# Patient Record
Sex: FEMALE | URBAN - METROPOLITAN AREA
[De-identification: names, ages, dates, MRNs, and addresses within clinical notes are randomized per-mention and may not be internally consistent; named-entity substitution may affect disease eponyms.]

---

## 2019-04-15 ENCOUNTER — OFFICE (OUTPATIENT)
Dept: URBAN - METROPOLITAN AREA CLINIC 34 | Facility: CLINIC | Age: 74
Setting detail: OPHTHALMOLOGY
End: 2019-04-15
Payer: COMMERCIAL

## 2019-04-15 ENCOUNTER — RX ONLY (RX ONLY)
Age: 74
End: 2019-04-15

## 2019-04-15 DIAGNOSIS — H00.024: ICD-10-CM

## 2019-04-15 DIAGNOSIS — E11.9: ICD-10-CM

## 2019-04-15 DIAGNOSIS — H16.221: ICD-10-CM

## 2019-04-15 DIAGNOSIS — H25.13: ICD-10-CM

## 2019-04-15 DIAGNOSIS — H00.021: ICD-10-CM

## 2019-04-15 DIAGNOSIS — H01.004: ICD-10-CM

## 2019-04-15 DIAGNOSIS — H01.001: ICD-10-CM

## 2019-04-15 PROCEDURE — 92004 COMPRE OPH EXAM NEW PT 1/>: CPT | Performed by: OPHTHALMOLOGY

## 2019-04-15 ASSESSMENT — AXIALLENGTH_DERIVED
OS_AL: 22.6591
OD_AL: 22.6642
OD_AL: 22.6642
OS_AL: 22.5696

## 2019-04-15 ASSESSMENT — SUPERFICIAL PUNCTATE KERATITIS (SPK): OD_SPK: 1+

## 2019-04-15 ASSESSMENT — REFRACTION_MANIFEST
OS_VA2: 20/
OS_VA3: 20/
OD_VA2: 20/
OU_VA: 20/
OD_VA1: 20/40
OD_VA3: 20/
OS_VA3: 20/
OS_AXIS: 040
OD_AXIS: 175
OS_CYLINDER: +1.75
OS_VA1: 20/30
OU_VA: 20/
OS_SPHERE: +1.75
OD_VA1: 20/
OD_CYLINDER: +2.25
OD_VA2: 20/
OD_SPHERE: +1.00
OS_VA1: 20/
OD_VA3: 20/
OS_VA2: 20/

## 2019-04-15 ASSESSMENT — SPHEQUIV_DERIVED
OD_SPHEQUIV: 2.125
OD_SPHEQUIV: 2.125
OS_SPHEQUIV: 2.375
OS_SPHEQUIV: 2.625

## 2019-04-15 ASSESSMENT — REFRACTION_CURRENTRX
OD_OVR_VA: 20/
OS_OVR_VA: 20/

## 2019-04-15 ASSESSMENT — REFRACTION_AUTOREFRACTION
OD_SPHERE: +1.00
OD_AXIS: 164
OD_CYLINDER: +2.25
OS_CYLINDER: +1.25
OS_AXIS: 014
OS_SPHERE: +1.75

## 2019-04-15 ASSESSMENT — LID EXAM ASSESSMENTS
OD_MEIBOMITIS: RUL 1+
OD_BLEPHARITIS: RUL 1+
OS_COMMENTS: MISSING GLANDS, TELANGIECTASIA
OS_BLEPHARITIS: LUL T
OS_MEIBOMITIS: LUL 1+ 2+
OD_COMMENTS: MISSING GLANDS

## 2019-04-15 ASSESSMENT — KERATOMETRY
OD_K2POWER_DIOPTERS: 44.25
OS_K1POWER_DIOPTERS: 43.25
METHOD_AUTO_MANUAL: AUTO
OD_K1POWER_DIOPTERS: 43.50
OS_AXISANGLE_DEGREES: 016
OS_K2POWER_DIOPTERS: 44.00
OD_AXISANGLE_DEGREES: 168

## 2019-04-15 ASSESSMENT — CONFRONTATIONAL VISUAL FIELD TEST (CVF)
OD_FINDINGS: FULL
OS_FINDINGS: FULL

## 2019-04-15 ASSESSMENT — VISUAL ACUITY
OD_BCVA: 20/70
OS_BCVA: 20/60+2

## 2019-04-15 ASSESSMENT — DRY EYES - PHYSICIAN NOTES: OD_GENERALCOMMENTS: INFERIOR LIMBUS

## 2020-12-01 ENCOUNTER — OFFICE (OUTPATIENT)
Dept: URBAN - METROPOLITAN AREA CLINIC 63 | Facility: CLINIC | Age: 75
Setting detail: OPHTHALMOLOGY
End: 2020-12-01
Payer: MEDICAID

## 2020-12-01 DIAGNOSIS — E11.9: ICD-10-CM

## 2020-12-01 DIAGNOSIS — H40.013: ICD-10-CM

## 2020-12-01 DIAGNOSIS — H04.123: ICD-10-CM

## 2020-12-01 DIAGNOSIS — H25.13: ICD-10-CM

## 2020-12-01 DIAGNOSIS — H35.033: ICD-10-CM

## 2020-12-01 PROCEDURE — 92020 GONIOSCOPY: CPT | Performed by: OPHTHALMOLOGY

## 2020-12-01 PROCEDURE — 83861 MICROFLUID ANALY TEARS: CPT | Performed by: OPHTHALMOLOGY

## 2020-12-01 PROCEDURE — 92014 COMPRE OPH EXAM EST PT 1/>: CPT | Performed by: OPHTHALMOLOGY

## 2020-12-01 PROCEDURE — 92250 FUNDUS PHOTOGRAPHY W/I&R: CPT | Performed by: OPHTHALMOLOGY

## 2020-12-01 ASSESSMENT — AXIALLENGTH_DERIVED
OS_AL: 22.8807
OS_AL: 22.6539
OD_AL: 22.6168
OD_AL: 22.7067

## 2020-12-01 ASSESSMENT — TONOMETRY
OD_IOP_MMHG: 14
OD_IOP_MMHG: 20
OS_IOP_MMHG: 16
OS_IOP_MMHG: 12

## 2020-12-01 ASSESSMENT — REFRACTION_MANIFEST
OS_CYLINDER: +1.75
OD_VA1: 20/40
OS_VA1: 20/30
OD_AXIS: 175
OS_SPHERE: +1.75
OS_AXIS: 040
OD_SPHERE: +1.00
OD_CYLINDER: +2.25

## 2020-12-01 ASSESSMENT — REFRACTION_AUTOREFRACTION
OD_CYLINDER: -1.75
OD_SPHERE: +3.25
OS_SPHERE: +2.75
OD_AXIS: 053
OS_CYLINDER: -1.50
OS_AXIS: 112

## 2020-12-01 ASSESSMENT — REFRACTION_CURRENTRX
OD_OVR_VA: 20/
OD_CYLINDER: -0.75
OS_ADD: +2.75
OS_VPRISM_DIRECTION: PROGS
OD_ADD: +2.75
OD_SPHERE: +2.25
OD_AXIS: 020
OS_AXIS: 112
OS_CYLINDER: -0.75
OS_SPHERE: +2.25
OS_OVR_VA: 20/
OD_VPRISM_DIRECTION: PROGS

## 2020-12-01 ASSESSMENT — CONFRONTATIONAL VISUAL FIELD TEST (CVF)
OS_FINDINGS: FULL
OD_FINDINGS: FULL

## 2020-12-01 ASSESSMENT — KERATOMETRY
OS_K2POWER_DIOPTERS: 44.00
OD_K1POWER_DIOPTERS: 43.50
OS_AXISANGLE_DEGREES: 012
OD_AXISANGLE_DEGREES: 159
OS_K1POWER_DIOPTERS: 42.75
OD_K2POWER_DIOPTERS: 44.00
METHOD_AUTO_MANUAL: AUTO

## 2020-12-01 ASSESSMENT — SUPERFICIAL PUNCTATE KERATITIS (SPK)
OS_SPK: T
OD_SPK: T

## 2020-12-01 ASSESSMENT — SPHEQUIV_DERIVED
OS_SPHEQUIV: 2
OS_SPHEQUIV: 2.625
OD_SPHEQUIV: 2.375
OD_SPHEQUIV: 2.125

## 2020-12-01 ASSESSMENT — VISUAL ACUITY
OS_BCVA: 20/50
OD_BCVA: 20/40

## 2022-08-09 ENCOUNTER — OFFICE (OUTPATIENT)
Dept: URBAN - METROPOLITAN AREA CLINIC 63 | Facility: CLINIC | Age: 77
Setting detail: OPHTHALMOLOGY
End: 2022-08-09
Payer: MEDICAID

## 2022-08-09 DIAGNOSIS — H04.123: ICD-10-CM

## 2022-08-09 DIAGNOSIS — H25.13: ICD-10-CM

## 2022-08-09 DIAGNOSIS — H35.033: ICD-10-CM

## 2022-08-09 DIAGNOSIS — H02.834: ICD-10-CM

## 2022-08-09 DIAGNOSIS — H40.013: ICD-10-CM

## 2022-08-09 DIAGNOSIS — E11.9: ICD-10-CM

## 2022-08-09 DIAGNOSIS — H43.813: ICD-10-CM

## 2022-08-09 DIAGNOSIS — H02.831: ICD-10-CM

## 2022-08-09 PROCEDURE — 92014 COMPRE OPH EXAM EST PT 1/>: CPT | Performed by: STUDENT IN AN ORGANIZED HEALTH CARE EDUCATION/TRAINING PROGRAM

## 2022-08-09 PROCEDURE — 92133 CPTRZD OPH DX IMG PST SGM ON: CPT | Performed by: STUDENT IN AN ORGANIZED HEALTH CARE EDUCATION/TRAINING PROGRAM

## 2022-08-09 ASSESSMENT — SUPERFICIAL PUNCTATE KERATITIS (SPK)
OS_SPK: 2+
OD_SPK: 2+

## 2022-08-09 ASSESSMENT — TONOMETRY
OD_IOP_MMHG: 15
OS_IOP_MMHG: 15

## 2022-08-09 ASSESSMENT — CONFRONTATIONAL VISUAL FIELD TEST (CVF)
OD_FINDINGS: FULL
OS_FINDINGS: FULL

## 2022-08-09 ASSESSMENT — LID EXAM ASSESSMENTS
OS_MEIBOMITIS: LLL 1+
OD_MEIBOMITIS: RLL 1+

## 2022-08-09 ASSESSMENT — LID POSITION - DERMATOCHALASIS
OS_DERMATOCHALASIS: LUL 1+
OD_DERMATOCHALASIS: RUL 1+

## 2022-08-10 ASSESSMENT — SPHEQUIV_DERIVED
OS_SPHEQUIV: 2.5
OS_SPHEQUIV: 2.75
OD_SPHEQUIV: 1.75
OD_SPHEQUIV: 2.125
OD_SPHEQUIV: 2
OS_SPHEQUIV: 2.625

## 2022-08-10 ASSESSMENT — AXIALLENGTH_DERIVED
OD_AL: 22.9727
OS_AL: 22.7389
OD_AL: 22.835
OS_AL: 22.7843
OS_AL: 22.6938
OD_AL: 22.8807

## 2022-08-10 ASSESSMENT — KERATOMETRY
OS_AXISANGLE_DEGREES: 013
OD_K2POWER_DIOPTERS: 43.75
OD_K1POWER_DIOPTERS: 43.00
OS_K2POWER_DIOPTERS: 43.75
METHOD_AUTO_MANUAL: AUTO
OS_K1POWER_DIOPTERS: 42.50
OD_AXISANGLE_DEGREES: 163

## 2022-08-10 ASSESSMENT — REFRACTION_AUTOREFRACTION
OS_SPHERE: +3.25
OD_CYLINDER: -1.00
OS_AXIS: 113
OD_AXIS: 055
OS_CYLINDER: -1.00
OD_SPHERE: +2.25

## 2022-08-10 ASSESSMENT — REFRACTION_MANIFEST
OS_SPHERE: +3.00
OD_CYLINDER: -1.00
OD_AXIS: 055
OS_SPHERE: +1.75
OD_CYLINDER: +2.25
OS_CYLINDER: +1.75
OS_ADD: +2.75
OD_SPHERE: +1.00
OS_VA1: 20/40
OD_ADD: +2.75
OS_VA1: 20/30
OS_AXIS: 110
OD_SPHERE: +2.50
OS_AXIS: 040
OS_VA2: 20/25
OD_VA2: 20/25
OD_VA1: 20/40
OD_AXIS: 175
OD_VA1: 20/40
OU_VA: 20/30
OS_CYLINDER: -1.00

## 2022-08-10 ASSESSMENT — VISUAL ACUITY
OS_BCVA: 20/50
OD_BCVA: 20/40-1

## 2022-08-10 ASSESSMENT — REFRACTION_CURRENTRX
OD_VPRISM_DIRECTION: PROGS
OS_OVR_VA: 20/
OD_AXIS: 020
OD_CYLINDER: -0.75
OS_SPHERE: +2.25
OS_CYLINDER: -0.75
OD_SPHERE: +2.25
OS_VPRISM_DIRECTION: PROGS
OD_ADD: +2.75
OD_OVR_VA: 20/
OS_AXIS: 112
OS_ADD: +2.75

## 2023-01-06 ENCOUNTER — INPATIENT (INPATIENT)
Facility: HOSPITAL | Age: 78
LOS: 5 days | Discharge: HOME | DRG: 65 | End: 2023-01-12
Attending: PSYCHIATRY & NEUROLOGY | Admitting: PSYCHIATRY & NEUROLOGY
Payer: MEDICARE

## 2023-01-06 VITALS
RESPIRATION RATE: 20 BRPM | WEIGHT: 119.93 LBS | DIASTOLIC BLOOD PRESSURE: 73 MMHG | OXYGEN SATURATION: 98 % | TEMPERATURE: 98 F | HEART RATE: 72 BPM | HEIGHT: 62 IN | SYSTOLIC BLOOD PRESSURE: 205 MMHG

## 2023-01-06 LAB
ALBUMIN SERPL ELPH-MCNC: 4.2 G/DL — SIGNIFICANT CHANGE UP (ref 3.3–5)
ALP SERPL-CCNC: 117 U/L — SIGNIFICANT CHANGE UP (ref 40–120)
ALT FLD-CCNC: 11 U/L — SIGNIFICANT CHANGE UP (ref 10–45)
ANION GAP SERPL CALC-SCNC: 13 MMOL/L — SIGNIFICANT CHANGE UP (ref 5–17)
APTT BLD: 30.6 SEC — SIGNIFICANT CHANGE UP (ref 27.5–35.5)
AST SERPL-CCNC: 17 U/L — SIGNIFICANT CHANGE UP (ref 10–40)
BASE EXCESS BLDV CALC-SCNC: 1.3 MMOL/L — SIGNIFICANT CHANGE UP (ref -2–3)
BASOPHILS # BLD AUTO: 0.04 K/UL — SIGNIFICANT CHANGE UP (ref 0–0.2)
BASOPHILS NFR BLD AUTO: 0.5 % — SIGNIFICANT CHANGE UP (ref 0–2)
BILIRUB SERPL-MCNC: 0.2 MG/DL — SIGNIFICANT CHANGE UP (ref 0.2–1.2)
BLOOD GAS VENOUS - CREATININE: SIGNIFICANT CHANGE UP MG/DL (ref 0.5–1.3)
BUN SERPL-MCNC: 26 MG/DL — HIGH (ref 7–23)
CA-I SERPL-SCNC: 1.23 MMOL/L — SIGNIFICANT CHANGE UP (ref 1.15–1.33)
CALCIUM SERPL-MCNC: 9.7 MG/DL — SIGNIFICANT CHANGE UP (ref 8.4–10.5)
CHLORIDE BLDV-SCNC: 104 MMOL/L — SIGNIFICANT CHANGE UP (ref 96–108)
CHLORIDE SERPL-SCNC: 105 MMOL/L — SIGNIFICANT CHANGE UP (ref 96–108)
CO2 BLDV-SCNC: 29 MMOL/L — HIGH (ref 22–26)
CO2 SERPL-SCNC: 23 MMOL/L — SIGNIFICANT CHANGE UP (ref 22–31)
CREAT SERPL-MCNC: 0.87 MG/DL — SIGNIFICANT CHANGE UP (ref 0.5–1.3)
EGFR: 68 ML/MIN/1.73M2 — SIGNIFICANT CHANGE UP
EOSINOPHIL # BLD AUTO: 0.51 K/UL — HIGH (ref 0–0.5)
EOSINOPHIL NFR BLD AUTO: 6.6 % — HIGH (ref 0–6)
GAS PNL BLDV: 140 MMOL/L — SIGNIFICANT CHANGE UP (ref 136–145)
GAS PNL BLDV: SIGNIFICANT CHANGE UP
GLUCOSE BLDV-MCNC: 231 MG/DL — HIGH (ref 70–99)
GLUCOSE SERPL-MCNC: 226 MG/DL — HIGH (ref 70–99)
HCO3 BLDV-SCNC: 27 MMOL/L — SIGNIFICANT CHANGE UP (ref 22–29)
HCT VFR BLD CALC: 40.3 % — SIGNIFICANT CHANGE UP (ref 34.5–45)
HCT VFR BLDA CALC: 39 % — SIGNIFICANT CHANGE UP (ref 34.5–46.5)
HGB BLD CALC-MCNC: 13.1 G/DL — SIGNIFICANT CHANGE UP (ref 11.7–16.1)
HGB BLD-MCNC: 12.8 G/DL — SIGNIFICANT CHANGE UP (ref 11.5–15.5)
IMM GRANULOCYTES NFR BLD AUTO: 0.5 % — SIGNIFICANT CHANGE UP (ref 0–0.9)
INR BLD: 1.11 RATIO — SIGNIFICANT CHANGE UP (ref 0.88–1.16)
LACTATE BLDV-MCNC: 1.3 MMOL/L — SIGNIFICANT CHANGE UP (ref 0.5–2)
LYMPHOCYTES # BLD AUTO: 2.85 K/UL — SIGNIFICANT CHANGE UP (ref 1–3.3)
LYMPHOCYTES # BLD AUTO: 36.7 % — SIGNIFICANT CHANGE UP (ref 13–44)
MCHC RBC-ENTMCNC: 27.8 PG — SIGNIFICANT CHANGE UP (ref 27–34)
MCHC RBC-ENTMCNC: 31.8 GM/DL — LOW (ref 32–36)
MCV RBC AUTO: 87.4 FL — SIGNIFICANT CHANGE UP (ref 80–100)
MONOCYTES # BLD AUTO: 0.56 K/UL — SIGNIFICANT CHANGE UP (ref 0–0.9)
MONOCYTES NFR BLD AUTO: 7.2 % — SIGNIFICANT CHANGE UP (ref 2–14)
NEUTROPHILS # BLD AUTO: 3.77 K/UL — SIGNIFICANT CHANGE UP (ref 1.8–7.4)
NEUTROPHILS NFR BLD AUTO: 48.5 % — SIGNIFICANT CHANGE UP (ref 43–77)
NRBC # BLD: 0 /100 WBCS — SIGNIFICANT CHANGE UP (ref 0–0)
PCO2 BLDV: 47 MMHG — HIGH (ref 39–42)
PH BLDV: 7.37 — SIGNIFICANT CHANGE UP (ref 7.32–7.43)
PLATELET # BLD AUTO: 277 K/UL — SIGNIFICANT CHANGE UP (ref 150–400)
PO2 BLDV: 37 MMHG — SIGNIFICANT CHANGE UP (ref 25–45)
POTASSIUM BLDV-SCNC: 3.8 MMOL/L — SIGNIFICANT CHANGE UP (ref 3.5–5.1)
POTASSIUM SERPL-MCNC: 4 MMOL/L — SIGNIFICANT CHANGE UP (ref 3.5–5.3)
POTASSIUM SERPL-SCNC: 4 MMOL/L — SIGNIFICANT CHANGE UP (ref 3.5–5.3)
PROT SERPL-MCNC: 8 G/DL — SIGNIFICANT CHANGE UP (ref 6–8.3)
PROTHROM AB SERPL-ACNC: 12.8 SEC — SIGNIFICANT CHANGE UP (ref 10.5–13.4)
RBC # BLD: 4.61 M/UL — SIGNIFICANT CHANGE UP (ref 3.8–5.2)
RBC # FLD: 13.9 % — SIGNIFICANT CHANGE UP (ref 10.3–14.5)
SAO2 % BLDV: 61.8 % — LOW (ref 67–88)
SODIUM SERPL-SCNC: 141 MMOL/L — SIGNIFICANT CHANGE UP (ref 135–145)
TROPONIN T, HIGH SENSITIVITY RESULT: 19 NG/L — SIGNIFICANT CHANGE UP (ref 0–51)
WBC # BLD: 7.77 K/UL — SIGNIFICANT CHANGE UP (ref 3.8–10.5)
WBC # FLD AUTO: 7.77 K/UL — SIGNIFICANT CHANGE UP (ref 3.8–10.5)

## 2023-01-06 PROCEDURE — 99291 CRITICAL CARE FIRST HOUR: CPT

## 2023-01-06 PROCEDURE — 70450 CT HEAD/BRAIN W/O DYE: CPT | Mod: 26,MA,59

## 2023-01-06 PROCEDURE — 71045 X-RAY EXAM CHEST 1 VIEW: CPT | Mod: 26

## 2023-01-06 PROCEDURE — 70496 CT ANGIOGRAPHY HEAD: CPT | Mod: 26,MA

## 2023-01-06 PROCEDURE — 70498 CT ANGIOGRAPHY NECK: CPT | Mod: 26,MA

## 2023-01-06 PROCEDURE — 0042T: CPT | Mod: MA

## 2023-01-06 NOTE — CONSULT NOTE ADULT - SUBJECTIVE AND OBJECTIVE BOX
Neurology - Consult Note - 01-06-23  -  Philippe Cantu MD  PGY-3 Neurology  Spectra: [phone] ([facility]), [phone] ([facility])  -    Name: SARAH DISLA; 78y (1945)    Reason for Admission:     Chief Complaint:     HPI:       Review of Systems:  INCOMPLETE   CONSTITUTIONAL: No fevers or chills  EYES/ENT: No visual changes or no throat pain   NECK: No pain or stiffness  RESPIRATORY: No hemoptysis or shortness of breath  CARDIOVASCULAR: No chest pain or palpitations  GASTROINTESTINAL: No melena or hematochezia  GENITOURINARY: No dysuria or hematuria  NEUROLOGICAL: +As stated in HPI above  SKIN: No itching, burning, rashes, or lesions   All other review of systems is negative unless indicated above.    Allergies:      PMHx:     PFHx:     PSuHx:       Medications:  MEDICATIONS  (STANDING):    MEDICATIONS  (PRN):      Vitals:  T(C): 36.7 (01-06-23 @ 22:50), Max: 36.7 (01-06-23 @ 22:50)  HR: 72 (01-06-23 @ 22:50) (72 - 72)  BP: 205/73 (01-06-23 @ 22:50) (205/73 - 205/73)  RR: 20 (01-06-23 @ 22:50) (20 - 20)  SpO2: 98% (01-06-23 @ 22:50) (98% - 98%)    Physical Examination: INCOMPLETE  ***    Labs:          CAPILLARY BLOOD GLUCOSE      POCT Blood Glucose.: 215 mg/dL (06 Jan 2023 22:54)         Radiology:     Neurology - Consult Note - 01-06-23  -  Philippe Cantu MD  PGY-3 Neurology  Spectra: [phone] ([facility]), [phone] ([facility])  -    Name: SARAH DISLA; 78y (1945)    Reason for Admission:     Chief Complaint:     HPI:       Review of Systems:  INCOMPLETE   CONSTITUTIONAL: No fevers or chills  EYES/ENT: No visual changes or no throat pain   NECK: No pain or stiffness  RESPIRATORY: No hemoptysis or shortness of breath  CARDIOVASCULAR: No chest pain or palpitations  GASTROINTESTINAL: No melena or hematochezia  GENITOURINARY: No dysuria or hematuria  NEUROLOGICAL: +As stated in HPI above  SKIN: No itching, burning, rashes, or lesions   All other review of systems is negative unless indicated above.    Allergies:      PMHx:     PFHx:     PSuHx:       Medications:  MEDICATIONS  (STANDING):    MEDICATIONS  (PRN):      Vitals:  T(C): 36.7 (01-06-23 @ 22:50), Max: 36.7 (01-06-23 @ 22:50)  HR: 72 (01-06-23 @ 22:50) (72 - 72)  BP: 205/73 (01-06-23 @ 22:50) (205/73 - 205/73)  RR: 20 (01-06-23 @ 22:50) (20 - 20)  SpO2: 98% (01-06-23 @ 22:50) (98% - 98%)    Physical Examination: INCOMPLETE  ***    Labs:          CAPILLARY BLOOD GLUCOSE      POCT Blood Glucose.: 215 mg/dL (06 Jan 2023 22:54)         Radiology:     Neurology - Consult Note - 01-06-23  -  Philippe Cantu MD  PGY-3 Neurology  Spectra: [phone] ([facility]), [phone] ([facility])  -    Name: SARAH DISLA; 78y (1945)    Reason for Admission:     Chief Complaint:     HPI:       Review of Systems:  INCOMPLETE   CONSTITUTIONAL: No fevers or chills  EYES/ENT: No visual changes or no throat pain   NECK: No pain or stiffness  RESPIRATORY: No hemoptysis or shortness of breath  CARDIOVASCULAR: No chest pain or palpitations  GASTROINTESTINAL: No melena or hematochezia  GENITOURINARY: No dysuria or hematuria  NEUROLOGICAL: +As stated in HPI above  SKIN: No itching, burning, rashes, or lesions   All other review of systems is negative unless indicated above.    Allergies:      PMHx:     PFHx:     PSuHx:       Medications:  MEDICATIONS  (STANDING):    MEDICATIONS  (PRN):      Vitals:  T(C): 36.7 (01-06-23 @ 22:50), Max: 36.7 (01-06-23 @ 22:50)  HR: 72 (01-06-23 @ 22:50) (72 - 72)  BP: 205/73 (01-06-23 @ 22:50) (205/73 - 205/73)  RR: 20 (01-06-23 @ 22:50) (20 - 20)  SpO2: 98% (01-06-23 @ 22:50) (98% - 98%)    Physical Examination: INCOMPLETE  ***    Labs:          CAPILLARY BLOOD GLUCOSE      POCT Blood Glucose.: 215 mg/dL (06 Jan 2023 22:54)         Radiology:     Neurology - Consult Note - 01-06-23  -  Philippe Cantu MD  PGY-3 Neurology  Spectra: [phone] ([facility]), [phone] ([facility])  -    Name: SARAH DISLA; 78y (1945)    Reason for Admission:     Chief Complaint:     HPI:       Review of Systems: unable to obtain ROS due to lack of speech.  All other review of systems is negative unless indicated above.    Allergies:  NKDA      PMHx:     PFHx:     PSuHx:       Medications:  MEDICATIONS  (STANDING):    MEDICATIONS  (PRN):      Vitals:  T(C): 36.7 (01-06-23 @ 22:50), Max: 36.7 (01-06-23 @ 22:50)  HR: 72 (01-06-23 @ 22:50) (72 - 72)  BP: 205/73 (01-06-23 @ 22:50) (205/73 - 205/73)  RR: 20 (01-06-23 @ 22:50) (20 - 20)  SpO2: 98% (01-06-23 @ 22:50) (98% - 98%)    Physical Examination:      Constitutional: well-developed, well-nourished, well-groomed  Eyes: ophthalmoscopic exam deferred secondary to COVID-19 pandemic  Cardiovascular: no swelling, warm-to-touch    Neurological Examination:    - Mental Status: Eyes open, awake, appears alert, but does not attend to examiner. Unable to assess [SCOTT] orientation to person, age, month, year, location, and situation. No discernable speech. Does not name, repeat, or follow simple commands.     - Cranial Nerves:  II: Diminished blink to threat in the right VF, intact in the left VF. PERRL.  III, IV, VI: Extraocular movements are grossly intact at least horizontally, unclear if patient buries sclera on either side as patient not following commands. No nystagmus.  V: Unable to assess facial sensation.  VII: Moderate-to-severe right lower facial weakness (somewhat limited examination as patient not following command). Forehead appears intact.  VIII: Unable to appropriately assess hearing.    - Motor/Strength Testing:  - Limited motor/strength testing as patient not following commands and does not mimic examiner.  - All extremities drift to the bed before 5 seconds.   - RUE falls faster than LUE.   - LLE falls faster than RLE (not a typo).     - Normal muscle bulk and tone throughout.    - Reflexes:   Bicep (C5/C6):                  R 2+ --- L 2+   Brachioradialis (C5/C6) :   R 2+ --- L 2+   Patella (L3/L4) :                 R 2+ --- L 0+   Ankle (S1) :                       R 0+ --- L 0+     - Plant responses up-going on the right, down-going on the left.    - Sensory: Less grimace and response to noxious stimuli applied to the LUE and LLE. Intact on the right body.  - Coordination: Unable to assess coordination due to not following commands.  - Gait: Unable to assess gait due to not following commands.      Labs:  01-06    141  |  105  |  26<H>  ----------------------------<  226<H>  4.0   |  23  |  0.87    Ca    9.7      06 Jan 2023 23:06    TPro  8.0  /  Alb  4.2  /  TBili  0.2  /  DBili  x   /  AST  17  /  ALT  11  /  AlkPhos  117  01-06                            12.8   7.77  )-----------( 277      ( 06 Jan 2023 23:06 )             40.3       PT/INR - ( 06 Jan 2023 23:06 )   PT: 12.8 sec;   INR: 1.11 ratio     PTT - ( 06 Jan 2023 23:06 )  PTT:30.6 sec  LIVER FUNCTIONS - ( 06 Jan 2023 23:06 )  Alb: 4.2 g/dL / Pro: 8.0 g/dL / ALK PHOS: 117 U/L / ALT: 11 U/L / AST: 17 U/L / GGT: x               Radiology:    CT Brain Stroke Protocol (01.06.23 @ 23:14)    ACC: [id number] EXAM:  CT BRAIN STROKE PROTOCOL                          PROCEDURE DATE:  01/06/2023      INTERPRETATION:  CT HEAD WITHOUT CONTRAST    INDICATION: 78 year old. Female. Canceled code stroke. Right-sided focal   neurological deficits.    COMPARISON: None available.    TECHNIQUE: Noncontrast axial CT head was obtained from the skull base to   vertex. RAPID artificial intelligence was used for preliminary evaluation   of intracranial hemorrhage.    FINDINGS:  Hypoattenuation of the left temporal lobe.  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    No acute intracranial hemorrhage, mass effect or midline shift.    The ventricles and cortical sulci are prominent likely secondary to   cerebral volume loss.  There are hemispheric white matter areas of low attenuation which are   nonspecific but likely related to sequelae of microvascular disease.    The visualized paranasal sinuses and mastoid air cells are well aerated.  No displaced calvarial fracture.    IMPRESSION:  No acute intracranial hemorrhage.    Hypoattenuation of the left temporal lobe, may represent acute left MCA   territory infarct.    Findings discussed with Dr. Cantu by Dr. Muse at 11:14 PM on   1/6/2023.    --- End of Report ---     EV MUSE MD; Resident Radiologist  This document has been electronically signed.  JAKUB SMITH MD; Attending Radiologist  This document has been electronically signed. Jan 6 2023 11:40PM       CT Angio Head & Neck w/ IV Cont; CT Perfusion (01.06.23 @ 23:26)    ACC: [id number] EXAM:  CT ANGIO NECK (W)AW IC                        ACC: [id number] EXAM:  CT BRAIN PERFUSION MAPS STROKE                        ACC: [id number] EXAM:  CT ANGIO BRAIN (W)AW IC                          PROCEDURE DATE:  01/06/2023          INTERPRETATION:  CT ANGIOGRAPHY OF THE HEAD AND NECK    HISTORY: 78 years old. Female. Stroke Code.    COMPARISON: None available.    TECHNIQUE: CT angiographic evaluation of the head and neck was performed   consisting of contiguous axial sections scanned from the vertex to the   level of the aortic arch following the intravenous administration of   iodinated contrast. The acquired data was post-processed to generate   3D/MIP coronal, and sagittal reformats of the head and neck arterial   vasculature. Vascular stenosis is measured by NASCET criteria comparing   the narrowest segment with the distal luminal diameter as related to the   reported measure of arterial narrowing.  90 cc Omnipaque 350 was administered.    FINDINGS:  Conventional arch anatomy. Great vessel origins are patent. Innominate   and visualized subclavian arteries are patent. The common carotid   arteries are unremarkable.    The carotid bifurcations are patent without significant stenosis. The   internal carotid arteries are patent without significant stenosis.    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 The   anterior and right middle cerebral arteries are patent.    Bilateral vertebral arteries are unremarkable from their origins to their   intracranial segments.    The basilar artery is unremarkable. The posterior cerebral arteries are   patent.    Other:  No enlarged cervical adenopathy by size criteria.    Left upper lobe bronchiectasis.    Multilevel degenerative changes in the cervical spine.    CT PERFUSION:  The following measurements were obtained on perfusion maps:    Tmax >10s: 5 mL  Tmax >8s: 16 mL  Tmax >6s: 59 mL  Tmax >4s: 118 mL    CBF <  20%: 0 mL  CBF <  30%: 5 mL  CBF <  34%: 6 mL  CBF <  38%: 7 mL    CBV <  34%: 0 mL  CBV <  38%: 0 mL  CBV <  42%: 0 mL    Core infarct (CBF <  30%:): 5 mL  Penumbra (Tmax >6s): 59 mL  Mismatched volume: 54 mL  Mismatched ratio: 11.8    IMPRESSION:  CTA Head & Neck: Severe stenosis/near complete occlusion with the left M2   branch.  CT Perfusion: There is a penumbra involving a large area of the left MCA   territory.    Findings discussed with  by Dr. Muse at 11:35 PM on   1/6/2023.    Neurology - Consult Note - 01-06-23  -  Philippe Cantu MD  PGY-3 Neurology  Spectra: [phone] ([facility]), [phone] ([facility])  -    Name: SARAH DISLA; 78y (1945)    Reason for Admission:     Chief Complaint:     HPI:       Review of Systems: unable to obtain ROS due to lack of speech.  All other review of systems is negative unless indicated above.    Allergies:  NKDA      PMHx:     PFHx:     PSuHx:       Medications:  MEDICATIONS  (STANDING):    MEDICATIONS  (PRN):      Vitals:  T(C): 36.7 (01-06-23 @ 22:50), Max: 36.7 (01-06-23 @ 22:50)  HR: 72 (01-06-23 @ 22:50) (72 - 72)  BP: 205/73 (01-06-23 @ 22:50) (205/73 - 205/73)  RR: 20 (01-06-23 @ 22:50) (20 - 20)  SpO2: 98% (01-06-23 @ 22:50) (98% - 98%)    Physical Examination:      Constitutional: well-developed, well-nourished, well-groomed  Eyes: ophthalmoscopic exam deferred secondary to COVID-19 pandemic  Cardiovascular: no swelling, warm-to-touch    Neurological Examination:    - Mental Status: Eyes open, awake, appears alert, but does not attend to examiner. Unable to assess [SCOTT] orientation to person, age, month, year, location, and situation. No discernable speech. Does not name, repeat, or follow simple commands.     - Cranial Nerves:  II: Diminished blink to threat in the right VF, intact in the left VF. PERRL.  III, IV, VI: Extraocular movements are grossly intact at least horizontally, unclear if patient buries sclera on either side as patient not following commands. No nystagmus.  V: Unable to assess facial sensation.  VII: Moderate-to-severe right lower facial weakness (somewhat limited examination as patient not following command). Forehead appears intact.  VIII: Unable to appropriately assess hearing.    - Motor/Strength Testing:  - Limited motor/strength testing as patient not following commands and does not mimic examiner.  - All extremities drift to the bed before 5 seconds.   - RUE falls faster than LUE.   - LLE falls faster than RLE (not a typo).     - Normal muscle bulk and tone throughout.    - Reflexes:   Bicep (C5/C6):                  R 2+ --- L 2+   Brachioradialis (C5/C6) :   R 2+ --- L 2+   Patella (L3/L4) :                 R 2+ --- L 0+   Ankle (S1) :                       R 0+ --- L 0+     - Plant responses up-going on the right, down-going on the left.    - Sensory: Less grimace and response to noxious stimuli applied to the LUE and LLE. Intact on the right body.  - Coordination: Unable to assess coordination due to not following commands.  - Gait: Unable to assess gait due to not following commands.      Labs:  01-06    141  |  105  |  26<H>  ----------------------------<  226<H>  4.0   |  23  |  0.87    Ca    9.7      06 Jan 2023 23:06    TPro  8.0  /  Alb  4.2  /  TBili  0.2  /  DBili  x   /  AST  17  /  ALT  11  /  AlkPhos  117  01-06                            12.8   7.77  )-----------( 277      ( 06 Jan 2023 23:06 )             40.3       PT/INR - ( 06 Jan 2023 23:06 )   PT: 12.8 sec;   INR: 1.11 ratio     PTT - ( 06 Jan 2023 23:06 )  PTT:30.6 sec  LIVER FUNCTIONS - ( 06 Jan 2023 23:06 )  Alb: 4.2 g/dL / Pro: 8.0 g/dL / ALK PHOS: 117 U/L / ALT: 11 U/L / AST: 17 U/L / GGT: x               Radiology:    CT Brain Stroke Protocol (01.06.23 @ 23:14)    ACC: [id number] EXAM:  CT BRAIN STROKE PROTOCOL                          PROCEDURE DATE:  01/06/2023      INTERPRETATION:  CT HEAD WITHOUT CONTRAST    INDICATION: 78 year old. Female. Canceled code stroke. Right-sided focal   neurological deficits.    COMPARISON: None available.    TECHNIQUE: Noncontrast axial CT head was obtained from the skull base to   vertex. RAPID artificial intelligence was used for preliminary evaluation   of intracranial hemorrhage.    FINDINGS:  Hypoattenuation of the left temporal lobe.  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    No acute intracranial hemorrhage, mass effect or midline shift.    The ventricles and cortical sulci are prominent likely secondary to   cerebral volume loss.  There are hemispheric white matter areas of low attenuation which are   nonspecific but likely related to sequelae of microvascular disease.    The visualized paranasal sinuses and mastoid air cells are well aerated.  No displaced calvarial fracture.    IMPRESSION:  No acute intracranial hemorrhage.    Hypoattenuation of the left temporal lobe, may represent acute left MCA   territory infarct.    Findings discussed with Dr. Cantu by Dr. Muse at 11:14 PM on   1/6/2023.    --- End of Report ---     EV MUSE MD; Resident Radiologist  This document has been electronically signed.  JAKUB SMITH MD; Attending Radiologist  This document has been electronically signed. Jan 6 2023 11:40PM       CT Angio Head & Neck w/ IV Cont; CT Perfusion (01.06.23 @ 23:26)    ACC: [id number] EXAM:  CT ANGIO NECK (W)AW IC                        ACC: [id number] EXAM:  CT BRAIN PERFUSION MAPS STROKE                        ACC: [id number] EXAM:  CT ANGIO BRAIN (W)AW IC                          PROCEDURE DATE:  01/06/2023          INTERPRETATION:  CT ANGIOGRAPHY OF THE HEAD AND NECK    HISTORY: 78 years old. Female. Stroke Code.    COMPARISON: None available.    TECHNIQUE: CT angiographic evaluation of the head and neck was performed   consisting of contiguous axial sections scanned from the vertex to the   level of the aortic arch following the intravenous administration of   iodinated contrast. The acquired data was post-processed to generate   3D/MIP coronal, and sagittal reformats of the head and neck arterial   vasculature. Vascular stenosis is measured by NASCET criteria comparing   the narrowest segment with the distal luminal diameter as related to the   reported measure of arterial narrowing.  90 cc Omnipaque 350 was administered.    FINDINGS:  Conventional arch anatomy. Great vessel origins are patent. Innominate   and visualized subclavian arteries are patent. The common carotid   arteries are unremarkable.    The carotid bifurcations are patent without significant stenosis. The   internal carotid arteries are patent without significant stenosis.    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 The   anterior and right middle cerebral arteries are patent.    Bilateral vertebral arteries are unremarkable from their origins to their   intracranial segments.    The basilar artery is unremarkable. The posterior cerebral arteries are   patent.    Other:  No enlarged cervical adenopathy by size criteria.    Left upper lobe bronchiectasis.    Multilevel degenerative changes in the cervical spine.    CT PERFUSION:  The following measurements were obtained on perfusion maps:    Tmax >10s: 5 mL  Tmax >8s: 16 mL  Tmax >6s: 59 mL  Tmax >4s: 118 mL    CBF <  20%: 0 mL  CBF <  30%: 5 mL  CBF <  34%: 6 mL  CBF <  38%: 7 mL    CBV <  34%: 0 mL  CBV <  38%: 0 mL  CBV <  42%: 0 mL    Core infarct (CBF <  30%:): 5 mL  Penumbra (Tmax >6s): 59 mL  Mismatched volume: 54 mL  Mismatched ratio: 11.8    IMPRESSION:  CTA Head & Neck: Severe stenosis/near complete occlusion with the left M2   branch.  CT Perfusion: There is a penumbra involving a large area of the left MCA   territory.    Findings discussed with  by Dr. Muse at 11:35 PM on   1/6/2023.    Neurology - Consult Note - 01-06-23  -  Philippe Cantu MD  PGY-3 Neurology  Spectra: [phone] ([facility]), [phone] ([facility])  -    Name: SARAH DISLA; 78y (1945)    Reason for Admission:     Chief Complaint:     HPI:       Review of Systems: unable to obtain ROS due to lack of speech.  All other review of systems is negative unless indicated above.    Allergies:  NKDA      PMHx:     PFHx:     PSuHx:       Medications:  MEDICATIONS  (STANDING):    MEDICATIONS  (PRN):      Vitals:  T(C): 36.7 (01-06-23 @ 22:50), Max: 36.7 (01-06-23 @ 22:50)  HR: 72 (01-06-23 @ 22:50) (72 - 72)  BP: 205/73 (01-06-23 @ 22:50) (205/73 - 205/73)  RR: 20 (01-06-23 @ 22:50) (20 - 20)  SpO2: 98% (01-06-23 @ 22:50) (98% - 98%)    Physical Examination:      Constitutional: well-developed, well-nourished, well-groomed  Eyes: ophthalmoscopic exam deferred secondary to COVID-19 pandemic  Cardiovascular: no swelling, warm-to-touch    Neurological Examination:    - Mental Status: Eyes open, awake, appears alert, but does not attend to examiner. Unable to assess [SCOTT] orientation to person, age, month, year, location, and situation. No discernable speech. Does not name, repeat, or follow simple commands.     - Cranial Nerves:  II: Diminished blink to threat in the right VF, intact in the left VF. PERRL.  III, IV, VI: Extraocular movements are grossly intact at least horizontally, unclear if patient buries sclera on either side as patient not following commands. No nystagmus.  V: Unable to assess facial sensation.  VII: Moderate-to-severe right lower facial weakness (somewhat limited examination as patient not following command). Forehead appears intact.  VIII: Unable to appropriately assess hearing.    - Motor/Strength Testing:  - Limited motor/strength testing as patient not following commands and does not mimic examiner.  - All extremities drift to the bed before 5 seconds.   - RUE falls faster than LUE.   - LLE falls faster than RLE (not a typo).     - Normal muscle bulk and tone throughout.    - Reflexes:   Bicep (C5/C6):                  R 2+ --- L 2+   Brachioradialis (C5/C6) :   R 2+ --- L 2+   Patella (L3/L4) :                 R 2+ --- L 0+   Ankle (S1) :                       R 0+ --- L 0+     - Plant responses up-going on the right, down-going on the left.    - Sensory: Less grimace and response to noxious stimuli applied to the LUE and LLE. Intact on the right body.  - Coordination: Unable to assess coordination due to not following commands.  - Gait: Unable to assess gait due to not following commands.      Labs:  01-06    141  |  105  |  26<H>  ----------------------------<  226<H>  4.0   |  23  |  0.87    Ca    9.7      06 Jan 2023 23:06    TPro  8.0  /  Alb  4.2  /  TBili  0.2  /  DBili  x   /  AST  17  /  ALT  11  /  AlkPhos  117  01-06                            12.8   7.77  )-----------( 277      ( 06 Jan 2023 23:06 )             40.3       PT/INR - ( 06 Jan 2023 23:06 )   PT: 12.8 sec;   INR: 1.11 ratio     PTT - ( 06 Jan 2023 23:06 )  PTT:30.6 sec  LIVER FUNCTIONS - ( 06 Jan 2023 23:06 )  Alb: 4.2 g/dL / Pro: 8.0 g/dL / ALK PHOS: 117 U/L / ALT: 11 U/L / AST: 17 U/L / GGT: x               Radiology:    CT Brain Stroke Protocol (01.06.23 @ 23:14)    ACC: [id number] EXAM:  CT BRAIN STROKE PROTOCOL                          PROCEDURE DATE:  01/06/2023      INTERPRETATION:  CT HEAD WITHOUT CONTRAST    INDICATION: 78 year old. Female. Canceled code stroke. Right-sided focal   neurological deficits.    COMPARISON: None available.    TECHNIQUE: Noncontrast axial CT head was obtained from the skull base to   vertex. RAPID artificial intelligence was used for preliminary evaluation   of intracranial hemorrhage.    FINDINGS:  Hypoattenuation of the left temporal lobe.  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    No acute intracranial hemorrhage, mass effect or midline shift.    The ventricles and cortical sulci are prominent likely secondary to   cerebral volume loss.  There are hemispheric white matter areas of low attenuation which are   nonspecific but likely related to sequelae of microvascular disease.    The visualized paranasal sinuses and mastoid air cells are well aerated.  No displaced calvarial fracture.    IMPRESSION:  No acute intracranial hemorrhage.    Hypoattenuation of the left temporal lobe, may represent acute left MCA   territory infarct.    Findings discussed with Dr. Cantu by Dr. Muse at 11:14 PM on   1/6/2023.    --- End of Report ---     EV MUSE MD; Resident Radiologist  This document has been electronically signed.  JAKUB SMITH MD; Attending Radiologist  This document has been electronically signed. Jan 6 2023 11:40PM       CT Angio Head & Neck w/ IV Cont; CT Perfusion (01.06.23 @ 23:26)    ACC: [id number] EXAM:  CT ANGIO NECK (W)AW IC                        ACC: [id number] EXAM:  CT BRAIN PERFUSION MAPS STROKE                        ACC: [id number] EXAM:  CT ANGIO BRAIN (W)AW IC                          PROCEDURE DATE:  01/06/2023          INTERPRETATION:  CT ANGIOGRAPHY OF THE HEAD AND NECK    HISTORY: 78 years old. Female. Stroke Code.    COMPARISON: None available.    TECHNIQUE: CT angiographic evaluation of the head and neck was performed   consisting of contiguous axial sections scanned from the vertex to the   level of the aortic arch following the intravenous administration of   iodinated contrast. The acquired data was post-processed to generate   3D/MIP coronal, and sagittal reformats of the head and neck arterial   vasculature. Vascular stenosis is measured by NASCET criteria comparing   the narrowest segment with the distal luminal diameter as related to the   reported measure of arterial narrowing.  90 cc Omnipaque 350 was administered.    FINDINGS:  Conventional arch anatomy. Great vessel origins are patent. Innominate   and visualized subclavian arteries are patent. The common carotid   arteries are unremarkable.    The carotid bifurcations are patent without significant stenosis. The   internal carotid arteries are patent without significant stenosis.    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 The   anterior and right middle cerebral arteries are patent.    Bilateral vertebral arteries are unremarkable from their origins to their   intracranial segments.    The basilar artery is unremarkable. The posterior cerebral arteries are   patent.    Other:  No enlarged cervical adenopathy by size criteria.    Left upper lobe bronchiectasis.    Multilevel degenerative changes in the cervical spine.    CT PERFUSION:  The following measurements were obtained on perfusion maps:    Tmax >10s: 5 mL  Tmax >8s: 16 mL  Tmax >6s: 59 mL  Tmax >4s: 118 mL    CBF <  20%: 0 mL  CBF <  30%: 5 mL  CBF <  34%: 6 mL  CBF <  38%: 7 mL    CBV <  34%: 0 mL  CBV <  38%: 0 mL  CBV <  42%: 0 mL    Core infarct (CBF <  30%:): 5 mL  Penumbra (Tmax >6s): 59 mL  Mismatched volume: 54 mL  Mismatched ratio: 11.8    IMPRESSION:  CTA Head & Neck: Severe stenosis/near complete occlusion with the left M2   branch.  CT Perfusion: There is a penumbra involving a large area of the left MCA   territory.    Findings discussed with  by Dr. Muse at 11:35 PM on   1/6/2023.

## 2023-01-06 NOTE — STROKE CODE NOTE - CT PERFORMED
Referring provider: Ulysses Alford OD  Primary eye provider: Ulysses Alford OD  Primary care provider:Carlos Heredia MD    Does Mr. Campbell have  today: yes  Mr. Campbell gave us verbal permission to discuss their medical condition in the presence of the following individual(s) in the room: NA      Last dilated exam each eye:  2/4/21  Last comprehensive exam:  3/1/2022  Last fluorescein angiogram:  6/8/21  Heart attack or stroke updated: 5/10/2022      CHIEF COMPLAINT/HPI (technician): 4 week return  for central retinal vein occlusion with macular edema of the right eye.  Patient states that his vision is the same.         HISTORY OF PRESENT ILLNESS (DOCTOR):  Mr. Campbell is a 84 year old patient here for 5 week return  for central retinal vein occlusion with macular edema of the right eye.  I reviewed and agree with the history as noted above.    The patient was diagnosed with diabetes at age 69?. They check their blood sugars PRN. Their blood sugar control is adequate.     Hemoglobin A1C (%)   Date Value   11/30/2019 5.8 (H)     Last blood sugar was unknown. States he doesn't check it at home (5/10/2022)      PAST OCULAR HISTORY:  Pseudophakia Both Eyes   Glaucoma suspect both eyes  Central Retinal Vein Occlusion Right Eye-present since at least 1/6/21      PAST OCULAR PROCEDURES:  No heart attack, had a mini stroke about 3 years ago: updated 7/22/22    RIGHT EYE:   Cataract Extraction: Right Eye  Avastin: 2/4/21, 3/9/21, 4/6/21, 5/6/21, 7/6/21,8-6-21(martin),9-14-21, 10/18/21  Eylea: 12/17/21, 1/17/22, 3/1/22, 4/5/22, 5/10/22, 7/22/22  LEFT EYE:   Cataract Extraction: Left Eye      CURRENT EYE MEDICATION:  Current eye drops - Latanoprost Both Eyes at bedtime    Base Eye Exam     Visual Acuity (Snellen - Linear)       Right Left    Dist cc 20/30 -1 20/20 -2    Correction: Glasses          Tonometry (Applanation, 9:11 AM)       Right Left    Pressure 16 20          Pupils       Pupils    Right PERRL    Left  PERRL          Neuro/Psych     Oriented x3: Yes    Mood/Affect: Normal          Dilation     Right eye: 2.5% Phenylephrine / 1% Tropicamide @ 9:11 AM            Additional Notes    Angles open right eye     Slit Lamp and Fundus Exam     External Exam       Right Left    External Normal           Slit Lamp Exam       Right Left    Lids/Lashes 2+ Dermatochalasis - upper lid     Conjunctiva/Sclera Clear     Cornea Clear     Anterior Chamber Deep and quiet     Iris Round and regular, no neovascularization of the iris or angles     Lens Posterior chamber intraocular lens, Open posterior capsule     Vitreous Posterior vitreous detachment           Fundus Exam       Right Left    Disc Minimal residual hemorrhage.  Otherwise pink, flat, clear.  No neovascularization, no shunt vessels at this stage     C/D Ratio 0.6     Macula No hemorrhage/exudate/fluid visible     Vessels Tortuous     Periphery Flat, healthy, without tears or detachments                 TEST: Ocular Coherence Tomography - Macula      Indication for test:  Central retinal vein occlusion of the right eye with macular edema.    Impression:       Right eye:  Central sub field thickness 428.  There is moderate fluid, worse than previous.          PROCEDURE PERFORMED: Intravitreal Eylea Right Eye    INDICATIONS: macular edema right eye    ANESTHESIA:  Proparacaine 0.5%, Tetracaine 0.5% and Subconjunctival lidocaine    SPECIMEN: None    COMPLICATIONS: None    ESTIMATED BLOOD LOSS: None    DESCRIPTION OF PROCEDURE: Treatment options were reviewed with the patient and after our discussion they elected to proceed.    The above-mentioned topical anesthetic was placed into the eye for approximately 5 minutes. An eyelid speculum was placed and the eye was then swabbed with povidone iodine 5%. 0.1 CC of Lidocaine was then injected subconjunctivally in the inferotemporal bulbar conjunctiva. This was allowed to sit for 5 minutes. The eye was again swabbed with Povidone  Iodine 5%. Eylea was injected 3.5-4.0 mm from the limbus in the inferotemporal quadrant.     Their vision was checked and confirmed to be at least count fingers before the completion of the visit.    DISPOSITION: The patient tolerated the procedure well Without complication.           ASSESSMENT/PLAN:  1. Central retinal vein occlusion with macular edema, right eye.  Confirmed by angiography.  Much better today.  We reviewed treatment options elected to continue with the Eylea injection.  Please see my operative note above.      2. Nonexudative macular degeneration, each eye.  Continue to monitor both eyes with the Amsler grid or equivalent.      3.  Ocular hypertension, left eye.  Intra-ocular pressure is good again.  Continue present management.        DROPS:    1. Latanoprost-1 drop to both eyes at bedtime.    Follow-up in 5 weeks for central retinal vein occlusion with macular edema of the right eye.  Ocular coherence tomography of the macula in the right eye.  Likely Eylea in the right eye.   06-Jan-2023 23:12

## 2023-01-06 NOTE — ED PROVIDER NOTE - PHYSICAL EXAMINATION
Gen: WDWN, NAD, comfortable appearing, hemodynamically stable   HEENT: Significant right sided facial droop with forehead sparing, atraumatic head, PERRLA, EOMI, no nasal discharge, mucous membranes moist   CV: RRR, +S1/S2, no M/R/G, equal b/l radial pulses 2+  Resp: CTAB, no W/R/R, no increased WOB   GI: Abdomen soft non-distended, NTTP, no masses/organomegaly   MSK/Skin: No CVA tenderness, no open wounds, no bruising, no LE edema  Neuro: Right sided facial droop with forehead sparing, AAOX0-1, MS +3/5 in in RUE/RLE, unsteady/slow gait   Psych: appropriate mood

## 2023-01-06 NOTE — ED PROVIDER NOTE - CLINICAL SUMMARY MEDICAL DECISION MAKING FREE TEXT BOX
78-year-old female with no past medical history presenting with facial droop and unsteady gait, facial droop started 4 days ago with gait instability starting around 3:30pm today, obvious right sided facial droop with RUE/RLE weakness c/f CVA; code stroke called in triage but patient not within TPA window; pending CTs, neuro recs, and will continue to reassess. Will also obtain labs for low suspicion of infectious etiology vs. anemia vs. electrolyte derangement 78-year-old female with no past medical history presenting with facial droop and unsteady gait, facial droop started 4 days ago with gait instability starting around 3:30pm today, obvious right sided facial droop with RUE/RLE weakness c/f CVA; code stroke called in triage but patient not within TPA window; pending CTs, neuro recs, and will continue to reassess. Will also obtain labs for low suspicion of infectious etiology vs. anemia vs. electrolyte derangement    Attending MD Belcher.  Pt is a 79 yo with no sig pmhx aside from dementia who presents ~3-4 days after onset of R facial droop with onset of difficulty ambulating today with onset ~1500.  Pt arrives with evidence of stroke sxs outside TPA window.  Stroke protocol pursued, neurology in house.  Pt expedited to CT.  Planned admission and management per neuro recs. 78-year-old female with no past medical history presenting with facial droop and unsteady gait, facial droop started 4 days ago with gait instability starting around 3:30pm today, obvious right sided facial droop with RUE/RLE weakness c/f CVA; code stroke called in triage but patient not within TPA window; pending CTs, neuro recs, and will continue to reassess. Will also obtain labs for low suspicion of infectious etiology vs. anemia vs. electrolyte derangement    Attending MD Belcher.  Pt is a 77 yo with no sig pmhx aside from dementia who presents ~3-4 days after onset of R facial droop with onset of difficulty ambulating today with onset ~1500.  Pt arrives with evidence of stroke sxs outside TPA window.  Stroke protocol pursued, neurology in house.  Pt expedited to CT.  Planned admission and management per neuro recs.

## 2023-01-06 NOTE — ED PROVIDER NOTE - OBJECTIVE STATEMENT
Attending MD Belcher.  Pt is a 79 yo with no sig pmhx aside from dementia Attending MD Belcher.  Pt is a 77 yo with no sig pmhx aside from dementia Attending MD Belcher.  Pt is a 79 yo with no sig pmhx aside from dementia    Goodrich, PGY-3  78-year-old female with no past medical history presenting with facial droop and unsteady gait.  Family at bedside noticed that patient started to have right-sided facial droop starting 4 days ago.  Today around 3:30 PM patient started to have unsteady gait and aphasia with last known well right before incident.  Patient not participating in history: Family at bedside reports more confused than usual but possible baseline dementia.  Family denies any recent fevers, vomiting/diarrhea, cough, rashes, complaints about changes in urination.  No recent fall/trauma.  Not on any anticoagulations, not taking any medications.  Normally ambulates independently. Attending MD Belcher.  Pt is a 77 yo with no sig pmhx aside from dementia    Goodrich, PGY-3  78-year-old female with no past medical history presenting with facial droop and unsteady gait.  Family at bedside noticed that patient started to have right-sided facial droop starting 4 days ago.  Today around 3:30 PM patient started to have unsteady gait and aphasia with last known well right before incident.  Patient not participating in history: Family at bedside reports more confused than usual but possible baseline dementia.  Family denies any recent fevers, vomiting/diarrhea, cough, rashes, complaints about changes in urination.  No recent fall/trauma.  Not on any anticoagulations, not taking any medications.  Normally ambulates independently. Kp, PGY-3  78-year-old female with no past medical history presenting with facial droop and unsteady gait.  Family at bedside noticed that patient started to have right-sided facial droop starting 4 days ago.  Today around 3:30 PM patient started to have unsteady gait and aphasia with last known well right before incident.  Patient not participating in history: Family at bedside reports more confused than usual but possible baseline dementia.  Family denies any recent fevers, vomiting/diarrhea, cough, rashes, complaints about changes in urination.  No recent fall/trauma.  Not on any anticoagulations, not taking any medications.  Normally ambulates independently.

## 2023-01-06 NOTE — ED PROVIDER NOTE - ATTENDING CONTRIBUTION TO CARE
Attending MD Belcher:  I performed a history and physical exam of the patient and discussed their management with the resident. I reviewed the resident's note and agree with the documented findings and plan of care. My medical decision making and observations are found above.    Critical care billing:  Upon my evaluation, this patient had a high probability of imminent or life-threatening deterioration due to acute stroke, which required my direct attention, intervention, and personal management.  The patient has a  medical condition that impairs one or more vital organ systems.  Frequent personal assessment and adjustment of medical interventions was performed.      I have personally provided 45 minutes of critical care time exclusive of time spent on separately billable procedures. Time includes review of laboratory data, radiology results, discussion with consultants, patient and family; monitoring for potential decompensation, as well as time spent retrieving data and reviewing the chart and documenting the visit. Interventions were performed as documented above.

## 2023-01-06 NOTE — CONSULT NOTE ADULT - ASSESSMENT
INCOMPLETE    Assessment: ***. Initial VS in ED: ***. Exam: ***.      mRS: ***  LKN: ***  NIHSS: ***    Impression: ***    Recommendations:    Diagnostics:  [] MRI brain without contrast   [] TTE   [] Implantable loop recorder - as per StrokeAF trial  [] Lipid panel, HbA1c  [] CBC, CMP, coagulation panel, troponin    Medications:  [] ASA 81 mg PO (325 per rectum if fails dysphagia)   [] Atorvastatin 80mg (titrate to LDL < 70)   [] Lovenox SQ for DVT prophylaxis     Miscellaneous:  [] NPO unless passes dysphagia screen; swallow eval if fails  [] Keep BP permissive up to 220/110 for 48 hours followed by gradual normotension over 2-3 days   [] Telemonitoring  [] Neurological checks and vital signs per unit protocol  [] BGM goals 140-180  [] PT/OT; S/S evaluation    * Case and plan *** with Stroke Fellow Dr. Bill Lemus *  * Case and plan to be discussed with Neurology Attending *    * Case and plan not final until Attending attestation *

## 2023-01-06 NOTE — ED PROVIDER NOTE - PROGRESS NOTE DETAILS
Kp, PGY-3  Severe/near complete occlusion for left M2 with penumbra in left MCA territory. Neuro aware and reports to give only aspirin at this time. Patient's /70; no treatment at this time; will continue to monitor/permissive HTN Attending MD Belcher.  Stroke service accepting pt to their service in stable condition for subacute/acute stroke.

## 2023-01-07 DIAGNOSIS — I63.9 CEREBRAL INFARCTION, UNSPECIFIED: ICD-10-CM

## 2023-01-07 LAB
A1C WITH ESTIMATED AVERAGE GLUCOSE RESULT: 8.6 % — HIGH (ref 4–5.6)
ANION GAP SERPL CALC-SCNC: 13 MMOL/L — SIGNIFICANT CHANGE UP (ref 5–17)
APPEARANCE UR: CLEAR — SIGNIFICANT CHANGE UP
BACTERIA # UR AUTO: NEGATIVE — SIGNIFICANT CHANGE UP
BILIRUB UR-MCNC: NEGATIVE — SIGNIFICANT CHANGE UP
BUN SERPL-MCNC: 22 MG/DL — SIGNIFICANT CHANGE UP (ref 7–23)
CALCIUM SERPL-MCNC: 9.6 MG/DL — SIGNIFICANT CHANGE UP (ref 8.4–10.5)
CHLORIDE SERPL-SCNC: 104 MMOL/L — SIGNIFICANT CHANGE UP (ref 96–108)
CO2 SERPL-SCNC: 22 MMOL/L — SIGNIFICANT CHANGE UP (ref 22–31)
COLOR SPEC: SIGNIFICANT CHANGE UP
CREAT SERPL-MCNC: 0.59 MG/DL — SIGNIFICANT CHANGE UP (ref 0.5–1.3)
DIFF PNL FLD: ABNORMAL
EGFR: 92 ML/MIN/1.73M2 — SIGNIFICANT CHANGE UP
EPI CELLS # UR: 1 /HPF — SIGNIFICANT CHANGE UP
ESTIMATED AVERAGE GLUCOSE: 200 MG/DL — HIGH (ref 68–114)
FLUAV AG NPH QL: SIGNIFICANT CHANGE UP
FLUBV AG NPH QL: SIGNIFICANT CHANGE UP
GLUCOSE SERPL-MCNC: 186 MG/DL — HIGH (ref 70–99)
GLUCOSE UR QL: ABNORMAL
HCT VFR BLD CALC: 40.2 % — SIGNIFICANT CHANGE UP (ref 34.5–45)
HGB BLD-MCNC: 12.8 G/DL — SIGNIFICANT CHANGE UP (ref 11.5–15.5)
HYALINE CASTS # UR AUTO: 1 /LPF — SIGNIFICANT CHANGE UP (ref 0–2)
KETONES UR-MCNC: NEGATIVE — SIGNIFICANT CHANGE UP
LEUKOCYTE ESTERASE UR-ACNC: NEGATIVE — SIGNIFICANT CHANGE UP
MCHC RBC-ENTMCNC: 27.5 PG — SIGNIFICANT CHANGE UP (ref 27–34)
MCHC RBC-ENTMCNC: 31.8 GM/DL — LOW (ref 32–36)
MCV RBC AUTO: 86.3 FL — SIGNIFICANT CHANGE UP (ref 80–100)
NITRITE UR-MCNC: NEGATIVE — SIGNIFICANT CHANGE UP
NRBC # BLD: 0 /100 WBCS — SIGNIFICANT CHANGE UP (ref 0–0)
PH UR: 6 — SIGNIFICANT CHANGE UP (ref 5–8)
PLATELET # BLD AUTO: 277 K/UL — SIGNIFICANT CHANGE UP (ref 150–400)
POTASSIUM SERPL-MCNC: 3.7 MMOL/L — SIGNIFICANT CHANGE UP (ref 3.5–5.3)
POTASSIUM SERPL-SCNC: 3.7 MMOL/L — SIGNIFICANT CHANGE UP (ref 3.5–5.3)
PROT UR-MCNC: ABNORMAL
RBC # BLD: 4.66 M/UL — SIGNIFICANT CHANGE UP (ref 3.8–5.2)
RBC # FLD: 13.6 % — SIGNIFICANT CHANGE UP (ref 10.3–14.5)
RBC CASTS # UR COMP ASSIST: 2 /HPF — SIGNIFICANT CHANGE UP (ref 0–4)
RSV RNA NPH QL NAA+NON-PROBE: SIGNIFICANT CHANGE UP
SARS-COV-2 RNA SPEC QL NAA+PROBE: SIGNIFICANT CHANGE UP
SODIUM SERPL-SCNC: 139 MMOL/L — SIGNIFICANT CHANGE UP (ref 135–145)
SP GR SPEC: >1.05 (ref 1.01–1.02)
TROPONIN T, HIGH SENSITIVITY RESULT: 11 NG/L — SIGNIFICANT CHANGE UP (ref 0–51)
UROBILINOGEN FLD QL: NEGATIVE — SIGNIFICANT CHANGE UP
WBC # BLD: 10.11 K/UL — SIGNIFICANT CHANGE UP (ref 3.8–10.5)
WBC # FLD AUTO: 10.11 K/UL — SIGNIFICANT CHANGE UP (ref 3.8–10.5)
WBC UR QL: 1 /HPF — SIGNIFICANT CHANGE UP (ref 0–5)

## 2023-01-07 PROCEDURE — 99291 CRITICAL CARE FIRST HOUR: CPT

## 2023-01-07 PROCEDURE — 93010 ELECTROCARDIOGRAM REPORT: CPT

## 2023-01-07 PROCEDURE — 70551 MRI BRAIN STEM W/O DYE: CPT | Mod: 26

## 2023-01-07 RX ORDER — ASPIRIN/CALCIUM CARB/MAGNESIUM 324 MG
81 TABLET ORAL DAILY
Refills: 0 | Status: DISCONTINUED | OUTPATIENT
Start: 2023-01-07 | End: 2023-01-12

## 2023-01-07 RX ORDER — HALOPERIDOL DECANOATE 100 MG/ML
2 INJECTION INTRAMUSCULAR ONCE
Refills: 0 | Status: COMPLETED | OUTPATIENT
Start: 2023-01-07 | End: 2023-01-07

## 2023-01-07 RX ORDER — CLOPIDOGREL BISULFATE 75 MG/1
300 TABLET, FILM COATED ORAL ONCE
Refills: 0 | Status: COMPLETED | OUTPATIENT
Start: 2023-01-07 | End: 2023-01-07

## 2023-01-07 RX ORDER — ATORVASTATIN CALCIUM 80 MG/1
80 TABLET, FILM COATED ORAL AT BEDTIME
Refills: 0 | Status: DISCONTINUED | OUTPATIENT
Start: 2023-01-07 | End: 2023-01-12

## 2023-01-07 RX ORDER — CLOPIDOGREL BISULFATE 75 MG/1
75 TABLET, FILM COATED ORAL DAILY
Refills: 0 | Status: DISCONTINUED | OUTPATIENT
Start: 2023-01-08 | End: 2023-01-12

## 2023-01-07 RX ORDER — ENOXAPARIN SODIUM 100 MG/ML
40 INJECTION SUBCUTANEOUS EVERY 24 HOURS
Refills: 0 | Status: DISCONTINUED | OUTPATIENT
Start: 2023-01-07 | End: 2023-01-12

## 2023-01-07 RX ORDER — SODIUM CHLORIDE 9 MG/ML
1000 INJECTION INTRAMUSCULAR; INTRAVENOUS; SUBCUTANEOUS
Refills: 0 | Status: DISCONTINUED | OUTPATIENT
Start: 2023-01-07 | End: 2023-01-10

## 2023-01-07 RX ORDER — ASPIRIN/CALCIUM CARB/MAGNESIUM 324 MG
81 TABLET ORAL ONCE
Refills: 0 | Status: COMPLETED | OUTPATIENT
Start: 2023-01-07 | End: 2023-01-07

## 2023-01-07 RX ORDER — QUETIAPINE FUMARATE 200 MG/1
12.5 TABLET, FILM COATED ORAL AT BEDTIME
Refills: 0 | Status: DISCONTINUED | OUTPATIENT
Start: 2023-01-07 | End: 2023-01-07

## 2023-01-07 RX ADMIN — Medication 1 MILLIGRAM(S): at 12:46

## 2023-01-07 RX ADMIN — HALOPERIDOL DECANOATE 2 MILLIGRAM(S): 100 INJECTION INTRAMUSCULAR at 06:16

## 2023-01-07 RX ADMIN — ENOXAPARIN SODIUM 40 MILLIGRAM(S): 100 INJECTION SUBCUTANEOUS at 05:10

## 2023-01-07 RX ADMIN — Medication 81 MILLIGRAM(S): at 11:47

## 2023-01-07 RX ADMIN — Medication 1 MILLIGRAM(S): at 14:27

## 2023-01-07 RX ADMIN — Medication 81 MILLIGRAM(S): at 00:20

## 2023-01-07 RX ADMIN — HALOPERIDOL DECANOATE 2 MILLIGRAM(S): 100 INJECTION INTRAMUSCULAR at 05:10

## 2023-01-07 RX ADMIN — CLOPIDOGREL BISULFATE 300 MILLIGRAM(S): 75 TABLET, FILM COATED ORAL at 12:16

## 2023-01-07 NOTE — H&P ADULT - NSHPPHYSICALEXAM_GEN_ALL_CORE
- Mental Status: Eyes open, awake, appears alert, but does not attend to examiner. Unable to assess [SCOTT] orientation to person, age, month, year, location, and situation. No discernable speech. Does not name, repeat, or follow simple commands.     - Cranial Nerves:  II: Diminished blink to threat in the right VF, intact in the left VF. PERRL.  III, IV, VI: Extraocular movements are grossly intact at least horizontally, unclear if patient buries sclera on either side as patient not following commands. No nystagmus.  V: Unable to assess facial sensation.  VII: Moderate-to-severe right lower facial weakness (somewhat limited examination as patient not following command). Forehead appears intact.  VIII: Unable to appropriately assess hearing.    - Motor/Strength Testing:  - Limited motor/strength testing as patient not following commands and does not mimic examiner.  - All extremities drift to the bed before 5 seconds.   - RUE falls faster than LUE.   - LLE falls faster than RLE (not a typo).     - Normal muscle bulk and tone throughout.    - Reflexes:   Bicep (C5/C6):                  R 2+ --- L 2+   Brachioradialis (C5/C6) :   R 2+ --- L 2+   Patella (L3/L4) :                 R 2+ --- L 0+   Ankle (S1) :                       R 0+ --- L 0+     - Plant responses up-going on the right, down-going on the left.    - Sensory: Less grimace and response to noxious stimuli applied to the LUE and LLE. Intact on the right body.  - Coordination: Unable to assess coordination due to not following commands.  - Gait: Unable to assess gait due to not following commands.

## 2023-01-07 NOTE — OCCUPATIONAL THERAPY INITIAL EVALUATION ADULT - PERTINENT HX OF CURRENT PROBLEM, REHAB EVAL
78 year-old right-handed female presenting to [facility] ED w/ right facial droop that started on 1/2/23. On day of arrival, 1/6/23, around 15:30PM, patient noted to have unsteady gait and language abnormalities (not speaking, difficulty understanding), whereas these symptoms were apparently not present just before 15:30PM. CTA Head & Neck 1/6: Severe stenosis/near complete occlusion with the left M2 branch. CT Perfusion 1/6: There is a penumbra involving a large area of the left MCA territory. CT Brain 1/6-No acute intracranial hemorrhage. Hypoattenuation of the left temporal lobe, may represent acute left MCA territory infarct. XRAY Chest 1/6-Left upper lung bronchiectasis but otherwise no focal consolidation. 78 year-old right-handed female presenting to [facility] ED w/ right facial droop that started on 1/2/23. On day of arrival, 1/6/23, around 15:30PM, patient noted to have unsteady gait and language abnormalities (not speaking, difficulty understanding), whereas these symptoms were apparently not present just before 15:30PM. CTA Head & Neck 1/6: Severe stenosis/near complete occlusion with the left M2 branch. CT Perfusion 1/6: There is a penumbra involving a large area of the left MCA territory. CT Brain 1/6-No acute intracranial hemorrhage. Hypoattenuation of the left temporal lobe, may represent acute left MCA territory infarct. XRAY Chest 1/6-Left upper lung bronchiectasis but otherwise no focal consolidation. 78 year-old right-handed female presenting to [facility] ED w/ right facial droop that started on 1/2/23. On day of arrival, 1/6/23, around 15:30PM, patient noted to have unsteady gait and language abnormalities (not speaking, difficulty understanding), whereas these symptoms were apparently not present just before 15:30PM. CTA Head & Neck 1/6: Severe stenosis/near complete occlusion with the left M2 branch. CT Perfusion 1/6: There is a penumbra involving a large area of the left MCA territory. CT Brain 1/6-No acute intracranial hemorrhage. Hypoattenuation of the left temporal lobe, may represent acute left MCA territory infarct. XRAY Chest 1/6-Left upper lung bronchiectasis but otherwise no focal consolidation.

## 2023-01-07 NOTE — PHYSICAL THERAPY INITIAL EVALUATION ADULT - DIAGNOSIS, PT EVAL
Impaired cognition, decreased strength, impaired postural control, impaired motor control, impaired functional mobility

## 2023-01-07 NOTE — OCCUPATIONAL THERAPY INITIAL EVALUATION ADULT - BALANCE DISTURBANCE, SYSTEM IMPAIRMENT CONTRIBUTE, REHAB EVAL
March 28, 2017                   [facility]  Pediatrics  [address]  [place] [address]  Phone: [phone]  Fax: [phone]   March 28, 2017     Patient: Leon Schaeffer   YOB: 2004   Date of Visit: 3/28/2017       To Whom it May Concern:    Leon Schaeffer was seen in my clinic on 3/28/2017. She may return to school on 3/29/17. She was absent 3/27/17-3/28/17.    If you have any questions or concerns, please don't hesitate to call.    Sincerely,         Grace Zayas MD     
cognitive/musculoskeletal

## 2023-01-07 NOTE — ED ADULT NURSE NOTE - OBJECTIVE STATEMENT
77 y/o female presents to the ED endorsing L-sided facial droop and weakness x3 days. A/Ox0, unaware of person, place and time. PMH: Dementia. Ambulatory at baseline. Code stroke initiated. Patient on CM, NSR. Safety measures maintained, call-bell within reach and side-rails intact. 79 y/o female presents to the ED endorsing L-sided facial droop and weakness x3 days. A/Ox0, unaware of person, place and time. PMH: Dementia. Ambulatory at baseline. Code stroke initiated. Patient on CM, NSR. Safety measures maintained, call-bell within reach and side-rails intact.

## 2023-01-07 NOTE — PHYSICAL THERAPY INITIAL EVALUATION ADULT - RISK REDUCTION/PREVENTION, PT EVAL
Phone: [address]      Fax: [phone]                            Outpatient Physical Therapy                                                                            Daily Note    Date: 2020  Patient Name: Carlos Eduardo Houser        MRN: [id number]   ACCT#:  [de-identified]  : 1948  (67 y.o.)    Referring Practitioner: Truman Miles    Referral Date : 10/28/20    Diagnosis: R shoulder A-scope with RCR  Treatment Diagnosis: R shoulder pain s/p RTC repair    Onset Date: 10/14/20  PT Insurance Information: [facility]  Total # of Visits Approved: 27 Per Physician Order  Total # of Visits to Date: 19  No Show: 0  Canceled Appointment: 0  Plan of Care/Certification Expiration Date: 12/15/20    Pre-Treatment Pain:  5/10  Subjective: Goes by \"OQMS\"  magda 20  Assessment  Assessment: Pt reports that he has noted occational numbness of his R 3rd and 4th digits. Pt reports it is infrequent but no pattern. PROM: ABD=115deg  Pt continues to note some discomfort anterior shoulder at endranges. Chart Reviewed: Yes    Plan  Plan: Continue with current plan    Exercises/Modalities/Manual:  See DocFlow Sheet    Education: Rommel Aguilar walks or table slides for ABD, goal 120deg`     Barriers to Learning: None    Goals  (Total # of Visits to Date: 23)   Short Term Goals -       Long Term Goals - Time Frame for Long term goals : 9 visits(27 total) POC exp 21  Long term goal 1: Pt to score >32/80 UEFS to improve ADLs  Long term goal 2: Pt to have 4/5 horiz ABD to improve pt posture  Long term goal 3: Pt to have 110deg of AROM ABD to improve upper body dressing.           Post Treatment Pain:  5/10      Time In: 0805  Time Out: 0850  Timed Code Treatment Minutes: 45 Minutes  Total Treatment Time: Kd Ramirez PT     Date: 2020 risk factors/recurrence of condition/secondary impairments

## 2023-01-07 NOTE — SPEECH LANGUAGE PATHOLOGY EVALUATION - COMMENTS
1/6:  CXR: Left upper lung bronchiectasis but otherwise no focal consolidation.  CTA Head & Neck: Severe stenosis/near complete occlusion with the left M2 branch.  CT Perfusion: There is a penumbra involving a large area of the left MCA territory.  CT Brain: IMPRESSION: No acute intracranial hemorrhage. Hypoattenuation of the left temporal lobe, may represent acute left MCA territory infarct.    SWALLOW HISTORY: No reports in SCM or in PACS prior to this admission. nonvocal/nonverbal Unable to assess given severity of language deficits. Granddaughter endorsed pt w/ short term recall deficits prior to admission, however LTM intact Simple object naming - 0/5 Goals:  1. Pt will improve expressive language skills for functional communication.  2. Pt will improve receptive language skills for functional communication.  3. Family/caregiver will demonstrate understanding/carryover of strategies to improve patient’s communication of wants/needs

## 2023-01-07 NOTE — OCCUPATIONAL THERAPY INITIAL EVALUATION ADULT - DIAGNOSIS, OT EVAL
pt presents with decreased strength, endurance, postural control, balance, and cognition, limiting their ability to engage in ADLs and functional tasks.

## 2023-01-07 NOTE — H&P ADULT - NSHPSOURCEINFOTX_GEN_ALL_CORE
Grandson-in-law Dr. Alejandro Vaughn [phone] Grandson-in-law Dr. Alejandro Vaughn [phone] Grandson-in-law Dr. Alejandro Vaughn [phone]

## 2023-01-07 NOTE — PHYSICAL THERAPY INITIAL EVALUATION ADULT - GAIT TRAINING, PT EVAL
GOAL: pt will be able to independently ambulate 300ft with least restrictive assistive device within 4 weeks

## 2023-01-07 NOTE — H&P ADULT - HISTORY OF PRESENT ILLNESS
78 year-old right-handed female w/ PMHx dementia (AOx1-2, performs all ADLs), otherwise reportedly no other PMHx, presenting to [facility] ED w/ right facial droop that started on 1/2/23. On day of arrival, 1/6/23, around 15:30PM, patient noted to have unsteady gait and language abnormalities (not speaking, difficulty understanding), whereas these symptoms were apparently not present just before 15:30PM. 78 year-old right-handed female w/ PMHx dementia (AOx1-2, performs all ADLs), otherwise reportedly no other PMHx, presenting to [facility] ED w/ right facial droop that started on 1/2/23. On day of arrival, 1/6/23, around 15:30PM, patient noted to have unsteady gait and language abnormalities (not speaking, difficulty understanding), whereas these symptoms were apparently not present just before 15:30PM. 78 year-old right-handed female w/ PMHx dementia (AOx1-2, performs all ADLs), otherwise reportedly no other PMHx, presenting to [facility] ED w/ right facial droop that started on 1/2/23. On day of arrival, 1/6/23, around 15:30PM, patient noted to have unsteady gait and language abnormalities (not speaking, difficulty understanding), whereas these symptoms were apparently not present just before 15:30PM. 78 year-old right-handed female w/ PMHx ?dementia (AOx1-2, performs all ADLs), otherwise reportedly no other PMHx, presenting to [facility] ED w/ right facial droop that started on 1/2/23. On day of arrival, 1/6/23, around 15:30PM, patient noted to have unsteady gait and language abnormalities (not speaking, difficulty understanding), whereas these symptoms were apparently not present just before 15:30PM. 78 year-old right-handed female w/ PMHx ?dementia (AOx1-2, performs all ADLs), otherwise reportedly no other PMHx, presenting to [facility] ED w/ right facial droop that started on 1/2/23. On day of arrival, 1/6/23, around 15:30PM, patient noted to have unsteady gait and language abnormalities (not speaking, difficulty understanding), whereas these symptoms were apparently not present just before 15:30PM. 78 year-old right-handed female w/ PMHx ?dementia (AOx1-2, performs all ADLs), otherwise reportedly no other PMHx, presenting to [facility] ED w/ right facial droop that started on 1/2/23. On day of arrival, 1/6/23, around 15:30PM, patient noted to have unsteady gait and language abnormalities (not speaking, difficulty understanding), whereas these symptoms were apparently not present just before 15:30PM.

## 2023-01-07 NOTE — SPEECH LANGUAGE PATHOLOGY EVALUATION - SLP ANTICIPATED DISCHARGE DISPOSITION
Acute rehab; if home, outpatient [facility] Acute rehab; if home, outpatient [facility] Acute rehab; if home, outpatient [facility]

## 2023-01-07 NOTE — PHYSICAL THERAPY INITIAL EVALUATION ADULT - BALANCE TRAINING, PT EVAL
GOAL: pt will be able to independently sit at edge of bed & perform ADL's without loss of balance within 4 weeks. pt will be able to independently ambulate 300ft with least restrictive assistive device without loss of balance within 4 weeks

## 2023-01-07 NOTE — H&P ADULT - ASSESSMENT
INCOMPLETE    Assessment: ***. VS in ED: /70, HR 84, afebrile. Exam: ***.      mRS: 0  LKN: 1/2/23  NIHSS: 22    Impression: ***    Recommendations:    Diagnostics:  [] MRI brain without contrast   [] MRA brain without contrast; MRA neck with contrast   [] MRA brain & neck w/ NOVA  [] TTE  [] Implantable loop recorder  [] Lipid panel, HbA1c  [] CBC, CMP, coagulation panel, troponin    Medications:  [] ASA 81 mg PO (325 per rectum if fails dysphagia)   [] Atorvastatin 80mg (titrate to LDL < 70)   [] Lovenox SQ for DVT prophylaxis     Miscellaneous:  [] NPO unless passes dysphagia screen; swallow eval if fails  [] Keep BP permissive, would keep SBP above 140-160 on night of admission (1/6-1/7).    [] Telemonitoring  [] Neurological checks and vital signs per unit protocol  [] BGM goals 140-180  [] PT/OT; S/S evaluation    * Case and plan not final until Attending attestation * Assessment: 78 year-old right-handed female w/ PMHx ?dementia (AOx1-2, performs all ADLs), otherwise reportedly no other PMHx, presenting to [facility] ED w/ right facial droop that started on 1/2/23. On day of arrival, 1/6/23, around 15:30PM, patient noted to have unsteady gait and language abnormalities (not speaking, difficulty understanding), whereas these symptoms were apparently not present just before 15:30PM. VS in ED: /70, HR 84, afebrile.     mRS: 0  LKN: 1/2/23  NIHSS: 22    Impression: Global aphasia a/w right hemiparesis localizable to left MCA territory acute ischemic stroke secondary to left MCA M1 severe stenosis likely due to large-artery atherosclerosis.    Recommendations:    Diagnostics:  [] MRI brain without contrast   [] MRA brain without contrast; MRA neck with contrast   [] MRA brain & neck w/ NOVA  [] TTE  [] Implantable loop recorder  [] Lipid panel, HbA1c  [] CBC, CMP, coagulation panel, troponin    Medications:  [] ASA 81 mg PO (325 per rectum if fails dysphagia)   [] Atorvastatin 80mg (titrate to LDL < 70)   [] Lovenox SQ for DVT prophylaxis     Miscellaneous:  [] NPO unless passes dysphagia screen; swallow eval if fails  [] Keep BP permissive, would keep SBP above 140-160 on night of admission (1/6-1/7).    [] Telemonitoring  [] Neurological checks and vital signs per unit protocol  [] BGM goals 140-180  [] PT/OT; S/S evaluation    * Case and plan not final until Attending attestation * Assessment: 78 year-old right-handed female w/ PMHx ?dementia (AOx1-2, performs all ADLs), otherwise reportedly no other PMHx, presenting to [facility] ED w/ right facial droop that started on 1/2/23. On day of arrival, 1/6/23, around 15:30PM, patient noted to have unsteady gait and language abnormalities (not speaking, difficulty understanding), whereas these symptoms were apparently not present just before 15:30PM. VS in ED: /70, HR 84, afebrile.     mRS: 0  LKN: 1/2/23  NIHSS: 22    Impression: Global aphasia a/w right hemiparesis localizable to left MCA territory acute ischemic stroke secondary to left MCA M1 severe stenosis likely due to large-artery atherosclerosis.    Recommendations:    Diagnostics:  [] MRI brain without contrast   [] MRA brain without contrast; MRA neck with contrast   [] MRA brain & neck w/ NOVA  [] TTE  [] Implantable loop recorder  [] Lipid panel, HbA1c  [] CBC, CMP, coagulation panel, troponin    Medications:  [] ASA 81 mg PO (325 per rectum if fails dysphagia)   [] Atorvastatin 80mg (titrate to LDL < 70)   [] Lovenox SQ for DVT prophylaxis     Miscellaneous:  [] NPO unless passes dysphagia screen; swallow eval if fails  [] Keep BP permissive, would keep SBP above 140-160 on night of admission (1/6-1/7).    [] Telemonitoring  [] Neurological checks and vital signs per unit protocol  [] BGM goals 140-180  [] PT/OT; S/S evaluation    * Case and plan not final until Attending attestation * Assessment: 78 year-old right-handed female w/ PMHx ?dementia (AOx1-2, performs all ADLs), otherwise reportedly no other PMHx, presenting to [facility] ED w/ right facial droop that started on 1/2/23. On day of arrival, 1/6/23, around 15:30PM, patient noted to have unsteady gait and language abnormalities (not speaking, difficulty understanding), whereas these symptoms were apparently not present just before 15:30PM. VS in ED: /70, HR 84, afebrile.     mRS: 0  LKN: 1/2/23  NIHSS: 22    Impression: Global aphasia a/w right hemiparesis localizable to left MCA territory acute ischemic stroke secondary to left MCA M1 severe stenosis likely due to large-artery atherosclerosis.    Recommendations:    Diagnostics:  [] MRI brain without contrast   [] MRA brain without contrast; MRA neck with contrast   [] MRA brain & neck w/ NOVA  [] TTE  [] Implantable loop recorder  [] Lipid panel, HbA1c  [] CBC, CMP, coagulation panel, troponin    Medications:  [] ASA 81 mg PO (325 per rectum if fails dysphagia)   [] Atorvastatin 80mg (titrate to LDL < 70)   [] Lovenox SQ for DVT prophylaxis     Miscellaneous:  [] NPO unless passes dysphagia screen; swallow eval if fails  [] Keep BP permissive, would keep SBP above 140-160 on night of admission (1/6-1/7).    [] Telemonitoring  [] Neurological checks and vital signs per unit protocol  [] BGM goals 140-180  [] PT/OT; S/S evaluation    * Case and plan not final until Attending attestation * Assessment: 78 year-old right-handed female w/ PMHx ?dementia (AOx1-2, performs all ADLs), otherwise reportedly no other PMHx, presenting to [facility] ED w/ right facial droop that started on 1/2/23. On day of arrival, 1/6/23, around 15:30PM, patient noted to have unsteady gait and language abnormalities (not speaking, difficulty understanding), whereas these symptoms were apparently not present just before 15:30PM. VS in ED: /70, HR 84, afebrile.     mRS: 0  LKN: 1/2/23  NIHSS: 22    Not a candidate for tPA or mechanical thrombectomy as last known normal > 24 hours prior to arrival.    Impression: Global aphasia a/w right hemiparesis localizable to left MCA territory acute ischemic stroke secondary to left MCA M1 severe stenosis likely due to large-artery atherosclerosis.    Recommendations:    Diagnostics:  [] MRI brain without contrast   [] MRA brain without contrast; MRA neck with contrast   [] MRA brain & neck w/ NOVA  [] TTE  [] Implantable loop recorder  [] Lipid panel, HbA1c  [] CBC, CMP, coagulation panel, troponin    Medications:  [] ASA 81 mg PO (325 per rectum if fails dysphagia)   [] Atorvastatin 80mg (titrate to LDL < 70)   [] Lovenox SQ for DVT prophylaxis     Miscellaneous:  [] NPO unless passes dysphagia screen; swallow eval if fails  [] Keep BP permissive, would keep SBP above 140-160 on night of admission (1/6-1/7).    [] Telemonitoring  [] Neurological checks and vital signs per unit protocol  [] BGM goals 140-180  [] PT/OT; S/S evaluation    * Case and plan not final until Attending attestation * Assessment: 78 year-old right-handed female w/ PMHx ?dementia (AOx1-2, performs all ADLs), otherwise reportedly no other PMHx, presenting to [facility] ED w/ right facial droop that started on 1/2/23. On day of arrival, 1/6/23, around 15:30PM, patient noted to have unsteady gait and language abnormalities (not speaking, difficulty understanding), whereas these symptoms were apparently not present just before 15:30PM. VS in ED: /70, HR 84, afebrile.     mRS: 0  LKN: 1/2/23  NIHSS: 22    Not a candidate for tPA or mechanical thrombectomy as last known normal > 24 hours prior to arrival.    Impression: Global aphasia a/w right hemiparesis localizable to left MCA territory acute ischemic stroke secondary to left MCA M1 severe stenosis likely due to large-artery atherosclerosis.    Recommendations:    Diagnostics:  [] MRI brain without contrast   [] MRA brain without contrast; MRA neck with contrast   [] MRA brain & neck w/ NOVA  [] TTE  [] Implantable loop recorder  [] Lipid panel, HbA1c  [] CBC, CMP, coagulation panel, troponin    Medications:  [] ASA 81 mg PO (325 per rectum if fails dysphagia)   [] Atorvastatin 80mg (titrate to LDL < 70)   [] Lovenox SQ for DVT prophylaxis     Miscellaneous:  [] NPO unless passes dysphagia screen; swallow eval if fails  [] Keep BP permissive, would keep SBP above 140-160 on night of admission (1/6-1/7).    [] Telemonitoring  [] Neurological checks and vital signs per unit protocol  [] BGM goals 140-180  [] PT/OT; S/S evaluation    * Case and plan not final until Attending attestation * Assessment: 78 year-old right-handed female w/ PMHx ?dementia (AOx1-2, performs all ADLs), otherwise reportedly no other PMHx, presenting to [facility] ED w/ right facial droop that started on 1/2/23. On day of arrival, 1/6/23, around 15:30PM, patient noted to have unsteady gait and language abnormalities (not speaking, difficulty understanding), whereas these symptoms were apparently not present just before 15:30PM. VS in ED: /70, HR 84, afebrile.     mRS: 0  LKN: 1/2/23  NIHSS: 22    Not a candidate for tPA or mechanical thrombectomy as last known normal > 24 hours prior to arrival.    Impression: Global aphasia a/w right hemiparesis localizable to left MCA territory acute ischemic stroke secondary to left MCA M1 severe stenosis likely due to large-artery atherosclerosis.    Recommendations:    Diagnostics:  [] MRI brain without contrast   [] MRA brain without contrast; MRA neck with contrast   [] MRA brain & neck w/ NOVA  [] TTE  [] Implantable loop recorder  [] Lipid panel, HbA1c  [] CBC, CMP, coagulation panel, troponin    Medications:  [] ASA 81 mg PO (325 per rectum if fails dysphagia)   [] Atorvastatin 80mg (titrate to LDL < 70)   [] Lovenox SQ for DVT prophylaxis     Miscellaneous:  [] NPO unless passes dysphagia screen; swallow eval if fails  [] Keep BP permissive, would keep SBP above 140-160 on night of admission (1/6-1/7).    [] Telemonitoring  [] Neurological checks and vital signs per unit protocol  [] BGM goals 140-180  [] PT/OT; S/S evaluation    * Case and plan not final until Attending attestation *

## 2023-01-07 NOTE — OCCUPATIONAL THERAPY INITIAL EVALUATION ADULT - LIVES WITH, PROFILE
Social history/prior level of function obtained from family at bedside as pt is not currently speaking. Pt lives with family (can provide 24/7 assist) in a private home, 4-5 entry steps (+ rail), pt stays on main level. Prior to admission pt was independent with all functional mobility including community ambulation without a device & ADL's. Pt does not own any DMEs. Walk in shower stall, + standard toilet seat height, built in bench, + grab bars, & fixed shower head. Pt with informal dx of dementia but AOx1-2 at baseline per family report. Pt Nicole speaking & no issues with communication prior to admission. Goal of therapy: return home & return to prior level of function.

## 2023-01-07 NOTE — SPEECH LANGUAGE PATHOLOGY EVALUATION - SLP GENERAL OBSERVATIONS
Pt encountered semi-supine in bed, on room air, + icu monitoring (VSS). Granddaughter and her  at the bedside. Offered to translate Czech for patient. Pt encountered semi-supine in bed, on room air, + icu monitoring (VSS). Granddaughter and her  at the bedside. Offered to translate Kiswahili for patient. Pt encountered semi-supine in bed, on room air, + icu monitoring (VSS). Granddaughter and her  at the bedside. Offered to translate Polish for patient. Pt encountered semi-supine in bed, on room air, + icu monitoring (VSS), 1:1 at bedside, unrestrained b/l wrist restraints. Granddaughter and her  at the bedside. Offered to translate Khmer for patient. Pt encountered semi-supine in bed, on room air, + icu monitoring (VSS), 1:1 at bedside, unrestrained b/l wrist restraints. Granddaughter and her  at the bedside. Offered to translate Mohawk for patient. Pt encountered semi-supine in bed, on room air, + icu monitoring (VSS), 1:1 at bedside, unrestrained b/l wrist restraints. Granddaughter and her  at the bedside. Offered to translate Korean for patient.

## 2023-01-07 NOTE — ED ADULT NURSE REASSESSMENT NOTE - NS ED NURSE REASSESS COMMENT FT1
Called neurology at [phone] to notify them that patient's BP was 199/96. Patient mildly agitated at the time. As per neurology they will recheck when patient is up stairs. [place] neurology at [phone] to notify them that patient's BP was 199/96. Patient mildly agitated at the time. As per neurology they will recheck when patient is up stairs. Called neurology at [phone] to notify them that patient's BP was 199/96. Patient mildly agitated at the time. As per neurology they will recheck when patient is up stairs.

## 2023-01-07 NOTE — ED CLERICAL - DIVISION
[facility]... [facility]... [facility]...

## 2023-01-07 NOTE — PHYSICAL THERAPY INITIAL EVALUATION ADULT - TRANSFER TRAINING, PT EVAL
GOAL: pt will be able to perform transfers independently with least restrictive assistive device within 4 weeks

## 2023-01-07 NOTE — PHYSICAL THERAPY INITIAL EVALUATION ADULT - PERTINENT HX OF CURRENT PROBLEM, REHAB EVAL
Pt is a 77yo Nicole speaking F admitted 2/2 R facial droop that started on 1/2/23. On day of arrival (1/6/23) around 1530, pt noted to have unsteady gait and language abnormalities (not speaking, difficulty understanding). Code stroke called: NIHSS =22. CT brain stroke: no acute ICH, + hypoattenuation of the left temporal lobe, may represent acute left MCA territory infarct. TTE pending. MRI head pending. MRA head/neck pending. Pt currently in stroke unit for work up. Agitated/combative overnight, placed on constant observation. Pt is a 79yo Nicole speaking F admitted 2/2 R facial droop that started on 1/2/23. On day of arrival (1/6/23) around 1530, pt noted to have unsteady gait and language abnormalities (not speaking, difficulty understanding). Code stroke called: NIHSS =22. CT brain stroke: no acute ICH, + hypoattenuation of the left temporal lobe, may represent acute left MCA territory infarct. TTE pending. MRI head pending. MRA head/neck pending. Pt currently in stroke unit for work up. Agitated/combative overnight, placed on constant observation.

## 2023-01-07 NOTE — H&P ADULT - NSHPLABSRESULTS_GEN_ALL_CORE
Labs:  01-06    141  |  105  |  26<H>  ----------------------------<  226<H>  4.0   |  23  |  0.87    Ca    9.7      06 Jan 2023 23:06    TPro  8.0  /  Alb  4.2  /  TBili  0.2  /  DBili  x   /  AST  17  /  ALT  11  /  AlkPhos  117  01-06                        12.8   7.77  )-----------( 277      ( 06 Jan 2023 23:06 )             40.3     PT/INR - ( 06 Jan 2023 23:06 )   PT: 12.8 sec;   INR: 1.11 ratio     PTT - ( 06 Jan 2023 23:06 )  PTT:30.6 sec  LIVER FUNCTIONS - ( 06 Jan 2023 23:06 )  Alb: 4.2 g/dL / Pro: 8.0 g/dL / ALK PHOS: 117 U/L / ALT: 11 U/L / AST: 17 U/L / GGT: x           -------------  CT Brain Stroke Protocol (01.06.23 @ 23:14)  IMPRESSION:  No acute intracranial hemorrhage.    Hypoattenuation of the left temporal lobe, may represent acute left MCA territory infarct.    -------------  CT Angio Head & Neck w/ IV Cont; CT Perfusion (01.06.23 @ 23:26)  IMPRESSION:    CTA Head & Neck: Severe stenosis/near complete occlusion with the left M2 branch.    CT Perfusion: There is a penumbra involving a large area of the left MCA territory. Labs:  01-06    141  |  105  |  26<H>  ----------------------------<  226<H>  4.0   |  23  |  0.87    Ca    9.7      06 Jan 2023 23:06    TPro  8.0  /  Alb  4.2  /  TBili  0.2  /  DBili  x   /  AST  17  /  ALT  11  /  AlkPhos  117  01-06                        12.8   7.77  )-----------( 277      ( 06 Jan 2023 23:06 )             40.3     PT/INR - ( 06 Jan 2023 23:06 )   PT: 12.8 sec;   INR: 1.11 ratio     PTT - ( 06 Jan 2023 23:06 )  PTT:30.6 sec  LIVER FUNCTIONS - ( 06 Jan 2023 23:06 )  Alb: 4.2 g/dL / Pro: 8.0 g/dL / ALK PHOS: 117 U/L / ALT: 11 U/L / AST: 17 U/L / GGT: x           -------------  CT Brain Stroke Protocol (01.06.23 @ 23:14)   IMPRESSION:  No acute intracranial hemorrhage.  Hypoattenuation of the left temporal lobe, may represent acute left MCA territory infarct.    -------------  CT Angio Head & Neck w/ IV Cont; CT Perfusion (01.06.23 @ 23:26)  IMPRESSION:  CTA Head & Neck: Severe stenosis/near complete occlusion with the left M2 branch.  CT Perfusion: There is a penumbra involving a large area of the left MCA territory.

## 2023-01-07 NOTE — SPEECH LANGUAGE PATHOLOGY EVALUATION - SLP DIAGNOSIS
Pt is a 77 y/o female with PMHx of ?dementia found to have a left MCA territory acute ischemic stroke. Pt p/w global aphasia evidenced by absent verbal output, absent naming/repetition, inability to follow simple 1-step directives/answer simple y/n questions, and inability to identify common objects in field of 2. Pt unable to gesture wants/needs. Unable to assess cognitive communication skills given severity of language deficits. Pt is a 79 y/o female with PMHx of ?dementia found to have a left MCA territory acute ischemic stroke. Pt p/w global aphasia evidenced by absent verbal output, absent naming/repetition, inability to follow simple 1-step directives/answer simple y/n questions, and inability to identify common objects in field of 2. Pt unable to gesture wants/needs. Unable to assess cognitive communication skills given severity of language deficits. Pt is a 77 y/o female with PMHx of ?dementia found to have a left MCA territory acute ischemic stroke. Pt p/w global aphasia evidenced by absent verbal output, absent naming/repetition, and inability to follow simple 1-step directives/answer simple y/n questions. Pt unable to gesture wants/needs. Unable to assess cognitive communication skills given severity of language deficits. Pt is a 79 y/o female with PMHx of ?dementia found to have a left MCA territory acute ischemic stroke. Pt p/w global aphasia evidenced by absent verbal output, absent naming/repetition, and inability to follow simple 1-step directives/answer simple y/n questions. Pt unable to gesture wants/needs. Unable to assess cognitive communication skills given severity of language deficits.

## 2023-01-07 NOTE — H&P ADULT - ATTENDING COMMENTS
Ms. Ulloa is a 78-year-old woman with past medical history significant for mild cognitive impairment baseline modified Roselyn scale 0, performs her daily activities and oriented to person and place.  She presented with sudden onset of right facial droop, right hemiparesis and difficulty speaking.  On neurological exam today, she is alert, awake.  She is due/in the speaking and I was able to give instructions for neurological exam in her native language.  She appears to have global aphasia, right facial droop and right hemiparesis, strength 4/5 in right arm and leg.  No obvious visual field deficit to visual threat.    Neuroimaging: Noncontrast CT shows left inferior temporal hypodensity suggestive of subacute infarct  MRI brain significant for left hemispheric–MCA territory inferior division temporal infarction and left hemispheric subcortical infarcts  Vascular imaging: To my eye, left M1 segment severe left MCA stenosis    Impression: Left MCA territory ischemia unlikely secondary to symptomatic intracranial left MCA stenosis  Differential diagnosis includes embolic etiology, based on age and infarct pattern on MRI.  Recommend complete stroke work-up including echocardiogram, telemetry monitoring  Speech and swallow evaluation PT OT PMR consultation  I spoke to her grandson also a physician at [facility] and updated him with the plan of care.  Permissive hypertension  Aspirin, clopidroand high-dose statins  loading dose with clopidogrel Ms. Ulloa is a 78-year-old woman with past medical history significant for mild cognitive impairment baseline modified Roselyn scale 0, performs her daily activities and oriented to person and place.  She presented with sudden onset of right facial droop, right hemiparesis and difficulty speaking.  On neurological exam today, she is alert, awake.  She is due/in the speaking and I was able to give instructions for neurological exam in her native language.  She appears to have global aphasia, right facial droop and right hemiparesis, strength 4/5 in right arm and leg.  No obvious visual field deficit to visual threat.    Neuroimaging: Noncontrast CT shows left inferior temporal hypodensity suggestive of subacute infarct  MRI brain significant for left hemispheric–MCA territory inferior division temporal infarction and left hemispheric subcortical infarcts  Vascular imaging: To my eye, left M1 segment severe left MCA stenosis    Impression: Left MCA territory ischemia unlikely secondary to symptomatic intracranial left MCA stenosis  Differential diagnosis includes embolic etiology, based on age and infarct pattern on MRI.  Recommend complete stroke work-up including echocardiogram, telemetry monitoring  Speech and swallow evaluation PT OT PMR consultation  I spoke to her grandson also a physician at [facility] and updated him with the plan of care.  Permissive hypertension  Aspirin, clopidroand high-dose statins  loading dose with clopidogrel Ms. Ulloa is a 78-year-old woman with past medical history significant for mild cognitive impairment baseline modified Roselyn scale 0, performs her daily activities and oriented to person and place.  She presented with sudden onset of right facial droop, right hemiparesis and difficulty speaking.  On neurological exam today, she is alert, awake.  She is due/in the speaking and I was able to give instructions for neurological exam in her native language.  She appears to have global aphasia, right facial droop and right hemiparesis, strength 4/5 in right arm and leg.  No obvious visual field deficit to visual threat.    Neuroimaging: Noncontrast CT shows left inferior temporal hypodensity suggestive of subacute infarct  MRI brain significant for left hemispheric–MCA territory inferior division temporal infarction and left hemispheric subcortical infarcts  Vascular imaging: To my eye, left M1 segment severe left MCA stenosis    Impression: Left MCA territory ischemia unlikely secondary to symptomatic intracranial left MCA stenosis  Differential diagnosis includes embolic etiology, based on age and infarct pattern on MRI.  Recommend complete stroke work-up including echocardiogram, telemetry monitoring  Speech and swallow evaluation PT OT PMR consultation  I spoke to her grandson also a physician at [facility] and updated him with the plan of care.  Permissive hypertension  Aspirin, clopidroand high-dose statins  loading dose with clopidogrel Ms. Ulloa is a 78-year-old woman with past medical history significant for mild cognitive impairment baseline modified Roselyn scale 0, performs her daily activities and oriented to person and place.  She presented with sudden onset of right facial droop, right hemiparesis and difficulty speaking.  On neurological exam today, she is alert, awake.  She is due/in the speaking and I was able to give instructions for neurological exam in her native language.  She appears to have global aphasia, right facial droop and right hemiparesis, strength 4/5 in right arm and leg.  No obvious visual field deficit to visual threat.  She was not  a candidate for IV thrombolysis as her stroke symptoms started 1/2/2023; infarction/ left temporal hypodensity present on CT at the time of presentation and left M2 distal occlusion with patent M1 segment.    Neuroimaging: Noncontrast CT shows left inferior temporal hypodensity suggestive of subacute infarct  MRI brain significant for left hemispheric–MCA territory inferior division temporal infarction and left hemispheric subcortical infarcts  Vascular imaging: To my eye, left M1 segment severe left MCA stenosis    Impression: Left MCA territory ischemia unlikely secondary to symptomatic intracranial left MCA stenosis  Differential diagnosis includes embolic etiology, based on age and infarct pattern on MRI.  Recommend complete stroke work-up including echocardiogram, telemetry monitoring  Speech and swallow evaluation PT OT PMR consultation  I spoke to her grandson also a physician at [facility] and updated him with the plan of care.  Permissive hypertension  Aspirin, clopidroand high-dose statins  loading dose with clopidogrel Ms. Ulloa is a 78-year-old woman with past medical history significant for mild cognitive impairment baseline modified Roselyn scale 0, performs her daily activities and oriented to person and place.  She presented with sudden onset of right facial droop, right hemiparesis and difficulty speaking.  On neurological exam today, she is alert, awake.  She is due/in the speaking and I was able to give instructions for neurological exam in her native language.  She appears to have global aphasia, right facial droop and right hemiparesis, strength 4/5 in right arm and leg.  No obvious visual field deficit to visual threat.  She was not  a candidate for IV thrombolysis as her stroke symptoms started 1/2/2023; infarction/ left temporal hypodensity present on CT at the time of presentation and left M2 distal occlusion with patent M1 segment.    Neuroimaging: Noncontrast CT shows left inferior temporal hypodensity suggestive of subacute infarct  MRI brain significant for left hemispheric–MCA territory inferior division temporal infarction and left hemispheric subcortical infarcts  Vascular imaging: To my eye, left M1 segment severe left MCA stenosis    Impression: Left MCA territory ischemia unlikely secondary to symptomatic intracranial left MCA stenosis  Differential diagnosis includes embolic etiology, based on age and infarct pattern on MRI.  Recommend complete stroke work-up including echocardiogram, telemetry monitoring  Speech and swallow evaluation PT OT PMR consultation  I spoke to her grandson also a physician at [facility] and updated him with the plan of care.  Permissive hypertension  Aspirin, clopidroand high-dose statins  loading dose with clopidogrel Ms. Ulloa is a 78-year-old woman with past medical history significant for mild cognitive impairment baseline modified Roselyn scale 0, performs her daily activities and oriented to person and place.  She presented with sudden onset of right facial droop, right hemiparesis and difficulty speaking.  On neurological exam today, she is alert, awake.  She is due/in the speaking and I was able to give instructions for neurological exam in her native language.  She appears to have global aphasia, right facial droop and right hemiparesis, strength 4/5 in right arm and leg.  No obvious visual field deficit to visual threat.  She was not  a candidate for IV thrombolysis as her stroke symptoms started 1/2/2023; infarction/ left temporal hypodensity present on CT at the time of presentation and left M2 distal occlusion with patent M1 segment.    Neuroimaging: Noncontrast CT shows left inferior temporal hypodensity suggestive of subacute infarct  MRI brain significant for left hemispheric–MCA territory inferior division temporal infarction and left hemispheric subcortical infarcts  Vascular imaging: To my eye, left M1 segment severe left MCA stenosis    Impression: Left MCA territory ischemia unlikely secondary to symptomatic intracranial left MCA stenosis  Differential diagnosis includes embolic etiology, based on age and infarct pattern on MRI.  Recommend complete stroke work-up including echocardiogram, telemetry monitoring  Speech and swallow evaluation PT OT PMR consultation  I spoke to her grandson also a physician at [facility] and updated him with the plan of care.  Permissive hypertension  Aspirin, clopidroand high-dose statins  loading dose with clopidogrel

## 2023-01-07 NOTE — SPEECH LANGUAGE PATHOLOGY EVALUATION - SLP PERTINENT HISTORY OF CURRENT PROBLEM
78 year-old right-handed female w/ PMHx ?dementia (AOx1-2, performs all ADLs), otherwise reportedly no other PMHx, presenting to [facility] ED w/ right facial droop that started on 1/2/23. On day of arrival, 1/6/23, around 15:30PM, patient noted to have unsteady gait and language abnormalities (not speaking, difficulty understanding), whereas these symptoms were apparently not present just before 15:30PM. VS in ED: /70, HR 84, afebrile. mRS: 0. LKN: 1/2/23. NIHSS: 22. Impression: Global aphasia a/w right hemiparesis localizable to left MCA territory acute ischemic stroke secondary to left MCA M1 severe stenosis likely due to large-artery atherosclerosis. 78 year-old right-handed female w/ PMHx ?dementia (AOx1-2, performs all ADLs), otherwise reportedly no other PMHx, presenting to [facility] ED w/ right facial droop that started on 1/2/23. On day of arrival, 1/6/23, around 15:30PM, patient noted to have unsteady gait and language abnormalities (not speaking, difficulty understanding), whereas these symptoms were apparently not present just before 15:30PM. VS in ED: /70, HR 84, afebrile. mRS: 0. LKN: 1/2/23. NIHSS: 22. Impression: Global aphasia a/w right hemiparesis localizable to left MCA territory acute ischemic stroke secondary to left MCA M1 severe stenosis likely due to large-artery atherosclerosis. 78 year-old right-handed female w/ PMHx ?dementia (AOx1-2, performs all ADLs), otherwise reportedly no other PMHx, presenting to [facility] ED w/ right facial droop that started on 1/2/23. On day of arrival, 1/6/23, around 15:30PM, patient noted to have unsteady gait and language abnormalities (not speaking, difficulty understanding), whereas these symptoms were apparently not present just before 15:30PM. VS in ED: /70, HR 84, afebrile. mRS: 0. LKN: 1/2/23. NIHSS: 22. Impression: Global aphasia a/w right hemiparesis localizable to left MCA territory acute ischemic stroke secondary to left MCA M1 severe stenosis likely due to large-artery atherosclerosis.

## 2023-01-07 NOTE — OCCUPATIONAL THERAPY INITIAL EVALUATION ADULT - STRENGTHENING, PT EVAL
General Sunscreen Counseling: I recommended a broad spectrum sunscreen with a SPF of 30 or higher.  I explained that SPF 30 sunscreens block approximately 97 percent of the sun's harmful rays.  Sunscreens should be applied at least 15 minutes prior to expected sun exposure and then every 2 hours after that as long as sun exposure continues. If swimming or exercising sunscreen should be reapplied every 45 minutes to an hour after getting wet or sweating.  One ounce, or the equivalent of a shot glass full of sunscreen, is adequate to protect the skin not covered by a bathing suit. I also recommended a lip balm with a sunscreen as well. Sun protective clothing can be used in lieu of sunscreen but must be worn the entire time you are exposed to the sun's rays. Products Recommended: Begin using physical sunscreen daily Detail Level: Generalized GOAL: Pt will increase strength by one muscle grade in 4 weeks to improve ability to complete ADLs and functional tasks.

## 2023-01-07 NOTE — PATIENT PROFILE ADULT - FALL HARM RISK - HARM RISK INTERVENTIONS
Assistance with ambulation/Assistance OOB with selected safe patient handling equipment/Communicate Risk of Fall with Harm to all staff/Discuss with provider need for PT consult/Monitor gait and stability/Provide patient with walking aids - walker, cane, crutches/Reinforce activity limits and safety measures with patient and family/Tailored Fall Risk Interventions/Visual Cue: Yellow wristband and red socks/Bed in lowest position, wheels locked, appropriate side rails in place/Call bell, personal items and telephone in reach/Instruct patient to call for assistance before getting out of bed or chair/Non-slip footwear when patient is out of bed/[place] to call system/Physically safe environment - no spills, clutter or unnecessary equipment/Purposeful Proactive Rounding/Room/bathroom lighting operational, light cord in reach Assistance with ambulation/Assistance OOB with selected safe patient handling equipment/Communicate Risk of Fall with Harm to all staff/Discuss with provider need for PT consult/Monitor gait and stability/Provide patient with walking aids - walker, cane, crutches/Reinforce activity limits and safety measures with patient and family/Tailored Fall Risk Interventions/Visual Cue: Yellow wristband and red socks/Bed in lowest position, wheels locked, appropriate side rails in place/Call bell, personal items and telephone in reach/Instruct patient to call for assistance before getting out of bed or chair/Non-slip footwear when patient is out of bed/[place] to call system/Physically safe environment - no spills, clutter or unnecessary equipment/Purposeful Proactive Rounding/Room/bathroom lighting operational, light cord in reach Assistance with ambulation/Assistance OOB with selected safe patient handling equipment/Communicate Risk of Fall with Harm to all staff/Discuss with provider need for PT consult/Monitor gait and stability/Provide patient with walking aids - walker, cane, crutches/Reinforce activity limits and safety measures with patient and family/Tailored Fall Risk Interventions/Visual Cue: Yellow wristband and red socks/Bed in lowest position, wheels locked, appropriate side rails in place/Call bell, personal items and telephone in reach/Instruct patient to call for assistance before getting out of bed or chair/Non-slip footwear when patient is out of bed/[place] to call system/Physically safe environment - no spills, clutter or unnecessary equipment/Purposeful Proactive Rounding/Room/bathroom lighting operational, light cord in reach

## 2023-01-07 NOTE — PHYSICAL THERAPY INITIAL EVALUATION ADULT - GENERAL OBSERVATIONS, REHAB EVAL
Pt encountered supine in bed + IV lock, BP cuff LUE, continuous pulse ox, cardiac monitor, primafit, diaper donned, family at bedside, 1:1 at bedside

## 2023-01-07 NOTE — PHYSICAL THERAPY INITIAL EVALUATION ADULT - REHAB POTENTIAL, PT EVAL
TSH (mcUnits/mL)   Date Value   12/21/2021 1.983     Doing well  Cont levothyroxine 25 mcg  No labs today   good, to achieve stated therapy goals

## 2023-01-08 LAB
ANION GAP SERPL CALC-SCNC: 11 MMOL/L — SIGNIFICANT CHANGE UP (ref 5–17)
BUN SERPL-MCNC: 13 MG/DL — SIGNIFICANT CHANGE UP (ref 7–23)
CALCIUM SERPL-MCNC: 9.5 MG/DL — SIGNIFICANT CHANGE UP (ref 8.4–10.5)
CHLORIDE SERPL-SCNC: 106 MMOL/L — SIGNIFICANT CHANGE UP (ref 96–108)
CHOLEST SERPL-MCNC: 238 MG/DL — HIGH
CO2 SERPL-SCNC: 24 MMOL/L — SIGNIFICANT CHANGE UP (ref 22–31)
CREAT SERPL-MCNC: 0.56 MG/DL — SIGNIFICANT CHANGE UP (ref 0.5–1.3)
EGFR: 93 ML/MIN/1.73M2 — SIGNIFICANT CHANGE UP
GLUCOSE SERPL-MCNC: 115 MG/DL — HIGH (ref 70–99)
HCT VFR BLD CALC: 43.1 % — SIGNIFICANT CHANGE UP (ref 34.5–45)
HDLC SERPL-MCNC: 46 MG/DL — LOW
HGB BLD-MCNC: 12.8 G/DL — SIGNIFICANT CHANGE UP (ref 11.5–15.5)
LIPID PNL WITH DIRECT LDL SERPL: 176 MG/DL — HIGH
MCHC RBC-ENTMCNC: 27.1 PG — SIGNIFICANT CHANGE UP (ref 27–34)
MCHC RBC-ENTMCNC: 29.7 GM/DL — LOW (ref 32–36)
MCV RBC AUTO: 91.3 FL — SIGNIFICANT CHANGE UP (ref 80–100)
NON HDL CHOLESTEROL: 192 MG/DL — HIGH
NRBC # BLD: 0 /100 WBCS — SIGNIFICANT CHANGE UP (ref 0–0)
PLATELET # BLD AUTO: 256 K/UL — SIGNIFICANT CHANGE UP (ref 150–400)
POTASSIUM SERPL-MCNC: 3.6 MMOL/L — SIGNIFICANT CHANGE UP (ref 3.5–5.3)
POTASSIUM SERPL-SCNC: 3.6 MMOL/L — SIGNIFICANT CHANGE UP (ref 3.5–5.3)
RBC # BLD: 4.72 M/UL — SIGNIFICANT CHANGE UP (ref 3.8–5.2)
RBC # FLD: 13.7 % — SIGNIFICANT CHANGE UP (ref 10.3–14.5)
SODIUM SERPL-SCNC: 141 MMOL/L — SIGNIFICANT CHANGE UP (ref 135–145)
TRIGL SERPL-MCNC: 80 MG/DL — SIGNIFICANT CHANGE UP
WBC # BLD: 7.33 K/UL — SIGNIFICANT CHANGE UP (ref 3.8–10.5)
WBC # FLD AUTO: 7.33 K/UL — SIGNIFICANT CHANGE UP (ref 3.8–10.5)

## 2023-01-08 PROCEDURE — 93306 TTE W/DOPPLER COMPLETE: CPT | Mod: 26

## 2023-01-08 PROCEDURE — 99233 SBSQ HOSP IP/OBS HIGH 50: CPT

## 2023-01-08 RX ORDER — SODIUM CHLORIDE 9 MG/ML
1000 INJECTION, SOLUTION INTRAVENOUS
Refills: 0 | Status: DISCONTINUED | OUTPATIENT
Start: 2023-01-08 | End: 2023-01-12

## 2023-01-08 RX ORDER — DEXTROSE 50 % IN WATER 50 %
15 SYRINGE (ML) INTRAVENOUS ONCE
Refills: 0 | Status: DISCONTINUED | OUTPATIENT
Start: 2023-01-08 | End: 2023-01-12

## 2023-01-08 RX ORDER — GLUCAGON INJECTION, SOLUTION 0.5 MG/.1ML
1 INJECTION, SOLUTION SUBCUTANEOUS ONCE
Refills: 0 | Status: DISCONTINUED | OUTPATIENT
Start: 2023-01-08 | End: 2023-01-12

## 2023-01-08 RX ORDER — DEXTROSE 50 % IN WATER 50 %
25 SYRINGE (ML) INTRAVENOUS ONCE
Refills: 0 | Status: DISCONTINUED | OUTPATIENT
Start: 2023-01-08 | End: 2023-01-12

## 2023-01-08 RX ORDER — DEXTROSE 50 % IN WATER 50 %
12.5 SYRINGE (ML) INTRAVENOUS ONCE
Refills: 0 | Status: DISCONTINUED | OUTPATIENT
Start: 2023-01-08 | End: 2023-01-12

## 2023-01-08 RX ORDER — QUETIAPINE FUMARATE 200 MG/1
12.5 TABLET, FILM COATED ORAL AT BEDTIME
Refills: 0 | Status: DISCONTINUED | OUTPATIENT
Start: 2023-01-08 | End: 2023-01-09

## 2023-01-08 RX ORDER — INSULIN LISPRO 100/ML
VIAL (ML) SUBCUTANEOUS AT BEDTIME
Refills: 0 | Status: DISCONTINUED | OUTPATIENT
Start: 2023-01-08 | End: 2023-01-09

## 2023-01-08 RX ORDER — INSULIN LISPRO 100/ML
VIAL (ML) SUBCUTANEOUS
Refills: 0 | Status: DISCONTINUED | OUTPATIENT
Start: 2023-01-08 | End: 2023-01-12

## 2023-01-08 RX ADMIN — CLOPIDOGREL BISULFATE 75 MILLIGRAM(S): 75 TABLET, FILM COATED ORAL at 12:20

## 2023-01-08 RX ADMIN — Medication 1: at 12:19

## 2023-01-08 RX ADMIN — QUETIAPINE FUMARATE 12.5 MILLIGRAM(S): 200 TABLET, FILM COATED ORAL at 21:26

## 2023-01-08 RX ADMIN — Medication 1: at 08:40

## 2023-01-08 RX ADMIN — ATORVASTATIN CALCIUM 80 MILLIGRAM(S): 80 TABLET, FILM COATED ORAL at 21:25

## 2023-01-08 RX ADMIN — ENOXAPARIN SODIUM 40 MILLIGRAM(S): 100 INJECTION SUBCUTANEOUS at 05:04

## 2023-01-08 RX ADMIN — Medication 81 MILLIGRAM(S): at 12:20

## 2023-01-08 NOTE — SWALLOW BEDSIDE ASSESSMENT ADULT - COMMENTS
Continued history:   1/6:  CXR: Left upper lung bronchiectasis but otherwise no focal consolidation.  CTA Head & Neck: Severe stenosis/near complete occlusion with the left M2 branch.  CT Perfusion: There is a penumbra involving a large area of the left MCA territory.  CT Brain: IMPRESSION: No acute intracranial hemorrhage. Hypoattenuation of the left temporal lobe, may represent acute left MCA territory infarct.    SWALLOW HISTORY: No reports in SCM or in PACS prior to this admission. Pt known this admission for speech evaluation 1/7- see report.  -1/7/23 0010- Passed RN swallow screen

## 2023-01-08 NOTE — PROGRESS NOTE ADULT - SUBJECTIVE AND OBJECTIVE BOX
THE PATIENT WAS SEEN AND EXAMINED BY ME WITH THE HOUSESTAFF AND STROKE TEAM DURING MORNING ROUNDS.   HPI:  78 year-old right-handed female w/ PMHx ?dementia (AOx1-2, performs all ADLs), DM, presenting to [facility] ED w/ right facial droop that started on 1/2/23. On day of arrival, 1/6/23, around 15:30PM, patient noted to have unsteady gait and language abnormalities (not speaking, difficulty understanding),       SUBJECTIVE: No events overnight.  No new neurologic complaints, ROS reported negative unless otherwise noted.      aspirin  chewable 81 milliGRAM(s) Oral daily  atorvastatin 80 milliGRAM(s) Oral at bedtime  clopidogrel Tablet 75 milliGRAM(s) Oral daily  dextrose 5%. 1000 milliLiter(s) IV Continuous <Continuous>  dextrose 5%. 1000 milliLiter(s) IV Continuous <Continuous>  dextrose 50% Injectable 25 Gram(s) IV Push once  dextrose 50% Injectable 12.5 Gram(s) IV Push once  dextrose 50% Injectable 25 Gram(s) IV Push once  dextrose Oral Gel 15 Gram(s) Oral once PRN  enoxaparin Injectable 40 milliGRAM(s) SubCutaneous every 24 hours  glucagon  Injectable 1 milliGRAM(s) IntraMuscular once  insulin lispro (ADMELOG) corrective regimen sliding scale   SubCutaneous three times a day before meals  insulin lispro (ADMELOG) corrective regimen sliding scale   SubCutaneous at bedtime  sodium chloride 0.9%. 1000 milliLiter(s) IV Continuous <Continuous>      PHYSICAL EXAM:   Vital Signs Last 24 Hrs  T(C): 36.4 (08 Jan 2023 04:00), Max: 36.6 (07 Jan 2023 12:00)  T(F): 97.6 (08 Jan 2023 04:00), Max: 97.8 (07 Jan 2023 12:00)  HR: 80 (08 Jan 2023 06:00) (63 - 96)  BP: 200/76 (08 Jan 2023 06:00) (143/56 - 200/76)  BP(mean): 102 (08 Jan 2023 06:00) (75 - 120)  RR: 17 (08 Jan 2023 06:00) (15 - 22)  SpO2: 98% (08 Jan 2023 04:00) (96% - 100%)    Parameters below as of 08 Jan 2023 06:00  Patient On (Oxygen Delivery Method): room air        General: No acute distress  HEENT: EOM intact, blinks to threat B/L  Abdomen: Soft, nontender, nondistended   Extremities: No edema    NEUROLOGICAL EXAM:  Mental status: Eyes closed, awake, alert, oriented x3, mute, unable to follow commands  Cranial Nerves: Right facial droop, no nystagmus, blinks to threat B/L  Motor exam: Normal tone, no drift, Right hemiparesis RUE drift, 4/5, RLE drift, 4/5,, LUE 5/5, LLE 5/5  Sensation: Intact to light touch   Coordination/ Gait: Unable to participate with exam     LABS:                        12.8   7.33  )-----------( 256      ( 08 Jan 2023 02:47 )             43.1    01-08    141  |  106  |  13  ----------------------------<  115<H>  3.6   |  24  |  0.56    Ca    9.5      08 Jan 2023 02:47    TPro  8.0  /  Alb  4.2  /  TBili  0.2  /  DBili  x   /  AST  17  /  ALT  11  /  AlkPhos  117  01-06  PT/INR - ( 06 Jan 2023 23:06 )   PT: 12.8 sec;   INR: 1.11 ratio         PTT - ( 06 Jan 2023 23:06 )  PTT:30.6 sec      IMAGING: Reviewed by me.     Brain MRI (01/07/23): Left MCA territory acute infarcts. No acute intracranial hemorrhage    CT Head (01/06/23): No acute intracranial hemorrhage. Hypoattenuation of the left temporal lobe, may represent acute left MCA territory infarct.    CT/CTA head/neck (01/06/23): CTA Head & Neck: Severe stenosis/near complete occlusion with the left M2 branch.    CT Perfusion: There is a penumbra involving a large area of the left MCA territory.   THE PATIENT WAS SEEN AND EXAMINED BY ME WITH THE HOUSESTAFF AND STROKE TEAM DURING MORNING ROUNDS.   HPI:  78 year-old right-handed female w/ PMHx ?dementia (AOx1-2, performs all ADLs), DM, presenting to [facility] ED w/ right facial droop that started on 1/2/23. On day of arrival, 1/6/23, around 15:30PM, patient noted to have unsteady gait and language abnormalities (not speaking, difficulty understanding),       SUBJECTIVE: No events overnight.  No new neurologic complaints, ROS reported negative unless otherwise noted.      aspirin  chewable 81 milliGRAM(s) Oral daily  atorvastatin 80 milliGRAM(s) Oral at bedtime  clopidogrel Tablet 75 milliGRAM(s) Oral daily  dextrose 5%. 1000 milliLiter(s) IV Continuous <Continuous>  dextrose 5%. 1000 milliLiter(s) IV Continuous <Continuous>  dextrose 50% Injectable 25 Gram(s) IV Push once  dextrose 50% Injectable 12.5 Gram(s) IV Push once  dextrose 50% Injectable 25 Gram(s) IV Push once  dextrose Oral Gel 15 Gram(s) Oral once PRN  enoxaparin Injectable 40 milliGRAM(s) SubCutaneous every 24 hours  glucagon  Injectable 1 milliGRAM(s) IntraMuscular once  insulin lispro (ADMELOG) corrective regimen sliding scale   SubCutaneous three times a day before meals  insulin lispro (ADMELOG) corrective regimen sliding scale   SubCutaneous at bedtime  sodium chloride 0.9%. 1000 milliLiter(s) IV Continuous <Continuous>      PHYSICAL EXAM:   Vital Signs Last 24 Hrs  T(C): 36.4 (08 Jan 2023 04:00), Max: 36.6 (07 Jan 2023 12:00)  T(F): 97.6 (08 Jan 2023 04:00), Max: 97.8 (07 Jan 2023 12:00)  HR: 80 (08 Jan 2023 06:00) (63 - 96)  BP: 200/76 (08 Jan 2023 06:00) (143/56 - 200/76)  BP(mean): 102 (08 Jan 2023 06:00) (75 - 120)  RR: 17 (08 Jan 2023 06:00) (15 - 22)  SpO2: 98% (08 Jan 2023 04:00) (96% - 100%)    Parameters below as of 08 Jan 2023 06:00  Patient On (Oxygen Delivery Method): room air        General: No acute distress  HEENT: EOM intact, blinks to threat B/L  Abdomen: Soft, nontender, nondistended   Extremities: No edema    NEUROLOGICAL EXAM:  Mental status: Eyes closed, awake, alert, oriented x3, mute, unable to follow commands  Cranial Nerves: Right facial droop, no nystagmus, blinks to threat B/L  Motor exam: Normal tone, no drift, Right hemiparesis RUE drift, 4/5, RLE drift, 4/5,, LUE 5/5, LLE 5/5  Sensation: Intact to light touch   Coordination/ Gait: Unable to participate with exam     LABS:                        12.8   7.33  )-----------( 256      ( 08 Jan 2023 02:47 )             43.1    01-08    141  |  106  |  13  ----------------------------<  115<H>  3.6   |  24  |  0.56    Ca    9.5      08 Jan 2023 02:47    TPro  8.0  /  Alb  4.2  /  TBili  0.2  /  DBili  x   /  AST  17  /  ALT  11  /  AlkPhos  117  01-06  PT/INR - ( 06 Jan 2023 23:06 )   PT: 12.8 sec;   INR: 1.11 ratio         PTT - ( 06 Jan 2023 23:06 )  PTT:30.6 sec      IMAGING: Reviewed by me.     Brain MRI (01/07/23): Left MCA territory acute infarcts. No acute intracranial hemorrhage    CT Head (01/06/23): No acute intracranial hemorrhage. Hypoattenuation of the left temporal lobe, may represent acute left MCA territory infarct.    CT/CTA head/neck (01/06/23): CTA Head & Neck: Severe stenosis/near complete occlusion with the left M2 branch.    CT Perfusion: There is a penumbra involving a large area of the left MCA territory.   THE PATIENT WAS SEEN AND EXAMINED BY ME WITH THE HOUSESTAFF AND STROKE TEAM DURING MORNING ROUNDS.   HPI:  78 year-old right-handed female w/ PMHx ?dementia (AOx1-2, performs all ADLs), DM, presenting to [facility] ED w/ right facial droop that started on 1/2/23. On day of arrival, 1/6/23, around 15:30PM, patient noted to have unsteady gait and language abnormalities (not speaking, difficulty understanding),       SUBJECTIVE: No events overnight.  No new neurologic complaints, ROS reported negative unless otherwise noted.      aspirin  chewable 81 milliGRAM(s) Oral daily  atorvastatin 80 milliGRAM(s) Oral at bedtime  clopidogrel Tablet 75 milliGRAM(s) Oral daily  dextrose 5%. 1000 milliLiter(s) IV Continuous <Continuous>  dextrose 5%. 1000 milliLiter(s) IV Continuous <Continuous>  dextrose 50% Injectable 25 Gram(s) IV Push once  dextrose 50% Injectable 12.5 Gram(s) IV Push once  dextrose 50% Injectable 25 Gram(s) IV Push once  dextrose Oral Gel 15 Gram(s) Oral once PRN  enoxaparin Injectable 40 milliGRAM(s) SubCutaneous every 24 hours  glucagon  Injectable 1 milliGRAM(s) IntraMuscular once  insulin lispro (ADMELOG) corrective regimen sliding scale   SubCutaneous three times a day before meals  insulin lispro (ADMELOG) corrective regimen sliding scale   SubCutaneous at bedtime  sodium chloride 0.9%. 1000 milliLiter(s) IV Continuous <Continuous>      PHYSICAL EXAM:   Vital Signs Last 24 Hrs  T(C): 36.4 (08 Jan 2023 04:00), Max: 36.6 (07 Jan 2023 12:00)  T(F): 97.6 (08 Jan 2023 04:00), Max: 97.8 (07 Jan 2023 12:00)  HR: 80 (08 Jan 2023 06:00) (63 - 96)  BP: 200/76 (08 Jan 2023 06:00) (143/56 - 200/76)  BP(mean): 102 (08 Jan 2023 06:00) (75 - 120)  RR: 17 (08 Jan 2023 06:00) (15 - 22)  SpO2: 98% (08 Jan 2023 04:00) (96% - 100%)    Parameters below as of 08 Jan 2023 06:00  Patient On (Oxygen Delivery Method): room air        General: No acute distress  HEENT: EOM intact, blinks to threat B/L  Abdomen: Soft, nontender, nondistended   Extremities: No edema    NEUROLOGICAL EXAM:  Mental status: Eyes closed, awake, alert, oriented x3, mute, unable to follow commands  Cranial Nerves: Right facial droop, no nystagmus, blinks to threat B/L  Motor exam: Normal tone, no drift, Right hemiparesis RUE drift, 4/5, RLE drift, 4/5,, LUE 5/5, LLE 5/5  Sensation: Intact to light touch   Coordination/ Gait: Unable to participate with exam     LABS:                        12.8   7.33  )-----------( 256      ( 08 Jan 2023 02:47 )             43.1    01-08    141  |  106  |  13  ----------------------------<  115<H>  3.6   |  24  |  0.56    Ca    9.5      08 Jan 2023 02:47    TPro  8.0  /  Alb  4.2  /  TBili  0.2  /  DBili  x   /  AST  17  /  ALT  11  /  AlkPhos  117  01-06  PT/INR - ( 06 Jan 2023 23:06 )   PT: 12.8 sec;   INR: 1.11 ratio         PTT - ( 06 Jan 2023 23:06 )  PTT:30.6 sec      IMAGING: Reviewed by me.     Brain MRI (01/07/23): Left MCA territory acute infarcts. No acute intracranial hemorrhage    CT Head (01/06/23): No acute intracranial hemorrhage. Hypoattenuation of the left temporal lobe, may represent acute left MCA territory infarct.    CT/CTA head/neck (01/06/23): CTA Head & Neck: Severe stenosis/near complete occlusion with the left M2 branch.    CT Perfusion: There is a penumbra involving a large area of the left MCA territory.   THE PATIENT WAS SEEN AND EXAMINED BY ME WITH THE HOUSESTAFF AND STROKE TEAM DURING MORNING ROUNDS.   HPI:  78 year-old right-handed female w/ PMHx ?dementia (AOx1-2, performs all ADLs), DM, presenting to [facility] ED w/ right facial droop that started on 1/2/23. On day of arrival, 1/6/23, around 15:30PM, patient noted to have unsteady gait and language abnormalities (not speaking, difficulty understanding),       SUBJECTIVE: No events overnight.  No new neurologic complaints, ROS reported negative unless otherwise noted.      aspirin  chewable 81 milliGRAM(s) Oral daily  atorvastatin 80 milliGRAM(s) Oral at bedtime  clopidogrel Tablet 75 milliGRAM(s) Oral daily  dextrose 5%. 1000 milliLiter(s) IV Continuous <Continuous>  dextrose 5%. 1000 milliLiter(s) IV Continuous <Continuous>  dextrose 50% Injectable 25 Gram(s) IV Push once  dextrose 50% Injectable 12.5 Gram(s) IV Push once  dextrose 50% Injectable 25 Gram(s) IV Push once  dextrose Oral Gel 15 Gram(s) Oral once PRN  enoxaparin Injectable 40 milliGRAM(s) SubCutaneous every 24 hours  glucagon  Injectable 1 milliGRAM(s) IntraMuscular once  insulin lispro (ADMELOG) corrective regimen sliding scale   SubCutaneous three times a day before meals  insulin lispro (ADMELOG) corrective regimen sliding scale   SubCutaneous at bedtime  sodium chloride 0.9%. 1000 milliLiter(s) IV Continuous <Continuous>      PHYSICAL EXAM:   Vital Signs Last 24 Hrs  T(C): 36.4 (08 Jan 2023 04:00), Max: 36.6 (07 Jan 2023 12:00)  T(F): 97.6 (08 Jan 2023 04:00), Max: 97.8 (07 Jan 2023 12:00)  HR: 80 (08 Jan 2023 06:00) (63 - 96)  BP: 200/76 (08 Jan 2023 06:00) (143/56 - 200/76)  BP(mean): 102 (08 Jan 2023 06:00) (75 - 120)  RR: 17 (08 Jan 2023 06:00) (15 - 22)  SpO2: 98% (08 Jan 2023 04:00) (96% - 100%)    Parameters below as of 08 Jan 2023 06:00  Patient On (Oxygen Delivery Method): room air        General: No acute distress  HEENT: EOM intact, blinks to threat B/L  Abdomen: Soft, nontender, nondistended   Extremities: No edema    NEUROLOGICAL EXAM: Translation provided by Neuro attending Dr Wilson and by pt's son in law at bedside  Mental status: Eyes closed, awake, alert, oriented x3, mute, unable to follow commands  Cranial Nerves: Right facial droop, no nystagmus, blinks to threat B/L  Motor exam: Normal tone, no drift, Right hemiparesis RUE drift, 4/5, RLE drift, 4/5,, LUE 5/5, LLE 5/5  Sensation: Intact to light touch   Coordination/ Gait: Unable to participate with exam     LABS:                        12.8   7.33  )-----------( 256      ( 08 Jan 2023 02:47 )             43.1    01-08    141  |  106  |  13  ----------------------------<  115<H>  3.6   |  24  |  0.56    Ca    9.5      08 Jan 2023 02:47    TPro  8.0  /  Alb  4.2  /  TBili  0.2  /  DBili  x   /  AST  17  /  ALT  11  /  AlkPhos  117  01-06  PT/INR - ( 06 Jan 2023 23:06 )   PT: 12.8 sec;   INR: 1.11 ratio         PTT - ( 06 Jan 2023 23:06 )  PTT:30.6 sec      IMAGING: Reviewed by me.     Brain MRI (01/07/23): Left MCA territory acute infarcts. No acute intracranial hemorrhage    CT Head (01/06/23): No acute intracranial hemorrhage. Hypoattenuation of the left temporal lobe, may represent acute left MCA territory infarct.    CT/CTA head/neck (01/06/23): CTA Head & Neck: Severe stenosis/near complete occlusion with the left M2 branch.    CT Perfusion: There is a penumbra involving a large area of the left MCA territory.   THE PATIENT WAS SEEN AND EXAMINED BY ME WITH THE HOUSESTAFF AND STROKE TEAM DURING MORNING ROUNDS.   HPI:  78 year-old right-handed female w/ PMHx ?dementia (AOx1-2, performs all ADLs), DM, presenting to [facility] ED w/ right facial droop that started on 1/2/23. On day of arrival, 1/6/23, around 15:30PM, patient noted to have unsteady gait and language abnormalities (not speaking, difficulty understanding),       SUBJECTIVE: No events overnight.  No new neurologic complaints, ROS reported negative unless otherwise noted.      aspirin  chewable 81 milliGRAM(s) Oral daily  atorvastatin 80 milliGRAM(s) Oral at bedtime  clopidogrel Tablet 75 milliGRAM(s) Oral daily  dextrose 5%. 1000 milliLiter(s) IV Continuous <Continuous>  dextrose 5%. 1000 milliLiter(s) IV Continuous <Continuous>  dextrose 50% Injectable 25 Gram(s) IV Push once  dextrose 50% Injectable 12.5 Gram(s) IV Push once  dextrose 50% Injectable 25 Gram(s) IV Push once  dextrose Oral Gel 15 Gram(s) Oral once PRN  enoxaparin Injectable 40 milliGRAM(s) SubCutaneous every 24 hours  glucagon  Injectable 1 milliGRAM(s) IntraMuscular once  insulin lispro (ADMELOG) corrective regimen sliding scale   SubCutaneous three times a day before meals  insulin lispro (ADMELOG) corrective regimen sliding scale   SubCutaneous at bedtime  sodium chloride 0.9%. 1000 milliLiter(s) IV Continuous <Continuous>      PHYSICAL EXAM:   Vital Signs Last 24 Hrs  T(C): 36.4 (08 Jan 2023 04:00), Max: 36.6 (07 Jan 2023 12:00)  T(F): 97.6 (08 Jan 2023 04:00), Max: 97.8 (07 Jan 2023 12:00)  HR: 80 (08 Jan 2023 06:00) (63 - 96)  BP: 200/76 (08 Jan 2023 06:00) (143/56 - 200/76)  BP(mean): 102 (08 Jan 2023 06:00) (75 - 120)  RR: 17 (08 Jan 2023 06:00) (15 - 22)  SpO2: 98% (08 Jan 2023 04:00) (96% - 100%)    Parameters below as of 08 Jan 2023 06:00  Patient On (Oxygen Delivery Method): room air        General: No acute distress  HEENT: EOM intact, blinks to threat B/L  Abdomen: Soft, nontender, nondistended   Extremities: No edema    NEUROLOGICAL EXAM: Translation provided by Neuro attending Dr Wilson and by pt's son in law at bedside  Mental status: Eyes closed, awake, alert, oriented x3, mute, unable to follow commands  Cranial Nerves: Right facial droop, no nystagmus, blinks to threat B/L  Motor exam: Normal tone, no drift, Right hemiparesis RUE drift, 4/5, RLE drift, 4/5,, LUE 5/5, LLE 5/5  Sensation: Intact to light touch   Coordination/ Gait: Unable to participate with exam     LABS:                        12.8   7.33  )-----------( 256      ( 08 Jan 2023 02:47 )             43.1    01-08    141  |  106  |  13  ----------------------------<  115<H>  3.6   |  24  |  0.56    Ca    9.5      08 Jan 2023 02:47    TPro  8.0  /  Alb  4.2  /  TBili  0.2  /  DBili  x   /  AST  17  /  ALT  11  /  AlkPhos  117  01-06  PT/INR - ( 06 Jan 2023 23:06 )   PT: 12.8 sec;   INR: 1.11 ratio         PTT - ( 06 Jan 2023 23:06 )  PTT:30.6 sec      IMAGING: Reviewed by me.     Brain MRI (01/07/23): Left MCA territory acute infarcts. No acute intracranial hemorrhage    CT Head (01/06/23): No acute intracranial hemorrhage. Hypoattenuation of the left temporal lobe, may represent acute left MCA territory infarct.    CT/CTA head/neck (01/06/23): CTA Head & Neck: Severe stenosis/near complete occlusion with the left M2 branch.    CT Perfusion: There is a penumbra involving a large area of the left MCA territory.   THE PATIENT WAS SEEN AND EXAMINED BY ME WITH THE HOUSESTAFF AND STROKE TEAM DURING MORNING ROUNDS.   HPI:  78 year-old right-handed female w/ PMHx ?dementia (AOx1-2, performs all ADLs), DM, presenting to [facility] ED w/ right facial droop that started on 1/2/23. On day of arrival, 1/6/23, around 15:30PM, patient noted to have unsteady gait and language abnormalities (not speaking, difficulty understanding),       SUBJECTIVE: No events overnight.  No new neurologic complaints, ROS reported negative unless otherwise noted.      aspirin  chewable 81 milliGRAM(s) Oral daily  atorvastatin 80 milliGRAM(s) Oral at bedtime  clopidogrel Tablet 75 milliGRAM(s) Oral daily  dextrose 5%. 1000 milliLiter(s) IV Continuous <Continuous>  dextrose 5%. 1000 milliLiter(s) IV Continuous <Continuous>  dextrose 50% Injectable 25 Gram(s) IV Push once  dextrose 50% Injectable 12.5 Gram(s) IV Push once  dextrose 50% Injectable 25 Gram(s) IV Push once  dextrose Oral Gel 15 Gram(s) Oral once PRN  enoxaparin Injectable 40 milliGRAM(s) SubCutaneous every 24 hours  glucagon  Injectable 1 milliGRAM(s) IntraMuscular once  insulin lispro (ADMELOG) corrective regimen sliding scale   SubCutaneous three times a day before meals  insulin lispro (ADMELOG) corrective regimen sliding scale   SubCutaneous at bedtime  sodium chloride 0.9%. 1000 milliLiter(s) IV Continuous <Continuous>      PHYSICAL EXAM:   Vital Signs Last 24 Hrs  T(C): 36.4 (08 Jan 2023 04:00), Max: 36.6 (07 Jan 2023 12:00)  T(F): 97.6 (08 Jan 2023 04:00), Max: 97.8 (07 Jan 2023 12:00)  HR: 80 (08 Jan 2023 06:00) (63 - 96)  BP: 200/76 (08 Jan 2023 06:00) (143/56 - 200/76)  BP(mean): 102 (08 Jan 2023 06:00) (75 - 120)  RR: 17 (08 Jan 2023 06:00) (15 - 22)  SpO2: 98% (08 Jan 2023 04:00) (96% - 100%)    Parameters below as of 08 Jan 2023 06:00  Patient On (Oxygen Delivery Method): room air        General: No acute distress  HEENT: EOM intact, blinks to threat B/L  Abdomen: Soft, nontender, nondistended   Extremities: No edema    NEUROLOGICAL EXAM: Translation provided by Neuro attending Dr Wilson and by pt's son in law at bedside  Mental status: Eyes closed, awake, alert, oriented x3, mute, unable to follow commands  Cranial Nerves: Right facial droop, no nystagmus, blinks to threat B/L  Motor exam: Normal tone, no drift, Right hemiparesis RUE drift, 4/5, RLE drift, 4/5,, LUE 5/5, LLE 5/5  Sensation: Intact to light touch   Coordination/ Gait: Unable to participate with exam     LABS:                        12.8   7.33  )-----------( 256      ( 08 Jan 2023 02:47 )             43.1    01-08    141  |  106  |  13  ----------------------------<  115<H>  3.6   |  24  |  0.56    Ca    9.5      08 Jan 2023 02:47    TPro  8.0  /  Alb  4.2  /  TBili  0.2  /  DBili  x   /  AST  17  /  ALT  11  /  AlkPhos  117  01-06  PT/INR - ( 06 Jan 2023 23:06 )   PT: 12.8 sec;   INR: 1.11 ratio         PTT - ( 06 Jan 2023 23:06 )  PTT:30.6 sec      IMAGING: Reviewed by me.     Brain MRI (01/07/23): Left MCA territory acute infarcts. No acute intracranial hemorrhage    CT Head (01/06/23): No acute intracranial hemorrhage. Hypoattenuation of the left temporal lobe, may represent acute left MCA territory infarct.    CT/CTA head/neck (01/06/23): CTA Head & Neck: Severe stenosis/near complete occlusion with the left M2 branch.    CT Perfusion: There is a penumbra involving a large area of the left MCA territory.   THE PATIENT WAS SEEN AND EXAMINED BY ME WITH THE HOUSESTAFF AND STROKE TEAM DURING MORNING ROUNDS.   HPI:  78 year-old right-handed female w/ PMHx ?dementia (AOx1-2, performs all ADLs), DM, presenting to [facility] ED w/ right facial droop that started on 1/2/23. On day of arrival, 1/6/23, around 15:30PM, patient noted to have unsteady gait and language abnormalities (not speaking, difficulty understanding),       SUBJECTIVE: No events overnight.  No new neurologic complaints, ROS reported negative unless otherwise noted.      aspirin  chewable 81 milliGRAM(s) Oral daily  atorvastatin 80 milliGRAM(s) Oral at bedtime  clopidogrel Tablet 75 milliGRAM(s) Oral daily  dextrose 5%. 1000 milliLiter(s) IV Continuous <Continuous>  dextrose 5%. 1000 milliLiter(s) IV Continuous <Continuous>  dextrose 50% Injectable 25 Gram(s) IV Push once  dextrose 50% Injectable 12.5 Gram(s) IV Push once  dextrose 50% Injectable 25 Gram(s) IV Push once  dextrose Oral Gel 15 Gram(s) Oral once PRN  enoxaparin Injectable 40 milliGRAM(s) SubCutaneous every 24 hours  glucagon  Injectable 1 milliGRAM(s) IntraMuscular once  insulin lispro (ADMELOG) corrective regimen sliding scale   SubCutaneous three times a day before meals  insulin lispro (ADMELOG) corrective regimen sliding scale   SubCutaneous at bedtime  sodium chloride 0.9%. 1000 milliLiter(s) IV Continuous <Continuous>      PHYSICAL EXAM:   Vital Signs Last 24 Hrs  T(C): 36.4 (08 Jan 2023 04:00), Max: 36.6 (07 Jan 2023 12:00)  T(F): 97.6 (08 Jan 2023 04:00), Max: 97.8 (07 Jan 2023 12:00)  HR: 80 (08 Jan 2023 06:00) (63 - 96)  BP: 200/76 (08 Jan 2023 06:00) (143/56 - 200/76)  BP(mean): 102 (08 Jan 2023 06:00) (75 - 120)  RR: 17 (08 Jan 2023 06:00) (15 - 22)  SpO2: 98% (08 Jan 2023 04:00) (96% - 100%)    Parameters below as of 08 Jan 2023 06:00  Patient On (Oxygen Delivery Method): room air        General: No acute distress  HEENT: EOM intact, blinks to threat B/L  Abdomen: Soft, nontender, nondistended   Extremities: No edema    NEUROLOGICAL EXAM: Translation provided by Neuro attending Dr Wilson and by pt's son in law at bedside  Mental status: Eyes closed, awake, alert, oriented x3, attempts to produce speech, , able to follow some simple commands, able to mimic, unable to ID objects  Cranial Nerves: Right facial droop, no nystagmus, blinks to threat B/L  Motor exam: Normal tone, no drift, Right hemiparesis RUE drift, 4/5, RLE drift, 4/5,, LUE 5/5, LLE 5/5  Sensation: Intact to light touch   Coordination/ Gait: Unable to participate with exam     LABS:                        12.8   7.33  )-----------( 256      ( 08 Jan 2023 02:47 )             43.1    01-08    141  |  106  |  13  ----------------------------<  115<H>  3.6   |  24  |  0.56    Ca    9.5      08 Jan 2023 02:47    TPro  8.0  /  Alb  4.2  /  TBili  0.2  /  DBili  x   /  AST  17  /  ALT  11  /  AlkPhos  117  01-06  PT/INR - ( 06 Jan 2023 23:06 )   PT: 12.8 sec;   INR: 1.11 ratio         PTT - ( 06 Jan 2023 23:06 )  PTT:30.6 sec      IMAGING: Reviewed by me.     Brain MRI (01/07/23): Left MCA territory acute infarcts. No acute intracranial hemorrhage    CT Head (01/06/23): No acute intracranial hemorrhage. Hypoattenuation of the left temporal lobe, may represent acute left MCA territory infarct.    CT/CTA head/neck (01/06/23): CTA Head & Neck: Severe stenosis/near complete occlusion with the left M2 branch.    CT Perfusion: There is a penumbra involving a large area of the left MCA territory.   THE PATIENT WAS SEEN AND EXAMINED BY ME WITH THE HOUSESTAFF AND STROKE TEAM DURING MORNING ROUNDS.   HPI:  78 year-old right-handed female w/ PMHx ?dementia (AOx1-2, performs all ADLs), DM, presenting to [facility] ED w/ right facial droop that started on 1/2/23. On day of arrival, 1/6/23, around 15:30PM, patient noted to have unsteady gait and language abnormalities (not speaking, difficulty understanding),       SUBJECTIVE: No events overnight.  No new neurologic complaints, ROS reported negative unless otherwise noted.      aspirin  chewable 81 milliGRAM(s) Oral daily  atorvastatin 80 milliGRAM(s) Oral at bedtime  clopidogrel Tablet 75 milliGRAM(s) Oral daily  dextrose 5%. 1000 milliLiter(s) IV Continuous <Continuous>  dextrose 5%. 1000 milliLiter(s) IV Continuous <Continuous>  dextrose 50% Injectable 25 Gram(s) IV Push once  dextrose 50% Injectable 12.5 Gram(s) IV Push once  dextrose 50% Injectable 25 Gram(s) IV Push once  dextrose Oral Gel 15 Gram(s) Oral once PRN  enoxaparin Injectable 40 milliGRAM(s) SubCutaneous every 24 hours  glucagon  Injectable 1 milliGRAM(s) IntraMuscular once  insulin lispro (ADMELOG) corrective regimen sliding scale   SubCutaneous three times a day before meals  insulin lispro (ADMELOG) corrective regimen sliding scale   SubCutaneous at bedtime  sodium chloride 0.9%. 1000 milliLiter(s) IV Continuous <Continuous>      PHYSICAL EXAM:   Vital Signs Last 24 Hrs  T(C): 36.4 (08 Jan 2023 04:00), Max: 36.6 (07 Jan 2023 12:00)  T(F): 97.6 (08 Jan 2023 04:00), Max: 97.8 (07 Jan 2023 12:00)  HR: 80 (08 Jan 2023 06:00) (63 - 96)  BP: 200/76 (08 Jan 2023 06:00) (143/56 - 200/76)  BP(mean): 102 (08 Jan 2023 06:00) (75 - 120)  RR: 17 (08 Jan 2023 06:00) (15 - 22)  SpO2: 98% (08 Jan 2023 04:00) (96% - 100%)    Parameters below as of 08 Jan 2023 06:00  Patient On (Oxygen Delivery Method): room air        General: No acute distress  HEENT: EOM intact, blinks to threat B/L  Abdomen: Soft, nontender, nondistended   Extremities: No edema    NEUROLOGICAL EXAM: Translation provided by Neuro attending Dr Wilson and by pt's son in law at bedside  Mental status: Eyes closed, awake, alert, oriented x3, attempts to produce speech, , able to follow some simple commands, able to mimic, unable to ID objects  Cranial Nerves: Right facial droop, no nystagmus, blinks to threat B/L  Motor exam: Normal tone, no drift, Right hemiparesis RUE drift, 4/5, RLE drift, 4/5,, LUE 5/5, LLE 5/5  Sensation: Intact to light touch   Coordination/ Gait: Unable to participate with exam     LABS:                        12.8   7.33  )-----------( 256      ( 08 Jan 2023 02:47 )             43.1    01-08    141  |  106  |  13  ----------------------------<  115<H>  3.6   |  24  |  0.56    Ca    9.5      08 Jan 2023 02:47    TPro  8.0  /  Alb  4.2  /  TBili  0.2  /  DBili  x   /  AST  17  /  ALT  11  /  AlkPhos  117  01-06  PT/INR - ( 06 Jan 2023 23:06 )   PT: 12.8 sec;   INR: 1.11 ratio         PTT - ( 06 Jan 2023 23:06 )  PTT:30.6 sec      IMAGING: Reviewed by me.     Brain MRI (01/07/23): Left MCA territory acute infarcts. No acute intracranial hemorrhage    CT Head (01/06/23): No acute intracranial hemorrhage. Hypoattenuation of the left temporal lobe, may represent acute left MCA territory infarct.    CT/CTA head/neck (01/06/23): CTA Head & Neck: Severe stenosis/near complete occlusion with the left M2 branch.    CT Perfusion: There is a penumbra involving a large area of the left MCA territory.   THE PATIENT WAS SEEN AND EXAMINED BY ME WITH THE HOUSESTAFF AND STROKE TEAM DURING MORNING ROUNDS.   HPI:  78 year-old right-handed female w/ PMHx ?dementia (AOx1-2, performs all ADLs), DM, presenting to [facility] ED w/ right facial droop that started on 1/2/23. On day of arrival, 1/6/23, around 15:30PM, patient noted to have unsteady gait and language abnormalities (not speaking, difficulty understanding),       SUBJECTIVE: No events overnight.  No new neurologic complaints, ROS reported negative unless otherwise noted.      aspirin  chewable 81 milliGRAM(s) Oral daily  atorvastatin 80 milliGRAM(s) Oral at bedtime  clopidogrel Tablet 75 milliGRAM(s) Oral daily  dextrose 5%. 1000 milliLiter(s) IV Continuous <Continuous>  dextrose 5%. 1000 milliLiter(s) IV Continuous <Continuous>  dextrose 50% Injectable 25 Gram(s) IV Push once  dextrose 50% Injectable 12.5 Gram(s) IV Push once  dextrose 50% Injectable 25 Gram(s) IV Push once  dextrose Oral Gel 15 Gram(s) Oral once PRN  enoxaparin Injectable 40 milliGRAM(s) SubCutaneous every 24 hours  glucagon  Injectable 1 milliGRAM(s) IntraMuscular once  insulin lispro (ADMELOG) corrective regimen sliding scale   SubCutaneous three times a day before meals  insulin lispro (ADMELOG) corrective regimen sliding scale   SubCutaneous at bedtime  sodium chloride 0.9%. 1000 milliLiter(s) IV Continuous <Continuous>      PHYSICAL EXAM:   Vital Signs Last 24 Hrs  T(C): 36.4 (08 Jan 2023 04:00), Max: 36.6 (07 Jan 2023 12:00)  T(F): 97.6 (08 Jan 2023 04:00), Max: 97.8 (07 Jan 2023 12:00)  HR: 80 (08 Jan 2023 06:00) (63 - 96)  BP: 200/76 (08 Jan 2023 06:00) (143/56 - 200/76)  BP(mean): 102 (08 Jan 2023 06:00) (75 - 120)  RR: 17 (08 Jan 2023 06:00) (15 - 22)  SpO2: 98% (08 Jan 2023 04:00) (96% - 100%)    Parameters below as of 08 Jan 2023 06:00  Patient On (Oxygen Delivery Method): room air        General: No acute distress  HEENT: EOM intact, blinks to threat B/L  Abdomen: Soft, nontender, nondistended   Extremities: No edema    NEUROLOGICAL EXAM: Translation provided by Neuro attending Dr Wilson and by pt's son in law at bedside  Mental status: Eyes closed, awake, alert, oriented x3, attempts to produce speech, , able to follow some simple commands, able to mimic, unable to ID objects  Cranial Nerves: Right facial droop, no nystagmus, blinks to threat B/L  Motor exam: Normal tone, no drift, Right hemiparesis RUE drift, 4/5, RLE drift, 4/5,, LUE 5/5, LLE 5/5  Sensation: Intact to light touch   Coordination/ Gait: Unable to participate with exam     LABS:                        12.8   7.33  )-----------( 256      ( 08 Jan 2023 02:47 )             43.1    01-08    141  |  106  |  13  ----------------------------<  115<H>  3.6   |  24  |  0.56    Ca    9.5      08 Jan 2023 02:47    TPro  8.0  /  Alb  4.2  /  TBili  0.2  /  DBili  x   /  AST  17  /  ALT  11  /  AlkPhos  117  01-06  PT/INR - ( 06 Jan 2023 23:06 )   PT: 12.8 sec;   INR: 1.11 ratio         PTT - ( 06 Jan 2023 23:06 )  PTT:30.6 sec      IMAGING: Reviewed by me.     Brain MRI (01/07/23): Left MCA territory acute infarcts. No acute intracranial hemorrhage    CT Head (01/06/23): No acute intracranial hemorrhage. Hypoattenuation of the left temporal lobe, may represent acute left MCA territory infarct.    CT/CTA head/neck (01/06/23): CTA Head & Neck: Severe stenosis/near complete occlusion with the left M2 branch.    CT Perfusion: There is a penumbra involving a large area of the left MCA territory.   THE PATIENT WAS SEEN AND EXAMINED BY ME WITH THE HOUSESTAFF AND STROKE TEAM DURING MORNING ROUNDS.   HPI:  78 year-old right-handed female Nicole speaking, w/ PMHx ?dementia (AOx1-2, performs all ADLs), DM, presenting to [facility] ED w/ right facial droop that started on 1/2/23. On day of arrival, 1/6/23, around 15:30PM, patient noted to have unsteady gait and language abnormalities (not speaking, difficulty understanding),       SUBJECTIVE: No events overnight.  No new neurologic complaints, ROS reported negative unless otherwise noted.      aspirin  chewable 81 milliGRAM(s) Oral daily  atorvastatin 80 milliGRAM(s) Oral at bedtime  clopidogrel Tablet 75 milliGRAM(s) Oral daily  dextrose 5%. 1000 milliLiter(s) IV Continuous <Continuous>  dextrose 5%. 1000 milliLiter(s) IV Continuous <Continuous>  dextrose 50% Injectable 25 Gram(s) IV Push once  dextrose 50% Injectable 12.5 Gram(s) IV Push once  dextrose 50% Injectable 25 Gram(s) IV Push once  dextrose Oral Gel 15 Gram(s) Oral once PRN  enoxaparin Injectable 40 milliGRAM(s) SubCutaneous every 24 hours  glucagon  Injectable 1 milliGRAM(s) IntraMuscular once  insulin lispro (ADMELOG) corrective regimen sliding scale   SubCutaneous three times a day before meals  insulin lispro (ADMELOG) corrective regimen sliding scale   SubCutaneous at bedtime  sodium chloride 0.9%. 1000 milliLiter(s) IV Continuous <Continuous>      PHYSICAL EXAM:   Vital Signs Last 24 Hrs  T(C): 36.4 (08 Jan 2023 04:00), Max: 36.6 (07 Jan 2023 12:00)  T(F): 97.6 (08 Jan 2023 04:00), Max: 97.8 (07 Jan 2023 12:00)  HR: 80 (08 Jan 2023 06:00) (63 - 96)  BP: 200/76 (08 Jan 2023 06:00) (143/56 - 200/76)  BP(mean): 102 (08 Jan 2023 06:00) (75 - 120)  RR: 17 (08 Jan 2023 06:00) (15 - 22)  SpO2: 98% (08 Jan 2023 04:00) (96% - 100%)    Parameters below as of 08 Jan 2023 06:00  Patient On (Oxygen Delivery Method): room air        General: No acute distress  HEENT: EOM intact, blinks to threat B/L  Abdomen: Soft, nontender, nondistended   Extremities: No edema    NEUROLOGICAL EXAM: Translation provided by Neuro attending Dr Wilson and by pt's son in law at bedside ([facility] language)  Mental status: Eyes closed, awake, alert, oriented x3, attempts to produce speech, , able to follow some simple commands, able to mimic, unable to ID objects  Cranial Nerves: Right facial droop, no nystagmus, blinks to threat B/L  Motor exam: Normal tone, no drift, Right hemiparesis RUE drift, 4/5, RLE drift, 4/5,, LUE 5/5, LLE 5/5  Sensation: Intact to light touch   Coordination/ Gait: Unable to participate with exam     LABS:                        12.8   7.33  )-----------( 256      ( 08 Jan 2023 02:47 )             43.1    01-08    141  |  106  |  13  ----------------------------<  115<H>  3.6   |  24  |  0.56    Ca    9.5      08 Jan 2023 02:47    TPro  8.0  /  Alb  4.2  /  TBili  0.2  /  DBili  x   /  AST  17  /  ALT  11  /  AlkPhos  117  01-06  PT/INR - ( 06 Jan 2023 23:06 )   PT: 12.8 sec;   INR: 1.11 ratio         PTT - ( 06 Jan 2023 23:06 )  PTT:30.6 sec      IMAGING: Reviewed by me.     Brain MRI (01/07/23): Left MCA territory acute infarcts. No acute intracranial hemorrhage    CT Head (01/06/23): No acute intracranial hemorrhage. Hypoattenuation of the left temporal lobe, may represent acute left MCA territory infarct.    CT/CTA head/neck (01/06/23): CTA Head & Neck: Severe stenosis/near complete occlusion with the left M2 branch.    CT Perfusion: There is a penumbra involving a large area of the left MCA territory.   THE PATIENT WAS SEEN AND EXAMINED BY ME WITH THE HOUSESTAFF AND STROKE TEAM DURING MORNING ROUNDS.   HPI:  78 year-old right-handed female Nicole speaking, w/ PMHx ?dementia (AOx1-2, performs all ADLs), DM, presenting to [facility] ED w/ right facial droop that started on 1/2/23. On day of arrival, 1/6/23, around 15:30PM, patient noted to have unsteady gait and language abnormalities (not speaking, difficulty understanding),       SUBJECTIVE: No events overnight.  No new neurologic complaints, ROS reported negative unless otherwise noted.      aspirin  chewable 81 milliGRAM(s) Oral daily  atorvastatin 80 milliGRAM(s) Oral at bedtime  clopidogrel Tablet 75 milliGRAM(s) Oral daily  dextrose 5%. 1000 milliLiter(s) IV Continuous <Continuous>  dextrose 5%. 1000 milliLiter(s) IV Continuous <Continuous>  dextrose 50% Injectable 25 Gram(s) IV Push once  dextrose 50% Injectable 12.5 Gram(s) IV Push once  dextrose 50% Injectable 25 Gram(s) IV Push once  dextrose Oral Gel 15 Gram(s) Oral once PRN  enoxaparin Injectable 40 milliGRAM(s) SubCutaneous every 24 hours  glucagon  Injectable 1 milliGRAM(s) IntraMuscular once  insulin lispro (ADMELOG) corrective regimen sliding scale   SubCutaneous three times a day before meals  insulin lispro (ADMELOG) corrective regimen sliding scale   SubCutaneous at bedtime  sodium chloride 0.9%. 1000 milliLiter(s) IV Continuous <Continuous>      PHYSICAL EXAM:   Vital Signs Last 24 Hrs  T(C): 36.4 (08 Jan 2023 04:00), Max: 36.6 (07 Jan 2023 12:00)  T(F): 97.6 (08 Jan 2023 04:00), Max: 97.8 (07 Jan 2023 12:00)  HR: 80 (08 Jan 2023 06:00) (63 - 96)  BP: 200/76 (08 Jan 2023 06:00) (143/56 - 200/76)  BP(mean): 102 (08 Jan 2023 06:00) (75 - 120)  RR: 17 (08 Jan 2023 06:00) (15 - 22)  SpO2: 98% (08 Jan 2023 04:00) (96% - 100%)    Parameters below as of 08 Jan 2023 06:00  Patient On (Oxygen Delivery Method): room air        General: No acute distress  HEENT: EOM intact, blinks to threat B/L  Abdomen: Soft, nontender, nondistended   Extremities: No edema    NEUROLOGICAL EXAM: Translation provided by Neuro attending Dr Wilson and by pt's son in law at bedside ([facility] language)  Mental status: Eyes closed, awake, alert, oriented x3, attempts to produce speech, , able to follow some simple commands, able to mimic, unable to ID objects  Cranial Nerves: Right facial droop, no nystagmus, blinks to threat B/L  Motor exam: Normal tone, no drift, Right hemiparesis RUE drift, 4/5, RLE drift, 4/5,, LUE 5/5, LLE 5/5  Sensation: Intact to light touch   Coordination/ Gait: Unable to participate with exam     LABS:                        12.8   7.33  )-----------( 256      ( 08 Jan 2023 02:47 )             43.1    01-08    141  |  106  |  13  ----------------------------<  115<H>  3.6   |  24  |  0.56    Ca    9.5      08 Jan 2023 02:47    TPro  8.0  /  Alb  4.2  /  TBili  0.2  /  DBili  x   /  AST  17  /  ALT  11  /  AlkPhos  117  01-06  PT/INR - ( 06 Jan 2023 23:06 )   PT: 12.8 sec;   INR: 1.11 ratio         PTT - ( 06 Jan 2023 23:06 )  PTT:30.6 sec      IMAGING: Reviewed by me.     Brain MRI (01/07/23): Left MCA territory acute infarcts. No acute intracranial hemorrhage    CT Head (01/06/23): No acute intracranial hemorrhage. Hypoattenuation of the left temporal lobe, may represent acute left MCA territory infarct.    CT/CTA head/neck (01/06/23): CTA Head & Neck: Severe stenosis/near complete occlusion with the left M2 branch.    CT Perfusion: There is a penumbra involving a large area of the left MCA territory.   THE PATIENT WAS SEEN AND EXAMINED BY ME WITH THE HOUSESTAFF AND STROKE TEAM DURING MORNING ROUNDS.   HPI:  78 year-old right-handed female Nicole speaking, w/ PMHx ?dementia (AOx1-2, performs all ADLs), DM, presenting to [facility] ED w/ right facial droop that started on 1/2/23. On day of arrival, 1/6/23, around 15:30PM, patient noted to have unsteady gait and language abnormalities (not speaking, difficulty understanding),       SUBJECTIVE: No events overnight.  No new neurologic complaints, ROS reported negative unless otherwise noted.      aspirin  chewable 81 milliGRAM(s) Oral daily  atorvastatin 80 milliGRAM(s) Oral at bedtime  clopidogrel Tablet 75 milliGRAM(s) Oral daily  dextrose 5%. 1000 milliLiter(s) IV Continuous <Continuous>  dextrose 5%. 1000 milliLiter(s) IV Continuous <Continuous>  dextrose 50% Injectable 25 Gram(s) IV Push once  dextrose 50% Injectable 12.5 Gram(s) IV Push once  dextrose 50% Injectable 25 Gram(s) IV Push once  dextrose Oral Gel 15 Gram(s) Oral once PRN  enoxaparin Injectable 40 milliGRAM(s) SubCutaneous every 24 hours  glucagon  Injectable 1 milliGRAM(s) IntraMuscular once  insulin lispro (ADMELOG) corrective regimen sliding scale   SubCutaneous three times a day before meals  insulin lispro (ADMELOG) corrective regimen sliding scale   SubCutaneous at bedtime  sodium chloride 0.9%. 1000 milliLiter(s) IV Continuous <Continuous>      PHYSICAL EXAM:   Vital Signs Last 24 Hrs  T(C): 36.4 (08 Jan 2023 04:00), Max: 36.6 (07 Jan 2023 12:00)  T(F): 97.6 (08 Jan 2023 04:00), Max: 97.8 (07 Jan 2023 12:00)  HR: 80 (08 Jan 2023 06:00) (63 - 96)  BP: 200/76 (08 Jan 2023 06:00) (143/56 - 200/76)  BP(mean): 102 (08 Jan 2023 06:00) (75 - 120)  RR: 17 (08 Jan 2023 06:00) (15 - 22)  SpO2: 98% (08 Jan 2023 04:00) (96% - 100%)    Parameters below as of 08 Jan 2023 06:00  Patient On (Oxygen Delivery Method): room air        General: No acute distress  HEENT: EOM intact, blinks to threat B/L  Abdomen: Soft, nontender, nondistended   Extremities: No edema    NEUROLOGICAL EXAM: Translation provided by Neuro attending Dr Wilson and by pt's son in law at bedside ([facility] language)  Mental status: Eyes closed, awake, alert, oriented x3, attempts to produce speech, , able to follow some simple commands, able to mimic, unable to ID objects  Cranial Nerves: Right facial droop, no nystagmus, blinks to threat B/L  Motor exam: Normal tone, no drift, Right hemiparesis RUE drift, 4/5, RLE drift, 4/5,, LUE 5/5, LLE 5/5  Sensation: Intact to light touch   Coordination/ Gait: Unable to participate with exam     LABS:                        12.8   7.33  )-----------( 256      ( 08 Jan 2023 02:47 )             43.1    01-08    141  |  106  |  13  ----------------------------<  115<H>  3.6   |  24  |  0.56    Ca    9.5      08 Jan 2023 02:47    TPro  8.0  /  Alb  4.2  /  TBili  0.2  /  DBili  x   /  AST  17  /  ALT  11  /  AlkPhos  117  01-06  PT/INR - ( 06 Jan 2023 23:06 )   PT: 12.8 sec;   INR: 1.11 ratio         PTT - ( 06 Jan 2023 23:06 )  PTT:30.6 sec      IMAGING: Reviewed by me.     Brain MRI (01/07/23): Left MCA territory acute infarcts. No acute intracranial hemorrhage    CT Head (01/06/23): No acute intracranial hemorrhage. Hypoattenuation of the left temporal lobe, may represent acute left MCA territory infarct.    CT/CTA head/neck (01/06/23): CTA Head & Neck: Severe stenosis/near complete occlusion with the left M2 branch.    CT Perfusion: There is a penumbra involving a large area of the left MCA territory.

## 2023-01-08 NOTE — SWALLOW BEDSIDE ASSESSMENT ADULT - SLP PERTINENT HISTORY OF CURRENT PROBLEM
SARAH DISLA is a 78 year-old right-handed female w/ PMHx ?dementia (AOx1-2, performs all ADLs), otherwise reportedly no other PMHx, presenting to [facility] ED w/ right facial droop that started on 1/2/23. On day of arrival, 1/6/23, around 15:30PM, patient noted to have unsteady gait and language abnormalities (not speaking, difficulty understanding), whereas these symptoms were apparently not present just before 15:30PM. VS in ED: /70, HR 84, afebrile. mRS: 0. LKN: 1/2/23. NIHSS: 22. Impression: Global aphasia a/w right hemiparesis localizable to left MCA territory acute ischemic stroke secondary to left MCA M1 severe stenosis likely due to large-artery atherosclerosis. SARAH DISLA is a 78 year-old right-handed female w/ PMHx ?dementia (AOx1-2, performs all ADLs), otherwise reportedly no other PMHx, presenting to [facility] ED w/ right facial droop that started on 1/2/23. On day of arrival, 1/6/23, around 15:30PM, patient noted to have unsteady gait and language abnormalities (not speaking, difficulty understanding), whereas these symptoms were apparently not present just before 15:30PM. VS in ED: /70, HR 84, afebrile. mRS: 0. LKN: 1/2/23. NIHSS: 22. Impression: Global aphasia a/w right hemiparesis localizable to left MCA territory acute ischemic stroke secondary to left MCA M1 severe stenosis likely due to large-artery atherosclerosis.

## 2023-01-08 NOTE — SWALLOW BEDSIDE ASSESSMENT ADULT - SLP GENERAL OBSERVATIONS
Pt encountered semi-supine in bed, on room air, + icu monitoring (VSS), 1:1 at bedside, unrestrained b/l wrist restraints. Granddaughter and her  at the bedside. Offered to translate Tajik for patient. Pt encountered semi-supine in bed, on room air, + icu monitoring (VSS), 1:1 at bedside, unrestrained b/l wrist restraints. Granddaughter and her  at the bedside. Offered to translate Nepali for patient. Pt encountered semi-supine in bed, on room air, + icu monitoring (VSS), 1:1 at bedside, unrestrained b/l wrist restraints. Granddaughter and her  at the bedside. Offered to translate Swedish for patient.

## 2023-01-08 NOTE — SWALLOW BEDSIDE ASSESSMENT ADULT - SWALLOW EVAL: DIAGNOSIS
Order received/appreciated for swallow evaluation; D/w RN in stroke unit and PADMINI Amador regarding order. Pt passed screen 1/7 and is tolerating diet per verbal report. PA to d/c order. Please reconsult for intolerance or concern for aspiration. Ekta Lewis MS CCC-SLP Prefer teams   extension [phone]# Order received/appreciated for swallow evaluation; D/w RN in stroke unit and PADMINI Amador regarding order. Pt passed screen 1/7 and is tolerating diet per verbal report. PA to d/c order. Please reconsult for intolerance or concern for aspiration. Ekta Lewis MS CCC-SLP Prefer teams   extension [phone]# Order received/appreciated for swallow evaluation; D/w RN in stroke unit and PADMINI Amador regarding order. Pt passed screen 1/7 and is tolerating diet per verbal report. PA to d/c order. Please reconsult for intolerance or concern for aspiration. Ekta Lewis MS CCC-SLP Prefer teams   extension [phone]#

## 2023-01-08 NOTE — PROGRESS NOTE ADULT - ASSESSMENT
ASSESSMENT: 78-year-old woman with PMH, DM, mild cognitive impairment (performs her daily activities and oriented to person and place.)  She presented with sudden onset of right facial droop, right hemiparesis and global aphasia.Head CT shows left inferior temporal hypodensity suggestive of subacute infarct. MRI brain significant for left hemispheric–MCA territory inferior division temporal infarction and left hemispheric subcortical infarcts  CTA shows to my eye, left M1 segment severe left MCA stenosis. She was not  a candidate for IV thrombolysis as her stroke symptoms started 1/2/2023; infarction/ left temporal hypodensity present on CT at the time of presentation and left M2 distal occlusion with patent M1 segment.    Impression: Left MCA territory ischemia likely secondary to symptomatic intracranial left MCA stenosis. Differential diagnosis includes embolic etiology, based on age and infarct pattern on MRI.    NEURO: Neuro exam unchanged, continue close monitoring for neurologic deterioration, permissive HTN with slow titration to normotensive, titrate statin to LDL goal less than 70, MRI Brain, CTA Head and Neck results as above Physical therapy/OT eval with recommendations for AR.      ANTITHROMBOTIC THERAPY: ASA/Plavix for 3 months followed by ASA only as per [facility] protocol for secondary stroke prevention     PULMONARY: CXR (01/07): Left upper lung bronchiectasis but otherwise no focal consolidation, protecting airway, saturating well on 1L NC    CARDIOVASCULAR: check TTE, cardiac monitoring no events                               SBP goal: 110-180mmHg    GASTROINTESTINAL: Dysphagia screen passed, tolerating diet     Diet: Pureed    RENAL: BUN/Cr stable, good urine output, straight cath X1 on 01/07/23      Na Goal: Greater than 135     Rojas: No    HEMATOLOGY: H/H stable, Platelets normal      DVT ppx: LMWH     ID: afebrile, no leukocytosis     OTHER: Plan/goals of care discussed with pt's family at bedside and over the phone. Soft vest restraints in place as pt attempts to get out of bed, pull lines    DISPOSITION: AR as per PT/OT eval once stable and workup is complete    CORE MEASURES:        Admission NIHSS: 22     TPA:  NO      LDL/HDL: pending     Depression Screen: N/A pt is aphasic     Statin Therapy: Y     Dysphagia Screen: PASS     Smoking NO      Afib NO     Stroke Education YES    Obtain screening ultrasound in patients who meet 1 or more of the following criteria as patient is high risk for DVT/PE upon admission:   [] History of DVT/PE  []Hypercoagulable states (Factor V Leiden, Cancer, OCP, etc. )  []Prolonged immobility (hemiplegia/hemiparesis/post operative or any other extended immobilization)   [] Transferred from outside facility (Rehab or Long term care)  [] Age </= to 50 ASSESSMENT: 78-year-old woman with PMH, DM, mild cognitive impairment (performs her daily activities and oriented to person and place.)  She presented with sudden onset of right facial droop, right hemiparesis and global aphasia.Head CT shows left inferior temporal hypodensity suggestive of subacute infarct. MRI brain significant for left hemispheric–MCA territory inferior division temporal infarction and left hemispheric subcortical infarcts  CTA shows to my eye, left M1 segment severe left MCA stenosis. She was not  a candidate for IV thrombolysis as her stroke symptoms started 1/2/2023; infarction/ left temporal hypodensity present on CT at the time of presentation and left M2 distal occlusion with patent M1 segment.    Impression: Left MCA territory ischemia likely secondary to symptomatic intracranial left MCA stenosis. Differential diagnosis includes embolic etiology, based on age and infarct pattern on MRI.    NEURO: Neuro exam unchanged, continue close monitoring for neurologic deterioration, permissive HTN with slow titration to normotensive, titrate statin to LDL goal less than 70, MRI Brain, CTA Head and Neck results as above Physical therapy/OT eval with recommendations for AR.      ANTITHROMBOTIC THERAPY: ASA/Plavix for 3 months followed by ASA only as per [facility] protocol for secondary stroke prevention     PULMONARY: CXR (01/07): Left upper lung bronchiectasis but otherwise no focal consolidation, protecting airway, saturating well on 1L NC    CARDIOVASCULAR: check TTE, cardiac monitoring no events                               SBP goal: 110-180mmHg    GASTROINTESTINAL: Dysphagia screen passed, tolerating diet     Diet: Pureed    RENAL: BUN/Cr stable, good urine output, straight cath X1 on 01/07/23      Na Goal: Greater than 135     Rojas: No    HEMATOLOGY: H/H stable, Platelets normal      DVT ppx: LMWH     ID: afebrile, no leukocytosis     OTHER: Plan/goals of care discussed with pt's family at bedside and over the phone. Soft vest restraints in place as pt attempts to get out of bed, pull lines    DISPOSITION: AR as per PT/OT eval once stable and workup is complete    CORE MEASURES:        Admission NIHSS: 22     TPA:  NO      LDL/HDL: pending     Depression Screen: N/A pt is aphasic     Statin Therapy: Y     Dysphagia Screen: PASS     Smoking NO      Afib NO     Stroke Education YES    Obtain screening ultrasound in patients who meet 1 or more of the following criteria as patient is high risk for DVT/PE upon admission:   [] History of DVT/PE  []Hypercoagulable states (Factor V Leiden, Cancer, OCP, etc. )  []Prolonged immobility (hemiplegia/hemiparesis/post operative or any other extended immobilization)   [] Transferred from outside facility (Rehab or Long term care)  [] Age </= to 50 ASSESSMENT: 78-year-old woman with PMH, DM, mild cognitive impairment (performs her daily activities and oriented to person and place.)  She presented with sudden onset of right facial droop, right hemiparesis and global aphasia.Head CT shows left inferior temporal hypodensity suggestive of subacute infarct. MRI brain significant for left hemispheric–MCA territory inferior division temporal infarction and left hemispheric subcortical infarcts  CTA shows to my eye, left M1 segment severe left MCA stenosis. She was not  a candidate for IV thrombolysis as her stroke symptoms started 1/2/2023; infarction/ left temporal hypodensity present on CT at the time of presentation and left M2 distal occlusion with patent M1 segment.    Impression: Left MCA territory ischemia likely secondary to symptomatic intracranial left MCA stenosis. Differential diagnosis includes embolic etiology, based on age and infarct pattern on MRI.    NEURO: Neuro exam unchanged, continue close monitoring for neurologic deterioration, permissive HTN with slow titration to normotensive, titrate statin to LDL goal less than 70, MRI Brain, CTA Head and Neck results as above Physical therapy/OT eval with recommendations for AR.      ANTITHROMBOTIC THERAPY: ASA/Plavix for 3 months followed by ASA only as per [facility] protocol for secondary stroke prevention     PULMONARY: CXR (01/07): Left upper lung bronchiectasis but otherwise no focal consolidation, protecting airway, saturating well on 1L NC    CARDIOVASCULAR: check TTE, cardiac monitoring no events                               SBP goal: 110-180mmHg    GASTROINTESTINAL: Dysphagia screen passed, tolerating diet     Diet: Pureed    RENAL: BUN/Cr stable, good urine output, straight cath X1 on 01/07/23      Na Goal: Greater than 135     Rojas: No    HEMATOLOGY: H/H stable, Platelets normal      DVT ppx: LMWH     ID: afebrile, no leukocytosis     OTHER: Plan/goals of care discussed with pt's family at bedside and over the phone. Soft vest restraints in place as pt attempts to get out of bed, pull lines    DISPOSITION: AR as per PT/OT eval once stable and workup is complete    CORE MEASURES:        Admission NIHSS: 22     TPA:  NO      LDL/HDL: pending     Depression Screen: N/A pt is aphasic     Statin Therapy: Y     Dysphagia Screen: PASS     Smoking NO      Afib NO     Stroke Education YES    Obtain screening ultrasound in patients who meet 1 or more of the following criteria as patient is high risk for DVT/PE upon admission:   [] History of DVT/PE  []Hypercoagulable states (Factor V Leiden, Cancer, OCP, etc. )  []Prolonged immobility (hemiplegia/hemiparesis/post operative or any other extended immobilization)   [] Transferred from outside facility (Rehab or Long term care)  [] Age </= to 50 ASSESSMENT: 78-year-old woman with PMH, DM, mild cognitive impairment (performs her daily activities and oriented to person and place.)  She presented with sudden onset of right facial droop, right hemiparesis and global aphasia.Head CT shows left inferior temporal hypodensity suggestive of subacute infarct. MRI brain significant for left hemispheric–MCA territory inferior division temporal infarction and left hemispheric subcortical infarcts  CTA shows to my eye, left M1 segment severe left MCA stenosis. She was not  a candidate for IV thrombolysis as her stroke symptoms started 1/2/2023; infarction/ left temporal hypodensity present on CT at the time of presentation and left M2 distal occlusion with patent M1 segment.    Impression: Left MCA territory ischemia likely secondary to symptomatic intracranial left MCA stenosis. Differential diagnosis includes embolic etiology, based on age and infarct pattern on MRI.    NEURO: Neuro exam unchanged, continue close monitoring for neurologic deterioration, permissive HTN with slow titration to normotensive, titrate statin to LDL goal less than 70, MRI Brain, CTA Head and Neck results as above Physical therapy/OT eval with recommendations for AR.      ANTITHROMBOTIC THERAPY: ASA/Plavix for 3 months followed by ASA only as per [facility] protocol for secondary stroke prevention     PULMONARY: CXR (01/07): Left upper lung bronchiectasis but otherwise no focal consolidation, protecting airway, saturating well on 1L NC    CARDIOVASCULAR: check TTE, cardiac monitoring no events, plan to obtain LOOP to monitor for occult arrhythmias like Afib as per AF stroke trial                               SBP goal: 110-180mmHg    GASTROINTESTINAL: Dysphagia screen passed, tolerating diet     Diet: Pureed    RENAL: BUN/Cr stable, good urine output, straight cath X1 on 01/07/23      Na Goal: Greater than 135     Rojas: No    HEMATOLOGY: H/H stable, Platelets normal      DVT ppx: LMWH     ID: afebrile, no leukocytosis     OTHER: Plan/goals of care discussed with pt's family at bedside and over the phone. Soft vest restraints in place as pt attempts to get out of bed, pull lines    DISPOSITION: AR as per PT/OT eval once stable and workup is complete    CORE MEASURES:        Admission NIHSS: 22     TPA:  NO      LDL/HDL: pending     Depression Screen: N/A pt is aphasic     Statin Therapy: Y     Dysphagia Screen: PASS     Smoking NO      Afib NO     Stroke Education YES    Obtain screening ultrasound in patients who meet 1 or more of the following criteria as patient is high risk for DVT/PE upon admission:   [] History of DVT/PE  []Hypercoagulable states (Factor V Leiden, Cancer, OCP, etc. )  []Prolonged immobility (hemiplegia/hemiparesis/post operative or any other extended immobilization)   [] Transferred from outside facility (Rehab or Long term care)  [] Age </= to 50 ASSESSMENT: 78-year-old woman with PMH, DM, mild cognitive impairment (performs her daily activities and oriented to person and place.)  She presented with sudden onset of right facial droop, right hemiparesis and global aphasia.Head CT shows left inferior temporal hypodensity suggestive of subacute infarct. MRI brain significant for left hemispheric–MCA territory inferior division temporal infarction and left hemispheric subcortical infarcts  CTA shows to my eye, left M1 segment severe left MCA stenosis. She was not  a candidate for IV thrombolysis as her stroke symptoms started 1/2/2023; infarction/ left temporal hypodensity present on CT at the time of presentation and left M2 distal occlusion with patent M1 segment.    Impression: Left MCA territory ischemia likely secondary to symptomatic intracranial left MCA stenosis. Differential diagnosis includes embolic etiology, based on age and infarct pattern on MRI.    NEURO: Neuro exam unchanged, continue close monitoring for neurologic deterioration, permissive HTN with slow titration to normotensive, titrate statin to LDL goal less than 70, MRI Brain, CTA Head and Neck results as above Physical therapy/OT eval with recommendations for AR.      ANTITHROMBOTIC THERAPY: ASA/Plavix for 3 months followed by ASA only as per [facility] protocol for secondary stroke prevention     PULMONARY: CXR (01/07): Left upper lung bronchiectasis but otherwise no focal consolidation, protecting airway, saturating well on 1L NC    CARDIOVASCULAR: check TTE, cardiac monitoring no events, plan to obtain LOOP to monitor for occult arrhythmias like Afib as per AF stroke trial                               SBP goal: 110-180mmHg    GASTROINTESTINAL: Dysphagia screen passed, tolerating diet     Diet: Pureed    RENAL: BUN/Cr stable, good urine output, straight cath X1 on 01/07/23      Na Goal: Greater than 135     Rojas: No    HEMATOLOGY: H/H stable, Platelets normal      DVT ppx: LMWH     ID: afebrile, no leukocytosis     OTHER: Plan/goals of care discussed with pt's family at bedside and over the phone. Soft vest restraints in place as pt attempts to get out of bed, pull lines    DISPOSITION: AR as per PT/OT eval once stable and workup is complete    CORE MEASURES:        Admission NIHSS: 22     TPA:  NO      LDL/HDL: pending     Depression Screen: N/A pt is aphasic     Statin Therapy: Y     Dysphagia Screen: PASS     Smoking NO      Afib NO     Stroke Education YES    Obtain screening ultrasound in patients who meet 1 or more of the following criteria as patient is high risk for DVT/PE upon admission:   [] History of DVT/PE  []Hypercoagulable states (Factor V Leiden, Cancer, OCP, etc. )  []Prolonged immobility (hemiplegia/hemiparesis/post operative or any other extended immobilization)   [] Transferred from outside facility (Rehab or Long term care)  [] Age </= to 50 ASSESSMENT: 78-year-old woman with PMH, DM, mild cognitive impairment (performs her daily activities and oriented to person and place.)  She presented with sudden onset of right facial droop, right hemiparesis and global aphasia.Head CT shows left inferior temporal hypodensity suggestive of subacute infarct. MRI brain significant for left hemispheric–MCA territory inferior division temporal infarction and left hemispheric subcortical infarcts  CTA shows to my eye, left M1 segment severe left MCA stenosis. She was not  a candidate for IV thrombolysis as her stroke symptoms started 1/2/2023; infarction/ left temporal hypodensity present on CT at the time of presentation and left M2 distal occlusion with patent M1 segment.    Impression: Left MCA territory ischemia likely secondary to symptomatic intracranial left MCA stenosis. Differential diagnosis includes embolic etiology, based on age and infarct pattern on MRI.    NEURO: Neuro exam unchanged, continue close monitoring for neurologic deterioration, permissive HTN with slow titration to normotensive, titrate statin to LDL goal less than 70, MRI Brain, CTA Head and Neck results as above Physical therapy/OT eval with recommendations for AR.      ANTITHROMBOTIC THERAPY: ASA/Plavix for 3 months followed by ASA only as per [facility] protocol for secondary stroke prevention     PULMONARY: CXR (01/07): Left upper lung bronchiectasis but otherwise no focal consolidation, protecting airway, saturating well on 1L NC    CARDIOVASCULAR: check TTE, cardiac monitoring no events, plan to obtain LOOP to monitor for occult arrhythmias like Afib as per AF stroke trial                               SBP goal: 110-180mmHg    GASTROINTESTINAL: Dysphagia screen passed, tolerating diet     Diet: Pureed    RENAL: BUN/Cr stable, good urine output, straight cath X1 on 01/07/23      Na Goal: Greater than 135     Rojas: No    HEMATOLOGY: H/H stable, Platelets normal      DVT ppx: LMWH     ID: afebrile, no leukocytosis     OTHER: Plan/goals of care discussed with pt's family at bedside and over the phone. Soft vest restraints in place as pt attempts to get out of bed, pull lines    DISPOSITION: AR as per PT/OT eval once stable and workup is complete    CORE MEASURES:        Admission NIHSS: 22     TPA:  NO      LDL/HDL: pending     Depression Screen: N/A pt is aphasic     Statin Therapy: Y     Dysphagia Screen: PASS     Smoking NO      Afib NO     Stroke Education YES    Obtain screening ultrasound in patients who meet 1 or more of the following criteria as patient is high risk for DVT/PE upon admission:   [] History of DVT/PE  []Hypercoagulable states (Factor V Leiden, Cancer, OCP, etc. )  []Prolonged immobility (hemiplegia/hemiparesis/post operative or any other extended immobilization)   [] Transferred from outside facility (Rehab or Long term care)  [] Age </= to 50

## 2023-01-09 DIAGNOSIS — E78.5 HYPERLIPIDEMIA, UNSPECIFIED: ICD-10-CM

## 2023-01-09 DIAGNOSIS — I10 ESSENTIAL (PRIMARY) HYPERTENSION: ICD-10-CM

## 2023-01-09 DIAGNOSIS — E11.65 TYPE 2 DIABETES MELLITUS WITH HYPERGLYCEMIA: ICD-10-CM

## 2023-01-09 LAB
ANION GAP SERPL CALC-SCNC: 14 MMOL/L — SIGNIFICANT CHANGE UP (ref 5–17)
BUN SERPL-MCNC: 12 MG/DL — SIGNIFICANT CHANGE UP (ref 7–23)
CALCIUM SERPL-MCNC: 9.4 MG/DL — SIGNIFICANT CHANGE UP (ref 8.4–10.5)
CHLORIDE SERPL-SCNC: 105 MMOL/L — SIGNIFICANT CHANGE UP (ref 96–108)
CO2 SERPL-SCNC: 19 MMOL/L — LOW (ref 22–31)
CREAT SERPL-MCNC: 0.56 MG/DL — SIGNIFICANT CHANGE UP (ref 0.5–1.3)
EGFR: 93 ML/MIN/1.73M2 — SIGNIFICANT CHANGE UP
GLUCOSE SERPL-MCNC: 181 MG/DL — HIGH (ref 70–99)
HCT VFR BLD CALC: 37.5 % — SIGNIFICANT CHANGE UP (ref 34.5–45)
HGB BLD-MCNC: 12.1 G/DL — SIGNIFICANT CHANGE UP (ref 11.5–15.5)
MCHC RBC-ENTMCNC: 27.5 PG — SIGNIFICANT CHANGE UP (ref 27–34)
MCHC RBC-ENTMCNC: 32.3 GM/DL — SIGNIFICANT CHANGE UP (ref 32–36)
MCV RBC AUTO: 85.2 FL — SIGNIFICANT CHANGE UP (ref 80–100)
NRBC # BLD: 0 /100 WBCS — SIGNIFICANT CHANGE UP (ref 0–0)
PLATELET # BLD AUTO: 291 K/UL — SIGNIFICANT CHANGE UP (ref 150–400)
POTASSIUM SERPL-MCNC: 3.3 MMOL/L — LOW (ref 3.5–5.3)
POTASSIUM SERPL-SCNC: 3.3 MMOL/L — LOW (ref 3.5–5.3)
RBC # BLD: 4.4 M/UL — SIGNIFICANT CHANGE UP (ref 3.8–5.2)
RBC # FLD: 13.6 % — SIGNIFICANT CHANGE UP (ref 10.3–14.5)
SODIUM SERPL-SCNC: 138 MMOL/L — SIGNIFICANT CHANGE UP (ref 135–145)
WBC # BLD: 10.36 K/UL — SIGNIFICANT CHANGE UP (ref 3.8–10.5)
WBC # FLD AUTO: 10.36 K/UL — SIGNIFICANT CHANGE UP (ref 3.8–10.5)

## 2023-01-09 PROCEDURE — 99222 1ST HOSP IP/OBS MODERATE 55: CPT

## 2023-01-09 PROCEDURE — 99223 1ST HOSP IP/OBS HIGH 75: CPT

## 2023-01-09 PROCEDURE — 99233 SBSQ HOSP IP/OBS HIGH 50: CPT

## 2023-01-09 RX ORDER — INSULIN LISPRO 100/ML
VIAL (ML) SUBCUTANEOUS AT BEDTIME
Refills: 0 | Status: DISCONTINUED | OUTPATIENT
Start: 2023-01-09 | End: 2023-01-12

## 2023-01-09 RX ORDER — POTASSIUM CHLORIDE 20 MEQ
10 PACKET (EA) ORAL
Refills: 0 | Status: COMPLETED | OUTPATIENT
Start: 2023-01-09 | End: 2023-01-09

## 2023-01-09 RX ORDER — CLOPIDOGREL BISULFATE 75 MG/1
1 TABLET, FILM COATED ORAL
Qty: 90 | Refills: 0
Start: 2023-01-09 | End: 2023-04-08

## 2023-01-09 RX ORDER — QUETIAPINE FUMARATE 200 MG/1
25 TABLET, FILM COATED ORAL AT BEDTIME
Refills: 0 | Status: DISCONTINUED | OUTPATIENT
Start: 2023-01-09 | End: 2023-01-09

## 2023-01-09 RX ORDER — ASPIRIN/CALCIUM CARB/MAGNESIUM 324 MG
1 TABLET ORAL
Qty: 30 | Refills: 0
Start: 2023-01-09 | End: 2023-02-07

## 2023-01-09 RX ORDER — POLYETHYLENE GLYCOL 3350 17 G/17G
17 POWDER, FOR SOLUTION ORAL DAILY
Refills: 0 | Status: DISCONTINUED | OUTPATIENT
Start: 2023-01-09 | End: 2023-01-12

## 2023-01-09 RX ORDER — QUETIAPINE FUMARATE 200 MG/1
12.5 TABLET, FILM COATED ORAL AT BEDTIME
Refills: 0 | Status: DISCONTINUED | OUTPATIENT
Start: 2023-01-09 | End: 2023-01-10

## 2023-01-09 RX ORDER — ATORVASTATIN CALCIUM 80 MG/1
1 TABLET, FILM COATED ORAL
Qty: 30 | Refills: 0
Start: 2023-01-09 | End: 2023-02-07

## 2023-01-09 RX ADMIN — SODIUM CHLORIDE 50 MILLILITER(S): 9 INJECTION INTRAMUSCULAR; INTRAVENOUS; SUBCUTANEOUS at 21:55

## 2023-01-09 RX ADMIN — ENOXAPARIN SODIUM 40 MILLIGRAM(S): 100 INJECTION SUBCUTANEOUS at 05:10

## 2023-01-09 RX ADMIN — Medication 1: at 11:58

## 2023-01-09 RX ADMIN — Medication 10 MILLIGRAM(S): at 01:16

## 2023-01-09 RX ADMIN — CLOPIDOGREL BISULFATE 75 MILLIGRAM(S): 75 TABLET, FILM COATED ORAL at 11:21

## 2023-01-09 RX ADMIN — Medication 100 MILLIEQUIVALENT(S): at 08:28

## 2023-01-09 RX ADMIN — QUETIAPINE FUMARATE 12.5 MILLIGRAM(S): 200 TABLET, FILM COATED ORAL at 21:42

## 2023-01-09 RX ADMIN — Medication 100 MILLIEQUIVALENT(S): at 11:21

## 2023-01-09 RX ADMIN — Medication 100 MILLIEQUIVALENT(S): at 09:16

## 2023-01-09 RX ADMIN — SODIUM CHLORIDE 50 MILLILITER(S): 9 INJECTION INTRAMUSCULAR; INTRAVENOUS; SUBCUTANEOUS at 16:09

## 2023-01-09 RX ADMIN — Medication 2: at 08:16

## 2023-01-09 RX ADMIN — Medication 81 MILLIGRAM(S): at 11:21

## 2023-01-09 RX ADMIN — Medication 1: at 17:09

## 2023-01-09 RX ADMIN — ATORVASTATIN CALCIUM 80 MILLIGRAM(S): 80 TABLET, FILM COATED ORAL at 21:42

## 2023-01-09 NOTE — DISCHARGE NOTE PROVIDER - HOSPITAL COURSE
78 y.o. RH Nicole speaking F with PMH of dementia (A&O x1-2 but performs all ADLs) presented to [facility] ED on 1/6/23 for R facial droop. LKN sometime on 1/2/23. Also presented with gait imbalance and speech difficulty that started around 15:30 hr on 1/6/23. Called for code stroke with NIHSS 22 (answering questions incorrectly, not performing tasks, complete hemianopia, partial paralysis of R face, some effort against gravity throughout extremities, and mild sensory loss). mRS 0. Underwent CTH which showed hypoattenuation of L temporal lobe, which may represent acute L MCA infarct & CTA which showed severe stenosis/near complete occlusion of L M2 branch, along with CTP showing large area of L MCA territory penumbra. Admitted to stroke for further management and evaluation.    During this hospitalization, underwent MR which confirmed L MCA territory acute infarcts. Placed on DAPT per sammpris & atorvastatin 80 mg qhs. A1c 8.6 and .    Imagings as noted below:  CT Brain Stroke Protocol (01.06.23 @ 23:14): No acute intracranial hemorrhage. Hypoattenuation of the left temporal lobe, may represent acute left MCA territory infarct.    CT Angio Neck w/ IV Cont (01.06.23 @ 23:21):   CTA Head & Neck: Severe stenosis/near complete occlusion with the left M2 branch.    CT Perfusion (01.06.23 @ 23:21): There is a penumbra involving a large area of the left MCA territory.    MR Head No Cont (01.07.23 @ 13:40): Left MCA territory acute infarcts.    TTE 1/8/23:   EF 63%    Conclusions:  1. Mitral annular calcification and calcified mitral  leaflets withnormal diastolic opening. Mild mitral  regurgitation.  2. Calcified trileaflet aortic valve with normal opening.  3. Normal left ventricular systolic function. No segmental  wall motion abnormalities.  4. Normal right ventricular size and function.    Pt is now medically stable and ready for discharge to rehab     78 y.o. RH Nicole speaking F with PMH of dementia (A&O x1-2 but performs all ADLs) presented to [email or web address] ED on 1/6/23 for R facial droop. LKN sometime on 1/2/23. Also presented with gait imbalance and speech difficulty that started around 15:30 hr on 1/6/23. Called for code stroke with NIHSS 22 (answering questions incorrectly, not performing tasks, complete hemianopia, partial paralysis of R face, some effort against gravity throughout extremities, and mild sensory loss). mRS 0. Underwent CTH which showed hypoattenuation of L temporal lobe, which may represent acute L MCA infarct & CTA which showed severe stenosis/near complete occlusion of L M2 branch, along with CTP showing large area of L MCA territory penumbra. Admitted to stroke for further management and evaluation.    During this hospitalization, underwent MR which confirmed L MCA territory acute infarcts. Placed on DAPT per sammpris & atorvastatin 80 mg qhs. A1c 8.6 and .    Imagings as noted below:  CT Brain Stroke Protocol (01.06.23 @ 23:14): No acute intracranial hemorrhage. Hypoattenuation of the left temporal lobe, may represent acute left MCA territory infarct.    CT Angio Neck w/ IV Cont (01.06.23 @ 23:21):   CTA Head & Neck: Severe stenosis/near complete occlusion with the left M2 branch.    CT Perfusion (01.06.23 @ 23:21): There is a penumbra involving a large area of the left MCA territory.    MR Head No Cont (01.07.23 @ 13:40): Left MCA territory acute infarcts.    TTE 1/8/23:   EF 63%    Conclusions:  1. Mitral annular calcification and calcified mitral  leaflets withnormal diastolic opening. Mild mitral  regurgitation.  2. Calcified trileaflet aortic valve with normal opening.  3. Normal left ventricular systolic function. No segmental  wall motion abnormalities.  4. Normal right ventricular size and function.    Pt is now medically stable and ready for discharge to rehab     78 y.o. RH Nicole speaking F with PMH of dementia (A&O x1-2 but performs all ADLs) presented to [facility] ED on 1/6/23 for R facial droop. LKN sometime on 1/2/23. Also presented with gait imbalance and speech difficulty that started around 15:30 hr on 1/6/23. Called for code stroke with NIHSS 22 (answering questions incorrectly, not performing tasks, complete hemianopia, partial paralysis of R face, some effort against gravity throughout extremities, and mild sensory loss). mRS 0. Underwent CTH which showed hypoattenuation of L temporal lobe, which may represent acute L MCA infarct & CTA which showed severe stenosis/near complete occlusion of L M2 branch, along with CTP showing large area of L MCA territory penumbra. Admitted to stroke for further management and evaluation.    During this hospitalization, underwent MR which confirmed L MCA territory acute infarcts. Placed on DAPT per sammpris & atorvastatin 80 mg qhs. A1c 8.6 and . Loop recorder may be done as outpatient.   Endocrine consulted due to new DM finding. Placed on sliding scale during stay. DM meds upon dc: Metformin 500 mg ER BID (with breakfast and dinner to reduce stomach upset).  Rec o/p F/u for close monitoring     Imagings as noted below:  CT Brain Stroke Protocol (01.06.23 @ 23:14): No acute intracranial hemorrhage. Hypoattenuation of the left temporal lobe, may represent acute left MCA territory infarct.    CT Angio Neck w/ IV Cont (01.06.23 @ 23:21):   CTA Head & Neck: Severe stenosis/near complete occlusion with the left M2 branch.    CT Perfusion (01.06.23 @ 23:21): There is a penumbra involving a large area of the left MCA territory.    MR Head No Cont (01.07.23 @ 13:40): Left MCA territory acute infarcts.    TTE 1/8/23:   EF 63%    Conclusions:  1. Mitral annular calcification and calcified mitral  leaflets withnormal diastolic opening. Mild mitral  regurgitation.  2. Calcified trileaflet aortic valve with normal opening.  3. Normal left ventricular systolic function. No segmental  wall motion abnormalities.  4. Normal right ventricular size and function.    CXR (01/07): Left upper lung bronchiectasis but otherwise no focal consolidation, protecting airway, saturating well on 1L NC    Pt is now medically stable and ready for discharge to rehab.     78 y.o. RH Nicole speaking F with PMH of dementia (A&O x1-2 but performs all ADLs) presented to [facility] ED on 1/6/23 for R facial droop. LKN sometime on 1/2/23. Also presented with gait imbalance and speech difficulty that started around 15:30 hr on 1/6/23. Called for code stroke with NIHSS 22 (answering questions incorrectly, not performing tasks, complete hemianopia, partial paralysis of R face, some effort against gravity throughout extremities, and mild sensory loss). mRS 0. Underwent CTH which showed hypoattenuation of L temporal lobe, which may represent acute L MCA infarct & CTA which showed severe stenosis/near complete occlusion of L M2 branch, along with CTP showing large area of L MCA territory penumbra. Admitted to stroke for further management and evaluation.    During this hospitalization, underwent MR which confirmed L MCA territory acute infarcts. Placed on DAPT per sammpris & atorvastatin 80 mg qhs. A1c 8.6 and . Loop recorder may be done as outpatient.   Endocrine consulted due to new DM finding. Placed on sliding scale during stay. DM meds upon dc: Metformin 500 mg ER BID (with breakfast and dinner to reduce stomach upset).  Rec o/p F/u for close monitoring     Imagings as noted below:  CT Brain Stroke Protocol (01.06.23 @ 23:14): No acute intracranial hemorrhage. Hypoattenuation of the left temporal lobe, may represent acute left MCA territory infarct.    CT Angio Neck w/ IV Cont (01.06.23 @ 23:21):   CTA Head & Neck: Severe stenosis/near complete occlusion with the left M2 branch.    CT Perfusion (01.06.23 @ 23:21): There is a penumbra involving a large area of the left MCA territory.    MR Head No Cont (01.07.23 @ 13:40): Left MCA territory acute infarcts.    TTE 1/8/23:   EF 63%    Conclusions:  1. Mitral annular calcification and calcified mitral  leaflets withnormal diastolic opening. Mild mitral  regurgitation.  2. Calcified trileaflet aortic valve with normal opening.  3. Normal left ventricular systolic function. No segmental  wall motion abnormalities.  4. Normal right ventricular size and function.    CXR (01/07): Left upper lung bronchiectasis but otherwise no focal consolidation, protecting airway, saturating well on 1L NC    Pt is now medically stable and ready for discharge to rehab.     78 y.o. RH Nicole speaking F with PMH of dementia (A&O x1-2 but performs all ADLs) presented to [facility] ED on 1/6/23 for R facial droop. LKN sometime on 1/2/23. Also presented with gait imbalance and speech difficulty that started around 15:30 hr on 1/6/23. Called for code stroke with NIHSS 22 (answering questions incorrectly, not performing tasks, complete hemianopia, partial paralysis of R face, some effort against gravity throughout extremities, and mild sensory loss). mRS 0. Underwent CTH which showed hypoattenuation of L temporal lobe, which may represent acute L MCA infarct & CTA which showed severe stenosis/near complete occlusion of L M2 branch, along with CTP showing large area of L MCA territory penumbra. Admitted to stroke for further management and evaluation.    During this hospitalization, underwent MR which confirmed L MCA territory acute infarcts. Placed on DAPT per sammpris & atorvastatin 80 mg qhs. A1c 8.6 and . Loop recorder may be done as outpatient.   Endocrine consulted due to new DM finding. Placed on sliding scale during stay. DM meds upon dc: Metformin 500 mg ER BID (with breakfast and dinner to reduce stomach upset).  Rec o/p F/u for close monitoring     Imagings as noted below:  CT Brain Stroke Protocol (01.06.23 @ 23:14): No acute intracranial hemorrhage. Hypoattenuation of the left temporal lobe, may represent acute left MCA territory infarct.    CT Angio Neck w/ IV Cont (01.06.23 @ 23:21):   CTA Head & Neck: Severe stenosis/near complete occlusion with the left M2 branch.    CT Perfusion (01.06.23 @ 23:21): There is a penumbra involving a large area of the left MCA territory.    MR Head No Cont (01.07.23 @ 13:40): Left MCA territory acute infarcts.    TTE 1/8/23:   EF 63%    Conclusions:  1. Mitral annular calcification and calcified mitral  leaflets withnormal diastolic opening. Mild mitral  regurgitation.  2. Calcified trileaflet aortic valve with normal opening.  3. Normal left ventricular systolic function. No segmental  wall motion abnormalities.  4. Normal right ventricular size and function.    CXR (01/07): Left upper lung bronchiectasis but otherwise no focal consolidation, protecting airway, saturating well on 1L NC    Pt is now medically stable and ready for discharge to rehab.     78 y.o. RH Nicole speaking F with PMH of dementia (A&O x1-2 but performs all ADLs) presented to [facility] ED on 1/6/23 for R facial droop. LKN sometime on 1/2/23. Also presented with gait imbalance and speech difficulty that started around 15:30 hr on 1/6/23. Called for code stroke with NIHSS 22 (answering questions incorrectly, not performing tasks, complete hemianopia, partial paralysis of R face, some effort against gravity throughout extremities, and mild sensory loss). mRS 0. Underwent CTH which showed hypoattenuation of L temporal lobe, which may represent acute L MCA infarct & CTA which showed severe stenosis/near complete occlusion of L M2 branch, along with CTP showing large area of L MCA territory penumbra. Admitted to stroke for further management and evaluation.    During this hospitalization, underwent MR which confirmed L MCA territory acute infarcts. Placed on DAPT per sammpris & atorvastatin 80 mg qhs. A1c 8.6 and . Loop recorder may be done as outpatient.   Endocrine consulted due to new DM finding. Placed on sliding scale during stay. DM meds upon dc: Metformin 500 mg ER BID (with breakfast and dinner to reduce stomach upset).  Rec o/p F/u for close monitoring     Imagings as noted below:  CT Brain Stroke Protocol (01.06.23 @ 23:14): No acute intracranial hemorrhage. Hypoattenuation of the left temporal lobe, may represent acute left MCA territory infarct.    CT Angio Neck w/ IV Cont (01.06.23 @ 23:21):   CTA Head & Neck: Severe stenosis/near complete occlusion with the left M2 branch.    CT Perfusion (01.06.23 @ 23:21): There is a penumbra involving a large area of the left MCA territory.    MR Head No Cont (01.07.23 @ 13:40): Left MCA territory acute infarcts.    TTE 1/8/23:   EF 63%    Conclusions:  1. Mitral annular calcification and calcified mitral  leaflets withnormal diastolic opening. Mild mitral  regurgitation.  2. Calcified trileaflet aortic valve with normal opening.  3. Normal left ventricular systolic function. No segmental  wall motion abnormalities.  4. Normal right ventricular size and function.    CXR (01/07): Left upper lung bronchiectasis but otherwise no focal consolidation, protecting airway, saturating well on 1L NC  CTA Chest: neg for PE. Hematuria 2/2 straight cath due to patient self retraining as she feels uncomfortable.   Pt is now medically stable and ready for discharge to rehab.     78 y.o. RH Nicole speaking F with PMH of dementia (A&O x1-2 but performs all ADLs) presented to [facility] ED on 1/6/23 for R facial droop. LKN sometime on 1/2/23. Also presented with gait imbalance and speech difficulty that started around 15:30 hr on 1/6/23. Called for code stroke with NIHSS 22 (answering questions incorrectly, not performing tasks, complete hemianopia, partial paralysis of R face, some effort against gravity throughout extremities, and mild sensory loss). mRS 0. Underwent CTH which showed hypoattenuation of L temporal lobe, which may represent acute L MCA infarct & CTA which showed severe stenosis/near complete occlusion of L M2 branch, along with CTP showing large area of L MCA territory penumbra. Admitted to stroke for further management and evaluation.    During this hospitalization, underwent MR which confirmed L MCA territory acute infarcts. Placed on DAPT per sammpris & atorvastatin 80 mg qhs. A1c 8.6 and . Loop recorder may be done as outpatient.   Endocrine consulted due to new DM finding. Placed on sliding scale during stay. DM meds upon dc: Metformin 500 mg ER BID (with breakfast and dinner to reduce stomach upset).  Rec o/p F/u for close monitoring     Imagings as noted below:  CT Brain Stroke Protocol (01.06.23 @ 23:14): No acute intracranial hemorrhage. Hypoattenuation of the left temporal lobe, may represent acute left MCA territory infarct.    CT Angio Neck w/ IV Cont (01.06.23 @ 23:21):   CTA Head & Neck: Severe stenosis/near complete occlusion with the left M2 branch.    CT Perfusion (01.06.23 @ 23:21): There is a penumbra involving a large area of the left MCA territory.    MR Head No Cont (01.07.23 @ 13:40): Left MCA territory acute infarcts.    TTE 1/8/23:   EF 63%    Conclusions:  1. Mitral annular calcification and calcified mitral  leaflets withnormal diastolic opening. Mild mitral  regurgitation.  2. Calcified trileaflet aortic valve with normal opening.  3. Normal left ventricular systolic function. No segmental  wall motion abnormalities.  4. Normal right ventricular size and function.    CXR (01/07): Left upper lung bronchiectasis but otherwise no focal consolidation, protecting airway, saturating well on 1L NC  CTA Chest: neg for PE. Hematuria 2/2 straight cath due to patient self retraining as she feels uncomfortable.   Pt is now medically stable and ready for discharge to rehab.     78 y.o. RH Nicole speaking F with PMH of dementia (A&O x1-2 but performs all ADLs) presented to [facility] ED on 1/6/23 for R facial droop. LKN sometime on 1/2/23. Also presented with gait imbalance and speech difficulty that started around 15:30 hr on 1/6/23. Called for code stroke with NIHSS 22 (answering questions incorrectly, not performing tasks, complete hemianopia, partial paralysis of R face, some effort against gravity throughout extremities, and mild sensory loss). mRS 0. Underwent CTH which showed hypoattenuation of L temporal lobe, which may represent acute L MCA infarct & CTA which showed severe stenosis/near complete occlusion of L M2 branch, along with CTP showing large area of L MCA territory penumbra. Admitted to stroke for further management and evaluation.    During this hospitalization, underwent MR which confirmed L MCA territory acute infarcts. Placed on DAPT per sammpris & atorvastatin 80 mg qhs. A1c 8.6 and . Loop recorder may be done as outpatient.   Endocrine consulted due to new DM finding. Placed on sliding scale during stay. DM meds upon dc: Metformin 500 mg ER BID (with breakfast and dinner to reduce stomach upset).  Rec o/p F/u for close monitoring     Imagings as noted below:  CT Brain Stroke Protocol (01.06.23 @ 23:14): No acute intracranial hemorrhage. Hypoattenuation of the left temporal lobe, may represent acute left MCA territory infarct.    CT Angio Neck w/ IV Cont (01.06.23 @ 23:21):   CTA Head & Neck: Severe stenosis/near complete occlusion with the left M2 branch.    CT Perfusion (01.06.23 @ 23:21): There is a penumbra involving a large area of the left MCA territory.    MR Head No Cont (01.07.23 @ 13:40): Left MCA territory acute infarcts.    TTE 1/8/23:   EF 63%    Conclusions:  1. Mitral annular calcification and calcified mitral  leaflets withnormal diastolic opening. Mild mitral  regurgitation.  2. Calcified trileaflet aortic valve with normal opening.  3. Normal left ventricular systolic function. No segmental  wall motion abnormalities.  4. Normal right ventricular size and function.    CXR (01/07): Left upper lung bronchiectasis but otherwise no focal consolidation, protecting airway, saturating well on 1L NC  CTA Chest: neg for PE. Hematuria 2/2 straight cath due to patient self retraining as she feels uncomfortable.   Pt is now medically stable and ready for discharge to rehab.

## 2023-01-09 NOTE — PROGRESS NOTE ADULT - ASSESSMENT
ASSESSMENT: 78-year-old woman with PMH, DM, mild cognitive impairment (performs her daily activities and oriented to person and place.)  She presented with sudden onset of right facial droop, right hemiparesis and global aphasia.Head CT shows left inferior temporal hypodensity suggestive of subacute infarct. MRI brain significant for left hemispheric–MCA territory inferior division temporal infarction and left hemispheric subcortical infarcts  CTA shows to my eye, left M1 segment severe left MCA stenosis. She was not  a candidate for IV thrombolysis as her stroke symptoms started 1/2/2023; infarction/ left temporal hypodensity present on CT at the time of presentation and left M2 distal occlusion with patent M1 segment.    Impression: Left MCA territory ischemia likely secondary to symptomatic intracranial left MCA stenosis. Differential diagnosis includes embolic etiology, based on age and infarct pattern on MRI.    NEURO: Neuro exam unchanged, continue close monitoring for neurologic deterioration, permissive HTN with slow titration to normotensive, titrate statin to LDL goal less than 70, MRI Brain, CTA Head and Neck results as above Physical therapy/OT eval with recommendations for AR.      ANTITHROMBOTIC THERAPY: ASA/Plavix for 3 months followed by ASA only as per [facility] protocol for secondary stroke prevention     PULMONARY: CXR (01/07): Left upper lung bronchiectasis but otherwise no focal consolidation, protecting airway, saturating well on 1L NC    CARDIOVASCULAR: check TTE, cardiac monitoring no events, plan to obtain LOOP to monitor for occult arrhythmias like Afib as per AF stroke trial                               SBP goal: 110-180mmHg    GASTROINTESTINAL: Dysphagia screen passed, tolerating diet     Diet: Pureed    RENAL: BUN/Cr stable, good urine output, straight cath X1 on 01/07/23      Na Goal: Greater than 135     Rojas: No    HEMATOLOGY: H/H stable, Platelets normal      DVT ppx: LMWH     ID: afebrile, no leukocytosis     OTHER: Plan/goals of care discussed with pt's family at bedside and over the phone. Soft vest restraints in place as pt attempts to get out of bed, pull lines    DISPOSITION: AR as per PT/OT eval once stable and workup is complete    CORE MEASURES:        Admission NIHSS: 22     TPA:  NO      LDL/HDL: pending     Depression Screen: N/A pt is aphasic     Statin Therapy: Y     Dysphagia Screen: PASS     Smoking NO      Afib NO     Stroke Education YES    Obtain screening ultrasound in patients who meet 1 or more of the following criteria as patient is high risk for DVT/PE upon admission:   [] History of DVT/PE  []Hypercoagulable states (Factor V Leiden, Cancer, OCP, etc. )  []Prolonged immobility (hemiplegia/hemiparesis/post operative or any other extended immobilization)   [] Transferred from outside facility (Rehab or Long term care)  [] Age </= to 50 ASSESSMENT: 78-year-old woman with PMH, DM, mild cognitive impairment (performs her daily activities and oriented to person and place.)  She presented with sudden onset of right facial droop, right hemiparesis and global aphasia.Head CT shows left inferior temporal hypodensity suggestive of subacute infarct. MRI brain significant for left hemispheric–MCA territory inferior division temporal infarction and left hemispheric subcortical infarcts  CTA shows to my eye, left M1 segment severe left MCA stenosis. She was not  a candidate for IV thrombolysis as her stroke symptoms started 1/2/2023; infarction/ left temporal hypodensity present on CT at the time of presentation and left M2 distal occlusion with patent M1 segment.    Impression: Left MCA territory ischemia likely secondary to symptomatic intracranial left MCA stenosis. Differential diagnosis includes embolic etiology, based on age and infarct pattern on MRI.    NEURO: Neuro exam unchanged, continue close monitoring for neurologic deterioration, permissive HTN with slow titration to normotensive, titrate statin to LDL goal less than 70, MRI Brain, CTA Head and Neck results as above Physical therapy/OT eval with recommendations for AR.      ANTITHROMBOTIC THERAPY: ASA/Plavix for 3 months followed by ASA only as per [facility] protocol for secondary stroke prevention     PULMONARY: CXR (01/07): Left upper lung bronchiectasis but otherwise no focal consolidation, protecting airway, saturating well on 1L NC    CARDIOVASCULAR: check TTE, cardiac monitoring no events, plan to obtain LOOP to monitor for occult arrhythmias like Afib as per AF stroke trial                               SBP goal: 110-180mmHg    GASTROINTESTINAL: Dysphagia screen passed, tolerating diet     Diet: Pureed    RENAL: BUN/Cr stable, good urine output, straight cath X1 on 01/07/23      Na Goal: Greater than 135     Rojas: No    HEMATOLOGY: H/H stable, Platelets normal      DVT ppx: LMWH     ID: afebrile, no leukocytosis     OTHER: Plan/goals of care discussed with pt's family at bedside and over the phone. Soft vest restraints in place as pt attempts to get out of bed, pull lines    DISPOSITION: AR as per PT/OT eval once stable and workup is complete    CORE MEASURES:        Admission NIHSS: 22     TPA:  NO      LDL/HDL: pending     Depression Screen: N/A pt is aphasic     Statin Therapy: Y     Dysphagia Screen: PASS     Smoking NO      Afib NO     Stroke Education YES    Obtain screening ultrasound in patients who meet 1 or more of the following criteria as patient is high risk for DVT/PE upon admission:   [] History of DVT/PE  []Hypercoagulable states (Factor V Leiden, Cancer, OCP, etc. )  []Prolonged immobility (hemiplegia/hemiparesis/post operative or any other extended immobilization)   [] Transferred from outside facility (Rehab or Long term care)  [] Age </= to 50 ASSESSMENT: 78-year-old woman with PMH, DM, mild cognitive impairment (performs her daily activities and oriented to person and place.)  She presented with sudden onset of right facial droop, right hemiparesis and global aphasia.Head CT shows left inferior temporal hypodensity suggestive of subacute infarct. MRI brain significant for left hemispheric–MCA territory inferior division temporal infarction and left hemispheric subcortical infarcts  CTA shows to my eye, left M1 segment severe left MCA stenosis. She was not  a candidate for IV thrombolysis as her stroke symptoms started 1/2/2023; infarction/ left temporal hypodensity present on CT at the time of presentation and left M2 distal occlusion with patent M1 segment.    Impression: Left MCA territory ischemia likely secondary to symptomatic intracranial left MCA stenosis. Differential diagnosis includes embolic etiology, based on age and infarct pattern on MRI.    NEURO: Neuro exam unchanged, continue close monitoring for neurologic deterioration, permissive HTN with slow titration to normotensive, titrate statin to LDL goal less than 70, MRI Brain, CTA Head and Neck results as above Physical therapy/OT eval with recommendations for AR.      ANTITHROMBOTIC THERAPY: ASA/Plavix for 3 months followed by ASA only as per [facility] protocol for secondary stroke prevention     PULMONARY: CXR (01/07): Left upper lung bronchiectasis but otherwise no focal consolidation, protecting airway, saturating well on 1L NC    CARDIOVASCULAR: check TTE, cardiac monitoring no events, plan to obtain LOOP to monitor for occult arrhythmias like Afib as per AF stroke trial                               SBP goal: 110-180mmHg    GASTROINTESTINAL: Dysphagia screen passed, tolerating diet     Diet: Pureed    RENAL: BUN/Cr stable, good urine output, straight cath X1 on 01/07/23      Na Goal: Greater than 135     Rojas: No    HEMATOLOGY: H/H stable, Platelets normal      DVT ppx: LMWH     ID: afebrile, no leukocytosis     OTHER: Plan/goals of care discussed with pt's family at bedside and over the phone. Soft vest restraints in place as pt attempts to get out of bed, pull lines    DISPOSITION: AR as per PT/OT eval once stable and workup is complete    CORE MEASURES:        Admission NIHSS: 22     TPA:  NO      LDL/HDL: pending     Depression Screen: N/A pt is aphasic     Statin Therapy: Y     Dysphagia Screen: PASS     Smoking NO      Afib NO     Stroke Education YES    Obtain screening ultrasound in patients who meet 1 or more of the following criteria as patient is high risk for DVT/PE upon admission:   [] History of DVT/PE  []Hypercoagulable states (Factor V Leiden, Cancer, OCP, etc. )  []Prolonged immobility (hemiplegia/hemiparesis/post operative or any other extended immobilization)   [] Transferred from outside facility (Rehab or Long term care)  [] Age </= to 50 ASSESSMENT: 78-year-old woman with PMH, DM, mild cognitive impairment (performs her daily activities and oriented to person and place.)  She presented with sudden onset of right facial droop, right hemiparesis and global aphasia.Head CT shows left inferior temporal hypodensity suggestive of subacute infarct. MRI brain significant for left hemispheric–MCA territory inferior division temporal infarction and left hemispheric subcortical infarcts  CTA shows to my eye, left M1 segment severe left MCA stenosis. She was not  a candidate for IV thrombolysis as her stroke symptoms started 1/2/2023; infarction/ left temporal hypodensity present on CT at the time of presentation and left M2 distal occlusion with patent M1 segment.    Impression: Left MCA territory ischemia likely secondary to symptomatic intracranial left MCA stenosis. Differential diagnosis includes embolic etiology, based on age and infarct pattern on MRI.    NEURO: Neuro exam unchanged, continue close monitoring for neurologic deterioration, permissive HTN with slow titration to normotensive, titrate statin to LDL goal less than 70, MRI Brain, CTA Head and Neck results as above Physical therapy/OT eval with recommendations for AR.      ANTITHROMBOTIC THERAPY: ASA/Plavix for 3 months followed by ASA only as per [facility] protocol for secondary stroke prevention     PULMONARY: CXR (01/07): Left upper lung bronchiectasis but otherwise no focal consolidation, protecting airway, saturating well on 1L NC    CARDIOVASCULAR: check TTE, cardiac monitoring no events, plan to obtain LOOP to monitor for occult arrhythmias like Afib as per AF stroke trial                               SBP goal: 110-180mmHg    GASTROINTESTINAL: Dysphagia screen passed, tolerating diet     Diet: Pureed    RENAL: BUN/Cr stable, good urine output, straight cath X1 on 01/07/23      Na Goal: Greater than 135     Rojas: No    HEMATOLOGY: H/H stable, Platelets normal      DVT ppx: LMWH     ID: afebrile, no leukocytosis     OTHER: Plan/goals of care discussed with pt's family at bedside and over the phone. Soft vest restraints in place as pt attempts to get out of bed, pull lines. Seroquel increased from 12.5mg to 25mg PO qHs for agitation and possible dc restraints once patient stops pulling lines    DISPOSITION: AR as per PT/OT eval once stable and workup is complete.     CORE MEASURES:        Admission NIHSS: 22     TPA:  NO      LDL/HDL: pending     Depression Screen: N/A pt is aphasic     Statin Therapy: Y     Dysphagia Screen: PASS     Smoking NO      Afib NO     Stroke Education YES    Obtain screening ultrasound in patients who meet 1 or more of the following criteria as patient is high risk for DVT/PE upon admission:   [] History of DVT/PE  []Hypercoagulable states (Factor V Leiden, Cancer, OCP, etc. )  []Prolonged immobility (hemiplegia/hemiparesis/post operative or any other extended immobilization)   [] Transferred from outside facility (Rehab or Long term care)  [] Age </= to 50 ASSESSMENT: 78-year-old woman with PMH, DM, mild cognitive impairment (performs her daily activities and oriented to person and place.)  She presented with sudden onset of right facial droop, right hemiparesis and global aphasia.Head CT shows left inferior temporal hypodensity suggestive of subacute infarct. MRI brain significant for left hemispheric–MCA territory inferior division temporal infarction and left hemispheric subcortical infarcts  CTA shows to my eye, left M1 segment severe left MCA stenosis. She was not  a candidate for IV thrombolysis as her stroke symptoms started 1/2/2023; infarction/ left temporal hypodensity present on CT at the time of presentation and left M2 distal occlusion with patent M1 segment.    Impression: Left MCA territory ischemia likely secondary to symptomatic intracranial left MCA stenosis. Differential diagnosis includes embolic etiology, based on age and infarct pattern on MRI.    NEURO: Neuro exam unchanged, continue close monitoring for neurologic deterioration, permissive HTN with slow titration to normotensive, titrate statin to LDL goal less than 70, MRI Brain, CTA Head and Neck results as above Physical therapy/OT eval with recommendations for AR.      ANTITHROMBOTIC THERAPY: ASA/Plavix for 3 months followed by ASA only as per [facility] protocol for secondary stroke prevention     PULMONARY: CXR (01/07): Left upper lung bronchiectasis but otherwise no focal consolidation, protecting airway, saturating well on 1L NC    CARDIOVASCULAR: check TTE, cardiac monitoring no events, plan to obtain LOOP to monitor for occult arrhythmias like Afib as per AF stroke trial                               SBP goal: 110-180mmHg    GASTROINTESTINAL: Dysphagia screen passed, tolerating diet     Diet: Pureed    RENAL: BUN/Cr stable, good urine output, straight cath X1 on 01/07/23      Na Goal: Greater than 135     Rojas: No    HEMATOLOGY: H/H stable, Platelets normal      DVT ppx: LMWH     ID: afebrile, no leukocytosis     OTHER: Plan/goals of care discussed with pt's family at bedside and over the phone. Soft vest restraints in place as pt attempts to get out of bed, pull lines. Seroquel increased from 12.5mg to 25mg PO qHs for agitation and possible dc restraints once patient stops pulling lines    DISPOSITION: AR as per PT/OT eval once stable and workup is complete.     CORE MEASURES:        Admission NIHSS: 22     TPA:  NO      LDL/HDL: pending     Depression Screen: N/A pt is aphasic     Statin Therapy: Y     Dysphagia Screen: PASS     Smoking NO      Afib NO     Stroke Education YES    Obtain screening ultrasound in patients who meet 1 or more of the following criteria as patient is high risk for DVT/PE upon admission:   [] History of DVT/PE  []Hypercoagulable states (Factor V Leiden, Cancer, OCP, etc. )  []Prolonged immobility (hemiplegia/hemiparesis/post operative or any other extended immobilization)   [] Transferred from outside facility (Rehab or Long term care)  [] Age </= to 50 ASSESSMENT: 78-year-old woman with PMH, DM, mild cognitive impairment (performs her daily activities and oriented to person and place.)  She presented with sudden onset of right facial droop, right hemiparesis and global aphasia.Head CT shows left inferior temporal hypodensity suggestive of subacute infarct. MRI brain significant for left hemispheric–MCA territory inferior division temporal infarction and left hemispheric subcortical infarcts  CTA shows to my eye, left M1 segment severe left MCA stenosis. She was not  a candidate for IV thrombolysis as her stroke symptoms started 1/2/2023; infarction/ left temporal hypodensity present on CT at the time of presentation and left M2 distal occlusion with patent M1 segment.    Impression: Left MCA territory ischemia likely secondary to symptomatic intracranial left MCA stenosis. Differential diagnosis includes embolic etiology, based on age and infarct pattern on MRI.    NEURO: Neuro exam unchanged, continue close monitoring for neurologic deterioration, permissive HTN with slow titration to normotensive, titrate statin to LDL goal less than 70, MRI Brain, CTA Head and Neck results as above Physical therapy/OT eval with recommendations for AR.      ANTITHROMBOTIC THERAPY: ASA/Plavix for 3 months followed by ASA only as per [facility] protocol for secondary stroke prevention     PULMONARY: CXR (01/07): Left upper lung bronchiectasis but otherwise no focal consolidation, protecting airway, saturating well on 1L NC    CARDIOVASCULAR: check TTE, cardiac monitoring no events, plan to obtain LOOP to monitor for occult arrhythmias like Afib as per AF stroke trial                               SBP goal: 110-180mmHg    GASTROINTESTINAL: Dysphagia screen passed, tolerating diet     Diet: Pureed    RENAL: BUN/Cr stable, good urine output, straight cath X1 on 01/07/23      Na Goal: Greater than 135     Rojas: No    HEMATOLOGY: H/H stable, Platelets normal      DVT ppx: LMWH     ID: afebrile, no leukocytosis     OTHER: Plan/goals of care discussed with pt's family at bedside and over the phone. Soft vest restraints in place as pt attempts to get out of bed, pull lines. Seroquel increased from 12.5mg to 25mg PO qHs for agitation and possible dc restraints once patient stops pulling lines    DISPOSITION: AR as per PT/OT eval once stable and workup is complete.     CORE MEASURES:        Admission NIHSS: 22     TPA:  NO      LDL/HDL: pending     Depression Screen: N/A pt is aphasic     Statin Therapy: Y     Dysphagia Screen: PASS     Smoking NO      Afib NO     Stroke Education YES    Obtain screening ultrasound in patients who meet 1 or more of the following criteria as patient is high risk for DVT/PE upon admission:   [] History of DVT/PE  []Hypercoagulable states (Factor V Leiden, Cancer, OCP, etc. )  []Prolonged immobility (hemiplegia/hemiparesis/post operative or any other extended immobilization)   [] Transferred from outside facility (Rehab or Long term care)  [] Age </= to 50 ASSESSMENT: 78-year-old woman with PMH, DM, mild cognitive impairment (performs her daily activities and oriented to person and place.)  She presented with sudden onset of right facial droop, right hemiparesis and global aphasia.Head CT shows left inferior temporal hypodensity suggestive of subacute infarct. MRI brain significant for left hemispheric–MCA territory inferior division temporal infarction and left hemispheric subcortical infarcts  CTA shows to my eye, left M1 segment severe left MCA stenosis. She was not  a candidate for IV thrombolysis as her stroke symptoms started 1/2/2023; infarction/ left temporal hypodensity present on CT at the time of presentation and left M2 distal occlusion with patent M1 segment.    Impression: Left MCA territory ischemia likely secondary to symptomatic intracranial left MCA stenosis. Differential diagnosis includes embolic etiology, based on age and infarct pattern on MRI.    NEURO: Neuro exam unchanged, continue close monitoring for neurologic deterioration, permissive HTN with slow titration to normotensive, titrate statin to LDL goal less than 70, MRI Brain, CTA Head and Neck results as above Physical therapy/OT eval with recommendations for AR.      ANTITHROMBOTIC THERAPY: ASA/Plavix for 3 months followed by ASA only as per [facility] protocol for secondary stroke prevention     PULMONARY: CXR (01/07): Left upper lung bronchiectasis but otherwise no focal consolidation, protecting airway, saturating well on 1L NC    CARDIOVASCULAR: check TTE, cardiac monitoring no events, plan to obtain LOOP to monitor for occult arrhythmias like Afib as per AF stroke trial                               SBP goal: 110-180mmHg    GASTROINTESTINAL: Dysphagia screen passed, tolerating diet     Diet: Pureed    RENAL: BUN/Cr stable, good urine output, straight cath X1 on 01/07/23      Na Goal: Greater than 135     Rojas: No    HEMATOLOGY: H/H stable, Platelets normal      DVT ppx: LMWH     ID: afebrile, no leukocytosis     OTHER: Plan/goals of care discussed with pt's family at bedside and over the phone. Soft vest restraints in place as pt attempts to get out of bed, pull lines. Seroquel increased from 12.5mg to 25mg PO qHs for agitation and possible dc restraints once patient stops pulling lines. Endocrine consult for new onset DM    DISPOSITION: AR as per PT/OT eval once stable and workup is complete.     CORE MEASURES:        Admission NIHSS: 22     TPA:  NO      LDL/HDL: pending     Depression Screen: N/A pt is aphasic     Statin Therapy: Y     Dysphagia Screen: PASS     Smoking NO      Afib NO     Stroke Education YES    Obtain screening ultrasound in patients who meet 1 or more of the following criteria as patient is high risk for DVT/PE upon admission:   [] History of DVT/PE  []Hypercoagulable states (Factor V Leiden, Cancer, OCP, etc. )  []Prolonged immobility (hemiplegia/hemiparesis/post operative or any other extended immobilization)   [] Transferred from outside facility (Rehab or Long term care)  [] Age </= to 50 ASSESSMENT: 78-year-old woman with PMH, DM, mild cognitive impairment (performs her daily activities and oriented to person and place.)  She presented with sudden onset of right facial droop, right hemiparesis and global aphasia.Head CT shows left inferior temporal hypodensity suggestive of subacute infarct. MRI brain significant for left hemispheric–MCA territory inferior division temporal infarction and left hemispheric subcortical infarcts  CTA shows to my eye, left M1 segment severe left MCA stenosis. She was not  a candidate for IV thrombolysis as her stroke symptoms started 1/2/2023; infarction/ left temporal hypodensity present on CT at the time of presentation and left M2 distal occlusion with patent M1 segment.    Impression: Left MCA territory ischemia likely secondary to symptomatic intracranial left MCA stenosis. Differential diagnosis includes embolic etiology, based on age and infarct pattern on MRI.    NEURO: Neuro exam unchanged, continue close monitoring for neurologic deterioration, permissive HTN with slow titration to normotensive, titrate statin to LDL goal less than 70, MRI Brain, CTA Head and Neck results as above Physical therapy/OT eval with recommendations for AR.      ANTITHROMBOTIC THERAPY: ASA/Plavix for 3 months followed by ASA only as per [facility] protocol for secondary stroke prevention     PULMONARY: CXR (01/07): Left upper lung bronchiectasis but otherwise no focal consolidation, protecting airway, saturating well on 1L NC    CARDIOVASCULAR: check TTE, cardiac monitoring no events, plan to obtain LOOP to monitor for occult arrhythmias like Afib as per AF stroke trial                               SBP goal: 110-180mmHg    GASTROINTESTINAL: Dysphagia screen passed, tolerating diet     Diet: Pureed    RENAL: BUN/Cr stable, good urine output, straight cath X1 on 01/07/23      Na Goal: Greater than 135     Rojas: No    HEMATOLOGY: H/H stable, Platelets normal      DVT ppx: LMWH     ID: afebrile, no leukocytosis     OTHER: Plan/goals of care discussed with pt's family at bedside and over the phone. Soft vest restraints in place as pt attempts to get out of bed, pull lines. Seroquel increased from 12.5mg to 25mg PO qHs for agitation and possible dc restraints once patient stops pulling lines. Endocrine consult for new onset DM    DISPOSITION: AR as per PT/OT eval once stable and workup is complete.     CORE MEASURES:        Admission NIHSS: 22     TPA:  NO      LDL/HDL: pending     Depression Screen: N/A pt is aphasic     Statin Therapy: Y     Dysphagia Screen: PASS     Smoking NO      Afib NO     Stroke Education YES    Obtain screening ultrasound in patients who meet 1 or more of the following criteria as patient is high risk for DVT/PE upon admission:   [] History of DVT/PE  []Hypercoagulable states (Factor V Leiden, Cancer, OCP, etc. )  []Prolonged immobility (hemiplegia/hemiparesis/post operative or any other extended immobilization)   [] Transferred from outside facility (Rehab or Long term care)  [] Age </= to 50 ASSESSMENT: 78-year-old woman with PMH, DM, mild cognitive impairment (performs her daily activities and oriented to person and place.)  She presented with sudden onset of right facial droop, right hemiparesis and global aphasia.Head CT shows left inferior temporal hypodensity suggestive of subacute infarct. MRI brain significant for left hemispheric–MCA territory inferior division temporal infarction and left hemispheric subcortical infarcts  CTA shows to my eye, left M1 segment severe left MCA stenosis. She was not  a candidate for IV thrombolysis as her stroke symptoms started 1/2/2023; infarction/ left temporal hypodensity present on CT at the time of presentation and left M2 distal occlusion with patent M1 segment.    Impression: Left MCA territory ischemia likely secondary to symptomatic intracranial left MCA stenosis. Differential diagnosis includes embolic etiology, based on age and infarct pattern on MRI.    NEURO: Neuro exam unchanged, continue close monitoring for neurologic deterioration, permissive HTN with slow titration to normotensive, titrate statin to LDL goal less than 70, MRI Brain, CTA Head and Neck results as above Physical therapy/OT eval with recommendations for AR.      ANTITHROMBOTIC THERAPY: ASA/Plavix for 3 months followed by ASA only as per [facility] protocol for secondary stroke prevention     PULMONARY: CXR (01/07): Left upper lung bronchiectasis but otherwise no focal consolidation, protecting airway, saturating well on 1L NC    CARDIOVASCULAR: check TTE, cardiac monitoring no events, plan to obtain LOOP to monitor for occult arrhythmias like Afib as per AF stroke trial                               SBP goal: 110-180mmHg    GASTROINTESTINAL: Dysphagia screen passed, tolerating diet     Diet: Pureed    RENAL: BUN/Cr stable, good urine output, straight cath X1 on 01/07/23      Na Goal: Greater than 135     Rojas: No    HEMATOLOGY: H/H stable, Platelets normal      DVT ppx: LMWH     ID: afebrile, no leukocytosis     OTHER: Plan/goals of care discussed with pt's family at bedside and over the phone. Soft vest restraints in place as pt attempts to get out of bed, pull lines. Seroquel increased from 12.5mg to 25mg PO qHs for agitation and possible dc restraints once patient stops pulling lines. Endocrine consult for new onset DM    DISPOSITION: AR as per PT/OT eval once stable and workup is complete.     CORE MEASURES:        Admission NIHSS: 22     TPA:  NO      LDL/HDL: pending     Depression Screen: N/A pt is aphasic     Statin Therapy: Y     Dysphagia Screen: PASS     Smoking NO      Afib NO     Stroke Education YES    Obtain screening ultrasound in patients who meet 1 or more of the following criteria as patient is high risk for DVT/PE upon admission:   [] History of DVT/PE  []Hypercoagulable states (Factor V Leiden, Cancer, OCP, etc. )  []Prolonged immobility (hemiplegia/hemiparesis/post operative or any other extended immobilization)   [] Transferred from outside facility (Rehab or Long term care)  [] Age </= to 50 ASSESSMENT: 78-year-old woman with PMH, DM, mild cognitive impairment (performs her daily activities and oriented to person and place.)  She presented with sudden onset of right facial droop, right hemiparesis and global aphasia.Head CT shows left inferior temporal hypodensity suggestive of subacute infarct. MRI brain significant for left hemispheric–MCA territory inferior division temporal infarction and left hemispheric subcortical infarcts  CTA shows to my eye, left M1 segment severe left MCA stenosis. She was not  a candidate for IV thrombolysis as her stroke symptoms started 1/2/2023; infarction/ left temporal hypodensity present on CT at the time of presentation and left M2 distal occlusion with patent M1 segment.    Impression: Left MCA territory ischemia likely secondary to symptomatic intracranial left MCA stenosis.     NEURO: Neuro exam unchanged, continue close monitoring for neurologic deterioration, permissive HTN with slow titration to normotensive, titrate statin to LDL goal less than 70, MRI Brain, CTA Head and Neck results as above Physical therapy/OT eval with recommendations for AR.      ANTITHROMBOTIC THERAPY: ASA/Plavix for 3 months followed by ASA only as per [facility] protocol for secondary stroke prevention     PULMONARY: CXR (01/07): Left upper lung bronchiectasis but otherwise no focal consolidation, protecting airway, saturating well on 1L NC    CARDIOVASCULAR: check TTE, cardiac monitoring no events, plan to evaluate for prolonged cardiac monitoring to completely exclude A-fib which can be done as outpatient                          SBP goal: 110-180mmHg    GASTROINTESTINAL: Dysphagia screen passed, tolerating diet     Diet: Pureed    RENAL: BUN/Cr stable, good urine output, straight cath X1 on 01/07/23      Na Goal: Greater than 135     Rojas: No    HEMATOLOGY: H/H stable, Platelets normal      DVT ppx: LMWH     ID: afebrile, no leukocytosis     OTHER: Plan/goals of care discussed with pt's family at bedside and over the phone. Soft vest restraints in place as pt attempts to get out of bed, pull lines. Seroquel increased from 12.5mg to 25mg PO qHs for agitation and possible dc restraints once patient stops pulling lines. Endocrine consult for new onset DM    DISPOSITION: AR as per PT/OT eval once stable and workup is complete.     CORE MEASURES:        Admission NIHSS: 22     TPA:  NO      LDL/HDL: pending     Depression Screen: N/A pt is aphasic     Statin Therapy: Y     Dysphagia Screen: PASS     Smoking NO      Afib NO     Stroke Education YES    Obtain screening ultrasound in patients who meet 1 or more of the following criteria as patient is high risk for DVT/PE upon admission:   [] History of DVT/PE  []Hypercoagulable states (Factor V Leiden, Cancer, OCP, etc. )  []Prolonged immobility (hemiplegia/hemiparesis/post operative or any other extended immobilization)   [] Transferred from outside facility (Rehab or Long term care)  [] Age </= to 50 ASSESSMENT: 78-year-old woman with PMH, DM, mild cognitive impairment (performs her daily activities and oriented to person and place.)  She presented with sudden onset of right facial droop, right hemiparesis and global aphasia.Head CT shows left inferior temporal hypodensity suggestive of subacute infarct. MRI brain significant for left hemispheric–MCA territory inferior division temporal infarction and left hemispheric subcortical infarcts  CTA shows to my eye, left M1 segment severe left MCA stenosis. She was not  a candidate for IV thrombolysis as her stroke symptoms started 1/2/2023; infarction/ left temporal hypodensity present on CT at the time of presentation and left M2 distal occlusion with patent M1 segment.    Impression: Left MCA territory ischemia likely secondary to symptomatic intracranial left MCA stenosis.     NEURO: Neuro exam unchanged, continue close monitoring for neurologic deterioration, permissive HTN with slow titration to normotensive, titrate statin to LDL goal less than 70, MRI Brain, CTA Head and Neck results as above Physical therapy/OT eval with recommendations for AR.      ANTITHROMBOTIC THERAPY: ASA/Plavix for 3 months followed by ASA only as per [facility] protocol for secondary stroke prevention     PULMONARY: CXR (01/07): Left upper lung bronchiectasis but otherwise no focal consolidation, protecting airway, saturating well on 1L NC    CARDIOVASCULAR: check TTE, cardiac monitoring no events, plan to evaluate for prolonged cardiac monitoring to completely exclude A-fib which can be done as outpatient                          SBP goal: 110-180mmHg    GASTROINTESTINAL: Dysphagia screen passed, tolerating diet     Diet: Pureed    RENAL: BUN/Cr stable, good urine output, straight cath X1 on 01/07/23      Na Goal: Greater than 135     Rojas: No    HEMATOLOGY: H/H stable, Platelets normal      DVT ppx: LMWH     ID: afebrile, no leukocytosis     OTHER: Plan/goals of care discussed with pt's family at bedside and over the phone. Soft vest restraints in place as pt attempts to get out of bed, pull lines. Seroquel increased from 12.5mg to 25mg PO qHs for agitation and possible dc restraints once patient stops pulling lines. Endocrine consult for new onset DM    DISPOSITION: AR as per PT/OT eval once stable and workup is complete.     CORE MEASURES:        Admission NIHSS: 22     TPA:  NO      LDL/HDL: pending     Depression Screen: N/A pt is aphasic     Statin Therapy: Y     Dysphagia Screen: PASS     Smoking NO      Afib NO     Stroke Education YES    Obtain screening ultrasound in patients who meet 1 or more of the following criteria as patient is high risk for DVT/PE upon admission:   [] History of DVT/PE  []Hypercoagulable states (Factor V Leiden, Cancer, OCP, etc. )  []Prolonged immobility (hemiplegia/hemiparesis/post operative or any other extended immobilization)   [] Transferred from outside facility (Rehab or Long term care)  [] Age </= to 50 ASSESSMENT: 78-year-old woman with PMH, DM, mild cognitive impairment (performs her daily activities and oriented to person and place.)  She presented with sudden onset of right facial droop, right hemiparesis and global aphasia.Head CT shows left inferior temporal hypodensity suggestive of subacute infarct. MRI brain significant for left hemispheric–MCA territory inferior division temporal infarction and left hemispheric subcortical infarcts  CTA shows to my eye, left M1 segment severe left MCA stenosis. She was not  a candidate for IV thrombolysis as her stroke symptoms started 1/2/2023; infarction/ left temporal hypodensity present on CT at the time of presentation and left M2 distal occlusion with patent M1 segment.    Impression: Left MCA territory ischemia likely secondary to symptomatic intracranial left MCA stenosis.     NEURO: Neuro exam unchanged, continue close monitoring for neurologic deterioration, permissive HTN with slow titration to normotensive, titrate statin to LDL goal less than 70, MRI Brain, CTA Head and Neck results as above Physical therapy/OT eval with recommendations for AR.      ANTITHROMBOTIC THERAPY: ASA/Plavix for 3 months followed by ASA only as per [facility] protocol for secondary stroke prevention     PULMONARY: CXR (01/07): Left upper lung bronchiectasis but otherwise no focal consolidation, protecting airway, saturating well on 1L NC    CARDIOVASCULAR: check TTE, cardiac monitoring no events, plan to evaluate for prolonged cardiac monitoring to completely exclude A-fib which can be done as outpatient                          SBP goal: 110-180mmHg    GASTROINTESTINAL: Dysphagia screen passed, tolerating diet     Diet: Pureed    RENAL: BUN/Cr stable, good urine output, straight cath X1 on 01/07/23      Na Goal: Greater than 135     Rojas: No    HEMATOLOGY: H/H stable, Platelets normal      DVT ppx: LMWH     ID: afebrile, no leukocytosis     OTHER: Plan/goals of care discussed with pt's family at bedside and over the phone. Soft vest restraints in place as pt attempts to get out of bed, pull lines. Seroquel increased from 12.5mg to 25mg PO qHs for agitation and possible dc restraints once patient stops pulling lines. Endocrine consult for new onset DM    DISPOSITION: AR as per PT/OT eval once stable and workup is complete.     CORE MEASURES:        Admission NIHSS: 22     TPA:  NO      LDL/HDL: pending     Depression Screen: N/A pt is aphasic     Statin Therapy: Y     Dysphagia Screen: PASS     Smoking NO      Afib NO     Stroke Education YES    Obtain screening ultrasound in patients who meet 1 or more of the following criteria as patient is high risk for DVT/PE upon admission:   [] History of DVT/PE  []Hypercoagulable states (Factor V Leiden, Cancer, OCP, etc. )  []Prolonged immobility (hemiplegia/hemiparesis/post operative or any other extended immobilization)   [] Transferred from outside facility (Rehab or Long term care)  [] Age </= to 50

## 2023-01-09 NOTE — DISCHARGE NOTE PROVIDER - NSDCMRMEDTOKEN_GEN_ALL_CORE_FT
aspirin 81 mg oral tablet, chewable: 1 tab(s) orally once a day  atorvastatin 80 mg oral tablet: 1 tab(s) orally once a day (at bedtime)  clopidogrel 75 mg oral tablet: 1 tab(s) orally once a day. Please continue to take plavix until 4/7/2023, then continue only aspirin indefinitely   alcohol swabs : Apply topically to affected area 4 times a day   amLODIPine 5 mg oral tablet: 1 tab(s) orally once a day  aspirin 81 mg oral tablet, chewable: 1 tab(s) orally once a day  atorvastatin 80 mg oral tablet: 1 tab(s) orally once a day (at bedtime)  clopidogrel 75 mg oral tablet: 1 tab(s) orally once a day. Please continue to take plavix until 4/7/2023, then continue only aspirin indefinitely  glucometer (per patient&#x27;s insurance): Test blood sugars four times a day. Dispense #1 glucometer.  Glumetza 500 mg oral tablet, extended release: 1 tab(s) orally 2 times a day   lancets: 1 application subcutaneously 4 times a day   test strips (per patient&#x27;s insurance): 1 application subcutaneously 4 times a day. ** Compatible with patient&#x27;s glucometer **   alcohol swabs : Apply topically to affected area 4 times a day   amLODIPine 5 mg oral tablet: 1 tab(s) orally once a day  aspirin 81 mg oral tablet, chewable: 1 tab(s) orally once a day  atorvastatin 80 mg oral tablet: 1 tab(s) orally once a day (at bedtime)  clopidogrel 75 mg oral tablet: 1 tab(s) orally once a day. Please continue to take plavix until 4/7/2023, then continue only aspirin indefinitely  glucometer (per patient&#x27;s insurance): Test blood sugars four times a day. Dispense #1 glucometer.  Glumetza 500 mg oral tablet, extended release: 1 tab(s) orally 2 times a day   lancets: 1 application subcutaneously 4 times a day   tamsulosin 0.4 mg oral capsule: 1 cap(s) orally once a day (at bedtime)  test strips (per patient&#x27;s insurance): 1 application subcutaneously 4 times a day. ** Compatible with patient&#x27;s glucometer **

## 2023-01-09 NOTE — DISCHARGE NOTE PROVIDER - NPI NUMBER (FOR SYSADMIN USE ONLY) :
[[id number]] [[id number]] [[id number]] [[id number]],[[id number]] [[id number]],[[id number]] [[id number]],[[id number]]

## 2023-01-09 NOTE — DISCHARGE NOTE PROVIDER - NSDCCPCAREPLAN_GEN_ALL_CORE_FT
PRINCIPAL DISCHARGE DIAGNOSIS  Diagnosis: Acute ischemic stroke  Assessment and Plan of Treatment: You were noted to have right facial droop, speech difficulty, and gait imbalance & found to have acute stroke upon imaging including CT and MRI. You were placed on antiplatelets called aspirin and plavix. Please continue to take medications as prescribed. Please follow up with outpatient neurologist. Please return to the nearest emergency room for any new gait imbalance, visual deficit, one sided numbness/tingling/weakness, and/or speech difficulty.

## 2023-01-09 NOTE — DISCHARGE NOTE PROVIDER - CARE PROVIDER_API CALL
Maylin West)  Neurology; Vascular Neurology  [address], [facility] [address]  [place] [address]  Phone: [phone]  Fax: [phone]  Follow Up Time: 1 week   Maylin West)  Neurology; Vascular Neurology  [address] [facility], [facility] [address]  [place] [address]  Phone: [phone]  Fax: [phone]  Follow Up Time: 1 week   Maylin West)  Neurology; Vascular Neurology  [address] [facility], [facility]  [place] [address]  Phone: [phone]  Fax: [phone]  Follow Up Time: 1 week   Maylin West (MD)  Neurology; Vascular Neurology  [address] [facility], [facility] [address]  [place] [address]  Phone: [phone]  Fax: [phone]  Follow Up Time: 1 week    Patric Castillo (DO)  Medicine  [address] [facility], [address]  [place] [address]  Phone: [phone]  Fax: [phone]  Follow Up Time: 2 weeks   Maylin West (MD)  Neurology; Vascular Neurology  [address] [facility], [facility] [address]  [place] [address]  Phone: [phone]  Fax: [phone]  Follow Up Time: 1 week    Patric Castillo (DO)  Medicine  [address] [facility], [address]  [place] [address]  Phone: [phone]  Fax: [phone]  Follow Up Time: 2 weeks   Maylin West (MD)  Neurology; Vascular Neurology  [address] [facility], [facility] [address]  [place] [address]  Phone: [phone]  Fax: [phone]  Follow Up Time: 1 week    Patric Castillo (DO)  Medicine  [address] [facility], [address]  [place] [address]  Phone: [phone]  Fax: [phone]  Follow Up Time: 2 weeks

## 2023-01-09 NOTE — DISCHARGE NOTE PROVIDER - PROVIDER TOKENS
PROVIDER:[TOKEN:[[id number]],FOLLOWUP:[1 week]] PROVIDER:[TOKEN:[[id number]],FOLLOWUP:[1 week]] PROVIDER:[TOKEN:[[id number]],FOLLOWUP:[1 week]] PROVIDER:[TOKEN:[[id number]],FOLLOWUP:[1 week]],PROVIDER:[TOKEN:[[id number]],FOLLOWUP:[2 weeks]] PROVIDER:[TOKEN:[[id number]],FOLLOWUP:[1 week]],PROVIDER:[TOKEN:[[id number]],FOLLOWUP:[2 weeks]] PROVIDER:[TOKEN:[[id number]],FOLLOWUP:[1 week]],PROVIDER:[TOKEN:[[id number]],FOLLOWUP:[2 weeks]]

## 2023-01-09 NOTE — PROGRESS NOTE ADULT - SUBJECTIVE AND OBJECTIVE BOX
THE PATIENT WAS SEEN AND EXAMINED BY ME WITH THE HOUSESTAFF AND STROKE TEAM DURING MORNING ROUNDS.   HPI:  78 year-old right-handed female Nicole speaking, w/ PMHx ?dementia (AOx1-2, performs all ADLs), DM, presenting to [facility] ED w/ right facial droop that started on 1/2/23. On day of arrival, 1/6/23, around 15:30PM, patient noted to have unsteady gait and language abnormalities (not speaking, difficulty understanding),       SUBJECTIVE: No events overnight.  No new neurologic complaints, ROS reported negative unless otherwise noted.      MEDICATIONS  (STANDING):  aspirin  chewable 81 milliGRAM(s) Oral daily  atorvastatin 80 milliGRAM(s) Oral at bedtime  clopidogrel Tablet 75 milliGRAM(s) Oral daily  dextrose 5%. 1000 milliLiter(s) (50 mL/Hr) IV Continuous <Continuous>  dextrose 5%. 1000 milliLiter(s) (100 mL/Hr) IV Continuous <Continuous>  dextrose 50% Injectable 25 Gram(s) IV Push once  dextrose 50% Injectable 12.5 Gram(s) IV Push once  dextrose 50% Injectable 25 Gram(s) IV Push once  enoxaparin Injectable 40 milliGRAM(s) SubCutaneous every 24 hours  glucagon  Injectable 1 milliGRAM(s) IntraMuscular once  insulin lispro (ADMELOG) corrective regimen sliding scale   SubCutaneous three times a day before meals  insulin lispro (ADMELOG) corrective regimen sliding scale   SubCutaneous at bedtime  polyethylene glycol 3350 17 Gram(s) Oral daily  potassium chloride  10 mEq/100 mL IVPB 10 milliEquivalent(s) IV Intermittent every 1 hour  QUEtiapine 12.5 milliGRAM(s) Oral at bedtime  sodium chloride 0.9%. 1000 milliLiter(s) (50 mL/Hr) IV Continuous <Continuous>    MEDICATIONS  (PRN):  dextrose Oral Gel 15 Gram(s) Oral once PRN Blood Glucose LESS THAN 70 milliGRAM(s)/deciliter        PHYSICAL EXAM:   Vital Signs Last 24 Hrs  T(C): 37.2 (09 Jan 2023 08:00), Max: 37.4 (09 Jan 2023 00:00)  T(F): 98.9 (09 Jan 2023 08:00), Max: 99.3 (09 Jan 2023 00:00)  HR: 103 (09 Jan 2023 08:00) (86 - 110)  BP: 155/106 (09 Jan 2023 08:00) (141/63 - 197/98)  BP(mean): 120 (09 Jan 2023 08:00) (75 - 127)  RR: 20 (09 Jan 2023 08:00) (17 - 23)  SpO2: 100% (09 Jan 2023 08:00) (96% - 100%)    General: No acute distress  HEENT: EOM intact, blinks to threat B/L  Abdomen: Soft, nontender, nondistended   Extremities: No edema    NEUROLOGICAL EXAM: Translation provided by Neuro attending Dr Wilson and by pt's son in law at bedside ([facility] language)  Mental status: Eyes closed, awake, alert, oriented x3, attempts to produce speech, , able to follow some simple commands, able to mimic, unable to ID objects  Cranial Nerves: Right facial droop, no nystagmus, blinks to threat B/L  Motor exam: Normal tone, no drift, Right hemiparesis RUE drift, 4/5, RLE drift, 4/5,, LUE 5/5, LLE 5/5  Sensation: Intact to light touch   Coordination/ Gait: Unable to participate with exam     LABS:                               12.1   10.36 )-----------( 291      ( 09 Jan 2023 04:42 )             37.5     01-09    138  |  105  |  12  ----------------------------<  181<H>  3.3<L>   |  19<L>  |  0.56    Ca    9.4      09 Jan 2023 04:42      IMAGING: Reviewed by me.     Brain MRI (01/07/23): Left MCA territory acute infarcts. No acute intracranial hemorrhage    CT Head (01/06/23): No acute intracranial hemorrhage. Hypoattenuation of the left temporal lobe, may represent acute left MCA territory infarct.    CT/CTA head/neck (01/06/23): CTA Head & Neck: Severe stenosis/near complete occlusion with the left M2 branch.    CT Perfusion: There is a penumbra involving a large area of the left MCA territory. THE PATIENT WAS SEEN AND EXAMINED BY ME WITH THE HOUSESTAFF AND STROKE TEAM DURING MORNING ROUNDS.   HPI:  78 year-old right-handed female Nicole speaking, w/ PMHx ?dementia (AOx1-2, performs all ADLs), DM, presenting to [facility] ED w/ right facial droop that started on 1/2/23. On day of arrival, 1/6/23, around 15:30PM, patient noted to have unsteady gait and language abnormalities (not speaking, difficulty understanding),       SUBJECTIVE: No events overnight.  No new neurologic complaints, ROS reported negative unless otherwise noted.      MEDICATIONS  (STANDING):  aspirin  chewable 81 milliGRAM(s) Oral daily  atorvastatin 80 milliGRAM(s) Oral at bedtime  clopidogrel Tablet 75 milliGRAM(s) Oral daily  dextrose 5%. 1000 milliLiter(s) (50 mL/Hr) IV Continuous <Continuous>  dextrose 5%. 1000 milliLiter(s) (100 mL/Hr) IV Continuous <Continuous>  dextrose 50% Injectable 25 Gram(s) IV Push once  dextrose 50% Injectable 12.5 Gram(s) IV Push once  dextrose 50% Injectable 25 Gram(s) IV Push once  enoxaparin Injectable 40 milliGRAM(s) SubCutaneous every 24 hours  glucagon  Injectable 1 milliGRAM(s) IntraMuscular once  insulin lispro (ADMELOG) corrective regimen sliding scale   SubCutaneous three times a day before meals  insulin lispro (ADMELOG) corrective regimen sliding scale   SubCutaneous at bedtime  polyethylene glycol 3350 17 Gram(s) Oral daily  potassium chloride  10 mEq/100 mL IVPB 10 milliEquivalent(s) IV Intermittent every 1 hour  QUEtiapine 12.5 milliGRAM(s) Oral at bedtime  sodium chloride 0.9%. 1000 milliLiter(s) (50 mL/Hr) IV Continuous <Continuous>    MEDICATIONS  (PRN):  dextrose Oral Gel 15 Gram(s) Oral once PRN Blood Glucose LESS THAN 70 milliGRAM(s)/deciliter        PHYSICAL EXAM:   Vital Signs Last 24 Hrs  T(C): 37.2 (09 Jan 2023 08:00), Max: 37.4 (09 Jan 2023 00:00)  T(F): 98.9 (09 Jan 2023 08:00), Max: 99.3 (09 Jan 2023 00:00)  HR: 103 (09 Jan 2023 08:00) (86 - 110)  BP: 155/106 (09 Jan 2023 08:00) (141/63 - 197/98)  BP(mean): 120 (09 Jan 2023 08:00) (75 - 127)  RR: 20 (09 Jan 2023 08:00) (17 - 23)  SpO2: 100% (09 Jan 2023 08:00) (96% - 100%)    General: No acute distress  HEENT: EOM intact, blinks to threat B/L  Abdomen: Soft, nontender, nondistended   Extremities: No edema    NEUROLOGICAL EXAM: Translation provided by Neuro attending Dr Wilson and by pt's son in law at bedside ([facility] language)  Mental status: Eyes closed, awake, alert, oriented x3, attempts to produce speech, , able to follow some simple commands, able to mimic, unable to ID objects  Cranial Nerves: Right facial droop, no nystagmus, blinks to threat B/L  Motor exam: Normal tone, no drift, Right hemiparesis RUE drift, 4/5, RLE drift, 4/5,, LUE 5/5, LLE 5/5  Sensation: Intact to light touch   Coordination/ Gait: Unable to participate with exam     LABS:                               12.1   10.36 )-----------( 291      ( 09 Jan 2023 04:42 )             37.5     01-09    138  |  105  |  12  ----------------------------<  181<H>  3.3<L>   |  19<L>  |  0.56    Ca    9.4      09 Jan 2023 04:42      IMAGING: Reviewed by me.     Brain MRI (01/07/23): Left MCA territory acute infarcts. No acute intracranial hemorrhage    CT Head (01/06/23): No acute intracranial hemorrhage. Hypoattenuation of the left temporal lobe, may represent acute left MCA territory infarct.    CT/CTA head/neck (01/06/23): CTA Head & Neck: Severe stenosis/near complete occlusion with the left M2 branch.    CT Perfusion: There is a penumbra involving a large area of the left MCA territory. THE PATIENT WAS SEEN AND EXAMINED BY ME WITH THE HOUSESTAFF AND STROKE TEAM DURING MORNING ROUNDS.   HPI:  78 year-old right-handed female Nicole speaking, w/ PMHx ?dementia (AOx1-2, performs all ADLs), DM, presenting to [facility] ED w/ right facial droop that started on 1/2/23. On day of arrival, 1/6/23, around 15:30PM, patient noted to have unsteady gait and language abnormalities (not speaking, difficulty understanding),       SUBJECTIVE: No events overnight.  No new neurologic complaints, ROS reported negative unless otherwise noted.      MEDICATIONS  (STANDING):  aspirin  chewable 81 milliGRAM(s) Oral daily  atorvastatin 80 milliGRAM(s) Oral at bedtime  clopidogrel Tablet 75 milliGRAM(s) Oral daily  dextrose 5%. 1000 milliLiter(s) (50 mL/Hr) IV Continuous <Continuous>  dextrose 5%. 1000 milliLiter(s) (100 mL/Hr) IV Continuous <Continuous>  dextrose 50% Injectable 25 Gram(s) IV Push once  dextrose 50% Injectable 12.5 Gram(s) IV Push once  dextrose 50% Injectable 25 Gram(s) IV Push once  enoxaparin Injectable 40 milliGRAM(s) SubCutaneous every 24 hours  glucagon  Injectable 1 milliGRAM(s) IntraMuscular once  insulin lispro (ADMELOG) corrective regimen sliding scale   SubCutaneous three times a day before meals  insulin lispro (ADMELOG) corrective regimen sliding scale   SubCutaneous at bedtime  polyethylene glycol 3350 17 Gram(s) Oral daily  potassium chloride  10 mEq/100 mL IVPB 10 milliEquivalent(s) IV Intermittent every 1 hour  QUEtiapine 12.5 milliGRAM(s) Oral at bedtime  sodium chloride 0.9%. 1000 milliLiter(s) (50 mL/Hr) IV Continuous <Continuous>    MEDICATIONS  (PRN):  dextrose Oral Gel 15 Gram(s) Oral once PRN Blood Glucose LESS THAN 70 milliGRAM(s)/deciliter        PHYSICAL EXAM:   Vital Signs Last 24 Hrs  T(C): 37.2 (09 Jan 2023 08:00), Max: 37.4 (09 Jan 2023 00:00)  T(F): 98.9 (09 Jan 2023 08:00), Max: 99.3 (09 Jan 2023 00:00)  HR: 103 (09 Jan 2023 08:00) (86 - 110)  BP: 155/106 (09 Jan 2023 08:00) (141/63 - 197/98)  BP(mean): 120 (09 Jan 2023 08:00) (75 - 127)  RR: 20 (09 Jan 2023 08:00) (17 - 23)  SpO2: 100% (09 Jan 2023 08:00) (96% - 100%)    General: No acute distress  HEENT: EOM intact, blinks to threat B/L  Abdomen: Soft, nontender, nondistended   Extremities: No edema    NEUROLOGICAL EXAM: Translation provided by Neuro attending Dr Wilson and by pt's son in law at bedside ([facility] language)  Mental status: Eyes closed, awake, alert, oriented x3, attempts to produce speech, , able to follow some simple commands, able to mimic, unable to ID objects  Cranial Nerves: Right facial droop, no nystagmus, blinks to threat B/L  Motor exam: Normal tone, no drift, Right hemiparesis RUE drift, 4/5, RLE drift, 4/5,, LUE 5/5, LLE 5/5  Sensation: Intact to light touch   Coordination/ Gait: Unable to participate with exam     LABS:                               12.1   10.36 )-----------( 291      ( 09 Jan 2023 04:42 )             37.5     01-09    138  |  105  |  12  ----------------------------<  181<H>  3.3<L>   |  19<L>  |  0.56    Ca    9.4      09 Jan 2023 04:42      IMAGING: Reviewed by me.     Brain MRI (01/07/23): Left MCA territory acute infarcts. No acute intracranial hemorrhage    CT Head (01/06/23): No acute intracranial hemorrhage. Hypoattenuation of the left temporal lobe, may represent acute left MCA territory infarct.    CT/CTA head/neck (01/06/23): CTA Head & Neck: Severe stenosis/near complete occlusion with the left M2 branch.    CT Perfusion: There is a penumbra involving a large area of the left MCA territory. THE PATIENT WAS SEEN AND EXAMINED BY ME WITH THE HOUSESTAFF AND STROKE TEAM DURING MORNING ROUNDS.   HPI:  78 year-old right-handed female Nicole speaking, w/ PMHx ?dementia (AOx1-2, performs all ADLs), DM, presenting to [facility] ED w/ right facial droop that started on 1/2/23. On day of arrival, 1/6/23, around 15:30PM, patient noted to have unsteady gait and language abnormalities (not speaking, difficulty understanding),       SUBJECTIVE: No events overnight.  No new neurologic complaints, ROS reported negative unless otherwise noted.      MEDICATIONS  (STANDING):  aspirin  chewable 81 milliGRAM(s) Oral daily  atorvastatin 80 milliGRAM(s) Oral at bedtime  clopidogrel Tablet 75 milliGRAM(s) Oral daily  dextrose 5%. 1000 milliLiter(s) (50 mL/Hr) IV Continuous <Continuous>  dextrose 5%. 1000 milliLiter(s) (100 mL/Hr) IV Continuous <Continuous>  dextrose 50% Injectable 25 Gram(s) IV Push once  dextrose 50% Injectable 12.5 Gram(s) IV Push once  dextrose 50% Injectable 25 Gram(s) IV Push once  enoxaparin Injectable 40 milliGRAM(s) SubCutaneous every 24 hours  glucagon  Injectable 1 milliGRAM(s) IntraMuscular once  insulin lispro (ADMELOG) corrective regimen sliding scale   SubCutaneous three times a day before meals  insulin lispro (ADMELOG) corrective regimen sliding scale   SubCutaneous at bedtime  polyethylene glycol 3350 17 Gram(s) Oral daily  potassium chloride  10 mEq/100 mL IVPB 10 milliEquivalent(s) IV Intermittent every 1 hour  QUEtiapine 12.5 milliGRAM(s) Oral at bedtime  sodium chloride 0.9%. 1000 milliLiter(s) (50 mL/Hr) IV Continuous <Continuous>    MEDICATIONS  (PRN):  dextrose Oral Gel 15 Gram(s) Oral once PRN Blood Glucose LESS THAN 70 milliGRAM(s)/deciliter    PHYSICAL EXAM:   Vital Signs Last 24 Hrs  T(C): 37.2 (09 Jan 2023 08:00), Max: 37.4 (09 Jan 2023 00:00)  T(F): 98.9 (09 Jan 2023 08:00), Max: 99.3 (09 Jan 2023 00:00)  HR: 103 (09 Jan 2023 08:00) (86 - 110)  BP: 155/106 (09 Jan 2023 08:00) (141/63 - 197/98)  BP(mean): 120 (09 Jan 2023 08:00) (75 - 127)  RR: 20 (09 Jan 2023 08:00) (17 - 23)  SpO2: 100% (09 Jan 2023 08:00) (96% - 100%)    General: No acute distress  HEENT: EOM intact, blinks to threat B/L  Abdomen: Soft, nontender, nondistended   Extremities: No edema    NEUROLOGICAL EXAM: Translation provided by Neuro attending Dr Wilson and by pt's son in law at bedside ([facility] language)  Mental status: Eyes closed, awake, alert, oriented x3, attempts to produce speech, , able to follow some simple commands, able to mimic, unable to ID objects  Cranial Nerves: Right facial droop, no nystagmus, blinks to threat B/L  Motor exam: Normal tone, no drift, Right hemiparesis RUE drift, 4/5, RLE drift, 4/5,, LUE 5/5, LLE 5/5  Sensation: Intact to light touch   Coordination/ Gait: Unable to participate with exam     LABS:                               12.1   10.36 )-----------( 291      ( 09 Jan 2023 04:42 )             37.5     01-09    138  |  105  |  12  ----------------------------<  181<H>  3.3<L>   |  19<L>  |  0.56    Ca    9.4      09 Jan 2023 04:42      IMAGING: Reviewed by me.     Brain MRI (01/07/23): Left MCA territory acute infarcts. No acute intracranial hemorrhage    CT Head (01/06/23): No acute intracranial hemorrhage. Hypoattenuation of the left temporal lobe, may represent acute left MCA territory infarct.    CT/CTA head/neck (01/06/23): CTA Head & Neck: Severe stenosis/near complete occlusion with the left M2 branch.    CT Perfusion: There is a penumbra involving a large area of the left MCA territory. THE PATIENT WAS SEEN AND EXAMINED BY ME WITH THE HOUSESTAFF AND STROKE TEAM DURING MORNING ROUNDS.   HPI:  78 year-old right-handed female Nicole speaking, w/ PMHx ?dementia (AOx1-2, performs all ADLs), DM, presenting to [facility] ED w/ right facial droop that started on 1/2/23. On day of arrival, 1/6/23, around 15:30PM, patient noted to have unsteady gait and language abnormalities (not speaking, difficulty understanding),       SUBJECTIVE: No events overnight.  No new neurologic complaints, ROS reported negative unless otherwise noted.      MEDICATIONS  (STANDING):  aspirin  chewable 81 milliGRAM(s) Oral daily  atorvastatin 80 milliGRAM(s) Oral at bedtime  clopidogrel Tablet 75 milliGRAM(s) Oral daily  dextrose 5%. 1000 milliLiter(s) (50 mL/Hr) IV Continuous <Continuous>  dextrose 5%. 1000 milliLiter(s) (100 mL/Hr) IV Continuous <Continuous>  dextrose 50% Injectable 25 Gram(s) IV Push once  dextrose 50% Injectable 12.5 Gram(s) IV Push once  dextrose 50% Injectable 25 Gram(s) IV Push once  enoxaparin Injectable 40 milliGRAM(s) SubCutaneous every 24 hours  glucagon  Injectable 1 milliGRAM(s) IntraMuscular once  insulin lispro (ADMELOG) corrective regimen sliding scale   SubCutaneous three times a day before meals  insulin lispro (ADMELOG) corrective regimen sliding scale   SubCutaneous at bedtime  polyethylene glycol 3350 17 Gram(s) Oral daily  potassium chloride  10 mEq/100 mL IVPB 10 milliEquivalent(s) IV Intermittent every 1 hour  QUEtiapine 12.5 milliGRAM(s) Oral at bedtime  sodium chloride 0.9%. 1000 milliLiter(s) (50 mL/Hr) IV Continuous <Continuous>    MEDICATIONS  (PRN):  dextrose Oral Gel 15 Gram(s) Oral once PRN Blood Glucose LESS THAN 70 milliGRAM(s)/deciliter    PHYSICAL EXAM:   Vital Signs Last 24 Hrs  T(C): 37.2 (09 Jan 2023 08:00), Max: 37.4 (09 Jan 2023 00:00)  T(F): 98.9 (09 Jan 2023 08:00), Max: 99.3 (09 Jan 2023 00:00)  HR: 103 (09 Jan 2023 08:00) (86 - 110)  BP: 155/106 (09 Jan 2023 08:00) (141/63 - 197/98)  BP(mean): 120 (09 Jan 2023 08:00) (75 - 127)  RR: 20 (09 Jan 2023 08:00) (17 - 23)  SpO2: 100% (09 Jan 2023 08:00) (96% - 100%)    General: No acute distress  HEENT: EOM intact, blinks to threat B/L  Abdomen: Soft, nontender, nondistended   Extremities: No edema    NEUROLOGICAL EXAM: Translation provided by Neuro attending Dr Wilson and by pt's son in law at bedside ([facility] language)  Mental status: Eyes closed, awake, alert, oriented x3, attempts to produce speech, , able to follow some simple commands, able to mimic, unable to ID objects  Cranial Nerves: Right facial droop, no nystagmus, blinks to threat B/L  Motor exam: Normal tone, no drift, Right hemiparesis RUE drift, 4/5, RLE drift, 4/5,, LUE 5/5, LLE 5/5  Sensation: Intact to light touch   Coordination/ Gait: Unable to participate with exam     LABS:                               12.1   10.36 )-----------( 291      ( 09 Jan 2023 04:42 )             37.5     01-09    138  |  105  |  12  ----------------------------<  181<H>  3.3<L>   |  19<L>  |  0.56    Ca    9.4      09 Jan 2023 04:42      IMAGING: Reviewed by me.     Brain MRI (01/07/23): Left MCA territory acute infarcts. No acute intracranial hemorrhage    CT Head (01/06/23): No acute intracranial hemorrhage. Hypoattenuation of the left temporal lobe, may represent acute left MCA territory infarct.    CT/CTA head/neck (01/06/23): CTA Head & Neck: Severe stenosis/near complete occlusion with the left M2 branch.    CT Perfusion: There is a penumbra involving a large area of the left MCA territory. THE PATIENT WAS SEEN AND EXAMINED BY ME WITH THE HOUSESTAFF AND STROKE TEAM DURING MORNING ROUNDS.   HPI:  78 year-old right-handed female Nicole speaking, w/ PMHx ?dementia (AOx1-2, performs all ADLs), DM, presenting to [facility] ED w/ right facial droop that started on 1/2/23. On day of arrival, 1/6/23, around 15:30PM, patient noted to have unsteady gait and language abnormalities (not speaking, difficulty understanding),       SUBJECTIVE: No events overnight.  No new neurologic complaints, ROS reported negative unless otherwise noted.      MEDICATIONS  (STANDING):  aspirin  chewable 81 milliGRAM(s) Oral daily  atorvastatin 80 milliGRAM(s) Oral at bedtime  clopidogrel Tablet 75 milliGRAM(s) Oral daily  dextrose 5%. 1000 milliLiter(s) (50 mL/Hr) IV Continuous <Continuous>  dextrose 5%. 1000 milliLiter(s) (100 mL/Hr) IV Continuous <Continuous>  dextrose 50% Injectable 25 Gram(s) IV Push once  dextrose 50% Injectable 12.5 Gram(s) IV Push once  dextrose 50% Injectable 25 Gram(s) IV Push once  enoxaparin Injectable 40 milliGRAM(s) SubCutaneous every 24 hours  glucagon  Injectable 1 milliGRAM(s) IntraMuscular once  insulin lispro (ADMELOG) corrective regimen sliding scale   SubCutaneous three times a day before meals  insulin lispro (ADMELOG) corrective regimen sliding scale   SubCutaneous at bedtime  polyethylene glycol 3350 17 Gram(s) Oral daily  potassium chloride  10 mEq/100 mL IVPB 10 milliEquivalent(s) IV Intermittent every 1 hour  QUEtiapine 12.5 milliGRAM(s) Oral at bedtime  sodium chloride 0.9%. 1000 milliLiter(s) (50 mL/Hr) IV Continuous <Continuous>    MEDICATIONS  (PRN):  dextrose Oral Gel 15 Gram(s) Oral once PRN Blood Glucose LESS THAN 70 milliGRAM(s)/deciliter    PHYSICAL EXAM:   Vital Signs Last 24 Hrs  T(C): 37.2 (09 Jan 2023 08:00), Max: 37.4 (09 Jan 2023 00:00)  T(F): 98.9 (09 Jan 2023 08:00), Max: 99.3 (09 Jan 2023 00:00)  HR: 103 (09 Jan 2023 08:00) (86 - 110)  BP: 155/106 (09 Jan 2023 08:00) (141/63 - 197/98)  BP(mean): 120 (09 Jan 2023 08:00) (75 - 127)  RR: 20 (09 Jan 2023 08:00) (17 - 23)  SpO2: 100% (09 Jan 2023 08:00) (96% - 100%)    General: No acute distress  HEENT: EOM intact, blinks to threat B/L  Abdomen: Soft, nontender, nondistended   Extremities: No edema    NEUROLOGICAL EXAM: Translation provided by Neuro attending Dr Wilson and by pt's son in law at bedside ([facility] language)  Mental status: Eyes closed, awake, alert, oriented x3, attempts to produce speech, , able to follow some simple commands, able to mimic, unable to ID objects  Cranial Nerves: Right facial droop, no nystagmus, blinks to threat B/L  Motor exam: Normal tone, no drift, Right hemiparesis RUE drift, 4/5, RLE drift, 4/5,, LUE 5/5, LLE 5/5  Sensation: Intact to light touch   Coordination/ Gait: Unable to participate with exam     LABS:                               12.1   10.36 )-----------( 291      ( 09 Jan 2023 04:42 )             37.5     01-09    138  |  105  |  12  ----------------------------<  181<H>  3.3<L>   |  19<L>  |  0.56    Ca    9.4      09 Jan 2023 04:42      IMAGING: Reviewed by me.     Brain MRI (01/07/23): Left MCA territory acute infarcts. No acute intracranial hemorrhage    CT Head (01/06/23): No acute intracranial hemorrhage. Hypoattenuation of the left temporal lobe, may represent acute left MCA territory infarct.    CT/CTA head/neck (01/06/23): CTA Head & Neck: Severe stenosis/near complete occlusion with the left M2 branch.    CT Perfusion: There is a penumbra involving a large area of the left MCA territory. THE PATIENT WAS SEEN AND EXAMINED BY ME WITH THE HOUSESTAFF AND STROKE TEAM DURING MORNING ROUNDS.   HPI:  78 year-old right-handed female Nicole speaking, w/ PMHx ?dementia (AOx1-2, performs all ADLs), DM, presenting to [facility] ED w/ right facial droop that started on 1/2/23. On day of arrival, 1/6/23, around 15:30PM, patient noted to have unsteady gait and language abnormalities (not speaking, difficulty understanding),       SUBJECTIVE:  No new neurologic complaints, ROS reported negative unless otherwise noted.  Multiple attempts to pull IVs overnight despite restraints    MEDICATIONS  (STANDING):  aspirin  chewable 81 milliGRAM(s) Oral daily  atorvastatin 80 milliGRAM(s) Oral at bedtime  clopidogrel Tablet 75 milliGRAM(s) Oral daily  dextrose 5%. 1000 milliLiter(s) (50 mL/Hr) IV Continuous <Continuous>  dextrose 5%. 1000 milliLiter(s) (100 mL/Hr) IV Continuous <Continuous>  dextrose 50% Injectable 25 Gram(s) IV Push once  dextrose 50% Injectable 12.5 Gram(s) IV Push once  dextrose 50% Injectable 25 Gram(s) IV Push once  enoxaparin Injectable 40 milliGRAM(s) SubCutaneous every 24 hours  glucagon  Injectable 1 milliGRAM(s) IntraMuscular once  insulin lispro (ADMELOG) corrective regimen sliding scale   SubCutaneous three times a day before meals  insulin lispro (ADMELOG) corrective regimen sliding scale   SubCutaneous at bedtime  polyethylene glycol 3350 17 Gram(s) Oral daily  potassium chloride  10 mEq/100 mL IVPB 10 milliEquivalent(s) IV Intermittent every 1 hour  QUEtiapine 12.5 milliGRAM(s) Oral at bedtime  sodium chloride 0.9%. 1000 milliLiter(s) (50 mL/Hr) IV Continuous <Continuous>    MEDICATIONS  (PRN):  dextrose Oral Gel 15 Gram(s) Oral once PRN Blood Glucose LESS THAN 70 milliGRAM(s)/deciliter    PHYSICAL EXAM:   Vital Signs Last 24 Hrs  T(C): 37.2 (09 Jan 2023 08:00), Max: 37.4 (09 Jan 2023 00:00)  T(F): 98.9 (09 Jan 2023 08:00), Max: 99.3 (09 Jan 2023 00:00)  HR: 103 (09 Jan 2023 08:00) (86 - 110)  BP: 155/106 (09 Jan 2023 08:00) (141/63 - 197/98)  BP(mean): 120 (09 Jan 2023 08:00) (75 - 127)  RR: 20 (09 Jan 2023 08:00) (17 - 23)  SpO2: 100% (09 Jan 2023 08:00) (96% - 100%)    General: No acute distress  HEENT: EOM intact, blinks to threat B/L  Abdomen: Soft, nontender, nondistended   Extremities: No edema    NEUROLOGICAL EXAM: Translation provided by Neuro attending Dr Wilson and by pt's son in law at bedside ([facility] language)  Mental status: Eyes closed, awake, alert, oriented x3, attempts to produce speech, , able to follow some simple commands, able to mimic, unable to ID objects  Cranial Nerves: Right facial droop, no nystagmus, blinks to threat B/L  Motor exam: Normal tone, no drift, Right hemiparesis RUE drift, 4/5, RLE drift, 4/5,, LUE 5/5, LLE 5/5  Sensation: Intact to light touch   Coordination/ Gait: Unable to participate with exam     LABS:                               12.1   10.36 )-----------( 291      ( 09 Jan 2023 04:42 )             37.5     01-09    138  |  105  |  12  ----------------------------<  181<H>  3.3<L>   |  19<L>  |  0.56    Ca    9.4      09 Jan 2023 04:42      IMAGING: Reviewed by me.     Brain MRI (01/07/23): Left MCA territory acute infarcts. No acute intracranial hemorrhage    CT Head (01/06/23): No acute intracranial hemorrhage. Hypoattenuation of the left temporal lobe, may represent acute left MCA territory infarct.    CT/CTA head/neck (01/06/23): CTA Head & Neck: Severe stenosis/near complete occlusion with the left M2 branch.    CT Perfusion: There is a penumbra involving a large area of the left MCA territory. THE PATIENT WAS SEEN AND EXAMINED BY ME WITH THE HOUSESTAFF AND STROKE TEAM DURING MORNING ROUNDS.   HPI:  78 year-old right-handed female Nicole speaking, w/ PMHx ?dementia (AOx1-2, performs all ADLs), DM, presenting to [facility] ED w/ right facial droop that started on 1/2/23. On day of arrival, 1/6/23, around 15:30PM, patient noted to have unsteady gait and language abnormalities (not speaking, difficulty understanding),       SUBJECTIVE:  No new neurologic complaints, ROS reported negative unless otherwise noted.  Multiple attempts to pull IVs overnight despite restraints    MEDICATIONS  (STANDING):  aspirin  chewable 81 milliGRAM(s) Oral daily  atorvastatin 80 milliGRAM(s) Oral at bedtime  clopidogrel Tablet 75 milliGRAM(s) Oral daily  dextrose 5%. 1000 milliLiter(s) (50 mL/Hr) IV Continuous <Continuous>  dextrose 5%. 1000 milliLiter(s) (100 mL/Hr) IV Continuous <Continuous>  dextrose 50% Injectable 25 Gram(s) IV Push once  dextrose 50% Injectable 12.5 Gram(s) IV Push once  dextrose 50% Injectable 25 Gram(s) IV Push once  enoxaparin Injectable 40 milliGRAM(s) SubCutaneous every 24 hours  glucagon  Injectable 1 milliGRAM(s) IntraMuscular once  insulin lispro (ADMELOG) corrective regimen sliding scale   SubCutaneous three times a day before meals  insulin lispro (ADMELOG) corrective regimen sliding scale   SubCutaneous at bedtime  polyethylene glycol 3350 17 Gram(s) Oral daily  potassium chloride  10 mEq/100 mL IVPB 10 milliEquivalent(s) IV Intermittent every 1 hour  QUEtiapine 12.5 milliGRAM(s) Oral at bedtime  sodium chloride 0.9%. 1000 milliLiter(s) (50 mL/Hr) IV Continuous <Continuous>    MEDICATIONS  (PRN):  dextrose Oral Gel 15 Gram(s) Oral once PRN Blood Glucose LESS THAN 70 milliGRAM(s)/deciliter    PHYSICAL EXAM:   Vital Signs Last 24 Hrs  T(C): 37.2 (09 Jan 2023 08:00), Max: 37.4 (09 Jan 2023 00:00)  T(F): 98.9 (09 Jan 2023 08:00), Max: 99.3 (09 Jan 2023 00:00)  HR: 103 (09 Jan 2023 08:00) (86 - 110)  BP: 155/106 (09 Jan 2023 08:00) (141/63 - 197/98)  BP(mean): 120 (09 Jan 2023 08:00) (75 - 127)  RR: 20 (09 Jan 2023 08:00) (17 - 23)  SpO2: 100% (09 Jan 2023 08:00) (96% - 100%)    General: No acute distress  HEENT: EOM intact, blinks to threat B/L  Abdomen: Soft, nontender, nondistended   Extremities: No edema    NEUROLOGICAL EXAM: Translation provided by Neuro attending Dr Wilson and by pt's son in law at bedside ([facility] language)  Mental status: Eyes closed, awake, alert, oriented x3, attempts to produce speech, , able to follow some simple commands, able to mimic, unable to ID objects  Cranial Nerves: Right facial droop, no nystagmus, blinks to threat B/L  Motor exam: Normal tone, no drift, Right hemiparesis RUE drift, 4/5, RLE drift, 4/5,, LUE 5/5, LLE 5/5  Sensation: Intact to light touch   Coordination/ Gait: Unable to participate with exam     LABS:                               12.1   10.36 )-----------( 291      ( 09 Jan 2023 04:42 )             37.5     01-09    138  |  105  |  12  ----------------------------<  181<H>  3.3<L>   |  19<L>  |  0.56    Ca    9.4      09 Jan 2023 04:42      IMAGING: Reviewed by me.     Brain MRI (01/07/23): Left MCA territory acute infarcts. No acute intracranial hemorrhage    CT Head (01/06/23): No acute intracranial hemorrhage. Hypoattenuation of the left temporal lobe, may represent acute left MCA territory infarct.    CT/CTA head/neck (01/06/23): CTA Head & Neck: Severe stenosis/near complete occlusion with the left M2 branch.    CT Perfusion: There is a penumbra involving a large area of the left MCA territory. THE PATIENT WAS SEEN AND EXAMINED BY ME WITH THE HOUSESTAFF AND STROKE TEAM DURING MORNING ROUNDS.   HPI:  78 year-old right-handed female Nicole speaking, w/ PMHx ?dementia (AOx1-2, performs all ADLs), DM, presenting to [facility] ED w/ right facial droop that started on 1/2/23. On day of arrival, 1/6/23, around 15:30PM, patient noted to have unsteady gait and language abnormalities (not speaking, difficulty understanding),       SUBJECTIVE:  No new neurologic complaints, ROS reported negative unless otherwise noted.  Multiple attempts to pull IVs overnight despite restraints    MEDICATIONS  (STANDING):  aspirin  chewable 81 milliGRAM(s) Oral daily  atorvastatin 80 milliGRAM(s) Oral at bedtime  clopidogrel Tablet 75 milliGRAM(s) Oral daily  dextrose 5%. 1000 milliLiter(s) (50 mL/Hr) IV Continuous <Continuous>  dextrose 5%. 1000 milliLiter(s) (100 mL/Hr) IV Continuous <Continuous>  dextrose 50% Injectable 25 Gram(s) IV Push once  dextrose 50% Injectable 12.5 Gram(s) IV Push once  dextrose 50% Injectable 25 Gram(s) IV Push once  enoxaparin Injectable 40 milliGRAM(s) SubCutaneous every 24 hours  glucagon  Injectable 1 milliGRAM(s) IntraMuscular once  insulin lispro (ADMELOG) corrective regimen sliding scale   SubCutaneous three times a day before meals  insulin lispro (ADMELOG) corrective regimen sliding scale   SubCutaneous at bedtime  polyethylene glycol 3350 17 Gram(s) Oral daily  potassium chloride  10 mEq/100 mL IVPB 10 milliEquivalent(s) IV Intermittent every 1 hour  QUEtiapine 12.5 milliGRAM(s) Oral at bedtime  sodium chloride 0.9%. 1000 milliLiter(s) (50 mL/Hr) IV Continuous <Continuous>    MEDICATIONS  (PRN):  dextrose Oral Gel 15 Gram(s) Oral once PRN Blood Glucose LESS THAN 70 milliGRAM(s)/deciliter    PHYSICAL EXAM:   Vital Signs Last 24 Hrs  T(C): 37.2 (09 Jan 2023 08:00), Max: 37.4 (09 Jan 2023 00:00)  T(F): 98.9 (09 Jan 2023 08:00), Max: 99.3 (09 Jan 2023 00:00)  HR: 103 (09 Jan 2023 08:00) (86 - 110)  BP: 155/106 (09 Jan 2023 08:00) (141/63 - 197/98)  BP(mean): 120 (09 Jan 2023 08:00) (75 - 127)  RR: 20 (09 Jan 2023 08:00) (17 - 23)  SpO2: 100% (09 Jan 2023 08:00) (96% - 100%)    General: No acute distress  HEENT: EOM intact, blinks to threat B/L  Abdomen: Soft, nontender, nondistended   Extremities: No edema    NEUROLOGICAL EXAM: Translation provided by Neuro attending Dr Wilson and by pt's son in law at bedside ([facility] language)  Mental status: Eyes closed, awake, alert, oriented x3, attempts to produce speech, , able to follow some simple commands, able to mimic, unable to ID objects  Cranial Nerves: Right facial droop, no nystagmus, blinks to threat B/L  Motor exam: Normal tone, no drift, Right hemiparesis RUE drift, 4/5, RLE drift, 4/5,, LUE 5/5, LLE 5/5  Sensation: Intact to light touch   Coordination/ Gait: Unable to participate with exam     LABS:                               12.1   10.36 )-----------( 291      ( 09 Jan 2023 04:42 )             37.5     01-09    138  |  105  |  12  ----------------------------<  181<H>  3.3<L>   |  19<L>  |  0.56    Ca    9.4      09 Jan 2023 04:42      IMAGING: Reviewed by me.     Brain MRI (01/07/23): Left MCA territory acute infarcts. No acute intracranial hemorrhage    CT Head (01/06/23): No acute intracranial hemorrhage. Hypoattenuation of the left temporal lobe, may represent acute left MCA territory infarct.    CT/CTA head/neck (01/06/23): CTA Head & Neck: Severe stenosis/near complete occlusion with the left M2 branch.    CT Perfusion: There is a penumbra involving a large area of the left MCA territory. THE PATIENT WAS SEEN AND EXAMINED BY ME WITH THE HOUSESTAFF AND STROKE TEAM DURING MORNING ROUNDS.   HPI:  78 year-old right-handed female Nicole speaking, w/ PMHx ?dementia (AOx1-2, performs all ADLs), DM, presenting to [facility] ED w/ right facial droop that started on 1/2/23. On day of arrival, 1/6/23, around 15:30PM, patient noted to have unsteady gait and language abnormalities (not speaking, difficulty understanding),       SUBJECTIVE:  No new neurologic complaints, ROS reported negative unless otherwise noted.  Multiple attempts to pull IVs overnight despite restraints    MEDICATIONS  (STANDING):  aspirin  chewable 81 milliGRAM(s) Oral daily  atorvastatin 80 milliGRAM(s) Oral at bedtime  clopidogrel Tablet 75 milliGRAM(s) Oral daily  dextrose 5%. 1000 milliLiter(s) (50 mL/Hr) IV Continuous <Continuous>  dextrose 5%. 1000 milliLiter(s) (100 mL/Hr) IV Continuous <Continuous>  dextrose 50% Injectable 25 Gram(s) IV Push once  dextrose 50% Injectable 12.5 Gram(s) IV Push once  dextrose 50% Injectable 25 Gram(s) IV Push once  enoxaparin Injectable 40 milliGRAM(s) SubCutaneous every 24 hours  glucagon  Injectable 1 milliGRAM(s) IntraMuscular once  insulin lispro (ADMELOG) corrective regimen sliding scale   SubCutaneous three times a day before meals  insulin lispro (ADMELOG) corrective regimen sliding scale   SubCutaneous at bedtime  polyethylene glycol 3350 17 Gram(s) Oral daily  potassium chloride  10 mEq/100 mL IVPB 10 milliEquivalent(s) IV Intermittent every 1 hour  QUEtiapine 12.5 milliGRAM(s) Oral at bedtime  sodium chloride 0.9%. 1000 milliLiter(s) (50 mL/Hr) IV Continuous <Continuous>    MEDICATIONS  (PRN):  dextrose Oral Gel 15 Gram(s) Oral once PRN Blood Glucose LESS THAN 70 milliGRAM(s)/deciliter    PHYSICAL EXAM:   Vital Signs Last 24 Hrs  T(C): 37.2 (09 Jan 2023 08:00), Max: 37.4 (09 Jan 2023 00:00)  T(F): 98.9 (09 Jan 2023 08:00), Max: 99.3 (09 Jan 2023 00:00)  HR: 103 (09 Jan 2023 08:00) (86 - 110)  BP: 155/106 (09 Jan 2023 08:00) (141/63 - 197/98)  BP(mean): 120 (09 Jan 2023 08:00) (75 - 127)  RR: 20 (09 Jan 2023 08:00) (17 - 23)  SpO2: 100% (09 Jan 2023 08:00) (96% - 100%)    General: No acute distress  HEENT: EOM intact, blinks to threat B/L  Abdomen: Soft, nontender, nondistended   Extremities: No edema    NEUROLOGICAL EXAM: son in law at bedside ([facility] language)  Mental status: Eyes closed, awake, alert, attempts to produce speech, able to follow some simple commands, able to mimic, unable to ID objects  Cranial Nerves: Right facial droop, no nystagmus, blinks to threat B/L  Motor exam: Normal tone, no drift, Right hemiparesis RUE drift, 4/5, RLE drift, 4/5,, LUE 5/5, LLE 5/5  Sensation: Intact to light touch   Coordination/ Gait: Unable to participate with exam     LABS:                               12.1   10.36 )-----------( 291      ( 09 Jan 2023 04:42 )             37.5     01-09    138  |  105  |  12  ----------------------------<  181<H>  3.3<L>   |  19<L>  |  0.56    Ca    9.4      09 Jan 2023 04:42      IMAGING: Reviewed by me.     Brain MRI (01/07/23): Left MCA territory acute infarcts. No acute intracranial hemorrhage    CT Head (01/06/23): No acute intracranial hemorrhage. Hypoattenuation of the left temporal lobe, may represent acute left MCA territory infarct.    CT/CTA head/neck (01/06/23): CTA Head & Neck: Severe stenosis/near complete occlusion with the left M2 branch.    CT Perfusion: There is a penumbra involving a large area of the left MCA territory. THE PATIENT WAS SEEN AND EXAMINED BY ME WITH THE HOUSESTAFF AND STROKE TEAM DURING MORNING ROUNDS.   HPI:  78 year-old right-handed female Nicole speaking, w/ PMHx ?dementia (AOx1-2, performs all ADLs), DM, presenting to [facility] ED w/ right facial droop that started on 1/2/23. On day of arrival, 1/6/23, around 15:30PM, patient noted to have unsteady gait and language abnormalities (not speaking, difficulty understanding),       SUBJECTIVE:  No new neurologic complaints, ROS reported negative unless otherwise noted.  Multiple attempts to pull IVs overnight despite restraints    MEDICATIONS  (STANDING):  aspirin  chewable 81 milliGRAM(s) Oral daily  atorvastatin 80 milliGRAM(s) Oral at bedtime  clopidogrel Tablet 75 milliGRAM(s) Oral daily  dextrose 5%. 1000 milliLiter(s) (50 mL/Hr) IV Continuous <Continuous>  dextrose 5%. 1000 milliLiter(s) (100 mL/Hr) IV Continuous <Continuous>  dextrose 50% Injectable 25 Gram(s) IV Push once  dextrose 50% Injectable 12.5 Gram(s) IV Push once  dextrose 50% Injectable 25 Gram(s) IV Push once  enoxaparin Injectable 40 milliGRAM(s) SubCutaneous every 24 hours  glucagon  Injectable 1 milliGRAM(s) IntraMuscular once  insulin lispro (ADMELOG) corrective regimen sliding scale   SubCutaneous three times a day before meals  insulin lispro (ADMELOG) corrective regimen sliding scale   SubCutaneous at bedtime  polyethylene glycol 3350 17 Gram(s) Oral daily  potassium chloride  10 mEq/100 mL IVPB 10 milliEquivalent(s) IV Intermittent every 1 hour  QUEtiapine 12.5 milliGRAM(s) Oral at bedtime  sodium chloride 0.9%. 1000 milliLiter(s) (50 mL/Hr) IV Continuous <Continuous>    MEDICATIONS  (PRN):  dextrose Oral Gel 15 Gram(s) Oral once PRN Blood Glucose LESS THAN 70 milliGRAM(s)/deciliter    PHYSICAL EXAM:   Vital Signs Last 24 Hrs  T(C): 37.2 (09 Jan 2023 08:00), Max: 37.4 (09 Jan 2023 00:00)  T(F): 98.9 (09 Jan 2023 08:00), Max: 99.3 (09 Jan 2023 00:00)  HR: 103 (09 Jan 2023 08:00) (86 - 110)  BP: 155/106 (09 Jan 2023 08:00) (141/63 - 197/98)  BP(mean): 120 (09 Jan 2023 08:00) (75 - 127)  RR: 20 (09 Jan 2023 08:00) (17 - 23)  SpO2: 100% (09 Jan 2023 08:00) (96% - 100%)    General: No acute distress  HEENT: EOM intact, blinks to threat B/L  Abdomen: Soft, nontender, nondistended   Extremities: No edema    NEUROLOGICAL EXAM: son in law at bedside ([facility] language)  Mental status: Eyes closed, awake, alert, attempts to produce speech, able to follow some simple commands, able to mimic, unable to ID objects  Cranial Nerves: Right facial droop, no nystagmus, blinks to threat B/L  Motor exam: Normal tone, no drift, Right hemiparesis RUE drift, 4/5, RLE drift, 4/5,, LUE 5/5, LLE 5/5  Sensation: Intact to light touch   Coordination/ Gait: Unable to participate with exam     LABS:                               12.1   10.36 )-----------( 291      ( 09 Jan 2023 04:42 )             37.5     01-09    138  |  105  |  12  ----------------------------<  181<H>  3.3<L>   |  19<L>  |  0.56    Ca    9.4      09 Jan 2023 04:42      IMAGING: Reviewed by me.     Brain MRI (01/07/23): Left MCA territory acute infarcts. No acute intracranial hemorrhage    CT Head (01/06/23): No acute intracranial hemorrhage. Hypoattenuation of the left temporal lobe, may represent acute left MCA territory infarct.    CT/CTA head/neck (01/06/23): CTA Head & Neck: Severe stenosis/near complete occlusion with the left M2 branch.    CT Perfusion: There is a penumbra involving a large area of the left MCA territory. THE PATIENT WAS SEEN AND EXAMINED BY ME WITH THE HOUSESTAFF AND STROKE TEAM DURING MORNING ROUNDS.   HPI:  78 year-old right-handed female Nicole speaking, w/ PMHx ?dementia (AOx1-2, performs all ADLs), DM, presenting to [facility] ED w/ right facial droop that started on 1/2/23. On day of arrival, 1/6/23, around 15:30PM, patient noted to have unsteady gait and language abnormalities (not speaking, difficulty understanding),       SUBJECTIVE:  No new neurologic complaints, ROS reported negative unless otherwise noted.  Multiple attempts to pull IVs overnight despite restraints    MEDICATIONS  (STANDING):  aspirin  chewable 81 milliGRAM(s) Oral daily  atorvastatin 80 milliGRAM(s) Oral at bedtime  clopidogrel Tablet 75 milliGRAM(s) Oral daily  dextrose 5%. 1000 milliLiter(s) (50 mL/Hr) IV Continuous <Continuous>  dextrose 5%. 1000 milliLiter(s) (100 mL/Hr) IV Continuous <Continuous>  dextrose 50% Injectable 25 Gram(s) IV Push once  dextrose 50% Injectable 12.5 Gram(s) IV Push once  dextrose 50% Injectable 25 Gram(s) IV Push once  enoxaparin Injectable 40 milliGRAM(s) SubCutaneous every 24 hours  glucagon  Injectable 1 milliGRAM(s) IntraMuscular once  insulin lispro (ADMELOG) corrective regimen sliding scale   SubCutaneous three times a day before meals  insulin lispro (ADMELOG) corrective regimen sliding scale   SubCutaneous at bedtime  polyethylene glycol 3350 17 Gram(s) Oral daily  potassium chloride  10 mEq/100 mL IVPB 10 milliEquivalent(s) IV Intermittent every 1 hour  QUEtiapine 12.5 milliGRAM(s) Oral at bedtime  sodium chloride 0.9%. 1000 milliLiter(s) (50 mL/Hr) IV Continuous <Continuous>    MEDICATIONS  (PRN):  dextrose Oral Gel 15 Gram(s) Oral once PRN Blood Glucose LESS THAN 70 milliGRAM(s)/deciliter    PHYSICAL EXAM:   Vital Signs Last 24 Hrs  T(C): 37.2 (09 Jan 2023 08:00), Max: 37.4 (09 Jan 2023 00:00)  T(F): 98.9 (09 Jan 2023 08:00), Max: 99.3 (09 Jan 2023 00:00)  HR: 103 (09 Jan 2023 08:00) (86 - 110)  BP: 155/106 (09 Jan 2023 08:00) (141/63 - 197/98)  BP(mean): 120 (09 Jan 2023 08:00) (75 - 127)  RR: 20 (09 Jan 2023 08:00) (17 - 23)  SpO2: 100% (09 Jan 2023 08:00) (96% - 100%)    General: No acute distress  HEENT: EOM intact, blinks to threat B/L  Abdomen: Soft, nontender, nondistended   Extremities: No edema    NEUROLOGICAL EXAM: son in law at bedside ([facility] language)  Mental status: Eyes closed, awake, alert, attempts to produce speech, able to follow some simple commands, able to mimic, unable to ID objects  Cranial Nerves: Right facial droop, no nystagmus, blinks to threat B/L  Motor exam: Normal tone, no drift, Right hemiparesis RUE drift, 4/5, RLE drift, 4/5,, LUE 5/5, LLE 5/5  Sensation: Intact to light touch   Coordination/ Gait: Unable to participate with exam     LABS:                               12.1   10.36 )-----------( 291      ( 09 Jan 2023 04:42 )             37.5     01-09    138  |  105  |  12  ----------------------------<  181<H>  3.3<L>   |  19<L>  |  0.56    Ca    9.4      09 Jan 2023 04:42      IMAGING: Reviewed by me.     Brain MRI (01/07/23): Left MCA territory acute infarcts. No acute intracranial hemorrhage    CT Head (01/06/23): No acute intracranial hemorrhage. Hypoattenuation of the left temporal lobe, may represent acute left MCA territory infarct.    CT/CTA head/neck (01/06/23): CTA Head & Neck: Severe stenosis/near complete occlusion with the left M2 branch.    CT Perfusion: There is a penumbra involving a large area of the left MCA territory. THE PATIENT WAS SEEN AND EXAMINED BY ME WITH THE HOUSESTAFF AND STROKE TEAM DURING MORNING ROUNDS.   HPI:  78 year-old right-handed female Nicole speaking, w/ PMHx ?dementia (AOx1-2, performs all ADLs), DM, presenting to [facility] ED w/ right facial droop that started on 1/2/23. On day of arrival, 1/6/23, around 15:30PM, patient noted to have unsteady gait and language abnormalities (not speaking, difficulty understanding),       SUBJECTIVE:  No new neurologic complaints, ROS reported negative unless otherwise noted.  Multiple attempts to pull IVs overnight despite restraints    MEDICATIONS  (STANDING):  aspirin  chewable 81 milliGRAM(s) Oral daily  atorvastatin 80 milliGRAM(s) Oral at bedtime  clopidogrel Tablet 75 milliGRAM(s) Oral daily  dextrose 5%. 1000 milliLiter(s) (50 mL/Hr) IV Continuous <Continuous>  dextrose 5%. 1000 milliLiter(s) (100 mL/Hr) IV Continuous <Continuous>  dextrose 50% Injectable 25 Gram(s) IV Push once  dextrose 50% Injectable 12.5 Gram(s) IV Push once  dextrose 50% Injectable 25 Gram(s) IV Push once  enoxaparin Injectable 40 milliGRAM(s) SubCutaneous every 24 hours  glucagon  Injectable 1 milliGRAM(s) IntraMuscular once  insulin lispro (ADMELOG) corrective regimen sliding scale   SubCutaneous three times a day before meals  insulin lispro (ADMELOG) corrective regimen sliding scale   SubCutaneous at bedtime  polyethylene glycol 3350 17 Gram(s) Oral daily  potassium chloride  10 mEq/100 mL IVPB 10 milliEquivalent(s) IV Intermittent every 1 hour  QUEtiapine 12.5 milliGRAM(s) Oral at bedtime  sodium chloride 0.9%. 1000 milliLiter(s) (50 mL/Hr) IV Continuous <Continuous>    MEDICATIONS  (PRN):  dextrose Oral Gel 15 Gram(s) Oral once PRN Blood Glucose LESS THAN 70 milliGRAM(s)/deciliter    PHYSICAL EXAM:   Vital Signs Last 24 Hrs  T(C): 37.2 (09 Jan 2023 08:00), Max: 37.4 (09 Jan 2023 00:00)  T(F): 98.9 (09 Jan 2023 08:00), Max: 99.3 (09 Jan 2023 00:00)  HR: 103 (09 Jan 2023 08:00) (86 - 110)  BP: 155/106 (09 Jan 2023 08:00) (141/63 - 197/98)  BP(mean): 120 (09 Jan 2023 08:00) (75 - 127)  RR: 20 (09 Jan 2023 08:00) (17 - 23)  SpO2: 100% (09 Jan 2023 08:00) (96% - 100%)    General: No acute distress  HEENT: EOM intact, blinks to threat B/L  Abdomen: Soft, nontender, nondistended   Extremities: No edema    NEUROLOGICAL EXAM: son in law at bedside ([facility] language)  Mental status: awake, alert, attempts to produce speech, able to follow some simple commands, able to mimic, unable to ID objects  Cranial Nerves: Right facial droop, no nystagmus, blinks to threat B/L  Motor exam: Normal tone, no drift, Right hemiparesis RUE drift, 4/5, RLE drift, 4/5,, LUE 5/5, LLE 5/5  Sensation: Intact to light touch   Coordination/ Gait: Unable to participate with exam     LABS:                               12.1   10.36 )-----------( 291      ( 09 Jan 2023 04:42 )             37.5     01-09    138  |  105  |  12  ----------------------------<  181<H>  3.3<L>   |  19<L>  |  0.56    Ca    9.4      09 Jan 2023 04:42      IMAGING: Reviewed by me.     Brain MRI (01/07/23): Left MCA territory acute infarcts. No acute intracranial hemorrhage    CT Head (01/06/23): No acute intracranial hemorrhage. Hypoattenuation of the left temporal lobe, may represent acute left MCA territory infarct.    CT/CTA head/neck (01/06/23): CTA Head & Neck: Severe stenosis/near complete occlusion with the left M2 branch.    CT Perfusion: There is a penumbra involving a large area of the left MCA territory. THE PATIENT WAS SEEN AND EXAMINED BY ME WITH THE HOUSESTAFF AND STROKE TEAM DURING MORNING ROUNDS.   HPI:  78 year-old right-handed female Nicole speaking, w/ PMHx ?dementia (AOx1-2, performs all ADLs), DM, presenting to [facility] ED w/ right facial droop that started on 1/2/23. On day of arrival, 1/6/23, around 15:30PM, patient noted to have unsteady gait and language abnormalities (not speaking, difficulty understanding),       SUBJECTIVE:  No new neurologic complaints, ROS reported negative unless otherwise noted.  Multiple attempts to pull IVs overnight despite restraints    MEDICATIONS  (STANDING):  aspirin  chewable 81 milliGRAM(s) Oral daily  atorvastatin 80 milliGRAM(s) Oral at bedtime  clopidogrel Tablet 75 milliGRAM(s) Oral daily  dextrose 5%. 1000 milliLiter(s) (50 mL/Hr) IV Continuous <Continuous>  dextrose 5%. 1000 milliLiter(s) (100 mL/Hr) IV Continuous <Continuous>  dextrose 50% Injectable 25 Gram(s) IV Push once  dextrose 50% Injectable 12.5 Gram(s) IV Push once  dextrose 50% Injectable 25 Gram(s) IV Push once  enoxaparin Injectable 40 milliGRAM(s) SubCutaneous every 24 hours  glucagon  Injectable 1 milliGRAM(s) IntraMuscular once  insulin lispro (ADMELOG) corrective regimen sliding scale   SubCutaneous three times a day before meals  insulin lispro (ADMELOG) corrective regimen sliding scale   SubCutaneous at bedtime  polyethylene glycol 3350 17 Gram(s) Oral daily  potassium chloride  10 mEq/100 mL IVPB 10 milliEquivalent(s) IV Intermittent every 1 hour  QUEtiapine 12.5 milliGRAM(s) Oral at bedtime  sodium chloride 0.9%. 1000 milliLiter(s) (50 mL/Hr) IV Continuous <Continuous>    MEDICATIONS  (PRN):  dextrose Oral Gel 15 Gram(s) Oral once PRN Blood Glucose LESS THAN 70 milliGRAM(s)/deciliter    PHYSICAL EXAM:   Vital Signs Last 24 Hrs  T(C): 37.2 (09 Jan 2023 08:00), Max: 37.4 (09 Jan 2023 00:00)  T(F): 98.9 (09 Jan 2023 08:00), Max: 99.3 (09 Jan 2023 00:00)  HR: 103 (09 Jan 2023 08:00) (86 - 110)  BP: 155/106 (09 Jan 2023 08:00) (141/63 - 197/98)  BP(mean): 120 (09 Jan 2023 08:00) (75 - 127)  RR: 20 (09 Jan 2023 08:00) (17 - 23)  SpO2: 100% (09 Jan 2023 08:00) (96% - 100%)    General: No acute distress  HEENT: EOM intact, blinks to threat B/L  Abdomen: Soft, nontender, nondistended   Extremities: No edema    NEUROLOGICAL EXAM: son in law at bedside ([facility] language)  Mental status: awake, alert, attempts to produce speech, able to follow some simple commands, able to mimic, unable to ID objects  Cranial Nerves: Right facial droop, no nystagmus, blinks to threat B/L  Motor exam: Normal tone, no drift, Right hemiparesis RUE drift, 4/5, RLE drift, 4/5,, LUE 5/5, LLE 5/5  Sensation: Intact to light touch   Coordination/ Gait: Unable to participate with exam     LABS:                               12.1   10.36 )-----------( 291      ( 09 Jan 2023 04:42 )             37.5     01-09    138  |  105  |  12  ----------------------------<  181<H>  3.3<L>   |  19<L>  |  0.56    Ca    9.4      09 Jan 2023 04:42      IMAGING: Reviewed by me.     Brain MRI (01/07/23): Left MCA territory acute infarcts. No acute intracranial hemorrhage    CT Head (01/06/23): No acute intracranial hemorrhage. Hypoattenuation of the left temporal lobe, may represent acute left MCA territory infarct.    CT/CTA head/neck (01/06/23): CTA Head & Neck: Severe stenosis/near complete occlusion with the left M2 branch.    CT Perfusion: There is a penumbra involving a large area of the left MCA territory. THE PATIENT WAS SEEN AND EXAMINED BY ME WITH THE HOUSESTAFF AND STROKE TEAM DURING MORNING ROUNDS.   HPI:  78 year-old right-handed female Nicole speaking, w/ PMHx ?dementia (AOx1-2, performs all ADLs), DM, presenting to [facility] ED w/ right facial droop that started on 1/2/23. On day of arrival, 1/6/23, around 15:30PM, patient noted to have unsteady gait and language abnormalities (not speaking, difficulty understanding),       SUBJECTIVE:  No new neurologic complaints, ROS reported negative unless otherwise noted.  Multiple attempts to pull IVs overnight despite restraints    MEDICATIONS  (STANDING):  aspirin  chewable 81 milliGRAM(s) Oral daily  atorvastatin 80 milliGRAM(s) Oral at bedtime  clopidogrel Tablet 75 milliGRAM(s) Oral daily  dextrose 5%. 1000 milliLiter(s) (50 mL/Hr) IV Continuous <Continuous>  dextrose 5%. 1000 milliLiter(s) (100 mL/Hr) IV Continuous <Continuous>  dextrose 50% Injectable 25 Gram(s) IV Push once  dextrose 50% Injectable 12.5 Gram(s) IV Push once  dextrose 50% Injectable 25 Gram(s) IV Push once  enoxaparin Injectable 40 milliGRAM(s) SubCutaneous every 24 hours  glucagon  Injectable 1 milliGRAM(s) IntraMuscular once  insulin lispro (ADMELOG) corrective regimen sliding scale   SubCutaneous three times a day before meals  insulin lispro (ADMELOG) corrective regimen sliding scale   SubCutaneous at bedtime  polyethylene glycol 3350 17 Gram(s) Oral daily  potassium chloride  10 mEq/100 mL IVPB 10 milliEquivalent(s) IV Intermittent every 1 hour  QUEtiapine 12.5 milliGRAM(s) Oral at bedtime  sodium chloride 0.9%. 1000 milliLiter(s) (50 mL/Hr) IV Continuous <Continuous>    MEDICATIONS  (PRN):  dextrose Oral Gel 15 Gram(s) Oral once PRN Blood Glucose LESS THAN 70 milliGRAM(s)/deciliter    PHYSICAL EXAM:   Vital Signs Last 24 Hrs  T(C): 37.2 (09 Jan 2023 08:00), Max: 37.4 (09 Jan 2023 00:00)  T(F): 98.9 (09 Jan 2023 08:00), Max: 99.3 (09 Jan 2023 00:00)  HR: 103 (09 Jan 2023 08:00) (86 - 110)  BP: 155/106 (09 Jan 2023 08:00) (141/63 - 197/98)  BP(mean): 120 (09 Jan 2023 08:00) (75 - 127)  RR: 20 (09 Jan 2023 08:00) (17 - 23)  SpO2: 100% (09 Jan 2023 08:00) (96% - 100%)    General: No acute distress  HEENT: EOM intact, blinks to threat B/L  Abdomen: Soft, nontender, nondistended   Extremities: No edema    NEUROLOGICAL EXAM: son in law at bedside ([facility] language)  Mental status: awake, alert, attempts to produce speech, able to follow some simple commands, able to mimic, unable to ID objects  Cranial Nerves: Right facial droop, no nystagmus, blinks to threat B/L  Motor exam: Normal tone, no drift, Right hemiparesis RUE drift, 4/5, RLE drift, 4/5,, LUE 5/5, LLE 5/5  Sensation: Intact to light touch   Coordination/ Gait: Unable to participate with exam     LABS:                               12.1   10.36 )-----------( 291      ( 09 Jan 2023 04:42 )             37.5     01-09    138  |  105  |  12  ----------------------------<  181<H>  3.3<L>   |  19<L>  |  0.56    Ca    9.4      09 Jan 2023 04:42      IMAGING: Reviewed by me.     Brain MRI (01/07/23): Left MCA territory acute infarcts. No acute intracranial hemorrhage    CT Head (01/06/23): No acute intracranial hemorrhage. Hypoattenuation of the left temporal lobe, may represent acute left MCA territory infarct.    CT/CTA head/neck (01/06/23): CTA Head & Neck: Severe stenosis/near complete occlusion with the left M2 branch.    CT Perfusion: There is a penumbra involving a large area of the left MCA territory.

## 2023-01-09 NOTE — DISCHARGE NOTE PROVIDER - CARE PROVIDERS DIRECT ADDRESSES
,[email or web address] ,[email or web address],[email or web address] ,[email or web address],[email or web address] ,[email or web address],DirectAddress_Unknown

## 2023-01-09 NOTE — DISCHARGE NOTE PROVIDER - NSDCFUADDAPPT_GEN_ALL_CORE_FT
Dr. Katherine Martini [address] [facility], [address], [place] [address]. [phone]   Dr. Katherine Martini [address] [facility], [address], [place] [address]. [phone]   Dr. Katherine Martini [address] [facility], [address], [place] [address]. [phone]

## 2023-01-09 NOTE — CONSULT NOTE ADULT - ASSESSMENT
Patient is a 78 F with no reported history presented to [facility] for right facial droop, found to have CVA.  Endocrine consulted for new onset diabetes. Patient is high risk with high level decision making due to uncontrolled diabetes which places patient at high risk for cardiovascular and cerebrovascular events. Patient with lability of glucose requiring close monitoring and insulin adjustments.    1.  T2DM   - Most recent Hemoglobin A1C 8.6, goal 8% for this 78 year old woman   - Current FS ranges from 150--220  - Current diet: CHO, pureed  - Please monitor blood glucose values TID AC & QHS while eating regular meals and Q6H while NPO  - Blood glucose goals pre-meal less than 140 mg/dL and random blood glucose less than 180 mg/dL  - Recommendations:  - Glucose mostly at goal, hold off on standing insulin   - Continue with low dose  Correctional scale TID with meals  - Continue with low dose Correctional scale QHS    2. HTN  - BP goal 130/80  - Manage per primary team     3. HLD  - Continue with atorvastatin 80 mg daily   -   - Manage as outpatient      Discharge planning:  - Current home DM meds: none  - Discharge DM meds: Metformin 500 mg ER BID (with breakfast and dinner to reduce stomach upset). Discussed this with the patient's son who is a pharmacist.   - Patient can follow up at [address] [place] [address], [address]LO[address], [place] [address] [phone]  - Patient will need opthalmology and podiatry follow up as outpatient     Thank you for the consult. Will continue to monitor. Please inform the endocrinology team at least 24 hours prior to intended discharge date.     Contact via pager or [employer] Teams during business hours. For follow up questions, discharge recommendations, or new consults please call answering service at [phone] (weekdays), [phone] (nights/weekends). For nonurgent matters, please email  [email or web address].     Katherine Martini MD  Department of Endocrinology, Diabetes and metabolism   Pager [phone]   Patient is a 78 F with no reported history presented to [facility] for right facial droop, found to have CVA.  Endocrine consulted for new onset diabetes. Patient is high risk with high level decision making due to uncontrolled diabetes which places patient at high risk for cardiovascular and cerebrovascular events. Patient with lability of glucose requiring close monitoring and insulin adjustments.    1.  T2DM   - Most recent Hemoglobin A1C 8.6, goal 8% for this 78 year old woman   - Current FS ranges from 150--220  - Current diet: CHO, pureed  - Please monitor blood glucose values TID AC & QHS while eating regular meals and Q6H while NPO  - Blood glucose goals pre-meal less than 140 mg/dL and random blood glucose less than 180 mg/dL  - Recommendations:  - Glucose mostly at goal, hold off on standing insulin   - Continue with low dose  Correctional scale TID with meals  - Continue with low dose Correctional scale QHS    2. HTN  - BP goal 130/80  - Manage per primary team     3. HLD  - Continue with atorvastatin 80 mg daily   -   - Manage as outpatient      Discharge planning:  - Current home DM meds: none  - Discharge DM meds: Metformin 500 mg ER BID (with breakfast and dinner to reduce stomach upset). Discussed this with the patient's son who is a pharmacist.   - Patient can follow up at [address] [place] [address], [address]LO[address], [place] [address] [phone]  - Patient will need opthalmology and podiatry follow up as outpatient     Thank you for the consult. Will continue to monitor. Please inform the endocrinology team at least 24 hours prior to intended discharge date.     Contact via pager or [employer] Teams during business hours. For follow up questions, discharge recommendations, or new consults please call answering service at [phone] (weekdays), [phone] (nights/weekends). For nonurgent matters, please email  [email or web address].     Katherine Martini MD  Department of Endocrinology, Diabetes and metabolism   Pager [phone]   Patient is a 78 F with no reported history presented to [facility] for right facial droop, found to have CVA.  Endocrine consulted for new onset diabetes. Patient is high risk with high level decision making due to uncontrolled diabetes which places patient at high risk for cardiovascular and cerebrovascular events. Patient with lability of glucose requiring close monitoring and insulin adjustments.    1.  T2DM   - Most recent Hemoglobin A1C 8.6, goal 8% for this 78 year old woman   - Current FS ranges from 150--220  - Current diet: CHO, pureed  - Please monitor blood glucose values TID AC & QHS while eating regular meals and Q6H while NPO  - Blood glucose goals pre-meal less than 140 mg/dL and random blood glucose less than 180 mg/dL  - Recommendations:  - Glucose mostly at goal, hold off on standing insulin   - Continue with low dose  Correctional scale TID with meals  - Continue with low dose Correctional scale QHS    2. HTN  - BP goal 130/80  - Manage per primary team     3. HLD  - Continue with atorvastatin 80 mg daily   -   - Manage as outpatient      Discharge planning:  - Current home DM meds: none  - Discharge DM meds: Metformin 500 mg ER BID (with breakfast and dinner to reduce stomach upset). Discussed this with the patient's son who is a pharmacist.   - Patient can follow up at [address] [place] [address], [address], [place] [address] [phone]  - Patient will need opthalmology and podiatry follow up as outpatient     Thank you for the consult. Will continue to monitor. Please inform the endocrinology team at least 24 hours prior to intended discharge date.     Contact via pager or [employer] Teams during business hours. For follow up questions, discharge recommendations, or new consults please call answering service at [phone] (weekdays), [phone] (nights/weekends). For nonurgent matters, please email  [email or web address]     Katherine Martini MD  Department of Endocrinology, Diabetes and metabolism   Pager [phone]

## 2023-01-09 NOTE — CONSULT NOTE ADULT - SUBJECTIVE AND OBJECTIVE BOX
HPI: Patient is a 78 F with no reported history presented to [facility] for right facial droop, found to have CVA.  Endocrine consulted for new onset diabetes. Son at the bedside providing history of translation.    Diabetes history:  Never been told that she has diabetes. Son thinks this is stress induced diabetes like gestational diabetes.  Does not check finger sticks at home. Patient lives at home with the son. No family history of diabetes. Never had CAD, MI. Denies blurry vision, but with history of cataract surgery, unknown retinopathy. Denies numbness or tingliness in fingers or toes.   Outpatient Endo: none, has PCP Dr. Cruz, which the son prefers the patient to follow up with  Most recent A1C 8.6 01/07/2023    Home DM medications:  - None       Current inpatient DM Meds:   Low dose ISS     FH:  DM: denies    SH:  Smoking: denies  Etoh: denies  Recreational Drugs: denies       PAST MEDICAL & SURGICAL HISTORY:  Dementia      No significant past surgical history              Current Meds:  aspirin  chewable 81 milliGRAM(s) Oral daily  atorvastatin 80 milliGRAM(s) Oral at bedtime  clopidogrel Tablet 75 milliGRAM(s) Oral daily  dextrose 5%. 1000 milliLiter(s) IV Continuous <Continuous>  dextrose 5%. 1000 milliLiter(s) IV Continuous <Continuous>  dextrose 50% Injectable 25 Gram(s) IV Push once  dextrose 50% Injectable 12.5 Gram(s) IV Push once  dextrose 50% Injectable 25 Gram(s) IV Push once  dextrose Oral Gel 15 Gram(s) Oral once PRN  enoxaparin Injectable 40 milliGRAM(s) SubCutaneous every 24 hours  glucagon  Injectable 1 milliGRAM(s) IntraMuscular once  insulin lispro (ADMELOG) corrective regimen sliding scale   SubCutaneous three times a day before meals  insulin lispro (ADMELOG) corrective regimen sliding scale   SubCutaneous at bedtime  polyethylene glycol 3350 17 Gram(s) Oral daily  QUEtiapine 12.5 milliGRAM(s) Oral at bedtime  sodium chloride 0.9%. 1000 milliLiter(s) IV Continuous <Continuous>      Allergies:  Benedryl Allergy Sinus (Hives)  penicillin (Rash)      ROS:     General: Denies decreased appetite, fatigue  Eyes: Denies Blurry vision, double vision, visual changes  ENT: Denies Throat pain, changes in voice,   CV: Denies palpitations  Resp: Denies SOB, CP, cough  GI: Denies NVD, difficulty swallowing, abdominal pain  : Denies polyuria, dysuria  MSK: Denies weakness, joint pain  Skin: Denies rash, dryness, diaphoresis  Heme: Denies Easy bruising or bleeding  Neuro: Denies HA, dizziness, lightheadedness, numbness tingling  Psych: Denies Anxiety, Depression    Vital Signs Last 24 Hrs  T(C): 37.2 (09 Jan 2023 08:00), Max: 37.4 (09 Jan 2023 00:00)  T(F): 98.9 (09 Jan 2023 08:00), Max: 99.3 (09 Jan 2023 00:00)  HR: 88 (09 Jan 2023 14:00) (86 - 110)  BP: 191/91 (09 Jan 2023 14:00) (141/63 - 191/91)  BP(mean): 117 (09 Jan 2023 14:00) (75 - 120)  RR: 23 (09 Jan 2023 14:00) (18 - 24)  SpO2: 97% (09 Jan 2023 14:00) (96% - 100%)    Parameters below as of 09 Jan 2023 14:00  Patient On (Oxygen Delivery Method): room air      Height (cm): 157.5 (01-06 @ 22:50)  Weight (kg): 54.4 (01-06 @ 22:50)  BMI (kg/m2): 21.9 (01-06 @ 22:50)      Constitutional: wn/wd in NAD.   HEENT: no proptosis or lid retraction  Neck: no thyromegaly   Respiratory: on room air, non labored breathing  Cardiovascular: no peripheral edema  GI: non diatended   Neurology: no tremors   Skin: no visible rashes/lesions  Psychiatric: AAO x 2, normal affect/mood.  Ext: extremities warm, no cyanosis, clubbing or edema.       LABS:                        12.1   10.36 )-----------( 291      ( 09 Jan 2023 04:42 )             37.5     01-09    138  |  105  |  12  ----------------------------<  181<H>  3.3<L>   |  19<L>  |  0.56    Ca    9.4      09 Jan 2023 04:42                RADIOLOGY & ADDITIONAL STUDIES:  CAPILLARY BLOOD GLUCOSE      POCT Blood Glucose.: 154 mg/dL (09 Jan 2023 11:56)  POCT Blood Glucose.: 224 mg/dL (09 Jan 2023 08:12)  POCT Blood Glucose.: 188 mg/dL (08 Jan 2023 21:23)  POCT Blood Glucose.: 118 mg/dL (08 Jan 2023 16:37)       HPI: Patient is a 78 F with no reported history presented to [facility] for right facial droop, found to have CVA.  Endocrine consulted for new onset diabetes. Son at the bedside providing history of translation.    Diabetes history:  Never been told that she has diabetes. Son thinks this is stress induced diabetes like gestational diabetes.  Does not check finger sticks at home. Patient lives at home with the son. No family history of diabetes. Never had CAD, MI. Denies blurry vision, but with history of cataract surgery, unknown retinopathy. Denies numbness or tingliness in fingers or toes.   Outpatient Endo: none, has PCP Dr. Cruz, which the son prefers the patient to follow up with  Most recent A1C 8.6 01/07/2023    Home DM medications:  - None       Current inpatient DM Meds:   Low dose ISS     FH:  DM: denies    SH:  Smoking: denies  Etoh: denies  Recreational Drugs: denies       PAST MEDICAL & SURGICAL HISTORY:  Dementia      No significant past surgical history              Current Meds:  aspirin  chewable 81 milliGRAM(s) Oral daily  atorvastatin 80 milliGRAM(s) Oral at bedtime  clopidogrel Tablet 75 milliGRAM(s) Oral daily  dextrose 5%. 1000 milliLiter(s) IV Continuous <Continuous>  dextrose 5%. 1000 milliLiter(s) IV Continuous <Continuous>  dextrose 50% Injectable 25 Gram(s) IV Push once  dextrose 50% Injectable 12.5 Gram(s) IV Push once  dextrose 50% Injectable 25 Gram(s) IV Push once  dextrose Oral Gel 15 Gram(s) Oral once PRN  enoxaparin Injectable 40 milliGRAM(s) SubCutaneous every 24 hours  glucagon  Injectable 1 milliGRAM(s) IntraMuscular once  insulin lispro (ADMELOG) corrective regimen sliding scale   SubCutaneous three times a day before meals  insulin lispro (ADMELOG) corrective regimen sliding scale   SubCutaneous at bedtime  polyethylene glycol 3350 17 Gram(s) Oral daily  QUEtiapine 12.5 milliGRAM(s) Oral at bedtime  sodium chloride 0.9%. 1000 milliLiter(s) IV Continuous <Continuous>      Allergies:  Benedryl Allergy Sinus (Hives)  penicillin (Rash)      ROS:     General: Denies decreased appetite, fatigue  Eyes: Denies Blurry vision, double vision, visual changes  ENT: Denies Throat pain, changes in voice,   CV: Denies palpitations  Resp: Denies SOB, CP, cough  GI: Denies NVD, difficulty swallowing, abdominal pain  : Denies polyuria, dysuria  MSK: Denies weakness, joint pain  Skin: Denies rash, dryness, diaphoresis  Heme: Denies Easy bruising or bleeding  Neuro: Denies HA, dizziness, lightheadedness, numbness tingling  Psych: Denies Anxiety, Depression    Vital Signs Last 24 Hrs  T(C): 37.2 (09 Jan 2023 08:00), Max: 37.4 (09 Jan 2023 00:00)  T(F): 98.9 (09 Jan 2023 08:00), Max: 99.3 (09 Jan 2023 00:00)  HR: 88 (09 Jan 2023 14:00) (86 - 110)  BP: 191/91 (09 Jan 2023 14:00) (141/63 - 191/91)  BP(mean): 117 (09 Jan 2023 14:00) (75 - 120)  RR: 23 (09 Jan 2023 14:00) (18 - 24)  SpO2: 97% (09 Jan 2023 14:00) (96% - 100%)    Parameters below as of 09 Jan 2023 14:00  Patient On (Oxygen Delivery Method): room air      Height (cm): 157.5 (01-06 @ 22:50)  Weight (kg): 54.4 (01-06 @ 22:50)  BMI (kg/m2): 21.9 (01-06 @ 22:50)      Constitutional: wn/wd in NAD.   HEENT: no proptosis or lid retraction  Neck: no thyromegaly   Respiratory: on room air, non labored breathing  Cardiovascular: no peripheral edema  GI: non diatended   Neurology: no tremors   Skin: no visible rashes/lesions  Psychiatric: AAO x 2, normal affect/mood.  Ext: extremities warm, no cyanosis, clubbing or edema.       LABS:                        12.1   10.36 )-----------( 291      ( 09 Jan 2023 04:42 )             37.5     01-09    138  |  105  |  12  ----------------------------<  181<H>  3.3<L>   |  19<L>  |  0.56    Ca    9.4      09 Jan 2023 04:42                RADIOLOGY & ADDITIONAL STUDIES:  CAPILLARY BLOOD GLUCOSE      POCT Blood Glucose.: 154 mg/dL (09 Jan 2023 11:56)  POCT Blood Glucose.: 224 mg/dL (09 Jan 2023 08:12)  POCT Blood Glucose.: 188 mg/dL (08 Jan 2023 21:23)  POCT Blood Glucose.: 118 mg/dL (08 Jan 2023 16:37)       HPI: Patient is a 78 F with no reported history presented to [facility] for right facial droop, found to have CVA.  Endocrine consulted for new onset diabetes. Son at the bedside providing history of translation.    Diabetes history:  Never been told that she has diabetes. Son thinks this is stress induced diabetes like gestational diabetes.  Does not check finger sticks at home. Patient lives at home with the son. No family history of diabetes. Never had CAD, MI. Denies blurry vision, but with history of cataract surgery, unknown retinopathy. Denies numbness or tingliness in fingers or toes.   Outpatient Endo: none, has PCP Dr. Cruz, which the son prefers the patient to follow up with  Most recent A1C 8.6 01/07/2023    Home DM medications:  - None       Current inpatient DM Meds:   Low dose ISS     FH:  DM: denies    SH:  Smoking: denies  Etoh: denies  Recreational Drugs: denies       PAST MEDICAL & SURGICAL HISTORY:  Dementia      No significant past surgical history              Current Meds:  aspirin  chewable 81 milliGRAM(s) Oral daily  atorvastatin 80 milliGRAM(s) Oral at bedtime  clopidogrel Tablet 75 milliGRAM(s) Oral daily  dextrose 5%. 1000 milliLiter(s) IV Continuous <Continuous>  dextrose 5%. 1000 milliLiter(s) IV Continuous <Continuous>  dextrose 50% Injectable 25 Gram(s) IV Push once  dextrose 50% Injectable 12.5 Gram(s) IV Push once  dextrose 50% Injectable 25 Gram(s) IV Push once  dextrose Oral Gel 15 Gram(s) Oral once PRN  enoxaparin Injectable 40 milliGRAM(s) SubCutaneous every 24 hours  glucagon  Injectable 1 milliGRAM(s) IntraMuscular once  insulin lispro (ADMELOG) corrective regimen sliding scale   SubCutaneous three times a day before meals  insulin lispro (ADMELOG) corrective regimen sliding scale   SubCutaneous at bedtime  polyethylene glycol 3350 17 Gram(s) Oral daily  QUEtiapine 12.5 milliGRAM(s) Oral at bedtime  sodium chloride 0.9%. 1000 milliLiter(s) IV Continuous <Continuous>      Allergies:  Benedryl Allergy Sinus (Hives)  penicillin (Rash)      ROS:     General: Denies decreased appetite, fatigue  Eyes: Denies Blurry vision, double vision, visual changes  ENT: Denies Throat pain, changes in voice,   CV: Denies palpitations  Resp: Denies SOB, CP, cough  GI: Denies NVD, difficulty swallowing, abdominal pain  : Denies polyuria, dysuria  MSK: Denies weakness, joint pain  Skin: Denies rash, dryness, diaphoresis  Heme: Denies Easy bruising or bleeding  Neuro: Denies HA, dizziness, lightheadedness, numbness tingling  Psych: Denies Anxiety, Depression    Vital Signs Last 24 Hrs  T(C): 37.2 (09 Jan 2023 08:00), Max: 37.4 (09 Jan 2023 00:00)  T(F): 98.9 (09 Jan 2023 08:00), Max: 99.3 (09 Jan 2023 00:00)  HR: 88 (09 Jan 2023 14:00) (86 - 110)  BP: 191/91 (09 Jan 2023 14:00) (141/63 - 191/91)  BP(mean): 117 (09 Jan 2023 14:00) (75 - 120)  RR: 23 (09 Jan 2023 14:00) (18 - 24)  SpO2: 97% (09 Jan 2023 14:00) (96% - 100%)    Parameters below as of 09 Jan 2023 14:00  Patient On (Oxygen Delivery Method): room air      Height (cm): 157.5 (01-06 @ 22:50)  Weight (kg): 54.4 (01-06 @ 22:50)  BMI (kg/m2): 21.9 (01-06 @ 22:50)      Constitutional: wn/wd in NAD.   HEENT: no proptosis or lid retraction  Neck: no thyromegaly   Respiratory: on room air, non labored breathing  Cardiovascular: no peripheral edema  GI: non diatended   Neurology: no tremors   Skin: no visible rashes/lesions  Psychiatric: AAO x 2, normal affect/mood.  Ext: extremities warm, no cyanosis, clubbing or edema.       LABS:                        12.1   10.36 )-----------( 291      ( 09 Jan 2023 04:42 )             37.5     01-09    138  |  105  |  12  ----------------------------<  181<H>  3.3<L>   |  19<L>  |  0.56    Ca    9.4      09 Jan 2023 04:42                RADIOLOGY & ADDITIONAL STUDIES:  CAPILLARY BLOOD GLUCOSE      POCT Blood Glucose.: 154 mg/dL (09 Jan 2023 11:56)  POCT Blood Glucose.: 224 mg/dL (09 Jan 2023 08:12)  POCT Blood Glucose.: 188 mg/dL (08 Jan 2023 21:23)  POCT Blood Glucose.: 118 mg/dL (08 Jan 2023 16:37)

## 2023-01-09 NOTE — CONSULT NOTE ADULT - SUBJECTIVE AND OBJECTIVE BOX
Patient is a 78y old  Female who presents with a chief complaint of Acute ischemic stroke (09 Jan 2023 08:50)    Admission HPI:  78 year-old right-handed female w/ PMHx ?dementia (AOx1-2, performs all ADLs), otherwise reportedly no other PMHx, presenting to [facility] ED w/ right facial droop that started on 1/2/23. On day of arrival, 1/6/23, around 15:30PM, patient noted to have unsteady gait and language abnormalities (not speaking, difficulty understanding), whereas these symptoms were apparently not present just before 15:30PM. (07 Jan 2023 01:33)    Interval History:  Patient had imaging:  CT Brain Stroke Protocol (01.06.23 @ 23:14) >  No acute intracranial hemorrhage.  Hypoattenuation of the left temporal lobe, may represent acute left MCA   territory infarct.    MR Head No Cont (01.07.23 @ 13:40) >  Left MCA territory acute infarcts.  No acute intracranial hemorrhage    REVIEW OF SYSTEMS: No chest pain, shortness of breath, nausea, vomiting or diarhea; other ROS neg     PAST MEDICAL & SURGICAL HISTORY  Dementia  No significant past surgical history    FUNCTIONAL HISTORY:   Lives w very supportive family in home w 4-5 YADI  PTA Independent    CURRENT FUNCTIONAL STATUS:  Min A transfers and gait    FAMILY HISTORY   No pertinent family history in first degree relatives    MEDICATIONS   aspirin  chewable 81 milliGRAM(s) Oral daily  atorvastatin 80 milliGRAM(s) Oral at bedtime  clopidogrel Tablet 75 milliGRAM(s) Oral daily  dextrose 5%. 1000 milliLiter(s) IV Continuous <Continuous>  dextrose 5%. 1000 milliLiter(s) IV Continuous <Continuous>  dextrose 50% Injectable 25 Gram(s) IV Push once  dextrose 50% Injectable 12.5 Gram(s) IV Push once  dextrose 50% Injectable 25 Gram(s) IV Push once  dextrose Oral Gel 15 Gram(s) Oral once PRN  enoxaparin Injectable 40 milliGRAM(s) SubCutaneous every 24 hours  glucagon  Injectable 1 milliGRAM(s) IntraMuscular once  insulin lispro (ADMELOG) corrective regimen sliding scale   SubCutaneous three times a day before meals  insulin lispro (ADMELOG) corrective regimen sliding scale   SubCutaneous at bedtime  polyethylene glycol 3350 17 Gram(s) Oral daily  QUEtiapine 25 milliGRAM(s) Oral at bedtime  sodium chloride 0.9%. 1000 milliLiter(s) IV Continuous <Continuous>    ALLERGIES  Benedryl Allergy Sinus (Hives)  penicillin (Rash)    VITALS  T(C): 37.2 (01-09-23 @ 08:00), Max: 37.4 (01-09-23 @ 00:00)  HR: 99 (01-09-23 @ 12:00) (86 - 110)  BP: 160/76 (01-09-23 @ 12:00) (141/63 - 197/98)  RR: 20 (01-09-23 @ 12:00) (18 - 24)  SpO2: 98% (01-09-23 @ 12:00) (96% - 100%)  Wt(kg): --    PHYSICAL EXAM  Constitutional - NAD, Comfortable  HEENT - NCAT, EOMI  Neck - Supple, No limited ROM  Chest - CTA bilaterally, No wheeze, No rhonchi, No crackles  Cardiovascular - RRR, S1S2, No murmurs  Abdomen - BS+, Soft, NTND  Extremities - No C/C/E, No calf tenderness   Neurologic Exam -                    Cognitive - Awake, Alert, AAO to self, place, date, year, situation     Communication - Fluent, No dysarthria, no aphasia     Cranial Nerves - CN 2-12 intact     Motor - No focal deficits      Sensory - Intact to LT     Reflexes - DTR Intact, No primitive reflexive  Psychiatric - Mood stable, Affect WNL    RECENT LABS/IMAGING  CBC Full  -  ( 09 Jan 2023 04:42 )  WBC Count : 10.36 K/uL  RBC Count : 4.40 M/uL  Hemoglobin : 12.1 g/dL  Hematocrit : 37.5 %  Platelet Count - Automated : 291 K/uL  Mean Cell Volume : 85.2 fl  Mean Cell Hemoglobin : 27.5 pg  Mean Cell Hemoglobin Concentration : 32.3 gm/dL  Auto Neutrophil # : x  Auto Lymphocyte # : x  Auto Monocyte # : x  Auto Eosinophil # : x  Auto Basophil # : x  Auto Neutrophil % : x  Auto Lymphocyte % : x  Auto Monocyte % : x  Auto Eosinophil % : x  Auto Basophil % : x    01-09    138  |  105  |  12  ----------------------------<  181<H>  3.3<L>   |  19<L>  |  0.56    Ca    9.4      09 Jan 2023 04:42    Impression:  77 yo with functional deficits secondary to diagnosis of CVA    Plan:  PT- ROM, Bed Mob, Transfers, Amb w AD and bracing as needed  OT- ADLs, bracing  SLP- Dysphagia eval and treat  Prec- Falls, Cardiac  DVT Prophylaxis- Lovenox  Skin- Turn q2 h  Dispo-  Patient is a 78y old  Female who presents with a chief complaint of Acute ischemic stroke (09 Jan 2023 08:50)    Admission HPI:  78 year-old right-handed female w/ PMHx ?dementia (AOx1-2, performs all ADLs), otherwise reportedly no other PMHx, presenting to [facility] ED w/ right facial droop that started on 1/2/23. On day of arrival, 1/6/23, around 15:30PM, patient noted to have unsteady gait and language abnormalities (not speaking, difficulty understanding), whereas these symptoms were apparently not present just before 15:30PM. (07 Jan 2023 01:33)    Interval History:  Patient had imaging:  CT Brain Stroke Protocol (01.06.23 @ 23:14) >  No acute intracranial hemorrhage.  Hypoattenuation of the left temporal lobe, may represent acute left MCA   territory infarct.    MR Head No Cont (01.07.23 @ 13:40) >  Left MCA territory acute infarcts.  No acute intracranial hemorrhage    REVIEW OF SYSTEMS: No chest pain, shortness of breath, nausea, vomiting or diarhea; other ROS neg     PAST MEDICAL & SURGICAL HISTORY  Dementia  No significant past surgical history    FUNCTIONAL HISTORY:   Lives w very supportive family in home w 4-5 YADI  PTA Independent    CURRENT FUNCTIONAL STATUS:  Min A transfers and gait    FAMILY HISTORY   No pertinent family history in first degree relatives    MEDICATIONS   aspirin  chewable 81 milliGRAM(s) Oral daily  atorvastatin 80 milliGRAM(s) Oral at bedtime  clopidogrel Tablet 75 milliGRAM(s) Oral daily  dextrose 5%. 1000 milliLiter(s) IV Continuous <Continuous>  dextrose 5%. 1000 milliLiter(s) IV Continuous <Continuous>  dextrose 50% Injectable 25 Gram(s) IV Push once  dextrose 50% Injectable 12.5 Gram(s) IV Push once  dextrose 50% Injectable 25 Gram(s) IV Push once  dextrose Oral Gel 15 Gram(s) Oral once PRN  enoxaparin Injectable 40 milliGRAM(s) SubCutaneous every 24 hours  glucagon  Injectable 1 milliGRAM(s) IntraMuscular once  insulin lispro (ADMELOG) corrective regimen sliding scale   SubCutaneous three times a day before meals  insulin lispro (ADMELOG) corrective regimen sliding scale   SubCutaneous at bedtime  polyethylene glycol 3350 17 Gram(s) Oral daily  QUEtiapine 25 milliGRAM(s) Oral at bedtime  sodium chloride 0.9%. 1000 milliLiter(s) IV Continuous <Continuous>    ALLERGIES  Benedryl Allergy Sinus (Hives)  penicillin (Rash)    VITALS  T(C): 37.2 (01-09-23 @ 08:00), Max: 37.4 (01-09-23 @ 00:00)  HR: 99 (01-09-23 @ 12:00) (86 - 110)  BP: 160/76 (01-09-23 @ 12:00) (141/63 - 197/98)  RR: 20 (01-09-23 @ 12:00) (18 - 24)  SpO2: 98% (01-09-23 @ 12:00) (96% - 100%)  Wt(kg): --    PHYSICAL EXAM  Constitutional - NAD, Comfortable  HEENT - NCAT, EOMI  Neck - Supple, No limited ROM  Chest - CTA bilaterally, No wheeze, No rhonchi, No crackles  Cardiovascular - RRR, S1S2, No murmurs  Abdomen - BS+, Soft, NTND  Extremities - No C/C/E, No calf tenderness   Neurologic Exam -                    Cognitive - Awake, Alert, AAO to self, place, date, year, situation     Communication - Fluent, No dysarthria, no aphasia     Cranial Nerves - CN 2-12 intact     Motor - No focal deficits      Sensory - Intact to LT     Reflexes - DTR Intact, No primitive reflexive  Psychiatric - Mood stable, Affect WNL    RECENT LABS/IMAGING  CBC Full  -  ( 09 Jan 2023 04:42 )  WBC Count : 10.36 K/uL  RBC Count : 4.40 M/uL  Hemoglobin : 12.1 g/dL  Hematocrit : 37.5 %  Platelet Count - Automated : 291 K/uL  Mean Cell Volume : 85.2 fl  Mean Cell Hemoglobin : 27.5 pg  Mean Cell Hemoglobin Concentration : 32.3 gm/dL  Auto Neutrophil # : x  Auto Lymphocyte # : x  Auto Monocyte # : x  Auto Eosinophil # : x  Auto Basophil # : x  Auto Neutrophil % : x  Auto Lymphocyte % : x  Auto Monocyte % : x  Auto Eosinophil % : x  Auto Basophil % : x    01-09    138  |  105  |  12  ----------------------------<  181<H>  3.3<L>   |  19<L>  |  0.56    Ca    9.4      09 Jan 2023 04:42    Impression:  77 yo with functional deficits secondary to diagnosis of CVA    Plan:  PT- ROM, Bed Mob, Transfers, Amb w AD and bracing as needed  OT- ADLs, bracing  SLP- Dysphagia eval and treat  Prec- Falls, Cardiac  DVT Prophylaxis- Lovenox  Skin- Turn q2 h  Dispo-  Patient is a 78y old  Female who presents with a chief complaint of Acute ischemic stroke (09 Jan 2023 08:50)    Admission HPI:  78 year-old right-handed female w/ PMHx ?dementia (AOx1-2, performs all ADLs), otherwise reportedly no other PMHx, presenting to [facility] ED w/ right facial droop that started on 1/2/23. On day of arrival, 1/6/23, around 15:30PM, patient noted to have unsteady gait and language abnormalities (not speaking, difficulty understanding), whereas these symptoms were apparently not present just before 15:30PM. (07 Jan 2023 01:33)    Interval History:  Patient had imaging:  CT Brain Stroke Protocol (01.06.23 @ 23:14) >  No acute intracranial hemorrhage.  Hypoattenuation of the left temporal lobe, may represent acute left MCA   territory infarct.    MR Head No Cont (01.07.23 @ 13:40) >  Left MCA territory acute infarcts.  No acute intracranial hemorrhage    REVIEW OF SYSTEMS: No chest pain, shortness of breath, nausea, vomiting or diarhea; other ROS neg     PAST MEDICAL & SURGICAL HISTORY  Dementia  No significant past surgical history    FUNCTIONAL HISTORY:   Lives w very supportive family in home w 4-5 YADI  PTA Independent    CURRENT FUNCTIONAL STATUS:  Min A transfers and gait    FAMILY HISTORY   No pertinent family history in first degree relatives    MEDICATIONS   aspirin  chewable 81 milliGRAM(s) Oral daily  atorvastatin 80 milliGRAM(s) Oral at bedtime  clopidogrel Tablet 75 milliGRAM(s) Oral daily  dextrose 5%. 1000 milliLiter(s) IV Continuous <Continuous>  dextrose 5%. 1000 milliLiter(s) IV Continuous <Continuous>  dextrose 50% Injectable 25 Gram(s) IV Push once  dextrose 50% Injectable 12.5 Gram(s) IV Push once  dextrose 50% Injectable 25 Gram(s) IV Push once  dextrose Oral Gel 15 Gram(s) Oral once PRN  enoxaparin Injectable 40 milliGRAM(s) SubCutaneous every 24 hours  glucagon  Injectable 1 milliGRAM(s) IntraMuscular once  insulin lispro (ADMELOG) corrective regimen sliding scale   SubCutaneous three times a day before meals  insulin lispro (ADMELOG) corrective regimen sliding scale   SubCutaneous at bedtime  polyethylene glycol 3350 17 Gram(s) Oral daily  QUEtiapine 25 milliGRAM(s) Oral at bedtime  sodium chloride 0.9%. 1000 milliLiter(s) IV Continuous <Continuous>    ALLERGIES  Benedryl Allergy Sinus (Hives)  penicillin (Rash)    VITALS  T(C): 37.2 (01-09-23 @ 08:00), Max: 37.4 (01-09-23 @ 00:00)  HR: 99 (01-09-23 @ 12:00) (86 - 110)  BP: 160/76 (01-09-23 @ 12:00) (141/63 - 197/98)  RR: 20 (01-09-23 @ 12:00) (18 - 24)  SpO2: 98% (01-09-23 @ 12:00) (96% - 100%)  Wt(kg): --    PHYSICAL EXAM  Constitutional - NAD, Comfortable  HEENT - NCAT, EOMI  Neck - Supple, No limited ROM  Chest - CTA bilaterally, No wheeze, No rhonchi, No crackles  Cardiovascular - RRR, S1S2, No murmurs  Abdomen - BS+, Soft, NTND  Extremities - No C/C/E, No calf tenderness   Neurologic Exam -                    Cognitive - Awake, Alert, AAO to self, place, date, year, situation     Communication - Fluent, No dysarthria, no aphasia     Cranial Nerves - CN 2-12 intact     Motor - No focal deficits      Sensory - Intact to LT     Reflexes - DTR Intact, No primitive reflexive  Psychiatric - Mood stable, Affect WNL    RECENT LABS/IMAGING  CBC Full  -  ( 09 Jan 2023 04:42 )  WBC Count : 10.36 K/uL  RBC Count : 4.40 M/uL  Hemoglobin : 12.1 g/dL  Hematocrit : 37.5 %  Platelet Count - Automated : 291 K/uL  Mean Cell Volume : 85.2 fl  Mean Cell Hemoglobin : 27.5 pg  Mean Cell Hemoglobin Concentration : 32.3 gm/dL  Auto Neutrophil # : x  Auto Lymphocyte # : x  Auto Monocyte # : x  Auto Eosinophil # : x  Auto Basophil # : x  Auto Neutrophil % : x  Auto Lymphocyte % : x  Auto Monocyte % : x  Auto Eosinophil % : x  Auto Basophil % : x    01-09    138  |  105  |  12  ----------------------------<  181<H>  3.3<L>   |  19<L>  |  0.56    Ca    9.4      09 Jan 2023 04:42    Impression:  77 yo with functional deficits secondary to diagnosis of CVA    Plan:  PT- ROM, Bed Mob, Transfers, Amb w AD and bracing as needed  OT- ADLs, bracing  SLP- Dysphagia eval and treat  Prec- Falls, Cardiac  DVT Prophylaxis- Lovenox  Skin- Turn q2 h  Dispo-  Patient is a 78y old  Female who presents with a chief complaint of Acute ischemic stroke (09 Jan 2023 08:50)    Admission HPI:  78 year-old right-handed female w/ PMHx ?dementia (AOx1-2, performs all ADLs), otherwise reportedly no other PMHx, presenting to [facility] ED w/ right facial droop that started on 1/2/23. On day of arrival, 1/6/23, around 15:30PM, patient noted to have unsteady gait and language abnormalities (not speaking, difficulty understanding), whereas these symptoms were apparently not present just before 15:30PM. (07 Jan 2023 01:33)    Interval History:  Patient had imaging:  CT Brain Stroke Protocol (01.06.23 @ 23:14) >  No acute intracranial hemorrhage.  Hypoattenuation of the left temporal lobe, may represent acute left MCA   territory infarct.    MR Head No Cont (01.07.23 @ 13:40) >  Left MCA territory acute infarcts.  No acute intracranial hemorrhage    Son at bedside to provide interpretation.    REVIEW OF SYSTEMS: Per son she has been communicating appropriately and w/o complaints- at time of evaluation he stated she was tired. No chest pain, shortness of breath, nausea, vomiting or diarhea; other ROS neg     PAST MEDICAL & SURGICAL HISTORY  Dementia  No significant past surgical history    FUNCTIONAL HISTORY:   Lives w very supportive family in home w 4-5 YADI  PTA Independent    CURRENT FUNCTIONAL STATUS:  Min A transfers and gait    FAMILY HISTORY   No pertinent family history in first degree relatives    MEDICATIONS   aspirin  chewable 81 milliGRAM(s) Oral daily  atorvastatin 80 milliGRAM(s) Oral at bedtime  clopidogrel Tablet 75 milliGRAM(s) Oral daily  dextrose 5%. 1000 milliLiter(s) IV Continuous <Continuous>  dextrose 5%. 1000 milliLiter(s) IV Continuous <Continuous>  dextrose 50% Injectable 25 Gram(s) IV Push once  dextrose 50% Injectable 12.5 Gram(s) IV Push once  dextrose 50% Injectable 25 Gram(s) IV Push once  dextrose Oral Gel 15 Gram(s) Oral once PRN  enoxaparin Injectable 40 milliGRAM(s) SubCutaneous every 24 hours  glucagon  Injectable 1 milliGRAM(s) IntraMuscular once  insulin lispro (ADMELOG) corrective regimen sliding scale   SubCutaneous three times a day before meals  insulin lispro (ADMELOG) corrective regimen sliding scale   SubCutaneous at bedtime  polyethylene glycol 3350 17 Gram(s) Oral daily  QUEtiapine 25 milliGRAM(s) Oral at bedtime  sodium chloride 0.9%. 1000 milliLiter(s) IV Continuous <Continuous>    ALLERGIES  Benedryl Allergy Sinus (Hives)  penicillin (Rash)    VITALS  T(C): 37.2 (01-09-23 @ 08:00), Max: 37.4 (01-09-23 @ 00:00)  HR: 99 (01-09-23 @ 12:00) (86 - 110)  BP: 160/76 (01-09-23 @ 12:00) (141/63 - 197/98)  RR: 20 (01-09-23 @ 12:00) (18 - 24)  SpO2: 98% (01-09-23 @ 12:00) (96% - 100%)  Wt(kg): --    PHYSICAL EXAM  Constitutional - NAD, Comfortable  HEENT - NCAT, EOMI  Neck - Supple, No limited ROM  Chest - CTA bilaterally, No wheeze, No rhonchi, No crackles  Cardiovascular - RRR, S1S2, No murmurs  Abdomen - BS+, Soft, NTND  Extremities - No C/C/E, No calf tenderness   Neurologic Exam -                 Alert  Delayed processing  Antigravity x 4  Not fully following verbal instruction w some mixed aphasia    Psychiatric - Mood stable, Affect WNL    RECENT LABS/IMAGING  CBC Full  -  ( 09 Jan 2023 04:42 )  WBC Count : 10.36 K/uL  RBC Count : 4.40 M/uL  Hemoglobin : 12.1 g/dL  Hematocrit : 37.5 %  Platelet Count - Automated : 291 K/uL  Mean Cell Volume : 85.2 fl  Mean Cell Hemoglobin : 27.5 pg  Mean Cell Hemoglobin Concentration : 32.3 gm/dL  Auto Neutrophil # : x  Auto Lymphocyte # : x  Auto Monocyte # : x  Auto Eosinophil # : x  Auto Basophil # : x  Auto Neutrophil % : x  Auto Lymphocyte % : x  Auto Monocyte % : x  Auto Eosinophil % : x  Auto Basophil % : x    01-09    138  |  105  |  12  ----------------------------<  181<H>  3.3<L>   |  19<L>  |  0.56    Ca    9.4      09 Jan 2023 04:42    Impression:  79 yo with functional deficits secondary to diagnosis of CVA    Plan:  PT- ROM, Bed Mob, Transfers, Amb w AD and bracing as needed  OT- ADLs, bracing  SLP- Dysphagia eval and treat  Prec- Falls, Cardiac  DVT Prophylaxis- Lovenox  Skin- Turn q2 h  Dispo- Acute Rehab- can tolerate 3h/d of therapies and requires daily physician visits   Patient is a 78y old  Female who presents with a chief complaint of Acute ischemic stroke (09 Jan 2023 08:50)    Admission HPI:  78 year-old right-handed female w/ PMHx ?dementia (AOx1-2, performs all ADLs), otherwise reportedly no other PMHx, presenting to [facility] ED w/ right facial droop that started on 1/2/23. On day of arrival, 1/6/23, around 15:30PM, patient noted to have unsteady gait and language abnormalities (not speaking, difficulty understanding), whereas these symptoms were apparently not present just before 15:30PM. (07 Jan 2023 01:33)    Interval History:  Patient had imaging:  CT Brain Stroke Protocol (01.06.23 @ 23:14) >  No acute intracranial hemorrhage.  Hypoattenuation of the left temporal lobe, may represent acute left MCA   territory infarct.    MR Head No Cont (01.07.23 @ 13:40) >  Left MCA territory acute infarcts.  No acute intracranial hemorrhage    Son at bedside to provide interpretation.    REVIEW OF SYSTEMS: Per son she has been communicating appropriately and w/o complaints- at time of evaluation he stated she was tired. No chest pain, shortness of breath, nausea, vomiting or diarhea; other ROS neg     PAST MEDICAL & SURGICAL HISTORY  Dementia  No significant past surgical history    FUNCTIONAL HISTORY:   Lives w very supportive family in home w 4-5 YADI  PTA Independent    CURRENT FUNCTIONAL STATUS:  Min A transfers and gait    FAMILY HISTORY   No pertinent family history in first degree relatives    MEDICATIONS   aspirin  chewable 81 milliGRAM(s) Oral daily  atorvastatin 80 milliGRAM(s) Oral at bedtime  clopidogrel Tablet 75 milliGRAM(s) Oral daily  dextrose 5%. 1000 milliLiter(s) IV Continuous <Continuous>  dextrose 5%. 1000 milliLiter(s) IV Continuous <Continuous>  dextrose 50% Injectable 25 Gram(s) IV Push once  dextrose 50% Injectable 12.5 Gram(s) IV Push once  dextrose 50% Injectable 25 Gram(s) IV Push once  dextrose Oral Gel 15 Gram(s) Oral once PRN  enoxaparin Injectable 40 milliGRAM(s) SubCutaneous every 24 hours  glucagon  Injectable 1 milliGRAM(s) IntraMuscular once  insulin lispro (ADMELOG) corrective regimen sliding scale   SubCutaneous three times a day before meals  insulin lispro (ADMELOG) corrective regimen sliding scale   SubCutaneous at bedtime  polyethylene glycol 3350 17 Gram(s) Oral daily  QUEtiapine 25 milliGRAM(s) Oral at bedtime  sodium chloride 0.9%. 1000 milliLiter(s) IV Continuous <Continuous>    ALLERGIES  Benedryl Allergy Sinus (Hives)  penicillin (Rash)    VITALS  T(C): 37.2 (01-09-23 @ 08:00), Max: 37.4 (01-09-23 @ 00:00)  HR: 99 (01-09-23 @ 12:00) (86 - 110)  BP: 160/76 (01-09-23 @ 12:00) (141/63 - 197/98)  RR: 20 (01-09-23 @ 12:00) (18 - 24)  SpO2: 98% (01-09-23 @ 12:00) (96% - 100%)  Wt(kg): --    PHYSICAL EXAM  Constitutional - NAD, Comfortable  HEENT - NCAT, EOMI  Neck - Supple, No limited ROM  Chest - CTA bilaterally, No wheeze, No rhonchi, No crackles  Cardiovascular - RRR, S1S2, No murmurs  Abdomen - BS+, Soft, NTND  Extremities - No C/C/E, No calf tenderness   Neurologic Exam -                 Alert  Delayed processing  Antigravity x 4  Not fully following verbal instruction w some mixed aphasia    Psychiatric - Mood stable, Affect WNL    RECENT LABS/IMAGING  CBC Full  -  ( 09 Jan 2023 04:42 )  WBC Count : 10.36 K/uL  RBC Count : 4.40 M/uL  Hemoglobin : 12.1 g/dL  Hematocrit : 37.5 %  Platelet Count - Automated : 291 K/uL  Mean Cell Volume : 85.2 fl  Mean Cell Hemoglobin : 27.5 pg  Mean Cell Hemoglobin Concentration : 32.3 gm/dL  Auto Neutrophil # : x  Auto Lymphocyte # : x  Auto Monocyte # : x  Auto Eosinophil # : x  Auto Basophil # : x  Auto Neutrophil % : x  Auto Lymphocyte % : x  Auto Monocyte % : x  Auto Eosinophil % : x  Auto Basophil % : x    01-09    138  |  105  |  12  ----------------------------<  181<H>  3.3<L>   |  19<L>  |  0.56    Ca    9.4      09 Jan 2023 04:42    Impression:  77 yo with functional deficits secondary to diagnosis of CVA    Plan:  PT- ROM, Bed Mob, Transfers, Amb w AD and bracing as needed  OT- ADLs, bracing  SLP- Dysphagia eval and treat  Prec- Falls, Cardiac  DVT Prophylaxis- Lovenox  Skin- Turn q2 h  Dispo- Acute Rehab- can tolerate 3h/d of therapies and requires daily physician visits   Patient is a 78y old  Female who presents with a chief complaint of Acute ischemic stroke (09 Jan 2023 08:50)    Admission HPI:  78 year-old right-handed female w/ PMHx ?dementia (AOx1-2, performs all ADLs), otherwise reportedly no other PMHx, presenting to [facility] ED w/ right facial droop that started on 1/2/23. On day of arrival, 1/6/23, around 15:30PM, patient noted to have unsteady gait and language abnormalities (not speaking, difficulty understanding), whereas these symptoms were apparently not present just before 15:30PM. (07 Jan 2023 01:33)    Interval History:  Patient had imaging:  CT Brain Stroke Protocol (01.06.23 @ 23:14) >  No acute intracranial hemorrhage.  Hypoattenuation of the left temporal lobe, may represent acute left MCA   territory infarct.    MR Head No Cont (01.07.23 @ 13:40) >  Left MCA territory acute infarcts.  No acute intracranial hemorrhage    Son at bedside to provide interpretation.    REVIEW OF SYSTEMS: Per son she has been communicating appropriately and w/o complaints- at time of evaluation he stated she was tired. No chest pain, shortness of breath, nausea, vomiting or diarhea; other ROS neg     PAST MEDICAL & SURGICAL HISTORY  Dementia  No significant past surgical history    FUNCTIONAL HISTORY:   Lives w very supportive family in home w 4-5 YADI  PTA Independent    CURRENT FUNCTIONAL STATUS:  Min A transfers and gait    FAMILY HISTORY   No pertinent family history in first degree relatives    MEDICATIONS   aspirin  chewable 81 milliGRAM(s) Oral daily  atorvastatin 80 milliGRAM(s) Oral at bedtime  clopidogrel Tablet 75 milliGRAM(s) Oral daily  dextrose 5%. 1000 milliLiter(s) IV Continuous <Continuous>  dextrose 5%. 1000 milliLiter(s) IV Continuous <Continuous>  dextrose 50% Injectable 25 Gram(s) IV Push once  dextrose 50% Injectable 12.5 Gram(s) IV Push once  dextrose 50% Injectable 25 Gram(s) IV Push once  dextrose Oral Gel 15 Gram(s) Oral once PRN  enoxaparin Injectable 40 milliGRAM(s) SubCutaneous every 24 hours  glucagon  Injectable 1 milliGRAM(s) IntraMuscular once  insulin lispro (ADMELOG) corrective regimen sliding scale   SubCutaneous three times a day before meals  insulin lispro (ADMELOG) corrective regimen sliding scale   SubCutaneous at bedtime  polyethylene glycol 3350 17 Gram(s) Oral daily  QUEtiapine 25 milliGRAM(s) Oral at bedtime  sodium chloride 0.9%. 1000 milliLiter(s) IV Continuous <Continuous>    ALLERGIES  Benedryl Allergy Sinus (Hives)  penicillin (Rash)    VITALS  T(C): 37.2 (01-09-23 @ 08:00), Max: 37.4 (01-09-23 @ 00:00)  HR: 99 (01-09-23 @ 12:00) (86 - 110)  BP: 160/76 (01-09-23 @ 12:00) (141/63 - 197/98)  RR: 20 (01-09-23 @ 12:00) (18 - 24)  SpO2: 98% (01-09-23 @ 12:00) (96% - 100%)  Wt(kg): --    PHYSICAL EXAM  Constitutional - NAD, Comfortable  HEENT - NCAT, EOMI  Neck - Supple, No limited ROM  Chest - CTA bilaterally, No wheeze, No rhonchi, No crackles  Cardiovascular - RRR, S1S2, No murmurs  Abdomen - BS+, Soft, NTND  Extremities - No C/C/E, No calf tenderness   Neurologic Exam -                 Alert  Delayed processing  Antigravity x 4  Not fully following verbal instruction w some mixed aphasia    Psychiatric - Mood stable, Affect WNL    RECENT LABS/IMAGING  CBC Full  -  ( 09 Jan 2023 04:42 )  WBC Count : 10.36 K/uL  RBC Count : 4.40 M/uL  Hemoglobin : 12.1 g/dL  Hematocrit : 37.5 %  Platelet Count - Automated : 291 K/uL  Mean Cell Volume : 85.2 fl  Mean Cell Hemoglobin : 27.5 pg  Mean Cell Hemoglobin Concentration : 32.3 gm/dL  Auto Neutrophil # : x  Auto Lymphocyte # : x  Auto Monocyte # : x  Auto Eosinophil # : x  Auto Basophil # : x  Auto Neutrophil % : x  Auto Lymphocyte % : x  Auto Monocyte % : x  Auto Eosinophil % : x  Auto Basophil % : x    01-09    138  |  105  |  12  ----------------------------<  181<H>  3.3<L>   |  19<L>  |  0.56    Ca    9.4      09 Jan 2023 04:42    Impression:  79 yo with functional deficits secondary to diagnosis of CVA    Plan:  PT- ROM, Bed Mob, Transfers, Amb w AD and bracing as needed  OT- ADLs, bracing  SLP- Dysphagia eval and treat  Prec- Falls, Cardiac  DVT Prophylaxis- Lovenox  Skin- Turn q2 h  Dispo- Acute Rehab- can tolerate 3h/d of therapies and requires daily physician visits

## 2023-01-10 LAB
ANION GAP SERPL CALC-SCNC: 14 MMOL/L — SIGNIFICANT CHANGE UP (ref 5–17)
APPEARANCE UR: ABNORMAL
BASOPHILS # BLD AUTO: 0.02 K/UL — SIGNIFICANT CHANGE UP (ref 0–0.2)
BASOPHILS NFR BLD AUTO: 0.2 % — SIGNIFICANT CHANGE UP (ref 0–2)
BILIRUB UR-MCNC: NEGATIVE — SIGNIFICANT CHANGE UP
BUN SERPL-MCNC: 14 MG/DL — SIGNIFICANT CHANGE UP (ref 7–23)
CALCIUM SERPL-MCNC: 9.1 MG/DL — SIGNIFICANT CHANGE UP (ref 8.4–10.5)
CHLORIDE SERPL-SCNC: 104 MMOL/L — SIGNIFICANT CHANGE UP (ref 96–108)
CO2 SERPL-SCNC: 18 MMOL/L — LOW (ref 22–31)
COLOR SPEC: SIGNIFICANT CHANGE UP
CREAT SERPL-MCNC: 0.57 MG/DL — SIGNIFICANT CHANGE UP (ref 0.5–1.3)
DIFF PNL FLD: ABNORMAL
EGFR: 92 ML/MIN/1.73M2 — SIGNIFICANT CHANGE UP
EOSINOPHIL # BLD AUTO: 0.07 K/UL — SIGNIFICANT CHANGE UP (ref 0–0.5)
EOSINOPHIL NFR BLD AUTO: 0.6 % — SIGNIFICANT CHANGE UP (ref 0–6)
GLUCOSE SERPL-MCNC: 179 MG/DL — HIGH (ref 70–99)
GLUCOSE UR QL: NEGATIVE — SIGNIFICANT CHANGE UP
HCT VFR BLD CALC: 37.3 % — SIGNIFICANT CHANGE UP (ref 34.5–45)
HCT VFR BLD CALC: 37.9 % — SIGNIFICANT CHANGE UP (ref 34.5–45)
HGB BLD-MCNC: 11.8 G/DL — SIGNIFICANT CHANGE UP (ref 11.5–15.5)
HGB BLD-MCNC: 12.2 G/DL — SIGNIFICANT CHANGE UP (ref 11.5–15.5)
IMM GRANULOCYTES NFR BLD AUTO: 0.6 % — SIGNIFICANT CHANGE UP (ref 0–0.9)
KETONES UR-MCNC: ABNORMAL
LEUKOCYTE ESTERASE UR-ACNC: NEGATIVE — SIGNIFICANT CHANGE UP
LYMPHOCYTES # BLD AUTO: 1.53 K/UL — SIGNIFICANT CHANGE UP (ref 1–3.3)
LYMPHOCYTES # BLD AUTO: 12.4 % — LOW (ref 13–44)
MCHC RBC-ENTMCNC: 27.4 PG — SIGNIFICANT CHANGE UP (ref 27–34)
MCHC RBC-ENTMCNC: 31.6 GM/DL — LOW (ref 32–36)
MCHC RBC-ENTMCNC: 32.2 GM/DL — SIGNIFICANT CHANGE UP (ref 32–36)
MCV RBC AUTO: 85 FL — SIGNIFICANT CHANGE UP (ref 80–100)
MCV RBC AUTO: 86.7 FL — SIGNIFICANT CHANGE UP (ref 80–100)
MONOCYTES # BLD AUTO: 0.89 K/UL — SIGNIFICANT CHANGE UP (ref 0–0.9)
MONOCYTES NFR BLD AUTO: 7.2 % — SIGNIFICANT CHANGE UP (ref 2–14)
NEUTROPHILS # BLD AUTO: 9.75 K/UL — HIGH (ref 1.8–7.4)
NEUTROPHILS NFR BLD AUTO: 79 % — HIGH (ref 43–77)
NITRITE UR-MCNC: NEGATIVE — SIGNIFICANT CHANGE UP
NRBC # BLD: 0 /100 WBCS — SIGNIFICANT CHANGE UP (ref 0–0)
PH UR: 6 — SIGNIFICANT CHANGE UP (ref 5–8)
PLATELET # BLD AUTO: 265 K/UL — SIGNIFICANT CHANGE UP (ref 150–400)
PLATELET # BLD AUTO: 274 K/UL — SIGNIFICANT CHANGE UP (ref 150–400)
POTASSIUM SERPL-MCNC: 3.3 MMOL/L — LOW (ref 3.5–5.3)
POTASSIUM SERPL-SCNC: 3.3 MMOL/L — LOW (ref 3.5–5.3)
PROCALCITONIN SERPL-MCNC: 0.18 NG/ML — HIGH (ref 0.02–0.1)
PROT UR-MCNC: ABNORMAL
RAPID RVP RESULT: SIGNIFICANT CHANGE UP
RBC # BLD: 4.3 M/UL — SIGNIFICANT CHANGE UP (ref 3.8–5.2)
RBC # BLD: 4.46 M/UL — SIGNIFICANT CHANGE UP (ref 3.8–5.2)
RBC # FLD: 13.8 % — SIGNIFICANT CHANGE UP (ref 10.3–14.5)
RBC # FLD: 14 % — SIGNIFICANT CHANGE UP (ref 10.3–14.5)
SARS-COV-2 RNA SPEC QL NAA+PROBE: SIGNIFICANT CHANGE UP
SODIUM SERPL-SCNC: 136 MMOL/L — SIGNIFICANT CHANGE UP (ref 135–145)
SP GR SPEC: 1.02 — SIGNIFICANT CHANGE UP (ref 1.01–1.02)
UROBILINOGEN FLD QL: NEGATIVE — SIGNIFICANT CHANGE UP
WBC # BLD: 10.8 K/UL — HIGH (ref 3.8–10.5)
WBC # BLD: 12.33 K/UL — HIGH (ref 3.8–10.5)
WBC # FLD AUTO: 10.8 K/UL — HIGH (ref 3.8–10.5)
WBC # FLD AUTO: 12.33 K/UL — HIGH (ref 3.8–10.5)

## 2023-01-10 PROCEDURE — 99233 SBSQ HOSP IP/OBS HIGH 50: CPT

## 2023-01-10 PROCEDURE — 71045 X-RAY EXAM CHEST 1 VIEW: CPT | Mod: 26

## 2023-01-10 RX ORDER — POTASSIUM CHLORIDE 20 MEQ
40 PACKET (EA) ORAL ONCE
Refills: 0 | Status: DISCONTINUED | OUTPATIENT
Start: 2023-01-10 | End: 2023-01-10

## 2023-01-10 RX ORDER — AMLODIPINE BESYLATE 2.5 MG/1
1 TABLET ORAL
Qty: 30 | Refills: 0
Start: 2023-01-10 | End: 2023-02-08

## 2023-01-10 RX ORDER — METFORMIN HYDROCHLORIDE 850 MG/1
1 TABLET ORAL
Qty: 60 | Refills: 0
Start: 2023-01-10 | End: 2023-02-08

## 2023-01-10 RX ORDER — ATORVASTATIN CALCIUM 80 MG/1
1 TABLET, FILM COATED ORAL
Qty: 30 | Refills: 0
Start: 2023-01-10 | End: 2023-02-08

## 2023-01-10 RX ORDER — POTASSIUM CHLORIDE 20 MEQ
10 PACKET (EA) ORAL ONCE
Refills: 0 | Status: COMPLETED | OUTPATIENT
Start: 2023-01-10 | End: 2023-01-10

## 2023-01-10 RX ORDER — CLOPIDOGREL BISULFATE 75 MG/1
1 TABLET, FILM COATED ORAL
Qty: 90 | Refills: 0
Start: 2023-01-10 | End: 2023-04-09

## 2023-01-10 RX ORDER — ASPIRIN/CALCIUM CARB/MAGNESIUM 324 MG
1 TABLET ORAL
Qty: 30 | Refills: 0
Start: 2023-01-10 | End: 2023-02-08

## 2023-01-10 RX ORDER — ACETAMINOPHEN 500 MG
650 TABLET ORAL ONCE
Refills: 0 | Status: COMPLETED | OUTPATIENT
Start: 2023-01-10 | End: 2023-01-10

## 2023-01-10 RX ORDER — AMLODIPINE BESYLATE 2.5 MG/1
5 TABLET ORAL DAILY
Refills: 0 | Status: DISCONTINUED | OUTPATIENT
Start: 2023-01-10 | End: 2023-01-12

## 2023-01-10 RX ORDER — ISOPROPYL ALCOHOL, BENZOCAINE .7; .06 ML/ML; ML/ML
1 SWAB TOPICAL
Qty: 100 | Refills: 1
Start: 2023-01-10 | End: 2023-02-28

## 2023-01-10 RX ADMIN — Medication 650 MILLIGRAM(S): at 16:21

## 2023-01-10 RX ADMIN — CLOPIDOGREL BISULFATE 75 MILLIGRAM(S): 75 TABLET, FILM COATED ORAL at 20:01

## 2023-01-10 RX ADMIN — Medication 2: at 12:12

## 2023-01-10 RX ADMIN — AMLODIPINE BESYLATE 5 MILLIGRAM(S): 2.5 TABLET ORAL at 07:55

## 2023-01-10 RX ADMIN — Medication 81 MILLIGRAM(S): at 20:01

## 2023-01-10 RX ADMIN — Medication 1: at 18:42

## 2023-01-10 RX ADMIN — ATORVASTATIN CALCIUM 80 MILLIGRAM(S): 80 TABLET, FILM COATED ORAL at 20:01

## 2023-01-10 RX ADMIN — Medication 100 MILLIEQUIVALENT(S): at 18:45

## 2023-01-10 RX ADMIN — Medication 650 MILLIGRAM(S): at 18:00

## 2023-01-10 RX ADMIN — ENOXAPARIN SODIUM 40 MILLIGRAM(S): 100 INJECTION SUBCUTANEOUS at 05:24

## 2023-01-10 RX ADMIN — Medication 1: at 07:55

## 2023-01-10 NOTE — PROGRESS NOTE ADULT - SUBJECTIVE AND OBJECTIVE BOX
HPI:    Patient is a 78 F with no reported history presented to [facility] for right facial droop, found to have CVA.  Endocrine consulted for new onset diabetes.     - None       Current inpatient DM Meds:   Low dose ISS     Most recent HbA1C:       INTERVAL HPI/OVERNIGHT EVENTS:  Seen at the bedside. Sleeping comfortable. Glucose mostly at goal.       Review of systems:   CONSTITUTIONAL:  Feels well, good appetite  CARDIOVASCULAR:  Negative for chest pain or palpitations  RESPIRATORY:  Negative for cough, or SOB   GASTROINTESTINAL:  Negative for nausea, vomiting, or abdominal pain  GENITOURINARY:  Negative frequency, urgency or dysuria     CAPILLARY BLOOD GLUCOSE      POCT Blood Glucose.: 226 mg/dL (10 Ruben 2023 11:57)  POCT Blood Glucose.: 166 mg/dL (10 Ruben 2023 07:51)  POCT Blood Glucose.: 171 mg/dL (09 Jan 2023 21:33)  POCT Blood Glucose.: 166 mg/dL (09 Jan 2023 17:04)       MEDICATIONS  (STANDING):  acetaminophen  Suppository .. 650 milliGRAM(s) Rectal once  amLODIPine   Tablet 5 milliGRAM(s) Oral daily  aspirin  chewable 81 milliGRAM(s) Oral daily  atorvastatin 80 milliGRAM(s) Oral at bedtime  clopidogrel Tablet 75 milliGRAM(s) Oral daily  dextrose 5%. 1000 milliLiter(s) (50 mL/Hr) IV Continuous <Continuous>  dextrose 5%. 1000 milliLiter(s) (100 mL/Hr) IV Continuous <Continuous>  dextrose 50% Injectable 25 Gram(s) IV Push once  dextrose 50% Injectable 12.5 Gram(s) IV Push once  dextrose 50% Injectable 25 Gram(s) IV Push once  enoxaparin Injectable 40 milliGRAM(s) SubCutaneous every 24 hours  glucagon  Injectable 1 milliGRAM(s) IntraMuscular once  insulin lispro (ADMELOG) corrective regimen sliding scale   SubCutaneous at bedtime  insulin lispro (ADMELOG) corrective regimen sliding scale   SubCutaneous three times a day before meals  polyethylene glycol 3350 17 Gram(s) Oral daily  potassium chloride   Powder 40 milliEquivalent(s) Oral once  QUEtiapine 12.5 milliGRAM(s) Oral at bedtime    MEDICATIONS  (PRN):  dextrose Oral Gel 15 Gram(s) Oral once PRN Blood Glucose LESS THAN 70 milliGRAM(s)/deciliter      PHYSICAL EXAM  Vital Signs Last 24 Hrs  T(C): 38.2 (10 Ruben 2023 15:20), Max: 38.2 (10 Ruben 2023 15:20)  T(F): 100.7 (10 Ruben 2023 15:20), Max: 100.7 (10 Ruben 2023 15:20)  HR: 93 (10 Ruben 2023 15:35) (60 - 109)  BP: 145/58 (10 Ruben 2023 14:00) (117/50 - 183/82)  BP(mean): 81 (10 Ruben 2023 14:00) (68 - 115)  RR: 25 (10 Ruben 2023 15:35) (16 - 26)  SpO2: 97% (10 Ruben 2023 15:35) (95% - 98%)    Parameters below as of 10 Ruben 2023 15:35  Patient On (Oxygen Delivery Method): room air        GENERAL: feMale laying in bed in NAD  RESPIRATORY: nonlabored breathing, no accessory muscle use  Extremities: Warm, no edema in all 4 exts   NEURO: A&O X3    LABS:                        12.2   10.80 )-----------( 274      ( 10 Ruben 2023 05:40 )             37.9     01-10    136  |  104  |  14  ----------------------------<  179<H>  3.3<L>   |  18<L>  |  0.57    Ca    9.1      10 Ruben 2023 05:40                HPI:    Patient is a 78 F with no reported history presented to [facility] for right facial droop, found to have CVA.  Endocrine consulted for new onset diabetes.     - None       Current inpatient DM Meds:   Low dose ISS     Most recent HbA1C:       INTERVAL HPI/OVERNIGHT EVENTS:  Seen at the bedside. Sleeping comfortable. Glucose mostly at goal.       Review of systems:   CONSTITUTIONAL:  Feels well, good appetite  CARDIOVASCULAR:  Negative for chest pain or palpitations  RESPIRATORY:  Negative for cough, or SOB   GASTROINTESTINAL:  Negative for nausea, vomiting, or abdominal pain  GENITOURINARY:  Negative frequency, urgency or dysuria     CAPILLARY BLOOD GLUCOSE      POCT Blood Glucose.: 226 mg/dL (10 Ruben 2023 11:57)  POCT Blood Glucose.: 166 mg/dL (10 Ruben 2023 07:51)  POCT Blood Glucose.: 171 mg/dL (09 Jan 2023 21:33)  POCT Blood Glucose.: 166 mg/dL (09 Jan 2023 17:04)       MEDICATIONS  (STANDING):  acetaminophen  Suppository .. 650 milliGRAM(s) Rectal once  amLODIPine   Tablet 5 milliGRAM(s) Oral daily  aspirin  chewable 81 milliGRAM(s) Oral daily  atorvastatin 80 milliGRAM(s) Oral at bedtime  clopidogrel Tablet 75 milliGRAM(s) Oral daily  dextrose 5%. 1000 milliLiter(s) (50 mL/Hr) IV Continuous <Continuous>  dextrose 5%. 1000 milliLiter(s) (100 mL/Hr) IV Continuous <Continuous>  dextrose 50% Injectable 25 Gram(s) IV Push once  dextrose 50% Injectable 12.5 Gram(s) IV Push once  dextrose 50% Injectable 25 Gram(s) IV Push once  enoxaparin Injectable 40 milliGRAM(s) SubCutaneous every 24 hours  glucagon  Injectable 1 milliGRAM(s) IntraMuscular once  insulin lispro (ADMELOG) corrective regimen sliding scale   SubCutaneous at bedtime  insulin lispro (ADMELOG) corrective regimen sliding scale   SubCutaneous three times a day before meals  polyethylene glycol 3350 17 Gram(s) Oral daily  potassium chloride   Powder 40 milliEquivalent(s) Oral once  QUEtiapine 12.5 milliGRAM(s) Oral at bedtime    MEDICATIONS  (PRN):  dextrose Oral Gel 15 Gram(s) Oral once PRN Blood Glucose LESS THAN 70 milliGRAM(s)/deciliter      PHYSICAL EXAM  Vital Signs Last 24 Hrs  T(C): 38.2 (10 Ruben 2023 15:20), Max: 38.2 (10 Ruben 2023 15:20)  T(F): 100.7 (10 Ruben 2023 15:20), Max: 100.7 (10 Ruben 2023 15:20)  HR: 93 (10 Ruben 2023 15:35) (60 - 109)  BP: 145/58 (10 Ruben 2023 14:00) (117/50 - 183/82)  BP(mean): 81 (10 Ruben 2023 14:00) (68 - 115)  RR: 25 (10 Ruben 2023 15:35) (16 - 26)  SpO2: 97% (10 Ruben 2023 15:35) (95% - 98%)    Parameters below as of 10 Ruben 2023 15:35  Patient On (Oxygen Delivery Method): room air        GENERAL: feMale laying in bed in NAD  RESPIRATORY: nonlabored breathing, no accessory muscle use  Extremities: Warm, no edema in all 4 exts   NEURO: A&O X3    LABS:                        12.2   10.80 )-----------( 274      ( 10 Ruben 2023 05:40 )             37.9     01-10    136  |  104  |  14  ----------------------------<  179<H>  3.3<L>   |  18<L>  |  0.57    Ca    9.1      10 Ruben 2023 05:40                HPI:    Patient is a 78 F with no reported history presented to [facility] for right facial droop, found to have CVA.  Endocrine consulted for new onset diabetes.     - None       Current inpatient DM Meds:   Low dose ISS     Most recent HbA1C:       INTERVAL HPI/OVERNIGHT EVENTS:  Seen at the bedside. Sleeping comfortable. Glucose mostly at goal.       Review of systems:   CONSTITUTIONAL:  Feels well, good appetite  CARDIOVASCULAR:  Negative for chest pain or palpitations  RESPIRATORY:  Negative for cough, or SOB   GASTROINTESTINAL:  Negative for nausea, vomiting, or abdominal pain  GENITOURINARY:  Negative frequency, urgency or dysuria     CAPILLARY BLOOD GLUCOSE      POCT Blood Glucose.: 226 mg/dL (10 Ruben 2023 11:57)  POCT Blood Glucose.: 166 mg/dL (10 Ruben 2023 07:51)  POCT Blood Glucose.: 171 mg/dL (09 Jan 2023 21:33)  POCT Blood Glucose.: 166 mg/dL (09 Jan 2023 17:04)       MEDICATIONS  (STANDING):  acetaminophen  Suppository .. 650 milliGRAM(s) Rectal once  amLODIPine   Tablet 5 milliGRAM(s) Oral daily  aspirin  chewable 81 milliGRAM(s) Oral daily  atorvastatin 80 milliGRAM(s) Oral at bedtime  clopidogrel Tablet 75 milliGRAM(s) Oral daily  dextrose 5%. 1000 milliLiter(s) (50 mL/Hr) IV Continuous <Continuous>  dextrose 5%. 1000 milliLiter(s) (100 mL/Hr) IV Continuous <Continuous>  dextrose 50% Injectable 25 Gram(s) IV Push once  dextrose 50% Injectable 12.5 Gram(s) IV Push once  dextrose 50% Injectable 25 Gram(s) IV Push once  enoxaparin Injectable 40 milliGRAM(s) SubCutaneous every 24 hours  glucagon  Injectable 1 milliGRAM(s) IntraMuscular once  insulin lispro (ADMELOG) corrective regimen sliding scale   SubCutaneous at bedtime  insulin lispro (ADMELOG) corrective regimen sliding scale   SubCutaneous three times a day before meals  polyethylene glycol 3350 17 Gram(s) Oral daily  potassium chloride   Powder 40 milliEquivalent(s) Oral once  QUEtiapine 12.5 milliGRAM(s) Oral at bedtime    MEDICATIONS  (PRN):  dextrose Oral Gel 15 Gram(s) Oral once PRN Blood Glucose LESS THAN 70 milliGRAM(s)/deciliter      PHYSICAL EXAM  Vital Signs Last 24 Hrs  T(C): 38.2 (10 Ruben 2023 15:20), Max: 38.2 (10 Ruben 2023 15:20)  T(F): 100.7 (10 Ruben 2023 15:20), Max: 100.7 (10 Ruben 2023 15:20)  HR: 93 (10 Ruben 2023 15:35) (60 - 109)  BP: 145/58 (10 Ruben 2023 14:00) (117/50 - 183/82)  BP(mean): 81 (10 Ruben 2023 14:00) (68 - 115)  RR: 25 (10 Ruben 2023 15:35) (16 - 26)  SpO2: 97% (10 Ruben 2023 15:35) (95% - 98%)    Parameters below as of 10 Ruben 2023 15:35  Patient On (Oxygen Delivery Method): room air        GENERAL: feMale laying in bed in NAD  RESPIRATORY: nonlabored breathing, no accessory muscle use  Extremities: Warm, no edema in all 4 exts   NEURO: A&O X3    LABS:                        12.2   10.80 )-----------( 274      ( 10 Ruben 2023 05:40 )             37.9     01-10    136  |  104  |  14  ----------------------------<  179<H>  3.3<L>   |  18<L>  |  0.57    Ca    9.1      10 Ruben 2023 05:40

## 2023-01-10 NOTE — PROGRESS NOTE ADULT - ASSESSMENT
Patient is a 78 F with no reported history presented to [facility] for right facial droop, found to have CVA.  Endocrine consulted for new onset diabetes. Patient is high risk with high level decision making due to uncontrolled diabetes which places patient at high risk for cardiovascular and cerebrovascular events. Patient with lability of glucose requiring close monitoring and insulin adjustments.    1.  T2DM   - Most recent Hemoglobin A1C 8.6, goal 8% for this 78 year old woman   - Current FS ranges from 150--220  - Current diet: CHO, pureed  - Please monitor blood glucose values TID AC & QHS while eating regular meals and Q6H while NPO  - Blood glucose goals pre-meal less than 140 mg/dL and random blood glucose less than 180 mg/dL  - Recommendations:  - Glucose mostly at goal, occasional spike to 200   - Continue with low dose  Correctional scale TID with meals  - Continue with low dose Correctional scale QHS    2. HTN  - BP goal 130/80  - Manage per primary team     3. HLD  - Continue with atorvastatin 80 mg daily   -   - Manage as outpatient      Discharge planning:  - Current home DM meds: none  - Discharge DM meds: Metformin 500 mg ER BID (with breakfast and dinner to reduce stomach upset). Discussed this with the patient's son who is a pharmacist. Can titrate up to max dose og 1g BID if tolerating with minimum GI side effects   - Patient can follow up at [address] [facility], [address], [place] [address] [phone]  - Patient will need opthalmology and podiatry follow up as outpatient     Thank you for the consult.      Contact via pager or [employer] Teams during business hours. For follow up questions, discharge recommendations, or new consults please call answering service at [phone] (weekdays), [phone] (nights/weekends). For nonurgent matters, please email  [email or web address].     Katherine Martini MD  Department of Endocrinology, Diabetes and metabolism   Pager [phone]   Patient is a 78 F with no reported history presented to [facility] for right facial droop, found to have CVA.  Endocrine consulted for new onset diabetes. Patient is high risk with high level decision making due to uncontrolled diabetes which places patient at high risk for cardiovascular and cerebrovascular events. Patient with lability of glucose requiring close monitoring and insulin adjustments.    1.  T2DM   - Most recent Hemoglobin A1C 8.6, goal 8% for this 78 year old woman   - Current FS ranges from 150--220  - Current diet: CHO, pureed  - Please monitor blood glucose values TID AC & QHS while eating regular meals and Q6H while NPO  - Blood glucose goals pre-meal less than 140 mg/dL and random blood glucose less than 180 mg/dL  - Recommendations:  - Glucose mostly at goal, occasional spike to 200   - Continue with low dose  Correctional scale TID with meals  - Continue with low dose Correctional scale QHS    2. HTN  - BP goal 130/80  - Manage per primary team     3. HLD  - Continue with atorvastatin 80 mg daily   -   - Manage as outpatient      Discharge planning:  - Current home DM meds: none  - Discharge DM meds: Metformin 500 mg ER BID (with breakfast and dinner to reduce stomach upset). Discussed this with the patient's son who is a pharmacist. Can titrate up to max dose og 1g BID if tolerating with minimum GI side effects   - Patient can follow up at [address] [facility], [address], [place] [address] [phone]  - Patient will need opthalmology and podiatry follow up as outpatient     Thank you for the consult.      Contact via pager or [employer] Teams during business hours. For follow up questions, discharge recommendations, or new consults please call answering service at [phone] (weekdays), [phone] (nights/weekends). For nonurgent matters, please email  [email or web address].     Katherine Martini MD  Department of Endocrinology, Diabetes and metabolism   Pager [phone]   Patient is a 78 F with no reported history presented to [facility] for right facial droop, found to have CVA.  Endocrine consulted for new onset diabetes. Patient is high risk with high level decision making due to uncontrolled diabetes which places patient at high risk for cardiovascular and cerebrovascular events. Patient with lability of glucose requiring close monitoring and insulin adjustments.    1.  T2DM   - Most recent Hemoglobin A1C 8.6, goal 8% for this 78 year old woman   - Current FS ranges from 150--220  - Current diet: CHO, pureed  - Please monitor blood glucose values TID AC & QHS while eating regular meals and Q6H while NPO  - Blood glucose goals pre-meal less than 140 mg/dL and random blood glucose less than 180 mg/dL  - Recommendations:  - Glucose mostly at goal, occasional spike to 200   - Continue with low dose  Correctional scale TID with meals  - Continue with low dose Correctional scale QHS    2. HTN  - BP goal 130/80  - Manage per primary team     3. HLD  - Continue with atorvastatin 80 mg daily   -   - Manage as outpatient      Discharge planning:  - Current home DM meds: none  - Discharge DM meds: Metformin 500 mg ER BID (with breakfast and dinner to reduce stomach upset). Discussed this with the patient's son who is a pharmacist. Can titrate up to max dose og 1g BID if tolerating with minimum GI side effects   - Patient can follow up at [address] [facility], [address], [place] [address] [phone]  - Patient will need opthalmology and podiatry follow up as outpatient     Thank you for the consult.      Contact via pager or [employer] Teams during business hours. For follow up questions, discharge recommendations, or new consults please call answering service at [phone] (weekdays), [phone] (nights/weekends). For nonurgent matters, please email  [email or web address].     Katherine Martini MD  Department of Endocrinology, Diabetes and metabolism   Pager [phone]

## 2023-01-10 NOTE — PROGRESS NOTE ADULT - SUBJECTIVE AND OBJECTIVE BOX
THE PATIENT WAS SEEN AND EXAMINED BY ME WITH THE HOUSESTAFF AND STROKE TEAM DURING MORNING ROUNDS.   HPI:  78 year-old right-handed female Nicole speaking, w/ PMHx ?dementia (AOx1-2, performs all ADLs), DM, presenting to [facility] ED w/ right facial droop that started on 1/2/23. On day of arrival, 1/6/23, around 15:30PM, patient noted to have unsteady gait and language abnormalities (not speaking, difficulty understanding),     SUBJECTIVE:  No new neurologic complaints, ROS reported negative unless otherwise noted.     amLODIPine   Tablet 5 milliGRAM(s) Oral daily  aspirin  chewable 81 milliGRAM(s) Oral daily  atorvastatin 80 milliGRAM(s) Oral at bedtime  clopidogrel Tablet 75 milliGRAM(s) Oral daily  dextrose 5%. 1000 milliLiter(s) IV Continuous <Continuous>  dextrose 5%. 1000 milliLiter(s) IV Continuous <Continuous>  dextrose 50% Injectable 25 Gram(s) IV Push once  dextrose 50% Injectable 12.5 Gram(s) IV Push once  dextrose 50% Injectable 25 Gram(s) IV Push once  dextrose Oral Gel 15 Gram(s) Oral once PRN  enoxaparin Injectable 40 milliGRAM(s) SubCutaneous every 24 hours  glucagon  Injectable 1 milliGRAM(s) IntraMuscular once  insulin lispro (ADMELOG) corrective regimen sliding scale   SubCutaneous three times a day before meals  insulin lispro (ADMELOG) corrective regimen sliding scale   SubCutaneous at bedtime  polyethylene glycol 3350 17 Gram(s) Oral daily  QUEtiapine 12.5 milliGRAM(s) Oral at bedtime  sodium chloride 0.9%. 1000 milliLiter(s) IV Continuous <Continuous>      PHYSICAL EXAM:   Vital Signs Last 24 Hrs  T(C): 36.8 (10 Ruben 2023 08:00), Max: 37.2 (09 Jan 2023 16:00)  T(F): 98.3 (10 Ruben 2023 08:00), Max: 99 (09 Jan 2023 16:00)  HR: 107 (10 Ruben 2023 10:00) (88 - 109)  BP: 134/66 (10 Ruben 2023 10:00) (132/52 - 191/91)  BP(mean): 80 (10 Ruben 2023 10:00) (73 - 119)  RR: 20 (10 Ruben 2023 10:00) (16 - 27)  SpO2: 97% (10 Ruben 2023 10:00) (97% - 100%)    Parameters below as of 10 Ruben 2023 08:00  Patient On (Oxygen Delivery Method): room air      General: No acute distress  HEENT: EOM intact, blinks to threat B/L  Abdomen: Soft, nontender, nondistended   Extremities: No edema    NEUROLOGICAL EXAM: son in law at bedside who provided translation   Mental status: awake, alert, attempts to produce speech, able to follow some simple commands, able to mimic, unable to ID objects  Cranial Nerves: Right facial droop, no nystagmus, blinks to threat B/L  Motor exam: Normal tone, no drift, Right hemiparesis RUE drift, 4/5, RLE drift, 4/5,, LUE 5/5, LLE 5/5  Sensation: Intact to light touch   Coordination/ Gait: Unable to participate with exam       LABS:                        12.2   10.80 )-----------( 274      ( 10 Ruben 2023 05:40 )             37.9    01-10    136  |  104  |  14  ----------------------------<  179<H>  3.3<L>   |  18<L>  |  0.57    Ca    9.1      10 Ruben 2023 05:40          IMAGING: Reviewed by me.     Brain MRI (01/07/23): Left MCA territory acute infarcts. No acute intracranial hemorrhage    CT Head (01/06/23): No acute intracranial hemorrhage. Hypoattenuation of the left temporal lobe, may represent acute left MCA territory infarct.    CT/CTA head/neck (01/06/23): CTA Head & Neck: Severe stenosis/near complete occlusion with the left M2 branch.    CT Perfusion: There is a penumbra involving a large area of the left MCA territory.   THE PATIENT WAS SEEN AND EXAMINED BY ME WITH THE HOUSESTAFF AND STROKE TEAM DURING MORNING ROUNDS.   HPI:  78 year-old right-handed female Nicole speaking, w/ PMHx ?dementia (AOx1-2, performs all ADLs), DM, presenting to [facility] ED w/ right facial droop that started on 1/2/23. On day of arrival, 1/6/23, around 15:30PM, patient noted to have unsteady gait and language abnormalities (not speaking, difficulty understanding),     SUBJECTIVE:  No new neurologic complaints, ROS reported negative unless otherwise noted.     amLODIPine   Tablet 5 milliGRAM(s) Oral daily  aspirin  chewable 81 milliGRAM(s) Oral daily  atorvastatin 80 milliGRAM(s) Oral at bedtime  clopidogrel Tablet 75 milliGRAM(s) Oral daily  dextrose 5%. 1000 milliLiter(s) IV Continuous <Continuous>  dextrose 5%. 1000 milliLiter(s) IV Continuous <Continuous>  dextrose 50% Injectable 25 Gram(s) IV Push once  dextrose 50% Injectable 12.5 Gram(s) IV Push once  dextrose 50% Injectable 25 Gram(s) IV Push once  dextrose Oral Gel 15 Gram(s) Oral once PRN  enoxaparin Injectable 40 milliGRAM(s) SubCutaneous every 24 hours  glucagon  Injectable 1 milliGRAM(s) IntraMuscular once  insulin lispro (ADMELOG) corrective regimen sliding scale   SubCutaneous three times a day before meals  insulin lispro (ADMELOG) corrective regimen sliding scale   SubCutaneous at bedtime  polyethylene glycol 3350 17 Gram(s) Oral daily  QUEtiapine 12.5 milliGRAM(s) Oral at bedtime  sodium chloride 0.9%. 1000 milliLiter(s) IV Continuous <Continuous>      PHYSICAL EXAM:   Vital Signs Last 24 Hrs  T(C): 36.8 (10 Ruben 2023 08:00), Max: 37.2 (09 Jan 2023 16:00)  T(F): 98.3 (10 Ruben 2023 08:00), Max: 99 (09 Jan 2023 16:00)  HR: 107 (10 Ruben 2023 10:00) (88 - 109)  BP: 134/66 (10 Ruben 2023 10:00) (132/52 - 191/91)  BP(mean): 80 (10 Ruben 2023 10:00) (73 - 119)  RR: 20 (10 Ruben 2023 10:00) (16 - 27)  SpO2: 97% (10 Ruben 2023 10:00) (97% - 100%)    Parameters below as of 10 Ruben 2023 08:00  Patient On (Oxygen Delivery Method): room air      General: No acute distress  HEENT: EOM intact, blinks to threat B/L  Abdomen: Soft, nontender, nondistended   Extremities: No edema    NEUROLOGICAL EXAM: son in law at bedside who provided translation   Mental status: awake, alert, attempts to produce speech, able to follow some simple commands, able to mimic, unable to ID objects  Cranial Nerves: Right facial droop, no nystagmus, blinks to threat B/L  Motor exam: Normal tone, no drift, Right hemiparesis RUE drift, 4/5, RLE drift, 4/5,, LUE 5/5, LLE 5/5  Sensation: Intact to light touch   Coordination/ Gait: Unable to participate with exam       LABS:                        12.2   10.80 )-----------( 274      ( 10 Ruben 2023 05:40 )             37.9    01-10    136  |  104  |  14  ----------------------------<  179<H>  3.3<L>   |  18<L>  |  0.57    Ca    9.1      10 Ruben 2023 05:40          IMAGING: Reviewed by me.     Brain MRI (01/07/23): Left MCA territory acute infarcts. No acute intracranial hemorrhage    CT Head (01/06/23): No acute intracranial hemorrhage. Hypoattenuation of the left temporal lobe, may represent acute left MCA territory infarct.    CT/CTA head/neck (01/06/23): CTA Head & Neck: Severe stenosis/near complete occlusion with the left M2 branch.    CT Perfusion: There is a penumbra involving a large area of the left MCA territory.   THE PATIENT WAS SEEN AND EXAMINED BY ME WITH THE HOUSESTAFF AND STROKE TEAM DURING MORNING ROUNDS.   HPI:  78 year-old right-handed female Nicole speaking, w/ PMHx ?dementia (AOx1-2, performs all ADLs), DM, presenting to [facility] ED w/ right facial droop that started on 1/2/23. On day of arrival, 1/6/23, around 15:30PM, patient noted to have unsteady gait and language abnormalities (not speaking, difficulty understanding),     SUBJECTIVE:  No new neurologic complaints, ROS reported negative unless otherwise noted.     amLODIPine   Tablet 5 milliGRAM(s) Oral daily  aspirin  chewable 81 milliGRAM(s) Oral daily  atorvastatin 80 milliGRAM(s) Oral at bedtime  clopidogrel Tablet 75 milliGRAM(s) Oral daily  dextrose 5%. 1000 milliLiter(s) IV Continuous <Continuous>  dextrose 5%. 1000 milliLiter(s) IV Continuous <Continuous>  dextrose 50% Injectable 25 Gram(s) IV Push once  dextrose 50% Injectable 12.5 Gram(s) IV Push once  dextrose 50% Injectable 25 Gram(s) IV Push once  dextrose Oral Gel 15 Gram(s) Oral once PRN  enoxaparin Injectable 40 milliGRAM(s) SubCutaneous every 24 hours  glucagon  Injectable 1 milliGRAM(s) IntraMuscular once  insulin lispro (ADMELOG) corrective regimen sliding scale   SubCutaneous three times a day before meals  insulin lispro (ADMELOG) corrective regimen sliding scale   SubCutaneous at bedtime  polyethylene glycol 3350 17 Gram(s) Oral daily  QUEtiapine 12.5 milliGRAM(s) Oral at bedtime  sodium chloride 0.9%. 1000 milliLiter(s) IV Continuous <Continuous>      PHYSICAL EXAM:   Vital Signs Last 24 Hrs  T(C): 36.8 (10 Ruben 2023 08:00), Max: 37.2 (09 Jan 2023 16:00)  T(F): 98.3 (10 Ruben 2023 08:00), Max: 99 (09 Jan 2023 16:00)  HR: 107 (10 Ruben 2023 10:00) (88 - 109)  BP: 134/66 (10 Ruben 2023 10:00) (132/52 - 191/91)  BP(mean): 80 (10 Ruben 2023 10:00) (73 - 119)  RR: 20 (10 Ruben 2023 10:00) (16 - 27)  SpO2: 97% (10 Ruben 2023 10:00) (97% - 100%)    Parameters below as of 10 Ruben 2023 08:00  Patient On (Oxygen Delivery Method): room air      General: No acute distress  HEENT: EOM intact, blinks to threat B/L  Abdomen: Soft, nontender, nondistended   Extremities: No edema    NEUROLOGICAL EXAM: son in law at bedside who provided translation   Mental status: awake, alert, attempts to produce speech, able to follow some simple commands, able to mimic, unable to ID objects  Cranial Nerves: Right facial droop, no nystagmus, blinks to threat B/L  Motor exam: Normal tone, no drift, Right hemiparesis RUE drift, 4/5, RLE drift, 4/5,, LUE 5/5, LLE 5/5  Sensation: Intact to light touch   Coordination/ Gait: Unable to participate with exam       LABS:                        12.2   10.80 )-----------( 274      ( 10 Ruben 2023 05:40 )             37.9    01-10    136  |  104  |  14  ----------------------------<  179<H>  3.3<L>   |  18<L>  |  0.57    Ca    9.1      10 Ruben 2023 05:40          IMAGING: Reviewed by me.     Brain MRI (01/07/23): Left MCA territory acute infarcts. No acute intracranial hemorrhage    CT Head (01/06/23): No acute intracranial hemorrhage. Hypoattenuation of the left temporal lobe, may represent acute left MCA territory infarct.    CT/CTA head/neck (01/06/23): CTA Head & Neck: Severe stenosis/near complete occlusion with the left M2 branch.    CT Perfusion: There is a penumbra involving a large area of the left MCA territory.

## 2023-01-10 NOTE — CHART NOTE - NSCHARTNOTEFT_GEN_A_CORE
Due to condition, patient has a severe mobility limitation and requires a transport wheelchair to assist in daily ADLs. Patient cannot ambulate safely with a cane or walker. Patient does not have sufficient upper body strength to self propel a standard wheelchair. Patient has a caregiver to assist with maneuvering the wheelchair. Patient has not expressed an unwillingness to use the wheelchair. Patient has ample room in the home for said wheelchair. Use of a transport wheelchair will significant improve the patients ability to participate in daily ADL's and the patient will use it on a regular basis in the home.

## 2023-01-10 NOTE — PROGRESS NOTE ADULT - NS ATTEND AMEND GEN_ALL_CORE FT
Today leucocytosis and one episode of fever. U/A no bacteremia. Will follow chest x ray and blood cultures. Risk of early sepsis. Attempts to identify infectious source being taken. Will sent D-dimer exclude PE wells score of 3.
Symptomatic intracranial stenosis. Re-evaluated will tolerate 3 hr rehab will discuss with family for disposition. Uncontrolled hypertension needs at least to be kept <150's consistently to asses if worsening of symptoms. Will add medications as tolerated. Also agitation probably contributing to elevated BP. Prolonged cardiac monitoring can be pursued as outpatient.

## 2023-01-10 NOTE — PROGRESS NOTE ADULT - ASSESSMENT
78-year-old woman with PMH, DM, mild cognitive impairment (performs her daily activities and oriented to person and place.)  She presented with sudden onset of right facial droop, right hemiparesis and global aphasia.Head CT shows left inferior temporal hypodensity suggestive of subacute infarct. MRI brain significant for left hemispheric–MCA territory inferior division temporal infarction and left hemispheric subcortical infarcts  CTA shows to my eye, left M1 segment severe left MCA stenosis. She was not  a candidate for IV thrombolysis as her stroke symptoms started 1/2/2023; infarction/ left temporal hypodensity present on CT at the time of presentation and left M2 distal occlusion with patent M1 segment.    Impression: Left MCA territory ischemia likely secondary to symptomatic intracranial left MCA stenosis.     NEURO: Neuro exam unchanged, permissive HTN with slow titration to normotensive, titrate statin to LDL goal less than 70, MRI Brain, CTA Head and Neck results as above Physical therapy/OT eval with recommendations for AR.      ANTITHROMBOTIC THERAPY: ASA/Plavix for 3 months followed by ASA only as per [facility] protocol for secondary stroke prevention     PULMONARY: CXR (01/07): Left upper lung bronchiectasis but otherwise no focal consolidation, protecting airway, saturating well on 1L NC    CARDIOVASCULAR: TTE 1/8/23: EF 63% Mild mitral regurgitation, cardiac monitoring no events, plan to evaluate for prolonged cardiac monitoring to completely exclude A-fib which can be done as outpatient                          SBP goal: 110-160mmHg    GASTROINTESTINAL: Dysphagia screen passed, tolerating diet     Diet: Pureed    RENAL: BUN/Cr without change, good urine output, straight cath X1 on 01/07/23 ; hypokalemia- s/p kcl supplement      Na Goal: Greater than 135     Rojas: No    HEMATOLOGY: H/H without change, Platelets 274     DVT ppx: LMWH     ID: afebrile, leukocytosis - no overt sign of infection    OTHER: Plan/goals of care discussed with pt's family at bedside and over the phone. Soft vest restraints d/c'd home.  Endocrine consult for new onset DM: diabetes medications sent home.     DISPOSITION: AR however family refused and would like to take her home, he will provide 24 hr supervision. risks benefits discussion at length.     CORE MEASURES:        Admission NIHSS: 22     TPA:  NO      LDL/HDL: 176/46     Depression Screen: N/A pt is aphasic     Statin Therapy: Y     Dysphagia Screen: PASS     Smoking NO      Afib NO     Stroke Education YES    Obtain screening ultrasound in patients who meet 1 or more of the following criteria as patient is high risk for DVT/PE upon admission:   [] History of DVT/PE  []Hypercoagulable states (Factor V Leiden, Cancer, OCP, etc. )  []Prolonged immobility (hemiplegia/hemiparesis/post operative or any other extended immobilization)   [] Transferred from outside facility (Rehab or Long term care)  [] Age </= to 50 78-year-old woman with PMH, DM, mild cognitive impairment (performs her daily activities and oriented to person and place.)  She presented with sudden onset of right facial droop, right hemiparesis and global aphasia.Head CT shows left inferior temporal hypodensity suggestive of subacute infarct. MRI brain significant for left hemispheric–MCA territory inferior division temporal infarction and left hemispheric subcortical infarcts  CTA shows to my eye, left M1 segment severe left MCA stenosis. She was not  a candidate for IV thrombolysis as her stroke symptoms started 1/2/2023; infarction/ left temporal hypodensity present on CT at the time of presentation and left M2 distal occlusion with patent M1 segment.    Impression: Left MCA territory ischemia likely secondary to symptomatic intracranial left MCA stenosis.     NEURO: Neuro exam unchanged, permissive HTN with slow titration to normotensive, titrate statin to LDL goal less than 70, MRI Brain, CTA Head and Neck results as above Physical therapy/OT eval with recommendations for AR.      ANTITHROMBOTIC THERAPY: ASA/Plavix for 3 months followed by ASA only as per [facility] protocol for secondary stroke prevention     PULMONARY: CXR (01/07): Left upper lung bronchiectasis but otherwise no focal consolidation, protecting airway, saturating well on 1L NC    CARDIOVASCULAR: TTE 1/8/23: EF 63% Mild mitral regurgitation, cardiac monitoring no events, plan to evaluate for prolonged cardiac monitoring to completely exclude A-fib which can be done as outpatient                          SBP goal: 110-160mmHg    GASTROINTESTINAL: Dysphagia screen passed, tolerating diet     Diet: Pureed    RENAL: BUN/Cr without change, good urine output, straight cath X1 on 01/07/23 ; hypokalemia- s/p kcl supplement      Na Goal: Greater than 135     Rojas: No    HEMATOLOGY: H/H without change, Platelets 274     DVT ppx: LMWH     ID: afebrile, leukocytosis - no overt sign of infection    OTHER: Plan/goals of care discussed with pt's family at bedside and over the phone. Soft vest restraints d/c'd home.  Endocrine consult for new onset DM: diabetes medications sent home.     DISPOSITION: AR however family refused and would like to take her home, he will provide 24 hr supervision. risks benefits discussion at length.     CORE MEASURES:        Admission NIHSS: 22     TPA:  NO      LDL/HDL: 176/46     Depression Screen: N/A pt is aphasic     Statin Therapy: Y     Dysphagia Screen: PASS     Smoking NO      Afib NO     Stroke Education YES    Obtain screening ultrasound in patients who meet 1 or more of the following criteria as patient is high risk for DVT/PE upon admission:   [] History of DVT/PE  []Hypercoagulable states (Factor V Leiden, Cancer, OCP, etc. )  []Prolonged immobility (hemiplegia/hemiparesis/post operative or any other extended immobilization)   [] Transferred from outside facility (Rehab or Long term care)  [] Age </= to 50 78-year-old woman with PMH, DM, mild cognitive impairment (performs her daily activities and oriented to person and place.)  She presented with sudden onset of right facial droop, right hemiparesis and global aphasia.Head CT shows left inferior temporal hypodensity suggestive of subacute infarct. MRI brain significant for left hemispheric–MCA territory inferior division temporal infarction and left hemispheric subcortical infarcts  CTA shows to my eye, left M1 segment severe left MCA stenosis. She was not  a candidate for IV thrombolysis as her stroke symptoms started 1/2/2023; infarction/ left temporal hypodensity present on CT at the time of presentation and left M2 distal occlusion with patent M1 segment.    Impression: Left MCA territory ischemia likely secondary to symptomatic intracranial left MCA stenosis.     NEURO: Neuro exam unchanged, permissive HTN with slow titration to normotensive, titrate statin to LDL goal less than 70, MRI Brain, CTA Head and Neck results as above Physical therapy/OT eval with recommendations for AR.      ANTITHROMBOTIC THERAPY: ASA/Plavix for 3 months followed by ASA only as per [facility] protocol for secondary stroke prevention     PULMONARY: CXR (01/07): Left upper lung bronchiectasis but otherwise no focal consolidation, protecting airway, saturating well on 1L NC    CARDIOVASCULAR: TTE 1/8/23: EF 63% Mild mitral regurgitation, cardiac monitoring no events, plan to evaluate for prolonged cardiac monitoring to completely exclude A-fib which can be done as outpatient                          SBP goal: 110-160mmHg    GASTROINTESTINAL: Dysphagia screen passed, tolerating diet     Diet: Pureed    RENAL: BUN/Cr without change, good urine output, straight cath X1 on 01/07/23 ; hypokalemia- s/p kcl supplement      Na Goal: Greater than 135     Rojas: No    HEMATOLOGY: H/H without change, Platelets 274     DVT ppx: LMWH     ID: afebrile, leukocytosis - no overt sign of infection    OTHER: Plan/goals of care discussed with pt's family at bedside and over the phone. Soft vest restraints d/c'd home.  Endocrine consult for new onset DM: diabetes medications sent home.     DISPOSITION: AR however family refused and would like to take her home, he will provide 24 hr supervision. risks benefits discussion at length.     CORE MEASURES:        Admission NIHSS: 22     TPA:  NO      LDL/HDL: 176/46     Depression Screen: N/A pt is aphasic     Statin Therapy: Y     Dysphagia Screen: PASS     Smoking NO      Afib NO     Stroke Education YES    Obtain screening ultrasound in patients who meet 1 or more of the following criteria as patient is high risk for DVT/PE upon admission:   [] History of DVT/PE  []Hypercoagulable states (Factor V Leiden, Cancer, OCP, etc. )  []Prolonged immobility (hemiplegia/hemiparesis/post operative or any other extended immobilization)   [] Transferred from outside facility (Rehab or Long term care)  [] Age </= to 50 78-year-old woman with PMH, DM, mild cognitive impairment (performs her daily activities and oriented to person and place.)  She presented with sudden onset of right facial droop, right hemiparesis and global aphasia.Head CT shows left inferior temporal hypodensity suggestive of subacute infarct. MRI brain significant for left hemispheric–MCA territory inferior division temporal infarction and left hemispheric subcortical infarcts  CTA shows to my eye, left M1 segment severe left MCA stenosis. She was not  a candidate for IV thrombolysis as her stroke symptoms started 1/2/2023; infarction/ left temporal hypodensity present on CT at the time of presentation and left M2 distal occlusion with patent M1 segment.    Impression: Left MCA territory ischemia likely secondary to symptomatic intracranial left MCA stenosis.     NEURO: Neuro exam unchanged, permissive HTN with slow titration to normotensive, titrate statin to LDL goal less than 70, MRI Brain, CTA Head and Neck results as above Physical therapy/OT eval with recommendations for AR.      ANTITHROMBOTIC THERAPY: ASA/Plavix for 3 months followed by ASA only as per [facility] protocol for secondary stroke prevention     PULMONARY: CXR (01/07): Left upper lung bronchiectasis but otherwise no focal consolidation, protecting airway, saturating well on 1L NC    CARDIOVASCULAR: TTE 1/8/23: EF 63% Mild mitral regurgitation, cardiac monitoring no events, plan to evaluate for prolonged cardiac monitoring to completely exclude A-fib which can be done as outpatient                     Amlodipine added 5 mg      SBP goal: 110-160mmHg    GASTROINTESTINAL: Dysphagia screen passed, tolerating diet     Diet: Pureed    RENAL: BUN/Cr without change, good urine output, straight cath X1 on 01/07/23 ; hypokalemia- s/p kcl supplement      Na Goal: Greater than 135     Rojas: No    HEMATOLOGY: H/H without change, Platelets 274     DVT ppx: LMWH     ID: fever leukocytosis - will order blood cultures and chest x-ray. U/A no evidence of bacteremia; elevated prolactin     OTHER: Plan/goals of care discussed with pt's family at bedside and over the phone. Soft vest restraints d/c'd home.  Endocrine consult for new onset DM: diabetes medications sent home.     DISPOSITION: AR however family refused and would like to take her home, he will provide 24 hr supervision. risks benefits discussion at length.     CORE MEASURES:        Admission NIHSS: 22     TPA:  NO      LDL/HDL: 176/46     Depression Screen: N/A pt is aphasic     Statin Therapy: Y     Dysphagia Screen: PASS     Smoking NO      Afib NO     Stroke Education YES    Obtain screening ultrasound in patients who meet 1 or more of the following criteria as patient is high risk for DVT/PE upon admission:   [] History of DVT/PE  []Hypercoagulable states (Factor V Leiden, Cancer, OCP, etc. )  []Prolonged immobility (hemiplegia/hemiparesis/post operative or any other extended immobilization)   [] Transferred from outside facility (Rehab or Long term care)  [] Age </= to 50 78-year-old woman with PMH, DM, mild cognitive impairment (performs her daily activities and oriented to person and place.)  She presented with sudden onset of right facial droop, right hemiparesis and global aphasia.Head CT shows left inferior temporal hypodensity suggestive of subacute infarct. MRI brain significant for left hemispheric–MCA territory inferior division temporal infarction and left hemispheric subcortical infarcts  CTA shows to my eye, left M1 segment severe left MCA stenosis. She was not  a candidate for IV thrombolysis as her stroke symptoms started 1/2/2023; infarction/ left temporal hypodensity present on CT at the time of presentation and left M2 distal occlusion with patent M1 segment.    Impression: Left MCA territory ischemia likely secondary to symptomatic intracranial left MCA stenosis.     NEURO: Neuro exam unchanged, permissive HTN with slow titration to normotensive, titrate statin to LDL goal less than 70, MRI Brain, CTA Head and Neck results as above Physical therapy/OT eval with recommendations for AR.      ANTITHROMBOTIC THERAPY: ASA/Plavix for 3 months followed by ASA only as per [facility] protocol for secondary stroke prevention     PULMONARY: CXR (01/07): Left upper lung bronchiectasis but otherwise no focal consolidation, protecting airway, saturating well on 1L NC    CARDIOVASCULAR: TTE 1/8/23: EF 63% Mild mitral regurgitation, cardiac monitoring no events, plan to evaluate for prolonged cardiac monitoring to completely exclude A-fib which can be done as outpatient                     Amlodipine added 5 mg      SBP goal: 110-160mmHg    GASTROINTESTINAL: Dysphagia screen passed, tolerating diet     Diet: Pureed    RENAL: BUN/Cr without change, good urine output, straight cath X1 on 01/07/23 ; hypokalemia- s/p kcl supplement      Na Goal: Greater than 135     Rojas: No    HEMATOLOGY: H/H without change, Platelets 274     DVT ppx: LMWH     ID: fever leukocytosis - will order blood cultures and chest x-ray. U/A no evidence of bacteremia; elevated prolactin     OTHER: Plan/goals of care discussed with pt's family at bedside and over the phone. Soft vest restraints d/c'd home.  Endocrine consult for new onset DM: diabetes medications sent home.     DISPOSITION: AR however family refused and would like to take her home, he will provide 24 hr supervision. risks benefits discussion at length.     CORE MEASURES:        Admission NIHSS: 22     TPA:  NO      LDL/HDL: 176/46     Depression Screen: N/A pt is aphasic     Statin Therapy: Y     Dysphagia Screen: PASS     Smoking NO      Afib NO     Stroke Education YES    Obtain screening ultrasound in patients who meet 1 or more of the following criteria as patient is high risk for DVT/PE upon admission:   [] History of DVT/PE  []Hypercoagulable states (Factor V Leiden, Cancer, OCP, etc. )  []Prolonged immobility (hemiplegia/hemiparesis/post operative or any other extended immobilization)   [] Transferred from outside facility (Rehab or Long term care)  [] Age </= to 50 78-year-old woman with PMH, DM, mild cognitive impairment (performs her daily activities and oriented to person and place.)  She presented with sudden onset of right facial droop, right hemiparesis and global aphasia.Head CT shows left inferior temporal hypodensity suggestive of subacute infarct. MRI brain significant for left hemispheric–MCA territory inferior division temporal infarction and left hemispheric subcortical infarcts  CTA shows to my eye, left M1 segment severe left MCA stenosis. She was not  a candidate for IV thrombolysis as her stroke symptoms started 1/2/2023; infarction/ left temporal hypodensity present on CT at the time of presentation and left M2 distal occlusion with patent M1 segment.    Impression: Left MCA territory ischemia likely secondary to symptomatic intracranial left MCA stenosis.     NEURO: Neuro exam unchanged, permissive HTN with slow titration to normotensive, titrate statin to LDL goal less than 70, MRI Brain, CTA Head and Neck results as above Physical therapy/OT eval with recommendations for AR.      ANTITHROMBOTIC THERAPY: ASA/Plavix for 3 months followed by ASA only as per [facility] protocol for secondary stroke prevention     PULMONARY: CXR (01/07): Left upper lung bronchiectasis but otherwise no focal consolidation, protecting airway, saturating well on 1L NC    CARDIOVASCULAR: TTE 1/8/23: EF 63% Mild mitral regurgitation, cardiac monitoring no events, plan to evaluate for prolonged cardiac monitoring to completely exclude A-fib which can be done as outpatient                     Amlodipine added 5 mg      SBP goal: 110-160mmHg    GASTROINTESTINAL: Dysphagia screen passed, tolerating diet     Diet: Pureed    RENAL: BUN/Cr without change, good urine output, straight cath X1 on 01/07/23 ; hypokalemia- s/p kcl supplement      Na Goal: Greater than 135     Rojas: No    HEMATOLOGY: H/H without change, Platelets 274     DVT ppx: LMWH     ID: fever leukocytosis - will order blood cultures and chest x-ray. U/A no evidence of bacteremia; elevated prolactin     OTHER: Plan/goals of care discussed with pt's family at bedside and over the phone. Soft vest restraints d/c'd home.  Endocrine consult for new onset DM: diabetes medications sent home.     DISPOSITION: AR however family refused and would like to take her home, he will provide 24 hr supervision. risks benefits discussion at length.     CORE MEASURES:        Admission NIHSS: 22     TPA:  NO      LDL/HDL: 176/46     Depression Screen: N/A pt is aphasic     Statin Therapy: Y     Dysphagia Screen: PASS     Smoking NO      Afib NO     Stroke Education YES    Obtain screening ultrasound in patients who meet 1 or more of the following criteria as patient is high risk for DVT/PE upon admission:   [] History of DVT/PE  []Hypercoagulable states (Factor V Leiden, Cancer, OCP, etc. )  []Prolonged immobility (hemiplegia/hemiparesis/post operative or any other extended immobilization)   [] Transferred from outside facility (Rehab or Long term care)  [] Age </= to 50

## 2023-01-11 LAB
ANION GAP SERPL CALC-SCNC: 12 MMOL/L — SIGNIFICANT CHANGE UP (ref 5–17)
APPEARANCE UR: ABNORMAL
BILIRUB UR-MCNC: SIGNIFICANT CHANGE UP
BUN SERPL-MCNC: 16 MG/DL — SIGNIFICANT CHANGE UP (ref 7–23)
CALCIUM SERPL-MCNC: 9.3 MG/DL — SIGNIFICANT CHANGE UP (ref 8.4–10.5)
CHLORIDE SERPL-SCNC: 103 MMOL/L — SIGNIFICANT CHANGE UP (ref 96–108)
CO2 SERPL-SCNC: 21 MMOL/L — LOW (ref 22–31)
COLOR SPEC: ABNORMAL
CREAT SERPL-MCNC: 0.58 MG/DL — SIGNIFICANT CHANGE UP (ref 0.5–1.3)
D DIMER BLD IA.RAPID-MCNC: 417 NG/ML DDU — HIGH
DIFF PNL FLD: SIGNIFICANT CHANGE UP
EGFR: 92 ML/MIN/1.73M2 — SIGNIFICANT CHANGE UP
GLUCOSE SERPL-MCNC: 187 MG/DL — HIGH (ref 70–99)
GLUCOSE UR QL: SIGNIFICANT CHANGE UP
HCT VFR BLD CALC: 37.6 % — SIGNIFICANT CHANGE UP (ref 34.5–45)
HGB BLD-MCNC: 12 G/DL — SIGNIFICANT CHANGE UP (ref 11.5–15.5)
KETONES UR-MCNC: SIGNIFICANT CHANGE UP
LEUKOCYTE ESTERASE UR-ACNC: SIGNIFICANT CHANGE UP
MCHC RBC-ENTMCNC: 27.5 PG — SIGNIFICANT CHANGE UP (ref 27–34)
MCHC RBC-ENTMCNC: 31.9 GM/DL — LOW (ref 32–36)
MCV RBC AUTO: 86.2 FL — SIGNIFICANT CHANGE UP (ref 80–100)
NITRITE UR-MCNC: SIGNIFICANT CHANGE UP
NRBC # BLD: 0 /100 WBCS — SIGNIFICANT CHANGE UP (ref 0–0)
PH UR: SIGNIFICANT CHANGE UP (ref 5–8)
PLATELET # BLD AUTO: 295 K/UL — SIGNIFICANT CHANGE UP (ref 150–400)
POTASSIUM SERPL-MCNC: 3.3 MMOL/L — LOW (ref 3.5–5.3)
POTASSIUM SERPL-SCNC: 3.3 MMOL/L — LOW (ref 3.5–5.3)
PROT UR-MCNC: SIGNIFICANT CHANGE UP
RBC # BLD: 4.36 M/UL — SIGNIFICANT CHANGE UP (ref 3.8–5.2)
RBC # FLD: 13.9 % — SIGNIFICANT CHANGE UP (ref 10.3–14.5)
SODIUM SERPL-SCNC: 136 MMOL/L — SIGNIFICANT CHANGE UP (ref 135–145)
SP GR SPEC: 1.03 — HIGH (ref 1.01–1.02)
UROBILINOGEN FLD QL: SIGNIFICANT CHANGE UP
WBC # BLD: 12.63 K/UL — HIGH (ref 3.8–10.5)
WBC # FLD AUTO: 12.63 K/UL — HIGH (ref 3.8–10.5)

## 2023-01-11 PROCEDURE — 93970 EXTREMITY STUDY: CPT | Mod: 26

## 2023-01-11 PROCEDURE — 71275 CT ANGIOGRAPHY CHEST: CPT | Mod: 26

## 2023-01-11 PROCEDURE — 99233 SBSQ HOSP IP/OBS HIGH 50: CPT

## 2023-01-11 RX ORDER — SODIUM CHLORIDE 9 MG/ML
1000 INJECTION INTRAMUSCULAR; INTRAVENOUS; SUBCUTANEOUS
Refills: 0 | Status: DISCONTINUED | OUTPATIENT
Start: 2023-01-11 | End: 2023-01-12

## 2023-01-11 RX ORDER — POTASSIUM CHLORIDE 20 MEQ
10 PACKET (EA) ORAL ONCE
Refills: 0 | Status: COMPLETED | OUTPATIENT
Start: 2023-01-11 | End: 2023-01-11

## 2023-01-11 RX ADMIN — AMLODIPINE BESYLATE 5 MILLIGRAM(S): 2.5 TABLET ORAL at 05:29

## 2023-01-11 RX ADMIN — Medication 1: at 16:34

## 2023-01-11 RX ADMIN — ENOXAPARIN SODIUM 40 MILLIGRAM(S): 100 INJECTION SUBCUTANEOUS at 05:28

## 2023-01-11 RX ADMIN — CLOPIDOGREL BISULFATE 75 MILLIGRAM(S): 75 TABLET, FILM COATED ORAL at 11:51

## 2023-01-11 RX ADMIN — SODIUM CHLORIDE 50 MILLILITER(S): 9 INJECTION INTRAMUSCULAR; INTRAVENOUS; SUBCUTANEOUS at 13:06

## 2023-01-11 RX ADMIN — Medication 100 MILLIEQUIVALENT(S): at 07:27

## 2023-01-11 RX ADMIN — Medication 81 MILLIGRAM(S): at 11:52

## 2023-01-11 RX ADMIN — ATORVASTATIN CALCIUM 80 MILLIGRAM(S): 80 TABLET, FILM COATED ORAL at 22:54

## 2023-01-11 RX ADMIN — Medication 2: at 12:07

## 2023-01-11 RX ADMIN — Medication 1: at 07:24

## 2023-01-11 RX ADMIN — SODIUM CHLORIDE 50 MILLILITER(S): 9 INJECTION INTRAMUSCULAR; INTRAVENOUS; SUBCUTANEOUS at 20:13

## 2023-01-11 NOTE — PROVIDER CONTACT NOTE (OTHER) - RECOMMENDATIONS
assess pt
PADMINI Gary come to bedside and assess inside patients mouth for bleeding/cuts. Draw up CBC labs.
Notify Provider

## 2023-01-11 NOTE — PROGRESS NOTE ADULT - SUBJECTIVE AND OBJECTIVE BOX
HPI:    Patient is a 78 F with no reported history presented to [facility] for right facial droop, found to have CVA.  Endocrine consulted for new onset diabetes.     - None    Current inpatient DM Meds:   Low dose ISS     Most recent HbA1C: 8.6    INTERVAL HPI/OVERNIGHT EVENTS:  Seen at the bedside. Son also at the bedside. Appetite not so great.       Review of systems:   CONSTITUTIONAL:  Feels well, good appetite  CARDIOVASCULAR:  Negative for chest pain or palpitations  RESPIRATORY:  Negative for cough, or SOB   GASTROINTESTINAL:  Negative for nausea, vomiting, or abdominal pain  GENITOURINARY:  Negative frequency, urgency or dysuria     CAPILLARY BLOOD GLUCOSE      POCT Blood Glucose.: 226 mg/dL (10 Ruben 2023 11:57)  POCT Blood Glucose.: 166 mg/dL (10 Ruben 2023 07:51)  POCT Blood Glucose.: 171 mg/dL (09 Jan 2023 21:33)  POCT Blood Glucose.: 166 mg/dL (09 Jan 2023 17:04)       MEDICATIONS  (STANDING):  acetaminophen  Suppository .. 650 milliGRAM(s) Rectal once  amLODIPine   Tablet 5 milliGRAM(s) Oral daily  aspirin  chewable 81 milliGRAM(s) Oral daily  atorvastatin 80 milliGRAM(s) Oral at bedtime  clopidogrel Tablet 75 milliGRAM(s) Oral daily  dextrose 5%. 1000 milliLiter(s) (50 mL/Hr) IV Continuous <Continuous>  dextrose 5%. 1000 milliLiter(s) (100 mL/Hr) IV Continuous <Continuous>  dextrose 50% Injectable 25 Gram(s) IV Push once  dextrose 50% Injectable 12.5 Gram(s) IV Push once  dextrose 50% Injectable 25 Gram(s) IV Push once  enoxaparin Injectable 40 milliGRAM(s) SubCutaneous every 24 hours  glucagon  Injectable 1 milliGRAM(s) IntraMuscular once  insulin lispro (ADMELOG) corrective regimen sliding scale   SubCutaneous at bedtime  insulin lispro (ADMELOG) corrective regimen sliding scale   SubCutaneous three times a day before meals  polyethylene glycol 3350 17 Gram(s) Oral daily  potassium chloride   Powder 40 milliEquivalent(s) Oral once  QUEtiapine 12.5 milliGRAM(s) Oral at bedtime    MEDICATIONS  (PRN):  dextrose Oral Gel 15 Gram(s) Oral once PRN Blood Glucose LESS THAN 70 milliGRAM(s)/deciliter      PHYSICAL EXAM  Vital Signs Last 24 Hrs  T(C): 38.2 (10 Ruben 2023 15:20), Max: 38.2 (10 Ruben 2023 15:20)  T(F): 100.7 (10 Ruben 2023 15:20), Max: 100.7 (10 Ruben 2023 15:20)  HR: 93 (10 Ruben 2023 15:35) (60 - 109)  BP: 145/58 (10 Ruben 2023 14:00) (117/50 - 183/82)  BP(mean): 81 (10 Ruben 2023 14:00) (68 - 115)  RR: 25 (10 Ruben 2023 15:35) (16 - 26)  SpO2: 97% (10 Ruben 2023 15:35) (95% - 98%)    Parameters below as of 10 Ruben 2023 15:35  Patient On (Oxygen Delivery Method): room air        GENERAL: feMale laying in bed in NAD  RESPIRATORY: nonlabored breathing, no accessory muscle use  Extremities: Warm, no edema in all 4 exts   NEURO: A&O X3    LABS:                        12.2   10.80 )-----------( 274      ( 10 Ruben 2023 05:40 )             37.9     01-10    136  |  104  |  14  ----------------------------<  179<H>  3.3<L>   |  18<L>  |  0.57    Ca    9.1      10 Ruben 2023 05:40                HPI:    Patient is a 78 F with no reported history presented to [facility] for right facial droop, found to have CVA.  Endocrine consulted for new onset diabetes.     - None    Current inpatient DM Meds:   Low dose ISS     Most recent HbA1C: 8.6    INTERVAL HPI/OVERNIGHT EVENTS:  Seen at the bedside. Son also at the bedside. Appetite not so great.       Review of systems:   CONSTITUTIONAL:  Feels well, good appetite  CARDIOVASCULAR:  Negative for chest pain or palpitations  RESPIRATORY:  Negative for cough, or SOB   GASTROINTESTINAL:  Negative for nausea, vomiting, or abdominal pain  GENITOURINARY:  Negative frequency, urgency or dysuria     CAPILLARY BLOOD GLUCOSE      POCT Blood Glucose.: 226 mg/dL (10 Ruben 2023 11:57)  POCT Blood Glucose.: 166 mg/dL (10 Ruben 2023 07:51)  POCT Blood Glucose.: 171 mg/dL (09 Jan 2023 21:33)  POCT Blood Glucose.: 166 mg/dL (09 Jan 2023 17:04)       MEDICATIONS  (STANDING):  acetaminophen  Suppository .. 650 milliGRAM(s) Rectal once  amLODIPine   Tablet 5 milliGRAM(s) Oral daily  aspirin  chewable 81 milliGRAM(s) Oral daily  atorvastatin 80 milliGRAM(s) Oral at bedtime  clopidogrel Tablet 75 milliGRAM(s) Oral daily  dextrose 5%. 1000 milliLiter(s) (50 mL/Hr) IV Continuous <Continuous>  dextrose 5%. 1000 milliLiter(s) (100 mL/Hr) IV Continuous <Continuous>  dextrose 50% Injectable 25 Gram(s) IV Push once  dextrose 50% Injectable 12.5 Gram(s) IV Push once  dextrose 50% Injectable 25 Gram(s) IV Push once  enoxaparin Injectable 40 milliGRAM(s) SubCutaneous every 24 hours  glucagon  Injectable 1 milliGRAM(s) IntraMuscular once  insulin lispro (ADMELOG) corrective regimen sliding scale   SubCutaneous at bedtime  insulin lispro (ADMELOG) corrective regimen sliding scale   SubCutaneous three times a day before meals  polyethylene glycol 3350 17 Gram(s) Oral daily  potassium chloride   Powder 40 milliEquivalent(s) Oral once  QUEtiapine 12.5 milliGRAM(s) Oral at bedtime    MEDICATIONS  (PRN):  dextrose Oral Gel 15 Gram(s) Oral once PRN Blood Glucose LESS THAN 70 milliGRAM(s)/deciliter      PHYSICAL EXAM  Vital Signs Last 24 Hrs  T(C): 38.2 (10 Ruben 2023 15:20), Max: 38.2 (10 Ruben 2023 15:20)  T(F): 100.7 (10 Ruben 2023 15:20), Max: 100.7 (10 Ruben 2023 15:20)  HR: 93 (10 Ruben 2023 15:35) (60 - 109)  BP: 145/58 (10 Ruben 2023 14:00) (117/50 - 183/82)  BP(mean): 81 (10 Ruben 2023 14:00) (68 - 115)  RR: 25 (10 Ruben 2023 15:35) (16 - 26)  SpO2: 97% (10 Ruben 2023 15:35) (95% - 98%)    Parameters below as of 10 Ruben 2023 15:35  Patient On (Oxygen Delivery Method): room air        GENERAL: feMale laying in bed in NAD  RESPIRATORY: nonlabored breathing, no accessory muscle use  Extremities: Warm, no edema in all 4 exts   NEURO: A&O X3    LABS:                        12.2   10.80 )-----------( 274      ( 10 Ruben 2023 05:40 )             37.9     01-10    136  |  104  |  14  ----------------------------<  179<H>  3.3<L>   |  18<L>  |  0.57    Ca    9.1      10 Ruben 2023 05:40                HPI:    Patient is a 78 F with no reported history presented to [facility] for right facial droop, found to have CVA.  Endocrine consulted for new onset diabetes.     - None    Current inpatient DM Meds:   Low dose ISS     Most recent HbA1C: 8.6    INTERVAL HPI/OVERNIGHT EVENTS:  Seen at the bedside. Son also at the bedside. Appetite not so great.       Review of systems:   CONSTITUTIONAL:  Feels well, good appetite  CARDIOVASCULAR:  Negative for chest pain or palpitations  RESPIRATORY:  Negative for cough, or SOB   GASTROINTESTINAL:  Negative for nausea, vomiting, or abdominal pain  GENITOURINARY:  Negative frequency, urgency or dysuria     CAPILLARY BLOOD GLUCOSE      POCT Blood Glucose.: 226 mg/dL (10 Ruben 2023 11:57)  POCT Blood Glucose.: 166 mg/dL (10 Ruben 2023 07:51)  POCT Blood Glucose.: 171 mg/dL (09 Jan 2023 21:33)  POCT Blood Glucose.: 166 mg/dL (09 Jan 2023 17:04)       MEDICATIONS  (STANDING):  acetaminophen  Suppository .. 650 milliGRAM(s) Rectal once  amLODIPine   Tablet 5 milliGRAM(s) Oral daily  aspirin  chewable 81 milliGRAM(s) Oral daily  atorvastatin 80 milliGRAM(s) Oral at bedtime  clopidogrel Tablet 75 milliGRAM(s) Oral daily  dextrose 5%. 1000 milliLiter(s) (50 mL/Hr) IV Continuous <Continuous>  dextrose 5%. 1000 milliLiter(s) (100 mL/Hr) IV Continuous <Continuous>  dextrose 50% Injectable 25 Gram(s) IV Push once  dextrose 50% Injectable 12.5 Gram(s) IV Push once  dextrose 50% Injectable 25 Gram(s) IV Push once  enoxaparin Injectable 40 milliGRAM(s) SubCutaneous every 24 hours  glucagon  Injectable 1 milliGRAM(s) IntraMuscular once  insulin lispro (ADMELOG) corrective regimen sliding scale   SubCutaneous at bedtime  insulin lispro (ADMELOG) corrective regimen sliding scale   SubCutaneous three times a day before meals  polyethylene glycol 3350 17 Gram(s) Oral daily  potassium chloride   Powder 40 milliEquivalent(s) Oral once  QUEtiapine 12.5 milliGRAM(s) Oral at bedtime    MEDICATIONS  (PRN):  dextrose Oral Gel 15 Gram(s) Oral once PRN Blood Glucose LESS THAN 70 milliGRAM(s)/deciliter      PHYSICAL EXAM  Vital Signs Last 24 Hrs  T(C): 38.2 (10 Ruben 2023 15:20), Max: 38.2 (10 Ruben 2023 15:20)  T(F): 100.7 (10 Ruben 2023 15:20), Max: 100.7 (10 Ruben 2023 15:20)  HR: 93 (10 Ruben 2023 15:35) (60 - 109)  BP: 145/58 (10 Ruben 2023 14:00) (117/50 - 183/82)  BP(mean): 81 (10 Ruben 2023 14:00) (68 - 115)  RR: 25 (10 Ruben 2023 15:35) (16 - 26)  SpO2: 97% (10 Ruben 2023 15:35) (95% - 98%)    Parameters below as of 10 Ruben 2023 15:35  Patient On (Oxygen Delivery Method): room air        GENERAL: feMale laying in bed in NAD  RESPIRATORY: nonlabored breathing, no accessory muscle use  Extremities: Warm, no edema in all 4 exts   NEURO: A&O X3    LABS:                        12.2   10.80 )-----------( 274      ( 10 Ruben 2023 05:40 )             37.9     01-10    136  |  104  |  14  ----------------------------<  179<H>  3.3<L>   |  18<L>  |  0.57    Ca    9.1      10 Ruben 2023 05:40

## 2023-01-11 NOTE — CONSULT NOTE ADULT - NS ATTEND AMEND GEN_ALL_CORE FT
Pt care and plan discussed and reviewed with PA. Plan as outlined above edited by me to reflect our discussion.     Discussed in detail with neurology team   pending infectious W/U

## 2023-01-11 NOTE — PROVIDER CONTACT NOTE (OTHER) - SITUATION
Pt is awake and removing all tele leads, not letting RN replace them.
HEMATURIA( DARK RED BLOODY URINE) 150ML
Pt agitated and restless, Pulling off TELE and pulse ox
Patient was drooling blood

## 2023-01-11 NOTE — PROVIDER CONTACT NOTE (OTHER) - ASSESSMENT
Patient is lethargic, arouse to pain (see neuro assessment). Noticed Pt had blood on lips, suction patient and did oral care.
Pt remains A&Ox0, does not follow commands, Driftx4
Pt orientation status is unable to assess due to severe aphasia, pt has little to no verbal output. pt doesn't show any signs of pain or discomfort. Pts oxygen and BP are stable
void 150ml with small amount incontinence  no residual urin by bladder scan

## 2023-01-11 NOTE — PROVIDER CONTACT NOTE (OTHER) - REASON
hematuria
Patient agitated and removing tele leads
Patient was drooling blood
Pt Agitated, restless, pulling lines off

## 2023-01-11 NOTE — PROGRESS NOTE ADULT - SUBJECTIVE AND OBJECTIVE BOX
THE PATIENT WAS SEEN AND EXAMINED BY ME WITH THE HOUSESTAFF AND STROKE TEAM DURING MORNING ROUNDS.   HPI:  78 year-old right-handed female Nicole speaking, w/ PMHx ?dementia (AOx1-2, performs all ADLs), DM, presenting to [facility] ED w/ right facial droop that started on 1/2/23. On day of arrival, 1/6/23, around 15:30PM, patient noted to have unsteady gait and language abnormalities (not speaking, difficulty understanding),     SUBJECTIVE: hematuria in am.  No new neurologic complaints, ROS reported negative unless otherwise noted.      amLODIPine   Tablet 5 milliGRAM(s) Oral daily  aspirin  chewable 81 milliGRAM(s) Oral daily  atorvastatin 80 milliGRAM(s) Oral at bedtime  clopidogrel Tablet 75 milliGRAM(s) Oral daily  dextrose 5%. 1000 milliLiter(s) IV Continuous <Continuous>  dextrose 5%. 1000 milliLiter(s) IV Continuous <Continuous>  dextrose 50% Injectable 25 Gram(s) IV Push once  dextrose 50% Injectable 12.5 Gram(s) IV Push once  dextrose 50% Injectable 25 Gram(s) IV Push once  dextrose Oral Gel 15 Gram(s) Oral once PRN  enoxaparin Injectable 40 milliGRAM(s) SubCutaneous every 24 hours  glucagon  Injectable 1 milliGRAM(s) IntraMuscular once  insulin lispro (ADMELOG) corrective regimen sliding scale   SubCutaneous three times a day before meals  insulin lispro (ADMELOG) corrective regimen sliding scale   SubCutaneous at bedtime  polyethylene glycol 3350 17 Gram(s) Oral daily      PHYSICAL EXAM:   Vital Signs Last 24 Hrs  T(C): 37.2 (11 Jan 2023 12:02), Max: 38.2 (10 Ruben 2023 15:20)  T(F): 99 (11 Jan 2023 12:02), Max: 100.7 (10 Ruben 2023 15:20)  HR: 99 (11 Jan 2023 12:02) (86 - 109)  BP: 152/75 (11 Jan 2023 12:02) (113/61 - 171/64)  BP(mean): 96 (11 Jan 2023 12:02) (69 - 109)  RR: 22 (11 Jan 2023 12:02) (16 - 34)  SpO2: 98% (11 Jan 2023 12:02) (95% - 99%)    Parameters below as of 11 Jan 2023 12:02  Patient On (Oxygen Delivery Method): room air        General: No acute distress  HEENT: EOM intact, blinks to threat B/L  Abdomen: Soft, nontender, nondistended   Extremities: No edema    NEUROLOGICAL EXAM: Family at bedside who provided translation   Mental status: Eyes open, awake, alert, attempts to produce speech, able to follow some simple commands, able to mimic, unable to ID objects  Cranial Nerves: Right facial droop, no nystagmus, blinks to threat B/L  Motor exam: Normal tone, no drift, Right hemiparesis RUE drift, 4/5, RLE drift, 4/5,, LUE 5/5, LLE 5/5  Sensation: Intact to light touch   Coordination/ Gait: Unable to participate with exam         LABS:                        12.0   12.63 )-----------( 295      ( 11 Jan 2023 05:23 )             37.6    01-11    136  |  103  |  16  ----------------------------<  187<H>  3.3<L>   |  21<L>  |  0.58    Ca    9.3      11 Jan 2023 05:23          IMAGING: Reviewed by me.     Brain MRI (01/07/23): Left MCA territory acute infarcts. No acute intracranial hemorrhage    CT Head (01/06/23): No acute intracranial hemorrhage. Hypoattenuation of the left temporal lobe, may represent acute left MCA territory infarct.    CT/CTA head/neck (01/06/23): CTA Head & Neck: Severe stenosis/near complete occlusion with the left M2 branch.    CT Perfusion: There is a penumbra involving a large area of the left MCA territory.     THE PATIENT WAS SEEN AND EXAMINED BY ME WITH THE HOUSESTAFF AND STROKE TEAM DURING MORNING ROUNDS.   HPI:  78 year-old right-handed female Nicole speaking, w/ PMHx ?dementia (AOx1-2, performs all ADLs), DM, presenting to [facility] ED w/ right facial droop that started on 1/2/23. On day of arrival, 1/6/23, around 15:30PM, patient noted to have unsteady gait and language abnormalities (not speaking, difficulty understanding),     SUBJECTIVE: hematuria in am.  No new neurologic complaints, ROS reported negative unless otherwise noted.      amLODIPine   Tablet 5 milliGRAM(s) Oral daily  aspirin  chewable 81 milliGRAM(s) Oral daily  atorvastatin 80 milliGRAM(s) Oral at bedtime  clopidogrel Tablet 75 milliGRAM(s) Oral daily  dextrose 5%. 1000 milliLiter(s) IV Continuous <Continuous>  dextrose 5%. 1000 milliLiter(s) IV Continuous <Continuous>  dextrose 50% Injectable 25 Gram(s) IV Push once  dextrose 50% Injectable 12.5 Gram(s) IV Push once  dextrose 50% Injectable 25 Gram(s) IV Push once  dextrose Oral Gel 15 Gram(s) Oral once PRN  enoxaparin Injectable 40 milliGRAM(s) SubCutaneous every 24 hours  glucagon  Injectable 1 milliGRAM(s) IntraMuscular once  insulin lispro (ADMELOG) corrective regimen sliding scale   SubCutaneous three times a day before meals  insulin lispro (ADMELOG) corrective regimen sliding scale   SubCutaneous at bedtime  polyethylene glycol 3350 17 Gram(s) Oral daily      PHYSICAL EXAM:   Vital Signs Last 24 Hrs  T(C): 37.2 (11 Jan 2023 12:02), Max: 38.2 (10 Ruben 2023 15:20)  T(F): 99 (11 Jan 2023 12:02), Max: 100.7 (10 Ruben 2023 15:20)  HR: 99 (11 Jan 2023 12:02) (86 - 109)  BP: 152/75 (11 Jan 2023 12:02) (113/61 - 171/64)  BP(mean): 96 (11 Jan 2023 12:02) (69 - 109)  RR: 22 (11 Jan 2023 12:02) (16 - 34)  SpO2: 98% (11 Jan 2023 12:02) (95% - 99%)    Parameters below as of 11 Jan 2023 12:02  Patient On (Oxygen Delivery Method): room air        General: No acute distress  HEENT: EOM intact, blinks to threat B/L  Abdomen: Soft, nontender, nondistended   Extremities: No edema    NEUROLOGICAL EXAM: Family at bedside who provided translation   Mental status: Eyes open, awake, alert, attempts to produce speech, able to follow some simple commands, able to mimic, unable to ID objects  Cranial Nerves: Right facial droop, no nystagmus, blinks to threat B/L  Motor exam: Normal tone, no drift, Right hemiparesis RUE drift, 4/5, RLE drift, 4/5,, LUE 5/5, LLE 5/5  Sensation: Intact to light touch   Coordination/ Gait: Unable to participate with exam         LABS:                        12.0   12.63 )-----------( 295      ( 11 Jan 2023 05:23 )             37.6    01-11    136  |  103  |  16  ----------------------------<  187<H>  3.3<L>   |  21<L>  |  0.58    Ca    9.3      11 Jan 2023 05:23          IMAGING: Reviewed by me.     Brain MRI (01/07/23): Left MCA territory acute infarcts. No acute intracranial hemorrhage    CT Head (01/06/23): No acute intracranial hemorrhage. Hypoattenuation of the left temporal lobe, may represent acute left MCA territory infarct.    CT/CTA head/neck (01/06/23): CTA Head & Neck: Severe stenosis/near complete occlusion with the left M2 branch.    CT Perfusion: There is a penumbra involving a large area of the left MCA territory.     THE PATIENT WAS SEEN AND EXAMINED BY ME WITH THE HOUSESTAFF AND STROKE TEAM DURING MORNING ROUNDS.   HPI:  78 year-old right-handed female Nicole speaking, w/ PMHx ?dementia (AOx1-2, performs all ADLs), DM, presenting to [facility] ED w/ right facial droop that started on 1/2/23. On day of arrival, 1/6/23, around 15:30PM, patient noted to have unsteady gait and language abnormalities (not speaking, difficulty understanding),     SUBJECTIVE: hematuria in am.  No new neurologic complaints, ROS reported negative unless otherwise noted.      amLODIPine   Tablet 5 milliGRAM(s) Oral daily  aspirin  chewable 81 milliGRAM(s) Oral daily  atorvastatin 80 milliGRAM(s) Oral at bedtime  clopidogrel Tablet 75 milliGRAM(s) Oral daily  dextrose 5%. 1000 milliLiter(s) IV Continuous <Continuous>  dextrose 5%. 1000 milliLiter(s) IV Continuous <Continuous>  dextrose 50% Injectable 25 Gram(s) IV Push once  dextrose 50% Injectable 12.5 Gram(s) IV Push once  dextrose 50% Injectable 25 Gram(s) IV Push once  dextrose Oral Gel 15 Gram(s) Oral once PRN  enoxaparin Injectable 40 milliGRAM(s) SubCutaneous every 24 hours  glucagon  Injectable 1 milliGRAM(s) IntraMuscular once  insulin lispro (ADMELOG) corrective regimen sliding scale   SubCutaneous three times a day before meals  insulin lispro (ADMELOG) corrective regimen sliding scale   SubCutaneous at bedtime  polyethylene glycol 3350 17 Gram(s) Oral daily      PHYSICAL EXAM:   Vital Signs Last 24 Hrs  T(C): 37.2 (11 Jan 2023 12:02), Max: 38.2 (10 Ruben 2023 15:20)  T(F): 99 (11 Jan 2023 12:02), Max: 100.7 (10 Ruben 2023 15:20)  HR: 99 (11 Jan 2023 12:02) (86 - 109)  BP: 152/75 (11 Jan 2023 12:02) (113/61 - 171/64)  BP(mean): 96 (11 Jan 2023 12:02) (69 - 109)  RR: 22 (11 Jan 2023 12:02) (16 - 34)  SpO2: 98% (11 Jan 2023 12:02) (95% - 99%)    Parameters below as of 11 Jan 2023 12:02  Patient On (Oxygen Delivery Method): room air        General: No acute distress  HEENT: EOM intact, blinks to threat B/L  Abdomen: Soft, nontender, nondistended   Extremities: No edema    NEUROLOGICAL EXAM: Family at bedside who provided translation   Mental status: Eyes open, awake, alert, attempts to produce speech, able to follow some simple commands, able to mimic, unable to ID objects  Cranial Nerves: Right facial droop, no nystagmus, blinks to threat B/L  Motor exam: Normal tone, no drift, Right hemiparesis RUE drift, 4/5, RLE drift, 4/5,, LUE 5/5, LLE 5/5  Sensation: Intact to light touch   Coordination/ Gait: Unable to participate with exam         LABS:                        12.0   12.63 )-----------( 295      ( 11 Jan 2023 05:23 )             37.6    01-11    136  |  103  |  16  ----------------------------<  187<H>  3.3<L>   |  21<L>  |  0.58    Ca    9.3      11 Jan 2023 05:23          IMAGING: Reviewed by me.     Brain MRI (01/07/23): Left MCA territory acute infarcts. No acute intracranial hemorrhage    CT Head (01/06/23): No acute intracranial hemorrhage. Hypoattenuation of the left temporal lobe, may represent acute left MCA territory infarct.    CT/CTA head/neck (01/06/23): CTA Head & Neck: Severe stenosis/near complete occlusion with the left M2 branch.    CT Perfusion: There is a penumbra involving a large area of the left MCA territory.

## 2023-01-11 NOTE — PROVIDER CONTACT NOTE (OTHER) - BACKGROUND
h/o dementia, severe/ near complete occlusion for left M2 with penumbra
Patient has severe /near complete occlusion for L M2 w/ penumbra in left MCA territory. See eMAR for medications.
LEFT MCA infarct stroke
Pt is a 78yr old female who p/w right sided facial droop found to have a occlusion in the Left MCA

## 2023-01-11 NOTE — PROVIDER CONTACT NOTE (OTHER) - ACTION/TREATMENT ORDERED:
U/A
Leave tele leads off for now and attempt to reattach leads later
Provider made aware, EKG to be complete then Haldol IVP to be given, b/l Unsecured mittens ordered
PADMINI Gary came to bedside, noticed a small cut inside left cheek, Sent CBC labs down, no suction at this time. Will continue to monitor patient for increase in bleeding.

## 2023-01-11 NOTE — PROGRESS NOTE ADULT - ASSESSMENT
Patient is a 78 F with no reported history presented to [facility] for right facial droop, found to have CVA.  Endocrine consulted for new onset diabetes. Patient is high risk with high level decision making due to uncontrolled diabetes which places patient at high risk for cardiovascular and cerebrovascular events. Patient with lability of glucose requiring close monitoring and insulin adjustments.    1.  T2DM   - Most recent Hemoglobin A1C 8.6, goal 8% for this 78 year old woman   - Current FS ranges from 150-180  - Current diet: CHO, pureed  - Please monitor blood glucose values TID AC & QHS while eating regular meals and Q6H while NPO  - Blood glucose goals pre-meal less than 140 mg/dL and random blood glucose less than 180 mg/dL  - Recommendations:  - Glucose mostly at goal  - Continue with low dose  Correctional scale TID with meals  - Continue with low dose Correctional scale QHS    2. HTN  - BP goal 130/80  - Manage per primary team     3. HLD  - Continue with atorvastatin 80 mg daily   -   - Manage as outpatient      Discharge planning:  - Current home DM meds: none  - Discharge DM meds: Metformin 500 mg ER BID (with breakfast and dinner to reduce stomach upset). Discussed this with the patient's son who is a pharmacist. Can titrate up to max dose og 1g BID if tolerating with minimum GI side effects   - Patient can follow up at -[address] [place] [address], [address]LO[address], [place] [address] [phone]  - Patient will need opthalmology and podiatry follow up as outpatient     Thank you for the consult. Discharge plan as above. We will sign off at this time.     Contact via pager or Microsoft Teams during business hours. For follow up questions, discharge recommendations, or new consults please call answering service at [phone] (weekdays), [phone] (nights/weekends). For nonurgent matters, please email  [email or web address].     Katherine Martini MD  Department of Endocrinology, Diabetes and metabolism   Pager [phone]   Patient is a 78 F with no reported history presented to [facility] for right facial droop, found to have CVA.  Endocrine consulted for new onset diabetes. Patient is high risk with high level decision making due to uncontrolled diabetes which places patient at high risk for cardiovascular and cerebrovascular events. Patient with lability of glucose requiring close monitoring and insulin adjustments.    1.  T2DM   - Most recent Hemoglobin A1C 8.6, goal 8% for this 78 year old woman   - Current FS ranges from 150-180  - Current diet: CHO, pureed  - Please monitor blood glucose values TID AC & QHS while eating regular meals and Q6H while NPO  - Blood glucose goals pre-meal less than 140 mg/dL and random blood glucose less than 180 mg/dL  - Recommendations:  - Glucose mostly at goal  - Continue with low dose  Correctional scale TID with meals  - Continue with low dose Correctional scale QHS    2. HTN  - BP goal 130/80  - Manage per primary team     3. HLD  - Continue with atorvastatin 80 mg daily   -   - Manage as outpatient      Discharge planning:  - Current home DM meds: none  - Discharge DM meds: Metformin 500 mg ER BID (with breakfast and dinner to reduce stomach upset). Discussed this with the patient's son who is a pharmacist. Can titrate up to max dose og 1g BID if tolerating with minimum GI side effects   - Patient can follow up at -[address] [place] [address], [address]LO[address], [place] [address] [phone]  - Patient will need opthalmology and podiatry follow up as outpatient     Thank you for the consult. Discharge plan as above. We will sign off at this time.     Contact via pager or Microsoft Teams during business hours. For follow up questions, discharge recommendations, or new consults please call answering service at [phone] (weekdays), [phone] (nights/weekends). For nonurgent matters, please email  [email or web address].     Katherine Martini MD  Department of Endocrinology, Diabetes and metabolism   Pager [phone]   Patient is a 78 F with no reported history presented to [facility] for right facial droop, found to have CVA.  Endocrine consulted for new onset diabetes. Patient is high risk with high level decision making due to uncontrolled diabetes which places patient at high risk for cardiovascular and cerebrovascular events. Patient with lability of glucose requiring close monitoring and insulin adjustments.    1.  T2DM   - Most recent Hemoglobin A1C 8.6, goal 8% for this 78 year old woman   - Current FS ranges from 150-180  - Current diet: CHO, pureed  - Please monitor blood glucose values TID AC & QHS while eating regular meals and Q6H while NPO  - Blood glucose goals pre-meal less than 140 mg/dL and random blood glucose less than 180 mg/dL  - Recommendations:  - Glucose mostly at goal  - Continue with low dose  Correctional scale TID with meals  - Continue with low dose Correctional scale QHS    2. HTN  - BP goal 130/80  - Manage per primary team     3. HLD  - Continue with atorvastatin 80 mg daily   -   - Manage as outpatient      Discharge planning:  - Current home DM meds: none  - Discharge DM meds: Metformin 500 mg ER BID (with breakfast and dinner to reduce stomach upset). Discussed this with the patient's son who is a pharmacist. Can titrate up to max dose og 1g BID if tolerating with minimum GI side effects   - Patient can follow up at -[address] [place] [address], [address] FLO[address], [place] [address] [phone]  - Patient will need opthalmology and podiatry follow up as outpatient     Thank you for the consult. Discharge plan as above. We will sign off at this time.     Contact via pager or Microsoft Teams during business hours. For follow up questions, discharge recommendations, or new consults please call answering service at [phone] (weekdays), [phone] (nights/weekends). For nonurgent matters, please email  [email or web address].     Katherine Martini MD  Department of Endocrinology, Diabetes and metabolism   Pager [phone]

## 2023-01-11 NOTE — PROGRESS NOTE ADULT - ASSESSMENT
78-year-old woman with PMH, DM, mild cognitive impairment (performs her daily activities and oriented to person and place.)  She presented with sudden onset of right facial droop, right hemiparesis and global aphasia.Head CT shows left inferior temporal hypodensity suggestive of subacute infarct. MRI brain significant for left hemispheric–MCA territory inferior division temporal infarction and left hemispheric subcortical infarcts  CTA shows to my eye, left M1 segment severe left MCA stenosis. She was not  a candidate for IV thrombolysis as her stroke symptoms started 1/2/2023; infarction/ left temporal hypodensity present on CT at the time of presentation and left M2 distal occlusion with patent M1 segment.    Impression: Left MCA territory ischemia likely secondary to symptomatic intracranial left MCA stenosis.     NEURO: Neuro exam unchanged, permissive HTN with slow titration to normotensive, titrate statin to LDL goal less than 70, MRI Brain, off Seroquel,  CTA Head and Neck results as above Physical therapy/OT eval with recommendations for AR.      ANTITHROMBOTIC THERAPY: ASA/Plavix for 3 months followed by ASA only as per [facility] protocol for secondary stroke prevention     PULMONARY: CXR (01/10): Biapical scarring with left upper lobe bronchiectasis are without significant change as compared to 1/6/2023., protecting airway, saturating well. Plan to obtain CTA chest to r/o PE, Pt with tachycardia,tachypnea and elevated WBC     CARDIOVASCULAR: TTE 1/8/23: EF 63% Mild mitral regurgitation. Cardiac monitoring: tachycardia HR , plan to obtain LOOP as oupt to monitor for occult arrhythmias like AFib, Continue amlodipine for BP management      SBP goal: 110-160mmHg    GASTROINTESTINAL: Dysphagia screen passed, tolerating diet     Diet: Pureed    RENAL: BUN/Cr without change, good urine output, straight cath X1 on 01/07/23 ; hypokalemia- s/p kcl supplement      Na Goal: Greater than 135     Rojas: No    HEMATOLOGY: H/H without change, Platelets 295, LE doppler (01/11): No evidence of DVT in either lower extremity.       DVT ppx: LMWH     ID: afebrile in am febrile on 01/10/23 T max 38.2, leukocytosis - blood cultures sent, UA negative, elevated prolactin 0.18, will continue to monitor, Dr Castillo contacted for further recommendations     OTHER: Plan/goals of care discussed with pt's family at bedside and over the phone. Soft vest restraints d/c'd.  Endocrine consult for new onset DM: diabetes medications sent home.     DISPOSITION: AR however family refused and would like to take her home, he will provide 24 hr supervision. risks benefits discussion at length. D/C once stable    CORE MEASURES:        Admission NIHSS: 22     TPA:  NO      LDL/HDL: 176/46     Depression Screen: N/A pt is aphasic     Statin Therapy: Y     Dysphagia Screen: PASS     Smoking NO      Afib NO     Stroke Education YES    Obtain screening ultrasound in patients who meet 1 or more of the following criteria as patient is high risk for DVT/PE upon admission:   [] History of DVT/PE  []Hypercoagulable states (Factor V Leiden, Cancer, OCP, etc. )  []Prolonged immobility (hemiplegia/hemiparesis/post operative or any other extended immobilization)   [] Transferred from outside facility (Rehab or Long term care)  [] Age </= to 50 78-year-old woman with PMH, DM, mild cognitive impairment (performs her daily activities and oriented to person and place.)  She presented with sudden onset of right facial droop, right hemiparesis and global aphasia.Head CT shows left inferior temporal hypodensity suggestive of subacute infarct. MRI brain significant for left hemispheric–MCA territory inferior division temporal infarction and left hemispheric subcortical infarcts  CTA shows to my eye, left M1 segment severe left MCA stenosis. She was not  a candidate for IV thrombolysis as her stroke symptoms started 1/2/2023; infarction/ left temporal hypodensity present on CT at the time of presentation and left M2 distal occlusion with patent M1 segment.    Impression: Left MCA territory ischemia likely secondary to symptomatic intracranial left MCA stenosis.     NEURO: Neuro exam unchanged, permissive HTN with slow titration to normotensive, titrate statin to LDL goal less than 70, MRI Brain, off Seroquel,  CTA Head and Neck results as above Physical therapy/OT eval with recommendations for AR.      ANTITHROMBOTIC THERAPY: ASA/Plavix for 3 months followed by ASA only as per [facility] protocol for secondary stroke prevention     PULMONARY: CXR (01/10): Biapical scarring with left upper lobe bronchiectasis are without significant change as compared to 1/6/2023., protecting airway, saturating well. Plan to obtain CTA chest to r/o PE, Pt with tachycardia,tachypnea and elevated WBC     CARDIOVASCULAR: TTE 1/8/23: EF 63% Mild mitral regurgitation. Cardiac monitoring: tachycardia HR , plan to obtain LOOP as oupt to monitor for occult arrhythmias like AFib, Continue amlodipine for BP management      SBP goal: 110-160mmHg    GASTROINTESTINAL: Dysphagia screen passed, tolerating diet     Diet: Pureed    RENAL: BUN/Cr without change, good urine output, straight cath X1 on 01/07/23 ; hypokalemia- s/p kcl supplement      Na Goal: Greater than 135     Rojas: No    HEMATOLOGY: H/H without change, Platelets 295, LE doppler (01/11): No evidence of DVT in either lower extremity.       DVT ppx: LMWH     ID: afebrile in am febrile on 01/10/23 T max 38.2, leukocytosis - blood cultures sent, UA negative, elevated prolactin 0.18, will continue to monitor, Dr Castillo contacted for further recommendations     OTHER: Plan/goals of care discussed with pt's family at bedside and over the phone. Soft vest restraints d/c'd.  Endocrine consult for new onset DM: diabetes medications sent home.     DISPOSITION: AR however family refused and would like to take her home, he will provide 24 hr supervision. risks benefits discussion at length. D/C once stable    CORE MEASURES:        Admission NIHSS: 22     TPA:  NO      LDL/HDL: 176/46     Depression Screen: N/A pt is aphasic     Statin Therapy: Y     Dysphagia Screen: PASS     Smoking NO      Afib NO     Stroke Education YES    Obtain screening ultrasound in patients who meet 1 or more of the following criteria as patient is high risk for DVT/PE upon admission:   [] History of DVT/PE  []Hypercoagulable states (Factor V Leiden, Cancer, OCP, etc. )  []Prolonged immobility (hemiplegia/hemiparesis/post operative or any other extended immobilization)   [] Transferred from outside facility (Rehab or Long term care)  [] Age </= to 50 78-year-old woman with PMH, DM, mild cognitive impairment (performs her daily activities and oriented to person and place.)  She presented with sudden onset of right facial droop, right hemiparesis and global aphasia.Head CT shows left inferior temporal hypodensity suggestive of subacute infarct. MRI brain significant for left hemispheric–MCA territory inferior division temporal infarction and left hemispheric subcortical infarcts  CTA shows to my eye, left M1 segment severe left MCA stenosis. She was not  a candidate for IV thrombolysis as her stroke symptoms started 1/2/2023; infarction/ left temporal hypodensity present on CT at the time of presentation and left M2 distal occlusion with patent M1 segment.    Impression: Left MCA territory ischemia likely secondary to symptomatic intracranial left MCA stenosis.     NEURO: Neuro exam unchanged, permissive HTN with slow titration to normotensive, titrate statin to LDL goal less than 70, MRI Brain, off Seroquel,  CTA Head and Neck results as above Physical therapy/OT eval with recommendations for AR.      ANTITHROMBOTIC THERAPY: ASA/Plavix for 3 months followed by ASA only as per [facility] protocol for secondary stroke prevention     PULMONARY: CXR (01/10): Biapical scarring with left upper lobe bronchiectasis are without significant change as compared to 1/6/2023., protecting airway, saturating well. Plan to obtain CTA chest to r/o PE, Pt with tachycardia,tachypnea and elevated WBC     CARDIOVASCULAR: TTE 1/8/23: EF 63% Mild mitral regurgitation. Cardiac monitoring: tachycardia HR , plan to obtain LOOP as oupt to monitor for occult arrhythmias like AFib, Continue amlodipine for BP management      SBP goal: 110-160mmHg    GASTROINTESTINAL: Dysphagia screen passed, tolerating diet     Diet: Pureed    RENAL: BUN/Cr without change, good urine output, straight cath X1 on 01/07/23 ; hypokalemia- s/p kcl supplement      Na Goal: Greater than 135     Rojas: No    HEMATOLOGY: H/H without change, Platelets 295, LE doppler (01/11): No evidence of DVT in either lower extremity.       DVT ppx: LMWH     ID: afebrile in am febrile on 01/10/23 T max 38.2, leukocytosis - blood cultures sent, UA negative, elevated prolactin 0.18, will continue to monitor, Dr Castillo contacted for further recommendations     OTHER: Plan/goals of care discussed with pt's family at bedside and over the phone. Soft vest restraints d/c'd.  Endocrine consult for new onset DM: diabetes medications sent home.     DISPOSITION: AR however family refused and would like to take her home, he will provide 24 hr supervision. risks benefits discussion at length. D/C once stable    CORE MEASURES:        Admission NIHSS: 22     TPA:  NO      LDL/HDL: 176/46     Depression Screen: N/A pt is aphasic     Statin Therapy: Y     Dysphagia Screen: PASS     Smoking NO      Afib NO     Stroke Education YES    Obtain screening ultrasound in patients who meet 1 or more of the following criteria as patient is high risk for DVT/PE upon admission:   [] History of DVT/PE  []Hypercoagulable states (Factor V Leiden, Cancer, OCP, etc. )  []Prolonged immobility (hemiplegia/hemiparesis/post operative or any other extended immobilization)   [] Transferred from outside facility (Rehab or Long term care)  [] Age </= to 50

## 2023-01-11 NOTE — CONSULT NOTE ADULT - SUBJECTIVE AND OBJECTIVE BOX
78 year-old right-handed female w/ PMHx ?dementia (AOx1-2, performs all ADLs), otherwise reportedly no other PMHx, presenting to [facility] ED w/ right facial droop that started on 23. On day of arrival, 23, around 15:30PM, patient noted to have unsteady gait and language abnormalities (not speaking, difficulty understanding), whereas these symptoms were apparently not present just before 15:30PM. (2023 01:33)    23 @ 14:15  PAST MEDICAL & SURGICAL HISTORY:  Dementia      No significant past surgical history          Review of Systems:   uto    Allergies    Benedryl Allergy Sinus (Hives)  penicillin (Rash)    Intolerances        Social History:     FAMILY HISTORY:  No pertinent family history in first degree relatives        MEDICATIONS  (STANDING):  amLODIPine   Tablet 5 milliGRAM(s) Oral daily  aspirin  chewable 81 milliGRAM(s) Oral daily  atorvastatin 80 milliGRAM(s) Oral at bedtime  clopidogrel Tablet 75 milliGRAM(s) Oral daily  dextrose 5%. 1000 milliLiter(s) (50 mL/Hr) IV Continuous <Continuous>  dextrose 5%. 1000 milliLiter(s) (100 mL/Hr) IV Continuous <Continuous>  dextrose 50% Injectable 25 Gram(s) IV Push once  dextrose 50% Injectable 12.5 Gram(s) IV Push once  dextrose 50% Injectable 25 Gram(s) IV Push once  enoxaparin Injectable 40 milliGRAM(s) SubCutaneous every 24 hours  glucagon  Injectable 1 milliGRAM(s) IntraMuscular once  insulin lispro (ADMELOG) corrective regimen sliding scale   SubCutaneous three times a day before meals  insulin lispro (ADMELOG) corrective regimen sliding scale   SubCutaneous at bedtime  polyethylene glycol 3350 17 Gram(s) Oral daily  sodium chloride 0.9%. 1000 milliLiter(s) (50 mL/Hr) IV Continuous <Continuous>    MEDICATIONS  (PRN):  dextrose Oral Gel 15 Gram(s) Oral once PRN Blood Glucose LESS THAN 70 milliGRAM(s)/deciliter      Vital Signs Last 24 Hrs  T(C): 37.6 (2023 14:00), Max: 38.2 (10 Ruben 2023 15:20)  T(F): 99.6 (2023 14:00), Max: 100.7 (10 Ruben 2023 15:20)  HR: 91 (2023 14:00) (86 - 109)  BP: 143/54 (2023 14:00) (113/61 - 171/64)  BP(mean): 76 (2023 14:00) (69 - 109)  RR: 22 (2023 14:00) (16 - 34)  SpO2: 96% (2023 14:00) (96% - 99%)    Parameters below as of 2023 14:00  Patient On (Oxygen Delivery Method): room air      CAPILLARY BLOOD GLUCOSE      POCT Blood Glucose.: 203 mg/dL (2023 12:06)  POCT Blood Glucose.: 175 mg/dL (2023 07:16)  POCT Blood Glucose.: 147 mg/dL (10 Ruben 2023 22:31)  POCT Blood Glucose.: 159 mg/dL (10 Ruben 2023 18:26)    I&O's Summary    10 Ruben 2023 07:01  -  2023 07:00  --------------------------------------------------------  IN: 550 mL / OUT: 400 mL / NET: 150 mL    2023 07:01  -  2023 14:15  --------------------------------------------------------  IN: 100 mL / OUT: 150 mL / NET: -50 mL        PHYSICAL EXAM:  GENERAL: NAD, well-developed  HEAD:  Atraumatic, Normocephalic  EYES: EOMI, PERRLA, conjunctiva and sclera clear  NECK: Supple, No JVD  CHEST/LUNG: Clear to auscultation bilaterally; No wheeze  HEART: Regular rate and rhythm; No murmurs, rubs, or gallops  ABDOMEN: Soft, Nontender, Nondistended; Bowel sounds present  EXTREMITIES:  2+ Peripheral Pulses, No clubbing, cyanosis, or edema  NEUROLOGICAL EXAM: Family at bedside who provided translation   Mental status: Eyes open, awake, alert, attempts to produce speech, able to follow some simple commands, able to mimic, unable to ID objects  Cranial Nerves: Right facial droop, no nystagmus, blinks to threat B/L  Motor exam: Normal tone, no drift, Right hemiparesis RUE drift, 4/5, RLE drift, 4/5,, LUE 5/5, LLE 5/5  Sensation: Intact to light touch   Coordination/ Gait: Unable to participate with exam     LABS:                        12.0   12.63 )-----------( 295      ( 2023 05:23 )             37.6     -11    136  |  103  |  16  ----------------------------<  187<H>  3.3<L>   |  21<L>  |  0.58    Ca    9.3      2023 05:23            Urinalysis Basic - ( 10 Ruben 2023 16:13 )    Color: DARK BROWN / Appearance: Slightly Turbid / S.024 / pH: x  Gluc: x / Ketone: Small  / Bili: Negative / Urobili: Negative   Blood: x / Protein: 300 mg/dL / Nitrite: Negative   Leuk Esterase: Negative / RBC: >50 /hpf / WBC 2 /HPF   Sq Epi: x / Non Sq Epi: 0 /hpf / Bacteria: Negative        RADIOLOGY & ADDITIONAL TESTS:    Imaging Personally Reviewed:    Consultant(s) Notes Reviewed:      Care Discussed with Consultants/Other Providers:   78 year-old right-handed female w/ PMHx ?dementia (AOx1-2, performs all ADLs), otherwise reportedly no other PMHx, presenting to [facility] ED w/ right facial droop that started on 23. On day of arrival, 23, around 15:30PM, patient noted to have unsteady gait and language abnormalities (not speaking, difficulty understanding), whereas these symptoms were apparently not present just before 15:30PM. (2023 01:33)    23 @ 14:15  PAST MEDICAL & SURGICAL HISTORY:  Dementia      No significant past surgical history          Review of Systems:   uto    Allergies    Benedryl Allergy Sinus (Hives)  penicillin (Rash)    Intolerances        Social History:     FAMILY HISTORY:  No pertinent family history in first degree relatives        MEDICATIONS  (STANDING):  amLODIPine   Tablet 5 milliGRAM(s) Oral daily  aspirin  chewable 81 milliGRAM(s) Oral daily  atorvastatin 80 milliGRAM(s) Oral at bedtime  clopidogrel Tablet 75 milliGRAM(s) Oral daily  dextrose 5%. 1000 milliLiter(s) (50 mL/Hr) IV Continuous <Continuous>  dextrose 5%. 1000 milliLiter(s) (100 mL/Hr) IV Continuous <Continuous>  dextrose 50% Injectable 25 Gram(s) IV Push once  dextrose 50% Injectable 12.5 Gram(s) IV Push once  dextrose 50% Injectable 25 Gram(s) IV Push once  enoxaparin Injectable 40 milliGRAM(s) SubCutaneous every 24 hours  glucagon  Injectable 1 milliGRAM(s) IntraMuscular once  insulin lispro (ADMELOG) corrective regimen sliding scale   SubCutaneous three times a day before meals  insulin lispro (ADMELOG) corrective regimen sliding scale   SubCutaneous at bedtime  polyethylene glycol 3350 17 Gram(s) Oral daily  sodium chloride 0.9%. 1000 milliLiter(s) (50 mL/Hr) IV Continuous <Continuous>    MEDICATIONS  (PRN):  dextrose Oral Gel 15 Gram(s) Oral once PRN Blood Glucose LESS THAN 70 milliGRAM(s)/deciliter      Vital Signs Last 24 Hrs  T(C): 37.6 (2023 14:00), Max: 38.2 (10 Ruben 2023 15:20)  T(F): 99.6 (2023 14:00), Max: 100.7 (10 Ruben 2023 15:20)  HR: 91 (2023 14:00) (86 - 109)  BP: 143/54 (2023 14:00) (113/61 - 171/64)  BP(mean): 76 (2023 14:00) (69 - 109)  RR: 22 (2023 14:00) (16 - 34)  SpO2: 96% (2023 14:00) (96% - 99%)    Parameters below as of 2023 14:00  Patient On (Oxygen Delivery Method): room air      CAPILLARY BLOOD GLUCOSE      POCT Blood Glucose.: 203 mg/dL (2023 12:06)  POCT Blood Glucose.: 175 mg/dL (2023 07:16)  POCT Blood Glucose.: 147 mg/dL (10 Ruben 2023 22:31)  POCT Blood Glucose.: 159 mg/dL (10 Ruben 2023 18:26)    I&O's Summary    10 Ruben 2023 07:01  -  2023 07:00  --------------------------------------------------------  IN: 550 mL / OUT: 400 mL / NET: 150 mL    2023 07:01  -  2023 14:15  --------------------------------------------------------  IN: 100 mL / OUT: 150 mL / NET: -50 mL        PHYSICAL EXAM:  GENERAL: NAD, well-developed  HEAD:  Atraumatic, Normocephalic  EYES: EOMI, PERRLA, conjunctiva and sclera clear  NECK: Supple, No JVD  CHEST/LUNG: Clear to auscultation bilaterally; No wheeze  HEART: Regular rate and rhythm; No murmurs, rubs, or gallops  ABDOMEN: Soft, Nontender, Nondistended; Bowel sounds present  EXTREMITIES:  2+ Peripheral Pulses, No clubbing, cyanosis, or edema  NEUROLOGICAL EXAM: Family at bedside who provided translation   Mental status: Eyes open, awake, alert, attempts to produce speech, able to follow some simple commands, able to mimic, unable to ID objects  Cranial Nerves: Right facial droop, no nystagmus, blinks to threat B/L  Motor exam: Normal tone, no drift, Right hemiparesis RUE drift, 4/5, RLE drift, 4/5,, LUE 5/5, LLE 5/5  Sensation: Intact to light touch   Coordination/ Gait: Unable to participate with exam     LABS:                        12.0   12.63 )-----------( 295      ( 2023 05:23 )             37.6     -11    136  |  103  |  16  ----------------------------<  187<H>  3.3<L>   |  21<L>  |  0.58    Ca    9.3      2023 05:23            Urinalysis Basic - ( 10 Ruben 2023 16:13 )    Color: DARK BROWN / Appearance: Slightly Turbid / S.024 / pH: x  Gluc: x / Ketone: Small  / Bili: Negative / Urobili: Negative   Blood: x / Protein: 300 mg/dL / Nitrite: Negative   Leuk Esterase: Negative / RBC: >50 /hpf / WBC 2 /HPF   Sq Epi: x / Non Sq Epi: 0 /hpf / Bacteria: Negative        RADIOLOGY & ADDITIONAL TESTS:    Imaging Personally Reviewed:    Consultant(s) Notes Reviewed:      Care Discussed with Consultants/Other Providers:   78 year-old right-handed female w/ PMHx ?dementia (AOx1-2, performs all ADLs), otherwise reportedly no other PMHx, presenting to [facility] ED w/ right facial droop that started on 23. On day of arrival, 23, around 15:30PM, patient noted to have unsteady gait and language abnormalities (not speaking, difficulty understanding), whereas these symptoms were apparently not present just before 15:30PM. (2023 01:33)    23 @ 14:15  PAST MEDICAL & SURGICAL HISTORY:  Dementia      No significant past surgical history          Review of Systems:   uto    Allergies    Benedryl Allergy Sinus (Hives)  penicillin (Rash)    Intolerances        Social History:     FAMILY HISTORY:  No pertinent family history in first degree relatives        MEDICATIONS  (STANDING):  amLODIPine   Tablet 5 milliGRAM(s) Oral daily  aspirin  chewable 81 milliGRAM(s) Oral daily  atorvastatin 80 milliGRAM(s) Oral at bedtime  clopidogrel Tablet 75 milliGRAM(s) Oral daily  dextrose 5%. 1000 milliLiter(s) (50 mL/Hr) IV Continuous <Continuous>  dextrose 5%. 1000 milliLiter(s) (100 mL/Hr) IV Continuous <Continuous>  dextrose 50% Injectable 25 Gram(s) IV Push once  dextrose 50% Injectable 12.5 Gram(s) IV Push once  dextrose 50% Injectable 25 Gram(s) IV Push once  enoxaparin Injectable 40 milliGRAM(s) SubCutaneous every 24 hours  glucagon  Injectable 1 milliGRAM(s) IntraMuscular once  insulin lispro (ADMELOG) corrective regimen sliding scale   SubCutaneous three times a day before meals  insulin lispro (ADMELOG) corrective regimen sliding scale   SubCutaneous at bedtime  polyethylene glycol 3350 17 Gram(s) Oral daily  sodium chloride 0.9%. 1000 milliLiter(s) (50 mL/Hr) IV Continuous <Continuous>    MEDICATIONS  (PRN):  dextrose Oral Gel 15 Gram(s) Oral once PRN Blood Glucose LESS THAN 70 milliGRAM(s)/deciliter      Vital Signs Last 24 Hrs  T(C): 37.6 (2023 14:00), Max: 38.2 (10 Ruben 2023 15:20)  T(F): 99.6 (2023 14:00), Max: 100.7 (10 Ruben 2023 15:20)  HR: 91 (2023 14:00) (86 - 109)  BP: 143/54 (2023 14:00) (113/61 - 171/64)  BP(mean): 76 (2023 14:00) (69 - 109)  RR: 22 (2023 14:00) (16 - 34)  SpO2: 96% (2023 14:00) (96% - 99%)    Parameters below as of 2023 14:00  Patient On (Oxygen Delivery Method): room air      CAPILLARY BLOOD GLUCOSE      POCT Blood Glucose.: 203 mg/dL (2023 12:06)  POCT Blood Glucose.: 175 mg/dL (2023 07:16)  POCT Blood Glucose.: 147 mg/dL (10 Ruben 2023 22:31)  POCT Blood Glucose.: 159 mg/dL (10 Ruben 2023 18:26)    I&O's Summary    10 Ruben 2023 07:01  -  2023 07:00  --------------------------------------------------------  IN: 550 mL / OUT: 400 mL / NET: 150 mL    2023 07:01  -  2023 14:15  --------------------------------------------------------  IN: 100 mL / OUT: 150 mL / NET: -50 mL        PHYSICAL EXAM:  GENERAL: NAD, well-developed  HEAD:  Atraumatic, Normocephalic  EYES: EOMI, PERRLA, conjunctiva and sclera clear  NECK: Supple, No JVD  CHEST/LUNG: Clear to auscultation bilaterally; No wheeze  HEART: Regular rate and rhythm; No murmurs, rubs, or gallops  ABDOMEN: Soft, Nontender, Nondistended; Bowel sounds present  EXTREMITIES:  2+ Peripheral Pulses, No clubbing, cyanosis, or edema  NEUROLOGICAL EXAM: Family at bedside who provided translation   Mental status: Eyes open, awake, alert, attempts to produce speech, able to follow some simple commands, able to mimic, unable to ID objects  Cranial Nerves: Right facial droop, no nystagmus, blinks to threat B/L  Motor exam: Normal tone, no drift, Right hemiparesis RUE drift, 4/5, RLE drift, 4/5,, LUE 5/5, LLE 5/5  Sensation: Intact to light touch   Coordination/ Gait: Unable to participate with exam     LABS:                        12.0   12.63 )-----------( 295      ( 2023 05:23 )             37.6     -11    136  |  103  |  16  ----------------------------<  187<H>  3.3<L>   |  21<L>  |  0.58    Ca    9.3      2023 05:23            Urinalysis Basic - ( 10 Ruben 2023 16:13 )    Color: DARK BROWN / Appearance: Slightly Turbid / S.024 / pH: x  Gluc: x / Ketone: Small  / Bili: Negative / Urobili: Negative   Blood: x / Protein: 300 mg/dL / Nitrite: Negative   Leuk Esterase: Negative / RBC: >50 /hpf / WBC 2 /HPF   Sq Epi: x / Non Sq Epi: 0 /hpf / Bacteria: Negative        RADIOLOGY & ADDITIONAL TESTS:    Imaging Personally Reviewed:    Consultant(s) Notes Reviewed:      Care Discussed with Consultants/Other Providers:   78 year-old right-handed female w/ PMHx ?dementia (AOx1-2, performs all ADLs), otherwise reportedly no other PMHx, presenting to [facility] ED w/ right facial droop that started on 23. On day of arrival, 23, around 15:30PM, patient noted to have unsteady gait and language abnormalities (not speaking, difficulty understanding), whereas these symptoms were apparently not present just before 15:30PM. (2023 01:33)    23 @ 14:15  PAST MEDICAL & SURGICAL HISTORY:  Dementia      No significant past surgical history          Review of Systems:   uto    Allergies    Benedryl Allergy Sinus (Hives)  penicillin (Rash)    Intolerances        Social History:     FAMILY HISTORY:  No pertinent family history in first degree relatives        MEDICATIONS  (STANDING):  amLODIPine   Tablet 5 milliGRAM(s) Oral daily  aspirin  chewable 81 milliGRAM(s) Oral daily  atorvastatin 80 milliGRAM(s) Oral at bedtime  clopidogrel Tablet 75 milliGRAM(s) Oral daily  dextrose 5%. 1000 milliLiter(s) (50 mL/Hr) IV Continuous <Continuous>  dextrose 5%. 1000 milliLiter(s) (100 mL/Hr) IV Continuous <Continuous>  dextrose 50% Injectable 25 Gram(s) IV Push once  dextrose 50% Injectable 12.5 Gram(s) IV Push once  dextrose 50% Injectable 25 Gram(s) IV Push once  enoxaparin Injectable 40 milliGRAM(s) SubCutaneous every 24 hours  glucagon  Injectable 1 milliGRAM(s) IntraMuscular once  insulin lispro (ADMELOG) corrective regimen sliding scale   SubCutaneous three times a day before meals  insulin lispro (ADMELOG) corrective regimen sliding scale   SubCutaneous at bedtime  polyethylene glycol 3350 17 Gram(s) Oral daily  sodium chloride 0.9%. 1000 milliLiter(s) (50 mL/Hr) IV Continuous <Continuous>    MEDICATIONS  (PRN):  dextrose Oral Gel 15 Gram(s) Oral once PRN Blood Glucose LESS THAN 70 milliGRAM(s)/deciliter      Vital Signs Last 24 Hrs  T(C): 37.6 (2023 14:00), Max: 38.2 (10 Ruben 2023 15:20)  T(F): 99.6 (2023 14:00), Max: 100.7 (10 Ruben 2023 15:20)  HR: 91 (2023 14:00) (86 - 109)  BP: 143/54 (2023 14:00) (113/61 - 171/64)  BP(mean): 76 (2023 14:00) (69 - 109)  RR: 22 (2023 14:00) (16 - 34)  SpO2: 96% (2023 14:00) (96% - 99%)    Parameters below as of 2023 14:00  Patient On (Oxygen Delivery Method): room air      CAPILLARY BLOOD GLUCOSE      POCT Blood Glucose.: 203 mg/dL (2023 12:06)  POCT Blood Glucose.: 175 mg/dL (2023 07:16)  POCT Blood Glucose.: 147 mg/dL (10 Ruben 2023 22:31)  POCT Blood Glucose.: 159 mg/dL (10 Ruben 2023 18:26)    I&O's Summary    10 Ruben 2023 07:01  -  2023 07:00  --------------------------------------------------------  IN: 550 mL / OUT: 400 mL / NET: 150 mL    2023 07:01  -  2023 14:15  --------------------------------------------------------  IN: 100 mL / OUT: 150 mL / NET: -50 mL        PHYSICAL EXAM:  GENERAL: NAD, well-developed  HEAD:  Atraumatic, Normocephalic  EYES: EOMI, PERRLA, conjunctiva and sclera clear  NECK: Supple, No JVD  CHEST/LUNG: Clear to auscultation bilaterally; No wheeze  HEART: Regular rate and rhythm; No murmurs, rubs, or gallops  ABDOMEN: Soft, Nontender, Nondistended; Bowel sounds present  EXTREMITIES:  2+ Peripheral Pulses, No clubbing, cyanosis, or edema  NEUROLOGICAL EXAM: Family at bedside who provided translation   Mental status: Eyes open, awake, alert, attempts to produce speech, able to follow some simple commands, able to mimic, unable to ID objects  Cranial Nerves: Right facial droop, no nystagmus, blinks to threat B/L  Motor exam: Normal tone, no drift, Right hemiparesis RUE drift, 4/5, RLE drift, 4/5,, LUE 5/5, LLE 5/5  Sensation: Intact to light touch   Coordination/ Gait: Unable to participate with exam     LABS:                        12.0   12.63 )-----------( 295      ( 2023 05:23 )             37.6         136  |  103  |  16  ----------------------------<  187<H>  3.3<L>   |  21<L>  |  0.58    Ca    9.3      2023 05:23            Urinalysis Basic - ( 10 Ruben 2023 16:13 )    Color: DARK BROWN / Appearance: Slightly Turbid / S.024 / pH: x  Gluc: x / Ketone: Small  / Bili: Negative / Urobili: Negative   Blood: x / Protein: 300 mg/dL / Nitrite: Negative   Leuk Esterase: Negative / RBC: >50 /hpf / WBC 2 /HPF   Sq Epi: x / Non Sq Epi: 0 /hpf / Bacteria: Negative      < from: MR Head No Cont (23 @ 13:40) >  FINDINGS:  There is moderate diffuse parenchymal volume loss. There are T2   prolongation signal abnormalities in the periventricular subcortical   white matter likely related to mild chronic microvascular ischemic   changes.    There are acute infarcts in the leftMCA territory largest area measuring   approximately 3.7 x 2.3 cm.    There is no acute parenchymal hemorrhage, parenchymal mass, or midline   shift. There is no extra-axial fluid collection.  There is no   hydrocephalus. Partial empty sella noted.    Visualized paranasal sinuses well aerated. Minimal opacification right   mastoid air cells noted.    Partially visualized soft tissue protrusion noted in the right face..    IMPRESSION:  Left MCA territory acute infarcts.    No acute intracranial hemorrhage      < end of copied text >  < from: CT Angio Head w/ IV Cont (23 @ 23:26) >  FINDINGS:  Conventional arch anatomy. Great vessel origins are patent. Innominate   and visualized subclavian arteries are patent. The common carotid   arteries are unremarkable.    The carotid bifurcations are patent without significant stenosis. The   internal carotid arteries are patent without significant stenosis.    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 The   anterior and right middle cerebral arteries are patent.    Bilateral vertebral arteries are unremarkable from their origins to their   intracranial segments.    The basilar artery is unremarkable. The posterior cerebral arteries are   patent.    Other:  No enlarged cervical adenopathy by size criteria.    Left upper lobe bronchiectasis.    Multilevel degenerative changes in the cervical spine.    CT PERFUSION:  The following measurements were obtained on perfusion maps:    Tmax >10s: 5 mL  Tmax >8s: 16 mL  Tmax >6s: 59 mL  Tmax >4s: 118 mL    CBF <  20%: 0 mL  CBF <  30%: 5 mL  CBF <  34%: 6 mL  CBF <  38%: 7 mL    CBV <  34%: 0 mL  CBV <  38%: 0 mL  CBV <  42%: 0 mL    Core infarct (CBF <  30%:): 5 mL  Penumbra (Tmax >6s): 59 mL  Mismatched volume: 54 mL  Mismatched ratio: 11.8    IMPRESSION:  CTA Head & Neck: Severe stenosis/near complete occlusion with the left M2   branch.  CT Perfusion: There is a penumbra involving a large area of the left MCA   territory.    Findings discussed with  by Dr. Gutierrez at 11:35 PM on   2023.    --- End of Report ---        < end of copied text >  < from: Xray Chest 1 View- PORTABLE-Urgent (Xray Chest 1 View- PORTABLE-Urgent .) (01.10.23 @ 19:52) >  IMPRESSION:    No acute cardiopulmonary pathology.      < end of copied text >  < from: [facility] Duplex Lower Ext Vein Scan, Bilat (23 @ 11:20) >  FINDINGS:    RIGHT:  Normal compressibility of the RIGHT common femoral, femoral and popliteal   veins.  Doppler examination shows normal spontaneous and phasic flow.  No RIGHT calf vein thrombosis is detected.    LEFT:  Normal compressibility of the LEFT common femoral, femoral and popliteal   veins.  Doppler examination shows normal spontaneous and phasic flow.  No LEFT calf vein thrombosis is detected.    IMPRESSION:  No evidence of deep venous thrombosis in either lower extremity.      < end of copied text >    RADIOLOGY & ADDITIONAL TESTS:    Imaging Personally Reviewed:    Consultant(s) Notes Reviewed:      Care Discussed with Consultants/Other Providers:   78 year-old right-handed female w/ PMHx ?dementia (AOx1-2, performs all ADLs), otherwise reportedly no other PMHx, presenting to [facility] ED w/ right facial droop that started on 23. On day of arrival, 23, around 15:30PM, patient noted to have unsteady gait and language abnormalities (not speaking, difficulty understanding), whereas these symptoms were apparently not present just before 15:30PM. (2023 01:33)    23 @ 14:15  PAST MEDICAL & SURGICAL HISTORY:  Dementia      No significant past surgical history          Review of Systems:   uto    Allergies    Benedryl Allergy Sinus (Hives)  penicillin (Rash)    Intolerances        Social History:     FAMILY HISTORY:  No pertinent family history in first degree relatives        MEDICATIONS  (STANDING):  amLODIPine   Tablet 5 milliGRAM(s) Oral daily  aspirin  chewable 81 milliGRAM(s) Oral daily  atorvastatin 80 milliGRAM(s) Oral at bedtime  clopidogrel Tablet 75 milliGRAM(s) Oral daily  dextrose 5%. 1000 milliLiter(s) (50 mL/Hr) IV Continuous <Continuous>  dextrose 5%. 1000 milliLiter(s) (100 mL/Hr) IV Continuous <Continuous>  dextrose 50% Injectable 25 Gram(s) IV Push once  dextrose 50% Injectable 12.5 Gram(s) IV Push once  dextrose 50% Injectable 25 Gram(s) IV Push once  enoxaparin Injectable 40 milliGRAM(s) SubCutaneous every 24 hours  glucagon  Injectable 1 milliGRAM(s) IntraMuscular once  insulin lispro (ADMELOG) corrective regimen sliding scale   SubCutaneous three times a day before meals  insulin lispro (ADMELOG) corrective regimen sliding scale   SubCutaneous at bedtime  polyethylene glycol 3350 17 Gram(s) Oral daily  sodium chloride 0.9%. 1000 milliLiter(s) (50 mL/Hr) IV Continuous <Continuous>    MEDICATIONS  (PRN):  dextrose Oral Gel 15 Gram(s) Oral once PRN Blood Glucose LESS THAN 70 milliGRAM(s)/deciliter      Vital Signs Last 24 Hrs  T(C): 37.6 (2023 14:00), Max: 38.2 (10 Ruben 2023 15:20)  T(F): 99.6 (2023 14:00), Max: 100.7 (10 Ruben 2023 15:20)  HR: 91 (2023 14:00) (86 - 109)  BP: 143/54 (2023 14:00) (113/61 - 171/64)  BP(mean): 76 (2023 14:00) (69 - 109)  RR: 22 (2023 14:00) (16 - 34)  SpO2: 96% (2023 14:00) (96% - 99%)    Parameters below as of 2023 14:00  Patient On (Oxygen Delivery Method): room air      CAPILLARY BLOOD GLUCOSE      POCT Blood Glucose.: 203 mg/dL (2023 12:06)  POCT Blood Glucose.: 175 mg/dL (2023 07:16)  POCT Blood Glucose.: 147 mg/dL (10 Ruben 2023 22:31)  POCT Blood Glucose.: 159 mg/dL (10 Ruben 2023 18:26)    I&O's Summary    10 Ruben 2023 07:01  -  2023 07:00  --------------------------------------------------------  IN: 550 mL / OUT: 400 mL / NET: 150 mL    2023 07:01  -  2023 14:15  --------------------------------------------------------  IN: 100 mL / OUT: 150 mL / NET: -50 mL        PHYSICAL EXAM:  GENERAL: NAD, well-developed  HEAD:  Atraumatic, Normocephalic  EYES: EOMI, PERRLA, conjunctiva and sclera clear  NECK: Supple, No JVD  CHEST/LUNG: Clear to auscultation bilaterally; No wheeze  HEART: Regular rate and rhythm; No murmurs, rubs, or gallops  ABDOMEN: Soft, Nontender, Nondistended; Bowel sounds present  EXTREMITIES:  2+ Peripheral Pulses, No clubbing, cyanosis, or edema  NEUROLOGICAL EXAM: Family at bedside who provided translation   Mental status: Eyes open, awake, alert, attempts to produce speech, able to follow some simple commands, able to mimic, unable to ID objects  Cranial Nerves: Right facial droop, no nystagmus, blinks to threat B/L  Motor exam: Normal tone, no drift, Right hemiparesis RUE drift, 4/5, RLE drift, 4/5,, LUE 5/5, LLE 5/5  Sensation: Intact to light touch   Coordination/ Gait: Unable to participate with exam     LABS:                        12.0   12.63 )-----------( 295      ( 2023 05:23 )             37.6         136  |  103  |  16  ----------------------------<  187<H>  3.3<L>   |  21<L>  |  0.58    Ca    9.3      2023 05:23            Urinalysis Basic - ( 10 Ruben 2023 16:13 )    Color: DARK BROWN / Appearance: Slightly Turbid / S.024 / pH: x  Gluc: x / Ketone: Small  / Bili: Negative / Urobili: Negative   Blood: x / Protein: 300 mg/dL / Nitrite: Negative   Leuk Esterase: Negative / RBC: >50 /hpf / WBC 2 /HPF   Sq Epi: x / Non Sq Epi: 0 /hpf / Bacteria: Negative      < from: MR Head No Cont (23 @ 13:40) >  FINDINGS:  There is moderate diffuse parenchymal volume loss. There are T2   prolongation signal abnormalities in the periventricular subcortical   white matter likely related to mild chronic microvascular ischemic   changes.    There are acute infarcts in the leftMCA territory largest area measuring   approximately 3.7 x 2.3 cm.    There is no acute parenchymal hemorrhage, parenchymal mass, or midline   shift. There is no extra-axial fluid collection.  There is no   hydrocephalus. Partial empty sella noted.    Visualized paranasal sinuses well aerated. Minimal opacification right   mastoid air cells noted.    Partially visualized soft tissue protrusion noted in the right face..    IMPRESSION:  Left MCA territory acute infarcts.    No acute intracranial hemorrhage      < end of copied text >  < from: CT Angio Head w/ IV Cont (23 @ 23:26) >  FINDINGS:  Conventional arch anatomy. Great vessel origins are patent. Innominate   and visualized subclavian arteries are patent. The common carotid   arteries are unremarkable.    The carotid bifurcations are patent without significant stenosis. The   internal carotid arteries are patent without significant stenosis.    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 The   anterior and right middle cerebral arteries are patent.    Bilateral vertebral arteries are unremarkable from their origins to their   intracranial segments.    The basilar artery is unremarkable. The posterior cerebral arteries are   patent.    Other:  No enlarged cervical adenopathy by size criteria.    Left upper lobe bronchiectasis.    Multilevel degenerative changes in the cervical spine.    CT PERFUSION:  The following measurements were obtained on perfusion maps:    Tmax >10s: 5 mL  Tmax >8s: 16 mL  Tmax >6s: 59 mL  Tmax >4s: 118 mL    CBF <  20%: 0 mL  CBF <  30%: 5 mL  CBF <  34%: 6 mL  CBF <  38%: 7 mL    CBV <  34%: 0 mL  CBV <  38%: 0 mL  CBV <  42%: 0 mL    Core infarct (CBF <  30%:): 5 mL  Penumbra (Tmax >6s): 59 mL  Mismatched volume: 54 mL  Mismatched ratio: 11.8    IMPRESSION:  CTA Head & Neck: Severe stenosis/near complete occlusion with the left M2   branch.  CT Perfusion: There is a penumbra involving a large area of the left MCA   territory.    Findings discussed with  by Dr. Gutierrez at 11:35 PM on   2023.    --- End of Report ---        < end of copied text >  < from: Xray Chest 1 View- PORTABLE-Urgent (Xray Chest 1 View- PORTABLE-Urgent .) (01.10.23 @ 19:52) >  IMPRESSION:    No acute cardiopulmonary pathology.      < end of copied text >  < from: [facility] Duplex Lower Ext Vein Scan, Bilat (23 @ 11:20) >  FINDINGS:    RIGHT:  Normal compressibility of the RIGHT common femoral, femoral and popliteal   veins.  Doppler examination shows normal spontaneous and phasic flow.  No RIGHT calf vein thrombosis is detected.    LEFT:  Normal compressibility of the LEFT common femoral, femoral and popliteal   veins.  Doppler examination shows normal spontaneous and phasic flow.  No LEFT calf vein thrombosis is detected.    IMPRESSION:  No evidence of deep venous thrombosis in either lower extremity.      < end of copied text >    RADIOLOGY & ADDITIONAL TESTS:    Imaging Personally Reviewed:    Consultant(s) Notes Reviewed:      Care Discussed with Consultants/Other Providers:   78 year-old right-handed female w/ PMHx ?dementia (AOx1-2, performs all ADLs), otherwise reportedly no other PMHx, presenting to [facility] ED w/ right facial droop that started on 23. On day of arrival, 23, around 15:30PM, patient noted to have unsteady gait and language abnormalities (not speaking, difficulty understanding), whereas these symptoms were apparently not present just before 15:30PM. (2023 01:33)    23 @ 14:15  PAST MEDICAL & SURGICAL HISTORY:  Dementia      No significant past surgical history          Review of Systems:   uto    Allergies    Benedryl Allergy Sinus (Hives)  penicillin (Rash)    Intolerances        Social History:     FAMILY HISTORY:  No pertinent family history in first degree relatives        MEDICATIONS  (STANDING):  amLODIPine   Tablet 5 milliGRAM(s) Oral daily  aspirin  chewable 81 milliGRAM(s) Oral daily  atorvastatin 80 milliGRAM(s) Oral at bedtime  clopidogrel Tablet 75 milliGRAM(s) Oral daily  dextrose 5%. 1000 milliLiter(s) (50 mL/Hr) IV Continuous <Continuous>  dextrose 5%. 1000 milliLiter(s) (100 mL/Hr) IV Continuous <Continuous>  dextrose 50% Injectable 25 Gram(s) IV Push once  dextrose 50% Injectable 12.5 Gram(s) IV Push once  dextrose 50% Injectable 25 Gram(s) IV Push once  enoxaparin Injectable 40 milliGRAM(s) SubCutaneous every 24 hours  glucagon  Injectable 1 milliGRAM(s) IntraMuscular once  insulin lispro (ADMELOG) corrective regimen sliding scale   SubCutaneous three times a day before meals  insulin lispro (ADMELOG) corrective regimen sliding scale   SubCutaneous at bedtime  polyethylene glycol 3350 17 Gram(s) Oral daily  sodium chloride 0.9%. 1000 milliLiter(s) (50 mL/Hr) IV Continuous <Continuous>    MEDICATIONS  (PRN):  dextrose Oral Gel 15 Gram(s) Oral once PRN Blood Glucose LESS THAN 70 milliGRAM(s)/deciliter      Vital Signs Last 24 Hrs  T(C): 37.6 (2023 14:00), Max: 38.2 (10 Ruben 2023 15:20)  T(F): 99.6 (2023 14:00), Max: 100.7 (10 Ruben 2023 15:20)  HR: 91 (2023 14:00) (86 - 109)  BP: 143/54 (2023 14:00) (113/61 - 171/64)  BP(mean): 76 (2023 14:00) (69 - 109)  RR: 22 (2023 14:00) (16 - 34)  SpO2: 96% (2023 14:00) (96% - 99%)    Parameters below as of 2023 14:00  Patient On (Oxygen Delivery Method): room air      CAPILLARY BLOOD GLUCOSE      POCT Blood Glucose.: 203 mg/dL (2023 12:06)  POCT Blood Glucose.: 175 mg/dL (2023 07:16)  POCT Blood Glucose.: 147 mg/dL (10 Ruben 2023 22:31)  POCT Blood Glucose.: 159 mg/dL (10 Ruben 2023 18:26)    I&O's Summary    10 Ruben 2023 07:01  -  2023 07:00  --------------------------------------------------------  IN: 550 mL / OUT: 400 mL / NET: 150 mL    2023 07:01  -  2023 14:15  --------------------------------------------------------  IN: 100 mL / OUT: 150 mL / NET: -50 mL        PHYSICAL EXAM:  GENERAL: NAD, well-developed  HEAD:  Atraumatic, Normocephalic  EYES: EOMI, PERRLA, conjunctiva and sclera clear  NECK: Supple, No JVD  CHEST/LUNG: Clear to auscultation bilaterally; No wheeze  HEART: Regular rate and rhythm; No murmurs, rubs, or gallops  ABDOMEN: Soft, Nontender, Nondistended; Bowel sounds present  EXTREMITIES:  2+ Peripheral Pulses, No clubbing, cyanosis, or edema  NEUROLOGICAL EXAM: Family at bedside who provided translation   Mental status: Eyes open, awake, alert, attempts to produce speech, able to follow some simple commands, able to mimic, unable to ID objects  Cranial Nerves: Right facial droop, no nystagmus, blinks to threat B/L  Motor exam: Normal tone, no drift, Right hemiparesis RUE drift, 4/5, RLE drift, 4/5,, LUE 5/5, LLE 5/5  Sensation: Intact to light touch   Coordination/ Gait: Unable to participate with exam     LABS:                        12.0   12.63 )-----------( 295      ( 2023 05:23 )             37.6         136  |  103  |  16  ----------------------------<  187<H>  3.3<L>   |  21<L>  |  0.58    Ca    9.3      2023 05:23            Urinalysis Basic - ( 10 Ruben 2023 16:13 )    Color: DARK BROWN / Appearance: Slightly Turbid / S.024 / pH: x  Gluc: x / Ketone: Small  / Bili: Negative / Urobili: Negative   Blood: x / Protein: 300 mg/dL / Nitrite: Negative   Leuk Esterase: Negative / RBC: >50 /hpf / WBC 2 /HPF   Sq Epi: x / Non Sq Epi: 0 /hpf / Bacteria: Negative      < from: MR Head No Cont (23 @ 13:40) >  FINDINGS:  There is moderate diffuse parenchymal volume loss. There are T2   prolongation signal abnormalities in the periventricular subcortical   white matter likely related to mild chronic microvascular ischemic   changes.    There are acute infarcts in the leftMCA territory largest area measuring   approximately 3.7 x 2.3 cm.    There is no acute parenchymal hemorrhage, parenchymal mass, or midline   shift. There is no extra-axial fluid collection.  There is no   hydrocephalus. Partial empty sella noted.    Visualized paranasal sinuses well aerated. Minimal opacification right   mastoid air cells noted.    Partially visualized soft tissue protrusion noted in the right face..    IMPRESSION:  Left MCA territory acute infarcts.    No acute intracranial hemorrhage      < end of copied text >  < from: CT Angio Head w/ IV Cont (23 @ 23:26) >  FINDINGS:  Conventional arch anatomy. Great vessel origins are patent. Innominate   and visualized subclavian arteries are patent. The common carotid   arteries are unremarkable.    The carotid bifurcations are patent without significant stenosis. The   internal carotid arteries are patent without significant stenosis.    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 The   anterior and right middle cerebral arteries are patent.    Bilateral vertebral arteries are unremarkable from their origins to their   intracranial segments.    The basilar artery is unremarkable. The posterior cerebral arteries are   patent.    Other:  No enlarged cervical adenopathy by size criteria.    Left upper lobe bronchiectasis.    Multilevel degenerative changes in the cervical spine.    CT PERFUSION:  The following measurements were obtained on perfusion maps:    Tmax >10s: 5 mL  Tmax >8s: 16 mL  Tmax >6s: 59 mL  Tmax >4s: 118 mL    CBF <  20%: 0 mL  CBF <  30%: 5 mL  CBF <  34%: 6 mL  CBF <  38%: 7 mL    CBV <  34%: 0 mL  CBV <  38%: 0 mL  CBV <  42%: 0 mL    Core infarct (CBF <  30%:): 5 mL  Penumbra (Tmax >6s): 59 mL  Mismatched volume: 54 mL  Mismatched ratio: 11.8    IMPRESSION:  CTA Head & Neck: Severe stenosis/near complete occlusion with the left M2   branch.  CT Perfusion: There is a penumbra involving a large area of the left MCA   territory.    Findings discussed with  by Dr. Gutierrez at 11:35 PM on   2023.    --- End of Report ---        < end of copied text >  < from: Xray Chest 1 View- PORTABLE-Urgent (Xray Chest 1 View- PORTABLE-Urgent .) (01.10.23 @ 19:52) >  IMPRESSION:    No acute cardiopulmonary pathology.      < end of copied text >  < from: VA Duplex Lower Ext Vein Scan, Bilat (23 @ 11:20) >  FINDINGS:    RIGHT:  Normal compressibility of the RIGHT common femoral, femoral and popliteal   veins.  Doppler examination shows normal spontaneous and phasic flow.  No RIGHT calf vein thrombosis is detected.    LEFT:  Normal compressibility of the LEFT common femoral, femoral and popliteal   veins.  Doppler examination shows normal spontaneous and phasic flow.  No LEFT calf vein thrombosis is detected.    IMPRESSION:  No evidence of deep venous thrombosis in either lower extremity.      < end of copied text >    RADIOLOGY & ADDITIONAL TESTS:    Imaging Personally Reviewed:    Consultant(s) Notes Reviewed:      Care Discussed with Consultants/Other Providers:

## 2023-01-11 NOTE — CONSULT NOTE ADULT - ASSESSMENT
78 year-old right-handed female w/ PMHx ?dementia (AOx1-2, performs all ADLs), otherwise reportedly no other PMHx, presenting to [facility] ED w/ right facial droop that started on 1/2/23. On day of arrival, 1/6/23, around 15:30PM, patient noted to have unsteady gait and language abnormalities (not speaking, difficulty understanding), whereas these symptoms were apparently not present just before 15:30PM. (07 Jan 2023 01:33)    Internal Medicine has been consulted on Ms. West's care for medical management. Patient is accompanied by her Son at the bedside. Son denies any cardiac history in the past.          CVA  - CT as noted w/ Acute L MCA infarct, Severe stenosis of the L M2 branch  - MR with L MCA Territory infarcts   - TTE with EF of 63%, Mild MR, No segmental WMA, LVH, Stage 1 diastolic dysfunction   - Patient will require loop placement, defer per neuro   - C/w ASA and Statin   - Monitor on tele  - Care per neurology appreciated   - Fall, Seizure and aspiration precautions    Tachypnea, Tachycardia,  Elevated D-dimer  - LE duplex neg for DVT  - F/u CTA to R/O PE  - Monitor O2 saturation closely     Fever  - Patient febrile with Tmax of 100.7 on 01/10   - UA neg for bacteria or Leukocytes   - BCx2 in lab, F/u results   - RVP, Covid negative   - F/u CTA to R/O pulmonary etiology   - Monitor CBC, temp curve, VS and patient closely  - Trend leukocytosis. If recurrent fever check repeat cultures    HTN  - C/w Norvasc 5 Qd  - Monitor BP, VS and patient closely    DM  - Elevated A1C of 8.6. Patient will require endo follow up   - Monitor glucose levels closely; Avoid hypo/hyperglycemia  - Diabetic DASH diet  - Endo follow up appreciated; F/u recs    HLD  - LDL elevated; LDL goal of <70  - C/w high intensity statin  - Diet and lifestyle modifications    Hypokalemia  - Monitor and replete electrolytes PRN   - F/u on AM labs      PPX  - Lovenox 40  78 year-old right-handed female w/ PMHx ?dementia (AOx1-2, performs all ADLs), otherwise reportedly no other PMHx, presenting to [facility] ED w/ right facial droop that started on 1/2/23. On day of arrival, 1/6/23, around 15:30PM, patient noted to have unsteady gait and language abnormalities (not speaking, difficulty understanding), whereas these symptoms were apparently not present just before 15:30PM. (07 Jan 2023 01:33)    Internal Medicine has been consulted on Ms. West's care for medical management. Patient is accompanied by her Son at the bedside. Son denies any cardiac history in the past.          CVA  - CT as noted w/ Acute L MCA infarct, Severe stenosis of the L M2 branch  - MR with L MCA Territory infarcts   - TTE with EF of 63%, Mild MR, No segmental WMA, LVH, Stage 1 diastolic dysfunction   - Patient will require loop placement, defer per neuro   - C/w ASA and Statin   - Monitor on tele  - Care per neurology appreciated   - Fall, Seizure and aspiration precautions    Tachypnea, Tachycardia,  Elevated D-dimer  - LE duplex neg for DVT  - F/u CTA to R/O PE  - Monitor O2 saturation closely     Fever  - Patient febrile with Tmax of 100.7 on 01/10   - UA neg for bacteria or Leukocytes   - BCx2 in lab, F/u results   - RVP, Covid negative   - F/u CTA to R/O pulmonary etiology   - Monitor CBC, temp curve, VS and patient closely  - Trend leukocytosis. If recurrent fever check repeat cultures    HTN  - C/w Norvasc 5 Qd  - Monitor BP, VS and patient closely    DM  - Elevated A1C of 8.6. Patient will require endo follow up   - Monitor glucose levels closely; Avoid hypo/hyperglycemia  - Diabetic DASH diet  - Endo follow up appreciated; F/u recs    HLD  - LDL elevated; LDL goal of <70  - C/w high intensity statin  - Diet and lifestyle modifications    Hypokalemia  - Monitor and replete electrolytes PRN   - F/u on AM labs      PPX  - Lovenox 40  78 year-old right-handed female w/ PMHx ?dementia (AOx1-2, performs all ADLs), otherwise reportedly no other PMHx, presenting to [facility] ED w/ right facial droop that started on 1/2/23. On day of arrival, 1/6/23, around 15:30PM, patient noted to have unsteady gait and language abnormalities (not speaking, difficulty understanding), whereas these symptoms were apparently not present just before 15:30PM. (07 Jan 2023 01:33)    Internal Medicine has been consulted on Ms. West's care for medical management. Patient is accompanied by her Son at the bedside. Son denies any cardiac history in the past.          CVA  - CT as noted w/ Acute L MCA infarct, Severe stenosis of the L M2 branch  - MR with L MCA Territory infarcts   - TTE with EF of 63%, Mild MR, No segmental WMA, LVH, Stage 1 diastolic dysfunction   - Patient will require loop placement, defer per neuro   - C/w ASA and Statin   - Monitor on tele  - Care per neurology appreciated   - Fall, Seizure and aspiration precautions    Tachypnea, Tachycardia,  Elevated D-dimer  - LE duplex neg for DVT  - F/u CTA to R/O PE  - Monitor O2 saturation closely     Fever  - Patient febrile with Tmax of 100.7 on 01/10   - UA neg for bacteria or Leukocytes   - BCx2 in lab, F/u results   - RVP, Covid negative   - F/u CTA to R/O pulmonary etiology   - Monitor CBC, temp curve, VS and patient closely  - Trend leukocytosis. If recurrent fever check repeat cultures    HTN  - C/w Norvasc 5 Qd  - Monitor BP, VS and patient closely    DM  - Elevated A1C of 8.6. Patient will require endo follow up   - Monitor glucose levels closely; Avoid hypo/hyperglycemia  - Diabetic DASH diet  - Endo follow up appreciated; F/u recs    HLD  - LDL elevated; LDL goal of <70  - C/w high intensity statin  - Diet and lifestyle modifications    Hypokalemia  - Monitor and replete electrolytes PRN   - F/u on AM labs      PPX  - Lovenox 40  78 year-old right-handed female w/ PMHx ?dementia (AOx1-2, performs all ADLs), otherwise reportedly no other PMHx, presenting to [facility] ED w/ right facial droop that started on 1/2/23. On day of arrival, 1/6/23, around 15:30PM, patient noted to have unsteady gait and language abnormalities (not speaking, difficulty understanding), whereas these symptoms were apparently not present just before 15:30PM. (07 Jan 2023 01:33)    Internal Medicine has been consulted on Ms. West's care for medical management. Patient is accompanied by her Son at the bedside. Son denies any cardiac history in the past.          CVA  - CT as noted w/ Acute L MCA infarct, Severe stenosis of the L M2 branch  - MR with L MCA Territory infarcts   - TTE with EF of 63%, Mild MR, No segmental WMA, LVH, Stage 1 diastolic dysfunction   - Patient will require loop placement, defer per neuro   - C/w ASA and Statin   - Monitor on tele  - Care per neurology appreciated   - Fall, Seizure and aspiration precautions  - Malignancy W/U inpatient vs outpatient     Tachypnea, Tachycardia,  Elevated D-dimer 417  - LE duplex neg for DVT  - F/u CTA to R/O PE  - Monitor O2 saturation closely     Fever  - Patient febrile with Tmax of 100.7 on 01/10   - UA neg for bacteria or Leukocytes   - BCx2 in lab, F/u results   - RVP, Covid negative   - F/u CTA to R/O pulmonary etiology   - Monitor CBC, temp curve, VS and patient closely  - Trend leukocytosis. If recurrent fever check repeat cultures    HTN  - C/w Norvasc 5 Qd  - Monitor BP, VS and patient closely    DM  - Elevated A1C of 8.6. Patient will require endo follow up   - Monitor glucose levels closely; Avoid hypo/hyperglycemia  - Diabetic DASH diet  - Endo follow up appreciated; F/u recs    HLD  - LDL elevated; LDL goal of <70  - C/w high intensity statin  - Diet and lifestyle modifications    Hypokalemia  - Monitor and replete electrolytes PRN   - F/u on AM labs      PPX  - Lovenox 40  78 year-old right-handed female w/ PMHx ?dementia (AOx1-2, performs all ADLs), otherwise reportedly no other PMHx, presenting to [facility] ED w/ right facial droop that started on 1/2/23. On day of arrival, 1/6/23, around 15:30PM, patient noted to have unsteady gait and language abnormalities (not speaking, difficulty understanding), whereas these symptoms were apparently not present just before 15:30PM. (07 Jan 2023 01:33)    Internal Medicine has been consulted on Ms. West's care for medical management. Patient is accompanied by her Son at the bedside. Son denies any cardiac history in the past.          CVA  - CT as noted w/ Acute L MCA infarct, Severe stenosis of the L M2 branch  - MR with L MCA Territory infarcts   - TTE with EF of 63%, Mild MR, No segmental WMA, LVH, Stage 1 diastolic dysfunction   - Patient will require loop placement, defer per neuro   - C/w ASA and Statin   - Monitor on tele  - Care per neurology appreciated   - Fall, Seizure and aspiration precautions  - Malignancy W/U inpatient vs outpatient     Tachypnea, Tachycardia,  Elevated D-dimer 417  - LE duplex neg for DVT  - F/u CTA to R/O PE  - Monitor O2 saturation closely     Fever  - Patient febrile with Tmax of 100.7 on 01/10   - UA neg for bacteria or Leukocytes   - BCx2 in lab, F/u results   - RVP, Covid negative   - F/u CTA to R/O pulmonary etiology   - Monitor CBC, temp curve, VS and patient closely  - Trend leukocytosis. If recurrent fever check repeat cultures    HTN  - C/w Norvasc 5 Qd  - Monitor BP, VS and patient closely    DM  - Elevated A1C of 8.6. Patient will require endo follow up   - Monitor glucose levels closely; Avoid hypo/hyperglycemia  - Diabetic DASH diet  - Endo follow up appreciated; F/u recs    HLD  - LDL elevated; LDL goal of <70  - C/w high intensity statin  - Diet and lifestyle modifications    Hypokalemia  - Monitor and replete electrolytes PRN   - F/u on AM labs      PPX  - Lovenox 40  78 year-old right-handed female w/ PMHx ?dementia (AOx1-2, performs all ADLs), otherwise reportedly no other PMHx, presenting to [facility] ED w/ right facial droop that started on 1/2/23. On day of arrival, 1/6/23, around 15:30PM, patient noted to have unsteady gait and language abnormalities (not speaking, difficulty understanding), whereas these symptoms were apparently not present just before 15:30PM. (07 Jan 2023 01:33)    Internal Medicine has been consulted on Ms. West's care for medical management. Patient is accompanied by her Son at the bedside. Son denies any cardiac history in the past.          CVA  - CT as noted w/ Acute L MCA infarct, Severe stenosis of the L M2 branch  - MR with L MCA Territory infarcts   - TTE with EF of 63%, Mild MR, No segmental WMA, LVH, Stage 1 diastolic dysfunction   - Patient will require loop placement, defer per neuro   - C/w ASA and Statin   - Monitor on tele  - Care per neurology appreciated   - Fall, Seizure and aspiration precautions  - Malignancy W/U inpatient vs outpatient     Tachypnea, Tachycardia,  Elevated D-dimer 417  - LE duplex neg for DVT  - F/u CTA to R/O PE  - Monitor O2 saturation closely     Fever  - Patient febrile with Tmax of 100.7 on 01/10   - UA neg for bacteria or Leukocytes   - BCx2 in lab, F/u results   - RVP, Covid negative   - F/u CTA to R/O pulmonary etiology   - Monitor CBC, temp curve, VS and patient closely  - Trend leukocytosis. If recurrent fever check repeat cultures    HTN  - C/w Norvasc 5 Qd  - Monitor BP, VS and patient closely    DM  - Elevated A1C of 8.6. Patient will require endo follow up   - Monitor glucose levels closely; Avoid hypo/hyperglycemia  - Diabetic DASH diet  - Endo follow up appreciated; F/u recs    HLD  - LDL elevated; LDL goal of <70  - C/w high intensity statin  - Diet and lifestyle modifications    Hypokalemia  - Monitor and replete electrolytes PRN   - F/u on AM labs      PPX  - Lovenox 40

## 2023-01-11 NOTE — CONSULT NOTE ADULT - REASON FOR ADMISSION
Acute ischemic stroke
Left M1 stenosis w/ left hemispheric infarct & at risk territory on CT perfusion
Acute ischemic stroke

## 2023-01-11 NOTE — CONSULT NOTE ADULT - SUBJECTIVE AND OBJECTIVE BOX
HPI:  78 year-old right-handed female w/ PMHx ?dementia (AOx1-2, performs all ADLs), otherwise reportedly no other PMHx, presenting to [facility] ED w/ right facial droop that started on 23. On day of arrival, 23, around 15:30PM, patient noted to have unsteady gait and language abnormalities (not speaking, difficulty understanding), whereas these symptoms were apparently not present just before 15:30PM. (2023 01:33)    Internal Medicine has been consulted on Ms. Disla's care for medical management. Patient is accompanied by her Son at the bedside.  Son denies any cardiac history in the past.      REVIEW OF SYSTEMS:  Unable to obtain ROS 2/2 clinical condition         PAST MEDICAL & SURGICAL HISTORY:  Dementia      No significant past surgical history            MEDICATIONS  (STANDING):  amLODIPine   Tablet 5 milliGRAM(s) Oral daily  aspirin  chewable 81 milliGRAM(s) Oral daily  atorvastatin 80 milliGRAM(s) Oral at bedtime  clopidogrel Tablet 75 milliGRAM(s) Oral daily  dextrose 5%. 1000 milliLiter(s) (50 mL/Hr) IV Continuous <Continuous>  dextrose 5%. 1000 milliLiter(s) (100 mL/Hr) IV Continuous <Continuous>  dextrose 50% Injectable 25 Gram(s) IV Push once  dextrose 50% Injectable 12.5 Gram(s) IV Push once  dextrose 50% Injectable 25 Gram(s) IV Push once  enoxaparin Injectable 40 milliGRAM(s) SubCutaneous every 24 hours  glucagon  Injectable 1 milliGRAM(s) IntraMuscular once  insulin lispro (ADMELOG) corrective regimen sliding scale   SubCutaneous three times a day before meals  insulin lispro (ADMELOG) corrective regimen sliding scale   SubCutaneous at bedtime  polyethylene glycol 3350 17 Gram(s) Oral daily  sodium chloride 0.9%. 1000 milliLiter(s) (50 mL/Hr) IV Continuous <Continuous>        Allergies    Benedryl Allergy Sinus (Hives)  penicillin (Rash)    Intolerances        Appearance: Awake, Sitting OOB TC, aphasic 	  HEENT:   Normal oral mucosa, Eyes are open   Lymphatic: No lymphadenopathy , no edema  Cardiovascular: Normal S1 S2, No JVD   Respiratory: Lungs clear to auscultation 	  Gastrointestinal:  Soft, Non-tender, + BS	  Skin: No rashes, No ecchymoses, No cyanosis, warm to touch  Musculoskeletal: Differed per Son's request   Psychiatry: Aphasic, Calm   Ext: No edema                              12.0   12.63 )-----------( 295      ( 2023 05:23 )             37.6                   136  |  103  |  16  ----------------------------<  187<H>  3.3<L>   |  21<L>  |  0.58    Ca    9.3      2023 05:23                         Urinalysis Basic - ( 10 Ruben 2023 16:13 )    Color: DARK BROWN / Appearance: Slightly Turbid / S.024 / pH: x  Gluc: x / Ketone: Small  / Bili: Negative / Urobili: Negative   Blood: x / Protein: 300 mg/dL / Nitrite: Negative   Leuk Esterase: Negative / RBC: >50 /hpf / WBC 2 /HPF   Sq Epi: x / Non Sq Epi: 0 /hpf / Bacteria: Negative            < from: CT Brain Stroke Protocol (23 @ 23:14) >  IMPRESSION:  No acute intracranial hemorrhage.    Hypoattenuation of the left temporal lobe, may represent acute left MCA   territory infarct.    Findings discussed with Dr. Cantu by Dr. Gutierrez at 11:14 PM on   2023.    < end of copied text >        < from: CT Angio Neck w/ IV Cont (23 @ 23:21) >  IMPRESSION:  CTA Head & Neck: Severe stenosis/near complete occlusion with the left M2   branch.  CT Perfusion: There is a penumbra involving a large area of the left MCA   territory.    Findings discussed with  by Dr. Gutierrez at 11:35 PM on   2023.    --- End of Report ---    < end of copied text >        < from: MR Head No Cont (23 @ 13:40) >    IMPRESSION:  Left MCA territory acute infarcts.    No acute intracranial hemorrhage    < end of copied text >        < from: Transthoracic Echocardiogram (23 @ 14:39) >    Patient name: SARAH DISLA  YOB: 1945   Age: 78 (F)   MR#: [id number]  Study Date: 2023  [facility]  Study quality: Technically fair  Referring Physician: Jamaal Wilson MD  Blood Pressure: 197/98 mmHg  Height: 157 cm  Weight: 54 kg  BSA: 1.5 m2  ------------------------------------------------------------------------  PROCEDURE: Transthoracic echocardiogram with 2-D, M-Mode  and complete spectral and color flow Doppler.  INDICATION: Cerebral infarction, unspecified (I63.9)  ------------------------------------------------------------------------  Dimensions:    Normal Values:  LA:     3.0    2.0 - 4.0 cm  Ao:     3.0    2.0 - 3.8 cm  SEPTUM: 1.2    0.6 - 1.2 cm  PWT:    1.1    0.6 - 1.1 cm  LVIDd:  3.0    3.0 - 5.6 cm  LVIDs:         1.8 - 4.0 cm  Derived variables:  LVMI: 66 g/m2  RWT: 0.73  EF (Montejo Rule): 63 %  ------------------------------------------------------------------------  Observations:  Mitral Valve: Mitral annular calcification and calcified  mitral leaflets with normal diastolic opening. Mild mitral  regurgitation.  Aortic Valve/Aorta: Calcified trileaflet aortic valve with  normal opening.  Aortic Root: 3 cm.  Ascending Aorta: 2.9 cm.  LVOT diameter: 2.4 cm.  Left Atrium: Normal left atrium.  LA volume index = 25  cc/m2.  Left Ventricle: Normal left ventricular systolic function.  No segmental wall motion abnormalities. Increased relative  wall thickness with normal left ventricular mass index,  consistent with concentric left ventricular remodeling.  Mild diastolic dysfunction (Stage I).  Right Heart: Normal right atrium. Normal right ventricular  size and function. Normal tricuspid valve. Normal pulmonic  valve.  Pericardium/Pleura: Normal pericardium with no pericardial  effusion.  Hemodynamic: Estimated right atrial pressure is 8 mm Hg.  ------------------------------------------------------------------------  Conclusions:  1. Mitral annular calcification and calcified mitral  leaflets withnormal diastolic opening. Mild mitral  regurgitation.  2. Calcified trileaflet aortic valve with normal opening.  3. Normal left ventricular systolic function. No segmental  wall motion abnormalities.  4. Normal right ventricular size and function.  ------------------------------------------------------------------------  Confirmed on  2023 - 17:57:22 by Graham Gaytan M.D.  ------------------------------------------------------------------------    < end of copied text >        < from: [facility] Duplex Lower Ext Vein Scan, Bilat (23 @ 11:20) >  IMPRESSION:  No evidence of deep venous thrombosis in either lower extremity.      < end of copied text >     HPI:  78 year-old right-handed female w/ PMHx ?dementia (AOx1-2, performs all ADLs), otherwise reportedly no other PMHx, presenting to [facility] ED w/ right facial droop that started on 23. On day of arrival, 23, around 15:30PM, patient noted to have unsteady gait and language abnormalities (not speaking, difficulty understanding), whereas these symptoms were apparently not present just before 15:30PM. (2023 01:33)    Internal Medicine has been consulted on Ms. Disla's care for medical management. Patient is accompanied by her Son at the bedside.  Son denies any cardiac history in the past.      REVIEW OF SYSTEMS:  Unable to obtain ROS 2/2 clinical condition         PAST MEDICAL & SURGICAL HISTORY:  Dementia      No significant past surgical history            MEDICATIONS  (STANDING):  amLODIPine   Tablet 5 milliGRAM(s) Oral daily  aspirin  chewable 81 milliGRAM(s) Oral daily  atorvastatin 80 milliGRAM(s) Oral at bedtime  clopidogrel Tablet 75 milliGRAM(s) Oral daily  dextrose 5%. 1000 milliLiter(s) (50 mL/Hr) IV Continuous <Continuous>  dextrose 5%. 1000 milliLiter(s) (100 mL/Hr) IV Continuous <Continuous>  dextrose 50% Injectable 25 Gram(s) IV Push once  dextrose 50% Injectable 12.5 Gram(s) IV Push once  dextrose 50% Injectable 25 Gram(s) IV Push once  enoxaparin Injectable 40 milliGRAM(s) SubCutaneous every 24 hours  glucagon  Injectable 1 milliGRAM(s) IntraMuscular once  insulin lispro (ADMELOG) corrective regimen sliding scale   SubCutaneous three times a day before meals  insulin lispro (ADMELOG) corrective regimen sliding scale   SubCutaneous at bedtime  polyethylene glycol 3350 17 Gram(s) Oral daily  sodium chloride 0.9%. 1000 milliLiter(s) (50 mL/Hr) IV Continuous <Continuous>        Allergies    Benedryl Allergy Sinus (Hives)  penicillin (Rash)    Intolerances        Appearance: Awake, Sitting OOB TC, aphasic 	  HEENT:   Normal oral mucosa, Eyes are open   Lymphatic: No lymphadenopathy , no edema  Cardiovascular: Normal S1 S2, No JVD   Respiratory: Lungs clear to auscultation 	  Gastrointestinal:  Soft, Non-tender, + BS	  Skin: No rashes, No ecchymoses, No cyanosis, warm to touch  Musculoskeletal: Differed per Son's request   Psychiatry: Aphasic, Calm   Ext: No edema                              12.0   12.63 )-----------( 295      ( 2023 05:23 )             37.6                   136  |  103  |  16  ----------------------------<  187<H>  3.3<L>   |  21<L>  |  0.58    Ca    9.3      2023 05:23                         Urinalysis Basic - ( 10 Ruben 2023 16:13 )    Color: DARK BROWN / Appearance: Slightly Turbid / S.024 / pH: x  Gluc: x / Ketone: Small  / Bili: Negative / Urobili: Negative   Blood: x / Protein: 300 mg/dL / Nitrite: Negative   Leuk Esterase: Negative / RBC: >50 /hpf / WBC 2 /HPF   Sq Epi: x / Non Sq Epi: 0 /hpf / Bacteria: Negative            < from: CT Brain Stroke Protocol (23 @ 23:14) >  IMPRESSION:  No acute intracranial hemorrhage.    Hypoattenuation of the left temporal lobe, may represent acute left MCA   territory infarct.    Findings discussed with Dr. Cantu by Dr. Gutierrez at 11:14 PM on   2023.    < end of copied text >        < from: CT Angio Neck w/ IV Cont (23 @ 23:21) >  IMPRESSION:  CTA Head & Neck: Severe stenosis/near complete occlusion with the left M2   branch.  CT Perfusion: There is a penumbra involving a large area of the left MCA   territory.    Findings discussed with  by Dr. Gutierrez at 11:35 PM on   2023.    --- End of Report ---    < end of copied text >        < from: MR Head No Cont (23 @ 13:40) >    IMPRESSION:  Left MCA territory acute infarcts.    No acute intracranial hemorrhage    < end of copied text >        < from: Transthoracic Echocardiogram (23 @ 14:39) >    Patient name: SARAH DISLA  YOB: 1945   Age: 78 (F)   MR#: [id number]  Study Date: 2023  [facility]  Study quality: Technically fair  Referring Physician: Jamaal Wilson MD  Blood Pressure: 197/98 mmHg  Height: 157 cm  Weight: 54 kg  BSA: 1.5 m2  ------------------------------------------------------------------------  PROCEDURE: Transthoracic echocardiogram with 2-D, M-Mode  and complete spectral and color flow Doppler.  INDICATION: Cerebral infarction, unspecified (I63.9)  ------------------------------------------------------------------------  Dimensions:    Normal Values:  LA:     3.0    2.0 - 4.0 cm  Ao:     3.0    2.0 - 3.8 cm  SEPTUM: 1.2    0.6 - 1.2 cm  PWT:    1.1    0.6 - 1.1 cm  LVIDd:  3.0    3.0 - 5.6 cm  LVIDs:         1.8 - 4.0 cm  Derived variables:  LVMI: 66 g/m2  RWT: 0.73  EF (Montejo Rule): 63 %  ------------------------------------------------------------------------  Observations:  Mitral Valve: Mitral annular calcification and calcified  mitral leaflets with normal diastolic opening. Mild mitral  regurgitation.  Aortic Valve/Aorta: Calcified trileaflet aortic valve with  normal opening.  Aortic Root: 3 cm.  Ascending Aorta: 2.9 cm.  LVOT diameter: 2.4 cm.  Left Atrium: Normal left atrium.  LA volume index = 25  cc/m2.  Left Ventricle: Normal left ventricular systolic function.  No segmental wall motion abnormalities. Increased relative  wall thickness with normal left ventricular mass index,  consistent with concentric left ventricular remodeling.  Mild diastolic dysfunction (Stage I).  Right Heart: Normal right atrium. Normal right ventricular  size and function. Normal tricuspid valve. Normal pulmonic  valve.  Pericardium/Pleura: Normal pericardium with no pericardial  effusion.  Hemodynamic: Estimated right atrial pressure is 8 mm Hg.  ------------------------------------------------------------------------  Conclusions:  1. Mitral annular calcification and calcified mitral  leaflets withnormal diastolic opening. Mild mitral  regurgitation.  2. Calcified trileaflet aortic valve with normal opening.  3. Normal left ventricular systolic function. No segmental  wall motion abnormalities.  4. Normal right ventricular size and function.  ------------------------------------------------------------------------  Confirmed on  2023 - 17:57:22 by Graham Gaytan M.D.  ------------------------------------------------------------------------    < end of copied text >        < from: VA Duplex Lower Ext Vein Scan, Bilat (23 @ 11:20) >  IMPRESSION:  No evidence of deep venous thrombosis in either lower extremity.      < end of copied text >     HPI:  78 year-old right-handed female w/ PMHx ?dementia (AOx1-2, performs all ADLs), otherwise reportedly no other PMHx, presenting to [facility] ED w/ right facial droop that started on 23. On day of arrival, 23, around 15:30PM, patient noted to have unsteady gait and language abnormalities (not speaking, difficulty understanding), whereas these symptoms were apparently not present just before 15:30PM. (2023 01:33)    Internal Medicine has been consulted on Ms. Disla's care for medical management. Patient is accompanied by her Son at the bedside.  Son denies any cardiac history in the past.      REVIEW OF SYSTEMS:  Unable to obtain ROS 2/2 clinical condition         PAST MEDICAL & SURGICAL HISTORY:  Dementia      No significant past surgical history            MEDICATIONS  (STANDING):  amLODIPine   Tablet 5 milliGRAM(s) Oral daily  aspirin  chewable 81 milliGRAM(s) Oral daily  atorvastatin 80 milliGRAM(s) Oral at bedtime  clopidogrel Tablet 75 milliGRAM(s) Oral daily  dextrose 5%. 1000 milliLiter(s) (50 mL/Hr) IV Continuous <Continuous>  dextrose 5%. 1000 milliLiter(s) (100 mL/Hr) IV Continuous <Continuous>  dextrose 50% Injectable 25 Gram(s) IV Push once  dextrose 50% Injectable 12.5 Gram(s) IV Push once  dextrose 50% Injectable 25 Gram(s) IV Push once  enoxaparin Injectable 40 milliGRAM(s) SubCutaneous every 24 hours  glucagon  Injectable 1 milliGRAM(s) IntraMuscular once  insulin lispro (ADMELOG) corrective regimen sliding scale   SubCutaneous three times a day before meals  insulin lispro (ADMELOG) corrective regimen sliding scale   SubCutaneous at bedtime  polyethylene glycol 3350 17 Gram(s) Oral daily  sodium chloride 0.9%. 1000 milliLiter(s) (50 mL/Hr) IV Continuous <Continuous>        Allergies    Benedryl Allergy Sinus (Hives)  penicillin (Rash)    Intolerances        Appearance: Awake, Sitting OOB TC, aphasic 	  HEENT:   Normal oral mucosa, Eyes are open   Lymphatic: No lymphadenopathy , no edema  Cardiovascular: Normal S1 S2, No JVD   Respiratory: Lungs clear to auscultation 	  Gastrointestinal:  Soft, Non-tender, + BS	  Skin: No rashes, No ecchymoses, No cyanosis, warm to touch  Musculoskeletal: Differed per Son's request   Psychiatry: Aphasic, Calm   Ext: No edema                              12.0   12.63 )-----------( 295      ( 2023 05:23 )             37.6                   136  |  103  |  16  ----------------------------<  187<H>  3.3<L>   |  21<L>  |  0.58    Ca    9.3      2023 05:23                         Urinalysis Basic - ( 10 Ruben 2023 16:13 )    Color: DARK BROWN / Appearance: Slightly Turbid / S.024 / pH: x  Gluc: x / Ketone: Small  / Bili: Negative / Urobili: Negative   Blood: x / Protein: 300 mg/dL / Nitrite: Negative   Leuk Esterase: Negative / RBC: >50 /hpf / WBC 2 /HPF   Sq Epi: x / Non Sq Epi: 0 /hpf / Bacteria: Negative            < from: CT Brain Stroke Protocol (23 @ 23:14) >  IMPRESSION:  No acute intracranial hemorrhage.    Hypoattenuation of the left temporal lobe, may represent acute left MCA   territory infarct.    Findings discussed with Dr. Cantu by Dr. Gutierrez at 11:14 PM on   2023.    < end of copied text >        < from: CT Angio Neck w/ IV Cont (23 @ 23:21) >  IMPRESSION:  CTA Head & Neck: Severe stenosis/near complete occlusion with the left M2   branch.  CT Perfusion: There is a penumbra involving a large area of the left MCA   territory.    Findings discussed with  by Dr. Gutierrez at 11:35 PM on   2023.    --- End of Report ---    < end of copied text >        < from: MR Head No Cont (23 @ 13:40) >    IMPRESSION:  Left MCA territory acute infarcts.    No acute intracranial hemorrhage    < end of copied text >        < from: Transthoracic Echocardiogram (23 @ 14:39) >    Patient name: SARAH DISLA  YOB: 1945   Age: 78 (F)   MR#: [id number]  Study Date: 2023  [facility]  Study quality: Technically fair  Referring Physician: Jamaal Wilson MD  Blood Pressure: 197/98 mmHg  Height: 157 cm  Weight: 54 kg  BSA: 1.5 m2  ------------------------------------------------------------------------  PROCEDURE: Transthoracic echocardiogram with 2-D, M-Mode  and complete spectral and color flow Doppler.  INDICATION: Cerebral infarction, unspecified (I63.9)  ------------------------------------------------------------------------  Dimensions:    Normal Values:  LA:     3.0    2.0 - 4.0 cm  Ao:     3.0    2.0 - 3.8 cm  SEPTUM: 1.2    0.6 - 1.2 cm  PWT:    1.1    0.6 - 1.1 cm  LVIDd:  3.0    3.0 - 5.6 cm  LVIDs:         1.8 - 4.0 cm  Derived variables:  LVMI: 66 g/m2  RWT: 0.73  EF (Montejo Rule): 63 %  ------------------------------------------------------------------------  Observations:  Mitral Valve: Mitral annular calcification and calcified  mitral leaflets with normal diastolic opening. Mild mitral  regurgitation.  Aortic Valve/Aorta: Calcified trileaflet aortic valve with  normal opening.  Aortic Root: 3 cm.  Ascending Aorta: 2.9 cm.  LVOT diameter: 2.4 cm.  Left Atrium: Normal left atrium.  LA volume index = 25  cc/m2.  Left Ventricle: Normal left ventricular systolic function.  No segmental wall motion abnormalities. Increased relative  wall thickness with normal left ventricular mass index,  consistent with concentric left ventricular remodeling.  Mild diastolic dysfunction (Stage I).  Right Heart: Normal right atrium. Normal right ventricular  size and function. Normal tricuspid valve. Normal pulmonic  valve.  Pericardium/Pleura: Normal pericardium with no pericardial  effusion.  Hemodynamic: Estimated right atrial pressure is 8 mm Hg.  ------------------------------------------------------------------------  Conclusions:  1. Mitral annular calcification and calcified mitral  leaflets withnormal diastolic opening. Mild mitral  regurgitation.  2. Calcified trileaflet aortic valve with normal opening.  3. Normal left ventricular systolic function. No segmental  wall motion abnormalities.  4. Normal right ventricular size and function.  ------------------------------------------------------------------------  Confirmed on  2023 - 17:57:22 by Graham Gaytan M.D.  ------------------------------------------------------------------------    < end of copied text >        < from: VA Duplex Lower Ext Vein Scan, Bilat (23 @ 11:20) >  IMPRESSION:  No evidence of deep venous thrombosis in either lower extremity.      < end of copied text >

## 2023-01-12 VITALS
DIASTOLIC BLOOD PRESSURE: 83 MMHG | RESPIRATION RATE: 24 BRPM | HEART RATE: 102 BPM | OXYGEN SATURATION: 100 % | SYSTOLIC BLOOD PRESSURE: 132 MMHG

## 2023-01-12 LAB
ANION GAP SERPL CALC-SCNC: 16 MMOL/L — SIGNIFICANT CHANGE UP (ref 5–17)
BUN SERPL-MCNC: 19 MG/DL — SIGNIFICANT CHANGE UP (ref 7–23)
CALCIUM SERPL-MCNC: 9.1 MG/DL — SIGNIFICANT CHANGE UP (ref 8.4–10.5)
CALCIUM SERPL-MCNC: 9.2 MG/DL — SIGNIFICANT CHANGE UP (ref 8.4–10.5)
CHLORIDE SERPL-SCNC: 104 MMOL/L — SIGNIFICANT CHANGE UP (ref 96–108)
CO2 SERPL-SCNC: 16 MMOL/L — LOW (ref 22–31)
CO2 SERPL-SCNC: 18 MMOL/L — LOW (ref 22–31)
CREAT SERPL-MCNC: 0.55 MG/DL — SIGNIFICANT CHANGE UP (ref 0.5–1.3)
CREAT SERPL-MCNC: 0.58 MG/DL — SIGNIFICANT CHANGE UP (ref 0.5–1.3)
EGFR: 92 ML/MIN/1.73M2 — SIGNIFICANT CHANGE UP
EGFR: 93 ML/MIN/1.73M2 — SIGNIFICANT CHANGE UP
GLUCOSE SERPL-MCNC: 208 MG/DL — HIGH (ref 70–99)
GLUCOSE SERPL-MCNC: 211 MG/DL — HIGH (ref 70–99)
HCT VFR BLD CALC: 35.3 % — SIGNIFICANT CHANGE UP (ref 34.5–45)
HGB BLD-MCNC: 11 G/DL — LOW (ref 11.5–15.5)
MCHC RBC-ENTMCNC: 27.2 PG — SIGNIFICANT CHANGE UP (ref 27–34)
MCHC RBC-ENTMCNC: 31.2 GM/DL — LOW (ref 32–36)
MCV RBC AUTO: 87.2 FL — SIGNIFICANT CHANGE UP (ref 80–100)
NRBC # BLD: 0 /100 WBCS — SIGNIFICANT CHANGE UP (ref 0–0)
PLATELET # BLD AUTO: 294 K/UL — SIGNIFICANT CHANGE UP (ref 150–400)
POTASSIUM SERPL-MCNC: 3.5 MMOL/L — SIGNIFICANT CHANGE UP (ref 3.5–5.3)
POTASSIUM SERPL-SCNC: 3.5 MMOL/L — SIGNIFICANT CHANGE UP (ref 3.5–5.3)
RBC # BLD: 4.05 M/UL — SIGNIFICANT CHANGE UP (ref 3.8–5.2)
RBC # FLD: 14.1 % — SIGNIFICANT CHANGE UP (ref 10.3–14.5)
SODIUM SERPL-SCNC: 136 MMOL/L — SIGNIFICANT CHANGE UP (ref 135–145)
SODIUM SERPL-SCNC: 138 MMOL/L — SIGNIFICANT CHANGE UP (ref 135–145)
WBC # BLD: 11.56 K/UL — HIGH (ref 3.8–10.5)
WBC # FLD AUTO: 11.56 K/UL — HIGH (ref 3.8–10.5)

## 2023-01-12 PROCEDURE — 85014 HEMATOCRIT: CPT

## 2023-01-12 PROCEDURE — 93970 EXTREMITY STUDY: CPT

## 2023-01-12 PROCEDURE — 85018 HEMOGLOBIN: CPT

## 2023-01-12 PROCEDURE — 93005 ELECTROCARDIOGRAM TRACING: CPT

## 2023-01-12 PROCEDURE — 82330 ASSAY OF CALCIUM: CPT

## 2023-01-12 PROCEDURE — 70450 CT HEAD/BRAIN W/O DYE: CPT | Mod: MA

## 2023-01-12 PROCEDURE — 82565 ASSAY OF CREATININE: CPT

## 2023-01-12 PROCEDURE — 99285 EMERGENCY DEPT VISIT HI MDM: CPT

## 2023-01-12 PROCEDURE — 82803 BLOOD GASES ANY COMBINATION: CPT

## 2023-01-12 PROCEDURE — 83605 ASSAY OF LACTIC ACID: CPT

## 2023-01-12 PROCEDURE — 82947 ASSAY GLUCOSE BLOOD QUANT: CPT

## 2023-01-12 PROCEDURE — 85379 FIBRIN DEGRADATION QUANT: CPT

## 2023-01-12 PROCEDURE — 87637 SARSCOV2&INF A&B&RSV AMP PRB: CPT

## 2023-01-12 PROCEDURE — 81001 URINALYSIS AUTO W/SCOPE: CPT

## 2023-01-12 PROCEDURE — 97530 THERAPEUTIC ACTIVITIES: CPT

## 2023-01-12 PROCEDURE — 84484 ASSAY OF TROPONIN QUANT: CPT

## 2023-01-12 PROCEDURE — 83036 HEMOGLOBIN GLYCOSYLATED A1C: CPT

## 2023-01-12 PROCEDURE — 70551 MRI BRAIN STEM W/O DYE: CPT

## 2023-01-12 PROCEDURE — 87040 BLOOD CULTURE FOR BACTERIA: CPT

## 2023-01-12 PROCEDURE — 84132 ASSAY OF SERUM POTASSIUM: CPT

## 2023-01-12 PROCEDURE — 80048 BASIC METABOLIC PNL TOTAL CA: CPT

## 2023-01-12 PROCEDURE — 97162 PT EVAL MOD COMPLEX 30 MIN: CPT

## 2023-01-12 PROCEDURE — 97166 OT EVAL MOD COMPLEX 45 MIN: CPT

## 2023-01-12 PROCEDURE — 82962 GLUCOSE BLOOD TEST: CPT

## 2023-01-12 PROCEDURE — 80053 COMPREHEN METABOLIC PANEL: CPT

## 2023-01-12 PROCEDURE — 84295 ASSAY OF SERUM SODIUM: CPT

## 2023-01-12 PROCEDURE — 80061 LIPID PANEL: CPT

## 2023-01-12 PROCEDURE — 0225U NFCT DS DNA&RNA 21 SARSCOV2: CPT

## 2023-01-12 PROCEDURE — 85027 COMPLETE CBC AUTOMATED: CPT

## 2023-01-12 PROCEDURE — 71045 X-RAY EXAM CHEST 1 VIEW: CPT

## 2023-01-12 PROCEDURE — 70498 CT ANGIOGRAPHY NECK: CPT | Mod: MA

## 2023-01-12 PROCEDURE — 36415 COLL VENOUS BLD VENIPUNCTURE: CPT

## 2023-01-12 PROCEDURE — 0042T: CPT | Mod: MA

## 2023-01-12 PROCEDURE — 93306 TTE W/DOPPLER COMPLETE: CPT

## 2023-01-12 PROCEDURE — 70496 CT ANGIOGRAPHY HEAD: CPT | Mod: MA

## 2023-01-12 PROCEDURE — 71275 CT ANGIOGRAPHY CHEST: CPT

## 2023-01-12 PROCEDURE — 92523 SPEECH SOUND LANG COMPREHEN: CPT

## 2023-01-12 PROCEDURE — 84145 PROCALCITONIN (PCT): CPT

## 2023-01-12 PROCEDURE — 82435 ASSAY OF BLOOD CHLORIDE: CPT

## 2023-01-12 PROCEDURE — 85025 COMPLETE CBC W/AUTO DIFF WBC: CPT

## 2023-01-12 PROCEDURE — 85730 THROMBOPLASTIN TIME PARTIAL: CPT

## 2023-01-12 PROCEDURE — 85610 PROTHROMBIN TIME: CPT

## 2023-01-12 RX ORDER — TAMSULOSIN HYDROCHLORIDE 0.4 MG/1
0.4 CAPSULE ORAL AT BEDTIME
Refills: 0 | Status: DISCONTINUED | OUTPATIENT
Start: 2023-01-12 | End: 2023-01-12

## 2023-01-12 RX ORDER — TAMSULOSIN HYDROCHLORIDE 0.4 MG/1
1 CAPSULE ORAL
Qty: 30 | Refills: 0
Start: 2023-01-12 | End: 2023-02-10

## 2023-01-12 RX ADMIN — Medication 2: at 08:34

## 2023-01-12 RX ADMIN — AMLODIPINE BESYLATE 5 MILLIGRAM(S): 2.5 TABLET ORAL at 06:28

## 2023-01-12 RX ADMIN — Medication 1: at 17:08

## 2023-01-12 RX ADMIN — CLOPIDOGREL BISULFATE 75 MILLIGRAM(S): 75 TABLET, FILM COATED ORAL at 12:48

## 2023-01-12 RX ADMIN — ENOXAPARIN SODIUM 40 MILLIGRAM(S): 100 INJECTION SUBCUTANEOUS at 05:10

## 2023-01-12 RX ADMIN — Medication 81 MILLIGRAM(S): at 12:48

## 2023-01-12 NOTE — PROGRESS NOTE ADULT - SUBJECTIVE AND OBJECTIVE BOX
THE PATIENT WAS SEEN AND EXAMINED BY ME WITH THE HOUSESTAFF AND STROKE TEAM DURING MORNING ROUNDS.   HPI:  78 year-old right-handed female Nicole speaking, w/ PMHx ?dementia (AOx1-2, performs all ADLs), DM, presented to [facility] ED w/ right facial droop that started on 1/2/23. On day of arrival, 1/6/23, around 15:30PM, patient noted to have unsteady gait and language abnormalities (not speaking, difficulty understanding),       SUBJECTIVE: No events overnight.  No new neurologic complaints.  ROS reported negative unless otherwise noted.    amLODIPine   Tablet 5 milliGRAM(s) Oral daily  aspirin  chewable 81 milliGRAM(s) Oral daily  atorvastatin 80 milliGRAM(s) Oral at bedtime  clopidogrel Tablet 75 milliGRAM(s) Oral daily  dextrose 5%. 1000 milliLiter(s) IV Continuous <Continuous>  dextrose 5%. 1000 milliLiter(s) IV Continuous <Continuous>  dextrose 50% Injectable 25 Gram(s) IV Push once  dextrose 50% Injectable 12.5 Gram(s) IV Push once  dextrose 50% Injectable 25 Gram(s) IV Push once  dextrose Oral Gel 15 Gram(s) Oral once PRN  enoxaparin Injectable 40 milliGRAM(s) SubCutaneous every 24 hours  glucagon  Injectable 1 milliGRAM(s) IntraMuscular once  insulin lispro (ADMELOG) corrective regimen sliding scale   SubCutaneous three times a day before meals  insulin lispro (ADMELOG) corrective regimen sliding scale   SubCutaneous at bedtime  polyethylene glycol 3350 17 Gram(s) Oral daily  sodium chloride 0.9%. 1000 milliLiter(s) IV Continuous <Continuous>      PHYSICAL EXAM:   Vital Signs Last 24 Hrs  T(C): 36.6 (12 Jan 2023 04:00), Max: 37.6 (11 Jan 2023 14:00)  T(F): 97.9 (12 Jan 2023 04:00), Max: 99.6 (11 Jan 2023 14:00)  HR: 101 (12 Jan 2023 06:00) (88 - 111)  BP: 160/59 (12 Jan 2023 06:00) (107/88 - 173/73)  BP(mean): 88 (12 Jan 2023 06:00) (72 - 118)  RR: 26 (12 Jan 2023 06:00) (12 - 26)  SpO2: 97% (12 Jan 2023 06:00) (96% - 100%)    Parameters below as of 12 Jan 2023 04:00  Patient On (Oxygen Delivery Method): room air      General: No acute distress  HEENT: EOM intact, blinks to threat B/L  Abdomen: Soft, nontender, nondistended   Extremities: No edema    NEUROLOGICAL EXAM: Family at bedside who provided translation   Mental status: Eyes open, awake, alert, attempts to produce speech, able to follow some simple commands, able to mimic, unable to ID objects  Cranial Nerves: Right facial droop, no nystagmus, blinks to threat B/L  Motor exam: Normal tone, no drift, Right hemiparesis RUE drift, 4/5, RLE drift, 4/5,, LUE 5/5, LLE 5/5  Sensation: Intact to light touch   Coordination/ Gait: Unable to participate with exam       LABS:                        11.0   11.56 )-----------( 294      ( 12 Jan 2023 07:54 )             35.3    01-12    136  |  104  |  19  ----------------------------<  211<H>  3.5   |  16<L>  |  0.55    Ca    9.2      12 Jan 2023 05:43          IMAGING: Reviewed by me.     Brain MRI (01/07/23): Left MCA territory acute infarcts. No acute intracranial hemorrhage    CT Head (01/06/23): No acute intracranial hemorrhage. Hypoattenuation of the left temporal lobe, may represent acute left MCA territory infarct.    CT/CTA head/neck (01/06/23): CTA Head & Neck: Severe stenosis/near complete occlusion with the left M2 branch.    CT Perfusion: There is a penumbra involving a large area of the left MCA territory.         THE PATIENT WAS SEEN AND EXAMINED BY ME WITH THE HOUSESTAFF AND STROKE TEAM DURING MORNING ROUNDS.   HPI:  78 year-old right-handed female Nicole speaking, w/ PMHx ?dementia (AOx1-2, performs all ADLs), DM, presented to [facility] ED w/ right facial droop that started on 1/2/23. On day of arrival, 1/6/23, around 15:30PM, patient noted to have unsteady gait and language abnormalities (not speaking, difficulty understanding),       SUBJECTIVE: No events overnight.  No new neurologic complaints.  ROS reported negative unless otherwise noted.    amLODIPine   Tablet 5 milliGRAM(s) Oral daily  aspirin  chewable 81 milliGRAM(s) Oral daily  atorvastatin 80 milliGRAM(s) Oral at bedtime  clopidogrel Tablet 75 milliGRAM(s) Oral daily  dextrose 5%. 1000 milliLiter(s) IV Continuous <Continuous>  dextrose 5%. 1000 milliLiter(s) IV Continuous <Continuous>  dextrose 50% Injectable 25 Gram(s) IV Push once  dextrose 50% Injectable 12.5 Gram(s) IV Push once  dextrose 50% Injectable 25 Gram(s) IV Push once  dextrose Oral Gel 15 Gram(s) Oral once PRN  enoxaparin Injectable 40 milliGRAM(s) SubCutaneous every 24 hours  glucagon  Injectable 1 milliGRAM(s) IntraMuscular once  insulin lispro (ADMELOG) corrective regimen sliding scale   SubCutaneous three times a day before meals  insulin lispro (ADMELOG) corrective regimen sliding scale   SubCutaneous at bedtime  polyethylene glycol 3350 17 Gram(s) Oral daily  sodium chloride 0.9%. 1000 milliLiter(s) IV Continuous <Continuous>      PHYSICAL EXAM:   Vital Signs Last 24 Hrs  T(C): 36.6 (12 Jan 2023 04:00), Max: 37.6 (11 Jan 2023 14:00)  T(F): 97.9 (12 Jan 2023 04:00), Max: 99.6 (11 Jan 2023 14:00)  HR: 101 (12 Jan 2023 06:00) (88 - 111)  BP: 160/59 (12 Jan 2023 06:00) (107/88 - 173/73)  BP(mean): 88 (12 Jan 2023 06:00) (72 - 118)  RR: 26 (12 Jan 2023 06:00) (12 - 26)  SpO2: 97% (12 Jan 2023 06:00) (96% - 100%)    Parameters below as of 12 Jan 2023 04:00  Patient On (Oxygen Delivery Method): room air      General: No acute distress  HEENT: EOM intact, blinks to threat B/L  Abdomen: Soft, nontender, nondistended   Extremities: No edema    NEUROLOGICAL EXAM: Family at bedside who provided translation   Mental status: Eyes open, awake, alert, attempts to produce speech, able to follow some simple commands, able to mimic, unable to ID objects  Cranial Nerves: Right facial droop, no nystagmus, blinks to threat B/L  Motor exam: Normal tone, no drift, Right hemiparesis RUE drift, 4/5, RLE drift, 4/5,, LUE 5/5, LLE 5/5  Sensation: Intact to light touch   Coordination/ Gait: Unable to participate with exam       LABS:                        11.0   11.56 )-----------( 294      ( 12 Jan 2023 07:54 )             35.3    01-12    136  |  104  |  19  ----------------------------<  211<H>  3.5   |  16<L>  |  0.55    Ca    9.2      12 Jan 2023 05:43          IMAGING: Reviewed by me.     Brain MRI (01/07/23): Left MCA territory acute infarcts. No acute intracranial hemorrhage    CT Head (01/06/23): No acute intracranial hemorrhage. Hypoattenuation of the left temporal lobe, may represent acute left MCA territory infarct.    CT/CTA head/neck (01/06/23): CTA Head & Neck: Severe stenosis/near complete occlusion with the left M2 branch.    CT Perfusion: There is a penumbra involving a large area of the left MCA territory.         THE PATIENT WAS SEEN AND EXAMINED BY ME WITH THE HOUSESTAFF AND STROKE TEAM DURING MORNING ROUNDS.   HPI:  78 year-old right-handed female Nicole speaking, w/ PMHx ?dementia (AOx1-2, performs all ADLs), DM, presented to [facility] ED w/ right facial droop that started on 1/2/23. On day of arrival, 1/6/23, around 15:30PM, patient noted to have unsteady gait and language abnormalities (not speaking, difficulty understanding),       SUBJECTIVE: No events overnight.  No new neurologic complaints.  ROS reported negative unless otherwise noted.    amLODIPine   Tablet 5 milliGRAM(s) Oral daily  aspirin  chewable 81 milliGRAM(s) Oral daily  atorvastatin 80 milliGRAM(s) Oral at bedtime  clopidogrel Tablet 75 milliGRAM(s) Oral daily  dextrose 5%. 1000 milliLiter(s) IV Continuous <Continuous>  dextrose 5%. 1000 milliLiter(s) IV Continuous <Continuous>  dextrose 50% Injectable 25 Gram(s) IV Push once  dextrose 50% Injectable 12.5 Gram(s) IV Push once  dextrose 50% Injectable 25 Gram(s) IV Push once  dextrose Oral Gel 15 Gram(s) Oral once PRN  enoxaparin Injectable 40 milliGRAM(s) SubCutaneous every 24 hours  glucagon  Injectable 1 milliGRAM(s) IntraMuscular once  insulin lispro (ADMELOG) corrective regimen sliding scale   SubCutaneous three times a day before meals  insulin lispro (ADMELOG) corrective regimen sliding scale   SubCutaneous at bedtime  polyethylene glycol 3350 17 Gram(s) Oral daily  sodium chloride 0.9%. 1000 milliLiter(s) IV Continuous <Continuous>      PHYSICAL EXAM:   Vital Signs Last 24 Hrs  T(C): 36.6 (12 Jan 2023 04:00), Max: 37.6 (11 Jan 2023 14:00)  T(F): 97.9 (12 Jan 2023 04:00), Max: 99.6 (11 Jan 2023 14:00)  HR: 101 (12 Jan 2023 06:00) (88 - 111)  BP: 160/59 (12 Jan 2023 06:00) (107/88 - 173/73)  BP(mean): 88 (12 Jan 2023 06:00) (72 - 118)  RR: 26 (12 Jan 2023 06:00) (12 - 26)  SpO2: 97% (12 Jan 2023 06:00) (96% - 100%)    Parameters below as of 12 Jan 2023 04:00  Patient On (Oxygen Delivery Method): room air      General: No acute distress  HEENT: EOM intact, blinks to threat B/L  Abdomen: Soft, nontender, nondistended   Extremities: No edema    NEUROLOGICAL EXAM: Family at bedside who provided translation   Mental status: Eyes open, awake, alert, attempts to produce speech, able to follow some simple commands, able to mimic, unable to ID objects  Cranial Nerves: Right facial droop, no nystagmus, blinks to threat B/L  Motor exam: Normal tone, no drift, Right hemiparesis RUE drift, 4/5, RLE drift, 4/5,, LUE 5/5, LLE 5/5  Sensation: Intact to light touch   Coordination/ Gait: Unable to participate with exam       LABS:                        11.0   11.56 )-----------( 294      ( 12 Jan 2023 07:54 )             35.3    01-12    136  |  104  |  19  ----------------------------<  211<H>  3.5   |  16<L>  |  0.55    Ca    9.2      12 Jan 2023 05:43          IMAGING: Reviewed by me.     Brain MRI (01/07/23): Left MCA territory acute infarcts. No acute intracranial hemorrhage    CT Head (01/06/23): No acute intracranial hemorrhage. Hypoattenuation of the left temporal lobe, may represent acute left MCA territory infarct.    CT/CTA head/neck (01/06/23): CTA Head & Neck: Severe stenosis/near complete occlusion with the left M2 branch.    CT Perfusion: There is a penumbra involving a large area of the left MCA territory.

## 2023-01-12 NOTE — PROGRESS NOTE ADULT - ASSESSMENT
78-year-old woman with PMH, DM, mild cognitive impairment (performs her daily activities and oriented to person and place.)  She presented with sudden onset of right facial droop, right hemiparesis and global aphasia.Head CT shows left inferior temporal hypodensity suggestive of subacute infarct. MRI brain significant for left hemispheric–MCA territory inferior division temporal infarction and left hemispheric subcortical infarcts  CTA shows to my eye, left M1 segment severe left MCA stenosis. She was not  a candidate for IV thrombolysis as her stroke symptoms started 1/2/2023; infarction/ left temporal hypodensity present on CT at the time of presentation and left M2 distal occlusion with patent M1 segment.    Impression: Left MCA territory ischemia likely secondary to symptomatic intracranial left MCA stenosis.     NEURO: Neuro exam unchanged, permissive HTN with slow titration to normotensive, titrate statin to LDL goal less than 70, MRI Brain, off Seroquel,  CTA Head and Neck results as above Physical therapy/OT eval with recommendations for AR.      ANTITHROMBOTIC THERAPY: ASA/Plavix for 3 months followed by ASA only as per [facility] protocol for secondary stroke prevention     PULMONARY: CXR (01/10): Biapical scarring with left upper lobe bronchiectasis are without significant change as compared to 1/6/2023., protecting airway, saturating well. Plan to obtain CTA chest neg for PE    CARDIOVASCULAR: TTE 1/8/23: EF 63% Mild mitral regurgitation. Cardiac monitoring: tachycardia HR , plan to obtain LOOP as oupt to monitor for occult arrhythmias like AFib, Continue amlodipine for BP management      SBP goal: 110-160mmHg    GASTROINTESTINAL: Dysphagia screen passed, tolerating diet     Diet: Pureed    RENAL: BUN/Cr without change, good urine output, straight cath X1 on 01/07/23 ; hypokalemia- s/p kcl supplement      Na Goal: Greater than 135     Rojas: No    HEMATOLOGY: H/H without change, Platelets 294, LE doppler (01/11): No evidence of DVT in either lower extremity.       DVT ppx: LMWH     ID: afebrile in am febrile on 01/10/23 T max 38.2, leukocytosis - blood cultures sent, UA negative, elevated prolactin 0.18, will continue to monitor, Dr Castillo contacted for further recommendations     OTHER: Plan/goals of care discussed with pt's family at bedside and over the phone. Soft vest restraints d/c'd.  Endocrine consult for new onset DM: diabetes medications sent home.     DISPOSITION: AR however family refused and would like to take her home, he will provide 24 hr supervision. risks benefits discussion at length. D/C once stable    CORE MEASURES:        Admission NIHSS: 22     TPA:  NO      LDL/HDL: 176/46     Depression Screen: N/A pt is aphasic     Statin Therapy: Y     Dysphagia Screen: PASS     Smoking NO      Afib NO     Stroke Education YES    Obtain screening ultrasound in patients who meet 1 or more of the following criteria as patient is high risk for DVT/PE upon admission:   [] History of DVT/PE  []Hypercoagulable states (Factor V Leiden, Cancer, OCP, etc. )  []Prolonged immobility (hemiplegia/hemiparesis/post operative or any other extended immobilization)   [] Transferred from outside facility (Rehab or Long term care)  [] Age </= to 50     78-year-old woman with PMH, DM, mild cognitive impairment (performs her daily activities and oriented to person and place.)  She presented with sudden onset of right facial droop, right hemiparesis and global aphasia.Head CT shows left inferior temporal hypodensity suggestive of subacute infarct. MRI brain significant for left hemispheric–MCA territory inferior division temporal infarction and left hemispheric subcortical infarcts  CTA shows to my eye, left M1 segment severe left MCA stenosis. She was not  a candidate for IV thrombolysis as her stroke symptoms started 1/2/2023; infarction/ left temporal hypodensity present on CT at the time of presentation and left M2 distal occlusion with patent M1 segment.    Impression: Left MCA territory ischemia likely secondary to symptomatic intracranial left MCA stenosis.     NEURO: Neuro exam unchanged, permissive HTN with slow titration to normotensive, titrate statin to LDL goal less than 70, MRI Brain, off Seroquel,  CTA Head and Neck results as above Physical therapy/OT eval with recommendations for AR.      ANTITHROMBOTIC THERAPY: ASA/Plavix for 3 months followed by ASA only as per [facility] protocol for secondary stroke prevention     PULMONARY: CXR (01/10): Biapical scarring with left upper lobe bronchiectasis are without significant change as compared to 1/6/2023., protecting airway, saturating well. Plan to obtain CTA chest neg for PE    CARDIOVASCULAR: TTE 1/8/23: EF 63% Mild mitral regurgitation. Cardiac monitoring: tachycardia HR , plan to obtain LOOP as oupt to monitor for occult arrhythmias like AFib, Continue amlodipine for BP management      SBP goal: 110-160mmHg    GASTROINTESTINAL: Dysphagia screen passed, tolerating diet     Diet: Pureed    RENAL: BUN/Cr without change, good urine output, straight cath X1 on 01/07/23 ; hypokalemia- s/p kcl supplement      Na Goal: Greater than 135     Rojas: No    HEMATOLOGY: H/H without change, Platelets 294, LE doppler (01/11): No evidence of DVT in either lower extremity.       DVT ppx: LMWH     ID: afebrile in am febrile on 01/10/23 T max 38.2, leukocytosis - blood cultures sent, UA negative, elevated prolactin 0.18, will continue to monitor, Dr Castillo contacted for further recommendations     OTHER: Plan/goals of care discussed with pt's family at bedside and over the phone. Soft vest restraints d/c'd.  Endocrine consult for new onset DM: diabetes medications sent home.     DISPOSITION: AR however family refused and would like to take her home, he will provide 24 hr supervision. risks benefits discussion at length. D/C once stable    CORE MEASURES:        Admission NIHSS: 22     TPA:  NO      LDL/HDL: 176/46     Depression Screen: N/A pt is aphasic     Statin Therapy: Y     Dysphagia Screen: PASS     Smoking NO      Afib NO     Stroke Education YES    Obtain screening ultrasound in patients who meet 1 or more of the following criteria as patient is high risk for DVT/PE upon admission:   [] History of DVT/PE  []Hypercoagulable states (Factor V Leiden, Cancer, OCP, etc. )  []Prolonged immobility (hemiplegia/hemiparesis/post operative or any other extended immobilization)   [] Transferred from outside facility (Rehab or Long term care)  [] Age </= to 50     78-year-old woman with PMH, DM, mild cognitive impairment (performs her daily activities and oriented to person and place.)  She presented with sudden onset of right facial droop, right hemiparesis and global aphasia.Head CT shows left inferior temporal hypodensity suggestive of subacute infarct. MRI brain significant for left hemispheric–MCA territory inferior division temporal infarction and left hemispheric subcortical infarcts  CTA shows to my eye, left M1 segment severe left MCA stenosis. She was not  a candidate for IV thrombolysis as her stroke symptoms started 1/2/2023; infarction/ left temporal hypodensity present on CT at the time of presentation and left M2 distal occlusion with patent M1 segment.    Impression: Left MCA territory ischemia likely secondary to symptomatic intracranial left MCA stenosis.     NEURO: Neuro exam unchanged, permissive HTN with slow titration to normotensive, titrate statin to LDL goal less than 70, MRI Brain, off Seroquel,  CTA Head and Neck results as above Physical therapy/OT eval with recommendations for AR.      ANTITHROMBOTIC THERAPY: ASA/Plavix for 3 months followed by ASA only as per [facility] protocol for secondary stroke prevention     PULMONARY: CXR (01/10): Biapical scarring with left upper lobe bronchiectasis are without significant change as compared to 1/6/2023., protecting airway, saturating well. Plan to obtain CTA chest neg for PE    CARDIOVASCULAR: TTE 1/8/23: EF 63% Mild mitral regurgitation. Cardiac monitoring: tachycardia HR , plan to obtain LOOP as oupt to monitor for occult arrhythmias like AFib, Continue amlodipine for BP management      SBP goal: 110-160mmHg    GASTROINTESTINAL: Dysphagia screen passed, tolerating diet     Diet: Pureed    RENAL: BUN/Cr without change, good urine output, straight cath X1 on 01/07/23 ; hypokalemia- s/p kcl supplement      Na Goal: Greater than 135     Rojas: No    HEMATOLOGY: H/H without change, Platelets 294, LE doppler (01/11): No evidence of DVT in either lower extremity.       DVT ppx: LMWH     ID: afebrile in am febrile on 01/10/23 T max 38.2, leukocytosis - blood cultures sent, UA negative, elevated prolactin 0.18, will continue to monitor, Dr Castillo contacted for further recommendations     OTHER: Plan/goals of care discussed with pt's family at bedside and over the phone. Soft vest restraints d/c'd.  Endocrine consult for new onset DM: diabetes medications sent home.     DISPOSITION: AR however family refused and would like to take her home, he will provide 24 hr supervision. risks benefits discussion at length. D/C once stable    CORE MEASURES:        Admission NIHSS: 22     TPA:  NO      LDL/HDL: 176/46     Depression Screen: N/A pt is aphasic     Statin Therapy: Y     Dysphagia Screen: PASS     Smoking NO      Afib NO     Stroke Education YES    Obtain screening ultrasound in patients who meet 1 or more of the following criteria as patient is high risk for DVT/PE upon admission:   [] History of DVT/PE  []Hypercoagulable states (Factor V Leiden, Cancer, OCP, etc. )  []Prolonged immobility (hemiplegia/hemiparesis/post operative or any other extended immobilization)   [] Transferred from outside facility (Rehab or Long term care)  [] Age </= to 50

## 2023-01-12 NOTE — PROGRESS NOTE ADULT - ASSESSMENT
78 year-old right-handed female w/ PMHx ?dementia (AOx1-2, performs all ADLs), otherwise reportedly no other PMHx, presenting to [facility] ED w/ right facial droop that started on 1/2/23. On day of arrival, 1/6/23, around 15:30PM, patient noted to have unsteady gait and language abnormalities (not speaking, difficulty understanding), whereas these symptoms were apparently not present just before 15:30PM. (07 Jan 2023 01:33)    Internal Medicine has been consulted on Ms. West's care for medical management. Patient is accompanied by her Son at the bedside. Son denies any cardiac history in the past.          CVA  - CT as noted w/ Acute L MCA infarct, Severe stenosis of the L M2 branch  - MR with L MCA Territory infarcts   - TTE with EF of 63%, Mild MR, No segmental WMA, LVH, Stage 1 diastolic dysfunction   - Patient will require loop placement, defer per neuro   - C/w ASA and Statin   - Monitor on tele  - Care per neurology appreciated   - Fall, Seizure and aspiration precautions  - Malignancy W/U inpatient vs outpatient     Tachypnea, Tachycardia,  Elevated D-dimer 417  - LE duplex neg for DVT  - F/u CTA to R/O PE  - Monitor O2 saturation closely     Fever  - Patient febrile with Tmax of 100.7 on 01/10   - UA neg for bacteria or Leukocytes   - BCx2 in lab, F/u results   - RVP, Covid negative   - F/u CTA to R/O pulmonary etiology   - Monitor CBC, temp curve, VS and patient closely  - Trend leukocytosis. If recurrent fever check repeat cultures    HTN  - C/w Norvasc 5 Qd  - Monitor BP, VS and patient closely    DM  - Elevated A1C of 8.6. Patient will require endo follow up   - Monitor glucose levels closely; Avoid hypo/hyperglycemia  - Diabetic DASH diet  - Endo follow up appreciated; F/u recs    HLD  - LDL elevated; LDL goal of <70  - C/w high intensity statin  - Diet and lifestyle modifications    Hypokalemia  - Monitor and replete electrolytes PRN   - F/u on AM labs      PPX  - Lovenox 40  78 year-old right-handed female w/ PMHx ?dementia (AOx1-2, performs all ADLs), otherwise reportedly no other PMHx, presenting to [facility] ED w/ right facial droop that started on 1/2/23. On day of arrival, 1/6/23, around 15:30PM, patient noted to have unsteady gait and language abnormalities (not speaking, difficulty understanding), whereas these symptoms were apparently not present just before 15:30PM. (07 Jan 2023 01:33)    Internal Medicine has been consulted on Ms. West's care for medical management. Patient is accompanied by her Son at the bedside. Son denies any cardiac history in the past.          CVA  - CT as noted w/ Acute L MCA infarct, Severe stenosis of the L M2 branch  - MR with L MCA Territory infarcts   - TTE with EF of 63%, Mild MR, No segmental WMA, LVH, Stage 1 diastolic dysfunction   - Patient will require loop placement, defer per neuro   - C/w ASA and Statin   - Monitor on tele  - Care per neurology appreciated   - Fall, Seizure and aspiration precautions  - Malignancy W/U inpatient vs outpatient     Tachypnea, Tachycardia,  Elevated D-dimer 417  - LE duplex neg for DVT  - F/u CTA to R/O PE  - Monitor O2 saturation closely     Fever  - Patient febrile with Tmax of 100.7 on 01/10   - UA neg for bacteria or Leukocytes   - BCx2 in lab, F/u results   - RVP, Covid negative   - F/u CTA to R/O pulmonary etiology   - Monitor CBC, temp curve, VS and patient closely  - Trend leukocytosis. If recurrent fever check repeat cultures    HTN  - C/w Norvasc 5 Qd  - Monitor BP, VS and patient closely    DM  - Elevated A1C of 8.6. Patient will require endo follow up   - Monitor glucose levels closely; Avoid hypo/hyperglycemia  - Diabetic DASH diet  - Endo follow up appreciated; F/u recs    HLD  - LDL elevated; LDL goal of <70  - C/w high intensity statin  - Diet and lifestyle modifications    Hypokalemia  - Monitor and replete electrolytes PRN   - F/u on AM labs      PPX  - Lovenox 40  78 year-old right-handed female w/ PMHx ?dementia (AOx1-2, performs all ADLs), otherwise reportedly no other PMHx, presenting to [facility] ED w/ right facial droop that started on 1/2/23. On day of arrival, 1/6/23, around 15:30PM, patient noted to have unsteady gait and language abnormalities (not speaking, difficulty understanding), whereas these symptoms were apparently not present just before 15:30PM. (07 Jan 2023 01:33)    Internal Medicine has been consulted on Ms. West's care for medical management. Patient is accompanied by her Son at the bedside. Son denies any cardiac history in the past.          CVA  - CT as noted w/ Acute L MCA infarct, Severe stenosis of the L M2 branch  - MR with L MCA Territory infarcts   - TTE with EF of 63%, Mild MR, No segmental WMA, LVH, Stage 1 diastolic dysfunction   - Patient will require loop placement, defer per neuro   - C/w ASA and Statin   - Monitor on tele  - Care per neurology appreciated   - Fall, Seizure and aspiration precautions  - Malignancy W/U inpatient vs outpatient     Tachypnea, Tachycardia,  Elevated D-dimer 417  - LE duplex neg for DVT  - F/u CTA to R/O PE  - Monitor O2 saturation closely     Fever  - Patient febrile with Tmax of 100.7 on 01/10   - UA neg for bacteria or Leukocytes   - BCx2 in lab, F/u results   - RVP, Covid negative   - F/u CTA to R/O pulmonary etiology   - Monitor CBC, temp curve, VS and patient closely  - Trend leukocytosis. If recurrent fever check repeat cultures    HTN  - C/w Norvasc 5 Qd  - Monitor BP, VS and patient closely    DM  - Elevated A1C of 8.6. Patient will require endo follow up   - Monitor glucose levels closely; Avoid hypo/hyperglycemia  - Diabetic DASH diet  - Endo follow up appreciated; F/u recs    HLD  - LDL elevated; LDL goal of <70  - C/w high intensity statin  - Diet and lifestyle modifications    Hypokalemia  - Monitor and replete electrolytes PRN   - F/u on AM labs      PPX  - Lovenox 40  78 year-old right-handed female w/ PMHx ?dementia (AOx1-2, performs all ADLs), otherwise reportedly no other PMHx, presenting to [facility] ED w/ right facial droop that started on 1/2/23. On day of arrival, 1/6/23, around 15:30PM, patient noted to have unsteady gait and language abnormalities (not speaking, difficulty understanding), whereas these symptoms were apparently not present just before 15:30PM. (07 Jan 2023 01:33)    Internal Medicine has been consulted on Ms. West's care for medical management. Patient is accompanied by her Son at the bedside. Son denies any cardiac history in the past.          CVA  - CT as noted w/ Acute L MCA infarct, Severe stenosis of the L M2 branch  - MR with L MCA Territory infarcts   - TTE with EF of 63%, Mild MR, No segmental WMA, LVH, Stage 1 diastolic dysfunction   - Patient will require loop placement, defer per neuro   - C/w ASA and Statin   - Monitor on tele  - Care per neurology appreciated   - Fall, Seizure and aspiration precautions  - Malignancy W/U inpatient vs outpatient     Tachypnea, Tachycardia,  Elevated D-dimer 417  - LE duplex neg for DVT  - F/u CTA to R/O PE - Neg for PE, chronic changes. Procal of 0.18. Repeat imaging as an outpatient for monitoring of nodules and CT findings.   - Monitor O2 saturation closely     Fever  - Patient febrile with Tmax of 100.7 on 01/10   - UA neg for bacteria or Leukocytes   - BCx2 w/ NGTD; F/u final   - RVP, Covid negative   - F/u CTA to R/O pulmonary etiology, Pro-Wallace 0.18   - Monitor CBC, temp curve, VS and patient closely  - Trend leukocytosis. If recurrent fever check repeat cultures    HTN  - C/w Norvasc 5 Qd  - Monitor BP, VS and patient closely    Mild L hydronephrosis   - Cr WNL   - Monitor and trend Cr, if Cr un-trends, consider renal US     DM  - Elevated A1C of 8.6. Patient will require endo follow up   - Monitor glucose levels closely; Avoid hypo/hyperglycemia  - Diabetic DASH diet  - Endo follow up appreciated; F/u recs    HLD  - LDL elevated; LDL goal of <70  - C/w high intensity statin  - Diet and lifestyle modifications    Hypokalemia  - Monitor and replete electrolytes PRN   - F/u on AM labs      PPX  - Lovenox 40     Discussed with Son at the bedside  78 year-old right-handed female w/ PMHx ?dementia (AOx1-2, performs all ADLs), otherwise reportedly no other PMHx, presenting to [facility] ED w/ right facial droop that started on 1/2/23. On day of arrival, 1/6/23, around 15:30PM, patient noted to have unsteady gait and language abnormalities (not speaking, difficulty understanding), whereas these symptoms were apparently not present just before 15:30PM. (07 Jan 2023 01:33)    Internal Medicine has been consulted on Ms. West's care for medical management. Patient is accompanied by her Son at the bedside. Son denies any cardiac history in the past.          CVA  - CT as noted w/ Acute L MCA infarct, Severe stenosis of the L M2 branch  - MR with L MCA Territory infarcts   - TTE with EF of 63%, Mild MR, No segmental WMA, LVH, Stage 1 diastolic dysfunction   - Patient will require loop placement, defer per neuro   - C/w ASA and Statin   - Monitor on tele  - Care per neurology appreciated   - Fall, Seizure and aspiration precautions  - Malignancy W/U inpatient vs outpatient     Tachypnea, Tachycardia,  Elevated D-dimer 417  - LE duplex neg for DVT  - F/u CTA to R/O PE - Neg for PE, chronic changes. Procal of 0.18. Repeat imaging as an outpatient for monitoring of nodules and CT findings.   - Monitor O2 saturation closely     Fever  - Patient febrile with Tmax of 100.7 on 01/10   - UA neg for bacteria or Leukocytes   - BCx2 w/ NGTD; F/u final   - RVP, Covid negative   - F/u CTA to R/O pulmonary etiology, Pro-Wallace 0.18   - Monitor CBC, temp curve, VS and patient closely  - Trend leukocytosis. If recurrent fever check repeat cultures    HTN  - C/w Norvasc 5 Qd  - Monitor BP, VS and patient closely    Mild L hydronephrosis   - Cr WNL   - Monitor and trend Cr, if Cr un-trends, consider renal US     DM  - Elevated A1C of 8.6. Patient will require endo follow up   - Monitor glucose levels closely; Avoid hypo/hyperglycemia  - Diabetic DASH diet  - Endo follow up appreciated; F/u recs    HLD  - LDL elevated; LDL goal of <70  - C/w high intensity statin  - Diet and lifestyle modifications    Hypokalemia  - Monitor and replete electrolytes PRN   - F/u on AM labs      PPX  - Lovenox 40     Discussed with Son at the bedside  78 year-old right-handed female w/ PMHx ?dementia (AOx1-2, performs all ADLs), otherwise reportedly no other PMHx, presenting to [facility] ED w/ right facial droop that started on 1/2/23. On day of arrival, 1/6/23, around 15:30PM, patient noted to have unsteady gait and language abnormalities (not speaking, difficulty understanding), whereas these symptoms were apparently not present just before 15:30PM. (07 Jan 2023 01:33)    Internal Medicine has been consulted on Ms. West's care for medical management. Patient is accompanied by her Son at the bedside. Son denies any cardiac history in the past.          CVA  - CT as noted w/ Acute L MCA infarct, Severe stenosis of the L M2 branch  - MR with L MCA Territory infarcts   - TTE with EF of 63%, Mild MR, No segmental WMA, LVH, Stage 1 diastolic dysfunction   - Patient will require loop placement, defer per neuro   - C/w ASA and Statin   - Monitor on tele  - Care per neurology appreciated   - Fall, Seizure and aspiration precautions  - Malignancy W/U inpatient vs outpatient     Tachypnea, Tachycardia,  Elevated D-dimer 417  - LE duplex neg for DVT  - F/u CTA to R/O PE - Neg for PE, chronic changes. Procal of 0.18. Repeat imaging as an outpatient for monitoring of nodules and CT findings.   - Monitor O2 saturation closely     Fever  - Patient febrile with Tmax of 100.7 on 01/10   - UA neg for bacteria or Leukocytes   - BCx2 w/ NGTD; F/u final   - RVP, Covid negative   - F/u CTA to R/O pulmonary etiology, Pro-Wallace 0.18   - Monitor CBC, temp curve, VS and patient closely  - Trend leukocytosis. If recurrent fever check repeat cultures    HTN  - C/w Norvasc 5 Qd  - Monitor BP, VS and patient closely    Mild L hydronephrosis   - Cr WNL   - Monitor and trend Cr, if Cr un-trends, consider renal US     DM  - Elevated A1C of 8.6. Patient will require endo follow up   - Monitor glucose levels closely; Avoid hypo/hyperglycemia  - Diabetic DASH diet  - Endo follow up appreciated; F/u recs    HLD  - LDL elevated; LDL goal of <70  - C/w high intensity statin  - Diet and lifestyle modifications    Hypokalemia  - Monitor and replete electrolytes PRN   - F/u on AM labs      PPX  - Lovenox 40     Discussed with Son at the bedside

## 2023-01-12 NOTE — PROGRESS NOTE ADULT - SUBJECTIVE AND OBJECTIVE BOX
Name of Patient : SARAH DISLA  MRN: [id number]  Date of visit: 23        Subjective: Patient seen and examined. No new events except as noted.   Patient seen lying down in bed  Son at the bedside    REVIEW OF SYSTEMS:  Unable to obtain ROS 2/2 clinical condition     MEDICATIONS:  MEDICATIONS  (STANDING):  amLODIPine   Tablet 5 milliGRAM(s) Oral daily  aspirin  chewable 81 milliGRAM(s) Oral daily  atorvastatin 80 milliGRAM(s) Oral at bedtime  clopidogrel Tablet 75 milliGRAM(s) Oral daily  dextrose 5%. 1000 milliLiter(s) (50 mL/Hr) IV Continuous <Continuous>  dextrose 5%. 1000 milliLiter(s) (100 mL/Hr) IV Continuous <Continuous>  dextrose 50% Injectable 25 Gram(s) IV Push once  dextrose 50% Injectable 12.5 Gram(s) IV Push once  dextrose 50% Injectable 25 Gram(s) IV Push once  enoxaparin Injectable 40 milliGRAM(s) SubCutaneous every 24 hours  glucagon  Injectable 1 milliGRAM(s) IntraMuscular once  insulin lispro (ADMELOG) corrective regimen sliding scale   SubCutaneous three times a day before meals  insulin lispro (ADMELOG) corrective regimen sliding scale   SubCutaneous at bedtime  polyethylene glycol 3350 17 Gram(s) Oral daily  sodium chloride 0.9%. 1000 milliLiter(s) (50 mL/Hr) IV Continuous <Continuous>  tamsulosin 0.4 milliGRAM(s) Oral at bedtime      PHYSICAL EXAM:  T(C): 36.4 (23 @ 12:00), Max: 36.9 (23 @ 20:00)  HR: 105 (23 @ 12:00) (94 - 111)  BP: 123/66 (23 @ 12:00) (107/88 - 173/73)  RR: 21 (23 @ 12:00) (12 - 26)  SpO2: 97% (23 @ 12:00) (97% - 100%)  Wt(kg): --  I&O's Summary    2023 07:01  -  2023 07:00  --------------------------------------------------------  IN: 1380 mL / OUT: 150 mL / NET: 1230 mL    2023 07:01  -  2023 15:42  --------------------------------------------------------  IN: 300 mL / OUT: 0 mL / NET: 300 mL          Appearance: Generalized weakness, Lying down in bed	  HEENT:  Eyes are open   Lymphatic: No lymphadenopathy   Cardiovascular: Normal    Respiratory: normal effort , clear  Gastrointestinal:  Soft, Non-tender  Skin: No rashes,  warm to touch  Psychiatry:  Aphasic  Musculoskeletal: No edema      23 @ 07:  -  23 @ 07:00  --------------------------------------------------------  IN: 1380 mL / OUT: 150 mL / NET: 1230 mL    23 @ 07:23 @ 15:42  --------------------------------------------------------  IN: 300 mL / OUT: 0 mL / NET: 300 mL                            11.0   11.56 )-----------( 294      ( 2023 07:54 )             35.3                   138  |  104  |  19  ----------------------------<  208<H>  3.5   |  18<L>  |  0.58    Ca    9.1      2023 07:54                         Urinalysis Basic - ( 2023 13:59 )    Color: Other / Appearance: Turbid / S.026 / pH: x  Gluc: x / Ketone: SEE NOTE  / Bili: SEE NOTE / Urobili: SEE NOTE   Blood: x / Protein: SEE NOTE / Nitrite: SEE NOTE   Leuk Esterase: SEE NOTE / RBC: x / WBC x   Sq Epi: x / Non Sq Epi: x / Bacteria: x      Culture - Blood (01.10.23 @ 18:50)   Specimen Source: .Blood Blood-Peripheral   Culture Results: No growth to date.     Culture - Blood in AM (01.10.23 @ 18:00)   Specimen Source: .Blood Blood-Venous   Culture Results: No growth to date.       < from: CT Angio Chest PE Protocol w/ IV Cont (23 @ 15:46) >    IMPRESSION:    1.  No pulmonary thromboembolism    2.  No priorchest CT for comparison. Evidence of prior granulomatous   disease including completely bronchiectatic left upper lobe scattered   calcified granulomas with small calcified left hilar lymph nodes. Areas   of airways impaction and small cavitary nodules in the left upper lobe   are indeterminate chronicity. Active infection would have to be assessed   on clinical grounds.    3.  Mild left hydronephrosis    < end of copied text >         Name of Patient : SARAH DISLA  MRN: [id number]  Date of visit: 23        Subjective: Patient seen and examined. No new events except as noted.   Patient seen lying down in bed  Son at the bedside    REVIEW OF SYSTEMS:  Unable to obtain ROS 2/2 clinical condition     MEDICATIONS:  MEDICATIONS  (STANDING):  amLODIPine   Tablet 5 milliGRAM(s) Oral daily  aspirin  chewable 81 milliGRAM(s) Oral daily  atorvastatin 80 milliGRAM(s) Oral at bedtime  clopidogrel Tablet 75 milliGRAM(s) Oral daily  dextrose 5%. 1000 milliLiter(s) (50 mL/Hr) IV Continuous <Continuous>  dextrose 5%. 1000 milliLiter(s) (100 mL/Hr) IV Continuous <Continuous>  dextrose 50% Injectable 25 Gram(s) IV Push once  dextrose 50% Injectable 12.5 Gram(s) IV Push once  dextrose 50% Injectable 25 Gram(s) IV Push once  enoxaparin Injectable 40 milliGRAM(s) SubCutaneous every 24 hours  glucagon  Injectable 1 milliGRAM(s) IntraMuscular once  insulin lispro (ADMELOG) corrective regimen sliding scale   SubCutaneous three times a day before meals  insulin lispro (ADMELOG) corrective regimen sliding scale   SubCutaneous at bedtime  polyethylene glycol 3350 17 Gram(s) Oral daily  sodium chloride 0.9%. 1000 milliLiter(s) (50 mL/Hr) IV Continuous <Continuous>  tamsulosin 0.4 milliGRAM(s) Oral at bedtime      PHYSICAL EXAM:  T(C): 36.4 (23 @ 12:00), Max: 36.9 (23 @ 20:00)  HR: 105 (23 @ 12:00) (94 - 111)  BP: 123/66 (23 @ 12:00) (107/88 - 173/73)  RR: 21 (23 @ 12:00) (12 - 26)  SpO2: 97% (23 @ 12:00) (97% - 100%)  Wt(kg): --  I&O's Summary    2023 07:01  -  2023 07:00  --------------------------------------------------------  IN: 1380 mL / OUT: 150 mL / NET: 1230 mL    2023 07:01  -  2023 15:42  --------------------------------------------------------  IN: 300 mL / OUT: 0 mL / NET: 300 mL          Appearance: Generalized weakness, Lying down in bed	  HEENT:  Eyes are open   Lymphatic: No lymphadenopathy   Cardiovascular: Normal    Respiratory: normal effort , clear  Gastrointestinal:  Soft, Non-tender  Skin: No rashes,  warm to touch  Psychiatry:  Aphasic  Musculoskeletal: No edema      23 @ 07:  -  23 @ 07:00  --------------------------------------------------------  IN: 1380 mL / OUT: 150 mL / NET: 1230 mL    23 @ 07:23 @ 15:42  --------------------------------------------------------  IN: 300 mL / OUT: 0 mL / NET: 300 mL                            11.0   11.56 )-----------( 294      ( 2023 07:54 )             35.3                   138  |  104  |  19  ----------------------------<  208<H>  3.5   |  18<L>  |  0.58    Ca    9.1      2023 07:54                         Urinalysis Basic - ( 2023 13:59 )    Color: Other / Appearance: Turbid / S.026 / pH: x  Gluc: x / Ketone: SEE NOTE  / Bili: SEE NOTE / Urobili: SEE NOTE   Blood: x / Protein: SEE NOTE / Nitrite: SEE NOTE   Leuk Esterase: SEE NOTE / RBC: x / WBC x   Sq Epi: x / Non Sq Epi: x / Bacteria: x      Culture - Blood (01.10.23 @ 18:50)   Specimen Source: .Blood Blood-Peripheral   Culture Results: No growth to date.     Culture - Blood in AM (01.10.23 @ 18:00)   Specimen Source: .Blood Blood-Venous   Culture Results: No growth to date.       < from: CT Angio Chest PE Protocol w/ IV Cont (23 @ 15:46) >    IMPRESSION:    1.  No pulmonary thromboembolism    2.  No priorchest CT for comparison. Evidence of prior granulomatous   disease including completely bronchiectatic left upper lobe scattered   calcified granulomas with small calcified left hilar lymph nodes. Areas   of airways impaction and small cavitary nodules in the left upper lobe   are indeterminate chronicity. Active infection would have to be assessed   on clinical grounds.    3.  Mild left hydronephrosis    < end of copied text >         Name of Patient : SARAH IDSLA  MRN: [id number]  Date of visit: 23        Subjective: Patient seen and examined. No new events except as noted.   Patient seen lying down in bed  Son at the bedside    REVIEW OF SYSTEMS:  Unable to obtain ROS 2/2 clinical condition     MEDICATIONS:  MEDICATIONS  (STANDING):  amLODIPine   Tablet 5 milliGRAM(s) Oral daily  aspirin  chewable 81 milliGRAM(s) Oral daily  atorvastatin 80 milliGRAM(s) Oral at bedtime  clopidogrel Tablet 75 milliGRAM(s) Oral daily  dextrose 5%. 1000 milliLiter(s) (50 mL/Hr) IV Continuous <Continuous>  dextrose 5%. 1000 milliLiter(s) (100 mL/Hr) IV Continuous <Continuous>  dextrose 50% Injectable 25 Gram(s) IV Push once  dextrose 50% Injectable 12.5 Gram(s) IV Push once  dextrose 50% Injectable 25 Gram(s) IV Push once  enoxaparin Injectable 40 milliGRAM(s) SubCutaneous every 24 hours  glucagon  Injectable 1 milliGRAM(s) IntraMuscular once  insulin lispro (ADMELOG) corrective regimen sliding scale   SubCutaneous three times a day before meals  insulin lispro (ADMELOG) corrective regimen sliding scale   SubCutaneous at bedtime  polyethylene glycol 3350 17 Gram(s) Oral daily  sodium chloride 0.9%. 1000 milliLiter(s) (50 mL/Hr) IV Continuous <Continuous>  tamsulosin 0.4 milliGRAM(s) Oral at bedtime      PHYSICAL EXAM:  T(C): 36.4 (23 @ 12:00), Max: 36.9 (23 @ 20:00)  HR: 105 (23 @ 12:00) (94 - 111)  BP: 123/66 (23 @ 12:00) (107/88 - 173/73)  RR: 21 (23 @ 12:00) (12 - 26)  SpO2: 97% (23 @ 12:00) (97% - 100%)  Wt(kg): --  I&O's Summary    2023 07:01  -  2023 07:00  --------------------------------------------------------  IN: 1380 mL / OUT: 150 mL / NET: 1230 mL    2023 07:01  -  2023 15:42  --------------------------------------------------------  IN: 300 mL / OUT: 0 mL / NET: 300 mL          Appearance: Generalized weakness, Lying down in bed	  HEENT:  Eyes are open   Lymphatic: No lymphadenopathy   Cardiovascular: Normal    Respiratory: normal effort , clear  Gastrointestinal:  Soft, Non-tender  Skin: No rashes,  warm to touch  Psychiatry:  Aphasic  Musculoskeletal: No edema      23 @ 07:  -  23 @ 07:00  --------------------------------------------------------  IN: 1380 mL / OUT: 150 mL / NET: 1230 mL    23 @ 07:23 @ 15:42  --------------------------------------------------------  IN: 300 mL / OUT: 0 mL / NET: 300 mL                            11.0   11.56 )-----------( 294      ( 2023 07:54 )             35.3                   138  |  104  |  19  ----------------------------<  208<H>  3.5   |  18<L>  |  0.58    Ca    9.1      2023 07:54                         Urinalysis Basic - ( 2023 13:59 )    Color: Other / Appearance: Turbid / S.026 / pH: x  Gluc: x / Ketone: SEE NOTE  / Bili: SEE NOTE / Urobili: SEE NOTE   Blood: x / Protein: SEE NOTE / Nitrite: SEE NOTE   Leuk Esterase: SEE NOTE / RBC: x / WBC x   Sq Epi: x / Non Sq Epi: x / Bacteria: x      Culture - Blood (01.10.23 @ 18:50)   Specimen Source: .Blood Blood-Peripheral   Culture Results: No growth to date.     Culture - Blood in AM (01.10.23 @ 18:00)   Specimen Source: .Blood Blood-Venous   Culture Results: No growth to date.       < from: CT Angio Chest PE Protocol w/ IV Cont (23 @ 15:46) >    IMPRESSION:    1.  No pulmonary thromboembolism    2.  No priorchest CT for comparison. Evidence of prior granulomatous   disease including completely bronchiectatic left upper lobe scattered   calcified granulomas with small calcified left hilar lymph nodes. Areas   of airways impaction and small cavitary nodules in the left upper lobe   are indeterminate chronicity. Active infection would have to be assessed   on clinical grounds.    3.  Mild left hydronephrosis    < end of copied text >

## 2023-01-12 NOTE — DISCHARGE NOTE NURSING/CASE MANAGEMENT/SOCIAL WORK - NSDCFUADDAPPT_GEN_ALL_CORE_FT
Dr. Katherine Martini [address] [facility], [address], [place] [address]. [phone]   Dr. Katherine Martini [address] [facility], [address], [place] [address]. [phone]   Dr. Katherine Martini [address] [facility], [address], [place] [address]. [phone]

## 2023-01-12 NOTE — DISCHARGE NOTE NURSING/CASE MANAGEMENT/SOCIAL WORK - PATIENT PORTAL LINK FT
You can access the FollowMyHealth Patient Portal offered by [facility] by registering at the following website: [email or web address]. By joining [employer]’s FollowMyHealth portal, you will also be able to view your health information using other applications (apps) compatible with our system. You can access the FollowMyHealth Patient Portal offered by [facility] by registering at the following website: [email or web address]. By joining [employer]s FollowMyHealth portal, you will also be able to view your health information using other applications (apps) compatible with our system. You can access the FollowMyHealth Patient Portal offered by [facility] by registering at the following website: [email or web address]. By joining [employer]’s FollowMyHealth portal, you will also be able to view your health information using other applications (apps) compatible with our system.

## 2023-01-12 NOTE — DISCHARGE NOTE NURSING/CASE MANAGEMENT/SOCIAL WORK - NSDCPEFALRISK_GEN_ALL_CORE
For information on Fall & Injury Prevention, visit: [email or web address] OR  [email or web address]-injury OR  https://www.cdc.gov/steadi/patient.html For information on Fall & Injury Prevention, visit: [email or web address]-[email or web address] OR  [email or web address]-[email or web address]-[email or web address]-injury OR  https://www.cdc.gov/steadi/patient.html For information on Fall & Injury Prevention, visit: [email or web address]://www.[facility]/news/[email or web address]-[email or web address]-[email or web address]-health-[email or web address]-mobility OR  [email or web address].[facility]/news/fall-prevention-tips-to-avoid-injury OR  https://www.cdc.gov/steadi/patient.html

## 2023-01-15 LAB
CULTURE RESULTS: SIGNIFICANT CHANGE UP
SPECIMEN SOURCE: SIGNIFICANT CHANGE UP

## 2023-01-19 ENCOUNTER — INPATIENT (INPATIENT)
Facility: HOSPITAL | Age: 78
LOS: 17 days | Discharge: NURSING, REHAB OR LONG-TERM CARE | DRG: 871 | End: 2023-02-06
Attending: INTERNAL MEDICINE | Admitting: INTERNAL MEDICINE
Payer: MEDICARE

## 2023-01-19 VITALS
DIASTOLIC BLOOD PRESSURE: 66 MMHG | OXYGEN SATURATION: 97 % | RESPIRATION RATE: 20 BRPM | WEIGHT: 132.28 LBS | SYSTOLIC BLOOD PRESSURE: 164 MMHG | HEIGHT: 62 IN | TEMPERATURE: 98 F | HEART RATE: 101 BPM

## 2023-01-19 DIAGNOSIS — E78.5 HYPERLIPIDEMIA, UNSPECIFIED: ICD-10-CM

## 2023-01-19 DIAGNOSIS — I63.9 CEREBRAL INFARCTION, UNSPECIFIED: ICD-10-CM

## 2023-01-19 DIAGNOSIS — E11.65 TYPE 2 DIABETES MELLITUS WITH HYPERGLYCEMIA: ICD-10-CM

## 2023-01-19 DIAGNOSIS — I10 ESSENTIAL (PRIMARY) HYPERTENSION: ICD-10-CM

## 2023-01-19 DIAGNOSIS — R41.82 ALTERED MENTAL STATUS, UNSPECIFIED: ICD-10-CM

## 2023-01-19 DIAGNOSIS — E87.0 HYPEROSMOLALITY AND HYPERNATREMIA: ICD-10-CM

## 2023-01-19 DIAGNOSIS — N39.0 URINARY TRACT INFECTION, SITE NOT SPECIFIED: ICD-10-CM

## 2023-01-19 LAB
ALBUMIN SERPL ELPH-MCNC: 2.7 G/DL — LOW (ref 3.3–5)
ALP SERPL-CCNC: 136 U/L — HIGH (ref 40–120)
ALT FLD-CCNC: 14 U/L — SIGNIFICANT CHANGE UP (ref 10–45)
ANION GAP SERPL CALC-SCNC: 14 MMOL/L — SIGNIFICANT CHANGE UP (ref 5–17)
ANION GAP SERPL CALC-SCNC: 17 MMOL/L — SIGNIFICANT CHANGE UP (ref 5–17)
APPEARANCE UR: ABNORMAL
AST SERPL-CCNC: 10 U/L — SIGNIFICANT CHANGE UP (ref 10–40)
B-OH-BUTYR SERPL-SCNC: 0.7 MMOL/L — HIGH
BACTERIA # UR AUTO: ABNORMAL
BASE EXCESS BLDV CALC-SCNC: -0.8 MMOL/L — SIGNIFICANT CHANGE UP (ref -2–3)
BASE EXCESS BLDV CALC-SCNC: -2.7 MMOL/L — LOW (ref -2–3)
BASOPHILS # BLD AUTO: 0 K/UL — SIGNIFICANT CHANGE UP (ref 0–0.2)
BASOPHILS NFR BLD AUTO: 0 % — SIGNIFICANT CHANGE UP (ref 0–2)
BILIRUB SERPL-MCNC: 0.5 MG/DL — SIGNIFICANT CHANGE UP (ref 0.2–1.2)
BILIRUB UR-MCNC: NEGATIVE — SIGNIFICANT CHANGE UP
BUN SERPL-MCNC: 38 MG/DL — HIGH (ref 7–23)
BUN SERPL-MCNC: 49 MG/DL — HIGH (ref 7–23)
BURR CELLS BLD QL SMEAR: PRESENT — SIGNIFICANT CHANGE UP
BURR CELLS BLD QL SMEAR: SLIGHT — SIGNIFICANT CHANGE UP
CA-I SERPL-SCNC: 1.2 MMOL/L — SIGNIFICANT CHANGE UP (ref 1.15–1.33)
CA-I SERPL-SCNC: 1.31 MMOL/L — SIGNIFICANT CHANGE UP (ref 1.15–1.33)
CALCIUM SERPL-MCNC: 8.4 MG/DL — SIGNIFICANT CHANGE UP (ref 8.4–10.5)
CALCIUM SERPL-MCNC: 9.9 MG/DL — SIGNIFICANT CHANGE UP (ref 8.4–10.5)
CHLORIDE BLDV-SCNC: 119 MMOL/L — HIGH (ref 96–108)
CHLORIDE BLDV-SCNC: 122 MMOL/L — HIGH (ref 96–108)
CHLORIDE SERPL-SCNC: 120 MMOL/L — HIGH (ref 96–108)
CHLORIDE SERPL-SCNC: 125 MMOL/L — HIGH (ref 96–108)
CO2 BLDV-SCNC: 24 MMOL/L — SIGNIFICANT CHANGE UP (ref 22–26)
CO2 BLDV-SCNC: 26 MMOL/L — SIGNIFICANT CHANGE UP (ref 22–26)
CO2 SERPL-SCNC: 19 MMOL/L — LOW (ref 22–31)
CO2 SERPL-SCNC: 21 MMOL/L — LOW (ref 22–31)
COLOR SPEC: SIGNIFICANT CHANGE UP
CREAT SERPL-MCNC: 0.74 MG/DL — SIGNIFICANT CHANGE UP (ref 0.5–1.3)
CREAT SERPL-MCNC: 0.92 MG/DL — SIGNIFICANT CHANGE UP (ref 0.5–1.3)
DIFF PNL FLD: ABNORMAL
EGFR: 63 ML/MIN/1.73M2 — SIGNIFICANT CHANGE UP
EGFR: 82 ML/MIN/1.73M2 — SIGNIFICANT CHANGE UP
ELLIPTOCYTES BLD QL SMEAR: SLIGHT — SIGNIFICANT CHANGE UP
EOSINOPHIL # BLD AUTO: 0 K/UL — SIGNIFICANT CHANGE UP (ref 0–0.5)
EOSINOPHIL NFR BLD AUTO: 0 % — SIGNIFICANT CHANGE UP (ref 0–6)
EPI CELLS # UR: SIGNIFICANT CHANGE UP
FLUAV AG NPH QL: SIGNIFICANT CHANGE UP
FLUBV AG NPH QL: SIGNIFICANT CHANGE UP
GAS PNL BLDV: 153 MMOL/L — HIGH (ref 136–145)
GAS PNL BLDV: 156 MMOL/L — HIGH (ref 136–145)
GAS PNL BLDV: SIGNIFICANT CHANGE UP
GLUCOSE BLDV-MCNC: 436 MG/DL — HIGH (ref 70–99)
GLUCOSE BLDV-MCNC: 509 MG/DL — CRITICAL HIGH (ref 70–99)
GLUCOSE SERPL-MCNC: 414 MG/DL — HIGH (ref 70–99)
GLUCOSE SERPL-MCNC: 468 MG/DL — CRITICAL HIGH (ref 70–99)
GLUCOSE UR QL: ABNORMAL
GRAN CASTS # UR COMP ASSIST: 4 /LPF — SIGNIFICANT CHANGE UP
HCO3 BLDV-SCNC: 23 MMOL/L — SIGNIFICANT CHANGE UP (ref 22–29)
HCO3 BLDV-SCNC: 24 MMOL/L — SIGNIFICANT CHANGE UP (ref 22–29)
HCT VFR BLD CALC: 40.5 % — SIGNIFICANT CHANGE UP (ref 34.5–45)
HCT VFR BLDA CALC: 35 % — SIGNIFICANT CHANGE UP (ref 34.5–46.5)
HCT VFR BLDA CALC: 38 % — SIGNIFICANT CHANGE UP (ref 34.5–46.5)
HGB BLD CALC-MCNC: 11.7 G/DL — SIGNIFICANT CHANGE UP (ref 11.7–16.1)
HGB BLD CALC-MCNC: 12.5 G/DL — SIGNIFICANT CHANGE UP (ref 11.7–16.1)
HGB BLD-MCNC: 12.3 G/DL — SIGNIFICANT CHANGE UP (ref 11.5–15.5)
KETONES UR-MCNC: SIGNIFICANT CHANGE UP
LACTATE BLDV-MCNC: 2.2 MMOL/L — HIGH (ref 0.5–2)
LACTATE BLDV-MCNC: 3.7 MMOL/L — HIGH (ref 0.5–2)
LEUKOCYTE ESTERASE UR-ACNC: ABNORMAL
LYMPHOCYTES # BLD AUTO: 14.8 % — SIGNIFICANT CHANGE UP (ref 13–44)
LYMPHOCYTES # BLD AUTO: 2.29 K/UL — SIGNIFICANT CHANGE UP (ref 1–3.3)
MAGNESIUM SERPL-MCNC: 2.6 MG/DL — SIGNIFICANT CHANGE UP (ref 1.6–2.6)
MANUAL SMEAR VERIFICATION: SIGNIFICANT CHANGE UP
MCHC RBC-ENTMCNC: 26.7 PG — LOW (ref 27–34)
MCHC RBC-ENTMCNC: 30.4 GM/DL — LOW (ref 32–36)
MCV RBC AUTO: 87.9 FL — SIGNIFICANT CHANGE UP (ref 80–100)
MONOCYTES # BLD AUTO: 0.67 K/UL — SIGNIFICANT CHANGE UP (ref 0–0.9)
MONOCYTES NFR BLD AUTO: 4.3 % — SIGNIFICANT CHANGE UP (ref 2–14)
NEUTROPHILS # BLD AUTO: 12.38 K/UL — HIGH (ref 1.8–7.4)
NEUTROPHILS NFR BLD AUTO: 74.8 % — SIGNIFICANT CHANGE UP (ref 43–77)
NEUTS BAND # BLD: 5.2 % — SIGNIFICANT CHANGE UP (ref 0–8)
NITRITE UR-MCNC: NEGATIVE — SIGNIFICANT CHANGE UP
OSMOLALITY SERPL: 361 MOSMOL/KG — HIGH (ref 280–301)
PCO2 BLDV: 40 MMHG — SIGNIFICANT CHANGE UP (ref 39–42)
PCO2 BLDV: 41 MMHG — SIGNIFICANT CHANGE UP (ref 39–42)
PH BLDV: 7.36 — SIGNIFICANT CHANGE UP (ref 7.32–7.43)
PH BLDV: 7.38 — SIGNIFICANT CHANGE UP (ref 7.32–7.43)
PH UR: 7 — SIGNIFICANT CHANGE UP (ref 5–8)
PHOSPHATE SERPL-MCNC: 2.9 MG/DL — SIGNIFICANT CHANGE UP (ref 2.5–4.5)
PLAT MORPH BLD: NORMAL — SIGNIFICANT CHANGE UP
PLATELET # BLD AUTO: 280 K/UL — SIGNIFICANT CHANGE UP (ref 150–400)
PO2 BLDV: 34 MMHG — SIGNIFICANT CHANGE UP (ref 25–45)
PO2 BLDV: 39 MMHG — SIGNIFICANT CHANGE UP (ref 25–45)
POIKILOCYTOSIS BLD QL AUTO: SIGNIFICANT CHANGE UP
POLYCHROMASIA BLD QL SMEAR: SLIGHT — SIGNIFICANT CHANGE UP
POTASSIUM BLDV-SCNC: 3 MMOL/L — LOW (ref 3.5–5.1)
POTASSIUM SERPL-MCNC: 3 MMOL/L — LOW (ref 3.5–5.3)
POTASSIUM SERPL-SCNC: 3 MMOL/L — LOW (ref 3.5–5.3)
PROT SERPL-MCNC: 7.1 G/DL — SIGNIFICANT CHANGE UP (ref 6–8.3)
PROT UR-MCNC: >600
RBC # BLD: 4.61 M/UL — SIGNIFICANT CHANGE UP (ref 3.8–5.2)
RBC # FLD: 14.7 % — HIGH (ref 10.3–14.5)
RBC BLD AUTO: ABNORMAL
RBC CASTS # UR COMP ASSIST: 15 /HPF — HIGH (ref 0–4)
RSV RNA NPH QL NAA+NON-PROBE: SIGNIFICANT CHANGE UP
SAO2 % BLDV: 42 % — LOW (ref 67–88)
SAO2 % BLDV: 56.7 % — LOW (ref 67–88)
SARS-COV-2 RNA SPEC QL NAA+PROBE: SIGNIFICANT CHANGE UP
SODIUM SERPL-SCNC: 158 MMOL/L — HIGH (ref 135–145)
SP GR SPEC: 1.02 — SIGNIFICANT CHANGE UP (ref 1.01–1.02)
UROBILINOGEN FLD QL: ABNORMAL
VARIANT LYMPHS # BLD: 0.9 % — SIGNIFICANT CHANGE UP (ref 0–6)
WBC # BLD: 15.48 K/UL — HIGH (ref 3.8–10.5)
WBC # FLD AUTO: 15.48 K/UL — HIGH (ref 3.8–10.5)
WBC UR QL: ABNORMAL

## 2023-01-19 PROCEDURE — 99285 EMERGENCY DEPT VISIT HI MDM: CPT

## 2023-01-19 PROCEDURE — 99223 1ST HOSP IP/OBS HIGH 75: CPT

## 2023-01-19 PROCEDURE — 70450 CT HEAD/BRAIN W/O DYE: CPT | Mod: 26,MA

## 2023-01-19 PROCEDURE — 71045 X-RAY EXAM CHEST 1 VIEW: CPT | Mod: 26

## 2023-01-19 RX ORDER — INSULIN LISPRO 100/ML
3 VIAL (ML) SUBCUTANEOUS ONCE
Refills: 0 | Status: DISCONTINUED | OUTPATIENT
Start: 2023-01-19 | End: 2023-01-19

## 2023-01-19 RX ORDER — SODIUM CHLORIDE 9 MG/ML
1000 INJECTION, SOLUTION INTRAVENOUS
Refills: 0 | Status: DISCONTINUED | OUTPATIENT
Start: 2023-01-19 | End: 2023-01-19

## 2023-01-19 RX ORDER — DEXTROSE 50 % IN WATER 50 %
12.5 SYRINGE (ML) INTRAVENOUS ONCE
Refills: 0 | Status: DISCONTINUED | OUTPATIENT
Start: 2023-01-19 | End: 2023-02-06

## 2023-01-19 RX ORDER — INSULIN GLARGINE 100 [IU]/ML
5 INJECTION, SOLUTION SUBCUTANEOUS ONCE
Refills: 0 | Status: COMPLETED | OUTPATIENT
Start: 2023-01-19 | End: 2023-01-19

## 2023-01-19 RX ORDER — ATORVASTATIN CALCIUM 80 MG/1
80 TABLET, FILM COATED ORAL AT BEDTIME
Refills: 0 | Status: DISCONTINUED | OUTPATIENT
Start: 2023-01-20 | End: 2023-01-22

## 2023-01-19 RX ORDER — INSULIN LISPRO 100/ML
VIAL (ML) SUBCUTANEOUS AT BEDTIME
Refills: 0 | Status: DISCONTINUED | OUTPATIENT
Start: 2023-01-19 | End: 2023-01-21

## 2023-01-19 RX ORDER — SODIUM CHLORIDE 9 MG/ML
500 INJECTION INTRAMUSCULAR; INTRAVENOUS; SUBCUTANEOUS ONCE
Refills: 0 | Status: DISCONTINUED | OUTPATIENT
Start: 2023-01-19 | End: 2023-01-19

## 2023-01-19 RX ORDER — CLOPIDOGREL BISULFATE 75 MG/1
75 TABLET, FILM COATED ORAL DAILY
Refills: 0 | Status: DISCONTINUED | OUTPATIENT
Start: 2023-01-20 | End: 2023-02-01

## 2023-01-19 RX ORDER — SODIUM CHLORIDE 9 MG/ML
1000 INJECTION INTRAMUSCULAR; INTRAVENOUS; SUBCUTANEOUS
Refills: 0 | Status: DISCONTINUED | OUTPATIENT
Start: 2023-01-19 | End: 2023-01-20

## 2023-01-19 RX ORDER — ENOXAPARIN SODIUM 100 MG/ML
40 INJECTION SUBCUTANEOUS EVERY 24 HOURS
Refills: 0 | Status: DISCONTINUED | OUTPATIENT
Start: 2023-01-20 | End: 2023-01-27

## 2023-01-19 RX ORDER — POTASSIUM CHLORIDE 20 MEQ
10 PACKET (EA) ORAL
Refills: 0 | Status: DISCONTINUED | OUTPATIENT
Start: 2023-01-19 | End: 2023-01-19

## 2023-01-19 RX ORDER — INSULIN LISPRO 100/ML
VIAL (ML) SUBCUTANEOUS
Refills: 0 | Status: DISCONTINUED | OUTPATIENT
Start: 2023-01-19 | End: 2023-01-19

## 2023-01-19 RX ORDER — POTASSIUM CHLORIDE 20 MEQ
10 PACKET (EA) ORAL
Refills: 0 | Status: COMPLETED | OUTPATIENT
Start: 2023-01-19 | End: 2023-01-20

## 2023-01-19 RX ORDER — SODIUM CHLORIDE 9 MG/ML
1000 INJECTION, SOLUTION INTRAVENOUS
Refills: 0 | Status: DISCONTINUED | OUTPATIENT
Start: 2023-01-19 | End: 2023-02-06

## 2023-01-19 RX ORDER — TAMSULOSIN HYDROCHLORIDE 0.4 MG/1
0.4 CAPSULE ORAL AT BEDTIME
Refills: 0 | Status: DISCONTINUED | OUTPATIENT
Start: 2023-01-21 | End: 2023-02-06

## 2023-01-19 RX ORDER — DEXTROSE 50 % IN WATER 50 %
25 SYRINGE (ML) INTRAVENOUS ONCE
Refills: 0 | Status: DISCONTINUED | OUTPATIENT
Start: 2023-01-19 | End: 2023-02-06

## 2023-01-19 RX ORDER — POTASSIUM CHLORIDE 20 MEQ
10 PACKET (EA) ORAL
Refills: 0 | Status: COMPLETED | OUTPATIENT
Start: 2023-01-19 | End: 2023-01-19

## 2023-01-19 RX ORDER — HALOPERIDOL DECANOATE 100 MG/ML
2.5 INJECTION INTRAMUSCULAR ONCE
Refills: 0 | Status: COMPLETED | OUTPATIENT
Start: 2023-01-19 | End: 2023-01-19

## 2023-01-19 RX ORDER — INSULIN LISPRO 100/ML
VIAL (ML) SUBCUTANEOUS EVERY 4 HOURS
Refills: 0 | Status: DISCONTINUED | OUTPATIENT
Start: 2023-01-19 | End: 2023-01-19

## 2023-01-19 RX ORDER — SODIUM CHLORIDE 9 MG/ML
1000 INJECTION INTRAMUSCULAR; INTRAVENOUS; SUBCUTANEOUS ONCE
Refills: 0 | Status: COMPLETED | OUTPATIENT
Start: 2023-01-19 | End: 2023-01-19

## 2023-01-19 RX ORDER — CEFTRIAXONE 500 MG/1
1000 INJECTION, POWDER, FOR SOLUTION INTRAMUSCULAR; INTRAVENOUS ONCE
Refills: 0 | Status: COMPLETED | OUTPATIENT
Start: 2023-01-19 | End: 2023-01-19

## 2023-01-19 RX ORDER — ASPIRIN/CALCIUM CARB/MAGNESIUM 324 MG
81 TABLET ORAL DAILY
Refills: 0 | Status: DISCONTINUED | OUTPATIENT
Start: 2023-01-20 | End: 2023-02-06

## 2023-01-19 RX ORDER — SODIUM CHLORIDE 9 MG/ML
500 INJECTION INTRAMUSCULAR; INTRAVENOUS; SUBCUTANEOUS ONCE
Refills: 0 | Status: COMPLETED | OUTPATIENT
Start: 2023-01-19 | End: 2023-01-19

## 2023-01-19 RX ORDER — GLUCAGON INJECTION, SOLUTION 0.5 MG/.1ML
1 INJECTION, SOLUTION SUBCUTANEOUS ONCE
Refills: 0 | Status: DISCONTINUED | OUTPATIENT
Start: 2023-01-19 | End: 2023-02-06

## 2023-01-19 RX ORDER — DEXTROSE 50 % IN WATER 50 %
15 SYRINGE (ML) INTRAVENOUS ONCE
Refills: 0 | Status: DISCONTINUED | OUTPATIENT
Start: 2023-01-19 | End: 2023-02-06

## 2023-01-19 RX ORDER — INSULIN LISPRO 100/ML
3 VIAL (ML) SUBCUTANEOUS ONCE
Refills: 0 | Status: COMPLETED | OUTPATIENT
Start: 2023-01-19 | End: 2023-01-19

## 2023-01-19 RX ORDER — CEFTRIAXONE 500 MG/1
1000 INJECTION, POWDER, FOR SOLUTION INTRAMUSCULAR; INTRAVENOUS EVERY 24 HOURS
Refills: 0 | Status: COMPLETED | OUTPATIENT
Start: 2023-01-20 | End: 2023-01-22

## 2023-01-19 RX ADMIN — SODIUM CHLORIDE 100 MILLILITER(S): 9 INJECTION INTRAMUSCULAR; INTRAVENOUS; SUBCUTANEOUS at 22:43

## 2023-01-19 RX ADMIN — Medication 100 MILLIEQUIVALENT(S): at 16:21

## 2023-01-19 RX ADMIN — Medication 3 UNIT(S): at 21:44

## 2023-01-19 RX ADMIN — HALOPERIDOL DECANOATE 2.5 MILLIGRAM(S): 100 INJECTION INTRAMUSCULAR at 16:26

## 2023-01-19 RX ADMIN — SODIUM CHLORIDE 75 MILLILITER(S): 9 INJECTION, SOLUTION INTRAVENOUS at 18:29

## 2023-01-19 RX ADMIN — SODIUM CHLORIDE 500 MILLILITER(S): 9 INJECTION INTRAMUSCULAR; INTRAVENOUS; SUBCUTANEOUS at 13:41

## 2023-01-19 RX ADMIN — Medication 100 MILLIEQUIVALENT(S): at 14:20

## 2023-01-19 RX ADMIN — Medication 3: at 23:04

## 2023-01-19 RX ADMIN — CEFTRIAXONE 100 MILLIGRAM(S): 500 INJECTION, POWDER, FOR SOLUTION INTRAMUSCULAR; INTRAVENOUS at 14:23

## 2023-01-19 RX ADMIN — INSULIN GLARGINE 5 UNIT(S): 100 INJECTION, SOLUTION SUBCUTANEOUS at 18:25

## 2023-01-19 RX ADMIN — Medication 100 MILLIEQUIVALENT(S): at 15:11

## 2023-01-19 RX ADMIN — SODIUM CHLORIDE 1000 MILLILITER(S): 9 INJECTION INTRAMUSCULAR; INTRAVENOUS; SUBCUTANEOUS at 14:21

## 2023-01-19 RX ADMIN — Medication 100 MILLIEQUIVALENT(S): at 22:43

## 2023-01-19 NOTE — ED ADULT NURSE NOTE - OBJECTIVE STATEMENT
78F, Lethargic upon arrival, non-verbal, not following commands. Responds to painful stimuli. BIBEMS for increased lethargy, hyperglycemia. Recent CVA 10 days ago w/ r-sided weakness. Pt presents w/ generalized weakness, labored RR. Skin hot and dry. Pt unable to participate with initial assessment, not follow commands, w/ interpretor. Upon grand daughters arrival to bedside pt more responsive, purposeful movement to L upper extremity, recognizing grand daughter, RR decreased, less labored

## 2023-01-19 NOTE — H&P ADULT - NSHPPHYSICALEXAM_GEN_ALL_CORE
Vital Signs Last 24 Hrs  T(C): 36.8 (19 Jan 2023 16:31), Max: 37.3 (19 Jan 2023 12:30)  T(F): 98.2 (19 Jan 2023 16:31), Max: 99.1 (19 Jan 2023 12:30)  HR: 104 (19 Jan 2023 16:31) (101 - 104)  BP: 135/84 (19 Jan 2023 16:31) (129/50 - 164/66)  BP(mean): 73 (19 Jan 2023 12:30) (73 - 73)  RR: 24 (19 Jan 2023 16:31) (20 - 30)  SpO2: 98% (19 Jan 2023 16:31) (97% - 98%)    Parameters below as of 19 Jan 2023 16:31  Patient On (Oxygen Delivery Method): room air      CONSTITUTIONAL: chronically ill appearing, in no apparent distress  EYES: No conjunctival or scleral injection, non-icteric; PERRLA  ENMT: No external nasal lesions; nasal mucosa not inflamed; oral mucosa with dry membranes  NECK: Supple; Trachea midline   RESPIRATORY: Breathing comfortably; lungs CTA without wheeze/rhonchi/rales  CARDIOVASCULAR: +S1S2, RRR, pedal pulses full and symmetric; trace lower extremity edema  GASTROINTESTINAL: No palpable masses or tenderness, +BS throughout, no rebound/guarding  MUSCULOSKELETAL: No digital clubbing or cyanosis; moves all extremities   SKIN: No rashes or ulcers noted on examined portions   NEUROLOGIC: limited neurology exam given poor mental status and inability to follow commands   PSYCHIATRIC: A+O x 0

## 2023-01-19 NOTE — PATIENT PROFILE ADULT - FALL HARM RISK - HARM RISK INTERVENTIONS
Assistance with ambulation/Assistance OOB with selected safe patient handling equipment/Communicate Risk of Fall with Harm to all staff/Discuss with provider need for PT consult/Monitor gait and stability/Provide patient with walking aids - walker, cane, crutches/Reinforce activity limits and safety measures with patient and family/Tailored Fall Risk Interventions/Visual Cue: Yellow wristband and red socks/Bed in lowest position, wheels locked, appropriate side rails in place/Call bell, personal items and telephone in reach/Instruct patient to call for assistance before getting out of bed or chair/Non-slip footwear when patient is out of bed/[place] to call system/Physically safe environment - no spills, clutter or unnecessary equipment/Purposeful Proactive Rounding/Room/bathroom lighting operational, light cord in reach Assistance with ambulation/Assistance OOB with selected safe patient handling equipment/Communicate Risk of Fall with Harm to all staff/Discuss with provider need for PT consult/Monitor gait and stability/Provide patient with walking aids - walker, cane, crutches/Reinforce activity limits and safety measures with patient and family/Tailored Fall Risk Interventions/Visual Cue: Yellow wristband and red socks/Bed in lowest position, wheels locked, appropriate side rails in place/Call bell, personal items and telephone in reach/Instruct patient to call for assistance before getting out of bed or chair/Non-slip footwear when patient is out of bed/[place] to call system/Physically safe environment - no spills, clutter or unnecessary equipment/Purposeful Proactive Rounding/Room/bathroom lighting operational, light cord in reach Assistance with ambulation/Assistance OOB with selected safe patient handling equipment/Communicate Risk of Fall with Harm to all staff/Discuss with provider need for PT consult/Monitor gait and stability/Provide patient with walking aids - walker, cane, crutches/Reinforce activity limits and safety measures with patient and family/Tailored Fall Risk Interventions/Visual Cue: Yellow wristband and red socks/Bed in lowest position, wheels locked, appropriate side rails in place/Call bell, personal items and telephone in reach/Instruct patient to call for assistance before getting out of bed or chair/Non-slip footwear when patient is out of bed/[place] to call system/Physically safe environment - no spills, clutter or unnecessary equipment/Purposeful Proactive Rounding/Room/bathroom lighting operational, light cord in reach

## 2023-01-19 NOTE — H&P ADULT - PROBLEM SELECTOR PLAN 5
Recent admission w/ Acute L MCA infarct, Severe stenosis of the L M2 branch. MR with L MCA [place] infarcts   CTH on admission showing No acute intracranial hemorrhage. 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 D/w neurology, expected change seen on CT, not new lesions. C/w asa, plavix, statin Recent admission w/ Acute L MCA infarct, severe stenosis of the L M2 branch. MR with L MCA [place] infarcts   CTH on admission showing No acute intracranial hemorrhage. 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 D/w neurology, expected change seen on CT, not new lesions. C/w asa, plavix, statin

## 2023-01-19 NOTE — ED ADULT NURSE REASSESSMENT NOTE - NS ED NURSE REASSESS COMMENT FT1
Pt straight cathed using sterile technique. 2 RNs present to confirm sterility. Pt tolerated procedure well. Sterile specimen collected. UA and Culture sent as ordered. Approx 400cc brown cloudy urine w/ foul odor output noted to bag.

## 2023-01-19 NOTE — PATIENT PROFILE ADULT - LANGUAGE ASSISTANCE NEEDED
Age appropriate
unable to assess pt is cognitively impaired, nonverbal daughters and grand daughter provided interpretation services

## 2023-01-19 NOTE — H&P ADULT - PROBLEM SELECTOR PLAN 2
D/c 1 week ago with Metformin 500 mg BID. Per family, taking 500 mg daily due to poor PO intake.   Now with glucose 500s, improved to mid 300s with fluids. Also with mild ketosis (AG 17, bicarb 21, BHB 0.7)  -Give low dose Lantus 5 units x 1 now  -Give Admelog per the low dose correctional scale x1 now.   -Start low dose correctional scale before each meal and low dose correctional scale at bedtime. Hold standing Admelog premeal   -Check BMP and BHB today at 8pm to ensure downtrending labs after insulin administration  -Check Ab given thin body habitus and ketosis (Glutamic Acid Decarboxylase, Zinc Transporter 8, Islet Cell Ab, and IA-2 Ab)  -Continue IVF administration  -FS qAC and qHS D/c 1 week ago with Metformin 500 mg BID. Per family, taking 500 mg daily due to poor PO intake.   Now with glucose 500s, improved to mid 300s with fluids. Also with mild ketosis (AG 17, bicarb 21, BHB 0.7)    -Endocrine consulted  -Give low dose Lantus 5 units x 1 now, Admelog per the low dose correctional scale x1 now.   -Start low dose correctional scale. Hold standing Admelog premeal   -Check BMP and BHB today at 8pm to ensure downtrending labs after insulin administration  -Check Ab given thin body habitus and ketosis (Glutamic Acid Decarboxylase, Zinc Transporter 8, Islet Cell Ab, and IA-2 Ab)  -Continue IVF administration  -FS q2h for now; if worsening hyperglycemia, may require insulin gtt/MICU eval

## 2023-01-19 NOTE — ED PROVIDER NOTE - CLINICAL SUMMARY MEDICAL DECISION MAKING FREE TEXT BOX
78-year-old female, hx?  Dementia, hypertension, diabetes, recently admitted a few weeks ago for ischemic stroke with L-MCA infarct with right-sided weakness, right facial droop deficit, presenting with elevated blood sugar level, and altered mental status.  > 397. EKG shows sinus tachycardia. Satting 100% on RA. BP stable. Exam as noted above. Will get labs, UA, CXR to check for DKA, infections, CT head for AMS. Will reassess. 78-year-old female, hx?  Dementia, hypertension, diabetes, recently admitted a few weeks ago for ischemic stroke with L-MCA infarct with right-sided weakness, right facial droop deficit, presenting with elevated blood sugar level, and altered mental status.  > 397. EKG shows sinus tachycardia. Satting 100% on RA. BP stable. Exam as noted above. Will get labs, UA, CXR to check for DKA, infections, CT head for AMS. Will reassess.  Chaz: 79yo who presents with decreased activity and high glucose. Patient with recent stroke and recently on meds for DM. will eval for worsening stroke because of ams and treat and eval for hyperglycemia. Lab studies ordered, independently reviewed and acted on as appropriate. 78-year-old female, hx?  Dementia, hypertension, diabetes, recently admitted a few weeks ago for ischemic stroke with L-MCA infarct with right-sided weakness, right facial droop deficit, presenting with elevated blood sugar level, and altered mental status.  > 397. EKG shows sinus tachycardia. Satting 100% on RA. BP stable. Exam as noted above. Will get labs, UA, CXR to check for DKA, infections, CT head for AMS. Will reassess.  Chaz: 77yo who presents with decreased activity and high glucose. Patient with recent stroke and recently on meds for DM. will eval for worsening stroke because of ams and treat and eval for hyperglycemia. Lab studies ordered, independently reviewed and acted on as appropriate.

## 2023-01-19 NOTE — ED ADULT NURSE NOTE - NSIMPLEMENTINTERV_GEN_ALL_ED
Implemented All Fall Risk Interventions:  [place] to call system. Call bell, personal items and telephone within reach. Instruct patient to call for assistance. Room bathroom lighting operational. Non-slip footwear when patient is off stretcher. Physically safe environment: no spills, clutter or unnecessary equipment. Stretcher in lowest position, wheels locked, appropriate side rails in place. Provide visual cue, wrist band, yellow gown, etc. Monitor gait and stability. Monitor for mental status changes and reorient to person, place, and time. Review medications for side effects contributing to fall risk. Reinforce activity limits and safety measures with patient and family. Implemented All Fall Risk Interventions:  [place] to call system. Call bell, personal items and telephone within reach. Instruct patient to call for assistance. Room bathroom lighting operational. Non-slip footwear when patient is off stretcher. Physically safe environment: no spills, clutter or unnecessary equipment. Stretcher in lowest position, wheels locked, appropriate side rails in place. Provide visual cue, wrist band, yellow gown, etc. Monitor gait and stability. Monitor for mental status changes and reorient to person, place, and time. Review medications for side effects contributing to fall risk. Reinforce activity limits and safety measures with patient and family. Implemented All Fall Risk Interventions:  [place] to call system. Call bell, personal items and telephone within reach. Instruct patient to call for assistance. Room bathroom lighting operational. Non-slip footwear when patient is off stretcher. Physically safe environment: no spills, clutter or unnecessary equipment. Stretcher in lowest position, wheels locked, appropriate side rails in place. Provide visual cue, wrist band, yellow gown, etc. Monitor gait and stability. Monitor for mental status changes and reorient to person, place, and time. Review medications for side effects contributing to fall risk. Reinforce activity limits and safety measures with patient and family.

## 2023-01-19 NOTE — CONSULT NOTE ADULT - ASSESSMENT
78 F with recent T2DM diagnosis and CVA (d/c 1 week ago), presenting with glucoses 500s.     1.  Uncontrolled T2DM, c/b CVA  A1c 8.6%. Goal < 8%  D/c 1 week ago with Metformin 500 mg BID. Only required scales (1-3 units while inpatient).   Has been taking 500 mg daily due to poor PO intake.   Now with glucose 500s, improved to mid 300s with fluids. Also with mild ketosis (AG 17, bicarb 21, BHB 0.7)    Plan:   -Give low dose Lantus 5 units x 1 now  -Give Admelog per the low dose correctional scale x1 now.   -Order Lantus 5 units daily for 5 pm starting on 1/20  -Start low dose correctional scale before each meal and low dose correctional scale at bedtime. Hold standing Admelog premeal   -Check BMP and BHB today at 8pm to ensure downtrending labs after insulin administration  -Check Ab given thin body habitus and ketosis (Glutamic Acid Decarboxylase, Zinc Transporter 8, Islet Cell Ab, and IA-2 Ab)  -Continue IVF administration  -FS qAC and qHS    For d/c:   -Metformin 500 mg BID, inc to 1000 mg BID. Check lactate prior to d/c  -May need low dose basal insulin (came in with glucose 500s, mild BHB elevation)  -Consider DPP4 inhibitor  - Patient can follow up at [address] [facility], [address], [place] [address] [phone]  - Patient will need opthalmology and podiatry follow up as outpatient     2. HTN  - BP goal 130/80  - Manage per primary team     3. HLD  - Continue with atorvastatin 80 mg daily   -   - Manage as outpatient      Ivette Roberts MD  Endocrine Fellow  Can be reached via teams.     For all urgent matters, can call answering service at [phone] (weekdays); [phone] (nights/weekends)  Any non-urgent consults or questions can be emailed to [email or web address] or [email or web address]     78 F with recent T2DM diagnosis and CVA (d/c 1 week ago), presenting with glucoses 500s.     1.  Uncontrolled T2DM, c/b CVA  A1c 8.6%. Goal < 8%  D/c 1 week ago with Metformin 500 mg BID. Only required scales (1-3 units while inpatient).   Has been taking 500 mg daily due to poor PO intake.   Now with glucose 500s, improved to mid 300s with fluids. Also with mild ketosis (AG 17, bicarb 21, BHB 0.7)    Plan:   -Give low dose Lantus 5 units x 1 now  -Give Admelog per the low dose correctional scale x1 now.   -Order Lantus 5 units daily for 5 pm starting on 1/20  -Start low dose correctional scale before each meal and low dose correctional scale at bedtime. Hold standing Admelog premeal   -Check BMP and BHB today at 8pm to ensure downtrending labs after insulin administration  -Check Ab given thin body habitus and ketosis (Glutamic Acid Decarboxylase, Zinc Transporter 8, Islet Cell Ab, and IA-2 Ab)  -Continue IVF administration  -FS qAC and qHS    For d/c:   -Metformin 500 mg BID, inc to 1000 mg BID. Check lactate prior to d/c  -May need low dose basal insulin (came in with glucose 500s, mild BHB elevation)  -Consider DPP4 inhibitor  - Patient can follow up at [address] [facility], [address], [place] [address] [phone]  - Patient will need opthalmology and podiatry follow up as outpatient     2. HTN  - BP goal 130/80  - Manage per primary team     3. HLD  - Continue with atorvastatin 80 mg daily   -   - Manage as outpatient      Ivette Roberts MD  Endocrine Fellow  Can be reached via teams.     For all urgent matters, can call answering service at [phone] (weekdays); [phone] (nights/weekends)  Any non-urgent consults or questions can be emailed to [email or web address] or [email or web address]     78 F with recent T2DM diagnosis and CVA (d/c 1 week ago), presenting with glucoses 500s.     1.  Uncontrolled T2DM, c/b CVA  A1c 8.6%. Goal < 8%  D/c 1 week ago with Metformin 500 mg BID. Only required scales (1-3 units while inpatient).   Has been taking 500 mg daily due to poor PO intake.   Now with glucose 500s, improved to mid 300s with fluids. Also with mild ketosis (AG 17, bicarb 21, BHB 0.7)    Plan:   -Give low dose Lantus 5 units x 1 now  -Give Admelog per the low dose correctional scale x1 now.   -Order Lantus 5 units daily for 5 pm starting on 1/20  -Start low dose correctional scale before each meal and low dose correctional scale at bedtime. Hold standing Admelog premeal   -Check BMP and BHB today at 8pm to ensure downtrending labs after insulin administration  -Check Ab given thin body habitus and ketosis (Glutamic Acid Decarboxylase, Zinc Transporter 8, Islet Cell Ab, and IA-2 Ab)  -Continue IVF administration  -FS qAC and qHS    For d/c:   -Metformin 500 mg BID, inc to 1000 mg BID. Check lactate prior to d/c  -May need low dose basal insulin (came in with glucose 500s, mild BHB elevation)  -Consider DPP4 inhibitor  - Patient can follow up at [address] [facility], [address], [place] [address] [phone]  - Patient will need opthalmology and podiatry follow up as outpatient     2. HTN  - BP goal 130/80  - Manage per primary team     3. HLD  - Continue with atorvastatin 80 mg daily   -   - Manage as outpatient      Ivette Roberts MD  Endocrine Fellow  Can be reached via teams.     For all urgent matters, can call answering service at [phone] (weekdays); [phone] (nights/weekends)  Any non-urgent consults or questions can be emailed to [email or web address] or [email or web address]     78 F with recent T2DM diagnosis and CVA (d/c 1 week ago), presenting with glucoses 500s.     1.  Uncontrolled T2DM, c/b CVA  A1c 8.6%. Goal < 8%  D/c 1 week ago with Metformin 500 mg BID. Only required scales (1-3 units while inpatient).   Has been taking 500 mg daily due to poor PO intake.   Now with glucose 500s, improved to mid 300s with fluids. Also with mild ketosis (AG 17, bicarb 21, BHB 0.7)    Plan:   -Give low dose Lantus 5 units x 1 now  -Give Admelog per the low dose correctional scale x1 now.   -Order Lantus 5 units daily for bedtime starting on 1/20 (order not placed today to avoid confusion)  -Start low dose correctional scale before each meal and low dose correctional scale at bedtime. Hold standing Admelog premeal   -Check BMP and BHB today at 8pm to ensure downtrending labs after insulin administration  -Check Ab given thin body habitus and ketosis (Glutamic Acid Decarboxylase, Zinc Transporter 8, Islet Cell Ab, and IA-2 Ab)  -Continue IVF administration  -FS qAC and qHS    For d/c:   -Metformin 500 mg BID, inc to 1000 mg BID. Check lactate prior to d/c  -May need low dose basal insulin (came in with glucose 500s, mild BHB elevation)  -Consider DPP4 inhibitor  - Patient can follow up at [address] [facility], [address], [place] [address] [phone]  - Patient will need opthalmology and podiatry follow up as outpatient     2. HTN  - BP goal 130/80  - Manage per primary team     3. HLD  - Continue with atorvastatin 80 mg daily   -   - Manage as outpatient      Ivette Roberts MD  Endocrine Fellow  Can be reached via teams.     For all urgent matters, can call answering service at [phone] (weekdays); [phone] (nights/weekends)  Any non-urgent consults or questions can be emailed to [email or web address] or [email or web address]     78 F with recent T2DM diagnosis and CVA (d/c 1 week ago), presenting with glucoses 500s.     1.  Uncontrolled T2DM, c/b CVA  A1c 8.6%. Goal < 8%  D/c 1 week ago with Metformin 500 mg BID. Only required scales (1-3 units while inpatient).   Has been taking 500 mg daily due to poor PO intake.   Now with glucose 500s, improved to mid 300s with fluids. Also with mild ketosis (AG 17, bicarb 21, BHB 0.7)    Plan:   -Give low dose Lantus 5 units x 1 now  -Give Admelog per the low dose correctional scale x1 now.   -Order Lantus 5 units daily for bedtime starting on 1/20 (order not placed today to avoid confusion)  -Start low dose correctional scale before each meal and low dose correctional scale at bedtime. Hold standing Admelog premeal   -Check BMP and BHB today at 8pm to ensure downtrending labs after insulin administration  -Check Ab given thin body habitus and ketosis (Glutamic Acid Decarboxylase, Zinc Transporter 8, Islet Cell Ab, and IA-2 Ab)  -Continue IVF administration  -FS qAC and qHS    For d/c:   -Metformin 500 mg BID, inc to 1000 mg BID. Check lactate prior to d/c  -May need low dose basal insulin (came in with glucose 500s, mild BHB elevation)  -Consider DPP4 inhibitor  - Patient can follow up at [address] [facility], [address], [place] [address] [phone]  - Patient will need opthalmology and podiatry follow up as outpatient     2. HTN  - BP goal 130/80  - Manage per primary team     3. HLD  - Continue with atorvastatin 80 mg daily   -   - Manage as outpatient      Ivette Roberts MD  Endocrine Fellow  Can be reached via teams.     For all urgent matters, can call answering service at [phone] (weekdays); [phone] (nights/weekends)  Any non-urgent consults or questions can be emailed to [email or web address] or [email or web address]     78 F with recent T2DM diagnosis and CVA (d/c 1 week ago), presenting with glucoses 500s.     1.  Uncontrolled T2DM, c/b CVA  A1c 8.6%. Goal < 8%  D/c 1 week ago with Metformin 500 mg BID. Only required scales (1-3 units while inpatient).   Has been taking 500 mg daily due to poor PO intake.   Now with glucose 500s, improved to mid 300s with fluids. Also with mild ketosis (AG 17, bicarb 21, BHB 0.7)    Plan:   -Give low dose Lantus 5 units x 1 now  -Give Admelog per the low dose correctional scale x1 now.   -Order Lantus 5 units daily for bedtime starting on 1/20 (order not placed today to avoid confusion)  -Start low dose correctional scale before each meal and low dose correctional scale at bedtime. Hold standing Admelog premeal   -Check BMP and BHB today at 8pm to ensure downtrending labs after insulin administration  -Check Ab given thin body habitus and ketosis (Glutamic Acid Decarboxylase, Zinc Transporter 8, Islet Cell Ab, and IA-2 Ab)  -Continue IVF administration  -FS qAC and qHS    For d/c:   -Metformin 500 mg BID, inc to 1000 mg BID. Check lactate prior to d/c  -May need low dose basal insulin (came in with glucose 500s, mild BHB elevation)  -Consider DPP4 inhibitor  - Patient can follow up at [address] [facility], [address], [place] [address] [phone]  - Patient will need opthalmology and podiatry follow up as outpatient     2. HTN  - BP goal 130/80  - Manage per primary team     3. HLD  - Continue with atorvastatin 80 mg daily   -   - Manage as outpatient      Ivette Roberts MD  Endocrine Fellow  Can be reached via teams.     For all urgent matters, can call answering service at [phone] (weekdays); [phone] (nights/weekends)  Any non-urgent consults or questions can be emailed to [email or web address] or [email or web address]

## 2023-01-19 NOTE — CONSULT NOTE ADULT - SUBJECTIVE AND OBJECTIVE BOX
HPI:  78F with recent onset T2DM, d/c 1 week ago for CVA presenting for hyperglycemia to 500s.   A1c 8.6%  Diagnosed with T2DM on last admission 2 weeks ago, seen by Endocrine team. Was only requiring 1-2 units of correctional scale while inpatient, without standing insulin. Discharged with Metformin 500 mg BID, plan to increase to 1000 mg BID in 1 week  History obtained from son over the phone - Patient has only been taking Metformin 500 mg once a day due to poor appetite and hydration. Glucoses have been in the 200s at home. Today, he noticed they were 500s. Now improved to mid 300s with fluids alone   Labs also show mild ketosis - Ag 17, bicarb 21, BHB 0.7. Lactate elevated       PAST MEDICAL & SURGICAL HISTORY:  Dementia      CVA (cerebrovascular accident)      DM (diabetes mellitus)      No significant past surgical history          FAMILY HISTORY:  No pertinent family history in first degree relatives        Social History:    Home Medications:      MEDICATIONS  (STANDING):    MEDICATIONS  (PRN):      Allergies    Benedryl Allergy Sinus (Hives)  penicillin (Rash)    Intolerances      Review of Systems:  Constitutional: No fever  Eyes: No blurry vision  Neuro: No tremors  HEENT: No pain  Cardiovascular: No chest pain, palpitations  Respiratory: No SOB, no cough  GI: No nausea, vomiting, abdominal pain  : No dysuria  Skin: no rash  Psych: no depression  Endocrine: no polyuria, polydipsia  Hem/lymph: no swelling  Osteoporosis: no fractures    PHYSICAL EXAM:  VITALS: T(C): 36.8 (01-19-23 @ 16:31)  T(F): 98.2 (01-19-23 @ 16:31), Max: 99.1 (01-19-23 @ 12:30)  HR: 104 (01-19-23 @ 16:31) (101 - 104)  BP: 135/84 (01-19-23 @ 16:31) (129/50 - 164/66)  RR:  (20 - 30)  SpO2:  (97% - 98%)  Wt(kg): --  GENERAL: NAD  EYES: No proptosis, no lid lag, anicteric  THYROID: Normal size, no palpable nodules  RESPIRATORY: Clear to auscultation bilaterally  CARDIOVASCULAR: Regular rate and rhythm  GI: Soft, nontender, non distended  EXT: b/l feet without wounds; 2+ pulses  PSYCH: Alert and oriented x 3, reactive mood    POCT Blood Glucose.: 358 mg/dL (01-19-23 @ 15:58)  POCT Blood Glucose.: 387 mg/dL (01-19-23 @ 13:34)  POCT Blood Glucose.: 397 mg/dL (01-19-23 @ 12:10)                            12.3   15.48 )-----------( 280      ( 19 Jan 2023 13:11 )             40.5       01-19    158<H>  |  120<H>  |  49<H>  ----------------------------<  468<HH>  3.0<L>   |  21<L>  |  0.92    eGFR: 63    Ca    9.9      01-19  Mg     2.6     01-19  Phos  2.9     01-19    TPro  7.1  /  Alb  2.7<L>  /  TBili  0.5  /  DBili  x   /  AST  10  /  ALT  14  /  AlkPhos  136<H>  01-19      Thyroid Function Tests:      A1C with Estimated Average Glucose Result: 8.6 % (01-07-23 @ 05:40)      01-08 Chol 238<H> Direct LDL -- LDL calculated 176<H> HDL 46<L> Trig 80    Radiology:                HPI:  78F with recent onset T2DM, d/c 1 week ago for CVA presenting for hyperglycemia to 500s.   A1c 8.6%  Diagnosed with T2DM on last admission 2 weeks ago, seen by Endocrine team. Was only requiring 1-2 units of correctional scale while inpatient, without standing insulin. Discharged with Metformin 500 mg BID, plan to increase to 1000 mg BID in 1 week  History obtained from son over the phone - Patient has only been taking Metformin 500 mg once a day due to poor appetite and hydration. Glucoses have been in the 200s at home. Today, he noticed they were 500s. Now improved to mid 300s with fluids alone   Labs also show mild ketosis - Ag 17, bicarb 21, BHB 0.7. Lactate elevated       PAST MEDICAL & SURGICAL HISTORY:  Dementia      CVA (cerebrovascular accident)      DM (diabetes mellitus)      No significant past surgical history          FAMILY HISTORY:  No pertinent family history in first degree relatives        Social History: No tobacco use    Home Medications: Metformin 500 mg BID      MEDICATIONS  (STANDING):    MEDICATIONS  (PRN):      Allergies    Benedryl Allergy Sinus (Hives)  penicillin (Rash)    Intolerances      Review of Systems: unable to obtain    PHYSICAL EXAM:  VITALS: T(C): 36.8 (01-19-23 @ 16:31)  T(F): 98.2 (01-19-23 @ 16:31), Max: 99.1 (01-19-23 @ 12:30)  HR: 104 (01-19-23 @ 16:31) (101 - 104)  BP: 135/84 (01-19-23 @ 16:31) (129/50 - 164/66)  RR:  (20 - 30)  SpO2:  (97% - 98%)  Wt(kg): --  GENERAL: NAD  EYES: No proptosis, no lid lag, anicteric  THYROID: Normal size, no palpable nodules  RESPIRATORY: Clear to auscultation bilaterally  CARDIOVASCULAR: Regular rate and rhythm  GI: Soft, nontender, non distended  EXT: b/l feet without wounds; 2+ pulses    POCT Blood Glucose.: 358 mg/dL (01-19-23 @ 15:58)  POCT Blood Glucose.: 387 mg/dL (01-19-23 @ 13:34)  POCT Blood Glucose.: 397 mg/dL (01-19-23 @ 12:10)                            12.3   15.48 )-----------( 280      ( 19 Jan 2023 13:11 )             40.5       01-19    158<H>  |  120<H>  |  49<H>  ----------------------------<  468<HH>  3.0<L>   |  21<L>  |  0.92    eGFR: 63    Ca    9.9      01-19  Mg     2.6     01-19  Phos  2.9     01-19    TPro  7.1  /  Alb  2.7<L>  /  TBili  0.5  /  DBili  x   /  AST  10  /  ALT  14  /  AlkPhos  136<H>  01-19      Thyroid Function Tests:      A1C with Estimated Average Glucose Result: 8.6 % (01-07-23 @ 05:40)      01-08 Chol 238<H> Direct LDL -- LDL calculated 176<H> HDL 46<L> Trig 80    Radiology:

## 2023-01-19 NOTE — H&P ADULT - HISTORY OF PRESENT ILLNESS
78F with dementia, T2DM, recently discharged about 1 week ago for CVA now presenting with poor PO intake, lethargy and hyperglycemia to 500s.   Of note, patient admitted 1/6 for R facial droop, word finding difficulties. Imaging concerning for acute L MCA infarct. Discharged home on 1/11. Majority of history obtained through family given mental status. Per family, since stroke patient nonverbal/unable to participate in meaningful communications, intermittently follows commands. For the last 3-4 days, patient with poor po intake. Reportedly only eating breakfast. Day of presentation, more lethargic with elevated finger sticks up to the 500s. Patient was evaluated by endocrine team during prior admission with recs to increase metformin to 1000mg BID. However, given poor PO intake, family has been giving 500mg daily.     In ED, afebrile, , 135/84. WBC 15, Na 158, K 3.0, Cl 120, Glu 468, alk phos 136m BHB 0.7. pH 7.36, lactate 2.2. UA: +leuk esterase, many bacteria, WBC 11-25  CXR: Redemonstrated biapical scarring and left upper lobe bronchiectasis, unchanged. No focal consolidation.   CTH No acute intracranial hemorrhage. 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  Given 1.5L NS, ceftriaxone x1, haldol 2.5mg x1 in ED.

## 2023-01-19 NOTE — ED PROVIDER NOTE - OBJECTIVE STATEMENT
78-year-old female, hx?  Dementia, hypertension, diabetes, recently admitted a few weeks ago for ischemic stroke with L-MCA infarct with right-sided weakness, right facial droop deficit, presenting with elevated blood sugar level.  Unable to obtain history from the patient as patient is not able to speak.  Daughter at bedside.  Reports patient is at her usual state of health until this morning when patient was found to be lethargic, fingerstick was found to be in the 500s. Denies any symptoms of fever, abdominal pain, chest pain, shortness of breath, nausea, vomiting, diarrhea. Patient appeared lethargic, altered and unable to answer questions. Per daughter, patient is able to follow simple commands and able to speak "a little bit" until yesterday. Was admitted under Dr. Patric Sierra. 78-year-old female, hx?  Dementia, hypertension, diabetes, recently admitted a few weeks ago for ischemic stroke with L-MCA infarct with right-sided weakness, right facial droop deficit, presenting with elevated blood sugar level.  Unable to obtain history from the patient as patient is not able to speak.  Daughter at bedside.  Reports patient is at her usual state of health until this morning when patient was found to be lethargic, fingerstick was found to be in the 500s. Patient is currently being given 500 cc IV fluids by the EMS. Denies any symptoms of fever, abdominal pain, chest pain, shortness of breath, nausea, vomiting, diarrhea. Patient appeared lethargic, altered and unable to answer questions. Per daughter, patient is able to follow simple commands and able to speak "a little bit" until yesterday. Was admitted under Dr. Patric Sierra.

## 2023-01-19 NOTE — H&P ADULT - PROBLEM SELECTOR PLAN 6
Na 158 with Glu 414. D/w renal, rec c/w 1/4 NS for now. BMP q4h. Hyperglycemia management per DM section

## 2023-01-19 NOTE — ED ADULT NURSE REASSESSMENT NOTE - NS ED NURSE REASSESS COMMENT FT1
Rojas catheter inserted using sterile technique. Second RN present to confirm sterility. Bedside drainage to gravity. Stat lock in place.

## 2023-01-19 NOTE — ED PROVIDER NOTE - ATTENDING CONTRIBUTION TO CARE
61
I performed a history and physical exam of the patient and discussed their management with the resident and /or advanced care provider. I reviewed the resident and /or ACP's note and agree with the documented findings and plan of care. My medical decision making and observations are found above.  Lungs clear and soft,

## 2023-01-19 NOTE — H&P ADULT - NSHPLABSRESULTS_GEN_ALL_CORE
LABS:                         12.3   15.48 )-----------( 280      ( 2023 13:11 )             40.5         158<H>  |  120<H>  |  49<H>  ----------------------------<  468<HH>  3.0<L>   |  21<L>  |  0.92    Ca    9.9      2023 13:11  Phos  2.9       Mg     2.6         TPro  7.1  /  Alb  2.7<L>  /  TBili  0.5  /  DBili  x   /  AST  10  /  ALT  14  /  AlkPhos  136<H>        Urinalysis Basic - ( 2023 13:15 )    Color: DARK BROWN / Appearance: Turbid / S.023 / pH: x  Gluc: x / Ketone: Trace  / Bili: Negative / Urobili: 6 mg/dL   Blood: x / Protein: >600 / Nitrite: Negative   Leuk Esterase: Large / RBC: 15 /hpf / WBC 11-25   Sq Epi: x / Non Sq Epi: Few / Bacteria: Many      CARDIAC MARKERS ( 2023 13:11 )  x     / x     / 73 U/L / x     / x          See HPI for summary of prior hospitalization  WBC 15, Na 158, K 3.0, Cl 120, Glu 468, alk phos 136m BHB 0.7. pH 7.36, lactate 2.2. UA: +leuk esterase, many bacteria, WBC 11-25  CXR: Redemonstrated biapical scarring and left upper lobe bronchiectasis, unchanged. No focal consolidation.   CTH No acute intracranial hemorrhage. 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

## 2023-01-19 NOTE — H&P ADULT - ASSESSMENT
78F with dementia, T2DM, recently discharged about 1 week ago for CVA now presenting with poor PO intake, lethargy and hyperglycemia to 500s.

## 2023-01-19 NOTE — H&P ADULT - PROBLEM SELECTOR PLAN 1
Since recent stroke, per family patient unable to participate in any meaningful conversation, intermittently follows commands. A&Ox0. More recently, poor po intake, lethargic. CTH no acute intracranial abnormalities. Multifactorial   - Multiple metabolic derangements - dehydration, hyperglycemia, hyperNa, hypoK - c/w IVF, K repletion prn, f/u BMP   - UA with +leuk esterase, many bacteria. Will treat with ceftriaxone for now. F/u UCx. Monitor fever curve, WBC trend  - NPO, pending bedside SLP eval Since recent stroke, per family patient unable to participate in any meaningful conversation, intermittently follows commands. A&Ox0. More recently, poor po intake, lethargic. CTH no acute intracranial abnormalities. Multifactorial   - Multiple metabolic derangements - dehydration, hyperglycemia, hyperNa, hypoK - c/w IVF, K repletion prn, f/u BMP   - UA with +leuk esterase, many bacteria. Will treat with ceftriaxone for now. F/u UCx, BCx. Monitor fever curve, WBC trend  - NPO, pending bedside SLP eval

## 2023-01-19 NOTE — ED PROVIDER NOTE - PHYSICAL EXAMINATION
Gen: Patient is lethargic appearing, NAD, AAOx0, not answering questions, not following commands, moving all extremities to pain stimuli, arousable to sternal rub  HEENT: NCAT, PERRLA, normal conjunctiva, tongue midline, oral mucosa moist  Lung: CTAB, no respiratory distress, no wheezes/rhonchi/rales B/L  CV: tachycardic, no murmurs, rubs or gallops, distal pulses 2+ b/l  Abd: soft, NT, ND, no guarding, no rigidity, no rebound tenderness  MSK: no visible deformities, moving all extremities to painful stimuli, no back TTP  Neuro: No focal sensory or motor deficits, AAOx0, not answering questions, not following commands, moving all extremities to pain stimuli, arousable to sternal rub  Skin: Warm, well perfused, scattered bruises in abdomen, lower extremities, no leg swelling  Psych: normal affect, calm

## 2023-01-19 NOTE — ED ADULT TRIAGE NOTE - NS ED NURSE AMBULANCES
[facility]

## 2023-01-19 NOTE — ED PROVIDER NOTE - CARE PLAN
1 Principal Discharge DX:	Acute UTI  Secondary Diagnosis:	Diabetes mellitus with hyperglycemia  Secondary Diagnosis:	SORAYA (acute kidney injury)  Secondary Diagnosis:	AMS (altered mental status)

## 2023-01-20 LAB
A1C WITH ESTIMATED AVERAGE GLUCOSE RESULT: 9.4 % — HIGH (ref 4–5.6)
ALBUMIN SERPL ELPH-MCNC: 2.2 G/DL — LOW (ref 3.3–5)
ALP SERPL-CCNC: 111 U/L — SIGNIFICANT CHANGE UP (ref 40–120)
ALT FLD-CCNC: 9 U/L — LOW (ref 10–45)
ANION GAP SERPL CALC-SCNC: 11 MMOL/L — SIGNIFICANT CHANGE UP (ref 5–17)
ANION GAP SERPL CALC-SCNC: 12 MMOL/L — SIGNIFICANT CHANGE UP (ref 5–17)
ANION GAP SERPL CALC-SCNC: 14 MMOL/L — SIGNIFICANT CHANGE UP (ref 5–17)
ANION GAP SERPL CALC-SCNC: 16 MMOL/L — SIGNIFICANT CHANGE UP (ref 5–17)
AST SERPL-CCNC: 12 U/L — SIGNIFICANT CHANGE UP (ref 10–40)
B-OH-BUTYR SERPL-SCNC: 0 MMOL/L — SIGNIFICANT CHANGE UP
B-OH-BUTYR SERPL-SCNC: 0.1 MMOL/L — SIGNIFICANT CHANGE UP
BASE EXCESS BLDV CALC-SCNC: -3.2 MMOL/L — LOW (ref -2–3)
BASOPHILS # BLD AUTO: 0.05 K/UL — SIGNIFICANT CHANGE UP (ref 0–0.2)
BASOPHILS NFR BLD AUTO: 0.3 % — SIGNIFICANT CHANGE UP (ref 0–2)
BILIRUB SERPL-MCNC: 0.4 MG/DL — SIGNIFICANT CHANGE UP (ref 0.2–1.2)
BUN SERPL-MCNC: 24 MG/DL — HIGH (ref 7–23)
BUN SERPL-MCNC: 28 MG/DL — HIGH (ref 7–23)
BUN SERPL-MCNC: 32 MG/DL — HIGH (ref 7–23)
BUN SERPL-MCNC: 33 MG/DL — HIGH (ref 7–23)
CA-I SERPL-SCNC: 1.25 MMOL/L — SIGNIFICANT CHANGE UP (ref 1.15–1.33)
CALCIUM SERPL-MCNC: 8.3 MG/DL — LOW (ref 8.4–10.5)
CALCIUM SERPL-MCNC: 8.6 MG/DL — SIGNIFICANT CHANGE UP (ref 8.4–10.5)
CALCIUM SERPL-MCNC: 9.1 MG/DL — SIGNIFICANT CHANGE UP (ref 8.4–10.5)
CALCIUM SERPL-MCNC: 9.2 MG/DL — SIGNIFICANT CHANGE UP (ref 8.4–10.5)
CHLORIDE BLDV-SCNC: 122 MMOL/L — HIGH (ref 96–108)
CHLORIDE SERPL-SCNC: 120 MMOL/L — HIGH (ref 96–108)
CHLORIDE SERPL-SCNC: 123 MMOL/L — HIGH (ref 96–108)
CHLORIDE SERPL-SCNC: 124 MMOL/L — HIGH (ref 96–108)
CHLORIDE SERPL-SCNC: 125 MMOL/L — HIGH (ref 96–108)
CO2 BLDV-SCNC: 24 MMOL/L — SIGNIFICANT CHANGE UP (ref 22–26)
CO2 SERPL-SCNC: 18 MMOL/L — LOW (ref 22–31)
CO2 SERPL-SCNC: 20 MMOL/L — LOW (ref 22–31)
CREAT SERPL-MCNC: 0.65 MG/DL — SIGNIFICANT CHANGE UP (ref 0.5–1.3)
CREAT SERPL-MCNC: 0.71 MG/DL — SIGNIFICANT CHANGE UP (ref 0.5–1.3)
CREAT SERPL-MCNC: 0.72 MG/DL — SIGNIFICANT CHANGE UP (ref 0.5–1.3)
CREAT SERPL-MCNC: 0.74 MG/DL — SIGNIFICANT CHANGE UP (ref 0.5–1.3)
EGFR: 82 ML/MIN/1.73M2 — SIGNIFICANT CHANGE UP
EGFR: 85 ML/MIN/1.73M2 — SIGNIFICANT CHANGE UP
EGFR: 86 ML/MIN/1.73M2 — SIGNIFICANT CHANGE UP
EGFR: 90 ML/MIN/1.73M2 — SIGNIFICANT CHANGE UP
EOSINOPHIL # BLD AUTO: 0.44 K/UL — SIGNIFICANT CHANGE UP (ref 0–0.5)
EOSINOPHIL NFR BLD AUTO: 3 % — SIGNIFICANT CHANGE UP (ref 0–6)
ESTIMATED AVERAGE GLUCOSE: 223 MG/DL — HIGH (ref 68–114)
GAS PNL BLDV: 159 MMOL/L — HIGH (ref 136–145)
GAS PNL BLDV: SIGNIFICANT CHANGE UP
GLUCOSE BLDV-MCNC: 143 MG/DL — HIGH (ref 70–99)
GLUCOSE SERPL-MCNC: 149 MG/DL — HIGH (ref 70–99)
GLUCOSE SERPL-MCNC: 178 MG/DL — HIGH (ref 70–99)
GLUCOSE SERPL-MCNC: 231 MG/DL — HIGH (ref 70–99)
GLUCOSE SERPL-MCNC: 248 MG/DL — HIGH (ref 70–99)
HCO3 BLDV-SCNC: 23 MMOL/L — SIGNIFICANT CHANGE UP (ref 22–29)
HCT VFR BLD CALC: 33 % — LOW (ref 34.5–45)
HCT VFR BLDA CALC: 35 % — SIGNIFICANT CHANGE UP (ref 34.5–46.5)
HGB BLD CALC-MCNC: 11.8 G/DL — SIGNIFICANT CHANGE UP (ref 11.7–16.1)
HGB BLD-MCNC: 10.1 G/DL — LOW (ref 11.5–15.5)
IMM GRANULOCYTES NFR BLD AUTO: 1.7 % — HIGH (ref 0–0.9)
LACTATE BLDV-MCNC: 3.9 MMOL/L — HIGH (ref 0.5–2)
LACTATE BLDV-MCNC: 4.4 MMOL/L — CRITICAL HIGH (ref 0.5–2)
LACTATE SERPL-SCNC: 1.9 MMOL/L — SIGNIFICANT CHANGE UP (ref 0.5–2)
LYMPHOCYTES # BLD AUTO: 17.9 % — SIGNIFICANT CHANGE UP (ref 13–44)
LYMPHOCYTES # BLD AUTO: 2.61 K/UL — SIGNIFICANT CHANGE UP (ref 1–3.3)
MCHC RBC-ENTMCNC: 27.2 PG — SIGNIFICANT CHANGE UP (ref 27–34)
MCHC RBC-ENTMCNC: 30.6 GM/DL — LOW (ref 32–36)
MCV RBC AUTO: 88.7 FL — SIGNIFICANT CHANGE UP (ref 80–100)
MONOCYTES # BLD AUTO: 0.75 K/UL — SIGNIFICANT CHANGE UP (ref 0–0.9)
MONOCYTES NFR BLD AUTO: 5.1 % — SIGNIFICANT CHANGE UP (ref 2–14)
NEUTROPHILS # BLD AUTO: 10.51 K/UL — HIGH (ref 1.8–7.4)
NEUTROPHILS NFR BLD AUTO: 72 % — SIGNIFICANT CHANGE UP (ref 43–77)
NRBC # BLD: 0 /100 WBCS — SIGNIFICANT CHANGE UP (ref 0–0)
PCO2 BLDV: 43 MMHG — HIGH (ref 39–42)
PH BLDV: 7.33 — SIGNIFICANT CHANGE UP (ref 7.32–7.43)
PLATELET # BLD AUTO: 239 K/UL — SIGNIFICANT CHANGE UP (ref 150–400)
PO2 BLDV: 36 MMHG — SIGNIFICANT CHANGE UP (ref 25–45)
POTASSIUM BLDV-SCNC: 2.9 MMOL/L — CRITICAL LOW (ref 3.5–5.1)
POTASSIUM SERPL-MCNC: 3.1 MMOL/L — LOW (ref 3.5–5.3)
POTASSIUM SERPL-MCNC: 3.6 MMOL/L — SIGNIFICANT CHANGE UP (ref 3.5–5.3)
POTASSIUM SERPL-MCNC: 5.2 MMOL/L — SIGNIFICANT CHANGE UP (ref 3.5–5.3)
POTASSIUM SERPL-SCNC: 3.1 MMOL/L — LOW (ref 3.5–5.3)
POTASSIUM SERPL-SCNC: 3.6 MMOL/L — SIGNIFICANT CHANGE UP (ref 3.5–5.3)
POTASSIUM SERPL-SCNC: 5.2 MMOL/L — SIGNIFICANT CHANGE UP (ref 3.5–5.3)
PROT SERPL-MCNC: 5.9 G/DL — LOW (ref 6–8.3)
RBC # BLD: 3.72 M/UL — LOW (ref 3.8–5.2)
RBC # FLD: 14.8 % — HIGH (ref 10.3–14.5)
SAO2 % BLDV: 54.3 % — LOW (ref 67–88)
SODIUM SERPL-SCNC: 151 MMOL/L — HIGH (ref 135–145)
SODIUM SERPL-SCNC: 156 MMOL/L — HIGH (ref 135–145)
SODIUM SERPL-SCNC: 157 MMOL/L — HIGH (ref 135–145)
SODIUM SERPL-SCNC: 159 MMOL/L — HIGH (ref 135–145)
WBC # BLD: 14.61 K/UL — HIGH (ref 3.8–10.5)
WBC # FLD AUTO: 14.61 K/UL — HIGH (ref 3.8–10.5)

## 2023-01-20 PROCEDURE — 99232 SBSQ HOSP IP/OBS MODERATE 35: CPT | Mod: GC

## 2023-01-20 RX ORDER — INSULIN GLARGINE 100 [IU]/ML
6 INJECTION, SOLUTION SUBCUTANEOUS AT BEDTIME
Refills: 0 | Status: DISCONTINUED | OUTPATIENT
Start: 2023-01-20 | End: 2023-01-22

## 2023-01-20 RX ORDER — POTASSIUM CHLORIDE 20 MEQ
10 PACKET (EA) ORAL
Refills: 0 | Status: COMPLETED | OUTPATIENT
Start: 2023-01-20 | End: 2023-01-20

## 2023-01-20 RX ORDER — POTASSIUM PHOSPHATE, MONOBASIC POTASSIUM PHOSPHATE, DIBASIC 236; 224 MG/ML; MG/ML
15 INJECTION, SOLUTION INTRAVENOUS ONCE
Refills: 0 | Status: COMPLETED | OUTPATIENT
Start: 2023-01-20 | End: 2023-01-20

## 2023-01-20 RX ORDER — INSULIN LISPRO 100/ML
VIAL (ML) SUBCUTANEOUS
Refills: 0 | Status: DISCONTINUED | OUTPATIENT
Start: 2023-01-20 | End: 2023-01-21

## 2023-01-20 RX ORDER — SODIUM CHLORIDE 9 MG/ML
1000 INJECTION, SOLUTION INTRAVENOUS
Refills: 0 | Status: DISCONTINUED | OUTPATIENT
Start: 2023-01-20 | End: 2023-01-23

## 2023-01-20 RX ORDER — POTASSIUM CHLORIDE 20 MEQ
40 PACKET (EA) ORAL ONCE
Refills: 0 | Status: DISCONTINUED | OUTPATIENT
Start: 2023-01-20 | End: 2023-01-20

## 2023-01-20 RX ORDER — SODIUM CHLORIDE 9 MG/ML
1000 INJECTION, SOLUTION INTRAVENOUS
Refills: 0 | Status: DISCONTINUED | OUTPATIENT
Start: 2023-01-20 | End: 2023-01-21

## 2023-01-20 RX ADMIN — Medication 2: at 17:21

## 2023-01-20 RX ADMIN — Medication 100 MILLIEQUIVALENT(S): at 02:10

## 2023-01-20 RX ADMIN — Medication 100 MILLIEQUIVALENT(S): at 08:41

## 2023-01-20 RX ADMIN — SODIUM CHLORIDE 50 MILLILITER(S): 9 INJECTION, SOLUTION INTRAVENOUS at 07:40

## 2023-01-20 RX ADMIN — Medication 100 MILLIEQUIVALENT(S): at 08:09

## 2023-01-20 RX ADMIN — SODIUM CHLORIDE 75 MILLILITER(S): 9 INJECTION, SOLUTION INTRAVENOUS at 15:21

## 2023-01-20 RX ADMIN — Medication 100 MILLIEQUIVALENT(S): at 08:44

## 2023-01-20 RX ADMIN — INSULIN GLARGINE 6 UNIT(S): 100 INJECTION, SOLUTION SUBCUTANEOUS at 22:12

## 2023-01-20 RX ADMIN — Medication 1: at 07:40

## 2023-01-20 RX ADMIN — SODIUM CHLORIDE 75 MILLILITER(S): 9 INJECTION, SOLUTION INTRAVENOUS at 22:13

## 2023-01-20 RX ADMIN — POTASSIUM PHOSPHATE, MONOBASIC POTASSIUM PHOSPHATE, DIBASIC 62.5 MILLIMOLE(S): 236; 224 INJECTION, SOLUTION INTRAVENOUS at 06:06

## 2023-01-20 RX ADMIN — ENOXAPARIN SODIUM 40 MILLIGRAM(S): 100 INJECTION SUBCUTANEOUS at 05:00

## 2023-01-20 RX ADMIN — Medication 100 MILLIEQUIVALENT(S): at 00:12

## 2023-01-20 RX ADMIN — CEFTRIAXONE 100 MILLIGRAM(S): 500 INJECTION, POWDER, FOR SOLUTION INTRAMUSCULAR; INTRAVENOUS at 14:35

## 2023-01-20 RX ADMIN — SODIUM CHLORIDE 50 MILLILITER(S): 9 INJECTION, SOLUTION INTRAVENOUS at 05:29

## 2023-01-20 NOTE — PROGRESS NOTE ADULT - SUBJECTIVE AND OBJECTIVE BOX
ENDOCRINE FOLLOW UP     Chief Complaint:     History:     MEDICATIONS  (STANDING):  aspirin  chewable 81 milliGRAM(s) Oral daily  atorvastatin 80 milliGRAM(s) Oral at bedtime  cefTRIAXone   IVPB 1000 milliGRAM(s) IV Intermittent every 24 hours  clopidogrel Tablet 75 milliGRAM(s) Oral daily  dextrose 5%. 1000 milliLiter(s) (100 mL/Hr) IV Continuous <Continuous>  dextrose 5%. 1000 milliLiter(s) (50 mL/Hr) IV Continuous <Continuous>  dextrose 5%. 1000 milliLiter(s) (75 mL/Hr) IV Continuous <Continuous>  dextrose 50% Injectable 25 Gram(s) IV Push once  dextrose 50% Injectable 12.5 Gram(s) IV Push once  dextrose 50% Injectable 25 Gram(s) IV Push once  dextrose Oral Gel 15 Gram(s) Oral once  enoxaparin Injectable 40 milliGRAM(s) SubCutaneous every 24 hours  glucagon  Injectable 1 milliGRAM(s) IntraMuscular once  insulin lispro (ADMELOG) corrective regimen sliding scale   SubCutaneous at bedtime  insulin lispro (ADMELOG) corrective regimen sliding scale   SubCutaneous three times a day before meals    MEDICATIONS  (PRN):      Allergies    Benedryl Allergy Sinus (Hives)  penicillin (Rash)    Intolerances        ROS: All other systems reviewed and negative    PHYSICAL EXAM:  VITALS: T(C): 37.2 (01-20-23 @ 12:19)  T(F): 99 (01-20-23 @ 12:19), Max: 99 (01-19-23 @ 22:11)  HR: 93 (01-20-23 @ 12:19) (93 - 112)  BP: 145/51 (01-20-23 @ 12:19) (128/72 - 148/63)  RR:  (18 - 24)  SpO2:  (95% - 98%)  Wt(kg): --  GENERAL: NAD, resting comfortably   EYES: No proptosis,  anicteric  HEENT:  Atraumatic, Normocephalic, moist mucous membranes  RESPIRATORY: Nonlabored respirations on room air, normal rate/effort   CARDIOVASCULAR: Regular rate and rhythm; No murmurs  GI: Soft, nontender, non distended, normal bowel sounds  NEURO: AOx3, moves all extremities spontaenuously   PSYCH:  reactive affect, euthymic mood    POCT Blood Glucose.: 140 mg/dL (01-20-23 @ 12:28)  POCT Blood Glucose.: 159 mg/dL (01-20-23 @ 07:37) 1A  POCT Blood Glucose.: 179 mg/dL (01-20-23 @ 06:25)  POCT Blood Glucose.: 125 mg/dL (01-20-23 @ 01:29)  POCT Blood Glucose.: 251 mg/dL (01-19-23 @ 22:58) 3A  POCT Blood Glucose.: 297 mg/dL (01-19-23 @ 21:13) 3A  POCT Blood Glucose.: 322 mg/dL (01-19-23 @ 18:23) lantus 5 units   POCT Blood Glucose.: 358 mg/dL (01-19-23 @ 15:58)  POCT Blood Glucose.: 387 mg/dL (01-19-23 @ 13:34)  POCT Blood Glucose.: 397 mg/dL (01-19-23 @ 12:10)    eMAR:  insulin glargine Injectable (LANTUS)   5 Unit(s) SubCutaneous (01-19-23 @ 18:25)    insulin lispro (ADMELOG) corrective regimen sliding scale   1 Unit(s) SubCutaneous (01-20-23 @ 07:40)    insulin lispro (ADMELOG) corrective regimen sliding scale   3 Unit(s) SubCutaneous (01-19-23 @ 23:04)    insulin lispro Injectable (ADMELOG)   3 Unit(s) SubCutaneous (01-19-23 @ 21:44)      01-20    156<H>  |  124<H>  |  28<H>  ----------------------------<  178<H>  3.1<L>   |  20<L>  |  0.71    eGFR: 86    Ca    8.6      01-20  Mg     2.2     01-20  Phos  1.8     01-20    TPro  5.9<L>  /  Alb  2.2<L>  /  TBili  0.4  /  DBili  x   /  AST  12  /  ALT  9<L>  /  AlkPhos  111  01-20      A1C with Estimated Average Glucose Result: 9.4 % (01-19-23 @ 13:11)  A1C with Estimated Average Glucose Result: 8.6 % (01-07-23 @ 05:40)       ENDOCRINE FOLLOW UP     Chief Complaint: DM    History: Patient seen this afternoon, sleeping in bed. Daughter at bedside. Reports patient has been eating a little today. Did not finish her lunch tray but ate maybe half her soup she says. No other concerns or complaints from daughter.     MEDICATIONS  (STANDING):  aspirin  chewable 81 milliGRAM(s) Oral daily  atorvastatin 80 milliGRAM(s) Oral at bedtime  cefTRIAXone   IVPB 1000 milliGRAM(s) IV Intermittent every 24 hours  clopidogrel Tablet 75 milliGRAM(s) Oral daily  dextrose 5%. 1000 milliLiter(s) (100 mL/Hr) IV Continuous <Continuous>  dextrose 5%. 1000 milliLiter(s) (50 mL/Hr) IV Continuous <Continuous>  dextrose 5%. 1000 milliLiter(s) (75 mL/Hr) IV Continuous <Continuous>  dextrose 50% Injectable 25 Gram(s) IV Push once  dextrose 50% Injectable 12.5 Gram(s) IV Push once  dextrose 50% Injectable 25 Gram(s) IV Push once  dextrose Oral Gel 15 Gram(s) Oral once  enoxaparin Injectable 40 milliGRAM(s) SubCutaneous every 24 hours  glucagon  Injectable 1 milliGRAM(s) IntraMuscular once  insulin lispro (ADMELOG) corrective regimen sliding scale   SubCutaneous at bedtime  insulin lispro (ADMELOG) corrective regimen sliding scale   SubCutaneous three times a day before meals    MEDICATIONS  (PRN):      Allergies    Benedryl Allergy Sinus (Hives)  penicillin (Rash)    Intolerances        ROS: unable to obtain     PHYSICAL EXAM:  VITALS: T(C): 37.2 (01-20-23 @ 12:19)  T(F): 99 (01-20-23 @ 12:19), Max: 99 (01-19-23 @ 22:11)  HR: 93 (01-20-23 @ 12:19) (93 - 112)  BP: 145/51 (01-20-23 @ 12:19) (128/72 - 148/63)  RR:  (18 - 24)  SpO2:  (95% - 98%)  Wt(kg): --  GENERAL: NAD, resting comfortably   HEENT:  Atraumatic, Normocephalic, moist mucous membranes  RESPIRATORY: Nonlabored respirations on room air, normal rate/effort   CARDIOVASCULAR: Regular rate and rhythm  GI: Soft, nontender, non distended    POCT Blood Glucose.: 140 mg/dL (01-20-23 @ 12:28)  POCT Blood Glucose.: 159 mg/dL (01-20-23 @ 07:37) 1A  POCT Blood Glucose.: 179 mg/dL (01-20-23 @ 06:25)  POCT Blood Glucose.: 125 mg/dL (01-20-23 @ 01:29)  POCT Blood Glucose.: 251 mg/dL (01-19-23 @ 22:58) 3A  POCT Blood Glucose.: 297 mg/dL (01-19-23 @ 21:13) 3A  POCT Blood Glucose.: 322 mg/dL (01-19-23 @ 18:23) lantus 5 units   POCT Blood Glucose.: 358 mg/dL (01-19-23 @ 15:58)  POCT Blood Glucose.: 387 mg/dL (01-19-23 @ 13:34)  POCT Blood Glucose.: 397 mg/dL (01-19-23 @ 12:10)    eMAR:  insulin glargine Injectable (LANTUS)   5 Unit(s) SubCutaneous (01-19-23 @ 18:25)    insulin lispro (ADMELOG) corrective regimen sliding scale   1 Unit(s) SubCutaneous (01-20-23 @ 07:40)    insulin lispro (ADMELOG) corrective regimen sliding scale   3 Unit(s) SubCutaneous (01-19-23 @ 23:04)    insulin lispro Injectable (ADMELOG)   3 Unit(s) SubCutaneous (01-19-23 @ 21:44)      01-20    156<H>  |  124<H>  |  28<H>  ----------------------------<  178<H>  3.1<L>   |  20<L>  |  0.71    eGFR: 86    Ca    8.6      01-20  Mg     2.2     01-20  Phos  1.8     01-20    TPro  5.9<L>  /  Alb  2.2<L>  /  TBili  0.4  /  DBili  x   /  AST  12  /  ALT  9<L>  /  AlkPhos  111  01-20      A1C with Estimated Average Glucose Result: 9.4 % (01-19-23 @ 13:11)  A1C with Estimated Average Glucose Result: 8.6 % (01-07-23 @ 05:40)

## 2023-01-20 NOTE — PROVIDER CONTACT NOTE (CRITICAL VALUE NOTIFICATION) - NAME OF MD/NP/PA/DO NOTIFIED:
[facility] [place] [facility]

## 2023-01-20 NOTE — CONSULT NOTE ADULT - ASSESSMENT
78F with dementia, T2DM, recently discharged about 1 week ago for CVA now presenting with poor PO intake, lethargy and hyperglycemia to 500s.   Of note, patient admitted 1/6 for R facial droop, word finding difficulties. Imaging concerning for acute L MCA infarct.  Per family, since stroke patient nonverbal/unable to participate in meaningful communications, intermittently follows commands. For the last 3-4 days, patient with poor po intake.    In ED, afebrile, , 135/84. WBC 15, Na 158, K 3.0, Cl 120, Glu 468, alk phos 136m BHB 0.7. pH 7.36, lactate 2.2.   UA: +leuk esterase, many bacteria, WBC 11-25  CXR: Redemonstrated biapical scarring and left upper lobe bronchiectasis, unchanged. No focal consolidation.   CTH No acute intracranial hemorrhage. 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  Given 1.5L NS, ceftriaxone x1, haldol 2.5mg x1 in ED.   last admission: CTA with L MCA severe stenosis; distal L M2 occlusion .  TTE 1/8/23: EF 63% Mild mitral regurgitation ; A1c 9.4     Impression  1) AMS likely 2/2 infection/UTI toxic metabolic encephalopahthy   2) recent stroke - L MCA infarct 2/2 symptomatic L MCA ICAD ; worsening of R HP in setting of infection     - treatment of infection per primary team -->  CTX  - for secondary stroke prevention. DAPT x 3 months followeed by ASA 81mg dialy thereafter.   - hihg dose statin lipitor 40mg dialy   - avoid hypotension given L MCA ICAD   - telemetry  - PT/OT/SS/SLP, OOBC  - check FS, glucose control <180  - GI/DVT ppx  - Counseling on diet, exercise, and medication adherence was done  - Counseling on smoking cessation and alcohol consumption offered when appropriate.  - Pain assessed and judicious use of narcotics when appropriate was discussed.    - Stroke education given when appropriate.  - Importance of fall prevention discussed.   - Differential diagnosis and plan of care discussed with patient and/or family and primary team  - Thank you for allowing me to participate in the care of this patient. Call with questions.   - spoke with daughter at bedside   Reji Greco MD  Vascular Neurology  Office: [phone]  78F with dementia, T2DM, recently discharged about 1 week ago for CVA now presenting with poor PO intake, lethargy and hyperglycemia to 500s.   Of note, patient admitted 1/6 for R facial droop, word finding difficulties. Imaging concerning for acute L MCA infarct.  Per family, since stroke patient nonverbal/unable to participate in meaningful communications, intermittently follows commands. For the last 3-4 days, patient with poor po intake.    In ED, afebrile, , 135/84. WBC 15, Na 158, K 3.0, Cl 120, Glu 468, alk phos 136m BHB 0.7. pH 7.36, lactate 2.2.   UA: +leuk esterase, many bacteria, WBC 11-25  CXR: Redemonstrated biapical scarring and left upper lobe bronchiectasis, unchanged. No focal consolidation.   CTH No acute intracranial hemorrhage. 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  Given 1.5L NS, ceftriaxone x1, haldol 2.5mg x1 in ED.   last admission: CTA with L MCA severe stenosis; distal L M2 occlusion .  TTE 1/8/23: EF 63% Mild mitral regurgitation ; A1c 9.4     Impression  1) AMS likely 2/2 infection/UTI toxic metabolic encephalopahthy   2) recent stroke - L MCA infarct 2/2 symptomatic L MCA ICAD ; worsening of R HP in setting of infection     - treatment of infection per primary team -->  CTX  - for secondary stroke prevention. DAPT x 3 months followeed by ASA 81mg dialy thereafter.   - hihg dose statin lipitor 40mg dialy   - avoid hypotension given L MCA ICAD   - telemetry  - PT/OT/SS/SLP, OOBC  - check FS, glucose control <180  - GI/DVT ppx  - Counseling on diet, exercise, and medication adherence was done  - Counseling on smoking cessation and alcohol consumption offered when appropriate.  - Pain assessed and judicious use of narcotics when appropriate was discussed.    - Stroke education given when appropriate.  - Importance of fall prevention discussed.   - Differential diagnosis and plan of care discussed with patient and/or family and primary team  - Thank you for allowing me to participate in the care of this patient. Call with questions.   - spoke with daughter at bedside   Reji Greco MD  Vascular Neurology  Office: [phone]  78F with dementia, T2DM, recently discharged about 1 week ago for CVA now presenting with poor PO intake, lethargy and hyperglycemia to 500s.   Of note, patient admitted 1/6 for R facial droop, word finding difficulties. Imaging concerning for acute L MCA infarct.  Per family, since stroke patient nonverbal/unable to participate in meaningful communications, intermittently follows commands. For the last 3-4 days, patient with poor po intake.    In ED, afebrile, , 135/84. WBC 15, Na 158, K 3.0, Cl 120, Glu 468, alk phos 136m BHB 0.7. pH 7.36, lactate 2.2.   UA: +leuk esterase, many bacteria, WBC 11-25  CXR: Redemonstrated biapical scarring and left upper lobe bronchiectasis, unchanged. No focal consolidation.   CTH No acute intracranial hemorrhage. 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  Given 1.5L NS, ceftriaxone x1, haldol 2.5mg x1 in ED.   last admission: CTA with L MCA severe stenosis; distal L M2 occlusion .  TTE 1/8/23: EF 63% Mild mitral regurgitation ; A1c 9.4     Impression  1) AMS likely 2/2 infection/UTI toxic metabolic encephalopahthy   2) recent stroke - L MCA infarct 2/2 symptomatic L MCA ICAD ; worsening of R HP in setting of infection     - treatment of infection per primary team -->  CTX  - for secondary stroke prevention. DAPT x 3 months followeed by ASA 81mg dialy thereafter.   - hihg dose statin lipitor 40mg dialy   - avoid hypotension given L MCA ICAD   - telemetry  - PT/OT/SS/SLP, OOBC  - check FS, glucose control <180  - GI/DVT ppx  - Counseling on diet, exercise, and medication adherence was done  - Counseling on smoking cessation and alcohol consumption offered when appropriate.  - Pain assessed and judicious use of narcotics when appropriate was discussed.    - Stroke education given when appropriate.  - Importance of fall prevention discussed.   - Differential diagnosis and plan of care discussed with patient and/or family and primary team  - Thank you for allowing me to participate in the care of this patient. Call with questions.   - spoke with daughter at bedside   Reji Greco MD  Vascular Neurology  Office: [phone]  78F with dementia, T2DM, recently discharged about 1 week ago for CVA now presenting with poor PO intake, lethargy and hyperglycemia to 500s.   Of note, patient admitted 1/6 for R facial droop, word finding difficulties. Imaging concerning for acute L MCA infarct.  Per family, since stroke patient nonverbal/unable to participate in meaningful communications, intermittently follows commands. For the last 3-4 days, patient with poor po intake.    In ED, afebrile, , 135/84. WBC 15, Na 158, K 3.0, Cl 120, Glu 468, alk phos 136m BHB 0.7. pH 7.36, lactate 2.2.   UA: +leuk esterase, many bacteria, WBC 11-25  CXR: Redemonstrated biapical scarring and left upper lobe bronchiectasis, unchanged. No focal consolidation.   CTH No acute intracranial hemorrhage. 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  Given 1.5L NS, ceftriaxone x1, haldol 2.5mg x1 in ED.   last admission: CTA with L MCA severe stenosis; distal L M2 occlusion .  TTE 1/8/23: EF 63% Mild mitral regurgitation ; A1c 9.4   NIHSS ~21  premrs4  Impression  1) AMS likely 2/2 infection/UTI toxic metabolic encephalopahthy   2) recent stroke - L MCA infarct 2/2 symptomatic L MCA ICAD ; worsening of R HP in setting of infection     - treatment of infection per primary team -->  CTX  - for secondary stroke prevention. DAPT x 3 months followeed by ASA 81mg dialy thereafter.   - hihg dose statin lipitor 40mg dialy   - avoid hypotension given L MCA ICAD   - telemetry  - PT/OT/SS/SLP, OOBC  - check FS, glucose control <180  - GI/DVT ppx  - Counseling on diet, exercise, and medication adherence was done  - Counseling on smoking cessation and alcohol consumption offered when appropriate.  - Pain assessed and judicious use of narcotics when appropriate was discussed.    - Stroke education given when appropriate.  - Importance of fall prevention discussed.   - Differential diagnosis and plan of care discussed with patient and/or family and primary team  - Thank you for allowing me to participate in the care of this patient. Call with questions.   - spoke with daughter at bedside   Reji Greco MD  Vascular Neurology  Office: [phone]  78F with dementia, T2DM, recently discharged about 1 week ago for CVA now presenting with poor PO intake, lethargy and hyperglycemia to 500s.   Of note, patient admitted 1/6 for R facial droop, word finding difficulties. Imaging concerning for acute L MCA infarct.  Per family, since stroke patient nonverbal/unable to participate in meaningful communications, intermittently follows commands. For the last 3-4 days, patient with poor po intake.    In ED, afebrile, , 135/84. WBC 15, Na 158, K 3.0, Cl 120, Glu 468, alk phos 136m BHB 0.7. pH 7.36, lactate 2.2.   UA: +leuk esterase, many bacteria, WBC 11-25  CXR: Redemonstrated biapical scarring and left upper lobe bronchiectasis, unchanged. No focal consolidation.   CTH No acute intracranial hemorrhage. 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  Given 1.5L NS, ceftriaxone x1, haldol 2.5mg x1 in ED.   last admission: CTA with L MCA severe stenosis; distal L M2 occlusion .  TTE 1/8/23: EF 63% Mild mitral regurgitation ; A1c 9.4   NIHSS ~21  premrs4  Impression  1) AMS likely 2/2 infection/UTI toxic metabolic encephalopahthy   2) recent stroke - L MCA infarct 2/2 symptomatic L MCA ICAD ; worsening of R HP in setting of infection     - treatment of infection per primary team -->  CTX  - for secondary stroke prevention. DAPT x 3 months followeed by ASA 81mg dialy thereafter.   - hihg dose statin lipitor 40mg dialy   - avoid hypotension given L MCA ICAD   - telemetry  - PT/OT/SS/SLP, OOBC  - check FS, glucose control <180  - GI/DVT ppx  - Counseling on diet, exercise, and medication adherence was done  - Counseling on smoking cessation and alcohol consumption offered when appropriate.  - Pain assessed and judicious use of narcotics when appropriate was discussed.    - Stroke education given when appropriate.  - Importance of fall prevention discussed.   - Differential diagnosis and plan of care discussed with patient and/or family and primary team  - Thank you for allowing me to participate in the care of this patient. Call with questions.   - spoke with daughter at bedside   Reji Greco MD  Vascular Neurology  Office: [phone]  78F with dementia, T2DM, recently discharged about 1 week ago for CVA now presenting with poor PO intake, lethargy and hyperglycemia to 500s.   Of note, patient admitted 1/6 for R facial droop, word finding difficulties. Imaging concerning for acute L MCA infarct.  Per family, since stroke patient nonverbal/unable to participate in meaningful communications, intermittently follows commands. For the last 3-4 days, patient with poor po intake.    In ED, afebrile, , 135/84. WBC 15, Na 158, K 3.0, Cl 120, Glu 468, alk phos 136m BHB 0.7. pH 7.36, lactate 2.2.   UA: +leuk esterase, many bacteria, WBC 11-25  CXR: Redemonstrated biapical scarring and left upper lobe bronchiectasis, unchanged. No focal consolidation.   CTH No acute intracranial hemorrhage. 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  Given 1.5L NS, ceftriaxone x1, haldol 2.5mg x1 in ED.   last admission: CTA with L MCA severe stenosis; distal L M2 occlusion .  TTE 1/8/23: EF 63% Mild mitral regurgitation ; A1c 9.4   NIHSS ~21  premrs4  Impression  1) AMS likely 2/2 infection/UTI toxic metabolic encephalopahthy   2) recent stroke - L MCA infarct 2/2 symptomatic L MCA ICAD ; worsening of R HP in setting of infection     - treatment of infection per primary team -->  CTX  - for secondary stroke prevention. DAPT x 3 months followeed by ASA 81mg dialy thereafter.   - hihg dose statin lipitor 40mg dialy   - avoid hypotension given L MCA ICAD   - telemetry  - PT/OT/SS/SLP, OOBC  - check FS, glucose control <180  - GI/DVT ppx  - Counseling on diet, exercise, and medication adherence was done  - Counseling on smoking cessation and alcohol consumption offered when appropriate.  - Pain assessed and judicious use of narcotics when appropriate was discussed.    - Stroke education given when appropriate.  - Importance of fall prevention discussed.   - Differential diagnosis and plan of care discussed with patient and/or family and primary team  - Thank you for allowing me to participate in the care of this patient. Call with questions.   - spoke with daughter at bedside   Reji Greco MD  Vascular Neurology  Office: [phone]

## 2023-01-20 NOTE — PROGRESS NOTE ADULT - ASSESSMENT
78 F with recent T2DM diagnosis and CVA (d/c 1 week ago), presenting with glucoses 500s.     1.  Uncontrolled T2DM, c/b CVA  A1c 8.6%. Goal < 8%  D/c 1 week ago with Metformin 500 mg BID. Only required scales (1-3 units while inpatient).   Has been taking 500 mg daily due to poor PO intake.   Now with glucose 500s, improved to mid 300s with fluids. Also with mild ketosis (AG 17, bicarb 21, BHB 0.7)    Plan:   -Start Lantus 5 units sc QHS   -Continue Admelog low dose correctional scale before each meal and low dose correctional scale at bedtime. Hold standing Admelog premeal   -FOllow up antibodies, given thin body habitus and ketosis (Glutamic Acid Decarboxylase, Zinc Transporter 8, Islet Cell Ab, and IA-2 Ab)  -FS qAC and qHS  -BG goal 100 to 180mg/dL   -Hypoglycemia protocol   -Carb consistent diet     For d/c:   -Metformin 500 mg BID for 1 week then increase to 1000 mg BID. Check lactate prior to d/c  -May need low dose basal insulin (came in with glucose 500s, mild BHB elevation)  -Consider DPP4 inhibitor  -Patient can follow up at [address] [facility], [address], [place] [address] [phone]  -Patient will need opthalmology and podiatry follow up as outpatient     2. HTN  - BP goal 130/80  - Manage per primary team     3. HLD  - Continue with atorvastatin 80 mg daily   -   - Manage as outpatient      Hortencia Rodriguez DO   Endocrine Fellow  For follow up questions, discharge recommendations, or new consults please call answering service at [phone] (weekdays), [phone] (nights/weekends). For nonurgent matters, please email [email or web address] or [email or web address]    78 F with recent T2DM diagnosis and CVA (d/c 1 week ago), presenting with glucoses 500s.     1.  Uncontrolled T2DM, c/b CVA  A1c 8.6%. Goal < 8%  D/c 1 week ago with Metformin 500 mg BID. Only required scales (1-3 units while inpatient).   Has been taking 500 mg daily due to poor PO intake.   Now with glucose 500s, improved to mid 300s with fluids. Also with mild ketosis (AG 17, bicarb 21, BHB 0.7)    Plan:   -Start Lantus 5 units sc QHS   -Continue Admelog low dose correctional scale before each meal and low dose correctional scale at bedtime. Hold standing Admelog premeal   -FOllow up antibodies, given thin body habitus and ketosis (Glutamic Acid Decarboxylase, Zinc Transporter 8, Islet Cell Ab, and IA-2 Ab)  -FS qAC and qHS  -BG goal 100 to 180mg/dL   -Hypoglycemia protocol   -Carb consistent diet     For d/c:   -Metformin 500 mg BID for 1 week then increase to 1000 mg BID. Check lactate prior to d/c  -May need low dose basal insulin (came in with glucose 500s, mild BHB elevation)  -Consider DPP4 inhibitor  -Patient can follow up at [address] [facility], [address], [place] [address] [phone]  -Patient will need opthalmology and podiatry follow up as outpatient     2. HTN  - BP goal 130/80  - Manage per primary team     3. HLD  - Continue with atorvastatin 80 mg daily   -   - Manage as outpatient      Hortencia Rodriguez DO   Endocrine Fellow  For follow up questions, discharge recommendations, or new consults please call answering service at [phone] (weekdays), [phone] (nights/weekends). For nonurgent matters, please email [email or web address] or [email or web address]    78 F with recent T2DM diagnosis and CVA (d/c 1 week ago), presenting with glucoses 500s.     1.  Uncontrolled T2DM, c/b CVA  A1c 8.6%. Goal < 8%  D/c 1 week ago with Metformin 500 mg BID. Only required scales (1-3 units while inpatient).   Has been taking 500 mg daily due to poor PO intake.   Now with glucose 500s, improved to mid 300s with fluids. Also with mild ketosis (AG 17, bicarb 21, BHB 0.7)    Plan:   -Start Lantus 5 units sc QHS   -Continue Admelog low dose correctional scale before each meal and low dose correctional scale at bedtime. Hold standing Admelog premeal   -FOllow up antibodies, given thin body habitus and ketosis (Glutamic Acid Decarboxylase, Zinc Transporter 8, Islet Cell Ab, and IA-2 Ab)  -FS qAC and qHS  -BG goal 100 to 180mg/dL   -Hypoglycemia protocol   -Carb consistent diet     For d/c:   -Metformin 500 mg BID for 1 week then increase to 1000 mg BID. Check lactate prior to d/c  -May need low dose basal insulin (came in with glucose 500s, mild BHB elevation)  -Consider DPP4 inhibitor  -Patient can follow up at [address] [facility], [address], [place] [address] [phone]  -Patient will need opthalmology and podiatry follow up as outpatient     2. HTN  - BP goal 130/80  - Manage per primary team     3. HLD  - Continue with atorvastatin 80 mg daily   -   - Manage as outpatient      Hortencia Rodriguez DO   Endocrine Fellow  For follow up questions, discharge recommendations, or new consults please call answering service at [phone] (weekdays), [phone] (nights/weekends). For nonurgent matters, please email [email or web address] or [email or web address]    78 F with recent T2DM diagnosis and CVA (d/c 1 week ago), presenting with glucoses 500s, poor po intake and lethargy. Endocrine consulted for assistance with management of uncontrolled, recently dx DM.     1.  Uncontrolled T2DM, c/b CVA  A1c 8.6%. Goal < 8%  D/c 1 week ago with Metformin 500 mg BID. Only required scales (1-3 units while inpatient).   Has been taking 500 mg daily due to poor PO intake.   Now with glucose 500s, improved to mid 300s with fluids. Also with mild ketosis (AG 17, bicarb 21, BHB 0.7)    Plan:   -Start Lantus 5 units sc QHS   -Continue Admelog low dose correctional scale before each meal and low dose correctional scale at bedtime. Hold standing Admelog premeal   -FOllow up antibodies, given thin body habitus and ketosis (Glutamic Acid Decarboxylase, Zinc Transporter 8, Islet Cell Ab, and IA-2 Ab)  -FS qAC and qHS  -BG goal 100 to 180mg/dL   -Hypoglycemia protocol   -Carb consistent diet     For d/c:   -Metformin 500 mg BID for 1 week then increase to 1000 mg BID. Check lactate prior to d/c  -May need low dose basal insulin (came in with glucose 500s, mild BHB elevation)  -Consider DPP4 inhibitor  -Patient can follow up at [address] [facility], [address], [place] [address] [phone]  -Patient will need opthalmology and podiatry follow up as outpatient     2. HTN  - BP goal 130/80  - Manage per primary team     3. HLD  - Continue with atorvastatin 80 mg daily   -   - Manage as outpatient      Hortencia Rodriguez DO   Endocrine Fellow  For follow up questions, discharge recommendations, or new consults please call answering service at [phone] (weekdays), [phone] (nights/weekends). For nonurgent matters, please email [email or web address] or [email or web address]    78 F with recent T2DM diagnosis and CVA (d/c 1 week ago), presenting with glucoses 500s, poor po intake and lethargy. Endocrine consulted for assistance with management of uncontrolled, recently dx DM.     1.  Uncontrolled T2DM, c/b CVA  A1c 8.6%. Goal < 8%  D/c 1 week ago with Metformin 500 mg BID. Only required scales (1-3 units while inpatient).   Has been taking 500 mg daily due to poor PO intake.   Now with glucose 500s, improved to mid 300s with fluids. Also with mild ketosis (AG 17, bicarb 21, BHB 0.7)    Plan:   -Start Lantus 5 units sc QHS   -Continue Admelog low dose correctional scale before each meal and low dose correctional scale at bedtime. Hold standing Admelog premeal   -FOllow up antibodies, given thin body habitus and ketosis (Glutamic Acid Decarboxylase, Zinc Transporter 8, Islet Cell Ab, and IA-2 Ab)  -FS qAC and qHS  -BG goal 100 to 180mg/dL   -Hypoglycemia protocol   -Carb consistent diet     For d/c:   -Metformin 500 mg BID for 1 week then increase to 1000 mg BID. Check lactate prior to d/c  -May need low dose basal insulin (came in with glucose 500s, mild BHB elevation)  -Consider DPP4 inhibitor  -Patient can follow up at [address] [facility], [address], [place] [address] [phone]  -Patient will need opthalmology and podiatry follow up as outpatient     2. HTN  - BP goal 130/80  - Manage per primary team     3. HLD  - Continue with atorvastatin 80 mg daily   -   - Manage as outpatient      Hortencia Rodriguez DO   Endocrine Fellow  For follow up questions, discharge recommendations, or new consults please call answering service at [phone] (weekdays), [phone] (nights/weekends). For nonurgent matters, please email [email or web address] or [email or web address]    78 F with recent T2DM diagnosis and CVA (d/c 1 week ago), presenting with glucoses 500s, poor po intake and lethargy. Endocrine consulted for assistance with management of uncontrolled, recently dx DM.     1.  Uncontrolled T2DM, c/b CVA  A1c 8.6%. Goal < 8%  D/c 1 week ago with Metformin 500 mg BID. Only required scales (1-3 units while inpatient).   Has been taking 500 mg daily due to poor PO intake.   Now with glucose 500s, improved to mid 300s with fluids. Also with mild ketosis (AG 17, bicarb 21, BHB 0.7)    Plan:   -Start Lantus 5 units sc QHS   -Continue Admelog low dose correctional scale before each meal and low dose correctional scale at bedtime. Hold standing Admelog premeal   -FOllow up antibodies, given thin body habitus and ketosis (Glutamic Acid Decarboxylase, Zinc Transporter 8, Islet Cell Ab, and IA-2 Ab)  -FS qAC and qHS  -BG goal 100 to 180mg/dL   -Hypoglycemia protocol   -Carb consistent diet     For d/c:   -Metformin 500 mg BID for 1 week then increase to 1000 mg BID. Check lactate prior to d/c  -May need low dose basal insulin (came in with glucose 500s, mild BHB elevation)  -Consider DPP4 inhibitor  -Patient can follow up at [address] [facility], [address], [place] [address] [phone]  -Patient will need opthalmology and podiatry follow up as outpatient     2. HTN  - BP goal 130/80  - Manage per primary team     3. HLD  - Continue with atorvastatin 80 mg daily   -   - Manage as outpatient      Hortencia Rodriguez DO   Endocrine Fellow  For follow up questions, discharge recommendations, or new consults please call answering service at [phone] (weekdays), [phone] (nights/weekends). For nonurgent matters, please email [email or web address] or [email or web address]    78 F with recent T2DM diagnosis and CVA (d/c 1 week ago), presenting with glucoses 500s, poor po intake and lethargy. Endocrine consulted for assistance with management of uncontrolled, recently dx DM.     1.  Uncontrolled T2DM, c/b CVA  A1c 8.6%. Goal < 8%  D/c 1 week ago with Metformin 500 mg BID. Only required scales (1-3 units while inpatient).   Has been taking 500 mg daily due to poor PO intake.   Now with glucose 500s, improved to mid 300s with fluids. Also with mild ketosis (AG 17, bicarb 21, BHB 0.7)    Plan:   -Start Lantus 6 units sc QHS   -Continue Admelog low dose correctional scale before each meal and low dose correctional scale at bedtime. Hold standing Admelog premeal   -FOllow up antibodies, given thin body habitus and ketosis (Glutamic Acid Decarboxylase, Zinc Transporter 8, Islet Cell Ab, and IA-2 Ab)  -FS q6  -BG goal 100 to 180mg/dL   -Hypoglycemia protocol   -Carb consistent diet     For d/c:   -Metformin 500 mg BID for 1 week then increase to 1000 mg BID. Check lactate prior to d/c  -May need low dose basal insulin (came in with glucose 500s, mild BHB elevation)  -Consider DPP4 inhibitor  -Patient can follow up at [address] [facility], [address], [place] [address] [phone]  -Patient will need opthalmology and podiatry follow up as outpatient     2. HTN  - BP goal 130/80  - Manage per primary team     3. HLD  - Continue with atorvastatin 80 mg daily   -   - Manage as outpatient      Hortencia Rodriguez DO   Endocrine Fellow  For follow up questions, discharge recommendations, or new consults please call answering service at [phone] (weekdays), [phone] (nights/weekends). For nonurgent matters, please email [email or web address] or [email or web address]    78 F with recent T2DM diagnosis and CVA (d/c 1 week ago), presenting with glucoses 500s, poor po intake and lethargy. Endocrine consulted for assistance with management of uncontrolled, recently dx DM.     1.  Uncontrolled T2DM, c/b CVA  A1c 8.6%. Goal < 8%  D/c 1 week ago with Metformin 500 mg BID. Only required scales (1-3 units while inpatient).   Has been taking 500 mg daily due to poor PO intake.   Now with glucose 500s, improved to mid 300s with fluids. Also with mild ketosis (AG 17, bicarb 21, BHB 0.7)    Plan:   -Start Lantus 6 units sc QHS   -Continue Admelog low dose correctional scale before each meal and low dose correctional scale at bedtime. Hold standing Admelog premeal   -FOllow up antibodies, given thin body habitus and ketosis (Glutamic Acid Decarboxylase, Zinc Transporter 8, Islet Cell Ab, and IA-2 Ab)  -FS q6  -BG goal 100 to 180mg/dL   -Hypoglycemia protocol   -Carb consistent diet     For d/c:   -Metformin 500 mg BID for 1 week then increase to 1000 mg BID. Check lactate prior to d/c  -May need low dose basal insulin (came in with glucose 500s, mild BHB elevation)  -Consider DPP4 inhibitor  -Patient can follow up at [address] [facility], [address], [place] [address] [phone]  -Patient will need opthalmology and podiatry follow up as outpatient     2. HTN  - BP goal 130/80  - Manage per primary team     3. HLD  - Continue with atorvastatin 80 mg daily   -   - Manage as outpatient      Hortencia Rodriguez DO   Endocrine Fellow  For follow up questions, discharge recommendations, or new consults please call answering service at [phone] (weekdays), [phone] (nights/weekends). For nonurgent matters, please email [email or web address] or [email or web address]    78 F with recent T2DM diagnosis and CVA (d/c 1 week ago), presenting with glucoses 500s, poor po intake and lethargy. Endocrine consulted for assistance with management of uncontrolled, recently dx DM.     1.  Uncontrolled T2DM, c/b CVA  A1c 8.6%. Goal < 8%  D/c 1 week ago with Metformin 500 mg BID. Only required scales (1-3 units while inpatient).   Has been taking 500 mg daily due to poor PO intake.   Now with glucose 500s, improved to mid 300s with fluids. Also with mild ketosis (AG 17, bicarb 21, BHB 0.7)    Plan:   -Start Lantus 6 units sc QHS   -Continue Admelog low dose correctional scale before each meal and low dose correctional scale at bedtime. Hold standing Admelog premeal   -FOllow up antibodies, given thin body habitus and ketosis (Glutamic Acid Decarboxylase, Zinc Transporter 8, Islet Cell Ab, and IA-2 Ab)  -FS q6  -BG goal 100 to 180mg/dL   -Hypoglycemia protocol   -Carb consistent diet     For d/c:   -Metformin 500 mg BID for 1 week then increase to 1000 mg BID. Check lactate prior to d/c  -May need low dose basal insulin (came in with glucose 500s, mild BHB elevation)  -Consider DPP4 inhibitor  -Patient can follow up at [address] [facility], [address], [place] [address] [phone]  -Patient will need opthalmology and podiatry follow up as outpatient     2. HTN  - BP goal 130/80  - Manage per primary team     3. HLD  - Continue with atorvastatin 80 mg daily   -   - Manage as outpatient      Hortencia Rodriguez DO   Endocrine Fellow  For follow up questions, discharge recommendations, or new consults please call answering service at [phone] (weekdays), [phone] (nights/weekends). For nonurgent matters, please email [email or web address] or [email or web address]

## 2023-01-20 NOTE — PROVIDER CONTACT NOTE (CRITICAL VALUE NOTIFICATION) - BACKGROUND
Admitted for elevated blood sugar, UTI,  History of CVA, Dementia, Hypertension
Admitted for elevated blood sugar, UTI,  History of CVA, Dementia, Hypertension.

## 2023-01-20 NOTE — PROVIDER CONTACT NOTE (CRITICAL VALUE NOTIFICATION) - TEST AND RESULT REPORTED:
[facility]

## 2023-01-20 NOTE — CONSULT NOTE ADULT - SUBJECTIVE AND OBJECTIVE BOX
Neurology Consult    Reason for Consult: Patient is a 78y old  Female who presents with a chief complaint of AMS, elevated finger sticks (2023 17:00)      HPI:  78F with dementia, T2DM, recently discharged about 1 week ago for CVA now presenting with poor PO intake, lethargy and hyperglycemia to 500s.   Of note, patient admitted  for R facial droop, word finding difficulties. Imaging concerning for acute L MCA infarct. Discharged home on . Majority of history obtained through family given mental status. Per family, since stroke patient nonverbal/unable to participate in meaningful communications, intermittently follows commands. For the last 3-4 days, patient with poor po intake. Reportedly only eating breakfast. Day of presentation, more lethargic with elevated finger sticks up to the 500s. Patient was evaluated by endocrine team during prior admission with recs to increase metformin to 1000mg BID. However, given poor PO intake, family has been giving 500mg daily.     In ED, afebrile, , 135/84. WBC 15, Na 158, K 3.0, Cl 120, Glu 468, alk phos 136m BHB 0.7. pH 7.36, lactate 2.2. UA: +leuk esterase, many bacteria, WBC 11-25  CXR: Redemonstrated biapical scarring and left upper lobe bronchiectasis, unchanged. No focal consolidation.   CTH No acute intracranial hemorrhage. 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  Given 1.5L NS, ceftriaxone x1, haldol 2.5mg x1 in ED.          (2023 17:00)       PAST MEDICAL & SURGICAL HISTORY:  Dementia      CVA (cerebrovascular accident)      DM (diabetes mellitus)      No significant past surgical history          Allergies: Allergies    Benedryl Allergy Sinus (Hives)  penicillin (Rash)    Intolerances        Social History: Denies toxic habits including tobacco, ETOH or illicit drugs.    Family History: FAMILY HISTORY:  No pertinent family history in first degree relatives    . No family history of strokes    Medications: MEDICATIONS  (STANDING):  aspirin  chewable 81 milliGRAM(s) Oral daily  atorvastatin 80 milliGRAM(s) Oral at bedtime  cefTRIAXone   IVPB 1000 milliGRAM(s) IV Intermittent every 24 hours  clopidogrel Tablet 75 milliGRAM(s) Oral daily  dextrose 5%. 1000 milliLiter(s) (100 mL/Hr) IV Continuous <Continuous>  dextrose 5%. 1000 milliLiter(s) (50 mL/Hr) IV Continuous <Continuous>  dextrose 50% Injectable 25 Gram(s) IV Push once  dextrose 50% Injectable 12.5 Gram(s) IV Push once  dextrose 50% Injectable 25 Gram(s) IV Push once  dextrose Oral Gel 15 Gram(s) Oral once  enoxaparin Injectable 40 milliGRAM(s) SubCutaneous every 24 hours  glucagon  Injectable 1 milliGRAM(s) IntraMuscular once  insulin lispro (ADMELOG) corrective regimen sliding scale   SubCutaneous at bedtime  insulin lispro (ADMELOG) corrective regimen sliding scale   SubCutaneous three times a day before meals    MEDICATIONS  (PRN):      Review of Systems:  unable given mental status     Vitals:  Vital Signs Last 24 Hrs  T(C): 37.2 (2023 12:19), Max: 37.2 (2023 22:11)  T(F): 99 (2023 12:19), Max: 99 (2023 22:11)  HR: 93 (2023 12:19) (93 - 112)  BP: 145/51 (2023 12:19) (128/72 - 148/63)  BP(mean): --  RR: 18 (2023 12:19) (18 - 24)  SpO2: 97% (2023 12:19) (95% - 98%)    Parameters below as of 2023 12:19  Patient On (Oxygen Delivery Method): room air        General Exam:   General Appearance: Appropriately dressed and in no acute distress       Head: Normocephalic, atraumatic and no dysmorphic features  Ear, Nose, and Throat: Moist mucous membranes  CVS: S1S2+  Resp: No SOB, no wheeze or rhonchi  GI: soft NT/ND  Extremities: No edema or cyanosis  Skin: No bruises or rashes     Neurological Exam:NEUROLOGICAL EXAM: Family at bedside who provided translation   Mental status: Eyes open, awake, alert, attempts to produce speech, able to follow some simple commands, able to mimic at times , unable to ID objects  Cranial Nerves: Right facial droop, no nystagmus, blinks to threat B/L  Motor exam: Normal tone, no drift, Right hemiparesis RUE drift, 3-44/5, RLE drift, 3-4/5,, LUE 5/5, LLE 5/5  Sensation: Intact to light touch   Coordination/ Gait: Unable to participate with exam     Data/Labs/Imaging which I personally reviewed.     Labs:     CBC Full  -  ( 2023 06:48 )  WBC Count : 14.61 K/uL  RBC Count : 3.72 M/uL  Hemoglobin : 10.1 g/dL  Hematocrit : 33.0 %  Platelet Count - Automated : 239 K/uL  Mean Cell Volume : 88.7 fl  Mean Cell Hemoglobin : 27.2 pg  Mean Cell Hemoglobin Concentration : 30.6 gm/dL  Auto Neutrophil # : 10.51 K/uL  Auto Lymphocyte # : 2.61 K/uL  Auto Monocyte # : 0.75 K/uL  Auto Eosinophil # : 0.44 K/uL  Auto Basophil # : 0.05 K/uL  Auto Neutrophil % : 72.0 %  Auto Lymphocyte % : 17.9 %  Auto Monocyte % : 5.1 %  Auto Eosinophil % : 3.0 %  Auto Basophil % : 0.3 %        156<H>  |  124<H>  |  28<H>  ----------------------------<  178<H>  3.1<L>   |  20<L>  |  0.71    Ca    8.6      2023 06:47  Phos  1.8       Mg     2.2         TPro  5.9<L>  /  Alb  2.2<L>  /  TBili  0.4  /  DBili  x   /  AST  12  /  ALT  9<L>  /  AlkPhos  111      LIVER FUNCTIONS - ( 2023 06:47 )  Alb: 2.2 g/dL / Pro: 5.9 g/dL / ALK PHOS: 111 U/L / ALT: 9 U/L / AST: 12 U/L / GGT: x             Urinalysis Basic - ( 2023 13:15 )    Color: DARK BROWN / Appearance: Turbid / S.023 / pH: x  Gluc: x / Ketone: Trace  / Bili: Negative / Urobili: 6 mg/dL   Blood: x / Protein: >600 / Nitrite: Negative   Leuk Esterase: Large / RBC: 15 /hpf / WBC 11-25   Sq Epi: x / Non Sq Epi: Few / Bacteria: Many      `< from: CT Head No Cont (23 @ 13:45) >    ACC: [id number] EXAM:  CT BRAIN   ORDERED BY: CORINNE MADERA     PROCEDURE DATE:  2023          INTERPRETATION:  CLINICAL STATEMENT: Altered mental status    TECHNIQUE: CT of the head was performed without IV contrast.  RAPID   artificial intelligence was utilized for the preliminary evaluation of   intracranial hemorrhage.    COMPARISON: MRI brain 2023.    FINDINGS:  There is mild diffuse parenchymal volume loss. There are areas of low   attenuation in the periventricular white matter likely related to mild   chronic microvascular ischemic changes.    Evolving small infarcts noted in the left corona radiata.   Age-indeterminate infarct also noted in the left temporal and parietal   lobe    There is no acute intracranial hemorrhage, parenchymal mass, mass effect   or midline shift.. There is no hydrocephalus. Partial empty sella noted    The cranium is intact. Calcification noted adjacent to left frontal   artery table. The visualized paranasal sinuses are well-aerated.        IMPRESSION:  No acute intracranial hemorrhage.    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    --- End of Report ---            PHILL DICKERSON MD; Attending Radiologist  This document has been electronically signed. 32:07PM    < end of copied text >       Neurology Consult    Reason for Consult: Patient is a 78y old  Female who presents with a chief complaint of AMS, elevated finger sticks (2023 17:00)      HPI:  78F with dementia, T2DM, recently discharged about 1 week ago for CVA now presenting with poor PO intake, lethargy and hyperglycemia to 500s.   Of note, patient admitted  for R facial droop, word finding difficulties. Imaging concerning for acute L MCA infarct. Discharged home on . Majority of history obtained through family given mental status. Per family, since stroke patient nonverbal/unable to participate in meaningful communications, intermittently follows commands. For the last 3-4 days, patient with poor po intake. Reportedly only eating breakfast. Day of presentation, more lethargic with elevated finger sticks up to the 500s. Patient was evaluated by endocrine team during prior admission with recs to increase metformin to 1000mg BID. However, given poor PO intake, family has been giving 500mg daily.     In ED, afebrile, , 135/84. WBC 15, Na 158, K 3.0, Cl 120, Glu 468, alk phos 136m BHB 0.7. pH 7.36, lactate 2.2. UA: +leuk esterase, many bacteria, WBC 11-25  CXR: Redemonstrated biapical scarring and left upper lobe bronchiectasis, unchanged. No focal consolidation.   CTH No acute intracranial hemorrhage. 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  Given 1.5L NS, ceftriaxone x1, haldol 2.5mg x1 in ED.          (2023 17:00)       PAST MEDICAL & SURGICAL HISTORY:  Dementia      CVA (cerebrovascular accident)      DM (diabetes mellitus)      No significant past surgical history          Allergies: Allergies    Benedryl Allergy Sinus (Hives)  penicillin (Rash)    Intolerances        Social History: Denies toxic habits including tobacco, ETOH or illicit drugs.    Family History: FAMILY HISTORY:  No pertinent family history in first degree relatives    . No family history of strokes    Medications: MEDICATIONS  (STANDING):  aspirin  chewable 81 milliGRAM(s) Oral daily  atorvastatin 80 milliGRAM(s) Oral at bedtime  cefTRIAXone   IVPB 1000 milliGRAM(s) IV Intermittent every 24 hours  clopidogrel Tablet 75 milliGRAM(s) Oral daily  dextrose 5%. 1000 milliLiter(s) (100 mL/Hr) IV Continuous <Continuous>  dextrose 5%. 1000 milliLiter(s) (50 mL/Hr) IV Continuous <Continuous>  dextrose 50% Injectable 25 Gram(s) IV Push once  dextrose 50% Injectable 12.5 Gram(s) IV Push once  dextrose 50% Injectable 25 Gram(s) IV Push once  dextrose Oral Gel 15 Gram(s) Oral once  enoxaparin Injectable 40 milliGRAM(s) SubCutaneous every 24 hours  glucagon  Injectable 1 milliGRAM(s) IntraMuscular once  insulin lispro (ADMELOG) corrective regimen sliding scale   SubCutaneous at bedtime  insulin lispro (ADMELOG) corrective regimen sliding scale   SubCutaneous three times a day before meals    MEDICATIONS  (PRN):      Review of Systems:  unable given mental status     Vitals:  Vital Signs Last 24 Hrs  T(C): 37.2 (2023 12:19), Max: 37.2 (2023 22:11)  T(F): 99 (2023 12:19), Max: 99 (2023 22:11)  HR: 93 (2023 12:19) (93 - 112)  BP: 145/51 (2023 12:19) (128/72 - 148/63)  BP(mean): --  RR: 18 (2023 12:19) (18 - 24)  SpO2: 97% (2023 12:19) (95% - 98%)    Parameters below as of 2023 12:19  Patient On (Oxygen Delivery Method): room air        General Exam:   General Appearance: Appropriately dressed and in no acute distress       Head: Normocephalic, atraumatic and no dysmorphic features  Ear, Nose, and Throat: Moist mucous membranes  CVS: S1S2+  Resp: No SOB, no wheeze or rhonchi  GI: soft NT/ND  Extremities: No edema or cyanosis  Skin: No bruises or rashes     Neurological Exam:NEUROLOGICAL EXAM: Family at bedside who provided translation   Mental status: Eyes open, awake, alert, attempts to produce speech, able to follow some simple commands, able to mimic at times , unable to ID objects  Cranial Nerves: Right facial droop, no nystagmus, blinks to threat B/L  Motor exam: Normal tone, no drift, Right hemiparesis RUE drift, 3-44/5, RLE drift, 3-4/5,, LUE 5/5, LLE 5/5  Sensation: Intact to light touch   Coordination/ Gait: Unable to participate with exam     Data/Labs/Imaging which I personally reviewed.     Labs:     CBC Full  -  ( 2023 06:48 )  WBC Count : 14.61 K/uL  RBC Count : 3.72 M/uL  Hemoglobin : 10.1 g/dL  Hematocrit : 33.0 %  Platelet Count - Automated : 239 K/uL  Mean Cell Volume : 88.7 fl  Mean Cell Hemoglobin : 27.2 pg  Mean Cell Hemoglobin Concentration : 30.6 gm/dL  Auto Neutrophil # : 10.51 K/uL  Auto Lymphocyte # : 2.61 K/uL  Auto Monocyte # : 0.75 K/uL  Auto Eosinophil # : 0.44 K/uL  Auto Basophil # : 0.05 K/uL  Auto Neutrophil % : 72.0 %  Auto Lymphocyte % : 17.9 %  Auto Monocyte % : 5.1 %  Auto Eosinophil % : 3.0 %  Auto Basophil % : 0.3 %        156<H>  |  124<H>  |  28<H>  ----------------------------<  178<H>  3.1<L>   |  20<L>  |  0.71    Ca    8.6      2023 06:47  Phos  1.8       Mg     2.2         TPro  5.9<L>  /  Alb  2.2<L>  /  TBili  0.4  /  DBili  x   /  AST  12  /  ALT  9<L>  /  AlkPhos  111      LIVER FUNCTIONS - ( 2023 06:47 )  Alb: 2.2 g/dL / Pro: 5.9 g/dL / ALK PHOS: 111 U/L / ALT: 9 U/L / AST: 12 U/L / GGT: x             Urinalysis Basic - ( 2023 13:15 )    Color: DARK BROWN / Appearance: Turbid / S.023 / pH: x  Gluc: x / Ketone: Trace  / Bili: Negative / Urobili: 6 mg/dL   Blood: x / Protein: >600 / Nitrite: Negative   Leuk Esterase: Large / RBC: 15 /hpf / WBC 11-25   Sq Epi: x / Non Sq Epi: Few / Bacteria: Many      `< from: CT Head No Cont (23 @ 13:45) >    ACC: [id number] EXAM:  CT BRAIN   ORDERED BY: CORINNE MADERA     PROCEDURE DATE:  2023          INTERPRETATION:  CLINICAL STATEMENT: Altered mental status    TECHNIQUE: CT of the head was performed without IV contrast.  RAPID   artificial intelligence was utilized for the preliminary evaluation of   intracranial hemorrhage.    COMPARISON: MRI brain 2023.    FINDINGS:  There is mild diffuse parenchymal volume loss. There are areas of low   attenuation in the periventricular white matter likely related to mild   chronic microvascular ischemic changes.    Evolving small infarcts noted in the left corona radiata.   Age-indeterminate infarct also noted in the left temporal and parietal   lobe    There is no acute intracranial hemorrhage, parenchymal mass, mass effect   or midline shift.. There is no hydrocephalus. Partial empty sella noted    The cranium is intact. Calcification noted adjacent to left frontal   artery table. The visualized paranasal sinuses are well-aerated.        IMPRESSION:  No acute intracranial hemorrhage.    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    --- End of Report ---            PHILL DICKERSON MD; Attending Radiologist  This document has been electronically signed. 32:07PM    < end of copied text >       Neurology Consult    Reason for Consult: Patient is a 78y old  Female who presents with a chief complaint of AMS, elevated finger sticks (2023 17:00)      HPI:  78F with dementia, T2DM, recently discharged about 1 week ago for CVA now presenting with poor PO intake, lethargy and hyperglycemia to 500s.   Of note, patient admitted  for R facial droop, word finding difficulties. Imaging concerning for acute L MCA infarct. Discharged home on . Majority of history obtained through family given mental status. Per family, since stroke patient nonverbal/unable to participate in meaningful communications, intermittently follows commands. For the last 3-4 days, patient with poor po intake. Reportedly only eating breakfast. Day of presentation, more lethargic with elevated finger sticks up to the 500s. Patient was evaluated by endocrine team during prior admission with recs to increase metformin to 1000mg BID. However, given poor PO intake, family has been giving 500mg daily.     In ED, afebrile, , 135/84. WBC 15, Na 158, K 3.0, Cl 120, Glu 468, alk phos 136m BHB 0.7. pH 7.36, lactate 2.2. UA: +leuk esterase, many bacteria, WBC 11-25  CXR: Redemonstrated biapical scarring and left upper lobe bronchiectasis, unchanged. No focal consolidation.   CTH No acute intracranial hemorrhage. 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  Given 1.5L NS, ceftriaxone x1, haldol 2.5mg x1 in ED.          (2023 17:00)       PAST MEDICAL & SURGICAL HISTORY:  Dementia      CVA (cerebrovascular accident)      DM (diabetes mellitus)      No significant past surgical history          Allergies: Allergies    Benedryl Allergy Sinus (Hives)  penicillin (Rash)    Intolerances        Social History: Denies toxic habits including tobacco, ETOH or illicit drugs.    Family History: FAMILY HISTORY:  No pertinent family history in first degree relatives    . No family history of strokes    Medications: MEDICATIONS  (STANDING):  aspirin  chewable 81 milliGRAM(s) Oral daily  atorvastatin 80 milliGRAM(s) Oral at bedtime  cefTRIAXone   IVPB 1000 milliGRAM(s) IV Intermittent every 24 hours  clopidogrel Tablet 75 milliGRAM(s) Oral daily  dextrose 5%. 1000 milliLiter(s) (100 mL/Hr) IV Continuous <Continuous>  dextrose 5%. 1000 milliLiter(s) (50 mL/Hr) IV Continuous <Continuous>  dextrose 50% Injectable 25 Gram(s) IV Push once  dextrose 50% Injectable 12.5 Gram(s) IV Push once  dextrose 50% Injectable 25 Gram(s) IV Push once  dextrose Oral Gel 15 Gram(s) Oral once  enoxaparin Injectable 40 milliGRAM(s) SubCutaneous every 24 hours  glucagon  Injectable 1 milliGRAM(s) IntraMuscular once  insulin lispro (ADMELOG) corrective regimen sliding scale   SubCutaneous at bedtime  insulin lispro (ADMELOG) corrective regimen sliding scale   SubCutaneous three times a day before meals    MEDICATIONS  (PRN):      Review of Systems:  unable given mental status     Vitals:  Vital Signs Last 24 Hrs  T(C): 37.2 (2023 12:19), Max: 37.2 (2023 22:11)  T(F): 99 (2023 12:19), Max: 99 (2023 22:11)  HR: 93 (2023 12:19) (93 - 112)  BP: 145/51 (2023 12:19) (128/72 - 148/63)  BP(mean): --  RR: 18 (2023 12:19) (18 - 24)  SpO2: 97% (2023 12:19) (95% - 98%)    Parameters below as of 2023 12:19  Patient On (Oxygen Delivery Method): room air        General Exam:   General Appearance: Appropriately dressed and in no acute distress       Head: Normocephalic, atraumatic and no dysmorphic features  Ear, Nose, and Throat: Moist mucous membranes  CVS: S1S2+  Resp: No SOB, no wheeze or rhonchi  GI: soft NT/ND  Extremities: No edema or cyanosis  Skin: No bruises or rashes     Neurological Exam:NEUROLOGICAL EXAM: Family at bedside who provided translation   Mental status: Eyes open, awake, alert, attempts to produce speech, able to follow some simple commands, able to mimic at times , unable to ID objects  Cranial Nerves: Right facial droop, no nystagmus, blinks to threat B/L  Motor exam: Normal tone, no drift, Right hemiparesis RUE drift, 3-44/5, RLE drift, 3-4/5,, LUE 5/5, LLE 5/5  Sensation: Intact to light touch   Coordination/ Gait: Unable to participate with exam     Data/Labs/Imaging which I personally reviewed.     Labs:     CBC Full  -  ( 2023 06:48 )  WBC Count : 14.61 K/uL  RBC Count : 3.72 M/uL  Hemoglobin : 10.1 g/dL  Hematocrit : 33.0 %  Platelet Count - Automated : 239 K/uL  Mean Cell Volume : 88.7 fl  Mean Cell Hemoglobin : 27.2 pg  Mean Cell Hemoglobin Concentration : 30.6 gm/dL  Auto Neutrophil # : 10.51 K/uL  Auto Lymphocyte # : 2.61 K/uL  Auto Monocyte # : 0.75 K/uL  Auto Eosinophil # : 0.44 K/uL  Auto Basophil # : 0.05 K/uL  Auto Neutrophil % : 72.0 %  Auto Lymphocyte % : 17.9 %  Auto Monocyte % : 5.1 %  Auto Eosinophil % : 3.0 %  Auto Basophil % : 0.3 %        156<H>  |  124<H>  |  28<H>  ----------------------------<  178<H>  3.1<L>   |  20<L>  |  0.71    Ca    8.6      2023 06:47  Phos  1.8       Mg     2.2         TPro  5.9<L>  /  Alb  2.2<L>  /  TBili  0.4  /  DBili  x   /  AST  12  /  ALT  9<L>  /  AlkPhos  111      LIVER FUNCTIONS - ( 2023 06:47 )  Alb: 2.2 g/dL / Pro: 5.9 g/dL / ALK PHOS: 111 U/L / ALT: 9 U/L / AST: 12 U/L / GGT: x             Urinalysis Basic - ( 2023 13:15 )    Color: DARK BROWN / Appearance: Turbid / S.023 / pH: x  Gluc: x / Ketone: Trace  / Bili: Negative / Urobili: 6 mg/dL   Blood: x / Protein: >600 / Nitrite: Negative   Leuk Esterase: Large / RBC: 15 /hpf / WBC 11-25   Sq Epi: x / Non Sq Epi: Few / Bacteria: Many      `< from: CT Head No Cont (23 @ 13:45) >    ACC: [id number] EXAM:  CT BRAIN   ORDERED BY: CORINNE MADERA     PROCEDURE DATE:  2023          INTERPRETATION:  CLINICAL STATEMENT: Altered mental status    TECHNIQUE: CT of the head was performed without IV contrast.  RAPID   artificial intelligence was utilized for the preliminary evaluation of   intracranial hemorrhage.    COMPARISON: MRI brain 2023.    FINDINGS:  There is mild diffuse parenchymal volume loss. There are areas of low   attenuation in the periventricular white matter likely related to mild   chronic microvascular ischemic changes.    Evolving small infarcts noted in the left corona radiata.   Age-indeterminate infarct also noted in the left temporal and parietal   lobe    There is no acute intracranial hemorrhage, parenchymal mass, mass effect   or midline shift.. There is no hydrocephalus. Partial empty sella noted    The cranium is intact. Calcification noted adjacent to left frontal   artery table. The visualized paranasal sinuses are well-aerated.        IMPRESSION:  No acute intracranial hemorrhage.    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    --- End of Report ---            PHILL DICKERSON MD; Attending Radiologist  This document has been electronically signed. 32:07PM    < end of copied text >

## 2023-01-20 NOTE — PROVIDER CONTACT NOTE (CRITICAL VALUE NOTIFICATION) - NAME OF MD/NP/PA/DO NOTIFIED:
[facility]

## 2023-01-20 NOTE — PROGRESS NOTE ADULT - ATTENDING COMMENTS
Agree with assessment and plan as above by Dr. Rodriguez. Reviewed all pertinent labs, glucose values, and imaging studies. Modifications made as indicated above. Glucose improving with basal insulin. Not eating much. Recommend q6 correction. Lantus 6 units qhs. Encourage po intake and hydration for hypernatremia. Dextrose likely contributing to some higher glucose values. Plan to d/c on metformin pending antibodies and c-peptide.     Enrique Eli D.O  [phone] Agree with assessment and plan as above by Dr. Rodriguez. Reviewed all pertinent labs, glucose values, and imaging studies. Modifications made as indicated above. Glucose improving with basal insulin. Not eating much. Recommend q6 correction. Lantus 6 units qhs. Encourage po intake and hydration for hypernatremia. Dextrose likely contributing to some higher glucose values. Plan to d/c on metformin pending antibodies and c-peptide.     Enrique Eli D.O  [phone] Agree with assessment and plan as above by Dr. Rodriguez. Reviewed all pertinent labs, glucose values, and imaging studies. Modifications made as indicated above. Glucose improving with basal insulin. Not eating much. Recommend q6 correction. Lantus 6 units qhs. Encourage po intake and hydration for hypernatremia. Dextrose likely contributing to some higher glucose values. Plan to d/c on metformin pending antibodies and c-peptide.     Enrique Eli D.O  [phone]

## 2023-01-20 NOTE — PROGRESS NOTE ADULT - ASSESSMENT
78F with dementia, T2DM, recently discharged about 1 week ago for CVA now presenting with poor PO intake, lethargy and hyperglycemia to 500s.     Altered mental status, UTI .   - Since recent stroke,  A&Ox0. More recently, poor po intake, lethargic. CTH no acute intracranial abnormalities. Multifactorial   - Multiple metabolic derangements - dehydration, hyperglycemia, hyperNa, hypoK  - c/w IVF D5 75 CC to run  - Monitor and replete electrolytes PRN   - UA with +leuk esterase, many bacteria. Will treat with ceftriaxone for now. F/u UCx, BCx. Monitor fever curve, WBC trend  - Neuro eval consulted; F/u recs   - Aspiration precautions     Uncontrolled type 2 diabetes mellitus with hyperglycemia.   - D/c 1 week ago with Metformin 500 mg BID  - Now with glucose 500s, improved to mid 300s with fluids. Also with mild ketosis (AG 17, bicarb 21, BHB 0.7)  - Endo eval appreciated, BHB now WNL  - Lantus 6 units QHs added to regimen & Sliding scale  - Monitor glucose levels closely, avoid hypo/hyperglycemia   -Check Ab given thin body habitus and ketosis (Glutamic Acid Decarboxylase, Zinc Transporter 8, Islet Cell Ab, and IA-2 Ab)  -Continue IVF administration  -FS q2h for now; if worsening hyperglycemia, may require insulin gtt/MICU eval.- F/u endo recs      Cerebrovascular accident (CVA).  - Recent admission w/ Acute L MCA infarct, severe stenosis of the L M2 branch. MR with L MCA Territory infarcts   - CTH on admission showing No acute intracranial hemorrhage. 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 D/w neurology, expected change seen on CT, not new lesions.   - C/w asa, plavix, statin.  - Neuro eval consulted; F/u recs     Hypernatremia.   - C/w D5 75 CC to run,   - F/u DM management as per above  - Monitor Na level closely, avoid overocrrection   - Check repeat BMP 4 PM    HypoK  - Monitor and replete electrolytes PRN     Hyperlipidemia.   -c/w atorvastatin.    Hypertension.    -amlodipine 5mg daily.      PPX  - Lovenox 40 Qd

## 2023-01-20 NOTE — PROGRESS NOTE ADULT - SUBJECTIVE AND OBJECTIVE BOX
Name of Patient : SARAH DISLA  MRN: [id number]  Date of visit: 23 @ 13:51      Subjective: Patient seen and examined. No new events except as noted.   Patient seen earlier this AM. Weak appearing female, lying down in bed    REVIEW OF SYSTEMS:  Unable to obtain ROS 2/2 clinical condition     MEDICATIONS:  MEDICATIONS  (STANDING):  aspirin  chewable 81 milliGRAM(s) Oral daily  atorvastatin 80 milliGRAM(s) Oral at bedtime  cefTRIAXone   IVPB 1000 milliGRAM(s) IV Intermittent every 24 hours  clopidogrel Tablet 75 milliGRAM(s) Oral daily  dextrose 5%. 1000 milliLiter(s) (100 mL/Hr) IV Continuous <Continuous>  dextrose 5%. 1000 milliLiter(s) (50 mL/Hr) IV Continuous <Continuous>  dextrose 5%. 1000 milliLiter(s) (75 mL/Hr) IV Continuous <Continuous>  dextrose 50% Injectable 25 Gram(s) IV Push once  dextrose 50% Injectable 12.5 Gram(s) IV Push once  dextrose 50% Injectable 25 Gram(s) IV Push once  dextrose Oral Gel 15 Gram(s) Oral once  enoxaparin Injectable 40 milliGRAM(s) SubCutaneous every 24 hours  glucagon  Injectable 1 milliGRAM(s) IntraMuscular once  insulin lispro (ADMELOG) corrective regimen sliding scale   SubCutaneous at bedtime  insulin lispro (ADMELOG) corrective regimen sliding scale   SubCutaneous three times a day before meals      PHYSICAL EXAM:  T(C): 37.2 (23 @ 12:19), Max: 37.2 (23 @ 22:11)  HR: 93 (23 @ 12:19) (93 - 112)  BP: 145/51 (23 @ 12:19) (128/72 - 148/63)  RR: 18 (23 @ 12:19) (18 - 20)  SpO2: 97% (23 @ 12:19) (95% - 98%)  Wt(kg): --  I&O's Summary    2023 07:01  -  2023 07:00  --------------------------------------------------------  IN: 300 mL / OUT: 400 mL / NET: -100 mL      Height (cm): 157.5 ( @ 20:35)  Weight (kg): 58.1 ( 20:35)  BMI (kg/m2): 23.4 ( @ 20:35)  BSA (m2): 1.58 ( @ 20:35)    Appearance: Lying down in bed, Weak appearing female   HEENT: Eyes are closed   Lymphatic: No lymphadenopathy   Cardiovascular: Normal S1 S2   Respiratory: normal effort , clear  Gastrointestinal:  Soft, Non-tender  Skin: No rashes,  warm to touch  Psychiatry: AxO X O, weak  Musculoskeletal: No edema        23 @ 07:01  -  23 @ 07:00  --------------------------------------------------------  IN: 300 mL / OUT: 400 mL / NET: -100 mL                                10.1   14.61 )-----------( 239      ( 2023 06:48 )             33.0                   156<H>  |  124<H>  |  28<H>  ----------------------------<  178<H>  3.1<L>   |  20<L>  |  0.71    Ca    8.6      2023 06:47  Phos  1.8       Mg     2.2         TPro  5.9<L>  /  Alb  2.2<L>  /  TBili  0.4  /  DBili  x   /  AST  12  /  ALT  9<L>  /  AlkPhos  111             CARDIAC MARKERS ( 2023 18:44 )  x     / x     / 75 U/L / x     / x      CARDIAC MARKERS ( 2023 13:11 )  x     / x     / 73 U/L / x     / x                  Urinalysis Basic - ( 2023 13:15 )    Color: DARK BROWN / Appearance: Turbid / S.023 / pH: x  Gluc: x / Ketone: Trace  / Bili: Negative / Urobili: 6 mg/dL   Blood: x / Protein: >600 / Nitrite: Negative   Leuk Esterase: Large / RBC: 15 /hpf / WBC 11-25   Sq Epi: x / Non Sq Epi: Few / Bacteria: Many         Name of Patient : SARAH DISLA  MRN: [id number]  Date of visit: 23 @ 13:51      Subjective: Patient seen and examined. No new events except as noted.   Patient seen earlier this AM. Weak appearing female, lying down in bed    REVIEW OF SYSTEMS:  Unable to obtain ROS 2/2 clinical condition     MEDICATIONS:  MEDICATIONS  (STANDING):  aspirin  chewable 81 milliGRAM(s) Oral daily  atorvastatin 80 milliGRAM(s) Oral at bedtime  cefTRIAXone   IVPB 1000 milliGRAM(s) IV Intermittent every 24 hours  clopidogrel Tablet 75 milliGRAM(s) Oral daily  dextrose 5%. 1000 milliLiter(s) (100 mL/Hr) IV Continuous <Continuous>  dextrose 5%. 1000 milliLiter(s) (50 mL/Hr) IV Continuous <Continuous>  dextrose 5%. 1000 milliLiter(s) (75 mL/Hr) IV Continuous <Continuous>  dextrose 50% Injectable 25 Gram(s) IV Push once  dextrose 50% Injectable 12.5 Gram(s) IV Push once  dextrose 50% Injectable 25 Gram(s) IV Push once  dextrose Oral Gel 15 Gram(s) Oral once  enoxaparin Injectable 40 milliGRAM(s) SubCutaneous every 24 hours  glucagon  Injectable 1 milliGRAM(s) IntraMuscular once  insulin lispro (ADMELOG) corrective regimen sliding scale   SubCutaneous at bedtime  insulin lispro (ADMELOG) corrective regimen sliding scale   SubCutaneous three times a day before meals      PHYSICAL EXAM:  T(C): 37.2 (23 @ 12:19), Max: 37.2 (23 @ 22:11)  HR: 93 (23 @ 12:19) (93 - 112)  BP: 145/51 (23 @ 12:19) (128/72 - 148/63)  RR: 18 (23 @ 12:19) (18 - 20)  SpO2: 97% (23 @ 12:19) (95% - 98%)  Wt(kg): --  I&O's Summary    2023 07:01  -  2023 07:00  --------------------------------------------------------  IN: 300 mL / OUT: 400 mL / NET: -100 mL      Height (cm): 157.5 ( @ 20:35)  Weight (kg): 58.1 ( 20:35)  BMI (kg/m2): 23.4 ( @ 20:35)  BSA (m2): 1.58 ( @ 20:35)    Appearance: Lying down in bed, Weak appearing female   HEENT: Eyes are closed   Lymphatic: No lymphadenopathy   Cardiovascular: Normal S1 S2   Respiratory: normal effort , clear  Gastrointestinal:  Soft, Non-tender  Skin: No rashes,  warm to touch  Psychiatry: AxO X O, weak  Musculoskeletal: No edema        23 @ 07:01  -  23 @ 07:00  --------------------------------------------------------  IN: 300 mL / OUT: 400 mL / NET: -100 mL                                10.1   14.61 )-----------( 239      ( 2023 06:48 )             33.0                   156<H>  |  124<H>  |  28<H>  ----------------------------<  178<H>  3.1<L>   |  20<L>  |  0.71    Ca    8.6      2023 06:47  Phos  1.8       Mg     2.2         TPro  5.9<L>  /  Alb  2.2<L>  /  TBili  0.4  /  DBili  x   /  AST  12  /  ALT  9<L>  /  AlkPhos  111             CARDIAC MARKERS ( 2023 18:44 )  x     / x     / 75 U/L / x     / x      CARDIAC MARKERS ( 2023 13:11 )  x     / x     / 73 U/L / x     / x                  Urinalysis Basic - ( 2023 13:15 )    Color: DARK BROWN / Appearance: Turbid / S.023 / pH: x  Gluc: x / Ketone: Trace  / Bili: Negative / Urobili: 6 mg/dL   Blood: x / Protein: >600 / Nitrite: Negative   Leuk Esterase: Large / RBC: 15 /hpf / WBC 11-25   Sq Epi: x / Non Sq Epi: Few / Bacteria: Many         Name of Patient : SARAH DISLA  MRN: [id number]  Date of visit: 23 @ 13:51      Subjective: Patient seen and examined. No new events except as noted.   Patient seen earlier this AM. Weak appearing female, lying down in bed    REVIEW OF SYSTEMS:  Unable to obtain ROS 2/2 clinical condition     MEDICATIONS:  MEDICATIONS  (STANDING):  aspirin  chewable 81 milliGRAM(s) Oral daily  atorvastatin 80 milliGRAM(s) Oral at bedtime  cefTRIAXone   IVPB 1000 milliGRAM(s) IV Intermittent every 24 hours  clopidogrel Tablet 75 milliGRAM(s) Oral daily  dextrose 5%. 1000 milliLiter(s) (100 mL/Hr) IV Continuous <Continuous>  dextrose 5%. 1000 milliLiter(s) (50 mL/Hr) IV Continuous <Continuous>  dextrose 5%. 1000 milliLiter(s) (75 mL/Hr) IV Continuous <Continuous>  dextrose 50% Injectable 25 Gram(s) IV Push once  dextrose 50% Injectable 12.5 Gram(s) IV Push once  dextrose 50% Injectable 25 Gram(s) IV Push once  dextrose Oral Gel 15 Gram(s) Oral once  enoxaparin Injectable 40 milliGRAM(s) SubCutaneous every 24 hours  glucagon  Injectable 1 milliGRAM(s) IntraMuscular once  insulin lispro (ADMELOG) corrective regimen sliding scale   SubCutaneous at bedtime  insulin lispro (ADMELOG) corrective regimen sliding scale   SubCutaneous three times a day before meals      PHYSICAL EXAM:  T(C): 37.2 (23 @ 12:19), Max: 37.2 (23 @ 22:11)  HR: 93 (23 @ 12:19) (93 - 112)  BP: 145/51 (23 @ 12:19) (128/72 - 148/63)  RR: 18 (23 @ 12:19) (18 - 20)  SpO2: 97% (23 @ 12:19) (95% - 98%)  Wt(kg): --  I&O's Summary    2023 07:01  -  2023 07:00  --------------------------------------------------------  IN: 300 mL / OUT: 400 mL / NET: -100 mL      Height (cm): 157.5 ( @ 20:35)  Weight (kg): 58.1 ( 20:35)  BMI (kg/m2): 23.4 ( @ 20:35)  BSA (m2): 1.58 ( @ 20:35)    Appearance: Lying down in bed, Weak appearing female   HEENT: Eyes are closed   Lymphatic: No lymphadenopathy   Cardiovascular: Normal S1 S2   Respiratory: normal effort , clear  Gastrointestinal:  Soft, Non-tender  Skin: No rashes,  warm to touch  Psychiatry: AxO X O, weak  Musculoskeletal: No edema        23 @ 07:01  -  23 @ 07:00  --------------------------------------------------------  IN: 300 mL / OUT: 400 mL / NET: -100 mL                                10.1   14.61 )-----------( 239      ( 2023 06:48 )             33.0                   156<H>  |  124<H>  |  28<H>  ----------------------------<  178<H>  3.1<L>   |  20<L>  |  0.71    Ca    8.6      2023 06:47  Phos  1.8       Mg     2.2         TPro  5.9<L>  /  Alb  2.2<L>  /  TBili  0.4  /  DBili  x   /  AST  12  /  ALT  9<L>  /  AlkPhos  111             CARDIAC MARKERS ( 2023 18:44 )  x     / x     / 75 U/L / x     / x      CARDIAC MARKERS ( 2023 13:11 )  x     / x     / 73 U/L / x     / x                  Urinalysis Basic - ( 2023 13:15 )    Color: DARK BROWN / Appearance: Turbid / S.023 / pH: x  Gluc: x / Ketone: Trace  / Bili: Negative / Urobili: 6 mg/dL   Blood: x / Protein: >600 / Nitrite: Negative   Leuk Esterase: Large / RBC: 15 /hpf / WBC 11-25   Sq Epi: x / Non Sq Epi: Few / Bacteria: Many

## 2023-01-21 LAB
ANION GAP SERPL CALC-SCNC: 12 MMOL/L — SIGNIFICANT CHANGE UP (ref 5–17)
BUN SERPL-MCNC: 21 MG/DL — SIGNIFICANT CHANGE UP (ref 7–23)
CALCIUM SERPL-MCNC: 8.1 MG/DL — LOW (ref 8.4–10.5)
CHLORIDE SERPL-SCNC: 115 MMOL/L — HIGH (ref 96–108)
CO2 SERPL-SCNC: 20 MMOL/L — LOW (ref 22–31)
CREAT SERPL-MCNC: 0.74 MG/DL — SIGNIFICANT CHANGE UP (ref 0.5–1.3)
EGFR: 82 ML/MIN/1.73M2 — SIGNIFICANT CHANGE UP
GLUCOSE SERPL-MCNC: 274 MG/DL — HIGH (ref 70–99)
HCT VFR BLD CALC: 32.8 % — LOW (ref 34.5–45)
HGB BLD-MCNC: 10 G/DL — LOW (ref 11.5–15.5)
MAGNESIUM SERPL-MCNC: 1.9 MG/DL — SIGNIFICANT CHANGE UP (ref 1.6–2.6)
MCHC RBC-ENTMCNC: 26.9 PG — LOW (ref 27–34)
MCHC RBC-ENTMCNC: 30.5 GM/DL — LOW (ref 32–36)
MCV RBC AUTO: 88.2 FL — SIGNIFICANT CHANGE UP (ref 80–100)
NRBC # BLD: 0 /100 WBCS — SIGNIFICANT CHANGE UP (ref 0–0)
PHOSPHATE SERPL-MCNC: 2.4 MG/DL — LOW (ref 2.5–4.5)
PLATELET # BLD AUTO: 207 K/UL — SIGNIFICANT CHANGE UP (ref 150–400)
POTASSIUM SERPL-MCNC: 3.2 MMOL/L — LOW (ref 3.5–5.3)
POTASSIUM SERPL-SCNC: 3.2 MMOL/L — LOW (ref 3.5–5.3)
RBC # BLD: 3.72 M/UL — LOW (ref 3.8–5.2)
RBC # FLD: 15 % — HIGH (ref 10.3–14.5)
SODIUM SERPL-SCNC: 147 MMOL/L — HIGH (ref 135–145)
WBC # BLD: 15.78 K/UL — HIGH (ref 3.8–10.5)
WBC # FLD AUTO: 15.78 K/UL — HIGH (ref 3.8–10.5)

## 2023-01-21 RX ORDER — SODIUM CHLORIDE 9 MG/ML
1000 INJECTION, SOLUTION INTRAVENOUS
Refills: 0 | Status: DISCONTINUED | OUTPATIENT
Start: 2023-01-21 | End: 2023-01-21

## 2023-01-21 RX ORDER — INSULIN LISPRO 100/ML
VIAL (ML) SUBCUTANEOUS EVERY 6 HOURS
Refills: 0 | Status: DISCONTINUED | OUTPATIENT
Start: 2023-01-21 | End: 2023-01-24

## 2023-01-21 RX ORDER — POTASSIUM PHOSPHATE, MONOBASIC POTASSIUM PHOSPHATE, DIBASIC 236; 224 MG/ML; MG/ML
30 INJECTION, SOLUTION INTRAVENOUS ONCE
Refills: 0 | Status: COMPLETED | OUTPATIENT
Start: 2023-01-21 | End: 2023-01-21

## 2023-01-21 RX ORDER — POTASSIUM CHLORIDE 20 MEQ
10 PACKET (EA) ORAL
Refills: 0 | Status: COMPLETED | OUTPATIENT
Start: 2023-01-21 | End: 2023-01-21

## 2023-01-21 RX ORDER — SODIUM CHLORIDE 9 MG/ML
1000 INJECTION, SOLUTION INTRAVENOUS
Refills: 0 | Status: DISCONTINUED | OUTPATIENT
Start: 2023-01-21 | End: 2023-01-23

## 2023-01-21 RX ORDER — POTASSIUM PHOSPHATE, MONOBASIC POTASSIUM PHOSPHATE, DIBASIC 236; 224 MG/ML; MG/ML
15 INJECTION, SOLUTION INTRAVENOUS ONCE
Refills: 0 | Status: DISCONTINUED | OUTPATIENT
Start: 2023-01-21 | End: 2023-01-21

## 2023-01-21 RX ORDER — INSULIN GLARGINE 100 [IU]/ML
3 INJECTION, SOLUTION SUBCUTANEOUS ONCE
Refills: 0 | Status: COMPLETED | OUTPATIENT
Start: 2023-01-21 | End: 2023-01-21

## 2023-01-21 RX ADMIN — SODIUM CHLORIDE 75 MILLILITER(S): 9 INJECTION, SOLUTION INTRAVENOUS at 09:02

## 2023-01-21 RX ADMIN — Medication 2: at 12:36

## 2023-01-21 RX ADMIN — Medication 100 MILLIEQUIVALENT(S): at 15:01

## 2023-01-21 RX ADMIN — CEFTRIAXONE 100 MILLIGRAM(S): 500 INJECTION, POWDER, FOR SOLUTION INTRAMUSCULAR; INTRAVENOUS at 14:29

## 2023-01-21 RX ADMIN — POTASSIUM PHOSPHATE, MONOBASIC POTASSIUM PHOSPHATE, DIBASIC 83.33 MILLIMOLE(S): 236; 224 INJECTION, SOLUTION INTRAVENOUS at 17:58

## 2023-01-21 RX ADMIN — Medication 100 MILLIEQUIVALENT(S): at 17:31

## 2023-01-21 RX ADMIN — Medication 3: at 06:14

## 2023-01-21 RX ADMIN — Medication 100 MILLIEQUIVALENT(S): at 15:25

## 2023-01-21 RX ADMIN — ENOXAPARIN SODIUM 40 MILLIGRAM(S): 100 INJECTION SUBCUTANEOUS at 05:16

## 2023-01-21 RX ADMIN — SODIUM CHLORIDE 50 MILLILITER(S): 9 INJECTION, SOLUTION INTRAVENOUS at 18:19

## 2023-01-21 RX ADMIN — INSULIN GLARGINE 3 UNIT(S): 100 INJECTION, SOLUTION SUBCUTANEOUS at 22:35

## 2023-01-21 NOTE — CONSULT NOTE ADULT - SUBJECTIVE AND OBJECTIVE BOX
[facility] [place] [facility]   MD PUSHPA WICK MD KRISTINE SOLTANPOUR, DO ANGELA WONG, PA        TEL:  OFFICE: [phone]  From 5pm-7am answering service [phone]    --- INITIAL RENAL CONSULT NOTE ---date of service 23 @ 15:01    HPI:  78F with dementia, T2DM, recently discharged about 1 week ago for CVA now presenting with poor PO intake, lethargy and hyperglycemia to 500s.   Of note, patient admitted  for R facial droop, word finding difficulties. Imaging concerning for acute L MCA infarct. Discharged home on . Majority of history obtained through family given mental status. Per family, since stroke patient nonverbal/unable to participate in meaningful communications, intermittently follows commands. For the last 3-4 days, patient with poor po intake. Reportedly only eating breakfast. Day of presentation, more lethargic with elevated finger sticks up to the 500s. Patient was evaluated by endocrine team during prior admission with recs to increase metformin to 1000mg BID. However, given poor PO intake, family has been giving 500mg daily.     In ED, afebrile, , 135/84. WBC 15, Na 158, K 3.0, Cl 120, Glu 468, alk phos 136m BHB 0.7. pH 7.36, lactate 2.2. UA: +leuk esterase, many bacteria, WBC 11-25  CXR: Redemonstrated biapical scarring and left upper lobe bronchiectasis, unchanged. No focal consolidation.   CTH No acute intracranial hemorrhage. 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  Given 1.5L NS, ceftriaxone x1, haldol 2.5mg x1 in ED.          (2023 17:00)        Allergies:  Benedryl Allergy Sinus (Hives)  penicillin (Rash)      PAST MEDICAL & SURGICAL HISTORY:  Dementia      CVA (cerebrovascular accident)      DM (diabetes mellitus)      No significant past surgical history        Home Medications Reviewed    Hospital Medications:   MEDICATIONS  (STANDING):  aspirin  chewable 81 milliGRAM(s) Oral daily  atorvastatin 80 milliGRAM(s) Oral at bedtime  cefTRIAXone   IVPB 1000 milliGRAM(s) IV Intermittent every 24 hours  clopidogrel Tablet 75 milliGRAM(s) Oral daily  dextrose 5%. 1000 milliLiter(s) (100 mL/Hr) IV Continuous <Continuous>  dextrose 5%. 1000 milliLiter(s) (50 mL/Hr) IV Continuous <Continuous>  dextrose 5%. 1000 milliLiter(s) (75 mL/Hr) IV Continuous <Continuous>  dextrose 50% Injectable 25 Gram(s) IV Push once  dextrose 50% Injectable 12.5 Gram(s) IV Push once  dextrose 50% Injectable 25 Gram(s) IV Push once  dextrose Oral Gel 15 Gram(s) Oral once  enoxaparin Injectable 40 milliGRAM(s) SubCutaneous every 24 hours  glucagon  Injectable 1 milliGRAM(s) IntraMuscular once  insulin glargine Injectable (LANTUS) 6 Unit(s) SubCutaneous at bedtime  insulin lispro (ADMELOG) corrective regimen sliding scale   SubCutaneous three times a day before meals  insulin lispro (ADMELOG) corrective regimen sliding scale   SubCutaneous at bedtime  potassium chloride  10 mEq/100 mL IVPB 10 milliEquivalent(s) IV Intermittent every 1 hour  potassium phosphate IVPB 15 milliMole(s) IV Intermittent once  tamsulosin 0.4 milliGRAM(s) Oral at bedtime      SOCIAL HISTORY:  Denies ETOh, Smoking,     FAMILY HISTORY:  No pertinent family history in first degree relatives        REVIEW OF SYSTEMS:  CONSTITUTIONAL: No weakness, fevers or chills  EYES/ENT: No visual changes;  No vertigo or throat pain   NECK: No pain or stiffness  RESPIRATORY: No cough, wheezing, hemoptysis; No shortness of breath  CARDIOVASCULAR: No chest pain or palpitations.  GASTROINTESTINAL: No abdominal or epigastric pain. No nausea, vomiting, or hematemesis; No diarrhea or constipation. No melena or hematochezia.  GENITOURINARY: No dysuria, frequency, foamy urine, urinary urgency, incontinence or hematuria  NEUROLOGICAL: No numbness or weakness  SKIN: No itching, burning, rashes, or lesions   VASCULAR: No bilateral lower extremity edema.   All other review of systems is negative unless indicated above.    VITALS:  T(F): 98.4 (23 @ 10:50), Max: 100 (23 @ 17:31)  HR: 84 (23 @ 14:45)  BP: 137/64 (23 @ 14:45)  RR: 20 (23 @ 14:45)  SpO2: 97% (23 @ 14:45)  Wt(kg): --     @ 07:  -   @ 07:00  --------------------------------------------------------  IN: 900 mL / OUT: 675 mL / NET: 225 mL          PHYSICAL EXAM:  General: NAD  HEENT: anicteric sclera, oropharynx clear, MMM  Neck: No JVD  Respiratory: CTAB, no wheezes, rales or rhonchi  Cardiovascular: S1, S2, RRR  Gastrointestinal: BS+, soft, NT/ND  Extremities: No cyanosis or clubbing. No peripheral edema  Neurological: A/O x 3, no focal deficits  Psychiatric: Normal mood, normal affect  : No CVA tenderness. No gonzalez.   Skin: No rashes  Vascular Access:    LABS:      147<H>  |  115<H>  |  21  ----------------------------<  274<H>  3.2<L>   |  20<L>  |  0.74    Ca    8.1<L>      2023 07:07  Phos  2.4       Mg     1.9         TPro  5.9<L>  /  Alb  2.2<L>  /  TBili  0.4  /  DBili      /  AST  12  /  ALT  9<L>  /  AlkPhos  111      Creatinine Trend: 0.74 <--, 0.72 <--, 0.71 <--, 0.74 <--, 0.65 <--, 0.74 <--, 0.92 <--                        10.0   15.78 )-----------( 207      ( 2023 07:06 )             32.8     Urine Studies:  Urinalysis Basic - ( 2023 13:15 )    Color: DARK BROWN / Appearance: Turbid / S.023 / pH:   Gluc:  / Ketone: Trace  / Bili: Negative / Urobili: 6 mg/dL   Blood:  / Protein: >600 / Nitrite: Negative   Leuk Esterase: Large / RBC: 15 /hpf / WBC 11-25   Sq Epi:  / Non Sq Epi: Few / Bacteria: Many          RADIOLOGY & ADDITIONAL STUDIES:    ACC: [id number] EXAM:  CT BRAIN   ORDERED BY: CORINNE MADERA     PROCEDURE DATE:  2023          INTERPRETATION:  CLINICAL STATEMENT: Altered mental status    TECHNIQUE: CT of the head was performed without IV contrast.  RAPID   artificial intelligence was utilized for the preliminary evaluation of   intracranial hemorrhage.    COMPARISON: MRI brain 2023.    FINDINGS:  There is mild diffuse parenchymal volume loss. There are areas of low   attenuation in the periventricular white matter likely related to mild   chronic microvascular ischemic changes.    Evolving small infarcts noted in the left corona radiata.   Age-indeterminate infarct also noted in the left temporal and parietal   lobe    There is no acute intracranial hemorrhage, parenchymal mass, mass effect   or midline shift.. There is no hydrocephalus. Partial empty sella noted    The cranium is intact. Calcification noted adjacent to left frontal   artery table. The visualized paranasal sinuses are well-aerated.        IMPRESSION:  No acute intracranial hemorrhage.    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    --- End of Report ---            PHILL DICKERSON MD; Attending Radiologist  This document has been electronically signed. 2023  2:07PM               [facility] [place] [facility]   MD PUSHPA WICK MD KRISTINE SOLTANPOUR, DO ANGELA WONG, PA        TEL:  OFFICE: [phone]  From 5pm-7am answering service [phone]    --- INITIAL RENAL CONSULT NOTE ---date of service 23 @ 15:01    HPI:  78F with dementia, T2DM, recently discharged about 1 week ago for CVA now presenting with poor PO intake, lethargy and hyperglycemia to 500s.   Of note, patient admitted  for R facial droop, word finding difficulties. Imaging concerning for acute L MCA infarct. Discharged home on . Majority of history obtained through family given mental status. Per family, since stroke patient nonverbal/unable to participate in meaningful communications, intermittently follows commands. For the last 3-4 days, patient with poor po intake. Reportedly only eating breakfast. Day of presentation, more lethargic with elevated finger sticks up to the 500s. Patient was evaluated by endocrine team during prior admission with recs to increase metformin to 1000mg BID. However, given poor PO intake, family has been giving 500mg daily.     In ED, afebrile, , 135/84. WBC 15, Na 158, K 3.0, Cl 120, Glu 468, alk phos 136m BHB 0.7. pH 7.36, lactate 2.2. UA: +leuk esterase, many bacteria, WBC 11-25  CXR: Redemonstrated biapical scarring and left upper lobe bronchiectasis, unchanged. No focal consolidation.   CTH No acute intracranial hemorrhage. 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  Given 1.5L NS, ceftriaxone x1, haldol 2.5mg x1 in ED.          (2023 17:00)        Allergies:  Benedryl Allergy Sinus (Hives)  penicillin (Rash)      PAST MEDICAL & SURGICAL HISTORY:  Dementia      CVA (cerebrovascular accident)      DM (diabetes mellitus)      No significant past surgical history        Home Medications Reviewed    Hospital Medications:   MEDICATIONS  (STANDING):  aspirin  chewable 81 milliGRAM(s) Oral daily  atorvastatin 80 milliGRAM(s) Oral at bedtime  cefTRIAXone   IVPB 1000 milliGRAM(s) IV Intermittent every 24 hours  clopidogrel Tablet 75 milliGRAM(s) Oral daily  dextrose 5%. 1000 milliLiter(s) (100 mL/Hr) IV Continuous <Continuous>  dextrose 5%. 1000 milliLiter(s) (50 mL/Hr) IV Continuous <Continuous>  dextrose 5%. 1000 milliLiter(s) (75 mL/Hr) IV Continuous <Continuous>  dextrose 50% Injectable 25 Gram(s) IV Push once  dextrose 50% Injectable 12.5 Gram(s) IV Push once  dextrose 50% Injectable 25 Gram(s) IV Push once  dextrose Oral Gel 15 Gram(s) Oral once  enoxaparin Injectable 40 milliGRAM(s) SubCutaneous every 24 hours  glucagon  Injectable 1 milliGRAM(s) IntraMuscular once  insulin glargine Injectable (LANTUS) 6 Unit(s) SubCutaneous at bedtime  insulin lispro (ADMELOG) corrective regimen sliding scale   SubCutaneous three times a day before meals  insulin lispro (ADMELOG) corrective regimen sliding scale   SubCutaneous at bedtime  potassium chloride  10 mEq/100 mL IVPB 10 milliEquivalent(s) IV Intermittent every 1 hour  potassium phosphate IVPB 15 milliMole(s) IV Intermittent once  tamsulosin 0.4 milliGRAM(s) Oral at bedtime      SOCIAL HISTORY:  Denies ETOh, Smoking,     FAMILY HISTORY:  No pertinent family history in first degree relatives        REVIEW OF SYSTEMS:  CONSTITUTIONAL: No weakness, fevers or chills  EYES/ENT: No visual changes;  No vertigo or throat pain   NECK: No pain or stiffness  RESPIRATORY: No cough, wheezing, hemoptysis; No shortness of breath  CARDIOVASCULAR: No chest pain or palpitations.  GASTROINTESTINAL: No abdominal or epigastric pain. No nausea, vomiting, or hematemesis; No diarrhea or constipation. No melena or hematochezia.  GENITOURINARY: No dysuria, frequency, foamy urine, urinary urgency, incontinence or hematuria  NEUROLOGICAL: No numbness or weakness  SKIN: No itching, burning, rashes, or lesions   VASCULAR: No bilateral lower extremity edema.   All other review of systems is negative unless indicated above.    VITALS:  T(F): 98.4 (23 @ 10:50), Max: 100 (23 @ 17:31)  HR: 84 (23 @ 14:45)  BP: 137/64 (23 @ 14:45)  RR: 20 (23 @ 14:45)  SpO2: 97% (23 @ 14:45)  Wt(kg): --     @ 07:  -   @ 07:00  --------------------------------------------------------  IN: 900 mL / OUT: 675 mL / NET: 225 mL          PHYSICAL EXAM:  General: NAD  HEENT: anicteric sclera, oropharynx clear, MMM  Neck: No JVD  Respiratory: CTAB, no wheezes, rales or rhonchi  Cardiovascular: S1, S2, RRR  Gastrointestinal: BS+, soft, NT/ND  Extremities: No cyanosis or clubbing. No peripheral edema  Neurological: A/O x 3, no focal deficits  Psychiatric: Normal mood, normal affect  : No CVA tenderness. No gonzalez.   Skin: No rashes  Vascular Access:    LABS:      147<H>  |  115<H>  |  21  ----------------------------<  274<H>  3.2<L>   |  20<L>  |  0.74    Ca    8.1<L>      2023 07:07  Phos  2.4       Mg     1.9         TPro  5.9<L>  /  Alb  2.2<L>  /  TBili  0.4  /  DBili      /  AST  12  /  ALT  9<L>  /  AlkPhos  111      Creatinine Trend: 0.74 <--, 0.72 <--, 0.71 <--, 0.74 <--, 0.65 <--, 0.74 <--, 0.92 <--                        10.0   15.78 )-----------( 207      ( 2023 07:06 )             32.8     Urine Studies:  Urinalysis Basic - ( 2023 13:15 )    Color: DARK BROWN / Appearance: Turbid / S.023 / pH:   Gluc:  / Ketone: Trace  / Bili: Negative / Urobili: 6 mg/dL   Blood:  / Protein: >600 / Nitrite: Negative   Leuk Esterase: Large / RBC: 15 /hpf / WBC 11-25   Sq Epi:  / Non Sq Epi: Few / Bacteria: Many          RADIOLOGY & ADDITIONAL STUDIES:    ACC: [id number] EXAM:  CT BRAIN   ORDERED BY: CORINNE MADERA     PROCEDURE DATE:  2023          INTERPRETATION:  CLINICAL STATEMENT: Altered mental status    TECHNIQUE: CT of the head was performed without IV contrast.  RAPID   artificial intelligence was utilized for the preliminary evaluation of   intracranial hemorrhage.    COMPARISON: MRI brain 2023.    FINDINGS:  There is mild diffuse parenchymal volume loss. There are areas of low   attenuation in the periventricular white matter likely related to mild   chronic microvascular ischemic changes.    Evolving small infarcts noted in the left corona radiata.   Age-indeterminate infarct also noted in the left temporal and parietal   lobe    There is no acute intracranial hemorrhage, parenchymal mass, mass effect   or midline shift.. There is no hydrocephalus. Partial empty sella noted    The cranium is intact. Calcification noted adjacent to left frontal   artery table. The visualized paranasal sinuses are well-aerated.        IMPRESSION:  No acute intracranial hemorrhage.    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    --- End of Report ---            PHILL DICKERSON MD; Attending Radiologist  This document has been electronically signed. 2023  2:07PM               [facility] [place] [facility]   MD PUSHPA WICK MD KRISTINE SOLTANPOUR, DO ANGELA WONG, PA        TEL:  OFFICE: [phone]  From 5pm-7am answering service [phone]    --- INITIAL RENAL CONSULT NOTE ---date of service 23 @ 15:01    HPI:  78F with dementia, T2DM, recently discharged about 1 week ago for CVA now presenting with poor PO intake, lethargy and hyperglycemia to 500s.   Of note, patient admitted  for R facial droop, word finding difficulties. Imaging concerning for acute L MCA infarct. Discharged home on . Majority of history obtained through family given mental status. Per family, since stroke patient nonverbal/unable to participate in meaningful communications, intermittently follows commands. For the last 3-4 days, patient with poor po intake. Reportedly only eating breakfast. Day of presentation, more lethargic with elevated finger sticks up to the 500s. Patient was evaluated by endocrine team during prior admission with recs to increase metformin to 1000mg BID. However, given poor PO intake, family has been giving 500mg daily.     In ED, afebrile, , 135/84. WBC 15, Na 158, K 3.0, Cl 120, Glu 468, alk phos 136m BHB 0.7. pH 7.36, lactate 2.2. UA: +leuk esterase, many bacteria, WBC 11-25  CXR: Redemonstrated biapical scarring and left upper lobe bronchiectasis, unchanged. No focal consolidation.   CTH No acute intracranial hemorrhage. 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  Given 1.5L NS, ceftriaxone x1, haldol 2.5mg x1 in ED.          (2023 17:00)        Allergies:  Benedryl Allergy Sinus (Hives)  penicillin (Rash)      PAST MEDICAL & SURGICAL HISTORY:  Dementia      CVA (cerebrovascular accident)      DM (diabetes mellitus)      No significant past surgical history        Home Medications Reviewed    Hospital Medications:   MEDICATIONS  (STANDING):  aspirin  chewable 81 milliGRAM(s) Oral daily  atorvastatin 80 milliGRAM(s) Oral at bedtime  cefTRIAXone   IVPB 1000 milliGRAM(s) IV Intermittent every 24 hours  clopidogrel Tablet 75 milliGRAM(s) Oral daily  dextrose 5%. 1000 milliLiter(s) (100 mL/Hr) IV Continuous <Continuous>  dextrose 5%. 1000 milliLiter(s) (50 mL/Hr) IV Continuous <Continuous>  dextrose 5%. 1000 milliLiter(s) (75 mL/Hr) IV Continuous <Continuous>  dextrose 50% Injectable 25 Gram(s) IV Push once  dextrose 50% Injectable 12.5 Gram(s) IV Push once  dextrose 50% Injectable 25 Gram(s) IV Push once  dextrose Oral Gel 15 Gram(s) Oral once  enoxaparin Injectable 40 milliGRAM(s) SubCutaneous every 24 hours  glucagon  Injectable 1 milliGRAM(s) IntraMuscular once  insulin glargine Injectable (LANTUS) 6 Unit(s) SubCutaneous at bedtime  insulin lispro (ADMELOG) corrective regimen sliding scale   SubCutaneous three times a day before meals  insulin lispro (ADMELOG) corrective regimen sliding scale   SubCutaneous at bedtime  potassium chloride  10 mEq/100 mL IVPB 10 milliEquivalent(s) IV Intermittent every 1 hour  potassium phosphate IVPB 15 milliMole(s) IV Intermittent once  tamsulosin 0.4 milliGRAM(s) Oral at bedtime      SOCIAL HISTORY:  Denies ETOh, Smoking,     FAMILY HISTORY:  No pertinent family history in first degree relatives        REVIEW OF SYSTEMS:  CONSTITUTIONAL: No weakness, fevers or chills  EYES/ENT: No visual changes;  No vertigo or throat pain   NECK: No pain or stiffness  RESPIRATORY: No cough, wheezing, hemoptysis; No shortness of breath  CARDIOVASCULAR: No chest pain or palpitations.  GASTROINTESTINAL: No abdominal or epigastric pain. No nausea, vomiting, or hematemesis; No diarrhea or constipation. No melena or hematochezia.  GENITOURINARY: No dysuria, frequency, foamy urine, urinary urgency, incontinence or hematuria  NEUROLOGICAL: No numbness or weakness  SKIN: No itching, burning, rashes, or lesions   VASCULAR: No bilateral lower extremity edema.   All other review of systems is negative unless indicated above.    VITALS:  T(F): 98.4 (23 @ 10:50), Max: 100 (23 @ 17:31)  HR: 84 (23 @ 14:45)  BP: 137/64 (23 @ 14:45)  RR: 20 (23 @ 14:45)  SpO2: 97% (23 @ 14:45)  Wt(kg): --     @ 07:  -   @ 07:00  --------------------------------------------------------  IN: 900 mL / OUT: 675 mL / NET: 225 mL          PHYSICAL EXAM:  General: NAD  HEENT: anicteric sclera, oropharynx clear, MMM  Neck: No JVD  Respiratory: CTAB, no wheezes, rales or rhonchi  Cardiovascular: S1, S2, RRR  Gastrointestinal: BS+, soft, NT/ND  Extremities: No cyanosis or clubbing. No peripheral edema  Neurological: A/O x 3, no focal deficits  Psychiatric: Normal mood, normal affect  : No CVA tenderness. No gonzalez.   Skin: No rashes  Vascular Access:    LABS:      147<H>  |  115<H>  |  21  ----------------------------<  274<H>  3.2<L>   |  20<L>  |  0.74    Ca    8.1<L>      2023 07:07  Phos  2.4       Mg     1.9         TPro  5.9<L>  /  Alb  2.2<L>  /  TBili  0.4  /  DBili      /  AST  12  /  ALT  9<L>  /  AlkPhos  111      Creatinine Trend: 0.74 <--, 0.72 <--, 0.71 <--, 0.74 <--, 0.65 <--, 0.74 <--, 0.92 <--                        10.0   15.78 )-----------( 207      ( 2023 07:06 )             32.8     Urine Studies:  Urinalysis Basic - ( 2023 13:15 )    Color: DARK BROWN / Appearance: Turbid / S.023 / pH:   Gluc:  / Ketone: Trace  / Bili: Negative / Urobili: 6 mg/dL   Blood:  / Protein: >600 / Nitrite: Negative   Leuk Esterase: Large / RBC: 15 /hpf / WBC 11-25   Sq Epi:  / Non Sq Epi: Few / Bacteria: Many          RADIOLOGY & ADDITIONAL STUDIES:    ACC: [id number] EXAM:  CT BRAIN   ORDERED BY: CORINNE MADERA     PROCEDURE DATE:  2023          INTERPRETATION:  CLINICAL STATEMENT: Altered mental status    TECHNIQUE: CT of the head was performed without IV contrast.  RAPID   artificial intelligence was utilized for the preliminary evaluation of   intracranial hemorrhage.    COMPARISON: MRI brain 2023.    FINDINGS:  There is mild diffuse parenchymal volume loss. There are areas of low   attenuation in the periventricular white matter likely related to mild   chronic microvascular ischemic changes.    Evolving small infarcts noted in the left corona radiata.   Age-indeterminate infarct also noted in the left temporal and parietal   lobe    There is no acute intracranial hemorrhage, parenchymal mass, mass effect   or midline shift.. There is no hydrocephalus. Partial empty sella noted    The cranium is intact. Calcification noted adjacent to left frontal   artery table. The visualized paranasal sinuses are well-aerated.        IMPRESSION:  No acute intracranial hemorrhage.    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    --- End of Report ---            PHILL DICKERSON MD; Attending Radiologist  This document has been electronically signed. 2023  2:07PM

## 2023-01-21 NOTE — SWALLOW BEDSIDE ASSESSMENT ADULT - SLP GENERAL OBSERVATIONS
Pt found in bed, sleeping upon encounter. Pt on room air, open mouth posture noted. Given verbal/tactile stimulation, patient briefly opens eyes, does not establish eye contact. Noted abdominal breathing which resolves when seated upright in bed. Covering RN Madelin alerted who immediately checked vital signs. O2 saturation 97%, HR 82 and /70. ROSANA Oconnor returned from break who reported same presentation yesterday and earlier today. Pt unable to achieve sufficient alertness for PO trials, despite frequent tactile stimulation. PO trials deferred. Pt left in upright position, VSS.

## 2023-01-21 NOTE — PROVIDER CONTACT NOTE (MEDICATION) - ASSESSMENT
Pt A&Ox0, lethargic, wakes up upon turning but then goes to sleep, unable to give PO meds, gonzalez leaking, unable to fix, need new gonzalez

## 2023-01-21 NOTE — CONSULT NOTE ADULT - ASSESSMENT
78F with dementia, T2DM, recently discharged about 1 week ago for CVA now presenting with poor PO intake, lethargy and hyperglycemia to 500s.   Of note, patient admitted 1/6 for R facial droop, word finding difficulties. Imaging concerning for acute L MCA infarct. Discharged home on 1/11. Majority of history obtained through family given mental status. Per family, since stroke patient nonverbal/unable to participate in meaningful communications, intermittently follows commands. For the last 3-4 days, patient with poor po intake. Reportedly only eating breakfast. Day of presentation, more lethargic with elevated finger sticks up to the 500s. Patient was evaluated by endocrine team during prior admission with recs to increase metformin to 1000mg BID. However, given poor PO intake, family has been giving 500mg daily.     In ED, afebrile, , 135/84. WBC 15, Na 158, K 3.0, Cl 120, Glu 468, alk phos 136m BHB 0.7. pH 7.36, lactate 2.2. UA: +leuk esterase, many bacteria, WBC 11-25  CXR: Redemonstrated biapical scarring and left upper lobe bronchiectasis, unchanged. No focal consolidation.   CTH No acute intracranial hemorrhage. 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  Given 1.5L NS, ceftriaxone x1, haldol 2.5mg x1 in ED.     Hypernatremia  Etiology due to poor po intake.   Would start LR at 75 cc/hr for 20 hours  Pt is 0.6 liter of free water deficit.  BMP daily. No need for more    Hypokalemia  Supplement as needed  Encourage po intake if pt gets less lethargic    Hypophosphatemia  Supplement as needed  Daily phosphorus     HTN  BP fluctuates but currently stable  if SBP>140 start with amlodipine    Proteinuria  Currently has UTI would repeat and then quantify    UTI  On Ceftriaxone  Will continue to monitor

## 2023-01-21 NOTE — SWALLOW BEDSIDE ASSESSMENT ADULT - SLP PERTINENT HISTORY OF CURRENT PROBLEM
H&P: 78F with dementia, T2DM, recently discharged about 1 week ago for CVA now presenting with poor PO intake, lethargy and hyperglycemia to 500s. H&P: 78F with dementia, T2DM, recently discharged about 1 week ago for CVA now presenting with poor PO intake, lethargy and hyperglycemia to 500s. Of note, patient admitted 1/6 for R facial droop, word finding difficulties. Imaging concerning for acute L MCA infarct. Discharged home on 1/11. Majority of history obtained through family given mental status. Per family, since stroke patient nonverbal/unable to participate in meaningful communications, intermittently follows commands. For the last 3-4 days, patient with poor po intake. Reportedly only eating breakfast. Day of presentation, more lethargic with elevated finger sticks up to the 500s. Patient was evaluated by endocrine team during prior admission with recs to increase metformin to 1000mg BID. However, given poor PO intake, family has been giving 500mg daily.

## 2023-01-21 NOTE — PROGRESS NOTE ADULT - SUBJECTIVE AND OBJECTIVE BOX
Name of Patient : SARAH DISLA  MRN: [id number]  Date of visit: 01-21-23       Subjective: Patient seen and examined. No new events except as noted.     REVIEW OF SYSTEMS:  more awake today  limited     MEDICATIONS:  MEDICATIONS  (STANDING):  aspirin  chewable 81 milliGRAM(s) Oral daily  atorvastatin 80 milliGRAM(s) Oral at bedtime  cefTRIAXone   IVPB 1000 milliGRAM(s) IV Intermittent every 24 hours  clopidogrel Tablet 75 milliGRAM(s) Oral daily  dextrose 5%. 1000 milliLiter(s) (100 mL/Hr) IV Continuous <Continuous>  dextrose 5%. 1000 milliLiter(s) (50 mL/Hr) IV Continuous <Continuous>  dextrose 50% Injectable 25 Gram(s) IV Push once  dextrose 50% Injectable 12.5 Gram(s) IV Push once  dextrose 50% Injectable 25 Gram(s) IV Push once  dextrose Oral Gel 15 Gram(s) Oral once  enoxaparin Injectable 40 milliGRAM(s) SubCutaneous every 24 hours  glucagon  Injectable 1 milliGRAM(s) IntraMuscular once  insulin glargine Injectable (LANTUS) 6 Unit(s) SubCutaneous at bedtime  insulin lispro (ADMELOG) corrective regimen sliding scale   SubCutaneous every 6 hours  lactated ringers. 1000 milliLiter(s) (50 mL/Hr) IV Continuous <Continuous>  tamsulosin 0.4 milliGRAM(s) Oral at bedtime      PHYSICAL EXAM:  T(C): 37.1 (01-21-23 @ 20:20), Max: 37.3 (01-21-23 @ 05:20)  HR: 85 (01-21-23 @ 20:20) (79 - 103)  BP: 106/58 (01-21-23 @ 20:20) (106/58 - 152/78)  RR: 20 (01-21-23 @ 20:20) (19 - 20)  SpO2: 97% (01-21-23 @ 20:20) (93% - 97%)  Wt(kg): --  I&O's Summary    20 Jan 2023 07:01  -  21 Jan 2023 07:00  --------------------------------------------------------  IN: 900 mL / OUT: 675 mL / NET: 225 mL    21 Jan 2023 07:01  -  21 Jan 2023 23:19  --------------------------------------------------------  IN: 250 mL / OUT: 450 mL / NET: -200 mL          Appearance: frail	  HEENT:  PERRLA   Lymphatic: No lymphadenopathy   Cardiovascular: Normal S1 S2, no JVD  Respiratory: normal effort , clear  Gastrointestinal:  Soft, Non-tender  Skin: No rashes,  warm to touch  Psychiatry:  Mood & affect appropriate  Musculuskeletal: No edema      All labs, Imaging and EKGs personally reviewed       01-20-23 @ 07:01  -  01-21-23 @ 07:00  --------------------------------------------------------  IN: 900 mL / OUT: 675 mL / NET: 225 mL    01-21-23 @ 07:01 - 01-21-23 @ 23:19  --------------------------------------------------------  IN: 250 mL / OUT: 450 mL / NET: -200 mL                            10.0   15.78 )-----------( 207      ( 21 Jan 2023 07:06 )             32.8               01-21    147<H>  |  115<H>  |  21  ----------------------------<  274<H>  3.2<L>   |  20<L>  |  0.74    Ca    8.1<L>      21 Jan 2023 07:07  Phos  2.4     01-21  Mg     1.9     01-21    TPro  5.9<L>  /  Alb  2.2<L>  /  TBili  0.4  /  DBili  x   /  AST  12  /  ALT  9<L>  /  AlkPhos  111  01-20                                Name of Patient : SARAH DISLA  MRN: [id number]  Date of visit: 01-21-23       Subjective: Patient seen and examined. No new events except as noted.     REVIEW OF SYSTEMS:  more awake today  limited     MEDICATIONS:  MEDICATIONS  (STANDING):  aspirin  chewable 81 milliGRAM(s) Oral daily  atorvastatin 80 milliGRAM(s) Oral at bedtime  cefTRIAXone   IVPB 1000 milliGRAM(s) IV Intermittent every 24 hours  clopidogrel Tablet 75 milliGRAM(s) Oral daily  dextrose 5%. 1000 milliLiter(s) (100 mL/Hr) IV Continuous <Continuous>  dextrose 5%. 1000 milliLiter(s) (50 mL/Hr) IV Continuous <Continuous>  dextrose 50% Injectable 25 Gram(s) IV Push once  dextrose 50% Injectable 12.5 Gram(s) IV Push once  dextrose 50% Injectable 25 Gram(s) IV Push once  dextrose Oral Gel 15 Gram(s) Oral once  enoxaparin Injectable 40 milliGRAM(s) SubCutaneous every 24 hours  glucagon  Injectable 1 milliGRAM(s) IntraMuscular once  insulin glargine Injectable (LANTUS) 6 Unit(s) SubCutaneous at bedtime  insulin lispro (ADMELOG) corrective regimen sliding scale   SubCutaneous every 6 hours  lactated ringers. 1000 milliLiter(s) (50 mL/Hr) IV Continuous <Continuous>  tamsulosin 0.4 milliGRAM(s) Oral at bedtime      PHYSICAL EXAM:  T(C): 37.1 (01-21-23 @ 20:20), Max: 37.3 (01-21-23 @ 05:20)  HR: 85 (01-21-23 @ 20:20) (79 - 103)  BP: 106/58 (01-21-23 @ 20:20) (106/58 - 152/78)  RR: 20 (01-21-23 @ 20:20) (19 - 20)  SpO2: 97% (01-21-23 @ 20:20) (93% - 97%)  Wt(kg): --  I&O's Summary    20 Jan 2023 07:01  -  21 Jan 2023 07:00  --------------------------------------------------------  IN: 900 mL / OUT: 675 mL / NET: 225 mL    21 Jan 2023 07:01  -  21 Jan 2023 23:19  --------------------------------------------------------  IN: 250 mL / OUT: 450 mL / NET: -200 mL          Appearance: frail	  HEENT:  PERRLA   Lymphatic: No lymphadenopathy   Cardiovascular: Normal S1 S2, no JVD  Respiratory: normal effort , clear  Gastrointestinal:  Soft, Non-tender  Skin: No rashes,  warm to touch  Psychiatry:  Mood & affect appropriate  Musculuskeletal: No edema      All labs, Imaging and EKGs personally reviewed       01-20-23 @ 07:01  -  01-21-23 @ 07:00  --------------------------------------------------------  IN: 900 mL / OUT: 675 mL / NET: 225 mL    01-21-23 @ 07:01 - 01-21-23 @ 23:19  --------------------------------------------------------  IN: 250 mL / OUT: 450 mL / NET: -200 mL                            10.0   15.78 )-----------( 207      ( 21 Jan 2023 07:06 )             32.8               01-21    147<H>  |  115<H>  |  21  ----------------------------<  274<H>  3.2<L>   |  20<L>  |  0.74    Ca    8.1<L>      21 Jan 2023 07:07  Phos  2.4     01-21  Mg     1.9     01-21    TPro  5.9<L>  /  Alb  2.2<L>  /  TBili  0.4  /  DBili  x   /  AST  12  /  ALT  9<L>  /  AlkPhos  111  01-20                                Name of Patient : SARAH DISLA  MRN: [id number]  Date of visit: 01-21-23       Subjective: Patient seen and examined. No new events except as noted.     REVIEW OF SYSTEMS:  more awake today  limited     MEDICATIONS:  MEDICATIONS  (STANDING):  aspirin  chewable 81 milliGRAM(s) Oral daily  atorvastatin 80 milliGRAM(s) Oral at bedtime  cefTRIAXone   IVPB 1000 milliGRAM(s) IV Intermittent every 24 hours  clopidogrel Tablet 75 milliGRAM(s) Oral daily  dextrose 5%. 1000 milliLiter(s) (100 mL/Hr) IV Continuous <Continuous>  dextrose 5%. 1000 milliLiter(s) (50 mL/Hr) IV Continuous <Continuous>  dextrose 50% Injectable 25 Gram(s) IV Push once  dextrose 50% Injectable 12.5 Gram(s) IV Push once  dextrose 50% Injectable 25 Gram(s) IV Push once  dextrose Oral Gel 15 Gram(s) Oral once  enoxaparin Injectable 40 milliGRAM(s) SubCutaneous every 24 hours  glucagon  Injectable 1 milliGRAM(s) IntraMuscular once  insulin glargine Injectable (LANTUS) 6 Unit(s) SubCutaneous at bedtime  insulin lispro (ADMELOG) corrective regimen sliding scale   SubCutaneous every 6 hours  lactated ringers. 1000 milliLiter(s) (50 mL/Hr) IV Continuous <Continuous>  tamsulosin 0.4 milliGRAM(s) Oral at bedtime      PHYSICAL EXAM:  T(C): 37.1 (01-21-23 @ 20:20), Max: 37.3 (01-21-23 @ 05:20)  HR: 85 (01-21-23 @ 20:20) (79 - 103)  BP: 106/58 (01-21-23 @ 20:20) (106/58 - 152/78)  RR: 20 (01-21-23 @ 20:20) (19 - 20)  SpO2: 97% (01-21-23 @ 20:20) (93% - 97%)  Wt(kg): --  I&O's Summary    20 Jan 2023 07:01  -  21 Jan 2023 07:00  --------------------------------------------------------  IN: 900 mL / OUT: 675 mL / NET: 225 mL    21 Jan 2023 07:01  -  21 Jan 2023 23:19  --------------------------------------------------------  IN: 250 mL / OUT: 450 mL / NET: -200 mL          Appearance: frail	  HEENT:  PERRLA   Lymphatic: No lymphadenopathy   Cardiovascular: Normal S1 S2, no JVD  Respiratory: normal effort , clear  Gastrointestinal:  Soft, Non-tender  Skin: No rashes,  warm to touch  Psychiatry:  Mood & affect appropriate  Musculuskeletal: No edema      All labs, Imaging and EKGs personally reviewed       01-20-23 @ 07:01  -  01-21-23 @ 07:00  --------------------------------------------------------  IN: 900 mL / OUT: 675 mL / NET: 225 mL    01-21-23 @ 07:01 - 01-21-23 @ 23:19  --------------------------------------------------------  IN: 250 mL / OUT: 450 mL / NET: -200 mL                            10.0   15.78 )-----------( 207      ( 21 Jan 2023 07:06 )             32.8               01-21    147<H>  |  115<H>  |  21  ----------------------------<  274<H>  3.2<L>   |  20<L>  |  0.74    Ca    8.1<L>      21 Jan 2023 07:07  Phos  2.4     01-21  Mg     1.9     01-21    TPro  5.9<L>  /  Alb  2.2<L>  /  TBili  0.4  /  DBili  x   /  AST  12  /  ALT  9<L>  /  AlkPhos  111  01-20

## 2023-01-21 NOTE — CHART NOTE - NSCHARTNOTEFT_GEN_A_CORE
Age: 78y    Gender: Female    POCT Blood Glucose:  240 mg/dL (01-21-23 @ 12:34)  272 mg/dL (01-21-23 @ 06:07)  262 mg/dL (01-21-23 @ 02:14)  161 mg/dL (01-20-23 @ 21:58)  198 mg/dL (01-20-23 @ 17:40)  246 mg/dL (01-20-23 @ 17:15)      eMAR:  insulin glargine Injectable (LANTUS)   6 Unit(s) SubCutaneous (01-20-23 @ 22:12)    insulin lispro (ADMELOG) corrective regimen sliding scale   2 Unit(s) SubCutaneous (01-21-23 @ 12:36)   3 Unit(s) SubCutaneous (01-21-23 @ 06:14)   2 Unit(s) SubCutaneous (01-20-23 @ 17:21)     POC glucose, insulin requirements, lab values reviewed.  FBG remains above goal  noted S&S now NPO   continue with current regimen Lantu s6 and admelog low correction scales as FBG above goal however now NPO

## 2023-01-21 NOTE — PROGRESS NOTE ADULT - ASSESSMENT
78F with dementia, T2DM, recently discharged about 1 week ago for CVA now presenting with poor PO intake, lethargy and hyperglycemia to 500s.     Altered mental status, UTI .   - Since recent stroke,  A&Ox0. More recently, poor po intake, lethargic. CTH no acute intracranial abnormalities. Multifactorial   - Multiple metabolic derangements - dehydration, hyperglycemia, hyperNa, hypoK  - c/w IVF D5 75 CC to run  - Monitor and replete electrolytes PRN   - UA with +leuk esterase, many bacteria. Will treat with ceftriaxone for now. F/u UCx, BCx. Monitor fever curve, WBC trend  - Neuro eval consulted; F/u recs   - Aspiration precautions     Uncontrolled type 2 diabetes mellitus with hyperglycemia.   - D/c 1 week ago with Metformin 500 mg BID  - Now with glucose 500s, improved to mid 300s with fluids. Also with mild ketosis (AG 17, bicarb 21, BHB 0.7)  - Endo eval appreciated, BHB now WNL  - Lantus 6 units QHs added to regimen & Sliding scale  - Monitor glucose levels closely, avoid hypo/hyperglycemia   -Check Ab given thin body habitus and ketosis (Glutamic Acid Decarboxylase, Zinc Transporter 8, Islet Cell Ab, and IA-2 Ab)  -Continue IVF administration  -FS q2h for now; if worsening hyperglycemia, may require insulin gtt/MICU eval.- F/u endo recs      Cerebrovascular accident (CVA).  - Recent admission w/ Acute L MCA infarct, severe stenosis of the L M2 branch. MR with L MCA Territory infarcts   - CTH on admission showing No acute intracranial hemorrhage. 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 D/w neurology, expected change seen on CT, not new lesions.   - C/w asa, plavix, statin.  - Neuro eval consulted; F/u recs     Hypernatremia.   - C/w D5 75 CC to run,   - F/u DM management as per above  - Monitor Na level closely, avoid overocrrection   - Na trending down , hydration as tolerated     HypoK  - Monitor and replete electrolytes PRN     Hyperlipidemia.   -c/w atorvastatin.    Hypertension.    -amlodipine 5mg daily.      PPX  - Lovenox 40 Qd

## 2023-01-22 LAB
-  AMIKACIN: SIGNIFICANT CHANGE UP
-  AMOXICILLIN/CLAVULANIC ACID: SIGNIFICANT CHANGE UP
-  AMPICILLIN/SULBACTAM: SIGNIFICANT CHANGE UP
-  AMPICILLIN: SIGNIFICANT CHANGE UP
-  AZTREONAM: SIGNIFICANT CHANGE UP
-  CEFAZOLIN: SIGNIFICANT CHANGE UP
-  CEFEPIME: SIGNIFICANT CHANGE UP
-  CEFOXITIN: SIGNIFICANT CHANGE UP
-  CEFTRIAXONE: SIGNIFICANT CHANGE UP
-  CEFUROXIME: SIGNIFICANT CHANGE UP
-  CIPROFLOXACIN: SIGNIFICANT CHANGE UP
-  ERTAPENEM: SIGNIFICANT CHANGE UP
-  GENTAMICIN: SIGNIFICANT CHANGE UP
-  IMIPENEM: SIGNIFICANT CHANGE UP
-  LEVOFLOXACIN: SIGNIFICANT CHANGE UP
-  MEROPENEM: SIGNIFICANT CHANGE UP
-  NITROFURANTOIN: SIGNIFICANT CHANGE UP
-  PIPERACILLIN/TAZOBACTAM: SIGNIFICANT CHANGE UP
-  TOBRAMYCIN: SIGNIFICANT CHANGE UP
-  TRIMETHOPRIM/SULFAMETHOXAZOLE: SIGNIFICANT CHANGE UP
ANION GAP SERPL CALC-SCNC: 14 MMOL/L — SIGNIFICANT CHANGE UP (ref 5–17)
ANION GAP SERPL CALC-SCNC: 15 MMOL/L — SIGNIFICANT CHANGE UP (ref 5–17)
BUN SERPL-MCNC: 20 MG/DL — SIGNIFICANT CHANGE UP (ref 7–23)
CALCIUM SERPL-MCNC: 8.3 MG/DL — LOW (ref 8.4–10.5)
CALCIUM SERPL-MCNC: 8.5 MG/DL — SIGNIFICANT CHANGE UP (ref 8.4–10.5)
CHLORIDE SERPL-SCNC: 111 MMOL/L — HIGH (ref 96–108)
CHLORIDE SERPL-SCNC: 112 MMOL/L — HIGH (ref 96–108)
CO2 SERPL-SCNC: 19 MMOL/L — LOW (ref 22–31)
CO2 SERPL-SCNC: 21 MMOL/L — LOW (ref 22–31)
CREAT SERPL-MCNC: 0.76 MG/DL — SIGNIFICANT CHANGE UP (ref 0.5–1.3)
CREAT SERPL-MCNC: 0.77 MG/DL — SIGNIFICANT CHANGE UP (ref 0.5–1.3)
CULTURE RESULTS: SIGNIFICANT CHANGE UP
EGFR: 78 ML/MIN/1.73M2 — SIGNIFICANT CHANGE UP
EGFR: 80 ML/MIN/1.73M2 — SIGNIFICANT CHANGE UP
GLUCOSE SERPL-MCNC: 156 MG/DL — HIGH (ref 70–99)
GLUCOSE SERPL-MCNC: 165 MG/DL — HIGH (ref 70–99)
HCT VFR BLD CALC: 36.6 % — SIGNIFICANT CHANGE UP (ref 34.5–45)
HGB BLD-MCNC: 11.3 G/DL — LOW (ref 11.5–15.5)
ISLET CELL512 AB SER-ACNC: SIGNIFICANT CHANGE UP
MAGNESIUM SERPL-MCNC: 1.9 MG/DL — SIGNIFICANT CHANGE UP (ref 1.6–2.6)
MCHC RBC-ENTMCNC: 27 PG — SIGNIFICANT CHANGE UP (ref 27–34)
MCHC RBC-ENTMCNC: 30.9 GM/DL — LOW (ref 32–36)
MCV RBC AUTO: 87.4 FL — SIGNIFICANT CHANGE UP (ref 80–100)
METHOD TYPE: SIGNIFICANT CHANGE UP
NRBC # BLD: 0 /100 WBCS — SIGNIFICANT CHANGE UP (ref 0–0)
ORGANISM # SPEC MICROSCOPIC CNT: SIGNIFICANT CHANGE UP
PHOSPHATE SERPL-MCNC: 2.7 MG/DL — SIGNIFICANT CHANGE UP (ref 2.5–4.5)
PLATELET # BLD AUTO: 217 K/UL — SIGNIFICANT CHANGE UP (ref 150–400)
POTASSIUM SERPL-MCNC: 3.3 MMOL/L — LOW (ref 3.5–5.3)
POTASSIUM SERPL-MCNC: 3.4 MMOL/L — LOW (ref 3.5–5.3)
POTASSIUM SERPL-SCNC: 3.3 MMOL/L — LOW (ref 3.5–5.3)
POTASSIUM SERPL-SCNC: 3.4 MMOL/L — LOW (ref 3.5–5.3)
RBC # BLD: 4.19 M/UL — SIGNIFICANT CHANGE UP (ref 3.8–5.2)
RBC # FLD: 14.8 % — HIGH (ref 10.3–14.5)
SODIUM SERPL-SCNC: 145 MMOL/L — SIGNIFICANT CHANGE UP (ref 135–145)
SODIUM SERPL-SCNC: 147 MMOL/L — HIGH (ref 135–145)
SPECIMEN SOURCE: SIGNIFICANT CHANGE UP
WBC # BLD: 14.91 K/UL — HIGH (ref 3.8–10.5)
WBC # FLD AUTO: 14.91 K/UL — HIGH (ref 3.8–10.5)

## 2023-01-22 PROCEDURE — 51703 INSERT BLADDER CATH COMPLEX: CPT

## 2023-01-22 PROCEDURE — 71045 X-RAY EXAM CHEST 1 VIEW: CPT | Mod: 26

## 2023-01-22 RX ORDER — ATORVASTATIN CALCIUM 80 MG/1
80 TABLET, FILM COATED ORAL AT BEDTIME
Refills: 0 | Status: DISCONTINUED | OUTPATIENT
Start: 2023-01-22 | End: 2023-02-06

## 2023-01-22 RX ORDER — POTASSIUM CHLORIDE 20 MEQ
20 PACKET (EA) ORAL ONCE
Refills: 0 | Status: COMPLETED | OUTPATIENT
Start: 2023-01-22 | End: 2023-01-22

## 2023-01-22 RX ORDER — INSULIN GLARGINE 100 [IU]/ML
4 INJECTION, SOLUTION SUBCUTANEOUS AT BEDTIME
Refills: 0 | Status: DISCONTINUED | OUTPATIENT
Start: 2023-01-22 | End: 2023-01-22

## 2023-01-22 RX ORDER — INSULIN GLARGINE 100 [IU]/ML
6 INJECTION, SOLUTION SUBCUTANEOUS AT BEDTIME
Refills: 0 | Status: DISCONTINUED | OUTPATIENT
Start: 2023-01-22 | End: 2023-01-25

## 2023-01-22 RX ORDER — POTASSIUM CHLORIDE 20 MEQ
10 PACKET (EA) ORAL
Refills: 0 | Status: COMPLETED | OUTPATIENT
Start: 2023-01-22 | End: 2023-01-22

## 2023-01-22 RX ORDER — INSULIN GLARGINE 100 [IU]/ML
3 INJECTION, SOLUTION SUBCUTANEOUS ONCE
Refills: 0 | Status: COMPLETED | OUTPATIENT
Start: 2023-01-22 | End: 2023-01-22

## 2023-01-22 RX ORDER — CEFTRIAXONE 500 MG/1
1000 INJECTION, POWDER, FOR SOLUTION INTRAMUSCULAR; INTRAVENOUS EVERY 24 HOURS
Refills: 0 | Status: DISCONTINUED | OUTPATIENT
Start: 2023-01-23 | End: 2023-01-24

## 2023-01-22 RX ADMIN — ENOXAPARIN SODIUM 40 MILLIGRAM(S): 100 INJECTION SUBCUTANEOUS at 05:14

## 2023-01-22 RX ADMIN — INSULIN GLARGINE 3 UNIT(S): 100 INJECTION, SOLUTION SUBCUTANEOUS at 22:45

## 2023-01-22 RX ADMIN — Medication 100 MILLIEQUIVALENT(S): at 11:02

## 2023-01-22 RX ADMIN — Medication 100 MILLIEQUIVALENT(S): at 12:57

## 2023-01-22 RX ADMIN — Medication 1: at 00:44

## 2023-01-22 RX ADMIN — ATORVASTATIN CALCIUM 80 MILLIGRAM(S): 80 TABLET, FILM COATED ORAL at 22:24

## 2023-01-22 RX ADMIN — CEFTRIAXONE 100 MILLIGRAM(S): 500 INJECTION, POWDER, FOR SOLUTION INTRAMUSCULAR; INTRAVENOUS at 14:25

## 2023-01-22 RX ADMIN — Medication 1: at 06:33

## 2023-01-22 RX ADMIN — Medication 20 MILLIEQUIVALENT(S): at 21:42

## 2023-01-22 RX ADMIN — Medication 100 MILLIEQUIVALENT(S): at 11:59

## 2023-01-22 NOTE — PROGRESS NOTE ADULT - SUBJECTIVE AND OBJECTIVE BOX
[facility]   MD PUSHPA WICK MD KRISTINE SOLTANPOUR, DO ANGELA WONG, PA    TEL:  OFFICE: [phone]  From 5pm-7am Answering Service [phone]    -- RENAL FOLLOW UP NOTE ---Date of Service 01-22-23 @ 22:39    Patient is a 78y old  Female who presents with a chief complaint of AMS, elevated finger sticks (22 Jan 2023 08:10)      Patient seen and examined at bedside. No chest pain/sob    VITALS:  T(F): 97.9 (01-22-23 @ 21:39), Max: 98.3 (01-22-23 @ 00:31)  HR: 101 (01-22-23 @ 21:39)  BP: 112/68 (01-22-23 @ 21:39)  RR: 18 (01-22-23 @ 21:39)  SpO2: 96% (01-22-23 @ 21:39)  Wt(kg): --    01-21 @ 07:01 - 01-22 @ 07:00  --------------------------------------------------------  IN: 250.4 mL / OUT: 800 mL / NET: -549.6 mL    01-22 @ 07:01 - 01-22 @ 22:39  --------------------------------------------------------  IN: 0 mL / OUT: 0 mL / NET: 0 mL          PHYSICAL EXAM:  General: NAD  Neck: No JVD  Respiratory: CTAB, no wheezes, rales or rhonchi  Cardiovascular: S1, S2, RRR  Gastrointestinal: BS+, soft, NT/ND  Extremities: No peripheral edema    Hospital Medications:   MEDICATIONS  (STANDING):  aspirin  chewable 81 milliGRAM(s) Oral daily  atorvastatin 80 milliGRAM(s) Oral at bedtime  clopidogrel Tablet 75 milliGRAM(s) Oral daily  dextrose 5%. 1000 milliLiter(s) (100 mL/Hr) IV Continuous <Continuous>  dextrose 5%. 1000 milliLiter(s) (50 mL/Hr) IV Continuous <Continuous>  dextrose 50% Injectable 25 Gram(s) IV Push once  dextrose 50% Injectable 12.5 Gram(s) IV Push once  dextrose 50% Injectable 25 Gram(s) IV Push once  dextrose Oral Gel 15 Gram(s) Oral once  enoxaparin Injectable 40 milliGRAM(s) SubCutaneous every 24 hours  glucagon  Injectable 1 milliGRAM(s) IntraMuscular once  insulin glargine Injectable (LANTUS) 3 Unit(s) SubCutaneous once  insulin glargine Injectable (LANTUS) 6 Unit(s) SubCutaneous at bedtime  insulin lispro (ADMELOG) corrective regimen sliding scale   SubCutaneous every 6 hours  lactated ringers. 1000 milliLiter(s) (50 mL/Hr) IV Continuous <Continuous>  tamsulosin 0.4 milliGRAM(s) Oral at bedtime      LABS:  01-22    145  |  111<H>  |  20  ----------------------------<  165<H>  3.4<L>   |  19<L>  |  0.76    Ca    8.3<L>      22 Jan 2023 18:41  Phos  2.7     01-22  Mg     1.9     01-22      Creatinine Trend: 0.76 <--, 0.77 <--, 0.74 <--, 0.72 <--, 0.71 <--, 0.74 <--, 0.65 <--, 0.74 <--, 0.92 <--    Phosphorus Level, Serum: 2.7 mg/dL (01-22 @ 07:12)                              11.3   14.91 )-----------( 217      ( 22 Jan 2023 07:10 )             36.6     Urine Studies:  Urinalysis - [01-19-23 @ 13:15]      Color DARK BROWN / Appearance Turbid / SG 1.023 / pH 7.0      Gluc 200 mg/dL / Ketone Trace  / Bili Negative / Urobili 6 mg/dL       Blood Large / Protein >600 / Leuk Est Large / Nitrite Negative      RBC 15 / WBC 11-25 / Hyaline  / Gran 4 / Sq Epi  / Non Sq Epi Few / Bacteria Many      Lipid: chol 238, TG 80, HDL 46, LDL --      [01-08-23 @ 02:47]        RADIOLOGY & ADDITIONAL STUDIES:   [facility] [place] [facility]   MD PUSHPA WICK MD KRISTINE SOLTANPOUR, DO ANGELA WONG, PA    TEL:  OFFICE: [phone]  From 5pm-7am Answering Service [phone]    -- RENAL FOLLOW UP NOTE ---Date of Service 01-22-23 @ 22:39    Patient is a 78y old  Female who presents with a chief complaint of AMS, elevated finger sticks (22 Jan 2023 08:10)      Patient seen and examined at bedside. No chest pain/sob    VITALS:  T(F): 97.9 (01-22-23 @ 21:39), Max: 98.3 (01-22-23 @ 00:31)  HR: 101 (01-22-23 @ 21:39)  BP: 112/68 (01-22-23 @ 21:39)  RR: 18 (01-22-23 @ 21:39)  SpO2: 96% (01-22-23 @ 21:39)  Wt(kg): --    01-21 @ 07:01 - 01-22 @ 07:00  --------------------------------------------------------  IN: 250.4 mL / OUT: 800 mL / NET: -549.6 mL    01-22 @ 07:01 - 01-22 @ 22:39  --------------------------------------------------------  IN: 0 mL / OUT: 0 mL / NET: 0 mL          PHYSICAL EXAM:  General: NAD  Neck: No JVD  Respiratory: CTAB, no wheezes, rales or rhonchi  Cardiovascular: S1, S2, RRR  Gastrointestinal: BS+, soft, NT/ND  Extremities: No peripheral edema    Hospital Medications:   MEDICATIONS  (STANDING):  aspirin  chewable 81 milliGRAM(s) Oral daily  atorvastatin 80 milliGRAM(s) Oral at bedtime  clopidogrel Tablet 75 milliGRAM(s) Oral daily  dextrose 5%. 1000 milliLiter(s) (100 mL/Hr) IV Continuous <Continuous>  dextrose 5%. 1000 milliLiter(s) (50 mL/Hr) IV Continuous <Continuous>  dextrose 50% Injectable 25 Gram(s) IV Push once  dextrose 50% Injectable 12.5 Gram(s) IV Push once  dextrose 50% Injectable 25 Gram(s) IV Push once  dextrose Oral Gel 15 Gram(s) Oral once  enoxaparin Injectable 40 milliGRAM(s) SubCutaneous every 24 hours  glucagon  Injectable 1 milliGRAM(s) IntraMuscular once  insulin glargine Injectable (LANTUS) 3 Unit(s) SubCutaneous once  insulin glargine Injectable (LANTUS) 6 Unit(s) SubCutaneous at bedtime  insulin lispro (ADMELOG) corrective regimen sliding scale   SubCutaneous every 6 hours  lactated ringers. 1000 milliLiter(s) (50 mL/Hr) IV Continuous <Continuous>  tamsulosin 0.4 milliGRAM(s) Oral at bedtime      LABS:  01-22    145  |  111<H>  |  20  ----------------------------<  165<H>  3.4<L>   |  19<L>  |  0.76    Ca    8.3<L>      22 Jan 2023 18:41  Phos  2.7     01-22  Mg     1.9     01-22      Creatinine Trend: 0.76 <--, 0.77 <--, 0.74 <--, 0.72 <--, 0.71 <--, 0.74 <--, 0.65 <--, 0.74 <--, 0.92 <--    Phosphorus Level, Serum: 2.7 mg/dL (01-22 @ 07:12)                              11.3   14.91 )-----------( 217      ( 22 Jan 2023 07:10 )             36.6     Urine Studies:  Urinalysis - [01-19-23 @ 13:15]      Color DARK BROWN / Appearance Turbid / SG 1.023 / pH 7.0      Gluc 200 mg/dL / Ketone Trace  / Bili Negative / Urobili 6 mg/dL       Blood Large / Protein >600 / Leuk Est Large / Nitrite Negative      RBC 15 / WBC 11-25 / Hyaline  / Gran 4 / Sq Epi  / Non Sq Epi Few / Bacteria Many      Lipid: chol 238, TG 80, HDL 46, LDL --      [01-08-23 @ 02:47]        RADIOLOGY & ADDITIONAL STUDIES:   [facility] [place] [facility]   MD PUSHPA WICK MD KRISTINE SOLTANPOUR, DO ANGELA WONG, PA    TEL:  OFFICE: [phone]  From 5pm-7am Answering Service [phone]    -- RENAL FOLLOW UP NOTE ---Date of Service 01-22-23 @ 22:39    Patient is a 78y old  Female who presents with a chief complaint of AMS, elevated finger sticks (22 Jan 2023 08:10)      Patient seen and examined at bedside. No chest pain/sob    VITALS:  T(F): 97.9 (01-22-23 @ 21:39), Max: 98.3 (01-22-23 @ 00:31)  HR: 101 (01-22-23 @ 21:39)  BP: 112/68 (01-22-23 @ 21:39)  RR: 18 (01-22-23 @ 21:39)  SpO2: 96% (01-22-23 @ 21:39)  Wt(kg): --    01-21 @ 07:01 - 01-22 @ 07:00  --------------------------------------------------------  IN: 250.4 mL / OUT: 800 mL / NET: -549.6 mL    01-22 @ 07:01 - 01-22 @ 22:39  --------------------------------------------------------  IN: 0 mL / OUT: 0 mL / NET: 0 mL          PHYSICAL EXAM:  General: NAD  Neck: No JVD  Respiratory: CTAB, no wheezes, rales or rhonchi  Cardiovascular: S1, S2, RRR  Gastrointestinal: BS+, soft, NT/ND  Extremities: No peripheral edema    Hospital Medications:   MEDICATIONS  (STANDING):  aspirin  chewable 81 milliGRAM(s) Oral daily  atorvastatin 80 milliGRAM(s) Oral at bedtime  clopidogrel Tablet 75 milliGRAM(s) Oral daily  dextrose 5%. 1000 milliLiter(s) (100 mL/Hr) IV Continuous <Continuous>  dextrose 5%. 1000 milliLiter(s) (50 mL/Hr) IV Continuous <Continuous>  dextrose 50% Injectable 25 Gram(s) IV Push once  dextrose 50% Injectable 12.5 Gram(s) IV Push once  dextrose 50% Injectable 25 Gram(s) IV Push once  dextrose Oral Gel 15 Gram(s) Oral once  enoxaparin Injectable 40 milliGRAM(s) SubCutaneous every 24 hours  glucagon  Injectable 1 milliGRAM(s) IntraMuscular once  insulin glargine Injectable (LANTUS) 3 Unit(s) SubCutaneous once  insulin glargine Injectable (LANTUS) 6 Unit(s) SubCutaneous at bedtime  insulin lispro (ADMELOG) corrective regimen sliding scale   SubCutaneous every 6 hours  lactated ringers. 1000 milliLiter(s) (50 mL/Hr) IV Continuous <Continuous>  tamsulosin 0.4 milliGRAM(s) Oral at bedtime      LABS:  01-22    145  |  111<H>  |  20  ----------------------------<  165<H>  3.4<L>   |  19<L>  |  0.76    Ca    8.3<L>      22 Jan 2023 18:41  Phos  2.7     01-22  Mg     1.9     01-22      Creatinine Trend: 0.76 <--, 0.77 <--, 0.74 <--, 0.72 <--, 0.71 <--, 0.74 <--, 0.65 <--, 0.74 <--, 0.92 <--    Phosphorus Level, Serum: 2.7 mg/dL (01-22 @ 07:12)                              11.3   14.91 )-----------( 217      ( 22 Jan 2023 07:10 )             36.6     Urine Studies:  Urinalysis - [01-19-23 @ 13:15]      Color DARK BROWN / Appearance Turbid / SG 1.023 / pH 7.0      Gluc 200 mg/dL / Ketone Trace  / Bili Negative / Urobili 6 mg/dL       Blood Large / Protein >600 / Leuk Est Large / Nitrite Negative      RBC 15 / WBC 11-25 / Hyaline  / Gran 4 / Sq Epi  / Non Sq Epi Few / Bacteria Many      Lipid: chol 238, TG 80, HDL 46, LDL --      [01-08-23 @ 02:47]        RADIOLOGY & ADDITIONAL STUDIES:

## 2023-01-22 NOTE — PROGRESS NOTE ADULT - SUBJECTIVE AND OBJECTIVE BOX
Name of Patient : SARAH DISLA  MRN: [id number]  Date of visit: 01-22-23      Subjective: Patient seen and examined. No new events except as noted.   family at the bedside  arousable, goes back to sleep        MEDICATIONS:  MEDICATIONS  (STANDING):  aspirin  chewable 81 milliGRAM(s) Oral daily  atorvastatin 80 milliGRAM(s) Oral at bedtime  clopidogrel Tablet 75 milliGRAM(s) Oral daily  dextrose 5%. 1000 milliLiter(s) (100 mL/Hr) IV Continuous <Continuous>  dextrose 5%. 1000 milliLiter(s) (50 mL/Hr) IV Continuous <Continuous>  dextrose 50% Injectable 25 Gram(s) IV Push once  dextrose 50% Injectable 12.5 Gram(s) IV Push once  dextrose 50% Injectable 25 Gram(s) IV Push once  dextrose Oral Gel 15 Gram(s) Oral once  enoxaparin Injectable 40 milliGRAM(s) SubCutaneous every 24 hours  glucagon  Injectable 1 milliGRAM(s) IntraMuscular once  insulin glargine Injectable (LANTUS) 6 Unit(s) SubCutaneous at bedtime  insulin lispro (ADMELOG) corrective regimen sliding scale   SubCutaneous every 6 hours  lactated ringers. 1000 milliLiter(s) (50 mL/Hr) IV Continuous <Continuous>  tamsulosin 0.4 milliGRAM(s) Oral at bedtime      PHYSICAL EXAM:  T(C): 36.6 (01-22-23 @ 21:39), Max: 36.8 (01-22-23 @ 00:31)  HR: 101 (01-22-23 @ 21:39) (89 - 105)  BP: 112/68 (01-22-23 @ 21:39) (112/68 - 147/83)  RR: 18 (01-22-23 @ 21:39) (18 - 20)  SpO2: 96% (01-22-23 @ 21:39) (95% - 98%)  Wt(kg): --  I&O's Summary    21 Jan 2023 07:01  -  22 Jan 2023 07:00  --------------------------------------------------------  IN: 250.4 mL / OUT: 800 mL / NET: -549.6 mL    22 Jan 2023 07:01  -  22 Jan 2023 23:24  --------------------------------------------------------  IN: 250 mL / OUT: 450 mL / NET: -200 mL          Appearance: frail, repsonds to voice and pain   HEENT:  PERRLA   Lymphatic: No lymphadenopathy   Cardiovascular: Normal S1 S2, no JVD  Respiratory: normal effort , clear  Gastrointestinal:  Soft, Non-tender  Skin: No rashes,  warm to touch  Musculuskeletal: No edema      All labs, Imaging and EKGs personally reviewed       01-21-23 @ 07:01  -  01-22-23 @ 07:00  --------------------------------------------------------  IN: 250.4 mL / OUT: 800 mL / NET: -549.6 mL    01-22-23 @ 07:01  -  01-22-23 @ 23:24  --------------------------------------------------------  IN: 250 mL / OUT: 450 mL / NET: -200 mL                          11.3   14.91 )-----------( 217      ( 22 Jan 2023 07:10 )             36.6               01-22    145  |  111<H>  |  20  ----------------------------<  165<H>  3.4<L>   |  19<L>  |  0.76    Ca    8.3<L>      22 Jan 2023 18:41  Phos  2.7     01-22  Mg     1.9     01-22                                    Name of Patient : SARAH DISLA  MRN: [id number]  Date of visit: 01-22-23      Subjective: Patient seen and examined. No new events except as noted.   family at the bedside  arousable, goes back to sleep        MEDICATIONS:  MEDICATIONS  (STANDING):  aspirin  chewable 81 milliGRAM(s) Oral daily  atorvastatin 80 milliGRAM(s) Oral at bedtime  clopidogrel Tablet 75 milliGRAM(s) Oral daily  dextrose 5%. 1000 milliLiter(s) (100 mL/Hr) IV Continuous <Continuous>  dextrose 5%. 1000 milliLiter(s) (50 mL/Hr) IV Continuous <Continuous>  dextrose 50% Injectable 25 Gram(s) IV Push once  dextrose 50% Injectable 12.5 Gram(s) IV Push once  dextrose 50% Injectable 25 Gram(s) IV Push once  dextrose Oral Gel 15 Gram(s) Oral once  enoxaparin Injectable 40 milliGRAM(s) SubCutaneous every 24 hours  glucagon  Injectable 1 milliGRAM(s) IntraMuscular once  insulin glargine Injectable (LANTUS) 6 Unit(s) SubCutaneous at bedtime  insulin lispro (ADMELOG) corrective regimen sliding scale   SubCutaneous every 6 hours  lactated ringers. 1000 milliLiter(s) (50 mL/Hr) IV Continuous <Continuous>  tamsulosin 0.4 milliGRAM(s) Oral at bedtime      PHYSICAL EXAM:  T(C): 36.6 (01-22-23 @ 21:39), Max: 36.8 (01-22-23 @ 00:31)  HR: 101 (01-22-23 @ 21:39) (89 - 105)  BP: 112/68 (01-22-23 @ 21:39) (112/68 - 147/83)  RR: 18 (01-22-23 @ 21:39) (18 - 20)  SpO2: 96% (01-22-23 @ 21:39) (95% - 98%)  Wt(kg): --  I&O's Summary    21 Jan 2023 07:01  -  22 Jan 2023 07:00  --------------------------------------------------------  IN: 250.4 mL / OUT: 800 mL / NET: -549.6 mL    22 Jan 2023 07:01  -  22 Jan 2023 23:24  --------------------------------------------------------  IN: 250 mL / OUT: 450 mL / NET: -200 mL          Appearance: frail, repsonds to voice and pain   HEENT:  PERRLA   Lymphatic: No lymphadenopathy   Cardiovascular: Normal S1 S2, no JVD  Respiratory: normal effort , clear  Gastrointestinal:  Soft, Non-tender  Skin: No rashes,  warm to touch  Musculuskeletal: No edema      All labs, Imaging and EKGs personally reviewed       01-21-23 @ 07:01  -  01-22-23 @ 07:00  --------------------------------------------------------  IN: 250.4 mL / OUT: 800 mL / NET: -549.6 mL    01-22-23 @ 07:01  -  01-22-23 @ 23:24  --------------------------------------------------------  IN: 250 mL / OUT: 450 mL / NET: -200 mL                          11.3   14.91 )-----------( 217      ( 22 Jan 2023 07:10 )             36.6               01-22    145  |  111<H>  |  20  ----------------------------<  165<H>  3.4<L>   |  19<L>  |  0.76    Ca    8.3<L>      22 Jan 2023 18:41  Phos  2.7     01-22  Mg     1.9     01-22                                    Name of Patient : SARAH DISLA  MRN: [id number]  Date of visit: 01-22-23      Subjective: Patient seen and examined. No new events except as noted.   family at the bedside  arousable, goes back to sleep        MEDICATIONS:  MEDICATIONS  (STANDING):  aspirin  chewable 81 milliGRAM(s) Oral daily  atorvastatin 80 milliGRAM(s) Oral at bedtime  clopidogrel Tablet 75 milliGRAM(s) Oral daily  dextrose 5%. 1000 milliLiter(s) (100 mL/Hr) IV Continuous <Continuous>  dextrose 5%. 1000 milliLiter(s) (50 mL/Hr) IV Continuous <Continuous>  dextrose 50% Injectable 25 Gram(s) IV Push once  dextrose 50% Injectable 12.5 Gram(s) IV Push once  dextrose 50% Injectable 25 Gram(s) IV Push once  dextrose Oral Gel 15 Gram(s) Oral once  enoxaparin Injectable 40 milliGRAM(s) SubCutaneous every 24 hours  glucagon  Injectable 1 milliGRAM(s) IntraMuscular once  insulin glargine Injectable (LANTUS) 6 Unit(s) SubCutaneous at bedtime  insulin lispro (ADMELOG) corrective regimen sliding scale   SubCutaneous every 6 hours  lactated ringers. 1000 milliLiter(s) (50 mL/Hr) IV Continuous <Continuous>  tamsulosin 0.4 milliGRAM(s) Oral at bedtime      PHYSICAL EXAM:  T(C): 36.6 (01-22-23 @ 21:39), Max: 36.8 (01-22-23 @ 00:31)  HR: 101 (01-22-23 @ 21:39) (89 - 105)  BP: 112/68 (01-22-23 @ 21:39) (112/68 - 147/83)  RR: 18 (01-22-23 @ 21:39) (18 - 20)  SpO2: 96% (01-22-23 @ 21:39) (95% - 98%)  Wt(kg): --  I&O's Summary    21 Jan 2023 07:01  -  22 Jan 2023 07:00  --------------------------------------------------------  IN: 250.4 mL / OUT: 800 mL / NET: -549.6 mL    22 Jan 2023 07:01  -  22 Jan 2023 23:24  --------------------------------------------------------  IN: 250 mL / OUT: 450 mL / NET: -200 mL          Appearance: frail, repsonds to voice and pain   HEENT:  PERRLA   Lymphatic: No lymphadenopathy   Cardiovascular: Normal S1 S2, no JVD  Respiratory: normal effort , clear  Gastrointestinal:  Soft, Non-tender  Skin: No rashes,  warm to touch  Musculuskeletal: No edema      All labs, Imaging and EKGs personally reviewed       01-21-23 @ 07:01  -  01-22-23 @ 07:00  --------------------------------------------------------  IN: 250.4 mL / OUT: 800 mL / NET: -549.6 mL    01-22-23 @ 07:01  -  01-22-23 @ 23:24  --------------------------------------------------------  IN: 250 mL / OUT: 450 mL / NET: -200 mL                          11.3   14.91 )-----------( 217      ( 22 Jan 2023 07:10 )             36.6               01-22    145  |  111<H>  |  20  ----------------------------<  165<H>  3.4<L>   |  19<L>  |  0.76    Ca    8.3<L>      22 Jan 2023 18:41  Phos  2.7     01-22  Mg     1.9     01-22

## 2023-01-22 NOTE — PROGRESS NOTE ADULT - ASSESSMENT
78F with dementia, T2DM, recently discharged about 1 week ago for CVA now presenting with poor PO intake, lethargy and hyperglycemia to 500s.   Of note, patient admitted 1/6 for R facial droop, word finding difficulties. Imaging concerning for acute L MCA infarct. Discharged home on 1/11. Majority of history obtained through family given mental status. Per family, since stroke patient nonverbal/unable to participate in meaningful communications, intermittently follows commands. For the last 3-4 days, patient with poor po intake. Reportedly only eating breakfast. Day of presentation, more lethargic with elevated finger sticks up to the 500s. Patient was evaluated by endocrine team during prior admission with recs to increase metformin to 1000mg BID. However, given poor PO intake, family has been giving 500mg daily.     In ED, afebrile, , 135/84. WBC 15, Na 158, K 3.0, Cl 120, Glu 468, alk phos 136m BHB 0.7. pH 7.36, lactate 2.2. UA: +leuk esterase, many bacteria, WBC 11-25  CXR: Redemonstrated biapical scarring and left upper lobe bronchiectasis, unchanged. No focal consolidation.   CTH No acute intracranial hemorrhage. 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  Given 1.5L NS, ceftriaxone x1, haldol 2.5mg x1 in ED.     Hypernatremia  Improving  Etiology due to poor po intake.   Continue LR for 20 more hours at 50 cc/hr.   BMP daily. No need for more    Hypokalemia  Supplement as needed  Encourage po intake if pt gets less lethargic    Hypophosphatemia  Supplement as needed  Daily phosphorus     HTN  BP fluctuates but currently stable  if SBP>140 start with amlodipine    Proteinuria  Currently has UTI would repeat and then quantify    UTI  On Ceftriaxone

## 2023-01-22 NOTE — PROGRESS NOTE ADULT - ASSESSMENT
78F with dementia, T2DM, recently discharged about 1 week ago for CVA now presenting with poor PO intake, lethargy and hyperglycemia to 500s.   Of note, patient admitted 1/6 for R facial droop, word finding difficulties. Imaging concerning for acute L MCA infarct.  Per family, since stroke patient nonverbal/unable to participate in meaningful communications, intermittently follows commands. For the last 3-4 days, patient with poor po intake.    In ED, afebrile, , 135/84. WBC 15, Na 158, K 3.0, Cl 120, Glu 468, alk phos 136m BHB 0.7. pH 7.36, lactate 2.2.   UA: +leuk esterase, many bacteria, WBC 11-25  CXR: Redemonstrated biapical scarring and left upper lobe bronchiectasis, unchanged. No focal consolidation.   CTH No acute intracranial hemorrhage. 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  Given 1.5L NS, ceftriaxone x1, haldol 2.5mg x1 in ED.   last admission: CTA with L MCA severe stenosis; distal L M2 occlusion .  TTE 1/8/23: EF 63% Mild mitral regurgitation ; A1c 9.4     Impression  1) AMS likely 2/2 infection/UTI toxic metabolic encephalopahthy   2) recent stroke - L MCA infarct 2/2 symptomatic L MCA ICAD ; worsening of R HP in setting of infection     - f/u endocrine   - treatment of infection per primary team -->  CTX  - for secondary stroke prevention. DAPT x 3 months followeed by ASA 81mg dialy thereafter.   - hihg dose statin lipitor 40mg dialy   - avoid hypotension given L MCA ICAD   - telemetry  - PT/OT/SS/SLP, OOBC  - check FS, glucose control <180  - GI/DVT ppx  - Thank you for allowing me to participate in the care of this patient. Call with questions.   - spoke with daughter at bedside   Reji Greco MD  Vascular Neurology  Office: [phone]  78F with dementia, T2DM, recently discharged about 1 week ago for CVA now presenting with poor PO intake, lethargy and hyperglycemia to 500s.   Of note, patient admitted 1/6 for R facial droop, word finding difficulties. Imaging concerning for acute L MCA infarct.  Per family, since stroke patient nonverbal/unable to participate in meaningful communications, intermittently follows commands. For the last 3-4 days, patient with poor po intake.    In ED, afebrile, , 135/84. WBC 15, Na 158, K 3.0, Cl 120, Glu 468, alk phos 136m BHB 0.7. pH 7.36, lactate 2.2.   UA: +leuk esterase, many bacteria, WBC 11-25  CXR: Redemonstrated biapical scarring and left upper lobe bronchiectasis, unchanged. No focal consolidation.   CTH No acute intracranial hemorrhage. 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  Given 1.5L NS, ceftriaxone x1, haldol 2.5mg x1 in ED.   last admission: CTA with L MCA severe stenosis; distal L M2 occlusion .  TTE 1/8/23: EF 63% Mild mitral regurgitation ; A1c 9.4     Impression  1) AMS likely 2/2 infection/UTI toxic metabolic encephalopahthy   2) recent stroke - L MCA infarct 2/2 symptomatic L MCA ICAD ; worsening of R HP in setting of infection     - f/u endocrine   - treatment of infection per primary team -->  CTX  - for secondary stroke prevention. DAPT x 3 months followeed by ASA 81mg dialy thereafter.   - hihg dose statin lipitor 40mg dialy   - avoid hypotension given L MCA ICAD   - telemetry  - PT/OT/SS/SLP, OOBC  - check FS, glucose control <180  - GI/DVT ppx  - Thank you for allowing me to participate in the care of this patient. Call with questions.   - spoke with daughter at bedside   Reji Greco MD  Vascular Neurology  Office: [phone]  78F with dementia, T2DM, recently discharged about 1 week ago for CVA now presenting with poor PO intake, lethargy and hyperglycemia to 500s.   Of note, patient admitted 1/6 for R facial droop, word finding difficulties. Imaging concerning for acute L MCA infarct.  Per family, since stroke patient nonverbal/unable to participate in meaningful communications, intermittently follows commands. For the last 3-4 days, patient with poor po intake.    In ED, afebrile, , 135/84. WBC 15, Na 158, K 3.0, Cl 120, Glu 468, alk phos 136m BHB 0.7. pH 7.36, lactate 2.2.   UA: +leuk esterase, many bacteria, WBC 11-25  CXR: Redemonstrated biapical scarring and left upper lobe bronchiectasis, unchanged. No focal consolidation.   CTH No acute intracranial hemorrhage. 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  Given 1.5L NS, ceftriaxone x1, haldol 2.5mg x1 in ED.   last admission: CTA with L MCA severe stenosis; distal L M2 occlusion .  TTE 1/8/23: EF 63% Mild mitral regurgitation ; A1c 9.4     Impression  1) AMS likely 2/2 infection/UTI toxic metabolic encephalopahthy   2) recent stroke - L MCA infarct 2/2 symptomatic L MCA ICAD ; worsening of R HP in setting of infection     - f/u endocrine   - treatment of infection per primary team -->  CTX  - for secondary stroke prevention. DAPT x 3 months followeed by ASA 81mg dialy thereafter.   - hihg dose statin lipitor 40mg dialy   - avoid hypotension given L MCA ICAD   - telemetry  - PT/OT/SS/SLP, OOBC  - check FS, glucose control <180  - GI/DVT ppx  - Thank you for allowing me to participate in the care of this patient. Call with questions.   - spoke with daughter at bedside   Reji Greco MD  Vascular Neurology  Office: [phone]

## 2023-01-22 NOTE — CHART NOTE - NSCHARTNOTEFT_GEN_A_CORE
Age: 78y    Gender: Female    POCT Blood Glucose:  150 mg/dL (01-22-23 @ 12:55)  153 mg/dL (01-22-23 @ 06:31)  158 mg/dL (01-22-23 @ 00:25)  137 mg/dL (01-21-23 @ 21:54)  148 mg/dL (01-21-23 @ 17:40)      eMAR:  insulin glargine Injectable (LANTUS)   3 Unit(s) SubCutaneous (01-21-23 @ 22:35)    insulin lispro (ADMELOG) corrective regimen sliding scale   1 Unit(s) SubCutaneous (01-22-23 @ 06:33)   1 Unit(s) SubCutaneous (01-22-23 @ 00:44)    Diet, Pureed:   Consistent Carbohydrate {No Snacks} (CSTCHO)  DASH/TLC {Sodium & Cholesterol Restricted} (DASH) (01-22-23 @ 11:09) [Active]       POC glucose, insulin requirements, lab values reviewed.  received lantus 3 units for npo status last night per team with FBG reasonable. noted now on puree diet  recommend:   -Lantus 6 units sq qhs  -Continue Admelog low dose correctional scale before each meal and low dose correctional scale at bedtime. Hold standing Admelog premeal   -Follow up antibodies, given thin body habitus and ketosis (Glutamic Acid Decarboxylase, Zinc Transporter 8, Islet Cell Ab, and IA-2 Ab)  -FS q6  -BG goal 100 to 180mg/dL   -Hypoglycemia protocol   -Carb consistent diet Age: 78y    Gender: Female    POCT Blood Glucose:  150 mg/dL (01-22-23 @ 12:55)  153 mg/dL (01-22-23 @ 06:31)  158 mg/dL (01-22-23 @ 00:25)  137 mg/dL (01-21-23 @ 21:54)  148 mg/dL (01-21-23 @ 17:40)      eMAR:  insulin glargine Injectable (LANTUS)   3 Unit(s) SubCutaneous (01-21-23 @ 22:35)    insulin lispro (ADMELOG) corrective regimen sliding scale   1 Unit(s) SubCutaneous (01-22-23 @ 06:33)   1 Unit(s) SubCutaneous (01-22-23 @ 00:44)    Diet, Pureed:   Consistent Carbohydrate {No Snacks} (CSTCHO)  DASH/TLC {Sodium & Cholesterol Restricted} (DASH) (01-22-23 @ 11:09) [Active]       POC glucose, insulin requirements, lab values reviewed.  received lantus 3 units for npo status last night per team with FBG reasonable. noted now on puree CHO diet  recommend:   -Lantus 6 units sq qhs  -Continue Admelog low dose correctional scale before each meal and low dose correctional scale at bedtime. Hold standing Admelog premeal   -Follow up antibodies, given thin body habitus and ketosis (Glutamic Acid Decarboxylase, Zinc Transporter 8, Islet Cell Ab, and IA-2 Ab)  -FS q6  -BG goal 100 to 180mg/dL   -Hypoglycemia protocol   -Carb consistent diet

## 2023-01-22 NOTE — CHART NOTE - NSCHARTNOTEFT_GEN_A_CORE
Hospitalist Medicine PA    Informed by attending Dr. Castillo to place NG tube. Patient's daughter at bedside and agreeable for procedure.     T(C): 36.8 (01-22-23 @ 12:41), Max: 37.1 (01-21-23 @ 20:20)  T(F): 98.2 (01-22-23 @ 12:41), Max: 98.7 (01-21-23 @ 20:20)  HR: 105 (01-22-23 @ 12:41) (85 - 105)  BP: 127/71 (01-22-23 @ 12:41) (106/58 - 147/83)  RR: 20 (01-22-23 @ 12:41) (20 - 20)  SpO2: 98% (01-22-23 @ 12:41) (95% - 98%)      NGT inserted into right nare. Pt tolerated procedure well. Placement verified via auscultation. CXR ordered to confirm placement. Primary team to follow up results of CXR.

## 2023-01-22 NOTE — PROGRESS NOTE ADULT - SUBJECTIVE AND OBJECTIVE BOX
Neurology Progress Note    S: Patient seen and examined.  lethargic     Medication:  aspirin  chewable 81 milliGRAM(s) Oral daily  atorvastatin 80 milliGRAM(s) Oral at bedtime  cefTRIAXone   IVPB 1000 milliGRAM(s) IV Intermittent every 24 hours  clopidogrel Tablet 75 milliGRAM(s) Oral daily  dextrose 5%. 1000 milliLiter(s) IV Continuous <Continuous>  dextrose 5%. 1000 milliLiter(s) IV Continuous <Continuous>  dextrose 50% Injectable 25 Gram(s) IV Push once  dextrose 50% Injectable 12.5 Gram(s) IV Push once  dextrose 50% Injectable 25 Gram(s) IV Push once  dextrose Oral Gel 15 Gram(s) Oral once  enoxaparin Injectable 40 milliGRAM(s) SubCutaneous every 24 hours  glucagon  Injectable 1 milliGRAM(s) IntraMuscular once  insulin glargine Injectable (LANTUS) 6 Unit(s) SubCutaneous at bedtime  insulin lispro (ADMELOG) corrective regimen sliding scale   SubCutaneous every 6 hours  lactated ringers. 1000 milliLiter(s) IV Continuous <Continuous>  tamsulosin 0.4 milliGRAM(s) Oral at bedtime      Vitals:  Vital Signs Last 24 Hrs  T(C): 36.8 (22 Jan 2023 12:41), Max: 37.1 (21 Jan 2023 20:20)  T(F): 98.2 (22 Jan 2023 12:41), Max: 98.7 (21 Jan 2023 20:20)  HR: 105 (22 Jan 2023 12:41) (84 - 105)  BP: 127/71 (22 Jan 2023 12:41) (106/58 - 147/83)  BP(mean): --  RR: 20 (22 Jan 2023 12:41) (20 - 20)  SpO2: 98% (22 Jan 2023 12:41) (95% - 98%)    Parameters below as of 22 Jan 2023 12:41  Patient On (Oxygen Delivery Method): room air        General Exam:   General Appearance: Appropriately dressed and in no acute distress       Head: Normocephalic, atraumatic and no dysmorphic features  Ear, Nose, and Throat: Moist mucous membranes  CVS: S1S2+  Resp: No SOB, no wheeze or rhonchi  Abd: soft NTND  Extremities: No edema, no cyanosis  Skin: No bruises, no rashes        NEUROLOGICAL EXAM: Family at bedside who provided translation   Mental status: Eyes open, awake, alert, attempts to produce speech, able to follow some simple commands, able to mimic at times , unable to ID objects  Cranial Nerves: Right facial droop, no nystagmus, blinks to threat B/L  Motor exam: Normal tone, no drift, Right hemiparesis RUE drift, 3-44/5, RLE drift, 3-4/5,, LUE 5/5, LLE 5/5  Sensation: Intact to light touch   Coordination/ Gait: Unable to participate with exam     I personally reviewed the below data/images/labs:      CBC Full  -  ( 22 Jan 2023 07:10 )  WBC Count : 14.91 K/uL  RBC Count : 4.19 M/uL  Hemoglobin : 11.3 g/dL  Hematocrit : 36.6 %  Platelet Count - Automated : 217 K/uL  Mean Cell Volume : 87.4 fl  Mean Cell Hemoglobin : 27.0 pg  Mean Cell Hemoglobin Concentration : 30.9 gm/dL  Auto Neutrophil # : x  Auto Lymphocyte # : x  Auto Monocyte # : x  Auto Eosinophil # : x  Auto Basophil # : x  Auto Neutrophil % : x  Auto Lymphocyte % : x  Auto Monocyte % : x  Auto Eosinophil % : x  Auto Basophil % : x    01-22    147<H>  |  112<H>  |  20  ----------------------------<  156<H>  3.3<L>   |  21<L>  |  0.77    Ca    8.5      22 Jan 2023 07:12  Phos  2.7     01-22  Mg     1.9     01-22        `< from: CT Head No Cont (01.19.23 @ 13:45) >    ACC: [id number] EXAM:  CT BRAIN   ORDERED BY: CORINNE MADERA     PROCEDURE DATE:  01/19/2023          INTERPRETATION:  CLINICAL STATEMENT: Altered mental status    TECHNIQUE: CT of the head was performed without IV contrast.  RAPID   artificial intelligence was utilized for the preliminary evaluation of   intracranial hemorrhage.    COMPARISON: MRI brain 1/7/2023.    FINDINGS:  There is mild diffuse parenchymal volume loss. There are areas of low   attenuation in the periventricular white matter likely related to mild   chronic microvascular ischemic changes.    Evolving small infarcts noted in the left corona radiata.   Age-indeterminate infarct also noted in the left temporal and parietal   lobe    There is no acute intracranial hemorrhage, parenchymal mass, mass effect   or midline shift.. There is no hydrocephalus. Partial empty sella noted    The cranium is intact. Calcification noted adjacent to left frontal   artery table. The visualized paranasal sinuses are well-aerated.        IMPRESSION:  No acute intracranial hemorrhage.    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    --- End of Report ---                 Neurology Progress Note    S: Patient seen and examined.  lethargic     Medication:  aspirin  chewable 81 milliGRAM(s) Oral daily  atorvastatin 80 milliGRAM(s) Oral at bedtime  cefTRIAXone   IVPB 1000 milliGRAM(s) IV Intermittent every 24 hours  clopidogrel Tablet 75 milliGRAM(s) Oral daily  dextrose 5%. 1000 milliLiter(s) IV Continuous <Continuous>  dextrose 5%. 1000 milliLiter(s) IV Continuous <Continuous>  dextrose 50% Injectable 25 Gram(s) IV Push once  dextrose 50% Injectable 12.5 Gram(s) IV Push once  dextrose 50% Injectable 25 Gram(s) IV Push once  dextrose Oral Gel 15 Gram(s) Oral once  enoxaparin Injectable 40 milliGRAM(s) SubCutaneous every 24 hours  glucagon  Injectable 1 milliGRAM(s) IntraMuscular once  insulin glargine Injectable (LANTUS) 6 Unit(s) SubCutaneous at bedtime  insulin lispro (ADMELOG) corrective regimen sliding scale   SubCutaneous every 6 hours  lactated ringers. 1000 milliLiter(s) IV Continuous <Continuous>  tamsulosin 0.4 milliGRAM(s) Oral at bedtime      Vitals:  Vital Signs Last 24 Hrs  T(C): 36.8 (22 Jan 2023 12:41), Max: 37.1 (21 Jan 2023 20:20)  T(F): 98.2 (22 Jan 2023 12:41), Max: 98.7 (21 Jan 2023 20:20)  HR: 105 (22 Jan 2023 12:41) (84 - 105)  BP: 127/71 (22 Jan 2023 12:41) (106/58 - 147/83)  BP(mean): --  RR: 20 (22 Jan 2023 12:41) (20 - 20)  SpO2: 98% (22 Jan 2023 12:41) (95% - 98%)    Parameters below as of 22 Jan 2023 12:41  Patient On (Oxygen Delivery Method): room air        General Exam:   General Appearance: Appropriately dressed and in no acute distress       Head: Normocephalic, atraumatic and no dysmorphic features  Ear, Nose, and Throat: Moist mucous membranes  CVS: S1S2+  Resp: No SOB, no wheeze or rhonchi  Abd: soft NTND  Extremities: No edema, no cyanosis  Skin: No bruises, no rashes        NEUROLOGICAL EXAM: Family at bedside who provided translation   Mental status: Eyes open, awake, alert, attempts to produce speech, able to follow some simple commands, able to mimic at times , unable to ID objects  Cranial Nerves: Right facial droop, no nystagmus, blinks to threat B/L  Motor exam: Normal tone, no drift, Right hemiparesis RUE drift, 3-44/5, RLE drift, 3-4/5,, LUE 5/5, LLE 5/5  Sensation: Intact to light touch   Coordination/ Gait: Unable to participate with exam     I personally reviewed the below data/images/labs:      CBC Full  -  ( 22 Jan 2023 07:10 )  WBC Count : 14.91 K/uL  RBC Count : 4.19 M/uL  Hemoglobin : 11.3 g/dL  Hematocrit : 36.6 %  Platelet Count - Automated : 217 K/uL  Mean Cell Volume : 87.4 fl  Mean Cell Hemoglobin : 27.0 pg  Mean Cell Hemoglobin Concentration : 30.9 gm/dL  Auto Neutrophil # : x  Auto Lymphocyte # : x  Auto Monocyte # : x  Auto Eosinophil # : x  Auto Basophil # : x  Auto Neutrophil % : x  Auto Lymphocyte % : x  Auto Monocyte % : x  Auto Eosinophil % : x  Auto Basophil % : x    01-22    147<H>  |  112<H>  |  20  ----------------------------<  156<H>  3.3<L>   |  21<L>  |  0.77    Ca    8.5      22 Jan 2023 07:12  Phos  2.7     01-22  Mg     1.9     01-22        `< from: CT Head No Cont (01.19.23 @ 13:45) >    ACC: [id number] EXAM:  CT BRAIN   ORDERED BY: CORINNE MADERA     PROCEDURE DATE:  01/19/2023          INTERPRETATION:  CLINICAL STATEMENT: Altered mental status    TECHNIQUE: CT of the head was performed without IV contrast.  RAPID   artificial intelligence was utilized for the preliminary evaluation of   intracranial hemorrhage.    COMPARISON: MRI brain 1/7/2023.    FINDINGS:  There is mild diffuse parenchymal volume loss. There are areas of low   attenuation in the periventricular white matter likely related to mild   chronic microvascular ischemic changes.    Evolving small infarcts noted in the left corona radiata.   Age-indeterminate infarct also noted in the left temporal and parietal   lobe    There is no acute intracranial hemorrhage, parenchymal mass, mass effect   or midline shift.. There is no hydrocephalus. Partial empty sella noted    The cranium is intact. Calcification noted adjacent to left frontal   artery table. The visualized paranasal sinuses are well-aerated.        IMPRESSION:  No acute intracranial hemorrhage.    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    --- End of Report ---                 Neurology Progress Note    S: Patient seen and examined.  lethargic     Medication:  aspirin  chewable 81 milliGRAM(s) Oral daily  atorvastatin 80 milliGRAM(s) Oral at bedtime  cefTRIAXone   IVPB 1000 milliGRAM(s) IV Intermittent every 24 hours  clopidogrel Tablet 75 milliGRAM(s) Oral daily  dextrose 5%. 1000 milliLiter(s) IV Continuous <Continuous>  dextrose 5%. 1000 milliLiter(s) IV Continuous <Continuous>  dextrose 50% Injectable 25 Gram(s) IV Push once  dextrose 50% Injectable 12.5 Gram(s) IV Push once  dextrose 50% Injectable 25 Gram(s) IV Push once  dextrose Oral Gel 15 Gram(s) Oral once  enoxaparin Injectable 40 milliGRAM(s) SubCutaneous every 24 hours  glucagon  Injectable 1 milliGRAM(s) IntraMuscular once  insulin glargine Injectable (LANTUS) 6 Unit(s) SubCutaneous at bedtime  insulin lispro (ADMELOG) corrective regimen sliding scale   SubCutaneous every 6 hours  lactated ringers. 1000 milliLiter(s) IV Continuous <Continuous>  tamsulosin 0.4 milliGRAM(s) Oral at bedtime      Vitals:  Vital Signs Last 24 Hrs  T(C): 36.8 (22 Jan 2023 12:41), Max: 37.1 (21 Jan 2023 20:20)  T(F): 98.2 (22 Jan 2023 12:41), Max: 98.7 (21 Jan 2023 20:20)  HR: 105 (22 Jan 2023 12:41) (84 - 105)  BP: 127/71 (22 Jan 2023 12:41) (106/58 - 147/83)  BP(mean): --  RR: 20 (22 Jan 2023 12:41) (20 - 20)  SpO2: 98% (22 Jan 2023 12:41) (95% - 98%)    Parameters below as of 22 Jan 2023 12:41  Patient On (Oxygen Delivery Method): room air        General Exam:   General Appearance: Appropriately dressed and in no acute distress       Head: Normocephalic, atraumatic and no dysmorphic features  Ear, Nose, and Throat: Moist mucous membranes  CVS: S1S2+  Resp: No SOB, no wheeze or rhonchi  Abd: soft NTND  Extremities: No edema, no cyanosis  Skin: No bruises, no rashes        NEUROLOGICAL EXAM: Family at bedside who provided translation   Mental status: Eyes open, awake, alert, attempts to produce speech, able to follow some simple commands, able to mimic at times , unable to ID objects  Cranial Nerves: Right facial droop, no nystagmus, blinks to threat B/L  Motor exam: Normal tone, no drift, Right hemiparesis RUE drift, 3-44/5, RLE drift, 3-4/5,, LUE 5/5, LLE 5/5  Sensation: Intact to light touch   Coordination/ Gait: Unable to participate with exam     I personally reviewed the below data/images/labs:      CBC Full  -  ( 22 Jan 2023 07:10 )  WBC Count : 14.91 K/uL  RBC Count : 4.19 M/uL  Hemoglobin : 11.3 g/dL  Hematocrit : 36.6 %  Platelet Count - Automated : 217 K/uL  Mean Cell Volume : 87.4 fl  Mean Cell Hemoglobin : 27.0 pg  Mean Cell Hemoglobin Concentration : 30.9 gm/dL  Auto Neutrophil # : x  Auto Lymphocyte # : x  Auto Monocyte # : x  Auto Eosinophil # : x  Auto Basophil # : x  Auto Neutrophil % : x  Auto Lymphocyte % : x  Auto Monocyte % : x  Auto Eosinophil % : x  Auto Basophil % : x    01-22    147<H>  |  112<H>  |  20  ----------------------------<  156<H>  3.3<L>   |  21<L>  |  0.77    Ca    8.5      22 Jan 2023 07:12  Phos  2.7     01-22  Mg     1.9     01-22        `< from: CT Head No Cont (01.19.23 @ 13:45) >    ACC: [id number] EXAM:  CT BRAIN   ORDERED BY: CORINNE MADERA     PROCEDURE DATE:  01/19/2023          INTERPRETATION:  CLINICAL STATEMENT: Altered mental status    TECHNIQUE: CT of the head was performed without IV contrast.  RAPID   artificial intelligence was utilized for the preliminary evaluation of   intracranial hemorrhage.    COMPARISON: MRI brain 1/7/2023.    FINDINGS:  There is mild diffuse parenchymal volume loss. There are areas of low   attenuation in the periventricular white matter likely related to mild   chronic microvascular ischemic changes.    Evolving small infarcts noted in the left corona radiata.   Age-indeterminate infarct also noted in the left temporal and parietal   lobe    There is no acute intracranial hemorrhage, parenchymal mass, mass effect   or midline shift.. There is no hydrocephalus. Partial empty sella noted    The cranium is intact. Calcification noted adjacent to left frontal   artery table. The visualized paranasal sinuses are well-aerated.        IMPRESSION:  No acute intracranial hemorrhage.    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    --- End of Report ---

## 2023-01-22 NOTE — PROGRESS NOTE ADULT - ASSESSMENT
78F with dementia, T2DM, recently discharged about 1 week ago for CVA now presenting with poor PO intake, lethargy and hyperglycemia to 500s.     Altered mental status, UTI .   - Since recent stroke,  A&Ox0. More recently, poor po intake, lethargic. CTH no acute intracranial abnormalities. Multifactorial   - Multiple metabolic derangements - dehydration, hyperglycemia, hyperNa, hypoK  - c/w IVF D5 75 CC to run  - Monitor and replete electrolytes PRN   - UA with +leuk esterase, many bacteria. Will treat with ceftriaxone for now. F/u UCx, BCx. Monitor fever curve, WBC trend, E coli   - Neuro eval consulted; F/u recs   - Aspiration precautions   - discussed with family, agreed for NGT for feeding     Uncontrolled type 2 diabetes mellitus with hyperglycemia.   - D/c 1 week ago with Metformin 500 mg BID  - Now with glucose 500s, improved to mid 300s with fluids. Also with mild ketosis (AG 17, bicarb 21, BHB 0.7)  - Endo eval appreciated, BHB now WNL  - Lantus 6 units QHs added to regimen & Sliding scale  - Monitor glucose levels closely, avoid hypo/hyperglycemia   -Check Ab given thin body habitus and ketosis (Glutamic Acid Decarboxylase, Zinc Transporter 8, Islet Cell Ab, and IA-2 Ab)  -Continue IVF administration     Cerebrovascular accident (CVA).  - Recent admission w/ Acute L MCA infarct, severe stenosis of the L M2 branch. MR with L MCA [place] infarcts   - CTH on admission showing No acute intracranial hemorrhage. 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 D/w neurology, expected change seen on CT, not new lesions.   - C/w asa, plavix, statin.  - Neuro eval consulted; F/u recs     Hypernatremia.   - C/w D5 75 CC to run,   - F/u DM management as per above  - Monitor Na level closely, avoid overocrrection   - Na trending down , hydration as tolerated     HypoK  - Monitor and replete electrolytes PRN     Hyperlipidemia.   -c/w atorvastatin.    Hypertension.    -amlodipine 5mg daily.      PPX  - Lovenox 40 Qd

## 2023-01-22 NOTE — PROCEDURE NOTE - ADDITIONAL PROCEDURE DETAILS
called for female with gonzalez inserted for urinary retention. PA reports large amount of leakage around gonzalez , gonzalez not irrigating well.     unable to irrigate current gonzalez. removed gonzalez after which a large mucous plug was pulled out from vaginal/urethral area- unclear as to where it originated. white/gray in color.     14f coude attempted prior to successful insertion of 16f gonzalez. cloudy brown urine drained.

## 2023-01-23 LAB
ANION GAP SERPL CALC-SCNC: 12 MMOL/L — SIGNIFICANT CHANGE UP (ref 5–17)
BUN SERPL-MCNC: 20 MG/DL — SIGNIFICANT CHANGE UP (ref 7–23)
CALCIUM SERPL-MCNC: 8.5 MG/DL — SIGNIFICANT CHANGE UP (ref 8.4–10.5)
CHLORIDE SERPL-SCNC: 113 MMOL/L — HIGH (ref 96–108)
CO2 SERPL-SCNC: 21 MMOL/L — LOW (ref 22–31)
CREAT SERPL-MCNC: 0.81 MG/DL — SIGNIFICANT CHANGE UP (ref 0.5–1.3)
EGFR: 74 ML/MIN/1.73M2 — SIGNIFICANT CHANGE UP
GLUCOSE SERPL-MCNC: 151 MG/DL — HIGH (ref 70–99)
HCT VFR BLD CALC: 32.7 % — LOW (ref 34.5–45)
HGB BLD-MCNC: 10.1 G/DL — LOW (ref 11.5–15.5)
MCHC RBC-ENTMCNC: 26.8 PG — LOW (ref 27–34)
MCHC RBC-ENTMCNC: 30.9 GM/DL — LOW (ref 32–36)
MCV RBC AUTO: 86.7 FL — SIGNIFICANT CHANGE UP (ref 80–100)
NRBC # BLD: 0 /100 WBCS — SIGNIFICANT CHANGE UP (ref 0–0)
PLATELET # BLD AUTO: 267 K/UL — SIGNIFICANT CHANGE UP (ref 150–400)
POTASSIUM SERPL-MCNC: 3.6 MMOL/L — SIGNIFICANT CHANGE UP (ref 3.5–5.3)
POTASSIUM SERPL-SCNC: 3.6 MMOL/L — SIGNIFICANT CHANGE UP (ref 3.5–5.3)
RBC # BLD: 3.77 M/UL — LOW (ref 3.8–5.2)
RBC # FLD: 15 % — HIGH (ref 10.3–14.5)
SODIUM SERPL-SCNC: 146 MMOL/L — HIGH (ref 135–145)
WBC # BLD: 11.31 K/UL — HIGH (ref 3.8–10.5)
WBC # FLD AUTO: 11.31 K/UL — HIGH (ref 3.8–10.5)

## 2023-01-23 PROCEDURE — 99232 SBSQ HOSP IP/OBS MODERATE 35: CPT

## 2023-01-23 PROCEDURE — 99231 SBSQ HOSP IP/OBS SF/LOW 25: CPT

## 2023-01-23 RX ORDER — SODIUM CHLORIDE 9 MG/ML
1000 INJECTION, SOLUTION INTRAVENOUS
Refills: 0 | Status: DISCONTINUED | OUTPATIENT
Start: 2023-01-23 | End: 2023-01-24

## 2023-01-23 RX ORDER — POTASSIUM CHLORIDE 20 MEQ
10 PACKET (EA) ORAL
Refills: 0 | Status: COMPLETED | OUTPATIENT
Start: 2023-01-23 | End: 2023-01-23

## 2023-01-23 RX ORDER — PERMETHRIN CREAM 5% W/W 50 MG/G
1 CREAM TOPICAL ONCE
Refills: 0 | Status: COMPLETED | OUTPATIENT
Start: 2023-01-23 | End: 2023-01-23

## 2023-01-23 RX ADMIN — Medication 100 MILLIEQUIVALENT(S): at 11:51

## 2023-01-23 RX ADMIN — SODIUM CHLORIDE 50 MILLILITER(S): 9 INJECTION, SOLUTION INTRAVENOUS at 14:09

## 2023-01-23 RX ADMIN — CLOPIDOGREL BISULFATE 75 MILLIGRAM(S): 75 TABLET, FILM COATED ORAL at 11:52

## 2023-01-23 RX ADMIN — INSULIN GLARGINE 6 UNIT(S): 100 INJECTION, SOLUTION SUBCUTANEOUS at 23:54

## 2023-01-23 RX ADMIN — Medication 100 MILLIEQUIVALENT(S): at 10:18

## 2023-01-23 RX ADMIN — CEFTRIAXONE 100 MILLIGRAM(S): 500 INJECTION, POWDER, FOR SOLUTION INTRAMUSCULAR; INTRAVENOUS at 01:04

## 2023-01-23 RX ADMIN — PERMETHRIN CREAM 5% W/W 1 APPLICATION(S): 50 CREAM TOPICAL at 22:06

## 2023-01-23 RX ADMIN — ENOXAPARIN SODIUM 40 MILLIGRAM(S): 100 INJECTION SUBCUTANEOUS at 05:15

## 2023-01-23 RX ADMIN — SODIUM CHLORIDE 50 MILLILITER(S): 9 INJECTION, SOLUTION INTRAVENOUS at 22:18

## 2023-01-23 RX ADMIN — ATORVASTATIN CALCIUM 80 MILLIGRAM(S): 80 TABLET, FILM COATED ORAL at 22:05

## 2023-01-23 RX ADMIN — Medication 1: at 18:59

## 2023-01-23 RX ADMIN — Medication 100 MILLIEQUIVALENT(S): at 08:16

## 2023-01-23 RX ADMIN — Medication 81 MILLIGRAM(S): at 11:52

## 2023-01-23 NOTE — PROGRESS NOTE ADULT - ASSESSMENT
78F with dementia, T2DM, recently discharged about 1 week ago for CVA now presenting with poor PO intake, lethargy and hyperglycemia to 500s.   Of note, patient admitted 1/6 for R facial droop, word finding difficulties. Imaging concerning for acute L MCA infarct. Discharged home on 1/11. Majority of history obtained through family given mental status. Per family, since stroke patient nonverbal/unable to participate in meaningful communications, intermittently follows commands. For the last 3-4 days, patient with poor po intake. Reportedly only eating breakfast. Day of presentation, more lethargic with elevated finger sticks up to the 500s. Patient was evaluated by endocrine team during prior admission with recs to increase metformin to 1000mg BID. However, given poor PO intake, family has been giving 500mg daily.     In ED, afebrile, , 135/84. WBC 15, Na 158, K 3.0, Cl 120, Glu 468, alk phos 136m BHB 0.7. pH 7.36, lactate 2.2. UA: +leuk esterase, many bacteria, WBC 11-25  CXR: Redemonstrated biapical scarring and left upper lobe bronchiectasis, unchanged. No focal consolidation.   CTH No acute intracranial hemorrhage. 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  Given 1.5L NS, ceftriaxone x1, haldol 2.5mg x1 in ED.     A/P    Hypernatremia  2/2 poor oral intake   worsening   suggest to switch ivf to D5 50cc/hr x10hr   Avoid overcorrection >6-8meq a day   monitor Na closely     Hypokalemia  improving   monitor K     Hypophosphatemia  Supplement as needed  Daily phosphorus     HTN  optimal   monitor BP closely     Proteinuria  Currently has UTI would repeat and then quantify

## 2023-01-23 NOTE — PROGRESS NOTE ADULT - ASSESSMENT
78F with dementia, T2DM, recently discharged about 1 week ago for CVA now presenting with poor PO intake, lethargy and hyperglycemia to 500s.     Altered mental status, UTI .   - Since recent stroke,  A&Ox0. More recently, poor po intake, lethargic. CTH no acute intracranial abnormalities. Multifactorial   - Multiple metabolic derangements - dehydration, hyperglycemia, hyperNa, hypoK  - c/w IVF D5 75 CC to run  - Monitor and replete electrolytes PRN   - UA with +leuk esterase, many bacteria. C/w ceftriaxone for now. F/u UCx -- E. coli and probable contamination  - BCx2 w/ NGTD; F/u final   - Monitor fever curve, WBC trend   - Neuro eval consulted; F/u recs   - Aspiration precautions   - discussed with family, agreed for NGT for feeding, confirm on Xray and start feeds     Uncontrolled type 2 diabetes mellitus with hyperglycemia.   - D/c 1 week ago with Metformin 500 mg BID  - Now with glucose 500s, improved to mid 300s with fluids. Also with mild ketosis (AG 17, bicarb 21, BHB 0.7)  - Endo eval appreciated, BHB now WNL  - Lantus 6 units QHs added to regimen & Sliding scale  - Monitor glucose levels closely, avoid hypo/hyperglycemia   -Check Ab given thin body habitus and ketosis (Glutamic Acid Decarboxylase, Zinc Transporter 8, Islet Cell Ab, and IA-2 Ab)  -Continue IVF administration     Cerebrovascular accident (CVA).  - Recent admission w/ Acute L MCA infarct, severe stenosis of the L M2 branch. MR with L MCA [place] infarcts   - CTH on admission showing No acute intracranial hemorrhage. 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 D/w neurology, expected change seen on CT, not new lesions.   - C/w asa, plavix, statin.  - Neuro eval consulted; F/u recs     Hypernatremia.   - C/w D5 50 CC x 10 hours   - F/u DM management as per above  - Monitor Na level closely, avoid overocrrection   - C/w Free water, monitor levels closely     HypoK  - Monitor and replete electrolytes PRN     Hyperlipidemia.   -c/w atorvastatin.    Hypertension.    -amlodipine 5mg daily.      PPX  - Lovenox 40 Qd  78F with dementia, T2DM, recently discharged about 1 week ago for CVA now presenting with poor PO intake, lethargy and hyperglycemia to 500s.     Altered mental status, UTI .   - Since recent stroke,  A&Ox0. More recently, poor po intake, lethargic. CTH no acute intracranial abnormalities. Multifactorial   - Multiple metabolic derangements - dehydration, hyperglycemia, hyperNa, hypoK  - c/w IVF D5 75 CC to run  - Monitor and replete electrolytes PRN   - UA with +leuk esterase, many bacteria. C/w ceftriaxone for now. F/u UCx -- E. coli and probable contamination  - BCx2 w/ NGTD; F/u final   - Monitor fever curve, WBC trend   - Neuro eval consulted; F/u recs   - Aspiration precautions   - discussed with family, agreed for NGT for feeding, confirm on Xray and start feeds     Uncontrolled type 2 diabetes mellitus with hyperglycemia.   - D/c 1 week ago with Metformin 500 mg BID  - Now with glucose 500s, improved to mid 300s with fluids. Also with mild ketosis (AG 17, bicarb 21, BHB 0.7)  - Endo eval appreciated, BHB now WNL  - Lantus 6 units QHs added to regimen & Sliding scale  - Monitor glucose levels closely, avoid hypo/hyperglycemia   -Check Ab given thin body habitus and ketosis (Glutamic Acid Decarboxylase, Zinc Transporter 8, Islet Cell Ab, and IA-2 Ab)  -Continue IVF administration     Cerebrovascular accident (CVA).  - Recent admission w/ Acute L MCA infarct, severe stenosis of the L M2 branch. MR with L MCA [place] infarcts   - CTH on admission showing No acute intracranial hemorrhage. 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 D/w neurology, expected change seen on CT, not new lesions.   - C/w asa, plavix, statin.  - Neuro eval consulted; F/u recs     Hypernatremia.   - C/w D5 50 CC x 10 hours   - F/u DM management as per above  - Monitor Na level closely, avoid overocrrection   - C/w Free water, monitor levels closely     Pediculosis   - ID eval to be called  - Contact precautions    HypoK  - Monitor and replete electrolytes PRN     Hyperlipidemia.   -c/w atorvastatin.    Hypertension.    -amlodipine 5mg daily.      PPX  - Lovenox 40 Qd  78F with dementia, T2DM, recently discharged about 1 week ago for CVA now presenting with poor PO intake, lethargy and hyperglycemia to 500s.     Altered mental status, UTI .   - Since recent stroke,  A&Ox0. More recently, poor po intake, lethargic. CTH no acute intracranial abnormalities. Multifactorial   - Multiple metabolic derangements - dehydration, hyperglycemia, hyperNa, hypoK  - c/w IVF D5 75 CC to run  - Monitor and replete electrolytes PRN   - UA with +leuk esterase, many bacteria. C/w ceftriaxone for now. F/u UCx -- E. coli and probable contamination  - BCx2 w/ NGTD; F/u final   - Monitor fever curve, WBC trend   - Neuro eval consulted; F/u recs   - Aspiration precautions   - Discussed with family, agreed for NGT for feeding, confirm on Xray and start feeds   - DC carlos, TONII, flomax. Monitor I and O     Uncontrolled type 2 diabetes mellitus with hyperglycemia.   - D/c 1 week ago with Metformin 500 mg BID  - Now with glucose 500s, improved to mid 300s with fluids. Also with mild ketosis (AG 17, bicarb 21, BHB 0.7)  - Endo eval appreciated, BHB now WNL  - Lantus 6 units QHs added to regimen & Sliding scale  - Monitor glucose levels closely, avoid hypo/hyperglycemia   -Check Ab given thin body habitus and ketosis (Glutamic Acid Decarboxylase, Zinc Transporter 8, Islet Cell Ab, and IA-2 Ab)  -Continue IVF administration     Cerebrovascular accident (CVA).  - Recent admission w/ Acute L MCA infarct, severe stenosis of the L M2 branch. MR with L MCA [place] infarcts   - CTH on admission showing No acute intracranial hemorrhage. 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 D/w neurology, expected change seen on CT, not new lesions.   - C/w asa, plavix, statin.  - Neuro eval consulted; F/u recs     Hypernatremia.   - C/w D5 50 CC x 10 hours   - F/u DM management as per above  - Monitor Na level closely, avoid overocrrection   - C/w Free water, monitor levels closely     Pediculosis   - ID eval to be called  - Contact precautions    HypoK  - Monitor and replete electrolytes PRN     Hyperlipidemia.   -c/w atorvastatin.    Hypertension.    -amlodipine 5mg daily.      PPX  - Lovenox 40 Qd

## 2023-01-23 NOTE — DIETITIAN INITIAL EVALUATION ADULT - OTHER CALCULATIONS
Defer fluid needs to medical team  Estimated calorie & protein needs based on upper IBW range of 121 pounds/ 54.8 kg

## 2023-01-23 NOTE — DIETITIAN INITIAL EVALUATION ADULT - [id number]_GEN_A_CORE
Weights:  - Drug Dosing Weight: 58.1 kg/ 128.1 pounds  (1/19/23)  - Weights from previous admission: 54.4 kg/ 119.9 pounds (1/6/23)  - Son reported pt's UBW was 155-160 pounds in September   - IBW: 110 pounds  %IBW: 116%   - Will monitor weight status as able during present admission

## 2023-01-23 NOTE — PROGRESS NOTE ADULT - ASSESSMENT
78 F with recent T2DM diagnosis and CVA (d/c 1 week ago), presenting with glucoses 500s, poor po intake and lethargy. Endocrine consulted for assistance with management of uncontrolled, recently dx DM. BG values at goal while NPO however now w/TF started today. Will slowly add back insulin. BG goal (100-180mg/dl).

## 2023-01-23 NOTE — PROGRESS NOTE ADULT - ASSESSMENT
78F with dementia, T2DM, recently discharged about 1 week ago for CVA now presenting with poor PO intake, lethargy and hyperglycemia to 500s.   Of note, patient admitted 1/6 for R facial droop, word finding difficulties. Imaging concerning for acute L MCA infarct.  Per family, since stroke patient nonverbal/unable to participate in meaningful communications, intermittently follows commands. For the last 3-4 days, patient with poor po intake.    In ED, afebrile, , 135/84. WBC 15, Na 158, K 3.0, Cl 120, Glu 468, alk phos 136m BHB 0.7. pH 7.36, lactate 2.2.   UA: +leuk esterase, many bacteria, WBC 11-25  CXR: Redemonstrated biapical scarring and left upper lobe bronchiectasis, unchanged. No focal consolidation.   CTH No acute intracranial hemorrhage. 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  Given 1.5L NS, ceftriaxone x1, haldol 2.5mg x1 in ED.   last admission: CTA with L MCA severe stenosis; distal L M2 occlusion .  TTE 1/8/23: EF 63% Mild mitral regurgitation ; A1c 9.4     Impression  1) AMS likely 2/2 infection/UTI toxic metabolic encephalopahthy   2) recent stroke - L MCA infarct 2/2 symptomatic L MCA ICAD ; worsening of R HP in setting of infection     - mittens. NGT   - f/u endocrine   - treatment of infection per primary team -->  CTX  - for secondary stroke prevention. DAPT x 3 months followeed by ASA 81mg dialy thereafter.   - hihg dose statin lipitor 40mg dialy   - avoid hypotension given L MCA ICAD   - telemetry  - PT/OT/SS/SLP, OOBC  - check FS, glucose control <180  - GI/DVT ppx  - Thank you for allowing me to participate in the care of this patient. Call with questions.   - spoke with daughter at bedside   Reji Greco MD  Vascular Neurology  Office: [phone]  78F with dementia, T2DM, recently discharged about 1 week ago for CVA now presenting with poor PO intake, lethargy and hyperglycemia to 500s.   Of note, patient admitted 1/6 for R facial droop, word finding difficulties. Imaging concerning for acute L MCA infarct.  Per family, since stroke patient nonverbal/unable to participate in meaningful communications, intermittently follows commands. For the last 3-4 days, patient with poor po intake.    In ED, afebrile, , 135/84. WBC 15, Na 158, K 3.0, Cl 120, Glu 468, alk phos 136m BHB 0.7. pH 7.36, lactate 2.2.   UA: +leuk esterase, many bacteria, WBC 11-25  CXR: Redemonstrated biapical scarring and left upper lobe bronchiectasis, unchanged. No focal consolidation.   CTH No acute intracranial hemorrhage. 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  Given 1.5L NS, ceftriaxone x1, haldol 2.5mg x1 in ED.   last admission: CTA with L MCA severe stenosis; distal L M2 occlusion .  TTE 1/8/23: EF 63% Mild mitral regurgitation ; A1c 9.4     Impression  1) AMS likely 2/2 infection/UTI toxic metabolic encephalopahthy   2) recent stroke - L MCA infarct 2/2 symptomatic L MCA ICAD ; worsening of R HP in setting of infection     - mittens. NGT   - f/u endocrine   - treatment of infection per primary team -->  CTX  - for secondary stroke prevention. DAPT x 3 months followeed by ASA 81mg dialy thereafter.   - hihg dose statin lipitor 40mg dialy   - avoid hypotension given L MCA ICAD   - telemetry  - PT/OT/SS/SLP, OOBC  - check FS, glucose control <180  - GI/DVT ppx  - Thank you for allowing me to participate in the care of this patient. Call with questions.   - spoke with daughter at bedside   Reji Greco MD  Vascular Neurology  Office: [phone]  78F with dementia, T2DM, recently discharged about 1 week ago for CVA now presenting with poor PO intake, lethargy and hyperglycemia to 500s.   Of note, patient admitted 1/6 for R facial droop, word finding difficulties. Imaging concerning for acute L MCA infarct.  Per family, since stroke patient nonverbal/unable to participate in meaningful communications, intermittently follows commands. For the last 3-4 days, patient with poor po intake.    In ED, afebrile, , 135/84. WBC 15, Na 158, K 3.0, Cl 120, Glu 468, alk phos 136m BHB 0.7. pH 7.36, lactate 2.2.   UA: +leuk esterase, many bacteria, WBC 11-25  CXR: Redemonstrated biapical scarring and left upper lobe bronchiectasis, unchanged. No focal consolidation.   CTH No acute intracranial hemorrhage. 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  Given 1.5L NS, ceftriaxone x1, haldol 2.5mg x1 in ED.   last admission: CTA with L MCA severe stenosis; distal L M2 occlusion .  TTE 1/8/23: EF 63% Mild mitral regurgitation ; A1c 9.4     Impression  1) AMS likely 2/2 infection/UTI toxic metabolic encephalopahthy   2) recent stroke - L MCA infarct 2/2 symptomatic L MCA ICAD ; worsening of R HP in setting of infection     - mittens. NGT   - f/u endocrine   - treatment of infection per primary team -->  CTX  - for secondary stroke prevention. DAPT x 3 months followeed by ASA 81mg dialy thereafter.   - hihg dose statin lipitor 40mg dialy   - avoid hypotension given L MCA ICAD   - telemetry  - PT/OT/SS/SLP, OOBC  - check FS, glucose control <180  - GI/DVT ppx  - Thank you for allowing me to participate in the care of this patient. Call with questions.   - spoke with daughter at bedside   Reji Greco MD  Vascular Neurology  Office: [phone]

## 2023-01-23 NOTE — PROGRESS NOTE ADULT - SUBJECTIVE AND OBJECTIVE BOX
Neurology Progress Note    S: [facility] and examined[facility]  [facility]     Medication:  MEDICATIONS  (STANDING):  aspirin  chewable 81 milliGRAM(s) Oral daily  atorvastatin 80 milliGRAM(s) Oral at bedtime  cefTRIAXone   IVPB 1000 milliGRAM(s) IV Intermittent every 24 hours  clopidogrel Tablet 75 milliGRAM(s) Oral daily  dextrose 5%. 1000 milliLiter(s) (100 mL/Hr) IV Continuous <Continuous>  dextrose 5%. 1000 milliLiter(s) (50 mL/Hr) IV Continuous <Continuous>  dextrose 50% Injectable 25 Gram(s) IV Push once  dextrose 50% Injectable 12.5 Gram(s) IV Push once  dextrose 50% Injectable 25 Gram(s) IV Push once  dextrose Oral Gel 15 Gram(s) Oral once  enoxaparin Injectable 40 milliGRAM(s) SubCutaneous every 24 hours  glucagon  Injectable 1 milliGRAM(s) IntraMuscular once  insulin glargine Injectable (LANTUS) 6 Unit(s) SubCutaneous at bedtime  insulin lispro (ADMELOG) corrective regimen sliding scale   SubCutaneous every 6 hours  tamsulosin 0.4 milliGRAM(s) Oral at bedtime    MEDICATIONS  (PRN):      Vitals:    Vital Signs Last 24 Hrs  T(C): 37.5 (23 Jan 2023 12:15), Max: 37.5 (23 Jan 2023 12:15)  T(F): 99.5 (23 Jan 2023 12:15), Max: 99.5 (23 Jan 2023 12:15)  HR: 100 (23 Jan 2023 04:52) (98 - 101)  BP: 133/78 (23 Jan 2023 12:15) (112/68 - 140/78)  BP(mean): --  RR: 18 (23 Jan 2023 12:15) (18 - 18)  SpO2: 100% (23 Jan 2023 12:15) (94% - 100%)    Parameters below as of 23 Jan 2023 12:15  Patient On (Oxygen Delivery Method): room air            General Exam:   General Appearance: Appropriately dressed and in no acute distress       Head: Normocephalic, atraumatic and no dysmorphic features  Ear, Nose, and Throat: Moist mucous membranes  CVS: S1S2+  Resp: No SOB, no wheeze or rhonchi  Abd: soft NTND  Extremities: No edema, no cyanosis  Skin: No bruises, no rashes        NEUROLOGICAL EXAM: Family at bedside who provided translation   Mental status: Eyes open, awake, alert, attempts to produce speech, able to follow some simple commands, able to mimic at times , unable to ID objects  Cranial Nerves: Right facial droop, no nystagmus, blinks to threat B/L  Motor exam: Normal tone, no drift, Right hemiparesis RUE drift, 3-44/5, RLE drift, 3-4/5,, LUE 5/5, LLE 5/5  Sensation: Intact to light touch   Coordination/ Gait: Unable to participate with exam     I personally reviewed the below data/images/labs:      CBC Full  -  ( 23 Jan 2023 07:43 )  WBC Count : 11.31 K/uL  RBC Count : 3.77 M/uL  Hemoglobin : 10.1 g/dL  Hematocrit : 32.7 %  Platelet Count - Automated : 267 K/uL  Mean Cell Volume : 86.7 fl  Mean Cell Hemoglobin : 26.8 pg  Mean Cell Hemoglobin Concentration : 30.9 gm/dL  Auto Neutrophil # : x  Auto Lymphocyte # : x  Auto Monocyte # : x  Auto Eosinophil # : x  Auto Basophil # : x  Auto Neutrophil % : x  Auto Lymphocyte % : x  Auto Monocyte % : x  Auto Eosinophil % : x  Auto Basophil % : x    01-23    146<H>  |  113<H>  |  20  ----------------------------<  151<H>  3.6   |  21<L>  |  0.81    Ca    8.5      23 Jan 2023 07:43  Phos  2.7     01-22  Mg     1.9     01-22          `< from: CT Head No Cont (01.19.23 @ 13:45) >    ACC: [id number] EXAM:  CT BRAIN   ORDERED BY: CORINNE MADERA     PROCEDURE DATE:  01/19/2023          INTERPRETATION:  CLINICAL STATEMENT: Altered mental status    TECHNIQUE: CT of the head was performed without IV contrast.  RAPID   artificial intelligence was utilized for the preliminary evaluation of   intracranial hemorrhage.    COMPARISON: MRI brain 1/7/2023.    FINDINGS:  There is mild diffuse parenchymal volume loss. There are areas of low   attenuation in the periventricular white matter likely related to mild   chronic microvascular ischemic changes.    Evolving small infarcts noted in the left corona radiata.   Age-indeterminate infarct also noted in the left temporal and parietal   lobe    There is no acute intracranial hemorrhage, parenchymal mass, mass effect   or midline shift.. There is no hydrocephalus. Partial empty sella noted    The cranium is intact. Calcification noted adjacent to left frontal   artery table. The visualized paranasal sinuses are well-aerated.        IMPRESSION:  No acute intracranial hemorrhage.    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    --- End of Report ---                 Neurology Progress Note    [facility]     Medication:  MEDICATIONS  (STANDING):  aspirin  chewable 81 milliGRAM(s) Oral daily  atorvastatin 80 milliGRAM(s) Oral at bedtime  cefTRIAXone   IVPB 1000 milliGRAM(s) IV Intermittent every 24 hours  clopidogrel Tablet 75 milliGRAM(s) Oral daily  dextrose 5%. 1000 milliLiter(s) (100 mL/Hr) IV Continuous <Continuous>  dextrose 5%. 1000 milliLiter(s) (50 mL/Hr) IV Continuous <Continuous>  dextrose 50% Injectable 25 Gram(s) IV Push once  dextrose 50% Injectable 12.5 Gram(s) IV Push once  dextrose 50% Injectable 25 Gram(s) IV Push once  dextrose Oral Gel 15 Gram(s) Oral once  enoxaparin Injectable 40 milliGRAM(s) SubCutaneous every 24 hours  glucagon  Injectable 1 milliGRAM(s) IntraMuscular once  insulin glargine Injectable (LANTUS) 6 Unit(s) SubCutaneous at bedtime  insulin lispro (ADMELOG) corrective regimen sliding scale   SubCutaneous every 6 hours  tamsulosin 0.4 milliGRAM(s) Oral at bedtime    MEDICATIONS  (PRN):      Vitals:    Vital Signs Last 24 Hrs  T(C): 37.5 (23 Jan 2023 12:15), Max: 37.5 (23 Jan 2023 12:15)  T(F): 99.5 (23 Jan 2023 12:15), Max: 99.5 (23 Jan 2023 12:15)  HR: 100 (23 Jan 2023 04:52) (98 - 101)  BP: 133/78 (23 Jan 2023 12:15) (112/68 - 140/78)  BP(mean): --  RR: 18 (23 Jan 2023 12:15) (18 - 18)  SpO2: 100% (23 Jan 2023 12:15) (94% - 100%)    Parameters below as of 23 Jan 2023 12:15  Patient On (Oxygen Delivery Method): room air            General Exam:   General Appearance: Appropriately dressed and in no acute distress       Head: Normocephalic, atraumatic and no dysmorphic features  Ear, Nose, and Throat: Moist mucous membranes  CVS: S1S2+  Resp: No SOB, no wheeze or rhonchi  Abd: soft NTND  Extremities: No edema, no cyanosis  Skin: No bruises, no rashes        NEUROLOGICAL EXAM: Family at bedside who provided translation   Mental status: Eyes open, awake, alert, attempts to produce speech, able to follow some simple commands, able to mimic at times , unable to ID objects  Cranial Nerves: Right facial droop, no nystagmus, blinks to threat B/L  Motor exam: Normal tone, no drift, Right hemiparesis RUE drift, 3-44/5, RLE drift, 3-4/5,, LUE 5/5, LLE 5/5  Sensation: Intact to light touch   Coordination/ Gait: Unable to participate with exam     I personally reviewed the below data/images/labs:      CBC Full  -  ( 23 Jan 2023 07:43 )  WBC Count : 11.31 K/uL  RBC Count : 3.77 M/uL  Hemoglobin : 10.1 g/dL  Hematocrit : 32.7 %  Platelet Count - Automated : 267 K/uL  Mean Cell Volume : 86.7 fl  Mean Cell Hemoglobin : 26.8 pg  Mean Cell Hemoglobin Concentration : 30.9 gm/dL  Auto Neutrophil # : x  Auto Lymphocyte # : x  Auto Monocyte # : x  Auto Eosinophil # : x  Auto Basophil # : x  Auto Neutrophil % : x  Auto Lymphocyte % : x  Auto Monocyte % : x  Auto Eosinophil % : x  Auto Basophil % : x    01-23    146<H>  |  113<H>  |  20  ----------------------------<  151<H>  3.6   |  21<L>  |  0.81    Ca    8.5      23 Jan 2023 07:43  Phos  2.7     01-22  Mg     1.9     01-22          `< from: CT Head No Cont (01.19.23 @ 13:45) >    ACC: [id number] EXAM:  CT BRAIN   ORDERED BY: CORINNE MADERA     PROCEDURE DATE:  01/19/2023          INTERPRETATION:  CLINICAL STATEMENT: Altered mental status    TECHNIQUE: CT of the head was performed without IV contrast.  RAPID   artificial intelligence was utilized for the preliminary evaluation of   intracranial hemorrhage.    COMPARISON: MRI brain 1/7/2023.    FINDINGS:  There is mild diffuse parenchymal volume loss. There are areas of low   attenuation in the periventricular white matter likely related to mild   chronic microvascular ischemic changes.    Evolving small infarcts noted in the left corona radiata.   Age-indeterminate infarct also noted in the left temporal and parietal   lobe    There is no acute intracranial hemorrhage, parenchymal mass, mass effect   or midline shift.. There is no hydrocephalus. Partial empty sella noted    The cranium is intact. Calcification noted adjacent to left frontal   artery table. The visualized paranasal sinuses are well-aerated.        IMPRESSION:  No acute intracranial hemorrhage.    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    --- End of Report ---                 Neurology Progress Note    [facility]     Medication:  MEDICATIONS  (STANDING):  aspirin  chewable 81 milliGRAM(s) Oral daily  atorvastatin 80 milliGRAM(s) Oral at bedtime  cefTRIAXone   IVPB 1000 milliGRAM(s) IV Intermittent every 24 hours  clopidogrel Tablet 75 milliGRAM(s) Oral daily  dextrose 5%. 1000 milliLiter(s) (100 mL/Hr) IV Continuous <Continuous>  dextrose 5%. 1000 milliLiter(s) (50 mL/Hr) IV Continuous <Continuous>  dextrose 50% Injectable 25 Gram(s) IV Push once  dextrose 50% Injectable 12.5 Gram(s) IV Push once  dextrose 50% Injectable 25 Gram(s) IV Push once  dextrose Oral Gel 15 Gram(s) Oral once  enoxaparin Injectable 40 milliGRAM(s) SubCutaneous every 24 hours  glucagon  Injectable 1 milliGRAM(s) IntraMuscular once  insulin glargine Injectable (LANTUS) 6 Unit(s) SubCutaneous at bedtime  insulin lispro (ADMELOG) corrective regimen sliding scale   SubCutaneous every 6 hours  tamsulosin 0.4 milliGRAM(s) Oral at bedtime    MEDICATIONS  (PRN):      Vitals:    Vital Signs Last 24 Hrs  T(C): 37.5 (23 Jan 2023 12:15), Max: 37.5 (23 Jan 2023 12:15)  T(F): 99.5 (23 Jan 2023 12:15), Max: 99.5 (23 Jan 2023 12:15)  HR: 100 (23 Jan 2023 04:52) (98 - 101)  BP: 133/78 (23 Jan 2023 12:15) (112/68 - 140/78)  BP(mean): --  RR: 18 (23 Jan 2023 12:15) (18 - 18)  SpO2: 100% (23 Jan 2023 12:15) (94% - 100%)    Parameters below as of 23 Jan 2023 12:15  Patient On (Oxygen Delivery Method): room air            General Exam:   General Appearance: Appropriately dressed and in no acute distress       Head: Normocephalic, atraumatic and no dysmorphic features  Ear, Nose, and Throat: Moist mucous membranes  CVS: S1S2+  Resp: No SOB, no wheeze or rhonchi  Abd: soft NTND  Extremities: No edema, no cyanosis  Skin: No bruises, no rashes        NEUROLOGICAL EXAM: Family at bedside who provided translation   Mental status: Eyes open, awake, alert, attempts to produce speech, able to follow some simple commands, able to mimic at times , unable to ID objects  Cranial Nerves: Right facial droop, no nystagmus, blinks to threat B/L  Motor exam: Normal tone, no drift, Right hemiparesis RUE drift, 3-44/5, RLE drift, 3-4/5,, LUE 5/5, LLE 5/5  Sensation: Intact to light touch   Coordination/ Gait: Unable to participate with exam     I personally reviewed the below data/images/labs:      CBC Full  -  ( 23 Jan 2023 07:43 )  WBC Count : 11.31 K/uL  RBC Count : 3.77 M/uL  Hemoglobin : 10.1 g/dL  Hematocrit : 32.7 %  Platelet Count - Automated : 267 K/uL  Mean Cell Volume : 86.7 fl  Mean Cell Hemoglobin : 26.8 pg  Mean Cell Hemoglobin Concentration : 30.9 gm/dL  Auto Neutrophil # : x  Auto Lymphocyte # : x  Auto Monocyte # : x  Auto Eosinophil # : x  Auto Basophil # : x  Auto Neutrophil % : x  Auto Lymphocyte % : x  Auto Monocyte % : x  Auto Eosinophil % : x  Auto Basophil % : x    01-23    146<H>  |  113<H>  |  20  ----------------------------<  151<H>  3.6   |  21<L>  |  0.81    Ca    8.5      23 Jan 2023 07:43  Phos  2.7     01-22  Mg     1.9     01-22          `< from: CT Head No Cont (01.19.23 @ 13:45) >    ACC: [id number] EXAM:  CT BRAIN   ORDERED BY: CORINNE MADERA     PROCEDURE DATE:  01/19/2023          INTERPRETATION:  CLINICAL STATEMENT: Altered mental status    TECHNIQUE: CT of the head was performed without IV contrast.  RAPID   artificial intelligence was utilized for the preliminary evaluation of   intracranial hemorrhage.    COMPARISON: MRI brain 1/7/2023.    FINDINGS:  There is mild diffuse parenchymal volume loss. There are areas of low   attenuation in the periventricular white matter likely related to mild   chronic microvascular ischemic changes.    Evolving small infarcts noted in the left corona radiata.   Age-indeterminate infarct also noted in the left temporal and parietal   lobe    There is no acute intracranial hemorrhage, parenchymal mass, mass effect   or midline shift.. There is no hydrocephalus. Partial empty sella noted    The cranium is intact. Calcification noted adjacent to left frontal   artery table. The visualized paranasal sinuses are well-aerated.        IMPRESSION:  No acute intracranial hemorrhage.    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    --- End of Report ---

## 2023-01-23 NOTE — PROGRESS NOTE ADULT - SUBJECTIVE AND OBJECTIVE BOX
[facility]   MD PUSHPA WICK MD, PA KRISTINE SOLTANPOUR, DO INJUNG KO, NP    TEL:  OFFICE: [phone]    From 5pm-7am Answering Service [phone]    -- RENAL FOLLOW UP NOTE ---Date of Service 01-23-23 @ 13:43    Patient is a 78y old  Female who presents with a chief complaint of Chart Reviewed, Events Noted  "78F with dementia, T2DM, recently discharged about 1 week ago for CVA now presenting with poor PO intake, lethargy and hyperglycemia to 500s."    (23 Jan 2023 10:07)      Patient seen and examined at bedside. No chest pain/sob    VITALS:  T(F): 99.5 (01-23-23 @ 12:15), Max: 99.5 (01-23-23 @ 12:15)  HR: 100 (01-23-23 @ 04:52)  BP: 133/78 (01-23-23 @ 12:15)  RR: 18 (01-23-23 @ 12:15)  SpO2: 100% (01-23-23 @ 12:15)  Wt(kg): --    01-22 @ 07:01  -  01-23 @ 07:00  --------------------------------------------------------  IN: 1100 mL / OUT: 850 mL / NET: 250 mL    01-23 @ 07:01  -  01-23 @ 13:43  --------------------------------------------------------  IN: 0 mL / OUT: 250 mL / NET: -250 mL          PHYSICAL EXAM:  Constitutional: NAD  Neck: No JVD  Respiratory: CTAB, no wheezes, rales or rhonchi  Cardiovascular: S1, S2, RRR  Gastrointestinal: BS+, soft, NT/ND  Extremities: No peripheral edema    Hospital Medications:   MEDICATIONS  (STANDING):  aspirin  chewable 81 milliGRAM(s) Oral daily  atorvastatin 80 milliGRAM(s) Oral at bedtime  cefTRIAXone   IVPB 1000 milliGRAM(s) IV Intermittent every 24 hours  clopidogrel Tablet 75 milliGRAM(s) Oral daily  dextrose 5%. 1000 milliLiter(s) (100 mL/Hr) IV Continuous <Continuous>  dextrose 5%. 1000 milliLiter(s) (50 mL/Hr) IV Continuous <Continuous>  dextrose 50% Injectable 25 Gram(s) IV Push once  dextrose 50% Injectable 12.5 Gram(s) IV Push once  dextrose 50% Injectable 25 Gram(s) IV Push once  dextrose Oral Gel 15 Gram(s) Oral once  enoxaparin Injectable 40 milliGRAM(s) SubCutaneous every 24 hours  glucagon  Injectable 1 milliGRAM(s) IntraMuscular once  insulin glargine Injectable (LANTUS) 6 Unit(s) SubCutaneous at bedtime  insulin lispro (ADMELOG) corrective regimen sliding scale   SubCutaneous every 6 hours  tamsulosin 0.4 milliGRAM(s) Oral at bedtime      LABS:  01-23    146<H>  |  113<H>  |  20  ----------------------------<  151<H>  3.6   |  21<L>  |  0.81    Ca    8.5      23 Jan 2023 07:43  Phos  2.7     01-22  Mg     1.9     01-22      Creatinine Trend: 0.81 <--, 0.76 <--, 0.77 <--, 0.74 <--, 0.72 <--, 0.71 <--, 0.74 <--, 0.65 <--, 0.74 <--, 0.92 <--                                10.1   11.31 )-----------( 267      ( 23 Jan 2023 07:43 )             32.7     Urine Studies:  Urinalysis - [01-19-23 @ 13:15]      Color DARK BROWN / Appearance Turbid / SG 1.023 / pH 7.0      Gluc 200 mg/dL / Ketone Trace  / Bili Negative / Urobili 6 mg/dL       Blood Large / Protein >600 / Leuk Est Large / Nitrite Negative      RBC 15 / WBC 11-25 / Hyaline  / Gran 4 / Sq Epi  / Non Sq Epi Few / Bacteria Many      Lipid: chol 238, TG 80, HDL 46, LDL --      [01-08-23 @ 02:47]        RADIOLOGY & ADDITIONAL STUDIES:   [facility] [place] [facility]   MD PUSHPA WICK MD, PA KRISTINE SOLTANPOUR, DO INJUNG KO, NP    TEL:  OFFICE: [phone]    From 5pm-7am Answering Service [phone]    -- RENAL FOLLOW UP NOTE ---Date of Service 01-23-23 @ 13:43    Patient is a 78y old  Female who presents with a chief complaint of Chart Reviewed, Events Noted  "78F with dementia, T2DM, recently discharged about 1 week ago for CVA now presenting with poor PO intake, lethargy and hyperglycemia to 500s."    (23 Jan 2023 10:07)      Patient seen and examined at bedside. No chest pain/sob    VITALS:  T(F): 99.5 (01-23-23 @ 12:15), Max: 99.5 (01-23-23 @ 12:15)  HR: 100 (01-23-23 @ 04:52)  BP: 133/78 (01-23-23 @ 12:15)  RR: 18 (01-23-23 @ 12:15)  SpO2: 100% (01-23-23 @ 12:15)  Wt(kg): --    01-22 @ 07:01  -  01-23 @ 07:00  --------------------------------------------------------  IN: 1100 mL / OUT: 850 mL / NET: 250 mL    01-23 @ 07:01  -  01-23 @ 13:43  --------------------------------------------------------  IN: 0 mL / OUT: 250 mL / NET: -250 mL          PHYSICAL EXAM:  Constitutional: NAD  Neck: No JVD  Respiratory: CTAB, no wheezes, rales or rhonchi  Cardiovascular: S1, S2, RRR  Gastrointestinal: BS+, soft, NT/ND  Extremities: No peripheral edema    Hospital Medications:   MEDICATIONS  (STANDING):  aspirin  chewable 81 milliGRAM(s) Oral daily  atorvastatin 80 milliGRAM(s) Oral at bedtime  cefTRIAXone   IVPB 1000 milliGRAM(s) IV Intermittent every 24 hours  clopidogrel Tablet 75 milliGRAM(s) Oral daily  dextrose 5%. 1000 milliLiter(s) (100 mL/Hr) IV Continuous <Continuous>  dextrose 5%. 1000 milliLiter(s) (50 mL/Hr) IV Continuous <Continuous>  dextrose 50% Injectable 25 Gram(s) IV Push once  dextrose 50% Injectable 12.5 Gram(s) IV Push once  dextrose 50% Injectable 25 Gram(s) IV Push once  dextrose Oral Gel 15 Gram(s) Oral once  enoxaparin Injectable 40 milliGRAM(s) SubCutaneous every 24 hours  glucagon  Injectable 1 milliGRAM(s) IntraMuscular once  insulin glargine Injectable (LANTUS) 6 Unit(s) SubCutaneous at bedtime  insulin lispro (ADMELOG) corrective regimen sliding scale   SubCutaneous every 6 hours  tamsulosin 0.4 milliGRAM(s) Oral at bedtime      LABS:  01-23    146<H>  |  113<H>  |  20  ----------------------------<  151<H>  3.6   |  21<L>  |  0.81    Ca    8.5      23 Jan 2023 07:43  Phos  2.7     01-22  Mg     1.9     01-22      Creatinine Trend: 0.81 <--, 0.76 <--, 0.77 <--, 0.74 <--, 0.72 <--, 0.71 <--, 0.74 <--, 0.65 <--, 0.74 <--, 0.92 <--                                10.1   11.31 )-----------( 267      ( 23 Jan 2023 07:43 )             32.7     Urine Studies:  Urinalysis - [01-19-23 @ 13:15]      Color DARK BROWN / Appearance Turbid / SG 1.023 / pH 7.0      Gluc 200 mg/dL / Ketone Trace  / Bili Negative / Urobili 6 mg/dL       Blood Large / Protein >600 / Leuk Est Large / Nitrite Negative      RBC 15 / WBC 11-25 / Hyaline  / Gran 4 / Sq Epi  / Non Sq Epi Few / Bacteria Many      Lipid: chol 238, TG 80, HDL 46, LDL --      [01-08-23 @ 02:47]        RADIOLOGY & ADDITIONAL STUDIES:   [facility] [place] [facility]   MD PUSHPA WICK MD, PA KRISTINE SOLTANPOUR, DO INJUNG KO, NP    TEL:  OFFICE: [phone]    From 5pm-7am Answering Service [phone]    -- RENAL FOLLOW UP NOTE ---Date of Service 01-23-23 @ 13:43    Patient is a 78y old  Female who presents with a chief complaint of Chart Reviewed, Events Noted  "78F with dementia, T2DM, recently discharged about 1 week ago for CVA now presenting with poor PO intake, lethargy and hyperglycemia to 500s."    (23 Jan 2023 10:07)      Patient seen and examined at bedside. No chest pain/sob    VITALS:  T(F): 99.5 (01-23-23 @ 12:15), Max: 99.5 (01-23-23 @ 12:15)  HR: 100 (01-23-23 @ 04:52)  BP: 133/78 (01-23-23 @ 12:15)  RR: 18 (01-23-23 @ 12:15)  SpO2: 100% (01-23-23 @ 12:15)  Wt(kg): --    01-22 @ 07:01  -  01-23 @ 07:00  --------------------------------------------------------  IN: 1100 mL / OUT: 850 mL / NET: 250 mL    01-23 @ 07:01  -  01-23 @ 13:43  --------------------------------------------------------  IN: 0 mL / OUT: 250 mL / NET: -250 mL          PHYSICAL EXAM:  Constitutional: NAD  Neck: No JVD  Respiratory: CTAB, no wheezes, rales or rhonchi  Cardiovascular: S1, S2, RRR  Gastrointestinal: BS+, soft, NT/ND  Extremities: No peripheral edema    Hospital Medications:   MEDICATIONS  (STANDING):  aspirin  chewable 81 milliGRAM(s) Oral daily  atorvastatin 80 milliGRAM(s) Oral at bedtime  cefTRIAXone   IVPB 1000 milliGRAM(s) IV Intermittent every 24 hours  clopidogrel Tablet 75 milliGRAM(s) Oral daily  dextrose 5%. 1000 milliLiter(s) (100 mL/Hr) IV Continuous <Continuous>  dextrose 5%. 1000 milliLiter(s) (50 mL/Hr) IV Continuous <Continuous>  dextrose 50% Injectable 25 Gram(s) IV Push once  dextrose 50% Injectable 12.5 Gram(s) IV Push once  dextrose 50% Injectable 25 Gram(s) IV Push once  dextrose Oral Gel 15 Gram(s) Oral once  enoxaparin Injectable 40 milliGRAM(s) SubCutaneous every 24 hours  glucagon  Injectable 1 milliGRAM(s) IntraMuscular once  insulin glargine Injectable (LANTUS) 6 Unit(s) SubCutaneous at bedtime  insulin lispro (ADMELOG) corrective regimen sliding scale   SubCutaneous every 6 hours  tamsulosin 0.4 milliGRAM(s) Oral at bedtime      LABS:  01-23    146<H>  |  113<H>  |  20  ----------------------------<  151<H>  3.6   |  21<L>  |  0.81    Ca    8.5      23 Jan 2023 07:43  Phos  2.7     01-22  Mg     1.9     01-22      Creatinine Trend: 0.81 <--, 0.76 <--, 0.77 <--, 0.74 <--, 0.72 <--, 0.71 <--, 0.74 <--, 0.65 <--, 0.74 <--, 0.92 <--                                10.1   11.31 )-----------( 267      ( 23 Jan 2023 07:43 )             32.7     Urine Studies:  Urinalysis - [01-19-23 @ 13:15]      Color DARK BROWN / Appearance Turbid / SG 1.023 / pH 7.0      Gluc 200 mg/dL / Ketone Trace  / Bili Negative / Urobili 6 mg/dL       Blood Large / Protein >600 / Leuk Est Large / Nitrite Negative      RBC 15 / WBC 11-25 / Hyaline  / Gran 4 / Sq Epi  / Non Sq Epi Few / Bacteria Many      Lipid: chol 238, TG 80, HDL 46, LDL --      [01-08-23 @ 02:47]        RADIOLOGY & ADDITIONAL STUDIES:

## 2023-01-23 NOTE — PROGRESS NOTE ADULT - SUBJECTIVE AND OBJECTIVE BOX
UROLOGY EVENT NOTE    Patient seen and examined at bedside. Called for gonzalez not draining. Unclear as to why patient needs gonzalez.   gonzalez last replaced yesterday by myself, confirmed in place, but large mucous plug and urine voided in between gonzalez exchange.   Per daughter at bedside, patient was voiding well on own prior to admission to hospital.       T(C): 37.5 (01-23-23 @ 12:15), Max: 37.5 (01-23-23 @ 12:15)  HR: 100 (01-23-23 @ 04:52) (98 - 101)  BP: 133/78 (01-23-23 @ 12:15) (112/68 - 140/78)  RR: 18 (01-23-23 @ 12:15) (18 - 18)  SpO2: 100% (01-23-23 @ 12:15) (94% - 100%)    Gen: NAD  Abdomen: Soft NT ND  Back: No CVAT  : +gonzalez with minimal urine in bag, able to flush fluid into gonzalez, but unable to aspirate.       A/P: 79 yo female with gonzalez not draining  - unclear as to whether patient needs gonzalez, confirmed with primary team, ok to TOV  - check PVR after voids  - bladder US pending  - please call back if patient unable to void, if so would recommend placing larger lumen gonzalez (20f) to accommodate possible mucous that may be obstructing gonzalez drainage. UROLOGY EVENT NOTE    Patient seen and examined at bedside. Called for gonzalez not draining. Unclear as to why patient needs gonzalez.   gonzalez last replaced yesterday by myself, confirmed in place, but large mucous plug and urine voided in between gonzalez exchange.   Per daughter at bedside, patient was voiding well on own prior to admission to hospital.       T(C): 37.5 (01-23-23 @ 12:15), Max: 37.5 (01-23-23 @ 12:15)  HR: 100 (01-23-23 @ 04:52) (98 - 101)  BP: 133/78 (01-23-23 @ 12:15) (112/68 - 140/78)  RR: 18 (01-23-23 @ 12:15) (18 - 18)  SpO2: 100% (01-23-23 @ 12:15) (94% - 100%)    Gen: NAD  Abdomen: Soft NT ND  Back: No CVAT  : +gonzalez with minimal urine in bag, able to flush fluid into gonzalez, but unable to aspirate.       A/P: 77 yo female with gonzalez not draining  - unclear as to whether patient needs gonzalez, confirmed with primary team, ok to TOV  - check PVR after voids  - bladder US pending  - please call back if patient unable to void, if so would recommend placing larger lumen gonzalez (20f) to accommodate possible mucous that may be obstructing gonzalez drainage.

## 2023-01-23 NOTE — PROGRESS NOTE ADULT - PROBLEM SELECTOR PLAN 1
-test BG Q6h  -Increase Lantus 6 units QHS for now  -c/w Admelog low correction scale Q6h. IF BG >200mg/dl x 2 can increase to moderate correction scale  -Pt may require NPH depending on insulin requirements when on TF  -Please notify endocrine team of any change to TF regimen.   For d/c:   -Metformin 500 mg BID for 1 week then increase to 1000 mg BID. Check lactate prior to d/c  -May need low dose basal insulin (came in with glucose 500s, mild BHB elevation)  -Consider DPP4 inhibitor  -Patient can follow up at [address] [place] [address], [address], [place] [address] [phone]  -Patient will need opthalmology and podiatry follow up as outpatient -test BG Q6h  -Increase Lantus 6 units QHS for now  -c/w Admelog low correction scale Q6h. IF BG >200mg/dl x 2 can increase to moderate correction scale  -Pt may require NPH depending on insulin requirements when on TF  -Please notify endocrine team of any change to TF regimen.   For d/c:   -Metformin 500 mg BID for 1 week then increase to 1000 mg BID. Check lactate prior to d/c  -May need low dose basal insulin (came in with glucose 500s, mild BHB elevation)  -Consider DPP4 inhibitor  -Patient can follow up at [address] [place] [address], [address], [place] [address] [phone]  -Patient will need opthalmology and podiatry follow up as outpatient -test BG Q6h  -Increase Lantus 6 units QHS for now  -c/w Admelog low correction scale Q6h. IF BG >200mg/dl x 2 can increase to moderate correction scale  -Pt may require NPH depending on insulin requirements when on TF  -Please notify endocrine team of any change to TF regimen.   For d/c:   -Metformin 500 mg BID for 1 week then increase to 1000 mg BID. Check lactate prior to d/c  -May need low dose basal insulin (came in with glucose 500s, mild BHB elevation)  -Consider DPP4 inhibitor  -Patient can follow up at [address] [place] [address], [address], [place] [address] [phone]  -Patient will need opthalmology and podiatry follow up as outpatient

## 2023-01-23 NOTE — DIETITIAN INITIAL EVALUATION ADULT - REASON
Pt's son not at [facility] to provide consent for Nutrition Focused Physical Exam, defer exam to when son or family is present to provide consent  Pt's son not at [facility] to provide consent for Nutrition Focused Physical Exam, defer exam to when son or family is present to provide consent  Pt's son not at [facility] to provide consent for Nutrition Focused Physical Exam, defer exam to when son or family is present to provide consent

## 2023-01-23 NOTE — DIETITIAN INITIAL EVALUATION ADULT - REASON FOR ADMISSION
Chart Reviewed, Events Noted  "78F with dementia, T2DM, recently discharged about 1 week ago for CVA now presenting with poor PO intake,   lethargy and hyperglycemia to 500s."    Chart Reviewed, Events Noted  "78F with dementia, T2DM, recently discharged about 1 week ago for CVA now presenting with poor PO intake, lethargy and hyperglycemia to 500s."

## 2023-01-23 NOTE — DIETITIAN INITIAL EVALUATION ADULT - ORAL INTAKE PTA/DIET HISTORY
As per son, pt was noted with poor PO intake 3 da As per son, pt was noted with poor PO intake 3 days prior to current admission. Son confirms no known food allergies/intolerances. Son reports patient had diarrhea at home PTA, denies any constipation, vomiting, or nausea PTA.

## 2023-01-23 NOTE — DIETITIAN INITIAL EVALUATION ADULT - ADD RECOMMEND
1.Defer diet/texture advancement to medical team/SLP as indicated   2.RD remains available to adjust EN formulary, volume/rate  3. Monitor GI tolerance, skin integrity, labs, weight, and bowel movement regularity.  RD remains available upon request and will follow-up per protocol.

## 2023-01-23 NOTE — DIETITIAN INITIAL EVALUATION ADULT - PERTINENT LABORATORY DATA
01-23    146<H>  |  113<H>  |  20  ----------------------------<  151<H>  3.6   |  21<L>  |  0.81    Ca    8.5      23 Jan 2023 07:43  Phos  2.7     01-22  Mg     1.9     01-22    POCT Blood Glucose.: 142 mg/dL (01-23-23 @ 05:50)  A1C with Estimated Average Glucose Result: 9.4 % (01-19-23 @ 13:11)  A1C with Estimated Average Glucose Result: 8.6 % (01-07-23 @ 05:40)   01-23    146<H>  |  113<H>  |  20  ----------------------------<  151<H>  3.6   |  21<L>  |  0.81    Ca    8.5      23 Jan 2023 07:43  Phos  2.7     01-22  Mg     1.9     01-22    POCT Blood Glucose.: 142 mg/dL (01-23-23 @ 05:50)  POCT Blood Glucose.: 132 mg/dL (01-22-23 @ 23:57)  POCT Blood Glucose.: 131 mg/dL (01-22-23 @ 22:30)  POCT Blood Glucose.: 147 mg/dL (01-22-23 @ 17:52)  POCT Blood Glucose.: 150 mg/dL (01-22-23 @ 12:55)    A1C with Estimated Average Glucose Result: 9.4 % (01-19-23 @ 13:11)  A1C with Estimated Average Glucose Result: 8.6 % (01-07-23 @ 05:40)

## 2023-01-23 NOTE — DIETITIAN INITIAL EVALUATION ADULT - NSFNSADHERENCEPTAFT_GEN_A_CORE
Son reports that pt was diagnosed with diabetes during previous admission for stroke. As per  H&P, during previous admission, pt was started onmetformin to 1000mg 2x/day. Most recent A1c= 9.4 % (01-19-23 ),  trending up from previous level of A1c= 8.6 % (01-07-23). Currently on lantus and lispro for diabetes management during present admission

## 2023-01-23 NOTE — PROGRESS NOTE ADULT - SUBJECTIVE AND OBJECTIVE BOX
Diabetes Follow up note:    Chief complaint: T2DM    Interval Hx: BG values at goal over past 24 hours. Pt seen at bedside w/family present.     Review of Systems:  General:  GI: Tolerating POs. Denies N/V/D/Abd pain  CV: Denies CP/SOB  ENDO: No S&Sx of hypoglycemia    MEDS:  atorvastatin 80 milliGRAM(s) Oral at bedtime  insulin glargine Injectable (LANTUS) 6 Unit(s) SubCutaneous at bedtime  insulin lispro (ADMELOG) corrective regimen sliding scale   SubCutaneous every 6 hours    cefTRIAXone   IVPB 1000 milliGRAM(s) IV Intermittent every 24 hours    Allergies    Benedryl Allergy Sinus (Hives)  penicillin (Rash)        PE:  General:  Vital Signs Last 24 Hrs  T(C): 37.5 (23 Jan 2023 12:15), Max: 37.5 (23 Jan 2023 12:15)  T(F): 99.5 (23 Jan 2023 12:15), Max: 99.5 (23 Jan 2023 12:15)  HR: 100 (23 Jan 2023 04:52) (98 - 101)  BP: 133/78 (23 Jan 2023 12:15) (112/68 - 140/78)  BP(mean): --  RR: 18 (23 Jan 2023 12:15) (18 - 18)  SpO2: 100% (23 Jan 2023 12:15) (94% - 100%)    Parameters below as of 23 Jan 2023 12:15  Patient On (Oxygen Delivery Method): room air      Abd: Soft, NT,ND,   Extremities: Warm  Neuro: A&O X3    LABS:  POCT Blood Glucose.: 143 mg/dL (01-23-23 @ 12:17)  POCT Blood Glucose.: 142 mg/dL (01-23-23 @ 05:50)  POCT Blood Glucose.: 132 mg/dL (01-22-23 @ 23:57)  POCT Blood Glucose.: 131 mg/dL (01-22-23 @ 22:30)  POCT Blood Glucose.: 147 mg/dL (01-22-23 @ 17:52)  POCT Blood Glucose.: 150 mg/dL (01-22-23 @ 12:55)  POCT Blood Glucose.: 153 mg/dL (01-22-23 @ 06:31)  POCT Blood Glucose.: 158 mg/dL (01-22-23 @ 00:25)  POCT Blood Glucose.: 137 mg/dL (01-21-23 @ 21:54)  POCT Blood Glucose.: 148 mg/dL (01-21-23 @ 17:40)  POCT Blood Glucose.: 240 mg/dL (01-21-23 @ 12:34)  POCT Blood Glucose.: 272 mg/dL (01-21-23 @ 06:07)  POCT Blood Glucose.: 262 mg/dL (01-21-23 @ 02:14)  POCT Blood Glucose.: 161 mg/dL (01-20-23 @ 21:58)  POCT Blood Glucose.: 198 mg/dL (01-20-23 @ 17:40)  POCT Blood Glucose.: 246 mg/dL (01-20-23 @ 17:15)                            10.1   11.31 )-----------( 267      ( 23 Jan 2023 07:43 )             32.7       01-23    146<H>  |  113<H>  |  20  ----------------------------<  151<H>  3.6   |  21<L>  |  0.81    eGFR: 74    Ca    8.5      01-23  Mg     1.9     01-22  Phos  2.7     01-22        Thyroid Function Tests:      A1C with Estimated Average Glucose Result: 9.4 % (01-19-23 @ 13:11)  A1C with Estimated Average Glucose Result: 8.6 % (01-07-23 @ 05:40)          Contact number: alycia [phone] or [phone]       Diabetes Follow up note:    Chief complaint: T2DM    Interval Hx: BG values at goal over past 24 hours. Pt seen at bedside w/family present.     Review of Systems:  General:  GI: Tolerating POs. Denies N/V/D/Abd pain  CV: Denies CP/SOB  ENDO: No S&Sx of hypoglycemia    MEDS:  atorvastatin 80 milliGRAM(s) Oral at bedtime  insulin glargine Injectable (LANTUS) 6 Unit(s) SubCutaneous at bedtime  insulin lispro (ADMELOG) corrective regimen sliding scale   SubCutaneous every 6 hours    cefTRIAXone   IVPB 1000 milliGRAM(s) IV Intermittent every 24 hours    Allergies    Benedryl Allergy Sinus (Hives)  penicillin (Rash)        PE:  General:  Vital Signs Last 24 Hrs  T(C): 37.5 (23 Jan 2023 12:15), Max: 37.5 (23 Jan 2023 12:15)  T(F): 99.5 (23 Jan 2023 12:15), Max: 99.5 (23 Jan 2023 12:15)  HR: 100 (23 Jan 2023 04:52) (98 - 101)  BP: 133/78 (23 Jan 2023 12:15) (112/68 - 140/78)  BP(mean): --  RR: 18 (23 Jan 2023 12:15) (18 - 18)  SpO2: 100% (23 Jan 2023 12:15) (94% - 100%)    Parameters below as of 23 Jan 2023 12:15  Patient On (Oxygen Delivery Method): room air      Abd: Soft, NT,ND,   Extremities: Warm  Neuro: A&O X3    LABS:  POCT Blood Glucose.: 143 mg/dL (01-23-23 @ 12:17)  POCT Blood Glucose.: 142 mg/dL (01-23-23 @ 05:50)  POCT Blood Glucose.: 132 mg/dL (01-22-23 @ 23:57)  POCT Blood Glucose.: 131 mg/dL (01-22-23 @ 22:30)  POCT Blood Glucose.: 147 mg/dL (01-22-23 @ 17:52)  POCT Blood Glucose.: 150 mg/dL (01-22-23 @ 12:55)  POCT Blood Glucose.: 153 mg/dL (01-22-23 @ 06:31)  POCT Blood Glucose.: 158 mg/dL (01-22-23 @ 00:25)  POCT Blood Glucose.: 137 mg/dL (01-21-23 @ 21:54)  POCT Blood Glucose.: 148 mg/dL (01-21-23 @ 17:40)  POCT Blood Glucose.: 240 mg/dL (01-21-23 @ 12:34)  POCT Blood Glucose.: 272 mg/dL (01-21-23 @ 06:07)  POCT Blood Glucose.: 262 mg/dL (01-21-23 @ 02:14)  POCT Blood Glucose.: 161 mg/dL (01-20-23 @ 21:58)  POCT Blood Glucose.: 198 mg/dL (01-20-23 @ 17:40)  POCT Blood Glucose.: 246 mg/dL (01-20-23 @ 17:15)                            10.1   11.31 )-----------( 267      ( 23 Jan 2023 07:43 )             32.7       01-23    146<H>  |  113<H>  |  20  ----------------------------<  151<H>  3.6   |  21<L>  |  0.81    eGFR: 74    Ca    8.5      01-23  Mg     1.9     01-22  Phos  2.7     01-22        Thyroid Function Tests:      A1C with Estimated Average Glucose Result: 9.4 % (01-19-23 @ 13:11)  A1C with Estimated Average Glucose Result: 8.6 % (01-07-23 @ 05:40)          Contact number: alycia [phone] or [phone]       Diabetes Follow up note:    Chief complaint: T2DM    Interval Hx: BG values at goal over past 24 hours. Pt seen at bedside w/family present.     Review of Systems:  General:  GI: Tolerating POs. Denies N/V/D/Abd pain  CV: Denies CP/SOB  ENDO: No S&Sx of hypoglycemia    MEDS:  atorvastatin 80 milliGRAM(s) Oral at bedtime  insulin glargine Injectable (LANTUS) 6 Unit(s) SubCutaneous at bedtime  insulin lispro (ADMELOG) corrective regimen sliding scale   SubCutaneous every 6 hours    cefTRIAXone   IVPB 1000 milliGRAM(s) IV Intermittent every 24 hours    Allergies    Benedryl Allergy Sinus (Hives)  penicillin (Rash)        PE:  General:  Vital Signs Last 24 Hrs  T(C): 37.5 (23 Jan 2023 12:15), Max: 37.5 (23 Jan 2023 12:15)  T(F): 99.5 (23 Jan 2023 12:15), Max: 99.5 (23 Jan 2023 12:15)  HR: 100 (23 Jan 2023 04:52) (98 - 101)  BP: 133/78 (23 Jan 2023 12:15) (112/68 - 140/78)  BP(mean): --  RR: 18 (23 Jan 2023 12:15) (18 - 18)  SpO2: 100% (23 Jan 2023 12:15) (94% - 100%)    Parameters below as of 23 Jan 2023 12:15  Patient On (Oxygen Delivery Method): room air      Abd: Soft, NT,ND,   Extremities: Warm  Neuro: A&O X3    LABS:  POCT Blood Glucose.: 143 mg/dL (01-23-23 @ 12:17)  POCT Blood Glucose.: 142 mg/dL (01-23-23 @ 05:50)  POCT Blood Glucose.: 132 mg/dL (01-22-23 @ 23:57)  POCT Blood Glucose.: 131 mg/dL (01-22-23 @ 22:30)  POCT Blood Glucose.: 147 mg/dL (01-22-23 @ 17:52)  POCT Blood Glucose.: 150 mg/dL (01-22-23 @ 12:55)  POCT Blood Glucose.: 153 mg/dL (01-22-23 @ 06:31)  POCT Blood Glucose.: 158 mg/dL (01-22-23 @ 00:25)  POCT Blood Glucose.: 137 mg/dL (01-21-23 @ 21:54)  POCT Blood Glucose.: 148 mg/dL (01-21-23 @ 17:40)  POCT Blood Glucose.: 240 mg/dL (01-21-23 @ 12:34)  POCT Blood Glucose.: 272 mg/dL (01-21-23 @ 06:07)  POCT Blood Glucose.: 262 mg/dL (01-21-23 @ 02:14)  POCT Blood Glucose.: 161 mg/dL (01-20-23 @ 21:58)  POCT Blood Glucose.: 198 mg/dL (01-20-23 @ 17:40)  POCT Blood Glucose.: 246 mg/dL (01-20-23 @ 17:15)                            10.1   11.31 )-----------( 267      ( 23 Jan 2023 07:43 )             32.7       01-23    146<H>  |  113<H>  |  20  ----------------------------<  151<H>  3.6   |  21<L>  |  0.81    eGFR: 74    Ca    8.5      01-23  Mg     1.9     01-22  Phos  2.7     01-22        Thyroid Function Tests:      A1C with Estimated Average Glucose Result: 9.4 % (01-19-23 @ 13:11)  A1C with Estimated Average Glucose Result: 8.6 % (01-07-23 @ 05:40)          Contact number: alycia [phone] or [phone]       Diabetes Follow up note:    Chief complaint: T2DM    Interval Hx: BG values at goal over past 24 hours. Pt seen at bedside w/family present and translating for pt. Pt started on Glucerna via NGT today at 20cc/hr currently.     Review of Systems:  General: no complaints  non-verbal but following commands for family      MEDS:  atorvastatin 80 milliGRAM(s) Oral at bedtime  insulin glargine Injectable (LANTUS) 6 Unit(s) SubCutaneous at bedtime  insulin lispro (ADMELOG) corrective regimen sliding scale   SubCutaneous every 6 hours    cefTRIAXone   IVPB 1000 milliGRAM(s) IV Intermittent every 24 hours    Allergies    Benedryl Allergy Sinus (Hives)  penicillin (Rash)        PE:  General: Female lying in bed. NAD.   Vital Signs Last 24 Hrs  T(C): 37.5 (23 Jan 2023 12:15), Max: 37.5 (23 Jan 2023 12:15)  T(F): 99.5 (23 Jan 2023 12:15), Max: 99.5 (23 Jan 2023 12:15)  HR: 100 (23 Jan 2023 04:52) (98 - 101)  BP: 133/78 (23 Jan 2023 12:15) (112/68 - 140/78)  BP(mean): --  RR: 18 (23 Jan 2023 12:15) (18 - 18)  SpO2: 100% (23 Jan 2023 12:15) (94% - 100%)    Parameters below as of 23 Jan 2023 12:15  Patient On (Oxygen Delivery Method): room air    HEENT: NGT in place.   Abd: Soft, NT,ND,   Extremities: Warm  Neuro: Alert. Follows commands.     LABS:  POCT Blood Glucose.: 143 mg/dL (01-23-23 @ 12:17)  POCT Blood Glucose.: 142 mg/dL (01-23-23 @ 05:50)  POCT Blood Glucose.: 132 mg/dL (01-22-23 @ 23:57)  POCT Blood Glucose.: 131 mg/dL (01-22-23 @ 22:30)  POCT Blood Glucose.: 147 mg/dL (01-22-23 @ 17:52)  POCT Blood Glucose.: 150 mg/dL (01-22-23 @ 12:55)  POCT Blood Glucose.: 153 mg/dL (01-22-23 @ 06:31)  POCT Blood Glucose.: 158 mg/dL (01-22-23 @ 00:25)  POCT Blood Glucose.: 137 mg/dL (01-21-23 @ 21:54)  POCT Blood Glucose.: 148 mg/dL (01-21-23 @ 17:40)  POCT Blood Glucose.: 240 mg/dL (01-21-23 @ 12:34)  POCT Blood Glucose.: 272 mg/dL (01-21-23 @ 06:07)  POCT Blood Glucose.: 262 mg/dL (01-21-23 @ 02:14)  POCT Blood Glucose.: 161 mg/dL (01-20-23 @ 21:58)  POCT Blood Glucose.: 198 mg/dL (01-20-23 @ 17:40)  POCT Blood Glucose.: 246 mg/dL (01-20-23 @ 17:15)                            10.1   11.31 )-----------( 267      ( 23 Jan 2023 07:43 )             32.7       01-23    146<H>  |  113<H>  |  20  ----------------------------<  151<H>  3.6   |  21<L>  |  0.81    eGFR: 74    Ca    8.5      01-23  Mg     1.9     01-22  Phos  2.7     01-22      A1C with Estimated Average Glucose Result: 9.4 % (01-19-23 @ 13:11)  A1C with Estimated Average Glucose Result: 8.6 % (01-07-23 @ 05:40)          Contact number: alycia [phone] or [phone]       Diabetes Follow up note:    Chief complaint: T2DM    Interval Hx: BG values at goal over past 24 hours. Pt seen at bedside w/family present and translating for pt. Pt started on Glucerna via NGT today at 20cc/hr currently.     Review of Systems:  General: no complaints  non-verbal but following commands for family      MEDS:  atorvastatin 80 milliGRAM(s) Oral at bedtime  insulin glargine Injectable (LANTUS) 6 Unit(s) SubCutaneous at bedtime  insulin lispro (ADMELOG) corrective regimen sliding scale   SubCutaneous every 6 hours    cefTRIAXone   IVPB 1000 milliGRAM(s) IV Intermittent every 24 hours    Allergies    Benedryl Allergy Sinus (Hives)  penicillin (Rash)        PE:  General: Female lying in bed. NAD.   Vital Signs Last 24 Hrs  T(C): 37.5 (23 Jan 2023 12:15), Max: 37.5 (23 Jan 2023 12:15)  T(F): 99.5 (23 Jan 2023 12:15), Max: 99.5 (23 Jan 2023 12:15)  HR: 100 (23 Jan 2023 04:52) (98 - 101)  BP: 133/78 (23 Jan 2023 12:15) (112/68 - 140/78)  BP(mean): --  RR: 18 (23 Jan 2023 12:15) (18 - 18)  SpO2: 100% (23 Jan 2023 12:15) (94% - 100%)    Parameters below as of 23 Jan 2023 12:15  Patient On (Oxygen Delivery Method): room air    HEENT: NGT in place.   Abd: Soft, NT,ND,   Extremities: Warm  Neuro: Alert. Follows commands.     LABS:  POCT Blood Glucose.: 143 mg/dL (01-23-23 @ 12:17)  POCT Blood Glucose.: 142 mg/dL (01-23-23 @ 05:50)  POCT Blood Glucose.: 132 mg/dL (01-22-23 @ 23:57)  POCT Blood Glucose.: 131 mg/dL (01-22-23 @ 22:30)  POCT Blood Glucose.: 147 mg/dL (01-22-23 @ 17:52)  POCT Blood Glucose.: 150 mg/dL (01-22-23 @ 12:55)  POCT Blood Glucose.: 153 mg/dL (01-22-23 @ 06:31)  POCT Blood Glucose.: 158 mg/dL (01-22-23 @ 00:25)  POCT Blood Glucose.: 137 mg/dL (01-21-23 @ 21:54)  POCT Blood Glucose.: 148 mg/dL (01-21-23 @ 17:40)  POCT Blood Glucose.: 240 mg/dL (01-21-23 @ 12:34)  POCT Blood Glucose.: 272 mg/dL (01-21-23 @ 06:07)  POCT Blood Glucose.: 262 mg/dL (01-21-23 @ 02:14)  POCT Blood Glucose.: 161 mg/dL (01-20-23 @ 21:58)  POCT Blood Glucose.: 198 mg/dL (01-20-23 @ 17:40)  POCT Blood Glucose.: 246 mg/dL (01-20-23 @ 17:15)                            10.1   11.31 )-----------( 267      ( 23 Jan 2023 07:43 )             32.7       01-23    146<H>  |  113<H>  |  20  ----------------------------<  151<H>  3.6   |  21<L>  |  0.81    eGFR: 74    Ca    8.5      01-23  Mg     1.9     01-22  Phos  2.7     01-22      A1C with Estimated Average Glucose Result: 9.4 % (01-19-23 @ 13:11)  A1C with Estimated Average Glucose Result: 8.6 % (01-07-23 @ 05:40)          Contact number: alycia [phone] or [phone]       Diabetes Follow up note:    Chief complaint: T2DM    Interval Hx: BG values at goal over past 24 hours. Pt seen at bedside w/family present and translating for pt. Pt started on Glucerna via NGT today at 20cc/hr currently.     Review of Systems:  General: no complaints  non-verbal but following commands for family      MEDS:  atorvastatin 80 milliGRAM(s) Oral at bedtime  insulin glargine Injectable (LANTUS) 6 Unit(s) SubCutaneous at bedtime  insulin lispro (ADMELOG) corrective regimen sliding scale   SubCutaneous every 6 hours    cefTRIAXone   IVPB 1000 milliGRAM(s) IV Intermittent every 24 hours    Allergies    Benedryl Allergy Sinus (Hives)  penicillin (Rash)        PE:  General: Female lying in bed. NAD.   Vital Signs Last 24 Hrs  T(C): 37.5 (23 Jan 2023 12:15), Max: 37.5 (23 Jan 2023 12:15)  T(F): 99.5 (23 Jan 2023 12:15), Max: 99.5 (23 Jan 2023 12:15)  HR: 100 (23 Jan 2023 04:52) (98 - 101)  BP: 133/78 (23 Jan 2023 12:15) (112/68 - 140/78)  BP(mean): --  RR: 18 (23 Jan 2023 12:15) (18 - 18)  SpO2: 100% (23 Jan 2023 12:15) (94% - 100%)    Parameters below as of 23 Jan 2023 12:15  Patient On (Oxygen Delivery Method): room air    HEENT: NGT in place.   Abd: Soft, NT,ND,   Extremities: Warm  Neuro: Alert. Follows commands.     LABS:  POCT Blood Glucose.: 143 mg/dL (01-23-23 @ 12:17)  POCT Blood Glucose.: 142 mg/dL (01-23-23 @ 05:50)  POCT Blood Glucose.: 132 mg/dL (01-22-23 @ 23:57)  POCT Blood Glucose.: 131 mg/dL (01-22-23 @ 22:30)  POCT Blood Glucose.: 147 mg/dL (01-22-23 @ 17:52)  POCT Blood Glucose.: 150 mg/dL (01-22-23 @ 12:55)  POCT Blood Glucose.: 153 mg/dL (01-22-23 @ 06:31)  POCT Blood Glucose.: 158 mg/dL (01-22-23 @ 00:25)  POCT Blood Glucose.: 137 mg/dL (01-21-23 @ 21:54)  POCT Blood Glucose.: 148 mg/dL (01-21-23 @ 17:40)  POCT Blood Glucose.: 240 mg/dL (01-21-23 @ 12:34)  POCT Blood Glucose.: 272 mg/dL (01-21-23 @ 06:07)  POCT Blood Glucose.: 262 mg/dL (01-21-23 @ 02:14)  POCT Blood Glucose.: 161 mg/dL (01-20-23 @ 21:58)  POCT Blood Glucose.: 198 mg/dL (01-20-23 @ 17:40)  POCT Blood Glucose.: 246 mg/dL (01-20-23 @ 17:15)                            10.1   11.31 )-----------( 267      ( 23 Jan 2023 07:43 )             32.7       01-23    146<H>  |  113<H>  |  20  ----------------------------<  151<H>  3.6   |  21<L>  |  0.81    eGFR: 74    Ca    8.5      01-23  Mg     1.9     01-22  Phos  2.7     01-22      A1C with Estimated Average Glucose Result: 9.4 % (01-19-23 @ 13:11)  A1C with Estimated Average Glucose Result: 8.6 % (01-07-23 @ 05:40)          Contact number: alycia [phone] or [phone]

## 2023-01-23 NOTE — DIETITIAN INITIAL EVALUATION ADULT - REASON INDICATOR FOR ASSESSMENT
Nutrition Assessment/Education for Tube Feeding  Information obtained from: Review of pt current medical record, discussion with pt's son via telephone, (Rosendo Stevens [phone]).  Pt is A&OX0, unable to participate in nutritional interview  Nutrition Assessment/Education for Tube Feeding  Information obtained from: Review of pt current medical record, discussion with pt's son via telephone, (Rosendo Stevens [phone]).  Pt is A&OX0, unable to participate in nutritional interview  Nutrition Assessment/Education for Tube Feeding  Information obtained from: Review of pt current medical record, discussion with pt's son via telephone, (Rosendo Stevens [phone]).  Pt is A&OX0, unable to participate in nutritional interview

## 2023-01-23 NOTE — DIETITIAN INITIAL EVALUATION ADULT - ENTERAL
As tolerated, recommend Glucerna 1.2 @ 20 mL/hr and advance by 10mL q12H until goal rate of 50mL/hr x24hrs is reached. Regimen at goal provides 1200 mL total volume, 966mL free water, 1440kcal/d and 72g pro/day (26kcal/kg and 1.3 g/kg) based on upper IBW range of 54.8 kg. Defer free water flushes to medical team

## 2023-01-23 NOTE — DIETITIAN INITIAL EVALUATION ADULT - NS FNS DIET ORDER
Diet, Pureed:   Consistent Carbohydrate {No Snacks} (CSTCHO)  DASH/TLC {Sodium & Cholesterol Restricted} (DASH) (01-22-23 @ 11:09)

## 2023-01-23 NOTE — PROGRESS NOTE ADULT - SUBJECTIVE AND OBJECTIVE BOX
Name of Patient : SARAH DISLA  MRN: [id number]  Date of visit: 01-23-23 @ 16:14      Subjective: Patient seen and examined. No new events except as noted.   Patient seen earlier this AM. Arousable to touch. Non-verbal     REVIEW OF SYSTEMS:  Unable to obtain ROS     MEDICATIONS:  MEDICATIONS  (STANDING):  aspirin  chewable 81 milliGRAM(s) Oral daily  atorvastatin 80 milliGRAM(s) Oral at bedtime  cefTRIAXone   IVPB 1000 milliGRAM(s) IV Intermittent every 24 hours  clopidogrel Tablet 75 milliGRAM(s) Oral daily  dextrose 5%. 1000 milliLiter(s) (100 mL/Hr) IV Continuous <Continuous>  dextrose 5%. 1000 milliLiter(s) (50 mL/Hr) IV Continuous <Continuous>  dextrose 50% Injectable 25 Gram(s) IV Push once  dextrose 50% Injectable 12.5 Gram(s) IV Push once  dextrose 50% Injectable 25 Gram(s) IV Push once  dextrose Oral Gel 15 Gram(s) Oral once  enoxaparin Injectable 40 milliGRAM(s) SubCutaneous every 24 hours  glucagon  Injectable 1 milliGRAM(s) IntraMuscular once  insulin glargine Injectable (LANTUS) 6 Unit(s) SubCutaneous at bedtime  insulin lispro (ADMELOG) corrective regimen sliding scale   SubCutaneous every 6 hours  tamsulosin 0.4 milliGRAM(s) Oral at bedtime      PHYSICAL EXAM:  T(C): 37.5 (01-23-23 @ 12:15), Max: 37.5 (01-23-23 @ 12:15)  HR: 100 (01-23-23 @ 04:52) (98 - 101)  BP: 133/78 (01-23-23 @ 12:15) (112/68 - 140/78)  RR: 18 (01-23-23 @ 12:15) (18 - 18)  SpO2: 100% (01-23-23 @ 12:15) (94% - 100%)  Wt(kg): --  I&O's Summary    22 Jan 2023 07:01  -  23 Jan 2023 07:00  --------------------------------------------------------  IN: 1100 mL / OUT: 850 mL / NET: 250 mL    23 Jan 2023 07:01  -  23 Jan 2023 16:14  --------------------------------------------------------  IN: 0 mL / OUT: 250 mL / NET: -250 mL          Appearance: Frail, weak appearing female, arousable to touch   HEENT:  Open eyes to touch   Lymphatic: No lymphadenopathy   Cardiovascular: Normal S1 S2, no JVD  Respiratory: normal effort , clear  Gastrointestinal:  Soft, Non-tender  Skin: No rashes,  warm to touch  Psychiatry:  Lethargic   Musculoskeletal: No edema      01-22-23 @ 07:01  -  01-23-23 @ 07:00  --------------------------------------------------------  IN: 1100 mL / OUT: 850 mL / NET: 250 mL    01-23-23 @ 07:01  -  01-23-23 @ 16:14  --------------------------------------------------------  IN: 0 mL / OUT: 250 mL / NET: -250 mL                                10.1   11.31 )-----------( 267      ( 23 Jan 2023 07:43 )             32.7               01-23    146<H>  |  113<H>  |  20  ----------------------------<  151<H>  3.6   |  21<L>  |  0.81    Ca    8.5      23 Jan 2023 07:43  Phos  2.7     01-22  Mg     1.9     01-22                             Specimen Source: Clean Catch Clean Catch (Midstream)   Culture Results:   >=3 organisms. Probable collection contamination. including   >100,000 CFU/ml Escherichia coli   Organism Identification: Escherichia coli   Organism: Escherichia coli     Culture - Blood (01.19.23 @ 12:40)   Specimen Source: .Blood Blood-Peripheral   Culture Results:   No growth to date.     Culture - Blood (01.19.23 @ 12:33)   Specimen Source: .Blood Blood-Peripheral   Culture Results:   No growth to date.  Name of Patient : SARAH IDSLA  MRN: [id number]  Date of visit: 01-23-23 @ 16:14      Subjective: Patient seen and examined. No new events except as noted.   Patient seen earlier this AM. Arousable to touch. Non-verbal     REVIEW OF SYSTEMS:  Unable to obtain ROS     MEDICATIONS:  MEDICATIONS  (STANDING):  aspirin  chewable 81 milliGRAM(s) Oral daily  atorvastatin 80 milliGRAM(s) Oral at bedtime  cefTRIAXone   IVPB 1000 milliGRAM(s) IV Intermittent every 24 hours  clopidogrel Tablet 75 milliGRAM(s) Oral daily  dextrose 5%. 1000 milliLiter(s) (100 mL/Hr) IV Continuous <Continuous>  dextrose 5%. 1000 milliLiter(s) (50 mL/Hr) IV Continuous <Continuous>  dextrose 50% Injectable 25 Gram(s) IV Push once  dextrose 50% Injectable 12.5 Gram(s) IV Push once  dextrose 50% Injectable 25 Gram(s) IV Push once  dextrose Oral Gel 15 Gram(s) Oral once  enoxaparin Injectable 40 milliGRAM(s) SubCutaneous every 24 hours  glucagon  Injectable 1 milliGRAM(s) IntraMuscular once  insulin glargine Injectable (LANTUS) 6 Unit(s) SubCutaneous at bedtime  insulin lispro (ADMELOG) corrective regimen sliding scale   SubCutaneous every 6 hours  tamsulosin 0.4 milliGRAM(s) Oral at bedtime      PHYSICAL EXAM:  T(C): 37.5 (01-23-23 @ 12:15), Max: 37.5 (01-23-23 @ 12:15)  HR: 100 (01-23-23 @ 04:52) (98 - 101)  BP: 133/78 (01-23-23 @ 12:15) (112/68 - 140/78)  RR: 18 (01-23-23 @ 12:15) (18 - 18)  SpO2: 100% (01-23-23 @ 12:15) (94% - 100%)  Wt(kg): --  I&O's Summary    22 Jan 2023 07:01  -  23 Jan 2023 07:00  --------------------------------------------------------  IN: 1100 mL / OUT: 850 mL / NET: 250 mL    23 Jan 2023 07:01  -  23 Jan 2023 16:14  --------------------------------------------------------  IN: 0 mL / OUT: 250 mL / NET: -250 mL          Appearance: Frail, weak appearing female, arousable to touch   HEENT:  Open eyes to touch   Lymphatic: No lymphadenopathy   Cardiovascular: Normal S1 S2, no JVD  Respiratory: normal effort , clear  Gastrointestinal:  Soft, Non-tender  Skin: No rashes,  warm to touch  Psychiatry:  Lethargic   Musculoskeletal: No edema      01-22-23 @ 07:01  -  01-23-23 @ 07:00  --------------------------------------------------------  IN: 1100 mL / OUT: 850 mL / NET: 250 mL    01-23-23 @ 07:01  -  01-23-23 @ 16:14  --------------------------------------------------------  IN: 0 mL / OUT: 250 mL / NET: -250 mL                                10.1   11.31 )-----------( 267      ( 23 Jan 2023 07:43 )             32.7               01-23    146<H>  |  113<H>  |  20  ----------------------------<  151<H>  3.6   |  21<L>  |  0.81    Ca    8.5      23 Jan 2023 07:43  Phos  2.7     01-22  Mg     1.9     01-22                             Specimen Source: Clean Catch Clean Catch (Midstream)   Culture Results:   >=3 organisms. Probable collection contamination. including   >100,000 CFU/ml Escherichia coli   Organism Identification: Escherichia coli   Organism: Escherichia coli     Culture - Blood (01.19.23 @ 12:40)   Specimen Source: .Blood Blood-Peripheral   Culture Results:   No growth to date.     Culture - Blood (01.19.23 @ 12:33)   Specimen Source: .Blood Blood-Peripheral   Culture Results:   No growth to date.  Name of Patient : SARAH DISLA  MRN: [id number]  Date of visit: 01-23-23 @ 16:14      Subjective: Patient seen and examined. No new events except as noted.   Patient seen earlier this AM. Arousable to touch. Non-verbal     REVIEW OF SYSTEMS:  Unable to obtain ROS     MEDICATIONS:  MEDICATIONS  (STANDING):  aspirin  chewable 81 milliGRAM(s) Oral daily  atorvastatin 80 milliGRAM(s) Oral at bedtime  cefTRIAXone   IVPB 1000 milliGRAM(s) IV Intermittent every 24 hours  clopidogrel Tablet 75 milliGRAM(s) Oral daily  dextrose 5%. 1000 milliLiter(s) (100 mL/Hr) IV Continuous <Continuous>  dextrose 5%. 1000 milliLiter(s) (50 mL/Hr) IV Continuous <Continuous>  dextrose 50% Injectable 25 Gram(s) IV Push once  dextrose 50% Injectable 12.5 Gram(s) IV Push once  dextrose 50% Injectable 25 Gram(s) IV Push once  dextrose Oral Gel 15 Gram(s) Oral once  enoxaparin Injectable 40 milliGRAM(s) SubCutaneous every 24 hours  glucagon  Injectable 1 milliGRAM(s) IntraMuscular once  insulin glargine Injectable (LANTUS) 6 Unit(s) SubCutaneous at bedtime  insulin lispro (ADMELOG) corrective regimen sliding scale   SubCutaneous every 6 hours  tamsulosin 0.4 milliGRAM(s) Oral at bedtime      PHYSICAL EXAM:  T(C): 37.5 (01-23-23 @ 12:15), Max: 37.5 (01-23-23 @ 12:15)  HR: 100 (01-23-23 @ 04:52) (98 - 101)  BP: 133/78 (01-23-23 @ 12:15) (112/68 - 140/78)  RR: 18 (01-23-23 @ 12:15) (18 - 18)  SpO2: 100% (01-23-23 @ 12:15) (94% - 100%)  Wt(kg): --  I&O's Summary    22 Jan 2023 07:01  -  23 Jan 2023 07:00  --------------------------------------------------------  IN: 1100 mL / OUT: 850 mL / NET: 250 mL    23 Jan 2023 07:01  -  23 Jan 2023 16:14  --------------------------------------------------------  IN: 0 mL / OUT: 250 mL / NET: -250 mL          Appearance: Frail, weak appearing female, arousable to touch   HEENT:  Open eyes to touch   Lymphatic: No lymphadenopathy   Cardiovascular: Normal S1 S2, no JVD  Respiratory: normal effort , clear  Gastrointestinal:  Soft, Non-tender  Skin: No rashes,  warm to touch  Psychiatry:  Lethargic   Musculoskeletal: No edema      01-22-23 @ 07:01  -  01-23-23 @ 07:00  --------------------------------------------------------  IN: 1100 mL / OUT: 850 mL / NET: 250 mL    01-23-23 @ 07:01  -  01-23-23 @ 16:14  --------------------------------------------------------  IN: 0 mL / OUT: 250 mL / NET: -250 mL                                10.1   11.31 )-----------( 267      ( 23 Jan 2023 07:43 )             32.7               01-23    146<H>  |  113<H>  |  20  ----------------------------<  151<H>  3.6   |  21<L>  |  0.81    Ca    8.5      23 Jan 2023 07:43  Phos  2.7     01-22  Mg     1.9     01-22                             Specimen Source: Clean Catch Clean Catch (Midstream)   Culture Results:   >=3 organisms. Probable collection contamination. including   >100,000 CFU/ml Escherichia coli   Organism Identification: Escherichia coli   Organism: Escherichia coli     Culture - Blood (01.19.23 @ 12:40)   Specimen Source: .Blood Blood-Peripheral   Culture Results:   No growth to date.     Culture - Blood (01.19.23 @ 12:33)   Specimen Source: .Blood Blood-Peripheral   Culture Results:   No growth to date.

## 2023-01-23 NOTE — PROGRESS NOTE ADULT - PROBLEM SELECTOR PLAN 3
- Continue with atorvastatin 80 mg daily   -   - Manage as outpatient       Can be reached via TEAMS/pager: [phone]   office:  [phone] (M-F 9a-5pm)               [phone] (nights/weekends)   [email or web address] password [email or web address] - Continue with atorvastatin 80 mg daily   -   - Manage as outpatient       Can be reached via TEAMS/pager: [phone]   office:  [phone] (M-F 9a-5pm)               [phone] (nights/weekends)   [email or web address] password [email or web address] - Continue with atorvastatin 80 mg daily   -   - Manage as outpatient       Can be reached via TEAMS/pager: [phone]   office:  [phone] (M-F 9a-5pm)               [phone] (nights/weekends)   [email or web address] password [email or web address]

## 2023-01-23 NOTE — DIETITIAN INITIAL EVALUATION ADULT - NAME AND PHONE
Sharron Seaman, MS,RDN,CDN Pager #[phone] or TEAMS  Sharron Seaman, MS,RDN,CDN Pager #[phone] or TEAMS  Sharron Seaman, MS,RDN,CDN Pager #[phone] or TEAMS

## 2023-01-24 LAB
-  AMIKACIN: SIGNIFICANT CHANGE UP
-  AMOXICILLIN/CLAVULANIC ACID: SIGNIFICANT CHANGE UP
-  AMPICILLIN/SULBACTAM: SIGNIFICANT CHANGE UP
-  AMPICILLIN: SIGNIFICANT CHANGE UP
-  AZTREONAM: SIGNIFICANT CHANGE UP
-  CEFAZOLIN: SIGNIFICANT CHANGE UP
-  CEFEPIME: SIGNIFICANT CHANGE UP
-  CEFOXITIN: SIGNIFICANT CHANGE UP
-  CEFTRIAXONE: SIGNIFICANT CHANGE UP
-  CIPROFLOXACIN: SIGNIFICANT CHANGE UP
-  ERTAPENEM: SIGNIFICANT CHANGE UP
-  GENTAMICIN: SIGNIFICANT CHANGE UP
-  IMIPENEM: SIGNIFICANT CHANGE UP
-  LEVOFLOXACIN: SIGNIFICANT CHANGE UP
-  MEROPENEM: SIGNIFICANT CHANGE UP
-  PIPERACILLIN/TAZOBACTAM: SIGNIFICANT CHANGE UP
-  TETRACYCLINE: SIGNIFICANT CHANGE UP
-  TOBRAMYCIN: SIGNIFICANT CHANGE UP
-  TRIMETHOPRIM/SULFAMETHOXAZOLE: SIGNIFICANT CHANGE UP
-  VANCOMYCIN: SIGNIFICANT CHANGE UP
ANION GAP SERPL CALC-SCNC: 12 MMOL/L — SIGNIFICANT CHANGE UP (ref 5–17)
BUN SERPL-MCNC: 18 MG/DL — SIGNIFICANT CHANGE UP (ref 7–23)
CALCIUM SERPL-MCNC: 8.2 MG/DL — LOW (ref 8.4–10.5)
CHLORIDE SERPL-SCNC: 110 MMOL/L — HIGH (ref 96–108)
CO2 SERPL-SCNC: 22 MMOL/L — SIGNIFICANT CHANGE UP (ref 22–31)
CREAT SERPL-MCNC: 0.79 MG/DL — SIGNIFICANT CHANGE UP (ref 0.5–1.3)
CULTURE RESULTS: SIGNIFICANT CHANGE UP
EGFR: 76 ML/MIN/1.73M2 — SIGNIFICANT CHANGE UP
GLUCOSE SERPL-MCNC: 250 MG/DL — HIGH (ref 70–99)
HCT VFR BLD CALC: 30.7 % — LOW (ref 34.5–45)
HGB BLD-MCNC: 9.3 G/DL — LOW (ref 11.5–15.5)
MCHC RBC-ENTMCNC: 27 PG — SIGNIFICANT CHANGE UP (ref 27–34)
MCHC RBC-ENTMCNC: 30.3 GM/DL — LOW (ref 32–36)
MCV RBC AUTO: 89 FL — SIGNIFICANT CHANGE UP (ref 80–100)
METHOD TYPE: SIGNIFICANT CHANGE UP
NRBC # BLD: 0 /100 WBCS — SIGNIFICANT CHANGE UP (ref 0–0)
ORGANISM # SPEC MICROSCOPIC CNT: SIGNIFICANT CHANGE UP
PLATELET # BLD AUTO: 251 K/UL — SIGNIFICANT CHANGE UP (ref 150–400)
POTASSIUM SERPL-MCNC: 3.6 MMOL/L — SIGNIFICANT CHANGE UP (ref 3.5–5.3)
POTASSIUM SERPL-SCNC: 3.6 MMOL/L — SIGNIFICANT CHANGE UP (ref 3.5–5.3)
RBC # BLD: 3.45 M/UL — LOW (ref 3.8–5.2)
RBC # FLD: 15 % — HIGH (ref 10.3–14.5)
SODIUM SERPL-SCNC: 144 MMOL/L — SIGNIFICANT CHANGE UP (ref 135–145)
SPECIMEN SOURCE: SIGNIFICANT CHANGE UP
WBC # BLD: 7.6 K/UL — SIGNIFICANT CHANGE UP (ref 3.8–10.5)
WBC # FLD AUTO: 7.6 K/UL — SIGNIFICANT CHANGE UP (ref 3.8–10.5)

## 2023-01-24 PROCEDURE — 99232 SBSQ HOSP IP/OBS MODERATE 35: CPT

## 2023-01-24 PROCEDURE — 99222 1ST HOSP IP/OBS MODERATE 55: CPT

## 2023-01-24 PROCEDURE — 76857 US EXAM PELVIC LIMITED: CPT | Mod: 26

## 2023-01-24 RX ORDER — ERTAPENEM SODIUM 1 G/1
1000 INJECTION, POWDER, LYOPHILIZED, FOR SOLUTION INTRAMUSCULAR; INTRAVENOUS ONCE
Refills: 0 | Status: COMPLETED | OUTPATIENT
Start: 2023-01-24 | End: 2023-01-24

## 2023-01-24 RX ORDER — ACETAMINOPHEN 500 MG
650 TABLET ORAL EVERY 6 HOURS
Refills: 0 | Status: DISCONTINUED | OUTPATIENT
Start: 2023-01-24 | End: 2023-02-06

## 2023-01-24 RX ORDER — ERTAPENEM SODIUM 1 G/1
1000 INJECTION, POWDER, LYOPHILIZED, FOR SOLUTION INTRAMUSCULAR; INTRAVENOUS EVERY 24 HOURS
Refills: 0 | Status: COMPLETED | OUTPATIENT
Start: 2023-01-25 | End: 2023-01-31

## 2023-01-24 RX ORDER — INSULIN LISPRO 100/ML
VIAL (ML) SUBCUTANEOUS EVERY 6 HOURS
Refills: 0 | Status: DISCONTINUED | OUTPATIENT
Start: 2023-01-24 | End: 2023-01-31

## 2023-01-24 RX ORDER — ERTAPENEM SODIUM 1 G/1
INJECTION, POWDER, LYOPHILIZED, FOR SOLUTION INTRAMUSCULAR; INTRAVENOUS
Refills: 0 | Status: DISCONTINUED | OUTPATIENT
Start: 2023-01-24 | End: 2023-01-24

## 2023-01-24 RX ADMIN — Medication 81 MILLIGRAM(S): at 12:49

## 2023-01-24 RX ADMIN — Medication 650 MILLIGRAM(S): at 00:49

## 2023-01-24 RX ADMIN — ERTAPENEM SODIUM 120 MILLIGRAM(S): 1 INJECTION, POWDER, LYOPHILIZED, FOR SOLUTION INTRAMUSCULAR; INTRAVENOUS at 15:56

## 2023-01-24 RX ADMIN — Medication 2: at 06:26

## 2023-01-24 RX ADMIN — ENOXAPARIN SODIUM 40 MILLIGRAM(S): 100 INJECTION SUBCUTANEOUS at 06:00

## 2023-01-24 RX ADMIN — CLOPIDOGREL BISULFATE 75 MILLIGRAM(S): 75 TABLET, FILM COATED ORAL at 12:49

## 2023-01-24 RX ADMIN — Medication 2: at 23:50

## 2023-01-24 RX ADMIN — Medication 2: at 13:03

## 2023-01-24 RX ADMIN — Medication 2: at 18:06

## 2023-01-24 RX ADMIN — INSULIN GLARGINE 6 UNIT(S): 100 INJECTION, SOLUTION SUBCUTANEOUS at 23:49

## 2023-01-24 RX ADMIN — ATORVASTATIN CALCIUM 80 MILLIGRAM(S): 80 TABLET, FILM COATED ORAL at 21:43

## 2023-01-24 RX ADMIN — CEFTRIAXONE 100 MILLIGRAM(S): 500 INJECTION, POWDER, FOR SOLUTION INTRAMUSCULAR; INTRAVENOUS at 00:55

## 2023-01-24 NOTE — PROGRESS NOTE ADULT - ASSESSMENT
78F with dementia, T2DM, recently discharged about 1 week ago for CVA now presenting with poor PO intake, lethargy and hyperglycemia to 500s.   Of note, patient admitted 1/6 for R facial droop, word finding difficulties. Imaging concerning for acute L MCA infarct. Discharged home on 1/11. Majority of history obtained through family given mental status. Per family, since stroke patient nonverbal/unable to participate in meaningful communications, intermittently follows commands. For the last 3-4 days, patient with poor po intake. Reportedly only eating breakfast. Day of presentation, more lethargic with elevated finger sticks up to the 500s. Patient was evaluated by endocrine team during prior admission with recs to increase metformin to 1000mg BID. However, given poor PO intake, family has been giving 500mg daily.     In ED, afebrile, , 135/84. WBC 15, Na 158, K 3.0, Cl 120, Glu 468, alk phos 136m BHB 0.7. pH 7.36, lactate 2.2. UA: +leuk esterase, many bacteria, WBC 11-25  CXR: Redemonstrated biapical scarring and left upper lobe bronchiectasis, unchanged. No focal consolidation.   CTH No acute intracranial hemorrhage. 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  Given 1.5L NS, ceftriaxone x1, haldol 2.5mg x1 in ED.     A/P    Hypernatremia  2/2 poor oral intake   s/p  D5 50cc/hr x10hr 01/23/23  improving   Avoid overcorrection >6-8meq a day   monitor Na closely     Hypokalemia  stable   monitor K     Hypophosphatemia  Supplement as needed  Daily phosphorus     HTN  optimal   monitor BP closely     Proteinuria  Currently has UTI would repeat and then quantify

## 2023-01-24 NOTE — PROGRESS NOTE ADULT - ASSESSMENT
78F with dementia, T2DM, recently discharged about 1 week ago for CVA now presenting with poor PO intake, lethargy and hyperglycemia to 500s.   Of note, patient admitted 1/6 for R facial droop, word finding difficulties. Imaging concerning for acute L MCA infarct.  Per family, since stroke patient nonverbal/unable to participate in meaningful communications, intermittently follows commands. For the last 3-4 days, patient with poor po intake.    In ED, afebrile, , 135/84. WBC 15, Na 158, K 3.0, Cl 120, Glu 468, alk phos 136m BHB 0.7. pH 7.36, lactate 2.2.   UA: +leuk esterase, many bacteria, WBC 11-25  CXR: Redemonstrated biapical scarring and left upper lobe bronchiectasis, unchanged. No focal consolidation.   CTH No acute intracranial hemorrhage. 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  Given 1.5L NS, ceftriaxone x1, haldol 2.5mg x1 in ED.   last admission: CTA with L MCA severe stenosis; distal L M2 occlusion .  TTE 1/8/23: EF 63% Mild mitral regurgitation ; A1c 9.4     Impression  1) AMS likely 2/2 infection/UTI toxic metabolic encephalopahthy   2) recent stroke - L MCA infarct 2/2 symptomatic L MCA ICAD ; worsening of R HP in setting of infection   - now on contact   - mittens. NGT   - f/u endocrine   - treatment of infection per primary team -->  CTX  - for secondary stroke prevention. DAPT x 3 months followeed by ASA 81mg dialy thereafter.   - hihg dose statin lipitor 40mg dialy   - avoid hypotension given L MCA ICAD   - telemetry  - PT/OT/SS/SLP, OOBC  - check FS, glucose control <180  - GI/DVT ppx  - Thank you for allowing me to participate in the care of this patient. Call with questions.   - spoke with daughter at bedside   Reji Greco MD  Vascular Neurology  Office: [phone]  78F with dementia, T2DM, recently discharged about 1 week ago for CVA now presenting with poor PO intake, lethargy and hyperglycemia to 500s.   Of note, patient admitted 1/6 for R facial droop, word finding difficulties. Imaging concerning for acute L MCA infarct.  Per family, since stroke patient nonverbal/unable to participate in meaningful communications, intermittently follows commands. For the last 3-4 days, patient with poor po intake.    In ED, afebrile, , 135/84. WBC 15, Na 158, K 3.0, Cl 120, Glu 468, alk phos 136m BHB 0.7. pH 7.36, lactate 2.2.   UA: +leuk esterase, many bacteria, WBC 11-25  CXR: Redemonstrated biapical scarring and left upper lobe bronchiectasis, unchanged. No focal consolidation.   CTH No acute intracranial hemorrhage. 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  Given 1.5L NS, ceftriaxone x1, haldol 2.5mg x1 in ED.   last admission: CTA with L MCA severe stenosis; distal L M2 occlusion .  TTE 1/8/23: EF 63% Mild mitral regurgitation ; A1c 9.4     Impression  1) AMS likely 2/2 infection/UTI toxic metabolic encephalopahthy   2) recent stroke - L MCA infarct 2/2 symptomatic L MCA ICAD ; worsening of R HP in setting of infection   - now on contact   - mittens. NGT   - f/u endocrine   - treatment of infection per primary team -->  CTX  - for secondary stroke prevention. DAPT x 3 months followeed by ASA 81mg dialy thereafter.   - hihg dose statin lipitor 40mg dialy   - avoid hypotension given L MCA ICAD   - telemetry  - PT/OT/SS/SLP, OOBC  - check FS, glucose control <180  - GI/DVT ppx  - Thank you for allowing me to participate in the care of this patient. Call with questions.   - spoke with daughter at bedside   Reji Greco MD  Vascular Neurology  Office: [phone]  78F with dementia, T2DM, recently discharged about 1 week ago for CVA now presenting with poor PO intake, lethargy and hyperglycemia to 500s.   Of note, patient admitted 1/6 for R facial droop, word finding difficulties. Imaging concerning for acute L MCA infarct.  Per family, since stroke patient nonverbal/unable to participate in meaningful communications, intermittently follows commands. For the last 3-4 days, patient with poor po intake.    In ED, afebrile, , 135/84. WBC 15, Na 158, K 3.0, Cl 120, Glu 468, alk phos 136m BHB 0.7. pH 7.36, lactate 2.2.   UA: +leuk esterase, many bacteria, WBC 11-25  CXR: Redemonstrated biapical scarring and left upper lobe bronchiectasis, unchanged. No focal consolidation.   CTH No acute intracranial hemorrhage. 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  Given 1.5L NS, ceftriaxone x1, haldol 2.5mg x1 in ED.   last admission: CTA with L MCA severe stenosis; distal L M2 occlusion .  TTE 1/8/23: EF 63% Mild mitral regurgitation ; A1c 9.4     Impression  1) AMS likely 2/2 infection/UTI toxic metabolic encephalopahthy   2) recent stroke - L MCA infarct 2/2 symptomatic L MCA ICAD ; worsening of R HP in setting of infection   - now on contact   - mittens. NGT   - f/u endocrine   - treatment of infection per primary team -->  CTX  - for secondary stroke prevention. DAPT x 3 months followeed by ASA 81mg dialy thereafter.   - hihg dose statin lipitor 40mg dialy   - avoid hypotension given L MCA ICAD   - telemetry  - PT/OT/SS/SLP, OOBC  - check FS, glucose control <180  - GI/DVT ppx  - Thank you for allowing me to participate in the care of this patient. Call with questions.   - spoke with daughter at bedside   Reji Greco MD  Vascular Neurology  Office: [phone]

## 2023-01-24 NOTE — CONSULT NOTE ADULT - SUBJECTIVE AND OBJECTIVE BOX
HPI:  78 f with DM, dementia recently discharged about 1 week ago for L MCA CVA, brought in 1/19 with poor PO intake, lethargy and hyperglycemia to 500s.   afebrile, WBC: 15, Na: 158, glucose  468  blood cx negative, urine cx>3 organisms but with E-coli and pt was on ceftriaxone  CXR: unchanged scarring, no focal consolidation  head CT: evolving stroke  pt had leaking urine around the gonzalez with minimal output from the gonzalez and bladder scan c/w retention, gonzalez was exchanged 1/21 and again on 1/22 similar problem, urology came and could not irrigate the gonzalez so removed gonzalez after which a large mucous plug was pulled out from vaginal/urethral area- unclear as to where it originated. white/gray in color. 14f coude attempted prior to successful insertion of 16f gonzalez, cloudy brown urine drained.  there is a culture sent which is labeled abscess at catheter size which is likely the mucous plug and is growing klebsiella aerogenes, E. feacalis and E. avium  pt spiked to 100.6    PAST MEDICAL & SURGICAL HISTORY:  Dementia      CVA (cerebrovascular accident)      DM (diabetes mellitus)      No significant past surgical history          Allergies    Benedryl Allergy Sinus (Hives)  penicillin (Rash)    Intolerances        ANTIMICROBIALS:  cefTRIAXone   IVPB 1000 every 24 hours      OTHER MEDS:  acetaminophen    Suspension .. 650 milliGRAM(s) Enteral Tube every 6 hours PRN  aspirin  chewable 81 milliGRAM(s) Oral daily  atorvastatin 80 milliGRAM(s) Oral at bedtime  clopidogrel Tablet 75 milliGRAM(s) Oral daily  dextrose 5%. 1000 milliLiter(s) IV Continuous <Continuous>  dextrose 50% Injectable 25 Gram(s) IV Push once  dextrose 50% Injectable 12.5 Gram(s) IV Push once  dextrose 50% Injectable 25 Gram(s) IV Push once  dextrose Oral Gel 15 Gram(s) Oral once  enoxaparin Injectable 40 milliGRAM(s) SubCutaneous every 24 hours  glucagon  Injectable 1 milliGRAM(s) IntraMuscular once  insulin glargine Injectable (LANTUS) 6 Unit(s) SubCutaneous at bedtime  insulin lispro (ADMELOG) corrective regimen sliding scale   SubCutaneous every 6 hours  tamsulosin 0.4 milliGRAM(s) Oral at bedtime      SOCIAL HISTORY:    no smoking, alcohol or drug abuse  no recent travel    FAMILY HISTORY:  No recent febrile illness in family members        ROS:  Unobtainable because of mental status    Physical Exam:    General:    NAD, non toxic  Head: atraumatic, normocephalic  Eyes: normal sclera and conjunctiva  ENT:  NG tube with dry mucous membranes   Cardio:    regular S1,S2  Respiratory:   clear b/l, no wheezing  abd:   soft, BS +, not tender  :     no CVAT, no suprapubic tenderness  Musculoskeletal : no joint swelling, no edema  Skin:    no rash  vascular: no phlebitis  Neurologic:   awake but unable to verbalize  psych: normal affect      Drug Dosing Weight  Height (cm): 157.5 (19 Jan 2023 20:35)  Weight (kg): 58.1 (19 Jan 2023 20:35)  BMI (kg/m2): 23.4 (19 Jan 2023 20:35)  BSA (m2): 1.58 (19 Jan 2023 20:35)    Vital Signs Last 24 Hrs  T(F): 98 (01-24-23 @ 12:30), Max: 100.6 (01-24-23 @ 00:12)    Vital Signs Last 24 Hrs  HR: 100 (01-24-23 @ 12:30) (96 - 120)  BP: 143/68 (01-24-23 @ 12:30) (120/62 - 154/71)  RR: 18 (01-24-23 @ 12:30)  SpO2: 98% (01-24-23 @ 12:30) (94% - 98%)  Wt(kg): --                          9.3    7.60  )-----------( 251      ( 24 Jan 2023 07:11 )             30.7       01-24    144  |  110<H>  |  18  ----------------------------<  250<H>  3.6   |  22  |  0.79    Ca    8.2<L>      24 Jan 2023 07:12            MICROBIOLOGY:  v  .Abscess Catheter Site  01-22-23   Moderate Enterococcus faecalis  Moderate Enterococcus avium  Rare Klebsiella aerogenes (Previously Enterobacter)  --  --      Clean Catch Clean Catch (Midstream)  01-19-23   >=3 organisms. Probable collection contamination. including  >100,000 CFU/ml Escherichia coli  --  Escherichia coli      .Blood Blood-Peripheral  01-19-23   No growth to date.  --  --      .Blood Blood-Peripheral  01-19-23   No growth to date.  --  --      .Blood Blood-Peripheral  01-10-23   No Growth Final  --  --      .Blood Blood-Venous  01-10-23   No Growth Final  --  --                  RADIOLOGY:    Images independently visualized and reviewed personally,  findings as below    < from: Xray Chest 1 View- PORTABLE-Urgent (Xray Chest 1 View- PORTABLE-Urgent .) (01.22.23 @ 17:59) >  IMPRESSION:    Enteric tube terminates in the distal stomach.    < end of copied text >  < from: CT Head No Cont (01.19.23 @ 13:45) >  IMPRESSION:  No acute intracranial hemorrhage.    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    < end of copied text >  < from: CT Angio Chest PE Protocol w/ IV Cont (01.11.23 @ 15:46) >  IMPRESSION:    1.  No pulmonary thromboembolism    2.  No priorchest CT for comparison. Evidence of prior granulomatous   disease including completely bronchiectatic left upper lobe scattered   calcified granulomas with small calcified left hilar lymph nodes. Areas   of airways impaction and small cavitary nodules in the left upper lobe   are indeterminate chronicity. Active infection would have to be assessed   on clinical grounds.    3.  Mild left hydronephrosis    < end of copied text >

## 2023-01-24 NOTE — CONSULT NOTE ADULT - ASSESSMENT
78 f with DM, dementia recently discharged about 1 week ago for L MCA CVA, brought in 1/19 with poor PO intake, lethargy and hyperglycemia to 500s.   afebrile, WBC: 15, Na: 158, glucose  468  blood cx negative, urine cx>3 organisms but with E-coli and pt was on ceftriaxone  CXR: unchanged scarring, no focal consolidation  head CT: evolving stroke  pt had leaking urine around the gonzalez with minimal output from the gonzalez and bladder scan c/w retention, gonzalez was exchanged 1/21 and again on 1/22 similar problem, urology came and could not irrigate the gonzalez so removed gonzalez after which a large mucous plug was pulled out from vaginal/urethral area- unclear as to where it originated. white/gray in color. 14f coude attempted prior to successful insertion of 16f gonzalez, cloudy brown urine drained.  there is a culture sent which is labeled abscess at catheter size which is likely the mucous plug and is growing klebsiella aerogenes, E. feacalis and E. avium  pt spiked to 100.6    initial leukocytosis and AMS with hypernatremia and hyperglycemia with dehydration, not sure if she had UTI at that point, urine cx showed >3 organisms and E-coli and was given ceftriaxone  had difficulty with draining gonzalez and retention, so urology removed the gonzalez and there was a large ?mucous plug pulled out from vaginal/uretral area which is growing klebsiella aerogenes, E. feacalis and E. avium, now pt febrile to 100.6 but WBC normalzied    * f/u the blood cx  * discontinue ceftriaxone and start ertapenem for now ( for the klebsiella aerogenes)  * monitor CBC/diff and renal function  * if more fever and worsening status will need abd/pelvis CT with contrast    The above assessment and plan was discussed with the primary team    Minnie Baez MD  contact on teams  After 5pm and on weekends call [phone]   78 f with DM, dementia recently discharged about 1 week ago for L MCA CVA, brought in 1/19 with poor PO intake, lethargy and hyperglycemia to 500s.   afebrile, WBC: 15, Na: 158, glucose  468  blood cx negative, urine cx>3 organisms but with E-coli and pt was on ceftriaxone  CXR: unchanged scarring, no focal consolidation  head CT: evolving stroke  pt had leaking urine around the gonzalez with minimal output from the gonzalez and bladder scan c/w retention, gonzalez was exchanged 1/21 and again on 1/22 similar problem, urology came and could not irrigate the gonzalez so removed gonzalez after which a large mucous plug was pulled out from vaginal/urethral area- unclear as to where it originated. white/gray in color. 14f coude attempted prior to successful insertion of 16f gonzalez, cloudy brown urine drained.  there is a culture sent which is labeled abscess at catheter size which is likely the mucous plug and is growing klebsiella aerogenes, E. feacalis and E. avium  pt spiked to 100.6    initial leukocytosis and AMS with hypernatremia and hyperglycemia with dehydration, not sure if she had UTI at that point, urine cx showed >3 organisms and E-coli and was given ceftriaxone  had difficulty with draining gonzalez and retention, so urology removed the gonzalez and there was a large ?mucous plug pulled out from vaginal/uretral area which is growing klebsiella aerogenes, E. feacalis and E. avium, now pt febrile to 100.6 but WBC normalzied    * f/u the blood cx  * discontinue ceftriaxone and start ertapenem for now ( for the klebsiella aerogenes)  * monitor CBC/diff and renal function  * if more fever and worsening status will need abd/pelvis CT with contrast    The above assessment and plan was discussed with the primary team    Minnie Baez MD  contact on teams  After 5pm and on weekends call [phone]   78 f with DM, dementia recently discharged about 1 week ago for L MCA CVA, brought in 1/19 with poor PO intake, lethargy and hyperglycemia to 500s.   afebrile, WBC: 15, Na: 158, glucose  468  blood cx negative, urine cx>3 organisms but with E-coli and pt was on ceftriaxone  CXR: unchanged scarring, no focal consolidation  head CT: evolving stroke  pt had leaking urine around the gonzalez with minimal output from the gonzalez and bladder scan c/w retention, gonzalez was exchanged 1/21 and again on 1/22 similar problem, urology came and could not irrigate the gonzalez so removed gonzalez after which a large mucous plug was pulled out from vaginal/urethral area- unclear as to where it originated. white/gray in color. 14f coude attempted prior to successful insertion of 16f gonzalez, cloudy brown urine drained.  there is a culture sent which is labeled abscess at catheter size which is likely the mucous plug and is growing klebsiella aerogenes, E. feacalis and E. avium  pt spiked to 100.6    initial leukocytosis and AMS with hypernatremia and hyperglycemia with dehydration, not sure if she had UTI at that point, urine cx showed >3 organisms and E-coli and was given ceftriaxone  had difficulty with draining gonzalez and retention, so urology removed the gonzalez and there was a large ?mucous plug pulled out from vaginal/uretral area which is growing klebsiella aerogenes, E. feacalis and E. avium, now pt febrile to 100.6 but WBC normalzied    * f/u the blood cx  * discontinue ceftriaxone and start ertapenem for now ( for the klebsiella aerogenes)  * monitor CBC/diff and renal function  * if more fever and worsening status will need abd/pelvis CT with contrast    The above assessment and plan was discussed with the primary team    Minnie Baez MD  contact on teams  After 5pm and on weekends call [phone]

## 2023-01-24 NOTE — PROGRESS NOTE ADULT - SUBJECTIVE AND OBJECTIVE BOX
Name of Patient : SARAH DISLA  MRN: [id number]  Date of visit: 01-24-23 @ 17:11      Subjective: Patient seen and examined. No new events except as noted.   Patient seen earlier this AM. Lying down in bed  NGT feedings  Febrile overnight     REVIEW OF SYSTEMS:  Unable to obtain ROS     MEDICATIONS:  MEDICATIONS  (STANDING):  aspirin  chewable 81 milliGRAM(s) Oral daily  atorvastatin 80 milliGRAM(s) Oral at bedtime  clopidogrel Tablet 75 milliGRAM(s) Oral daily  dextrose 5%. 1000 milliLiter(s) (100 mL/Hr) IV Continuous <Continuous>  dextrose 50% Injectable 25 Gram(s) IV Push once  dextrose 50% Injectable 12.5 Gram(s) IV Push once  dextrose 50% Injectable 25 Gram(s) IV Push once  dextrose Oral Gel 15 Gram(s) Oral once  enoxaparin Injectable 40 milliGRAM(s) SubCutaneous every 24 hours  ertapenem  IVPB      glucagon  Injectable 1 milliGRAM(s) IntraMuscular once  insulin glargine Injectable (LANTUS) 6 Unit(s) SubCutaneous at bedtime  insulin lispro (ADMELOG) corrective regimen sliding scale   SubCutaneous every 6 hours  tamsulosin 0.4 milliGRAM(s) Oral at bedtime      PHYSICAL EXAM:  T(C): 36.7 (01-24-23 @ 12:30), Max: 38.1 (01-24-23 @ 00:12)  HR: 100 (01-24-23 @ 12:30) (96 - 120)  BP: 143/68 (01-24-23 @ 12:30) (120/62 - 154/71)  RR: 18 (01-24-23 @ 12:30) (18 - 18)  SpO2: 98% (01-24-23 @ 12:30) (94% - 98%)  Wt(kg): --  I&O's Summary    23 Jan 2023 07:01  -  24 Jan 2023 07:00  --------------------------------------------------------  IN: 650 mL / OUT: 900 mL / NET: -250 mL          Appearance: Frail, weak appearing female, arousable     HEENT:  Open eyes ; NGT   Lymphatic: No lymphadenopathy   Cardiovascular: Normal S1 S2, no JVD  Respiratory: normal effort , clear  Gastrointestinal:  Soft, Non-tender  Skin: No rashes,  warm to touch  Psychiatry:  Lethargic   Musculoskeletal: No edema              01-23-23 @ 07:01  -  01-24-23 @ 07:00  --------------------------------------------------------  IN: 650 mL / OUT: 900 mL / NET: -250 mL                                  9.3    7.60  )-----------( 251      ( 24 Jan 2023 07:11 )             30.7               01-24    144  |  110<H>  |  18  ----------------------------<  250<H>  3.6   |  22  |  0.79    Ca    8.2<L>      24 Jan 2023 07:12                         Culture - Abscess with Gram Stain (01.22.23 @ 14:00)   Specimen Source: .Abscess Catheter Site   Culture Results:   Moderate Enterococcus faecalis   Moderate Enterococcus avium   Rare Klebsiella aerogenes (Previously Enterobacter)     Specimen Source: Clean Catch Clean Catch (Midstream)   Culture Results:   >=3 organisms. Probable collection contamination. including   >100,000 CFU/ml Escherichia coli     Culture - Blood (01.19.23 @ 12:40)   Specimen Source: .Blood Blood-Peripheral   Culture Results:   No growth to date.     Culture - Blood (01.19.23 @ 12:33)   Specimen Source: .Blood Blood-Peripheral   Culture Results:   No growth to date.    Name of Patient : SARAH DISLA  MRN: [id number]  Date of visit: 01-24-23 @ 17:11      Subjective: Patient seen and examined. No new events except as noted.   Patient seen earlier this AM. Lying down in bed  NGT feedings  Febrile overnight     REVIEW OF SYSTEMS:  Unable to obtain ROS     MEDICATIONS:  MEDICATIONS  (STANDING):  aspirin  chewable 81 milliGRAM(s) Oral daily  atorvastatin 80 milliGRAM(s) Oral at bedtime  clopidogrel Tablet 75 milliGRAM(s) Oral daily  dextrose 5%. 1000 milliLiter(s) (100 mL/Hr) IV Continuous <Continuous>  dextrose 50% Injectable 25 Gram(s) IV Push once  dextrose 50% Injectable 12.5 Gram(s) IV Push once  dextrose 50% Injectable 25 Gram(s) IV Push once  dextrose Oral Gel 15 Gram(s) Oral once  enoxaparin Injectable 40 milliGRAM(s) SubCutaneous every 24 hours  ertapenem  IVPB      glucagon  Injectable 1 milliGRAM(s) IntraMuscular once  insulin glargine Injectable (LANTUS) 6 Unit(s) SubCutaneous at bedtime  insulin lispro (ADMELOG) corrective regimen sliding scale   SubCutaneous every 6 hours  tamsulosin 0.4 milliGRAM(s) Oral at bedtime      PHYSICAL EXAM:  T(C): 36.7 (01-24-23 @ 12:30), Max: 38.1 (01-24-23 @ 00:12)  HR: 100 (01-24-23 @ 12:30) (96 - 120)  BP: 143/68 (01-24-23 @ 12:30) (120/62 - 154/71)  RR: 18 (01-24-23 @ 12:30) (18 - 18)  SpO2: 98% (01-24-23 @ 12:30) (94% - 98%)  Wt(kg): --  I&O's Summary    23 Jan 2023 07:01  -  24 Jan 2023 07:00  --------------------------------------------------------  IN: 650 mL / OUT: 900 mL / NET: -250 mL          Appearance: Frail, weak appearing female, arousable     HEENT:  Open eyes ; NGT   Lymphatic: No lymphadenopathy   Cardiovascular: Normal S1 S2, no JVD  Respiratory: normal effort , clear  Gastrointestinal:  Soft, Non-tender  Skin: No rashes,  warm to touch  Psychiatry:  Lethargic   Musculoskeletal: No edema              01-23-23 @ 07:01  -  01-24-23 @ 07:00  --------------------------------------------------------  IN: 650 mL / OUT: 900 mL / NET: -250 mL                                  9.3    7.60  )-----------( 251      ( 24 Jan 2023 07:11 )             30.7               01-24    144  |  110<H>  |  18  ----------------------------<  250<H>  3.6   |  22  |  0.79    Ca    8.2<L>      24 Jan 2023 07:12                         Culture - Abscess with Gram Stain (01.22.23 @ 14:00)   Specimen Source: .Abscess Catheter Site   Culture Results:   Moderate Enterococcus faecalis   Moderate Enterococcus avium   Rare Klebsiella aerogenes (Previously Enterobacter)     Specimen Source: Clean Catch Clean Catch (Midstream)   Culture Results:   >=3 organisms. Probable collection contamination. including   >100,000 CFU/ml Escherichia coli     Culture - Blood (01.19.23 @ 12:40)   Specimen Source: .Blood Blood-Peripheral   Culture Results:   No growth to date.     Culture - Blood (01.19.23 @ 12:33)   Specimen Source: .Blood Blood-Peripheral   Culture Results:   No growth to date.    Name of Patient : SARAH DISLA  MRN: [id number]  Date of visit: 01-24-23 @ 17:11      Subjective: Patient seen and examined. No new events except as noted.   Patient seen earlier this AM. Lying down in bed  NGT feedings  Febrile overnight     REVIEW OF SYSTEMS:  Unable to obtain ROS     MEDICATIONS:  MEDICATIONS  (STANDING):  aspirin  chewable 81 milliGRAM(s) Oral daily  atorvastatin 80 milliGRAM(s) Oral at bedtime  clopidogrel Tablet 75 milliGRAM(s) Oral daily  dextrose 5%. 1000 milliLiter(s) (100 mL/Hr) IV Continuous <Continuous>  dextrose 50% Injectable 25 Gram(s) IV Push once  dextrose 50% Injectable 12.5 Gram(s) IV Push once  dextrose 50% Injectable 25 Gram(s) IV Push once  dextrose Oral Gel 15 Gram(s) Oral once  enoxaparin Injectable 40 milliGRAM(s) SubCutaneous every 24 hours  ertapenem  IVPB      glucagon  Injectable 1 milliGRAM(s) IntraMuscular once  insulin glargine Injectable (LANTUS) 6 Unit(s) SubCutaneous at bedtime  insulin lispro (ADMELOG) corrective regimen sliding scale   SubCutaneous every 6 hours  tamsulosin 0.4 milliGRAM(s) Oral at bedtime      PHYSICAL EXAM:  T(C): 36.7 (01-24-23 @ 12:30), Max: 38.1 (01-24-23 @ 00:12)  HR: 100 (01-24-23 @ 12:30) (96 - 120)  BP: 143/68 (01-24-23 @ 12:30) (120/62 - 154/71)  RR: 18 (01-24-23 @ 12:30) (18 - 18)  SpO2: 98% (01-24-23 @ 12:30) (94% - 98%)  Wt(kg): --  I&O's Summary    23 Jan 2023 07:01  -  24 Jan 2023 07:00  --------------------------------------------------------  IN: 650 mL / OUT: 900 mL / NET: -250 mL          Appearance: Frail, weak appearing female, arousable     HEENT:  Open eyes ; NGT   Lymphatic: No lymphadenopathy   Cardiovascular: Normal S1 S2, no JVD  Respiratory: normal effort , clear  Gastrointestinal:  Soft, Non-tender  Skin: No rashes,  warm to touch  Psychiatry:  Lethargic   Musculoskeletal: No edema              01-23-23 @ 07:01  -  01-24-23 @ 07:00  --------------------------------------------------------  IN: 650 mL / OUT: 900 mL / NET: -250 mL                                  9.3    7.60  )-----------( 251      ( 24 Jan 2023 07:11 )             30.7               01-24    144  |  110<H>  |  18  ----------------------------<  250<H>  3.6   |  22  |  0.79    Ca    8.2<L>      24 Jan 2023 07:12                         Culture - Abscess with Gram Stain (01.22.23 @ 14:00)   Specimen Source: .Abscess Catheter Site   Culture Results:   Moderate Enterococcus faecalis   Moderate Enterococcus avium   Rare Klebsiella aerogenes (Previously Enterobacter)     Specimen Source: Clean Catch Clean Catch (Midstream)   Culture Results:   >=3 organisms. Probable collection contamination. including   >100,000 CFU/ml Escherichia coli     Culture - Blood (01.19.23 @ 12:40)   Specimen Source: .Blood Blood-Peripheral   Culture Results:   No growth to date.     Culture - Blood (01.19.23 @ 12:33)   Specimen Source: .Blood Blood-Peripheral   Culture Results:   No growth to date.

## 2023-01-24 NOTE — PROCEDURE NOTE - ADDITIONAL PROCEDURE DETAILS
Using aseptic technique, area prepped in traditional sterile fashion, 20F  catheter placed without resistance. Purulent urine drained. 10 cc sterile water placed into balloon and gonzalez catheter secured with stat lock. pt tolerated procedure well. plan for gonzalez per primary team. please page with any acute  concerns or questions.  p: [phone]     Recs:  flush floey with 60-120cc of strerile water twice daily Using aseptic technique, area prepped in traditional sterile fashion, 20F  catheter placed without resistance. Purulent urine drained. 10 cc sterile water placed into balloon and gonzalez catheter secured with stat lock. pt tolerated procedure well. plan for gonzalez per primary team. please page with any acute  concerns or questions.  p: [phone]     Recs:  flush floey with 60-120cc of strerile water twice daily Using aseptic technique, area prepped in traditional sterile fashion, 20F  catheter placed without resistance. Purulent urine drained. 10 cc sterile water placed into balloon and gonzalez catheter secured with stat lock. pt tolerated procedure well. plan for gonzalez per primary team. please page with any acute  concerns or questions.  p: [phone]     Recs:  flush floey with 60-120cc of strerile water twice daily

## 2023-01-24 NOTE — PROGRESS NOTE ADULT - SUBJECTIVE AND OBJECTIVE BOX
Neurology Progress Note    S: Patient seen and examined.  in mittens now and contact     Medication:    MEDICATIONS  (STANDING):  aspirin  chewable 81 milliGRAM(s) Oral daily  atorvastatin 80 milliGRAM(s) Oral at bedtime  cefTRIAXone   IVPB 1000 milliGRAM(s) IV Intermittent every 24 hours  clopidogrel Tablet 75 milliGRAM(s) Oral daily  dextrose 5%. 1000 milliLiter(s) (100 mL/Hr) IV Continuous <Continuous>  dextrose 50% Injectable 25 Gram(s) IV Push once  dextrose 50% Injectable 12.5 Gram(s) IV Push once  dextrose 50% Injectable 25 Gram(s) IV Push once  dextrose Oral Gel 15 Gram(s) Oral once  enoxaparin Injectable 40 milliGRAM(s) SubCutaneous every 24 hours  glucagon  Injectable 1 milliGRAM(s) IntraMuscular once  insulin glargine Injectable (LANTUS) 6 Unit(s) SubCutaneous at bedtime  insulin lispro (ADMELOG) corrective regimen sliding scale   SubCutaneous every 6 hours  tamsulosin 0.4 milliGRAM(s) Oral at bedtime    MEDICATIONS  (PRN):  acetaminophen    Suspension .. 650 milliGRAM(s) Enteral Tube every 6 hours PRN Temp greater or equal to 38C (100.4F)        Vitals:    Vital Signs Last 24 Hrs  T(C): 36.9 (01-24-23 @ 04:33), Max: 38.1 (01-24-23 @ 00:12)  T(F): 98.5 (01-24-23 @ 04:33), Max: 100.6 (01-24-23 @ 00:12)  HR: 96 (01-24-23 @ 04:33) (96 - 120)  BP: 120/62 (01-24-23 @ 04:33) (120/62 - 154/71)  BP(mean): --  RR: 18 (01-24-23 @ 04:33) (18 - 18)  SpO2: 95% (01-24-23 @ 04:33) (94% - 100%)              General Exam:   General Appearance: Appropriately dressed and in no acute distress       Head: Normocephalic, atraumatic and no dysmorphic features  Ear, Nose, and Throat: Moist mucous membranes  CVS: S1S2+  Resp: No SOB, no wheeze or rhonchi  Abd: soft NTND  Extremities: No edema, no cyanosis  Skin: No bruises, no rashes        NEUROLOGICAL EXAM: Family at bedside who provided translation   Mental status: Eyes open, awake, alert, attempts to produce speech, able to follow some simple commands, able to mimic at times , unable to ID objects  Cranial Nerves: Right facial droop, no nystagmus, blinks to threat B/L  Motor exam: Normal tone, no drift, Right hemiparesis RUE drift, 3-44/5, RLE drift, 3-4/5,, LUE 5/5, LLE 5/5  Sensation: Intact to light touch   Coordination/ Gait: Unable to participate with exam     I personally reviewed the below data/images/labs:  CBC Full  -  ( 24 Jan 2023 07:11 )  WBC Count : 7.60 K/uL  RBC Count : 3.45 M/uL  Hemoglobin : 9.3 g/dL  Hematocrit : 30.7 %  Platelet Count - Automated : 251 K/uL  Mean Cell Volume : 89.0 fl  Mean Cell Hemoglobin : 27.0 pg  Mean Cell Hemoglobin Concentration : 30.3 gm/dL  Auto Neutrophil # : x  Auto Lymphocyte # : x  Auto Monocyte # : x  Auto Eosinophil # : x  Auto Basophil # : x  Auto Neutrophil % : x  Auto Lymphocyte % : x  Auto Monocyte % : x  Auto Eosinophil % : x  Auto Basophil % : x      01-24    144  |  110<H>  |  18  ----------------------------<  250<H>  3.6   |  22  |  0.79    Ca    8.2<L>      24 Jan 2023 07:12              `< from: CT Head No Cont (01.19.23 @ 13:45) >    ACC: [id number] EXAM:  CT BRAIN   ORDERED BY: CORINNE MADERA     PROCEDURE DATE:  01/19/2023          INTERPRETATION:  CLINICAL STATEMENT: Altered mental status    TECHNIQUE: CT of the head was performed without IV contrast.  RAPID   artificial intelligence was utilized for the preliminary evaluation of   intracranial hemorrhage.    COMPARISON: MRI brain 1/7/2023.    FINDINGS:  There is mild diffuse parenchymal volume loss. There are areas of low   attenuation in the periventricular white matter likely related to mild   chronic microvascular ischemic changes.    Evolving small infarcts noted in the left corona radiata.   Age-indeterminate infarct also noted in the left temporal and parietal   lobe    There is no acute intracranial hemorrhage, parenchymal mass, mass effect   or midline shift.. There is no hydrocephalus. Partial empty sella noted    The cranium is intact. Calcification noted adjacent to left frontal   artery table. The visualized paranasal sinuses are well-aerated.        IMPRESSION:  No acute intracranial hemorrhage.    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    --- End of Report ---                 Neurology Progress Note    S: Patient seen and examined.  in mittens now and contact     Medication:    MEDICATIONS  (STANDING):  aspirin  chewable 81 milliGRAM(s) Oral daily  atorvastatin 80 milliGRAM(s) Oral at bedtime  cefTRIAXone   IVPB 1000 milliGRAM(s) IV Intermittent every 24 hours  clopidogrel Tablet 75 milliGRAM(s) Oral daily  dextrose 5%. 1000 milliLiter(s) (100 mL/Hr) IV Continuous <Continuous>  dextrose 50% Injectable 25 Gram(s) IV Push once  dextrose 50% Injectable 12.5 Gram(s) IV Push once  dextrose 50% Injectable 25 Gram(s) IV Push once  dextrose Oral Gel 15 Gram(s) Oral once  enoxaparin Injectable 40 milliGRAM(s) SubCutaneous every 24 hours  glucagon  Injectable 1 milliGRAM(s) IntraMuscular once  insulin glargine Injectable (LANTUS) 6 Unit(s) SubCutaneous at bedtime  insulin lispro (ADMELOG) corrective regimen sliding scale   SubCutaneous every 6 hours  tamsulosin 0.4 milliGRAM(s) Oral at bedtime    MEDICATIONS  (PRN):  acetaminophen    Suspension .. 650 milliGRAM(s) Enteral Tube every 6 hours PRN Temp greater or equal to 38C (100.4F)        Vitals:    Vital Signs Last 24 Hrs  T(C): 36.9 (01-24-23 @ 04:33), Max: 38.1 (01-24-23 @ 00:12)  T(F): 98.5 (01-24-23 @ 04:33), Max: 100.6 (01-24-23 @ 00:12)  HR: 96 (01-24-23 @ 04:33) (96 - 120)  BP: 120/62 (01-24-23 @ 04:33) (120/62 - 154/71)  BP(mean): --  RR: 18 (01-24-23 @ 04:33) (18 - 18)  SpO2: 95% (01-24-23 @ 04:33) (94% - 100%)              General Exam:   General Appearance: Appropriately dressed and in no acute distress       Head: Normocephalic, atraumatic and no dysmorphic features  Ear, Nose, and Throat: Moist mucous membranes  CVS: S1S2+  Resp: No SOB, no wheeze or rhonchi  Abd: soft NTND  Extremities: No edema, no cyanosis  Skin: No bruises, no rashes        NEUROLOGICAL EXAM: Family at bedside who provided translation   Mental status: Eyes open, awake, alert, attempts to produce speech, able to follow some simple commands, able to mimic at times , unable to ID objects  Cranial Nerves: Right facial droop, no nystagmus, blinks to threat B/L  Motor exam: Normal tone, no drift, Right hemiparesis RUE drift, 3-44/5, RLE drift, 3-4/5,, LUE 5/5, LLE 5/5  Sensation: Intact to light touch   Coordination/ Gait: Unable to participate with exam     I personally reviewed the below data/images/labs:  CBC Full  -  ( 24 Jan 2023 07:11 )  WBC Count : 7.60 K/uL  RBC Count : 3.45 M/uL  Hemoglobin : 9.3 g/dL  Hematocrit : 30.7 %  Platelet Count - Automated : 251 K/uL  Mean Cell Volume : 89.0 fl  Mean Cell Hemoglobin : 27.0 pg  Mean Cell Hemoglobin Concentration : 30.3 gm/dL  Auto Neutrophil # : x  Auto Lymphocyte # : x  Auto Monocyte # : x  Auto Eosinophil # : x  Auto Basophil # : x  Auto Neutrophil % : x  Auto Lymphocyte % : x  Auto Monocyte % : x  Auto Eosinophil % : x  Auto Basophil % : x      01-24    144  |  110<H>  |  18  ----------------------------<  250<H>  3.6   |  22  |  0.79    Ca    8.2<L>      24 Jan 2023 07:12              `< from: CT Head No Cont (01.19.23 @ 13:45) >    ACC: [id number] EXAM:  CT BRAIN   ORDERED BY: CORINNE MADERA     PROCEDURE DATE:  01/19/2023          INTERPRETATION:  CLINICAL STATEMENT: Altered mental status    TECHNIQUE: CT of the head was performed without IV contrast.  RAPID   artificial intelligence was utilized for the preliminary evaluation of   intracranial hemorrhage.    COMPARISON: MRI brain 1/7/2023.    FINDINGS:  There is mild diffuse parenchymal volume loss. There are areas of low   attenuation in the periventricular white matter likely related to mild   chronic microvascular ischemic changes.    Evolving small infarcts noted in the left corona radiata.   Age-indeterminate infarct also noted in the left temporal and parietal   lobe    There is no acute intracranial hemorrhage, parenchymal mass, mass effect   or midline shift.. There is no hydrocephalus. Partial empty sella noted    The cranium is intact. Calcification noted adjacent to left frontal   artery table. The visualized paranasal sinuses are well-aerated.        IMPRESSION:  No acute intracranial hemorrhage.    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    --- End of Report ---                 Neurology Progress Note    S: Patient seen and examined.  in mittens now and contact     Medication:    MEDICATIONS  (STANDING):  aspirin  chewable 81 milliGRAM(s) Oral daily  atorvastatin 80 milliGRAM(s) Oral at bedtime  cefTRIAXone   IVPB 1000 milliGRAM(s) IV Intermittent every 24 hours  clopidogrel Tablet 75 milliGRAM(s) Oral daily  dextrose 5%. 1000 milliLiter(s) (100 mL/Hr) IV Continuous <Continuous>  dextrose 50% Injectable 25 Gram(s) IV Push once  dextrose 50% Injectable 12.5 Gram(s) IV Push once  dextrose 50% Injectable 25 Gram(s) IV Push once  dextrose Oral Gel 15 Gram(s) Oral once  enoxaparin Injectable 40 milliGRAM(s) SubCutaneous every 24 hours  glucagon  Injectable 1 milliGRAM(s) IntraMuscular once  insulin glargine Injectable (LANTUS) 6 Unit(s) SubCutaneous at bedtime  insulin lispro (ADMELOG) corrective regimen sliding scale   SubCutaneous every 6 hours  tamsulosin 0.4 milliGRAM(s) Oral at bedtime    MEDICATIONS  (PRN):  acetaminophen    Suspension .. 650 milliGRAM(s) Enteral Tube every 6 hours PRN Temp greater or equal to 38C (100.4F)        Vitals:    Vital Signs Last 24 Hrs  T(C): 36.9 (01-24-23 @ 04:33), Max: 38.1 (01-24-23 @ 00:12)  T(F): 98.5 (01-24-23 @ 04:33), Max: 100.6 (01-24-23 @ 00:12)  HR: 96 (01-24-23 @ 04:33) (96 - 120)  BP: 120/62 (01-24-23 @ 04:33) (120/62 - 154/71)  BP(mean): --  RR: 18 (01-24-23 @ 04:33) (18 - 18)  SpO2: 95% (01-24-23 @ 04:33) (94% - 100%)              General Exam:   General Appearance: Appropriately dressed and in no acute distress       Head: Normocephalic, atraumatic and no dysmorphic features  Ear, Nose, and Throat: Moist mucous membranes  CVS: S1S2+  Resp: No SOB, no wheeze or rhonchi  Abd: soft NTND  Extremities: No edema, no cyanosis  Skin: No bruises, no rashes        NEUROLOGICAL EXAM: Family at bedside who provided translation   Mental status: Eyes open, awake, alert, attempts to produce speech, able to follow some simple commands, able to mimic at times , unable to ID objects  Cranial Nerves: Right facial droop, no nystagmus, blinks to threat B/L  Motor exam: Normal tone, no drift, Right hemiparesis RUE drift, 3-44/5, RLE drift, 3-4/5,, LUE 5/5, LLE 5/5  Sensation: Intact to light touch   Coordination/ Gait: Unable to participate with exam     I personally reviewed the below data/images/labs:  CBC Full  -  ( 24 Jan 2023 07:11 )  WBC Count : 7.60 K/uL  RBC Count : 3.45 M/uL  Hemoglobin : 9.3 g/dL  Hematocrit : 30.7 %  Platelet Count - Automated : 251 K/uL  Mean Cell Volume : 89.0 fl  Mean Cell Hemoglobin : 27.0 pg  Mean Cell Hemoglobin Concentration : 30.3 gm/dL  Auto Neutrophil # : x  Auto Lymphocyte # : x  Auto Monocyte # : x  Auto Eosinophil # : x  Auto Basophil # : x  Auto Neutrophil % : x  Auto Lymphocyte % : x  Auto Monocyte % : x  Auto Eosinophil % : x  Auto Basophil % : x      01-24    144  |  110<H>  |  18  ----------------------------<  250<H>  3.6   |  22  |  0.79    Ca    8.2<L>      24 Jan 2023 07:12              `< from: CT Head No Cont (01.19.23 @ 13:45) >    ACC: [id number] EXAM:  CT BRAIN   ORDERED BY: CORINNE MADERA     PROCEDURE DATE:  01/19/2023          INTERPRETATION:  CLINICAL STATEMENT: Altered mental status    TECHNIQUE: CT of the head was performed without IV contrast.  RAPID   artificial intelligence was utilized for the preliminary evaluation of   intracranial hemorrhage.    COMPARISON: MRI brain 1/7/2023.    FINDINGS:  There is mild diffuse parenchymal volume loss. There are areas of low   attenuation in the periventricular white matter likely related to mild   chronic microvascular ischemic changes.    Evolving small infarcts noted in the left corona radiata.   Age-indeterminate infarct also noted in the left temporal and parietal   lobe    There is no acute intracranial hemorrhage, parenchymal mass, mass effect   or midline shift.. There is no hydrocephalus. Partial empty sella noted    The cranium is intact. Calcification noted adjacent to left frontal   artery table. The visualized paranasal sinuses are well-aerated.        IMPRESSION:  No acute intracranial hemorrhage.    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    --- End of Report ---

## 2023-01-24 NOTE — PROGRESS NOTE ADULT - SUBJECTIVE AND OBJECTIVE BOX
[facility]   MD PUSHPA WICK MD, PA KRISTINE SOLTANPOUR, DO INJUNG KO, NP    TEL:  OFFICE: [phone]    From 5pm-7am Answering Service [phone]    -- RENAL FOLLOW UP NOTE ---Date of Service 01-24-23 @ 14:06    Patient is a 78y old  Female who presents with a chief complaint of AMS, elevated finger sticks (24 Jan 2023 12:28)      Patient seen and examined at bedside. No chest pain/sob    VITALS:  T(F): 98 (01-24-23 @ 12:30), Max: 100.6 (01-24-23 @ 00:12)  HR: 100 (01-24-23 @ 12:30)  BP: 143/68 (01-24-23 @ 12:30)  RR: 18 (01-24-23 @ 12:30)  SpO2: 98% (01-24-23 @ 12:30)  Wt(kg): --    01-23 @ 07:01  -  01-24 @ 07:00  --------------------------------------------------------  IN: 650 mL / OUT: 900 mL / NET: -250 mL          PHYSICAL EXAM:  Constitutional: NAD  Neck: No JVD  Respiratory: CTAB, no wheezes, rales or rhonchi  Cardiovascular: S1, S2, RRR  Gastrointestinal: BS+, soft, NT/ND  Extremities: No peripheral edema    Hospital Medications:   MEDICATIONS  (STANDING):  aspirin  chewable 81 milliGRAM(s) Oral daily  atorvastatin 80 milliGRAM(s) Oral at bedtime  clopidogrel Tablet 75 milliGRAM(s) Oral daily  dextrose 5%. 1000 milliLiter(s) (100 mL/Hr) IV Continuous <Continuous>  dextrose 50% Injectable 25 Gram(s) IV Push once  dextrose 50% Injectable 12.5 Gram(s) IV Push once  dextrose 50% Injectable 25 Gram(s) IV Push once  dextrose Oral Gel 15 Gram(s) Oral once  enoxaparin Injectable 40 milliGRAM(s) SubCutaneous every 24 hours  ertapenem  IVPB      glucagon  Injectable 1 milliGRAM(s) IntraMuscular once  insulin glargine Injectable (LANTUS) 6 Unit(s) SubCutaneous at bedtime  insulin lispro (ADMELOG) corrective regimen sliding scale   SubCutaneous every 6 hours  tamsulosin 0.4 milliGRAM(s) Oral at bedtime      LABS:  01-24    144  |  110<H>  |  18  ----------------------------<  250<H>  3.6   |  22  |  0.79    Ca    8.2<L>      24 Jan 2023 07:12      Creatinine Trend: 0.79 <--, 0.81 <--, 0.76 <--, 0.77 <--, 0.74 <--, 0.72 <--, 0.71 <--, 0.74 <--, 0.65 <--, 0.74 <--, 0.92 <--                                9.3    7.60  )-----------( 251      ( 24 Jan 2023 07:11 )             30.7     Urine Studies:  Urinalysis - [01-19-23 @ 13:15]      Color DARK BROWN / Appearance Turbid / SG 1.023 / pH 7.0      Gluc 200 mg/dL / Ketone Trace  / Bili Negative / Urobili 6 mg/dL       Blood Large / Protein >600 / Leuk Est Large / Nitrite Negative      RBC 15 / WBC 11-25 / Hyaline  / Gran 4 / Sq Epi  / Non Sq Epi Few / Bacteria Many      Lipid: chol 238, TG 80, HDL 46, LDL --      [01-08-23 @ 02:47]        RADIOLOGY & ADDITIONAL STUDIES:   [facility]   MD PUSHPA WICK MD, PA KRISTINE SOLTANPOUR, DO INJUNG KO, NP    TEL:  OFFICE: [phone]    From 5pm-7am Answering Service [phone]    -- RENAL FOLLOW UP NOTE ---Date of Service 01-24-23 @ 14:06    Patient is a 78y old  Female who presents with a chief complaint of AMS, elevated finger sticks (24 Jan 2023 12:28)      Patient seen and examined at bedside. No chest pain/sob    VITALS:  T(F): 98 (01-24-23 @ 12:30), Max: 100.6 (01-24-23 @ 00:12)  HR: 100 (01-24-23 @ 12:30)  BP: 143/68 (01-24-23 @ 12:30)  RR: 18 (01-24-23 @ 12:30)  SpO2: 98% (01-24-23 @ 12:30)  Wt(kg): --    01-23 @ 07:01  -  01-24 @ 07:00  --------------------------------------------------------  IN: 650 mL / OUT: 900 mL / NET: -250 mL          PHYSICAL EXAM:  Constitutional: NAD  Neck: No JVD  Respiratory: CTAB, no wheezes, rales or rhonchi  Cardiovascular: S1, S2, RRR  Gastrointestinal: BS+, soft, NT/ND  Extremities: No peripheral edema    Hospital Medications:   MEDICATIONS  (STANDING):  aspirin  chewable 81 milliGRAM(s) Oral daily  atorvastatin 80 milliGRAM(s) Oral at bedtime  clopidogrel Tablet 75 milliGRAM(s) Oral daily  dextrose 5%. 1000 milliLiter(s) (100 mL/Hr) IV Continuous <Continuous>  dextrose 50% Injectable 25 Gram(s) IV Push once  dextrose 50% Injectable 12.5 Gram(s) IV Push once  dextrose 50% Injectable 25 Gram(s) IV Push once  dextrose Oral Gel 15 Gram(s) Oral once  enoxaparin Injectable 40 milliGRAM(s) SubCutaneous every 24 hours  ertapenem  IVPB      glucagon  Injectable 1 milliGRAM(s) IntraMuscular once  insulin glargine Injectable (LANTUS) 6 Unit(s) SubCutaneous at bedtime  insulin lispro (ADMELOG) corrective regimen sliding scale   SubCutaneous every 6 hours  tamsulosin 0.4 milliGRAM(s) Oral at bedtime      LABS:  01-24    144  |  110<H>  |  18  ----------------------------<  250<H>  3.6   |  22  |  0.79    Ca    8.2<L>      24 Jan 2023 07:12      Creatinine Trend: 0.79 <--, 0.81 <--, 0.76 <--, 0.77 <--, 0.74 <--, 0.72 <--, 0.71 <--, 0.74 <--, 0.65 <--, 0.74 <--, 0.92 <--                                9.3    7.60  )-----------( 251      ( 24 Jan 2023 07:11 )             30.7     Urine Studies:  Urinalysis - [01-19-23 @ 13:15]      Color DARK BROWN / Appearance Turbid / SG 1.023 / pH 7.0      Gluc 200 mg/dL / Ketone Trace  / Bili Negative / Urobili 6 mg/dL       Blood Large / Protein >600 / Leuk Est Large / Nitrite Negative      RBC 15 / WBC 11-25 / Hyaline  / Gran 4 / Sq Epi  / Non Sq Epi Few / Bacteria Many      Lipid: chol 238, TG 80, HDL 46, LDL --      [01-08-23 @ 02:47]        RADIOLOGY & ADDITIONAL STUDIES:   [facility]   MD PUSHPA WICK MD, PA KRISTINE SOLTANPOUR, DO INJUNG KO, NP    TEL:  OFFICE: [phone]    From 5pm-7am Answering Service [phone]    -- RENAL FOLLOW UP NOTE ---Date of Service 01-24-23 @ 14:06    Patient is a 78y old  Female who presents with a chief complaint of AMS, elevated finger sticks (24 Jan 2023 12:28)      Patient seen and examined at bedside. No chest pain/sob    VITALS:  T(F): 98 (01-24-23 @ 12:30), Max: 100.6 (01-24-23 @ 00:12)  HR: 100 (01-24-23 @ 12:30)  BP: 143/68 (01-24-23 @ 12:30)  RR: 18 (01-24-23 @ 12:30)  SpO2: 98% (01-24-23 @ 12:30)  Wt(kg): --    01-23 @ 07:01  -  01-24 @ 07:00  --------------------------------------------------------  IN: 650 mL / OUT: 900 mL / NET: -250 mL          PHYSICAL EXAM:  Constitutional: NAD  Neck: No JVD  Respiratory: CTAB, no wheezes, rales or rhonchi  Cardiovascular: S1, S2, RRR  Gastrointestinal: BS+, soft, NT/ND  Extremities: No peripheral edema    Hospital Medications:   MEDICATIONS  (STANDING):  aspirin  chewable 81 milliGRAM(s) Oral daily  atorvastatin 80 milliGRAM(s) Oral at bedtime  clopidogrel Tablet 75 milliGRAM(s) Oral daily  dextrose 5%. 1000 milliLiter(s) (100 mL/Hr) IV Continuous <Continuous>  dextrose 50% Injectable 25 Gram(s) IV Push once  dextrose 50% Injectable 12.5 Gram(s) IV Push once  dextrose 50% Injectable 25 Gram(s) IV Push once  dextrose Oral Gel 15 Gram(s) Oral once  enoxaparin Injectable 40 milliGRAM(s) SubCutaneous every 24 hours  ertapenem  IVPB      glucagon  Injectable 1 milliGRAM(s) IntraMuscular once  insulin glargine Injectable (LANTUS) 6 Unit(s) SubCutaneous at bedtime  insulin lispro (ADMELOG) corrective regimen sliding scale   SubCutaneous every 6 hours  tamsulosin 0.4 milliGRAM(s) Oral at bedtime      LABS:  01-24    144  |  110<H>  |  18  ----------------------------<  250<H>  3.6   |  22  |  0.79    Ca    8.2<L>      24 Jan 2023 07:12      Creatinine Trend: 0.79 <--, 0.81 <--, 0.76 <--, 0.77 <--, 0.74 <--, 0.72 <--, 0.71 <--, 0.74 <--, 0.65 <--, 0.74 <--, 0.92 <--                                9.3    7.60  )-----------( 251      ( 24 Jan 2023 07:11 )             30.7     Urine Studies:  Urinalysis - [01-19-23 @ 13:15]      Color DARK BROWN / Appearance Turbid / SG 1.023 / pH 7.0      Gluc 200 mg/dL / Ketone Trace  / Bili Negative / Urobili 6 mg/dL       Blood Large / Protein >600 / Leuk Est Large / Nitrite Negative      RBC 15 / WBC 11-25 / Hyaline  / Gran 4 / Sq Epi  / Non Sq Epi Few / Bacteria Many      Lipid: chol 238, TG 80, HDL 46, LDL --      [01-08-23 @ 02:47]        RADIOLOGY & ADDITIONAL STUDIES:

## 2023-01-24 NOTE — PROGRESS NOTE ADULT - SUBJECTIVE AND OBJECTIVE BOX
seen earlier today     Chief Complaint: Type 2 Diabetes Mellitus     INTERVAL HX: febrile o/n , started 24 hours TF yesterday with BG uptrending to 200s       Review of Systems:  nonverbal    Allergies    Benedryl Allergy Sinus (Hives)  penicillin (Rash)    Intolerances      MEDICATIONS  (STANDING):  aspirin  chewable 81 milliGRAM(s) Oral daily  atorvastatin 80 milliGRAM(s) Oral at bedtime  clopidogrel Tablet 75 milliGRAM(s) Oral daily  dextrose 5%. 1000 milliLiter(s) (100 mL/Hr) IV Continuous <Continuous>  dextrose 50% Injectable 25 Gram(s) IV Push once  dextrose 50% Injectable 12.5 Gram(s) IV Push once  dextrose 50% Injectable 25 Gram(s) IV Push once  dextrose Oral Gel 15 Gram(s) Oral once  enoxaparin Injectable 40 milliGRAM(s) SubCutaneous every 24 hours  ertapenem  IVPB      glucagon  Injectable 1 milliGRAM(s) IntraMuscular once  insulin glargine Injectable (LANTUS) 6 Unit(s) SubCutaneous at bedtime  insulin lispro (ADMELOG) corrective regimen sliding scale   SubCutaneous every 6 hours  tamsulosin 0.4 milliGRAM(s) Oral at bedtime        atorvastatin   80 milliGRAM(s) Oral (01-23-23 @ 22:05)    insulin glargine Injectable (LANTUS)   6 Unit(s) SubCutaneous (01-23-23 @ 23:54)    insulin lispro (ADMELOG) corrective regimen sliding scale   2 Unit(s) SubCutaneous (01-24-23 @ 13:03)   2 Unit(s) SubCutaneous (01-24-23 @ 06:26)   1 Unit(s) SubCutaneous (01-23-23 @ 18:59)        PHYSICAL EXAM:  VITALS: T(C): 36.7 (01-24-23 @ 12:30)  T(F): 98 (01-24-23 @ 12:30), Max: 100.6 (01-24-23 @ 00:12)  HR: 100 (01-24-23 @ 12:30) (96 - 120)  BP: 143/68 (01-24-23 @ 12:30) (120/62 - 154/71)  RR:  (18 - 18)  SpO2:  (94% - 98%)  Wt(kg): --  GENERAL: female laying in bed  in NAD, glucerna TF via ngt  Respiratory: Respirations unlabored   Extremities: Warm, no edema  NEURO: nonverbal    LABS:  POCT Blood Glucose.: 202 mg/dL (01-24-23 @ 12:54)  POCT Blood Glucose.: 231 mg/dL (01-24-23 @ 05:57)  POCT Blood Glucose.: 247 mg/dL (01-23-23 @ 23:43)  POCT Blood Glucose.: 186 mg/dL (01-23-23 @ 18:12)  POCT Blood Glucose.: 143 mg/dL (01-23-23 @ 12:17)  POCT Blood Glucose.: 142 mg/dL (01-23-23 @ 05:50)  POCT Blood Glucose.: 132 mg/dL (01-22-23 @ 23:57)  POCT Blood Glucose.: 131 mg/dL (01-22-23 @ 22:30)  POCT Blood Glucose.: 147 mg/dL (01-22-23 @ 17:52)  POCT Blood Glucose.: 150 mg/dL (01-22-23 @ 12:55)  POCT Blood Glucose.: 153 mg/dL (01-22-23 @ 06:31)  POCT Blood Glucose.: 158 mg/dL (01-22-23 @ 00:25)  POCT Blood Glucose.: 137 mg/dL (01-21-23 @ 21:54)  POCT Blood Glucose.: 148 mg/dL (01-21-23 @ 17:40)                          9.3    7.60  )-----------( 251      ( 24 Jan 2023 07:11 )             30.7     01-24    144  |  110<H>  |  18  ----------------------------<  250<H>  3.6   |  22  |  0.79    Ca    8.2<L>      24 Jan 2023 07:12      eGFR: 76 mL/min/1.73m2 (24 Jan 2023 07:12)    01-08 Chol 238<H> Direct LDL -- LDL calculated 176<H> HDL 46<L> Trig 80  Thyroid Function Tests:      A1C with Estimated Average Glucose Result: 9.4 % (01-19-23 @ 13:11)  A1C with Estimated Average Glucose Result: 8.6 % (01-07-23 @ 05:40)    Estimated Average Glucose: 223 mg/dL (01-19-23 @ 13:11)  Estimated Average Glucose: 200 mg/dL (01-07-23 @ 05:40)        Diet, NPO with Tube Feed:   Tube Feeding Modality: Nasogastric  Glucerna 1.2 Wallace (GLUCERNARTH)  Total Volume for 24 Hours (mL): 1200  Continuous  Starting Tube Feed Rate mL per Hour: 20  Increase Tube Feed Rate by (mL): 10     Every 12 hours  Until Goal Tube Feed Rate (mL per Hour): 50  Tube Feed Duration (in Hours): 24  Tube Feed Start Time: 00:00 (01-23-23 @ 11:07) [Active]

## 2023-01-24 NOTE — PROGRESS NOTE ADULT - ASSESSMENT
78 F with recent T2DM diagnosis and CVA (d/c 1 week ago), presenting with glucoses 500s, poor po intake and lethargy. Endocrine consulted for assistance with management of uncontrolled, recently dx DM. BG values at goal while NPO however now w/TF started today. Will slowly add back insulin. BG goal (100-180mg/dl).  On Abx for UTI.     Uncontrolled type 2 diabetes mellitus with hyperglycemia.   ·  Plan: -test BG Q6h  -monitor on  Lantus 6 units QHS for now, if BG remains above goal on moderate scale consider increasing lantus vs. changing to NPH depending on insulin requirements/TF  -change to Admelog moderate correction scale Q6h   -Please notify endocrine team of any change to TF regimen.   For d/c:   final recs TBD based on feeding modality/insulin requiremeents  if dc on PO intake consider   -Metformin 500 mg BID for 1 week then increase to 1000 mg BID. Check lactate prior to d/c  -May need low dose basal insulin (came in with glucose 500s, mild BHB elevation)  -Consider DPP4 inhibitor  -Patient can follow up at [address] [place] [address], [address]LO[address], [place] [address] [phone]  -Patient will need opthalmology and podiatry follow up as outpatient.     Problem/Plan - 2:  ·  Problem: Hypertension.   ·  Plan: - BP goal 130/80  - Manage per primary team.     Problem/Plan - 3:  ·  Problem: Hyperlipidemia.   ·  Plan: - Continue with atorvastatin 80 mg daily   -   - Manage as outpatient       discussed with patient and RN   Contact via Microsoft Teams during business hours  On evenings and weekends (or if no response on Microsoft Teams) please call [phone] or page endocrine fellow on call.   For nonurgent matters please email [email or web address]    Please note that this patient may be followed by different provider tomorrow.  Notify endocrine 24 hours prior to discharge for final recommendations    greater than 50% of the encounter was spent counseling and/or coordination of care.  26 minutes spent on total encounter; The necessity of the time spent during the encounter on this date of service was due to development of plan of care/coordination of care/glycemic control through review of labs, blood glucose values and vital signs.  78 F with recent T2DM diagnosis and CVA (d/c 1 week ago), presenting with glucoses 500s, poor po intake and lethargy. Endocrine consulted for assistance with management of uncontrolled, recently dx DM. BG values at goal while NPO however now w/TF started today. Will slowly add back insulin. BG goal (100-180mg/dl).  On Abx for UTI.     Uncontrolled type 2 diabetes mellitus with hyperglycemia.   ·  Plan: -test BG Q6h  -monitor on  Lantus 6 units QHS for now, if BG remains above goal on moderate scale consider increasing lantus vs. changing to NPH depending on insulin requirements/TF  -change to Admelog moderate correction scale Q6h   -Please notify endocrine team of any change to TF regimen.   For d/c:   final recs TBD based on feeding modality/insulin requiremeents  if dc on PO intake consider   -Metformin 500 mg BID for 1 week then increase to 1000 mg BID. Check lactate prior to d/c  -May need low dose basal insulin (came in with glucose 500s, mild BHB elevation)  -Consider DPP4 inhibitor  -Patient can follow up at [address] [place] [address], [address]LO[address], [place] [address] [phone]  -Patient will need opthalmology and podiatry follow up as outpatient.     Problem/Plan - 2:  ·  Problem: Hypertension.   ·  Plan: - BP goal 130/80  - Manage per primary team.     Problem/Plan - 3:  ·  Problem: Hyperlipidemia.   ·  Plan: - Continue with atorvastatin 80 mg daily   -   - Manage as outpatient       discussed with patient and RN   Contact via Microsoft Teams during business hours  On evenings and weekends (or if no response on Microsoft Teams) please call [phone] or page endocrine fellow on call.   For nonurgent matters please email [email or web address]    Please note that this patient may be followed by different provider tomorrow.  Notify endocrine 24 hours prior to discharge for final recommendations    greater than 50% of the encounter was spent counseling and/or coordination of care.  26 minutes spent on total encounter; The necessity of the time spent during the encounter on this date of service was due to development of plan of care/coordination of care/glycemic control through review of labs, blood glucose values and vital signs.  78 F with recent T2DM diagnosis and CVA (d/c 1 week ago), presenting with glucoses 500s, poor po intake and lethargy. Endocrine consulted for assistance with management of uncontrolled, recently dx DM. BG values at goal while NPO however now w/TF started today. Will slowly add back insulin. BG goal (100-180mg/dl).  On Abx for UTI.     Uncontrolled type 2 diabetes mellitus with hyperglycemia.   ·  Plan: -test BG Q6h  -monitor on  Lantus 6 units QHS for now, if BG remains above goal on moderate scale consider increasing lantus vs. changing to NPH depending on insulin requirements/TF  -change to Admelog moderate correction scale Q6h   -Please notify endocrine team of any change to TF regimen.   For d/c:   final recs TBD based on feeding modality/insulin requiremeents  if dc on PO intake consider   -Metformin 500 mg BID for 1 week then increase to 1000 mg BID. Check lactate prior to d/c  -May need low dose basal insulin (came in with glucose 500s, mild BHB elevation)  -Consider DPP4 inhibitor  -Patient can follow up at [address] [place] [address], [address] FLO[address], [place] [address] [phone]  -Patient will need opthalmology and podiatry follow up as outpatient.     Problem/Plan - 2:  ·  Problem: Hypertension.   ·  Plan: - BP goal 130/80  - Manage per primary team.     Problem/Plan - 3:  ·  Problem: Hyperlipidemia.   ·  Plan: - Continue with atorvastatin 80 mg daily   -   - Manage as outpatient       discussed with patient and RN   Contact via Microsoft Teams during business hours  On evenings and weekends (or if no response on [employer] Teams) please call [phone] or page endocrine fellow on call.   For nonurgent matters please email [email or web address]    Please note that this patient may be followed by different provider tomorrow.  Notify endocrine 24 hours prior to discharge for final recommendations    greater than 50% of the encounter was spent counseling and/or coordination of care.  26 minutes spent on total encounter; The necessity of the time spent during the encounter on this date of service was due to development of plan of care/coordination of care/glycemic control through review of labs, blood glucose values and vital signs.

## 2023-01-24 NOTE — PROGRESS NOTE ADULT - ASSESSMENT
78F with dementia, T2DM, recently discharged about 1 week ago for CVA now presenting with poor PO intake, lethargy and hyperglycemia to 500s.     Altered mental status, UTI .   - Since recent stroke,  A&Ox0. More recently, poor po intake, lethargic. CTH no acute intracranial abnormalities. Multifactorial   - Multiple metabolic derangements - dehydration, hyperglycemia, hyperNa, hypoK  - c/w IVF D5 75 CC to run  - Monitor and replete electrolytes PRN   - UA with +leuk esterase, many bacteria. C/w ceftriaxone for now. F/u UCx -- E. coli and probable contamination  - BCx2 w/ NGTD; F/u final   - Monitor fever curve, WBC trend   - Neuro eval consulted; F/u recs   - Aspiration precautions   - Discussed with family, agreed for NGT for feeding, confirm on Xray and start feeds   - DC gonzalez, TOV, flomax. Monitor I and O   - Retention - replace gonzalez, check bladder US     Uncontrolled type 2 diabetes mellitus with hyperglycemia.   - D/c 1 week ago with Metformin 500 mg BID  - Now with glucose 500s, improved to mid 300s with fluids. Also with mild ketosis (AG 17, bicarb 21, BHB 0.7)  - Endo eval appreciated, BHB now WNL  - Lantus 6 units QHs added to regimen & Sliding scale  - Monitor glucose levels closely, avoid hypo/hyperglycemia   -Check Ab given thin body habitus and ketosis (Glutamic Acid Decarboxylase, Zinc Transporter 8, Islet Cell Ab, and IA-2 Ab)  -Continue IVF administration  - Endo follo wup      Cerebrovascular accident (CVA).  - Recent admission w/ Acute L MCA infarct, severe stenosis of the L M2 branch. MR with L MCA Territory infarcts   - CTH on admission showing No acute intracranial hemorrhage. 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 D/w neurology, expected change seen on CT, not new lesions.   - C/w asa, plavix, statin.  - Neuro eval consulted; F/u recs     Febrile  - Cx with E. Faecalis, E. Avium; UCx with E. coli   - 01/19 BCx2 W/ NGTD; F/u final   - F/u repeat BCx2   - ID eval consulted; F/u recs  - ABX as per ID, switched to ertapenem   - Monitor CBC, temp curve, VS and patient closely     Hypernatremia.   - S/P D5 50 CC x 10 hours   - F/u DM management as per above  - Monitor Na level closely, avoid overocrrection   - C/w Free water, monitor levels closely     Pediculosis   - ID eval   - Nix as per protocol   - Contact precautions    HypoK  - Monitor and replete electrolytes PRN     Hyperlipidemia.   -c/w atorvastatin.    Hypertension.    -amlodipine 5mg daily.      PPX  - Lovenox 40 Qd  78F with dementia, T2DM, recently discharged about 1 week ago for CVA now presenting with poor PO intake, lethargy and hyperglycemia to 500s.     Altered mental status, UTI .   - Since recent stroke,  A&Ox0. More recently, poor po intake, lethargic. CTH no acute intracranial abnormalities. Multifactorial   - Multiple metabolic derangements - dehydration, hyperglycemia, hyperNa, hypoK  - c/w IVF D5 75 CC to run  - Monitor and replete electrolytes PRN   - UA with +leuk esterase, many bacteria. C/w ceftriaxone for now. F/u UCx -- E. coli and probable contamination  - BCx2 w/ NGTD; F/u final   - Monitor fever curve, WBC trend   - Neuro eval consulted; F/u recs   - Aspiration precautions   - Discussed with family, agreed for NGT for feeding, confirm on Xray and start feeds   - DC gonzalez, TOV, flomax. Monitor I and O   - Retention - replace gonzalez, check bladder US     Uncontrolled type 2 diabetes mellitus with hyperglycemia.   - D/c 1 week ago with Metformin 500 mg BID  - Now with glucose 500s, improved to mid 300s with fluids. Also with mild ketosis (AG 17, bicarb 21, BHB 0.7)  - Endo eval appreciated, BHB now WNL  - Lantus 6 units QHs added to regimen & Sliding scale  - Monitor glucose levels closely, avoid hypo/hyperglycemia   -Check Ab given thin body habitus and ketosis (Glutamic Acid Decarboxylase, Zinc Transporter 8, Islet Cell Ab, and IA-2 Ab)  -Continue IVF administration  - Endo follo wup      Cerebrovascular accident (CVA).  - Recent admission w/ Acute L MCA infarct, severe stenosis of the L M2 branch. MR with L MCA Territory infarcts   - CTH on admission showing No acute intracranial hemorrhage. 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 D/w neurology, expected change seen on CT, not new lesions.   - C/w asa, plavix, statin.  - Neuro eval consulted; F/u recs     Febrile  - Cx with E. Faecalis, E. Avium; UCx with E. coli   - 01/19 BCx2 W/ NGTD; F/u final   - F/u repeat BCx2   - ID eval consulted; F/u recs  - ABX as per ID, switched to ertapenem   - Monitor CBC, temp curve, VS and patient closely     Anemia  - Drop in hemoglobin   - Checking Iron, B12, Folate, Ferritin, TIBC and Occult  - Monitor H/H closely, monitor for signs/symptoms of bleeding    Hypernatremia.   - S/P D5 50 CC x 10 hours   - F/u DM management as per above  - Monitor Na level closely, avoid overocrrection   - C/w Free water, monitor levels closely     Pediculosis   - ID eval   - Nix as per protocol   - Contact precautions    HypoK  - Monitor and replete electrolytes PRN     Hyperlipidemia.   -c/w atorvastatin.    Hypertension.    -amlodipine 5mg daily.      PPX  - Lovenox 40 Qd

## 2023-01-25 LAB
ANION GAP SERPL CALC-SCNC: 6 MMOL/L — SIGNIFICANT CHANGE UP (ref 5–17)
BUN SERPL-MCNC: 16 MG/DL — SIGNIFICANT CHANGE UP (ref 7–23)
CALCIUM SERPL-MCNC: 8.1 MG/DL — LOW (ref 8.4–10.5)
CHLORIDE SERPL-SCNC: 110 MMOL/L — HIGH (ref 96–108)
CO2 SERPL-SCNC: 28 MMOL/L — SIGNIFICANT CHANGE UP (ref 22–31)
CREAT SERPL-MCNC: 0.7 MG/DL — SIGNIFICANT CHANGE UP (ref 0.5–1.3)
EGFR: 88 ML/MIN/1.73M2 — SIGNIFICANT CHANGE UP
FERRITIN SERPL-MCNC: 172 NG/ML — HIGH (ref 15–150)
FOLATE SERPL-MCNC: 10.7 NG/ML — SIGNIFICANT CHANGE UP
GLUCOSE SERPL-MCNC: 291 MG/DL — HIGH (ref 70–99)
HCT VFR BLD CALC: 30.5 % — LOW (ref 34.5–45)
HGB BLD-MCNC: 9.2 G/DL — LOW (ref 11.5–15.5)
IRON SATN MFR SERPL: 17 UG/DL — LOW (ref 30–160)
IRON SATN MFR SERPL: 9 % — LOW (ref 14–50)
MCHC RBC-ENTMCNC: 26.4 PG — LOW (ref 27–34)
MCHC RBC-ENTMCNC: 30.2 GM/DL — LOW (ref 32–36)
MCV RBC AUTO: 87.6 FL — SIGNIFICANT CHANGE UP (ref 80–100)
NRBC # BLD: 0 /100 WBCS — SIGNIFICANT CHANGE UP (ref 0–0)
PLATELET # BLD AUTO: 297 K/UL — SIGNIFICANT CHANGE UP (ref 150–400)
POTASSIUM SERPL-MCNC: 3.8 MMOL/L — SIGNIFICANT CHANGE UP (ref 3.5–5.3)
POTASSIUM SERPL-SCNC: 3.8 MMOL/L — SIGNIFICANT CHANGE UP (ref 3.5–5.3)
RBC # BLD: 3.48 M/UL — LOW (ref 3.8–5.2)
RBC # FLD: 14.8 % — HIGH (ref 10.3–14.5)
SODIUM SERPL-SCNC: 144 MMOL/L — SIGNIFICANT CHANGE UP (ref 135–145)
TIBC SERPL-MCNC: 187 UG/DL — LOW (ref 220–430)
UIBC SERPL-MCNC: 170 UG/DL — SIGNIFICANT CHANGE UP (ref 110–370)
VIT B12 SERPL-MCNC: 1076 PG/ML — SIGNIFICANT CHANGE UP (ref 232–1245)
WBC # BLD: 6.72 K/UL — SIGNIFICANT CHANGE UP (ref 3.8–10.5)
WBC # FLD AUTO: 6.72 K/UL — SIGNIFICANT CHANGE UP (ref 3.8–10.5)

## 2023-01-25 PROCEDURE — 99232 SBSQ HOSP IP/OBS MODERATE 35: CPT

## 2023-01-25 PROCEDURE — 99223 1ST HOSP IP/OBS HIGH 75: CPT

## 2023-01-25 RX ORDER — SODIUM CHLORIDE 0.65 %
4 AEROSOL, SPRAY (ML) NASAL
Refills: 0 | Status: COMPLETED | OUTPATIENT
Start: 2023-01-25 | End: 2023-01-26

## 2023-01-25 RX ORDER — INSULIN GLARGINE 100 [IU]/ML
9 INJECTION, SOLUTION SUBCUTANEOUS AT BEDTIME
Refills: 0 | Status: DISCONTINUED | OUTPATIENT
Start: 2023-01-25 | End: 2023-01-26

## 2023-01-25 RX ADMIN — INSULIN GLARGINE 9 UNIT(S): 100 INJECTION, SOLUTION SUBCUTANEOUS at 22:00

## 2023-01-25 RX ADMIN — Medication 6: at 12:19

## 2023-01-25 RX ADMIN — Medication 81 MILLIGRAM(S): at 12:15

## 2023-01-25 RX ADMIN — TAMSULOSIN HYDROCHLORIDE 0.4 MILLIGRAM(S): 0.4 CAPSULE ORAL at 23:49

## 2023-01-25 RX ADMIN — Medication 4: at 05:54

## 2023-01-25 RX ADMIN — CLOPIDOGREL BISULFATE 75 MILLIGRAM(S): 75 TABLET, FILM COATED ORAL at 12:15

## 2023-01-25 RX ADMIN — Medication 2: at 17:55

## 2023-01-25 RX ADMIN — ERTAPENEM SODIUM 120 MILLIGRAM(S): 1 INJECTION, POWDER, LYOPHILIZED, FOR SOLUTION INTRAMUSCULAR; INTRAVENOUS at 16:36

## 2023-01-25 RX ADMIN — ATORVASTATIN CALCIUM 80 MILLIGRAM(S): 80 TABLET, FILM COATED ORAL at 23:48

## 2023-01-25 RX ADMIN — ENOXAPARIN SODIUM 40 MILLIGRAM(S): 100 INJECTION SUBCUTANEOUS at 05:54

## 2023-01-25 NOTE — PROGRESS NOTE ADULT - SUBJECTIVE AND OBJECTIVE BOX
[facility] [place] [facility]   MD PUSHPA WICK MD, PA KRISTINE SOLTANPOUR, DO INJUNG KO, NP    TEL:  OFFICE: [phone]    From 5pm-7am Answering Service [phone]    -- RENAL FOLLOW UP NOTE ---Date of Service 01-25-23 @ 16:49    Patient is a 78y old  Female who presents with a chief complaint of AMS, elevated finger sticks (25 Jan 2023 15:04)      Patient seen and examined at bedside.     VITALS:  T(F): 99.4 (01-25-23 @ 12:28), Max: 99.4 (01-25-23 @ 12:28)  HR: 100 (01-25-23 @ 12:28)  BP: 122/68 (01-25-23 @ 12:28)  RR: 18 (01-25-23 @ 12:28)  SpO2: 100% (01-25-23 @ 12:28)  Wt(kg): --    01-24 @ 07:01  -  01-25 @ 07:00  --------------------------------------------------------  IN: 910 mL / OUT: 1100 mL / NET: -190 mL          PHYSICAL EXAM:  Constitutional: NAD  Neck: No JVD  Respiratory: CTAB, no wheezes, rales or rhonchi  Cardiovascular: S1, S2, RRR  Gastrointestinal: BS+, soft, NT/ND  Extremities: No peripheral edema    Hospital Medications:   MEDICATIONS  (STANDING):  aspirin  chewable 81 milliGRAM(s) Oral daily  atorvastatin 80 milliGRAM(s) Oral at bedtime  clopidogrel Tablet 75 milliGRAM(s) Oral daily  dextrose 5%. 1000 milliLiter(s) (100 mL/Hr) IV Continuous <Continuous>  dextrose 50% Injectable 25 Gram(s) IV Push once  dextrose 50% Injectable 12.5 Gram(s) IV Push once  dextrose 50% Injectable 25 Gram(s) IV Push once  dextrose Oral Gel 15 Gram(s) Oral once  enoxaparin Injectable 40 milliGRAM(s) SubCutaneous every 24 hours  ertapenem  IVPB 1000 milliGRAM(s) IV Intermittent every 24 hours  glucagon  Injectable 1 milliGRAM(s) IntraMuscular once  insulin glargine Injectable (LANTUS) 9 Unit(s) SubCutaneous at bedtime  insulin lispro (ADMELOG) corrective regimen sliding scale   SubCutaneous every 6 hours  tamsulosin 0.4 milliGRAM(s) Oral at bedtime      LABS:  01-25    144  |  110<H>  |  16  ----------------------------<  291<H>  3.8   |  28  |  0.70    Ca    8.1<L>      25 Jan 2023 12:19      Creatinine Trend: 0.70 <--, 0.79 <--, 0.81 <--, 0.76 <--, 0.77 <--, 0.74 <--, 0.72 <--, 0.71 <--, 0.74 <--, 0.65 <--, 0.74 <--, 0.92 <--    Iron Total, Serum: 17 ug/dL (01-25 @ 07:18)  Iron - Total Binding Capacity.: 187 ug/dL (01-25 @ 07:18)  Ferritin, Serum: 172 ng/mL (01-25 @ 07:18)                              9.2    6.72  )-----------( 297      ( 25 Jan 2023 12:19 )             30.5     Urine Studies:  Urinalysis - [01-19-23 @ 13:15]      Color DARK BROWN / Appearance Turbid / SG 1.023 / pH 7.0      Gluc 200 mg/dL / Ketone Trace  / Bili Negative / Urobili 6 mg/dL       Blood Large / Protein >600 / Leuk Est Large / Nitrite Negative      RBC 15 / WBC 11-25 / Hyaline  / Gran 4 / Sq Epi  / Non Sq Epi Few / Bacteria Many      Iron 17, TIBC 187, %sat 9      [01-25-23 @ 07:18]  Ferritin 172      [01-25-23 @ 07:18]  Lipid: chol 238, TG 80, HDL 46, LDL --      [01-08-23 @ 02:47]        RADIOLOGY & ADDITIONAL STUDIES:   [facility]   MD PUSHPA WICK MD, PA KRISTINE SOLTANPOUR, DO INJUNG KO, NP    TEL:  OFFICE: [phone]    From 5pm-7am Answering Service [phone]    -- RENAL FOLLOW UP NOTE ---Date of Service 01-25-23 @ 16:49    Patient is a 78y old  Female who presents with a chief complaint of AMS, elevated finger sticks (25 Jan 2023 15:04)      Patient seen and examined at bedside.     VITALS:  T(F): 99.4 (01-25-23 @ 12:28), Max: 99.4 (01-25-23 @ 12:28)  HR: 100 (01-25-23 @ 12:28)  BP: 122/68 (01-25-23 @ 12:28)  RR: 18 (01-25-23 @ 12:28)  SpO2: 100% (01-25-23 @ 12:28)  Wt(kg): --    01-24 @ 07:01  -  01-25 @ 07:00  --------------------------------------------------------  IN: 910 mL / OUT: 1100 mL / NET: -190 mL          PHYSICAL EXAM:  Constitutional: NAD  Neck: No JVD  Respiratory: CTAB, no wheezes, rales or rhonchi  Cardiovascular: S1, S2, RRR  Gastrointestinal: BS+, soft, NT/ND  Extremities: No peripheral edema    Hospital Medications:   MEDICATIONS  (STANDING):  aspirin  chewable 81 milliGRAM(s) Oral daily  atorvastatin 80 milliGRAM(s) Oral at bedtime  clopidogrel Tablet 75 milliGRAM(s) Oral daily  dextrose 5%. 1000 milliLiter(s) (100 mL/Hr) IV Continuous <Continuous>  dextrose 50% Injectable 25 Gram(s) IV Push once  dextrose 50% Injectable 12.5 Gram(s) IV Push once  dextrose 50% Injectable 25 Gram(s) IV Push once  dextrose Oral Gel 15 Gram(s) Oral once  enoxaparin Injectable 40 milliGRAM(s) SubCutaneous every 24 hours  ertapenem  IVPB 1000 milliGRAM(s) IV Intermittent every 24 hours  glucagon  Injectable 1 milliGRAM(s) IntraMuscular once  insulin glargine Injectable (LANTUS) 9 Unit(s) SubCutaneous at bedtime  insulin lispro (ADMELOG) corrective regimen sliding scale   SubCutaneous every 6 hours  tamsulosin 0.4 milliGRAM(s) Oral at bedtime      LABS:  01-25    144  |  110<H>  |  16  ----------------------------<  291<H>  3.8   |  28  |  0.70    Ca    8.1<L>      25 Jan 2023 12:19      Creatinine Trend: 0.70 <--, 0.79 <--, 0.81 <--, 0.76 <--, 0.77 <--, 0.74 <--, 0.72 <--, 0.71 <--, 0.74 <--, 0.65 <--, 0.74 <--, 0.92 <--    Iron Total, Serum: 17 ug/dL (01-25 @ 07:18)  Iron - Total Binding Capacity.: 187 ug/dL (01-25 @ 07:18)  Ferritin, Serum: 172 ng/mL (01-25 @ 07:18)                              9.2    6.72  )-----------( 297      ( 25 Jan 2023 12:19 )             30.5     Urine Studies:  Urinalysis - [01-19-23 @ 13:15]      Color DARK BROWN / Appearance Turbid / SG 1.023 / pH 7.0      Gluc 200 mg/dL / Ketone Trace  / Bili Negative / Urobili 6 mg/dL       Blood Large / Protein >600 / Leuk Est Large / Nitrite Negative      RBC 15 / WBC 11-25 / Hyaline  / Gran 4 / Sq Epi  / Non Sq Epi Few / Bacteria Many      Iron 17, TIBC 187, %sat 9      [01-25-23 @ 07:18]  Ferritin 172      [01-25-23 @ 07:18]  Lipid: chol 238, TG 80, HDL 46, LDL --      [01-08-23 @ 02:47]        RADIOLOGY & ADDITIONAL STUDIES:   [facility] [place] [facility]   MD PUSHPA WICK MD, PA KRISTINE SOLTANPOUR, DO INJUNG KO, NP    TEL:  OFFICE: [phone]    From 5pm-7am Answering Service [phone]    -- RENAL FOLLOW UP NOTE ---Date of Service 01-25-23 @ 16:49    Patient is a 78y old  Female who presents with a chief complaint of AMS, elevated finger sticks (25 Jan 2023 15:04)      Patient seen and examined at bedside.     VITALS:  T(F): 99.4 (01-25-23 @ 12:28), Max: 99.4 (01-25-23 @ 12:28)  HR: 100 (01-25-23 @ 12:28)  BP: 122/68 (01-25-23 @ 12:28)  RR: 18 (01-25-23 @ 12:28)  SpO2: 100% (01-25-23 @ 12:28)  Wt(kg): --    01-24 @ 07:01  -  01-25 @ 07:00  --------------------------------------------------------  IN: 910 mL / OUT: 1100 mL / NET: -190 mL          PHYSICAL EXAM:  Constitutional: NAD  Neck: No JVD  Respiratory: CTAB, no wheezes, rales or rhonchi  Cardiovascular: S1, S2, RRR  Gastrointestinal: BS+, soft, NT/ND  Extremities: No peripheral edema    Hospital Medications:   MEDICATIONS  (STANDING):  aspirin  chewable 81 milliGRAM(s) Oral daily  atorvastatin 80 milliGRAM(s) Oral at bedtime  clopidogrel Tablet 75 milliGRAM(s) Oral daily  dextrose 5%. 1000 milliLiter(s) (100 mL/Hr) IV Continuous <Continuous>  dextrose 50% Injectable 25 Gram(s) IV Push once  dextrose 50% Injectable 12.5 Gram(s) IV Push once  dextrose 50% Injectable 25 Gram(s) IV Push once  dextrose Oral Gel 15 Gram(s) Oral once  enoxaparin Injectable 40 milliGRAM(s) SubCutaneous every 24 hours  ertapenem  IVPB 1000 milliGRAM(s) IV Intermittent every 24 hours  glucagon  Injectable 1 milliGRAM(s) IntraMuscular once  insulin glargine Injectable (LANTUS) 9 Unit(s) SubCutaneous at bedtime  insulin lispro (ADMELOG) corrective regimen sliding scale   SubCutaneous every 6 hours  tamsulosin 0.4 milliGRAM(s) Oral at bedtime      LABS:  01-25    144  |  110<H>  |  16  ----------------------------<  291<H>  3.8   |  28  |  0.70    Ca    8.1<L>      25 Jan 2023 12:19      Creatinine Trend: 0.70 <--, 0.79 <--, 0.81 <--, 0.76 <--, 0.77 <--, 0.74 <--, 0.72 <--, 0.71 <--, 0.74 <--, 0.65 <--, 0.74 <--, 0.92 <--    Iron Total, Serum: 17 ug/dL (01-25 @ 07:18)  Iron - Total Binding Capacity.: 187 ug/dL (01-25 @ 07:18)  Ferritin, Serum: 172 ng/mL (01-25 @ 07:18)                              9.2    6.72  )-----------( 297      ( 25 Jan 2023 12:19 )             30.5     Urine Studies:  Urinalysis - [01-19-23 @ 13:15]      Color DARK BROWN / Appearance Turbid / SG 1.023 / pH 7.0      Gluc 200 mg/dL / Ketone Trace  / Bili Negative / Urobili 6 mg/dL       Blood Large / Protein >600 / Leuk Est Large / Nitrite Negative      RBC 15 / WBC 11-25 / Hyaline  / Gran 4 / Sq Epi  / Non Sq Epi Few / Bacteria Many      Iron 17, TIBC 187, %sat 9      [01-25-23 @ 07:18]  Ferritin 172      [01-25-23 @ 07:18]  Lipid: chol 238, TG 80, HDL 46, LDL --      [01-08-23 @ 02:47]        RADIOLOGY & ADDITIONAL STUDIES:

## 2023-01-25 NOTE — PROGRESS NOTE ADULT - SUBJECTIVE AND OBJECTIVE BOX
Neurology Progress Note    S: Patient seen and examined.  in mittens now and contact     Medication:  MEDICATIONS  (STANDING):  aspirin  chewable 81 milliGRAM(s) Oral daily  atorvastatin 80 milliGRAM(s) Oral at bedtime  clopidogrel Tablet 75 milliGRAM(s) Oral daily  dextrose 5%. 1000 milliLiter(s) (100 mL/Hr) IV Continuous <Continuous>  dextrose 50% Injectable 25 Gram(s) IV Push once  dextrose 50% Injectable 12.5 Gram(s) IV Push once  dextrose 50% Injectable 25 Gram(s) IV Push once  dextrose Oral Gel 15 Gram(s) Oral once  enoxaparin Injectable 40 milliGRAM(s) SubCutaneous every 24 hours  ertapenem  IVPB 1000 milliGRAM(s) IV Intermittent every 24 hours  glucagon  Injectable 1 milliGRAM(s) IntraMuscular once  insulin glargine Injectable (LANTUS) 6 Unit(s) SubCutaneous at bedtime  insulin lispro (ADMELOG) corrective regimen sliding scale   SubCutaneous every 6 hours  tamsulosin 0.4 milliGRAM(s) Oral at bedtime    MEDICATIONS  (PRN):  acetaminophen    Suspension .. 650 milliGRAM(s) Enteral Tube every 6 hours PRN Temp greater or equal to 38C (100.4F)      Vitals:  Vital Signs Last 24 Hrs  T(C): 37.1 (01-25-23 @ 06:01), Max: 37.1 (01-25-23 @ 06:01)  T(F): 98.7 (01-25-23 @ 06:01), Max: 98.7 (01-25-23 @ 06:01)  HR: 99 (01-25-23 @ 06:01) (99 - 100)  BP: 135/71 (01-25-23 @ 06:01) (135/71 - 145/77)  BP(mean): --  RR: 18 (01-25-23 @ 06:01) (18 - 18)  SpO2: 96% (01-25-23 @ 06:01) (96% - 98%)                  General Exam:   General Appearance: Appropriately dressed and in no acute distress       Head: Normocephalic, atraumatic and no dysmorphic features  Ear, Nose, and Throat: Moist mucous membranes  CVS: S1S2+  Resp: No SOB, no wheeze or rhonchi  Abd: soft NTND  Extremities: No edema, no cyanosis  Skin: No bruises, no rashes        NEUROLOGICAL EXAM: Family at bedside who provided translation   Mental status: Eyes open, awake, alert, attempts to produce speech, able to follow some simple commands, able to mimic at times , unable to ID objects  Cranial Nerves: Right facial droop, no nystagmus, blinks to threat B/L  Motor exam: Normal tone, no drift, Right hemiparesis RUE drift, 3-44/5, RLE drift, 3-4/5,, LUE 5/5, LLE 5/5  Sensation: Intact to light touch   Coordination/ Gait: Unable to participate with exam     I personally reviewed the below data/images/labs:  no new labs   CBC Full  -  ( 24 Jan 2023 07:11 )  WBC Count : 7.60 K/uL  RBC Count : 3.45 M/uL  Hemoglobin : 9.3 g/dL  Hematocrit : 30.7 %  Platelet Count - Automated : 251 K/uL  Mean Cell Volume : 89.0 fl  Mean Cell Hemoglobin : 27.0 pg  Mean Cell Hemoglobin Concentration : 30.3 gm/dL  Auto Neutrophil # : x  Auto Lymphocyte # : x  Auto Monocyte # : x  Auto Eosinophil # : x  Auto Basophil # : x  Auto Neutrophil % : x  Auto Lymphocyte % : x  Auto Monocyte % : x  Auto Eosinophil % : x  Auto Basophil % : x      01-24    144  |  110<H>  |  18  ----------------------------<  250<H>  3.6   |  22  |  0.79    Ca    8.2<L>      24 Jan 2023 07:12              `< from: CT Head No Cont (01.19.23 @ 13:45) >    ACC: [id number] EXAM:  CT BRAIN   ORDERED BY: CORINNE MADERA     PROCEDURE DATE:  01/19/2023          INTERPRETATION:  CLINICAL STATEMENT: Altered mental status    TECHNIQUE: CT of the head was performed without IV contrast.  RAPID   artificial intelligence was utilized for the preliminary evaluation of   intracranial hemorrhage.    COMPARISON: MRI brain 1/7/2023.    FINDINGS:  There is mild diffuse parenchymal volume loss. There are areas of low   attenuation in the periventricular white matter likely related to mild   chronic microvascular ischemic changes.    Evolving small infarcts noted in the left corona radiata.   Age-indeterminate infarct also noted in the left temporal and parietal   lobe    There is no acute intracranial hemorrhage, parenchymal mass, mass effect   or midline shift.. There is no hydrocephalus. Partial empty sella noted    The cranium is intact. Calcification noted adjacent to left frontal   artery table. The visualized paranasal sinuses are well-aerated.        IMPRESSION:  No acute intracranial hemorrhage.    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    --- End of Report ---                 Neurology Progress Note    S: Patient seen and examined.  in mittens now and contact     Medication:  MEDICATIONS  (STANDING):  aspirin  chewable 81 milliGRAM(s) Oral daily  atorvastatin 80 milliGRAM(s) Oral at bedtime  clopidogrel Tablet 75 milliGRAM(s) Oral daily  dextrose 5%. 1000 milliLiter(s) (100 mL/Hr) IV Continuous <Continuous>  dextrose 50% Injectable 25 Gram(s) IV Push once  dextrose 50% Injectable 12.5 Gram(s) IV Push once  dextrose 50% Injectable 25 Gram(s) IV Push once  dextrose Oral Gel 15 Gram(s) Oral once  enoxaparin Injectable 40 milliGRAM(s) SubCutaneous every 24 hours  ertapenem  IVPB 1000 milliGRAM(s) IV Intermittent every 24 hours  glucagon  Injectable 1 milliGRAM(s) IntraMuscular once  insulin glargine Injectable (LANTUS) 6 Unit(s) SubCutaneous at bedtime  insulin lispro (ADMELOG) corrective regimen sliding scale   SubCutaneous every 6 hours  tamsulosin 0.4 milliGRAM(s) Oral at bedtime    MEDICATIONS  (PRN):  acetaminophen    Suspension .. 650 milliGRAM(s) Enteral Tube every 6 hours PRN Temp greater or equal to 38C (100.4F)      Vitals:  Vital Signs Last 24 Hrs  T(C): 37.1 (01-25-23 @ 06:01), Max: 37.1 (01-25-23 @ 06:01)  T(F): 98.7 (01-25-23 @ 06:01), Max: 98.7 (01-25-23 @ 06:01)  HR: 99 (01-25-23 @ 06:01) (99 - 100)  BP: 135/71 (01-25-23 @ 06:01) (135/71 - 145/77)  BP(mean): --  RR: 18 (01-25-23 @ 06:01) (18 - 18)  SpO2: 96% (01-25-23 @ 06:01) (96% - 98%)                  General Exam:   General Appearance: Appropriately dressed and in no acute distress       Head: Normocephalic, atraumatic and no dysmorphic features  Ear, Nose, and Throat: Moist mucous membranes  CVS: S1S2+  Resp: No SOB, no wheeze or rhonchi  Abd: soft NTND  Extremities: No edema, no cyanosis  Skin: No bruises, no rashes        NEUROLOGICAL EXAM: Family at bedside who provided translation   Mental status: Eyes open, awake, alert, attempts to produce speech, able to follow some simple commands, able to mimic at times , unable to ID objects  Cranial Nerves: Right facial droop, no nystagmus, blinks to threat B/L  Motor exam: Normal tone, no drift, Right hemiparesis RUE drift, 3-44/5, RLE drift, 3-4/5,, LUE 5/5, LLE 5/5  Sensation: Intact to light touch   Coordination/ Gait: Unable to participate with exam     I personally reviewed the below data/images/labs:  no new labs   CBC Full  -  ( 24 Jan 2023 07:11 )  WBC Count : 7.60 K/uL  RBC Count : 3.45 M/uL  Hemoglobin : 9.3 g/dL  Hematocrit : 30.7 %  Platelet Count - Automated : 251 K/uL  Mean Cell Volume : 89.0 fl  Mean Cell Hemoglobin : 27.0 pg  Mean Cell Hemoglobin Concentration : 30.3 gm/dL  Auto Neutrophil # : x  Auto Lymphocyte # : x  Auto Monocyte # : x  Auto Eosinophil # : x  Auto Basophil # : x  Auto Neutrophil % : x  Auto Lymphocyte % : x  Auto Monocyte % : x  Auto Eosinophil % : x  Auto Basophil % : x      01-24    144  |  110<H>  |  18  ----------------------------<  250<H>  3.6   |  22  |  0.79    Ca    8.2<L>      24 Jan 2023 07:12              `< from: CT Head No Cont (01.19.23 @ 13:45) >    ACC: [id number] EXAM:  CT BRAIN   ORDERED BY: CORINNE MADERA     PROCEDURE DATE:  01/19/2023          INTERPRETATION:  CLINICAL STATEMENT: Altered mental status    TECHNIQUE: CT of the head was performed without IV contrast.  RAPID   artificial intelligence was utilized for the preliminary evaluation of   intracranial hemorrhage.    COMPARISON: MRI brain 1/7/2023.    FINDINGS:  There is mild diffuse parenchymal volume loss. There are areas of low   attenuation in the periventricular white matter likely related to mild   chronic microvascular ischemic changes.    Evolving small infarcts noted in the left corona radiata.   Age-indeterminate infarct also noted in the left temporal and parietal   lobe    There is no acute intracranial hemorrhage, parenchymal mass, mass effect   or midline shift.. There is no hydrocephalus. Partial empty sella noted    The cranium is intact. Calcification noted adjacent to left frontal   artery table. The visualized paranasal sinuses are well-aerated.        IMPRESSION:  No acute intracranial hemorrhage.    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    --- End of Report ---                 Neurology Progress Note    S: Patient seen and examined.  in mittens now and contact     Medication:  MEDICATIONS  (STANDING):  aspirin  chewable 81 milliGRAM(s) Oral daily  atorvastatin 80 milliGRAM(s) Oral at bedtime  clopidogrel Tablet 75 milliGRAM(s) Oral daily  dextrose 5%. 1000 milliLiter(s) (100 mL/Hr) IV Continuous <Continuous>  dextrose 50% Injectable 25 Gram(s) IV Push once  dextrose 50% Injectable 12.5 Gram(s) IV Push once  dextrose 50% Injectable 25 Gram(s) IV Push once  dextrose Oral Gel 15 Gram(s) Oral once  enoxaparin Injectable 40 milliGRAM(s) SubCutaneous every 24 hours  ertapenem  IVPB 1000 milliGRAM(s) IV Intermittent every 24 hours  glucagon  Injectable 1 milliGRAM(s) IntraMuscular once  insulin glargine Injectable (LANTUS) 6 Unit(s) SubCutaneous at bedtime  insulin lispro (ADMELOG) corrective regimen sliding scale   SubCutaneous every 6 hours  tamsulosin 0.4 milliGRAM(s) Oral at bedtime    MEDICATIONS  (PRN):  acetaminophen    Suspension .. 650 milliGRAM(s) Enteral Tube every 6 hours PRN Temp greater or equal to 38C (100.4F)      Vitals:  Vital Signs Last 24 Hrs  T(C): 37.1 (01-25-23 @ 06:01), Max: 37.1 (01-25-23 @ 06:01)  T(F): 98.7 (01-25-23 @ 06:01), Max: 98.7 (01-25-23 @ 06:01)  HR: 99 (01-25-23 @ 06:01) (99 - 100)  BP: 135/71 (01-25-23 @ 06:01) (135/71 - 145/77)  BP(mean): --  RR: 18 (01-25-23 @ 06:01) (18 - 18)  SpO2: 96% (01-25-23 @ 06:01) (96% - 98%)                  General Exam:   General Appearance: Appropriately dressed and in no acute distress       Head: Normocephalic, atraumatic and no dysmorphic features  Ear, Nose, and Throat: Moist mucous membranes  CVS: S1S2+  Resp: No SOB, no wheeze or rhonchi  Abd: soft NTND  Extremities: No edema, no cyanosis  Skin: No bruises, no rashes        NEUROLOGICAL EXAM: Family at bedside who provided translation   Mental status: Eyes open, awake, alert, attempts to produce speech, able to follow some simple commands, able to mimic at times , unable to ID objects  Cranial Nerves: Right facial droop, no nystagmus, blinks to threat B/L  Motor exam: Normal tone, no drift, Right hemiparesis RUE drift, 3-44/5, RLE drift, 3-4/5,, LUE 5/5, LLE 5/5  Sensation: Intact to light touch   Coordination/ Gait: Unable to participate with exam     I personally reviewed the below data/images/labs:  no new labs   CBC Full  -  ( 24 Jan 2023 07:11 )  WBC Count : 7.60 K/uL  RBC Count : 3.45 M/uL  Hemoglobin : 9.3 g/dL  Hematocrit : 30.7 %  Platelet Count - Automated : 251 K/uL  Mean Cell Volume : 89.0 fl  Mean Cell Hemoglobin : 27.0 pg  Mean Cell Hemoglobin Concentration : 30.3 gm/dL  Auto Neutrophil # : x  Auto Lymphocyte # : x  Auto Monocyte # : x  Auto Eosinophil # : x  Auto Basophil # : x  Auto Neutrophil % : x  Auto Lymphocyte % : x  Auto Monocyte % : x  Auto Eosinophil % : x  Auto Basophil % : x      01-24    144  |  110<H>  |  18  ----------------------------<  250<H>  3.6   |  22  |  0.79    Ca    8.2<L>      24 Jan 2023 07:12              `< from: CT Head No Cont (01.19.23 @ 13:45) >    ACC: [id number] EXAM:  CT BRAIN   ORDERED BY: CORINNE MADERA     PROCEDURE DATE:  01/19/2023          INTERPRETATION:  CLINICAL STATEMENT: Altered mental status    TECHNIQUE: CT of the head was performed without IV contrast.  RAPID   artificial intelligence was utilized for the preliminary evaluation of   intracranial hemorrhage.    COMPARISON: MRI brain 1/7/2023.    FINDINGS:  There is mild diffuse parenchymal volume loss. There are areas of low   attenuation in the periventricular white matter likely related to mild   chronic microvascular ischemic changes.    Evolving small infarcts noted in the left corona radiata.   Age-indeterminate infarct also noted in the left temporal and parietal   lobe    There is no acute intracranial hemorrhage, parenchymal mass, mass effect   or midline shift.. There is no hydrocephalus. Partial empty sella noted    The cranium is intact. Calcification noted adjacent to left frontal   artery table. The visualized paranasal sinuses are well-aerated.        IMPRESSION:  No acute intracranial hemorrhage.    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    --- End of Report ---

## 2023-01-25 NOTE — PROGRESS NOTE ADULT - SUBJECTIVE AND OBJECTIVE BOX
Name of Patient : SARAH DISLA  MRN: [id number]  Date of visit: 01-25-23 @ 15:04      Subjective: Patient seen and examined. No new events except as noted.   Lying down in bed. Lethargic   Hair covered  NGT in place    REVIEW OF SYSTEMS:  Unable to obtain ROS 2/2 clinical condition    MEDICATIONS:  MEDICATIONS  (STANDING):  aspirin  chewable 81 milliGRAM(s) Oral daily  atorvastatin 80 milliGRAM(s) Oral at bedtime  clopidogrel Tablet 75 milliGRAM(s) Oral daily  dextrose 5%. 1000 milliLiter(s) (100 mL/Hr) IV Continuous <Continuous>  dextrose 50% Injectable 25 Gram(s) IV Push once  dextrose 50% Injectable 12.5 Gram(s) IV Push once  dextrose 50% Injectable 25 Gram(s) IV Push once  dextrose Oral Gel 15 Gram(s) Oral once  enoxaparin Injectable 40 milliGRAM(s) SubCutaneous every 24 hours  ertapenem  IVPB 1000 milliGRAM(s) IV Intermittent every 24 hours  glucagon  Injectable 1 milliGRAM(s) IntraMuscular once  insulin glargine Injectable (LANTUS) 9 Unit(s) SubCutaneous at bedtime  insulin lispro (ADMELOG) corrective regimen sliding scale   SubCutaneous every 6 hours  tamsulosin 0.4 milliGRAM(s) Oral at bedtime      PHYSICAL EXAM:  T(C): 37.4 (01-25-23 @ 12:28), Max: 37.4 (01-25-23 @ 12:28)  HR: 100 (01-25-23 @ 12:28) (99 - 100)  BP: 122/68 (01-25-23 @ 12:28) (122/68 - 145/77)  RR: 18 (01-25-23 @ 12:28) (18 - 18)  SpO2: 100% (01-25-23 @ 12:28) (96% - 100%)  Wt(kg): --  I&O's Summary    24 Jan 2023 07:01  -  25 Jan 2023 07:00  --------------------------------------------------------  IN: 910 mL / OUT: 1100 mL / NET: -190 mL          Appearance: Lying down in bed, NGT in place   HEENT: Eyes are open; NGT; Hair covered   Lymphatic: No lymphadenopathy   Cardiovascular: Normal S1 S2   Respiratory: normal effort , clear  Gastrointestinal:  Soft, Non-tender  Skin: No rashes grossly   Psychiatry:  Lethargic xx  Musculoskeletal: No edema        01-24-23 @ 07:01  -  01-25-23 @ 07:00  --------------------------------------------------------  IN: 910 mL / OUT: 1100 mL / NET: -190 mL                                  9.2    6.72  )-----------( 297      ( 25 Jan 2023 12:19 )             30.5               01-25    144  |  110<H>  |  16  ----------------------------<  291<H>  3.8   |  28  |  0.70    Ca    8.1<L>      25 Jan 2023 12:19                           Culture - Blood in AM (01.24.23 @ 07:50)   Specimen Source: .Blood Blood-Peripheral   Culture Results:   No growth to date.     Culture - Blood in AM (01.24.23 @ 07:09)   Specimen Source: .Blood Blood-Peripheral   Culture Results:   No growth to date.     Specimen Source: .Abscess Catheter Site   Culture Results:   Moderate Enterococcus faecalis   Moderate Enterococcus avium   Rare Klebsiella aerogenes (Previously Enterobacter)   Moderate Bacteroides thetaiotamcron group "Susceptibilities not performed"   Organism Identification: Enterococcus faecalis   Enterococcus avium   Klebsiella aerogenes (Previously Enterobacter)   Organism: Enterococcus avium   Organism: Klebsiella aerogenes (Previously Enterobacter)   Organism: Enterococcus faecalis     Specimen Source: Clean Catch Clean Catch (Midstream)   Culture Results:   >=3 organisms. Probable collection contamination. including   >100,000 CFU/ml Escherichia coli   Organism Identification: Escherichia coli     Culture - Blood (01.19.23 @ 12:40)   Specimen Source: .Blood Blood-Peripheral   Culture Results:   No Growth Final     Culture - Blood (01.19.23 @ 12:33)   Specimen Source: .Blood Blood-Peripheral   Culture Results:   No Growth Final  Name of Patient : SARAH DISLA  MRN: [id number]  Date of visit: 01-25-23 @ 15:04      Subjective: Patient seen and examined. No new events except as noted.   Lying down in bed. Lethargic   Hair covered  NGT in place    REVIEW OF SYSTEMS:  Unable to obtain ROS 2/2 clinical condition    MEDICATIONS:  MEDICATIONS  (STANDING):  aspirin  chewable 81 milliGRAM(s) Oral daily  atorvastatin 80 milliGRAM(s) Oral at bedtime  clopidogrel Tablet 75 milliGRAM(s) Oral daily  dextrose 5%. 1000 milliLiter(s) (100 mL/Hr) IV Continuous <Continuous>  dextrose 50% Injectable 25 Gram(s) IV Push once  dextrose 50% Injectable 12.5 Gram(s) IV Push once  dextrose 50% Injectable 25 Gram(s) IV Push once  dextrose Oral Gel 15 Gram(s) Oral once  enoxaparin Injectable 40 milliGRAM(s) SubCutaneous every 24 hours  ertapenem  IVPB 1000 milliGRAM(s) IV Intermittent every 24 hours  glucagon  Injectable 1 milliGRAM(s) IntraMuscular once  insulin glargine Injectable (LANTUS) 9 Unit(s) SubCutaneous at bedtime  insulin lispro (ADMELOG) corrective regimen sliding scale   SubCutaneous every 6 hours  tamsulosin 0.4 milliGRAM(s) Oral at bedtime      PHYSICAL EXAM:  T(C): 37.4 (01-25-23 @ 12:28), Max: 37.4 (01-25-23 @ 12:28)  HR: 100 (01-25-23 @ 12:28) (99 - 100)  BP: 122/68 (01-25-23 @ 12:28) (122/68 - 145/77)  RR: 18 (01-25-23 @ 12:28) (18 - 18)  SpO2: 100% (01-25-23 @ 12:28) (96% - 100%)  Wt(kg): --  I&O's Summary    24 Jan 2023 07:01  -  25 Jan 2023 07:00  --------------------------------------------------------  IN: 910 mL / OUT: 1100 mL / NET: -190 mL          Appearance: Lying down in bed, NGT in place   HEENT: Eyes are open; NGT; Hair covered   Lymphatic: No lymphadenopathy   Cardiovascular: Normal S1 S2   Respiratory: normal effort , clear  Gastrointestinal:  Soft, Non-tender  Skin: No rashes grossly   Psychiatry:  Lethargic xx  Musculoskeletal: No edema        01-24-23 @ 07:01  -  01-25-23 @ 07:00  --------------------------------------------------------  IN: 910 mL / OUT: 1100 mL / NET: -190 mL                                  9.2    6.72  )-----------( 297      ( 25 Jan 2023 12:19 )             30.5               01-25    144  |  110<H>  |  16  ----------------------------<  291<H>  3.8   |  28  |  0.70    Ca    8.1<L>      25 Jan 2023 12:19                           Culture - Blood in AM (01.24.23 @ 07:50)   Specimen Source: .Blood Blood-Peripheral   Culture Results:   No growth to date.     Culture - Blood in AM (01.24.23 @ 07:09)   Specimen Source: .Blood Blood-Peripheral   Culture Results:   No growth to date.     Specimen Source: .Abscess Catheter Site   Culture Results:   Moderate Enterococcus faecalis   Moderate Enterococcus avium   Rare Klebsiella aerogenes (Previously Enterobacter)   Moderate Bacteroides thetaiotamcron group "Susceptibilities not performed"   Organism Identification: Enterococcus faecalis   Enterococcus avium   Klebsiella aerogenes (Previously Enterobacter)   Organism: Enterococcus avium   Organism: Klebsiella aerogenes (Previously Enterobacter)   Organism: Enterococcus faecalis     Specimen Source: Clean Catch Clean Catch (Midstream)   Culture Results:   >=3 organisms. Probable collection contamination. including   >100,000 CFU/ml Escherichia coli   Organism Identification: Escherichia coli     Culture - Blood (01.19.23 @ 12:40)   Specimen Source: .Blood Blood-Peripheral   Culture Results:   No Growth Final     Culture - Blood (01.19.23 @ 12:33)   Specimen Source: .Blood Blood-Peripheral   Culture Results:   No Growth Final  Name of Patient : SARAH DISLA  MRN: [id number]  Date of visit: 01-25-23 @ 15:04      Subjective: Patient seen and examined. No new events except as noted.   Lying down in bed. Lethargic   Hair covered  NGT in place    REVIEW OF SYSTEMS:  Unable to obtain ROS 2/2 clinical condition    MEDICATIONS:  MEDICATIONS  (STANDING):  aspirin  chewable 81 milliGRAM(s) Oral daily  atorvastatin 80 milliGRAM(s) Oral at bedtime  clopidogrel Tablet 75 milliGRAM(s) Oral daily  dextrose 5%. 1000 milliLiter(s) (100 mL/Hr) IV Continuous <Continuous>  dextrose 50% Injectable 25 Gram(s) IV Push once  dextrose 50% Injectable 12.5 Gram(s) IV Push once  dextrose 50% Injectable 25 Gram(s) IV Push once  dextrose Oral Gel 15 Gram(s) Oral once  enoxaparin Injectable 40 milliGRAM(s) SubCutaneous every 24 hours  ertapenem  IVPB 1000 milliGRAM(s) IV Intermittent every 24 hours  glucagon  Injectable 1 milliGRAM(s) IntraMuscular once  insulin glargine Injectable (LANTUS) 9 Unit(s) SubCutaneous at bedtime  insulin lispro (ADMELOG) corrective regimen sliding scale   SubCutaneous every 6 hours  tamsulosin 0.4 milliGRAM(s) Oral at bedtime      PHYSICAL EXAM:  T(C): 37.4 (01-25-23 @ 12:28), Max: 37.4 (01-25-23 @ 12:28)  HR: 100 (01-25-23 @ 12:28) (99 - 100)  BP: 122/68 (01-25-23 @ 12:28) (122/68 - 145/77)  RR: 18 (01-25-23 @ 12:28) (18 - 18)  SpO2: 100% (01-25-23 @ 12:28) (96% - 100%)  Wt(kg): --  I&O's Summary    24 Jan 2023 07:01  -  25 Jan 2023 07:00  --------------------------------------------------------  IN: 910 mL / OUT: 1100 mL / NET: -190 mL          Appearance: Lying down in bed, NGT in place   HEENT: Eyes are open; NGT; Hair covered   Lymphatic: No lymphadenopathy   Cardiovascular: Normal S1 S2   Respiratory: normal effort , clear  Gastrointestinal:  Soft, Non-tender  Skin: No rashes grossly   Psychiatry:  Lethargic xx  Musculoskeletal: No edema        01-24-23 @ 07:01  -  01-25-23 @ 07:00  --------------------------------------------------------  IN: 910 mL / OUT: 1100 mL / NET: -190 mL                                  9.2    6.72  )-----------( 297      ( 25 Jan 2023 12:19 )             30.5               01-25    144  |  110<H>  |  16  ----------------------------<  291<H>  3.8   |  28  |  0.70    Ca    8.1<L>      25 Jan 2023 12:19                           Culture - Blood in AM (01.24.23 @ 07:50)   Specimen Source: .Blood Blood-Peripheral   Culture Results:   No growth to date.     Culture - Blood in AM (01.24.23 @ 07:09)   Specimen Source: .Blood Blood-Peripheral   Culture Results:   No growth to date.     Specimen Source: .Abscess Catheter Site   Culture Results:   Moderate Enterococcus faecalis   Moderate Enterococcus avium   Rare Klebsiella aerogenes (Previously Enterobacter)   Moderate Bacteroides thetaiotamcron group "Susceptibilities not performed"   Organism Identification: Enterococcus faecalis   Enterococcus avium   Klebsiella aerogenes (Previously Enterobacter)   Organism: Enterococcus avium   Organism: Klebsiella aerogenes (Previously Enterobacter)   Organism: Enterococcus faecalis     Specimen Source: Clean Catch Clean Catch (Midstream)   Culture Results:   >=3 organisms. Probable collection contamination. including   >100,000 CFU/ml Escherichia coli   Organism Identification: Escherichia coli     Culture - Blood (01.19.23 @ 12:40)   Specimen Source: .Blood Blood-Peripheral   Culture Results:   No Growth Final     Culture - Blood (01.19.23 @ 12:33)   Specimen Source: .Blood Blood-Peripheral   Culture Results:   No Growth Final

## 2023-01-25 NOTE — CHART NOTE - NSCHARTNOTEFT_GEN_A_CORE
NGT clogged  Called by RN for clogged NGT. As per primary RN NGT feeding was infusing on pump well w/o any alarms; pt then was given meds via NGT at which time tube became clogged thereafter with failed attempt by RN to flush. RN was asked to place gingerale and allow to dwell, however this was unsuccessful. Several attempts made by undersigned to flushed how tube is clearly well clogged and neither air nor water is moving through despite several back and forth plunging movements. Attempt made to removed the clogged device however at the end of the tubing it will not exit the nares and was met with much resistance in the nares almost as if sewn it. Pt likely has a built up of dry secretions in her nares preventing exit.   - will order nasal saline spray for moisture then attempt removal NGT clogged  Called by RN for clogged NGT. As per primary RN NGT feeding was infusing on pump well w/o any alarms; pt then was given meds via NGT at which time tube became clogged thereafter with failed attempt by RN to flush. RN was asked to place gingerale and allow to dwell, however this was unsuccessful. Several attempts made by undersigned to flushed how tube is clearly well clogged and neither air nor water is moving through despite several back and forth plunging movements. Attempt made to removed the clogged device however at the end of the tubing it will not exit the nares and was met with much resistance in the nares almost as if sewn it. Pt likely has a built up of dry secretions in her nares preventing exit.   - will order nasal saline spray for moisture then attempt removal  - I suspect ENT will likely need to be consulted to ensure the weighted aspect is not embedded in tissue if moisturing w/ saline is not effective

## 2023-01-25 NOTE — PROGRESS NOTE ADULT - ASSESSMENT
78F with dementia, T2DM, recently discharged about 1 week ago for CVA now presenting with poor PO intake, lethargy and hyperglycemia to 500s.   Of note, patient admitted 1/6 for R facial droop, word finding difficulties. Imaging concerning for acute L MCA infarct. Discharged home on 1/11. Majority of history obtained through family given mental status. Per family, since stroke patient nonverbal/unable to participate in meaningful communications, intermittently follows commands. For the last 3-4 days, patient with poor po intake. Reportedly only eating breakfast. Day of presentation, more lethargic with elevated finger sticks up to the 500s. Patient was evaluated by endocrine team during prior admission with recs to increase metformin to 1000mg BID. However, given poor PO intake, family has been giving 500mg daily.     In ED, afebrile, , 135/84. WBC 15, Na 158, K 3.0, Cl 120, Glu 468, alk phos 136m BHB 0.7. pH 7.36, lactate 2.2. UA: +leuk esterase, many bacteria, WBC 11-25  CXR: Redemonstrated biapical scarring and left upper lobe bronchiectasis, unchanged. No focal consolidation.   CTH No acute intracranial hemorrhage. 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  Given 1.5L NS, ceftriaxone x1, haldol 2.5mg x1 in ED.     A/P    Hypernatremia  2/2 poor oral intake   s/p  D5 50cc/hr x10hr 01/23/23  improved  Avoid overcorrection >6-8meq a day   monitor Na closely     Hypokalemia  stable   monitor K     Hypophosphatemia  Supplement as needed  Daily phosphorus     HTN  optimal   monitor BP closely     Proteinuria  Currently has UTI would repeat and then quantify

## 2023-01-25 NOTE — PROGRESS NOTE ADULT - ASSESSMENT
78F with dementia, T2DM, recently discharged about 1 week ago for CVA now presenting with poor PO intake, lethargy and hyperglycemia to 500s.   Of note, patient admitted 1/6 for R facial droop, word finding difficulties. Imaging concerning for acute L MCA infarct.  Per family, since stroke patient nonverbal/unable to participate in meaningful communications, intermittently follows commands. For the last 3-4 days, patient with poor po intake.    In ED, afebrile, , 135/84. WBC 15, Na 158, K 3.0, Cl 120, Glu 468, alk phos 136m BHB 0.7. pH 7.36, lactate 2.2.   UA: +leuk esterase, many bacteria, WBC 11-25  CXR: Redemonstrated biapical scarring and left upper lobe bronchiectasis, unchanged. No focal consolidation.   CTH No acute intracranial hemorrhage. 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  Given 1.5L NS, ceftriaxone x1, haldol 2.5mg x1 in ED.   last admission: CTA with L MCA severe stenosis; distal L M2 occlusion .  TTE 1/8/23: EF 63% Mild mitral regurgitation ; A1c 9.4     Impression  1) AMS likely 2/2 infection/UTI toxic metabolic encephalopahthy   2) recent stroke - L MCA infarct 2/2 symptomatic L MCA ICAD ; worsening of R HP in setting of infection   - now on contact   - mittens. NGT   - f/u endocrine   - treatment of infection per primary team -->  CTX  - for secondary stroke prevention. DAPT x 3 months followeed by ASA 81mg dialy thereafter.   - hihg dose statin lipitor 40mg dialy   - avoid hypotension given L MCA ICAD   - telemetry  - PT/OT/SS/SLP, OOBC  - check FS, glucose control <180  - GI/DVT ppx  - Thank you for allowing me to participate in the care of this patient. Call with questions.   - spoke with daughter at bedside   Reji Greco MD  Vascular Neurology  Office: [phone]  78F with dementia, T2DM, recently discharged about 1 week ago for CVA now presenting with poor PO intake, lethargy and hyperglycemia to 500s.   Of note, patient admitted 1/6 for R facial droop, word finding difficulties. Imaging concerning for acute L MCA infarct.  Per family, since stroke patient nonverbal/unable to participate in meaningful communications, intermittently follows commands. For the last 3-4 days, patient with poor po intake.    In ED, afebrile, , 135/84. WBC 15, Na 158, K 3.0, Cl 120, Glu 468, alk phos 136m BHB 0.7. pH 7.36, lactate 2.2.   UA: +leuk esterase, many bacteria, WBC 11-25  CXR: Redemonstrated biapical scarring and left upper lobe bronchiectasis, unchanged. No focal consolidation.   CTH No acute intracranial hemorrhage. 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  Given 1.5L NS, ceftriaxone x1, haldol 2.5mg x1 in ED.   last admission: CTA with L MCA severe stenosis; distal L M2 occlusion .  TTE 1/8/23: EF 63% Mild mitral regurgitation ; A1c 9.4     Impression  1) AMS likely 2/2 infection/UTI toxic metabolic encephalopahthy   2) recent stroke - L MCA infarct 2/2 symptomatic L MCA ICAD ; worsening of R HP in setting of infection   - now on contact   - mittens. NGT   - f/u endocrine   - treatment of infection per primary team -->  CTX  - for secondary stroke prevention. DAPT x 3 months followeed by ASA 81mg dialy thereafter.   - hihg dose statin lipitor 40mg dialy   - avoid hypotension given L MCA ICAD   - telemetry  - PT/OT/SS/SLP, OOBC  - check FS, glucose control <180  - GI/DVT ppx  - Thank you for allowing me to participate in the care of this patient. Call with questions.   - spoke with daughter at bedside   Reji Greco MD  Vascular Neurology  Office: [phone]  78F with dementia, T2DM, recently discharged about 1 week ago for CVA now presenting with poor PO intake, lethargy and hyperglycemia to 500s.   Of note, patient admitted 1/6 for R facial droop, word finding difficulties. Imaging concerning for acute L MCA infarct.  Per family, since stroke patient nonverbal/unable to participate in meaningful communications, intermittently follows commands. For the last 3-4 days, patient with poor po intake.    In ED, afebrile, , 135/84. WBC 15, Na 158, K 3.0, Cl 120, Glu 468, alk phos 136m BHB 0.7. pH 7.36, lactate 2.2.   UA: +leuk esterase, many bacteria, WBC 11-25  CXR: Redemonstrated biapical scarring and left upper lobe bronchiectasis, unchanged. No focal consolidation.   CTH No acute intracranial hemorrhage. 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  Given 1.5L NS, ceftriaxone x1, haldol 2.5mg x1 in ED.   last admission: CTA with L MCA severe stenosis; distal L M2 occlusion .  TTE 1/8/23: EF 63% Mild mitral regurgitation ; A1c 9.4     Impression  1) AMS likely 2/2 infection/UTI toxic metabolic encephalopahthy   2) recent stroke - L MCA infarct 2/2 symptomatic L MCA ICAD ; worsening of R HP in setting of infection   - now on contact   - mittens. NGT   - f/u endocrine   - treatment of infection per primary team -->  CTX  - for secondary stroke prevention. DAPT x 3 months followeed by ASA 81mg dialy thereafter.   - hihg dose statin lipitor 40mg dialy   - avoid hypotension given L MCA ICAD   - telemetry  - PT/OT/SS/SLP, OOBC  - check FS, glucose control <180  - GI/DVT ppx  - Thank you for allowing me to participate in the care of this patient. Call with questions.   - spoke with daughter at bedside   Reji Greco MD  Vascular Neurology  Office: [phone]

## 2023-01-25 NOTE — PROGRESS NOTE ADULT - ASSESSMENT
78 f with DM, dementia recently discharged about 1 week ago for L MCA CVA, brought in 1/19 with poor PO intake, lethargy and hyperglycemia to 500s.   afebrile, WBC: 15, Na: 158, glucose  468  blood cx negative, urine cx>3 organisms but with E-coli and pt was on ceftriaxone  CXR: unchanged scarring, no focal consolidation  head CT: evolving stroke  pt had leaking urine around the gonzalez with minimal output from the gonzalez and bladder scan c/w retention, gonzalez was exchanged 1/21 and again on 1/22 similar problem, urology came and could not irrigate the gonzalez so removed gonzalez after which a large mucous plug was pulled out from vaginal/urethral area- unclear as to where it originated. white/gray in color. 14f coude attempted prior to successful insertion of 16f gonzalez, cloudy brown urine drained.  there is a culture sent which is labeled abscess at catheter size which is likely the mucous plug and is growing klebsiella aerogenes, E. feacalis and E. avium  pt spiked to 100.6    initial leukocytosis and AMS with hypernatremia and hyperglycemia with dehydration, not sure if she had UTI at that point, urine cx showed >3 organisms and E-coli and was given ceftriaxone  had difficulty with draining gonzalez and retention, so urology removed the gonzalez and there was a large ?mucous plug pulled out from vaginal/uretral area which is growing klebsiella aerogenes, E. feacalis, E. avium and bacteroides,  pt febrile to 100.6 after but WBC normalized  lice s/p permethrin   * f/u the blood cx, negative for now  * c/w ertapenem for now ( for the klebsiella aerogenes and will also covers the bacteroides I don't believe we have to address the enterococcus, it is a polymicrobial culture and was a ?mucous plug) started 1/24 now day 2, s/p ceftriaxone 1/19-1/24  * monitor CBC/diff and renal function  * if more fever and worsening status will need abd/pelvis CT with contrast  * if persistent lice will need malathion     The above assessment and plan was discussed with the primary team    Minnie Baez MD  contact on teams  After 5pm and on weekends call [phone]       78 f with DM, dementia recently discharged about 1 week ago for L MCA CVA, brought in 1/19 with poor PO intake, lethargy and hyperglycemia to 500s.   afebrile, WBC: 15, Na: 158, glucose  468  blood cx negative, urine cx>3 organisms but with E-coli and pt was on ceftriaxone  CXR: unchanged scarring, no focal consolidation  head CT: evolving stroke  pt had leaking urine around the gonzalez with minimal output from the gonzalez and bladder scan c/w retention, gonzalez was exchanged 1/21 and again on 1/22 similar problem, urology came and could not irrigate the gonzalez so removed gonzalez after which a large mucous plug was pulled out from vaginal/urethral area- unclear as to where it originated. white/gray in color. 14f coude attempted prior to successful insertion of 16f gonzalez, cloudy brown urine drained.  there is a culture sent which is labeled abscess at catheter size which is likely the mucous plug and is growing klebsiella aerogenes, E. feacalis and E. avium  pt spiked to 100.6    initial leukocytosis and AMS with hypernatremia and hyperglycemia with dehydration, not sure if she had UTI at that point, urine cx showed >3 organisms and E-coli and was given ceftriaxone  had difficulty with draining gonzalez and retention, so urology removed the gonzalez and there was a large ?mucous plug pulled out from vaginal/uretral area which is growing klebsiella aerogenes, E. feacalis, E. avium and bacteroides,  pt febrile to 100.6 after but WBC normalized  lice s/p permethrin   * f/u the blood cx, negative for now  * c/w ertapenem for now ( for the klebsiella aerogenes and will also covers the bacteroides I don't believe we have to address the enterococcus, it is a polymicrobial culture and was a ?mucous plug) started 1/24 now day 2, s/p ceftriaxone 1/19-1/24  * monitor CBC/diff and renal function  * if more fever and worsening status will need abd/pelvis CT with contrast  * if persistent lice will need malathion     The above assessment and plan was discussed with the primary team    Minnie Baez MD  contact on teams  After 5pm and on weekends call [phone]       78 f with DM, dementia recently discharged about 1 week ago for L MCA CVA, brought in 1/19 with poor PO intake, lethargy and hyperglycemia to 500s.   afebrile, WBC: 15, Na: 158, glucose  468  blood cx negative, urine cx>3 organisms but with E-coli and pt was on ceftriaxone  CXR: unchanged scarring, no focal consolidation  head CT: evolving stroke  pt had leaking urine around the gonzalez with minimal output from the gonzalez and bladder scan c/w retention, gonzalez was exchanged 1/21 and again on 1/22 similar problem, urology came and could not irrigate the gonzalez so removed gonzalez after which a large mucous plug was pulled out from vaginal/urethral area- unclear as to where it originated. white/gray in color. 14f coude attempted prior to successful insertion of 16f gonzalez, cloudy brown urine drained.  there is a culture sent which is labeled abscess at catheter size which is likely the mucous plug and is growing klebsiella aerogenes, E. feacalis and E. avium  pt spiked to 100.6    initial leukocytosis and AMS with hypernatremia and hyperglycemia with dehydration, not sure if she had UTI at that point, urine cx showed >3 organisms and E-coli and was given ceftriaxone  had difficulty with draining gonzalez and retention, so urology removed the gonzalez and there was a large ?mucous plug pulled out from vaginal/uretral area which is growing klebsiella aerogenes, E. feacalis, E. avium and bacteroides,  pt febrile to 100.6 after but WBC normalized  lice s/p permethrin   * f/u the blood cx, negative for now  * c/w ertapenem for now ( for the klebsiella aerogenes and will also covers the bacteroides I don't believe we have to address the enterococcus, it is a polymicrobial culture and was a ?mucous plug) started 1/24 now day 2, s/p ceftriaxone 1/19-1/24  * monitor CBC/diff and renal function  * if more fever and worsening status will need abd/pelvis CT with contrast  * if persistent lice will need malathion     The above assessment and plan was discussed with the primary team    Minnie Baez MD  contact on teams  After 5pm and on weekends call [phone]

## 2023-01-25 NOTE — PROGRESS NOTE ADULT - SUBJECTIVE AND OBJECTIVE BOX
Diabetes Follow up note:    Chief complaint: T2DM    Interval Hx: BG values 160-mid 200s over past 24 hours. Pt seen at bedside, tolerating Glucerna @ 50 cc/hr.     Review of Systems:  non-verbal/lethargic. Unable to provide ROS.     MEDS:  atorvastatin 80 milliGRAM(s) Oral at bedtime  insulin glargine Injectable (LANTUS) 9 Unit(s) SubCutaneous at bedtime  insulin lispro (ADMELOG) corrective regimen sliding scale   SubCutaneous every 6 hours    ertapenem  IVPB 1000 milliGRAM(s) IV Intermittent every 24 hours    Allergies    Benedryl Allergy Sinus (Hives)  penicillin (Rash)        PE:  General: Female lying in bed. NAD.   Vital Signs Last 24 Hrs  T(C): 37.1 (25 Jan 2023 06:01), Max: 37.1 (25 Jan 2023 06:01)  T(F): 98.7 (25 Jan 2023 06:01), Max: 98.7 (25 Jan 2023 06:01)  HR: 99 (25 Jan 2023 06:01) (99 - 99)  BP: 135/71 (25 Jan 2023 06:01) (135/71 - 145/77)  BP(mean): --  RR: 18 (25 Jan 2023 06:01) (18 - 18)  SpO2: 96% (25 Jan 2023 06:01) (96% - 96%)    Parameters below as of 25 Jan 2023 06:01  Patient On (Oxygen Delivery Method): room air    HEENT: NGT in place.   Abd: Soft, NT,ND,   Extremities: Warm  Neuro: non-verbal     LABS:  POCT Blood Glucose.: 269 mg/dL (01-25-23 @ 12:15)  POCT Blood Glucose.: 242 mg/dL (01-25-23 @ 05:50)  POCT Blood Glucose.: 178 mg/dL (01-24-23 @ 23:46)  POCT Blood Glucose.: 160 mg/dL (01-24-23 @ 18:00)  POCT Blood Glucose.: 202 mg/dL (01-24-23 @ 12:54)  POCT Blood Glucose.: 231 mg/dL (01-24-23 @ 05:57)  POCT Blood Glucose.: 247 mg/dL (01-23-23 @ 23:43)  POCT Blood Glucose.: 186 mg/dL (01-23-23 @ 18:12)  POCT Blood Glucose.: 143 mg/dL (01-23-23 @ 12:17)  POCT Blood Glucose.: 142 mg/dL (01-23-23 @ 05:50)  POCT Blood Glucose.: 132 mg/dL (01-22-23 @ 23:57)  POCT Blood Glucose.: 131 mg/dL (01-22-23 @ 22:30)  POCT Blood Glucose.: 147 mg/dL (01-22-23 @ 17:52)                            9.2    6.72  )-----------( 297      ( 25 Jan 2023 12:19 )             30.5       01-25    144  |  110<H>  |  16  ----------------------------<  291<H>  3.8   |  28  |  0.70    eGFR: 88    Ca    8.1<L>      01-25      A1C with Estimated Average Glucose Result: 9.4 % (01-19-23 @ 13:11)  A1C with Estimated Average Glucose Result: 8.6 % (01-07-23 @ 05:40)          Contact number: alycia [phone] or [phone]       Diabetes Follow up note:    Chief complaint: T2DM    Interval Hx: BG values 160-mid 200s over past 24 hours. Pt seen at bedside, tolerating Glucerna @ 50 cc/hr.     Review of Systems:  non-verbal/lethargic. Unable to provide ROS.     MEDS:  atorvastatin 80 milliGRAM(s) Oral at bedtime  insulin glargine Injectable (LANTUS) 9 Unit(s) SubCutaneous at bedtime  insulin lispro (ADMELOG) corrective regimen sliding scale   SubCutaneous every 6 hours    ertapenem  IVPB 1000 milliGRAM(s) IV Intermittent every 24 hours    Allergies    Benedryl Allergy Sinus (Hives)  penicillin (Rash)        PE:  General: Female lying in bed. NAD.   Vital Signs Last 24 Hrs  T(C): 37.1 (25 Jan 2023 06:01), Max: 37.1 (25 Jan 2023 06:01)  T(F): 98.7 (25 Jan 2023 06:01), Max: 98.7 (25 Jan 2023 06:01)  HR: 99 (25 Jan 2023 06:01) (99 - 99)  BP: 135/71 (25 Jan 2023 06:01) (135/71 - 145/77)  BP(mean): --  RR: 18 (25 Jan 2023 06:01) (18 - 18)  SpO2: 96% (25 Jan 2023 06:01) (96% - 96%)    Parameters below as of 25 Jan 2023 06:01  Patient On (Oxygen Delivery Method): room air    HEENT: NGT in place.   Abd: Soft, NT,ND,   Extremities: Warm  Neuro: non-verbal     LABS:  POCT Blood Glucose.: 269 mg/dL (01-25-23 @ 12:15)  POCT Blood Glucose.: 242 mg/dL (01-25-23 @ 05:50)  POCT Blood Glucose.: 178 mg/dL (01-24-23 @ 23:46)  POCT Blood Glucose.: 160 mg/dL (01-24-23 @ 18:00)  POCT Blood Glucose.: 202 mg/dL (01-24-23 @ 12:54)  POCT Blood Glucose.: 231 mg/dL (01-24-23 @ 05:57)  POCT Blood Glucose.: 247 mg/dL (01-23-23 @ 23:43)  POCT Blood Glucose.: 186 mg/dL (01-23-23 @ 18:12)  POCT Blood Glucose.: 143 mg/dL (01-23-23 @ 12:17)  POCT Blood Glucose.: 142 mg/dL (01-23-23 @ 05:50)  POCT Blood Glucose.: 132 mg/dL (01-22-23 @ 23:57)  POCT Blood Glucose.: 131 mg/dL (01-22-23 @ 22:30)  POCT Blood Glucose.: 147 mg/dL (01-22-23 @ 17:52)                            9.2    6.72  )-----------( 297      ( 25 Jan 2023 12:19 )             30.5       01-25    144  |  110<H>  |  16  ----------------------------<  291<H>  3.8   |  28  |  0.70    eGFR: 88    Ca    8.1<L>      01-25      A1C with Estimated Average Glucose Result: 9.4 % (01-19-23 @ 13:11)  A1C with Estimated Average Glucose Result: 8.6 % (01-07-23 @ 05:40)          Contact number: alycia [phone] or [phone]       Diabetes Follow up note:    Chief complaint: T2DM    Interval Hx: BG values 160-mid 200s over past 24 hours. Pt seen at bedside, tolerating Glucerna @ 50 cc/hr.     Review of Systems:  non-verbal/lethargic. Unable to provide ROS.     MEDS:  atorvastatin 80 milliGRAM(s) Oral at bedtime  insulin glargine Injectable (LANTUS) 9 Unit(s) SubCutaneous at bedtime  insulin lispro (ADMELOG) corrective regimen sliding scale   SubCutaneous every 6 hours    ertapenem  IVPB 1000 milliGRAM(s) IV Intermittent every 24 hours    Allergies    Benedryl Allergy Sinus (Hives)  penicillin (Rash)        PE:  General: Female lying in bed. NAD.   Vital Signs Last 24 Hrs  T(C): 37.1 (25 Jan 2023 06:01), Max: 37.1 (25 Jan 2023 06:01)  T(F): 98.7 (25 Jan 2023 06:01), Max: 98.7 (25 Jan 2023 06:01)  HR: 99 (25 Jan 2023 06:01) (99 - 99)  BP: 135/71 (25 Jan 2023 06:01) (135/71 - 145/77)  BP(mean): --  RR: 18 (25 Jan 2023 06:01) (18 - 18)  SpO2: 96% (25 Jan 2023 06:01) (96% - 96%)    Parameters below as of 25 Jan 2023 06:01  Patient On (Oxygen Delivery Method): room air    HEENT: NGT in place.   Abd: Soft, NT,ND,   Extremities: Warm  Neuro: non-verbal     LABS:  POCT Blood Glucose.: 269 mg/dL (01-25-23 @ 12:15)  POCT Blood Glucose.: 242 mg/dL (01-25-23 @ 05:50)  POCT Blood Glucose.: 178 mg/dL (01-24-23 @ 23:46)  POCT Blood Glucose.: 160 mg/dL (01-24-23 @ 18:00)  POCT Blood Glucose.: 202 mg/dL (01-24-23 @ 12:54)  POCT Blood Glucose.: 231 mg/dL (01-24-23 @ 05:57)  POCT Blood Glucose.: 247 mg/dL (01-23-23 @ 23:43)  POCT Blood Glucose.: 186 mg/dL (01-23-23 @ 18:12)  POCT Blood Glucose.: 143 mg/dL (01-23-23 @ 12:17)  POCT Blood Glucose.: 142 mg/dL (01-23-23 @ 05:50)  POCT Blood Glucose.: 132 mg/dL (01-22-23 @ 23:57)  POCT Blood Glucose.: 131 mg/dL (01-22-23 @ 22:30)  POCT Blood Glucose.: 147 mg/dL (01-22-23 @ 17:52)                            9.2    6.72  )-----------( 297      ( 25 Jan 2023 12:19 )             30.5       01-25    144  |  110<H>  |  16  ----------------------------<  291<H>  3.8   |  28  |  0.70    eGFR: 88    Ca    8.1<L>      01-25      A1C with Estimated Average Glucose Result: 9.4 % (01-19-23 @ 13:11)  A1C with Estimated Average Glucose Result: 8.6 % (01-07-23 @ 05:40)          Contact number: alycia [phone] or [phone]

## 2023-01-25 NOTE — PROGRESS NOTE ADULT - PROBLEM SELECTOR PLAN 1
-test BG Q6h  -Increase Lantus 9 units QHS  -Admelog moderate correction scale Q6h   -Please notify endocrine team of any change to TF regimen.   For d/c:   final recs TBD based on feeding modality/insulin requirements  if dc on PO intake consider   -Metformin 500 mg BID for 1 week then increase to 1000 mg BID. Check lactate prior to d/c  -May need low dose basal insulin (came in with glucose 500s, mild BHB elevation)  -Consider DPP4 inhibitor  -Patient can follow up at [address] [place] [address], [address]LO[address], [place] [address] [phone]  -Patient will need opthalmology and podiatry follow up as outpatient. -test BG Q6h  -Increase Lantus 9 units QHS  -Admelog moderate correction scale Q6h   -Please notify endocrine team of any change to TF regimen.   For d/c:   final recs TBD based on feeding modality/insulin requirements  if dc on PO intake consider   -Metformin 500 mg BID for 1 week then increase to 1000 mg BID. Check lactate prior to d/c  -May need low dose basal insulin (came in with glucose 500s, mild BHB elevation)  -Consider DPP4 inhibitor  -Patient can follow up at [address] [place] [address], [address]LO[address], [place] [address] [phone]  -Patient will need opthalmology and podiatry follow up as outpatient. -test BG Q6h  -Increase Lantus 9 units QHS  -Admelog moderate correction scale Q6h   -Please notify endocrine team of any change to TF regimen.   For d/c:   final recs TBD based on feeding modality/insulin requirements  if dc on PO intake consider   -Metformin 500 mg BID for 1 week then increase to 1000 mg BID. Check lactate prior to d/c  -May need low dose basal insulin (came in with glucose 500s, mild BHB elevation)  -Consider DPP4 inhibitor  -Patient can follow up at [address] [place] [address], [address]LO[address], [place] [address] [phone]  -Patient will need opthalmology and podiatry follow up as outpatient.

## 2023-01-25 NOTE — PROGRESS NOTE ADULT - ASSESSMENT
78 F with recent T2DM diagnosis and CVA (d/c 1 week ago), presenting with glucoses 500s, poor po intake and lethargy. Endocrine consulted for assistance with management of uncontrolled, recently dx DM. Now on continuous tube feeds at goal rate w/BG values above goal. BG goal (100-180mg/dl).

## 2023-01-25 NOTE — PROGRESS NOTE ADULT - PROBLEM SELECTOR PLAN 3
Continue with atorvastatin 80 mg daily   -   - Manage as outpatient     Can be reached via TEAMS/pager: [phone]   office:  [phone] (M-F 9a-5pm)               [phone] (nights/weekends)   [email or web address] password [email or web address] Continue with atorvastatin 80 mg daily   -   - Manage as outpatient     Can be reached via TEAMS/pager: [phone]   office:  [phone] (M-F 9a-5pm)               [phone] (nights/weekends)   [email or web address] password [email or web address] Continue with atorvastatin 80 mg daily   -   - Manage as outpatient     Can be reached via TEAMS/pager: [phone]   office:  [phone] (M-F 9a-5pm)               [phone] (nights/weekends)   [email or web address] password [email or web address]

## 2023-01-25 NOTE — PROGRESS NOTE ADULT - SUBJECTIVE AND OBJECTIVE BOX
Follow Up:  fever, urinary retention, lice    Interval History: no more fever s/p gonzalez yesterday    ROS:    Unobtainable because of mental status      Allergies  Benedryl Allergy Sinus (Hives)  penicillin (Rash)        ANTIMICROBIALS:  ertapenem  IVPB 1000 every 24 hours      OTHER MEDS:  acetaminophen    Suspension .. 650 milliGRAM(s) Enteral Tube every 6 hours PRN  aspirin  chewable 81 milliGRAM(s) Oral daily  atorvastatin 80 milliGRAM(s) Oral at bedtime  clopidogrel Tablet 75 milliGRAM(s) Oral daily  dextrose 5%. 1000 milliLiter(s) IV Continuous <Continuous>  dextrose 50% Injectable 25 Gram(s) IV Push once  dextrose 50% Injectable 12.5 Gram(s) IV Push once  dextrose 50% Injectable 25 Gram(s) IV Push once  dextrose Oral Gel 15 Gram(s) Oral once  enoxaparin Injectable 40 milliGRAM(s) SubCutaneous every 24 hours  glucagon  Injectable 1 milliGRAM(s) IntraMuscular once  insulin glargine Injectable (LANTUS) 9 Unit(s) SubCutaneous at bedtime  insulin lispro (ADMELOG) corrective regimen sliding scale   SubCutaneous every 6 hours  tamsulosin 0.4 milliGRAM(s) Oral at bedtime      Vital Signs Last 24 Hrs  T(C): 37.1 (25 Jan 2023 06:01), Max: 37.1 (25 Jan 2023 06:01)  T(F): 98.7 (25 Jan 2023 06:01), Max: 98.7 (25 Jan 2023 06:01)  HR: 99 (25 Jan 2023 06:01) (99 - 99)  BP: 135/71 (25 Jan 2023 06:01) (135/71 - 145/77)  BP(mean): --  RR: 18 (25 Jan 2023 06:01) (18 - 18)  SpO2: 96% (25 Jan 2023 06:01) (96% - 96%)    Parameters below as of 25 Jan 2023 06:01  Patient On (Oxygen Delivery Method): room air        Physical Exam:  General:    NAD, non toxic  Respiratory:   comfortable on Ra  abd:   soft,  not tender  :     no CVAT, no suprapubic tenderness  Musculoskeletal : no joint swelling  Skin:    no rash  vascular: no phlebitis                          9.2    6.72  )-----------( 297      ( 25 Jan 2023 12:19 )             30.5       01-25    144  |  110<H>  |  16  ----------------------------<  291<H>  3.8   |  28  |  0.70    Ca    8.1<L>      25 Jan 2023 12:19            MICROBIOLOGY:  v  .Blood Blood-Peripheral  01-24-23   No growth to date.  --  --      .Blood Blood-Peripheral  01-24-23   No growth to date.  --  --      .Abscess Catheter Site  01-22-23   Moderate Enterococcus faecalis  Moderate Enterococcus avium  Rare Klebsiella aerogenes (Previously Enterobacter)  Moderate Bacteroides thetaiotamcron group "Susceptibilities not performed"  --  Enterococcus faecalis  Enterococcus avium  Klebsiella aerogenes (Previously Enterobacter)      Clean Catch Clean Catch (Midstream)  01-19-23   >=3 organisms. Probable collection contamination. including  >100,000 CFU/ml Escherichia coli  --  Escherichia coli      .Blood Blood-Peripheral  01-19-23   No Growth Final  --  --      .Blood Blood-Peripheral  01-19-23   No Growth Final  --  --      .Blood Blood-Peripheral  01-10-23   No Growth Final  --  --      .Blood Blood-Venous  01-10-23   No Growth Final  --  --                RADIOLOGY:  Images independently visualized and reviewed personally, findings as below  < from: US Urinary Bladder (01.24.23 @ 15:44) >    IMPRESSION:  Urinary bladder distended with debris. Recommend correlation with   urinalysis.    A Gonzalez catheter was placed immediately prior to this examination, and   therefore pre and post void bladder volumes cannot be accurately obtained.    Irregular wall thickening along the right bladder versus eccentric   debris. Bladder neoplasm is not excluded. Continued follow-up is   recommended. Consider further evaluation with cystoscopy.    < end of copied text >  < from: US Urinary Bladder (01.24.23 @ 15:44) >    IMPRESSION:  Urinary bladder distended with debris. Recommend correlation with   urinalysis.    A Gonzalez catheter was placed immediately prior to this examination, and   therefore pre and post void bladder volumes cannot be accurately obtained.    Irregular wall thickening along the right bladder versus eccentric   debris. Bladder neoplasm is not excluded. Continued follow-up is   recommended. Consider further evaluation with cystoscopy.    < end of copied text >  < from: Xray Chest 1 View- PORTABLE-Urgent (Xray Chest 1 View- PORTABLE-Urgent .) (01.22.23 @ 17:59) >  IMPRESSION:    Enteric tube terminates in the distal stomach.    < end of copied text >  < from: CT Head No Cont (01.19.23 @ 13:45) >  IMPRESSION:  No acute intracranial hemorrhage.    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    < end of copied text >

## 2023-01-25 NOTE — PROGRESS NOTE ADULT - ASSESSMENT
78F with dementia, T2DM, recently discharged about 1 week ago for CVA now presenting with poor PO intake, lethargy and hyperglycemia to 500s.     Altered mental status, UTI .   - Since recent stroke,  A&Ox0. More recently, poor po intake, lethargic. CTH no acute intracranial abnormalities. Multifactorial   - Multiple metabolic derangements - dehydration, hyperglycemia, hyperNa, hypoK  - S/P IVF D5 75 CC to run  - Monitor and replete electrolytes PRN   - UA with +leuk esterase, many bacteria. C/w ceftriaxone for now. F/u UCx -- E. coli and probable contamination  - 01/19 BCx2 w/ NGTD; F/u final   - Monitor fever curve, WBC trend   - Neuro eval consulted; F/u recs   - Aspiration precautions   - Discussed with family, agreed for NGT for feeding   - DC gonzalez, TOV, flomax. Monitor I and O --> Retention - replace gonzalez, check bladder US     Uncontrolled type 2 diabetes mellitus with hyperglycemia.   - D/c 1 week ago with Metformin 500 mg BID  - Now with glucose 500s, improved to mid 300s with fluids. Also with mild ketosis (AG 17, bicarb 21, BHB 0.7)  - Endo eval appreciated, BHB now WNL  - Lantus 6 units QHs added to regimen & Sliding scale  - Monitor glucose levels closely, avoid hypo/hyperglycemia   -Check Ab given thin body habitus and ketosis (Glutamic Acid Decarboxylase, Zinc Transporter 8, Islet Cell Ab, and IA-2 Ab)  -S/PIVF administration  - Endo follo wup; F/u recs      Cerebrovascular accident (CVA).  - Recent admission w/ Acute L MCA infarct, severe stenosis of the L M2 branch. MR with L MCA [place] infarcts   - CTH on admission showing No acute intracranial hemorrhage. 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 D/w neurology, expected change seen on CT, not new lesions.   - C/w asa, plavix, statin.  - Neuro eval consulted; F/u recs   - CHeck MR brain    Febrile  - Cx with E. Faecalis, E. Avium; UCx with E. coli   - 01/19 BCx2 W/ Neg final   - F/u repeat BCx2  --> NGTD; F/u final  - ID eval consulted; F/u recs  - ABX as per ID, switched to ertapenem   - If recurrent fever, check CT as per ID   - Monitor CBC, temp curve, VS and patient closely     Anemia  - Drop in hemoglobin   - Checking Iron, B12, Folate, Ferritin, TIBC and Occult  - Monitor H/H closely, monitor for signs/symptoms of bleeding    Hypernatremia.   - S/P D5 50 CC x 10 hours   - F/u DM management as per above  - Monitor Na level closely, avoid overocrrection   - C/w Free water, monitor levels closely     Pediculosis   - ID eval   - Nix as per protocol   - Contact precautions    HypoK  - Monitor and replete electrolytes PRN     Hyperlipidemia.   -c/w atorvastatin.    Hypertension.    -amlodipine 5mg daily.      PPX  - Lovenox 40 Qd  78F with dementia, T2DM, recently discharged about 1 week ago for CVA now presenting with poor PO intake, lethargy and hyperglycemia to 500s.     Altered mental status, UTI .   - Since recent stroke,  A&Ox0. More recently, poor po intake, lethargic. CTH no acute intracranial abnormalities. Multifactorial   - Multiple metabolic derangements - dehydration, hyperglycemia, hyperNa, hypoK  - S/P IVF D5 75 CC to run  - Monitor and replete electrolytes PRN   - UA with +leuk esterase, many bacteria. C/w ceftriaxone for now. F/u UCx -- E. coli and probable contamination  - 01/19 BCx2 w/ NGTD; F/u final   - Monitor fever curve, WBC trend   - Neuro eval consulted; F/u recs   - Aspiration precautions   - Discussed with family, agreed for NGT for feeding   -Retention - replace gonzalez, check bladder US -- Noted, per attending discussion with family, family to decide on further work up     Uncontrolled type 2 diabetes mellitus with hyperglycemia.   - D/c 1 week ago with Metformin 500 mg BID  - Now with glucose 500s, improved to mid 300s with fluids. Also with mild ketosis (AG 17, bicarb 21, BHB 0.7)  - Endo eval appreciated, BHB now WNL  - Lantus 6 units QHs added to regimen & Sliding scale  - Monitor glucose levels closely, avoid hypo/hyperglycemia   -Check Ab given thin body habitus and ketosis (Glutamic Acid Decarboxylase, Zinc Transporter 8, Islet Cell Ab, and IA-2 Ab)  -S/PIVF administration  - Endo follo wup; F/u recs      Cerebrovascular accident (CVA).  - Recent admission w/ Acute L MCA infarct, severe stenosis of the L M2 branch. MR with L MCA [place] infarcts   - CTH on admission showing No acute intracranial hemorrhage. 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 D/w neurology, expected change seen on CT, not new lesions.   - C/w asa, plavix, statin.  - Neuro eval consulted; F/u recs   - CHeck MR brain -- pending     Febrile  - Cx with E. Faecalis, E. Avium; UCx with E. coli   - 01/19 BCx2 W/ Neg final   - F/u repeat BCx2  --> NGTD; F/u final  - ID eval consulted; F/u recs  - ABX as per ID, switched to ertapenem   - If recurrent fever, check CT as per ID   - Monitor CBC, temp curve, VS and patient closely     Anemia  - Drop in hemoglobin   - Checking Iron, B12, Folate, Ferritin, TIBC and Occult  - Monitor H/H closely, monitor for signs/symptoms of bleeding    Hypernatremia.   - S/P D5 50 CC x 10 hours   - F/u DM management as per above  - Monitor Na level closely, avoid overocrrection   - C/w Free water, monitor levels closely     Pediculosis   - ID eval   - Nix as per protocol   - Contact precautions    HypoK  - Monitor and replete electrolytes PRN     Hyperlipidemia.   -c/w atorvastatin.    Hypertension.    -amlodipine 5mg daily.      PPX  - Lovenox 40 Qd

## 2023-01-26 LAB
ANION GAP SERPL CALC-SCNC: 8 MMOL/L — SIGNIFICANT CHANGE UP (ref 5–17)
BUN SERPL-MCNC: 18 MG/DL — SIGNIFICANT CHANGE UP (ref 7–23)
CALCIUM SERPL-MCNC: 8 MG/DL — LOW (ref 8.4–10.5)
CHLORIDE SERPL-SCNC: 107 MMOL/L — SIGNIFICANT CHANGE UP (ref 96–108)
CO2 SERPL-SCNC: 27 MMOL/L — SIGNIFICANT CHANGE UP (ref 22–31)
CREAT SERPL-MCNC: 0.63 MG/DL — SIGNIFICANT CHANGE UP (ref 0.5–1.3)
EGFR: 90 ML/MIN/1.73M2 — SIGNIFICANT CHANGE UP
GLUCOSE SERPL-MCNC: 131 MG/DL — HIGH (ref 70–99)
HCT VFR BLD CALC: 29.7 % — LOW (ref 34.5–45)
HGB BLD-MCNC: 9.1 G/DL — LOW (ref 11.5–15.5)
MCHC RBC-ENTMCNC: 26.9 PG — LOW (ref 27–34)
MCHC RBC-ENTMCNC: 30.6 GM/DL — LOW (ref 32–36)
MCV RBC AUTO: 87.9 FL — SIGNIFICANT CHANGE UP (ref 80–100)
NRBC # BLD: 0 /100 WBCS — SIGNIFICANT CHANGE UP (ref 0–0)
PLATELET # BLD AUTO: 311 K/UL — SIGNIFICANT CHANGE UP (ref 150–400)
POTASSIUM SERPL-MCNC: 3.7 MMOL/L — SIGNIFICANT CHANGE UP (ref 3.5–5.3)
POTASSIUM SERPL-SCNC: 3.7 MMOL/L — SIGNIFICANT CHANGE UP (ref 3.5–5.3)
RBC # BLD: 3.38 M/UL — LOW (ref 3.8–5.2)
RBC # FLD: 14.9 % — HIGH (ref 10.3–14.5)
SODIUM SERPL-SCNC: 142 MMOL/L — SIGNIFICANT CHANGE UP (ref 135–145)
WBC # BLD: 7.56 K/UL — SIGNIFICANT CHANGE UP (ref 3.8–10.5)
WBC # FLD AUTO: 7.56 K/UL — SIGNIFICANT CHANGE UP (ref 3.8–10.5)

## 2023-01-26 PROCEDURE — 99221 1ST HOSP IP/OBS SF/LOW 40: CPT

## 2023-01-26 PROCEDURE — 71045 X-RAY EXAM CHEST 1 VIEW: CPT | Mod: 26

## 2023-01-26 PROCEDURE — 99232 SBSQ HOSP IP/OBS MODERATE 35: CPT

## 2023-01-26 PROCEDURE — 70551 MRI BRAIN STEM W/O DYE: CPT | Mod: 26

## 2023-01-26 RX ORDER — INSULIN GLARGINE 100 [IU]/ML
11 INJECTION, SOLUTION SUBCUTANEOUS AT BEDTIME
Refills: 0 | Status: DISCONTINUED | OUTPATIENT
Start: 2023-01-26 | End: 2023-01-27

## 2023-01-26 RX ADMIN — Medication 4 SPRAY(S): at 00:12

## 2023-01-26 RX ADMIN — Medication 4: at 12:41

## 2023-01-26 RX ADMIN — Medication 2: at 17:42

## 2023-01-26 RX ADMIN — ERTAPENEM SODIUM 120 MILLIGRAM(S): 1 INJECTION, POWDER, LYOPHILIZED, FOR SOLUTION INTRAMUSCULAR; INTRAVENOUS at 16:40

## 2023-01-26 RX ADMIN — TAMSULOSIN HYDROCHLORIDE 0.4 MILLIGRAM(S): 0.4 CAPSULE ORAL at 22:27

## 2023-01-26 RX ADMIN — INSULIN GLARGINE 11 UNIT(S): 100 INJECTION, SOLUTION SUBCUTANEOUS at 22:26

## 2023-01-26 RX ADMIN — Medication 4 SPRAY(S): at 01:47

## 2023-01-26 RX ADMIN — ATORVASTATIN CALCIUM 80 MILLIGRAM(S): 80 TABLET, FILM COATED ORAL at 22:27

## 2023-01-26 RX ADMIN — Medication 81 MILLIGRAM(S): at 12:23

## 2023-01-26 RX ADMIN — Medication 4: at 00:12

## 2023-01-26 RX ADMIN — CLOPIDOGREL BISULFATE 75 MILLIGRAM(S): 75 TABLET, FILM COATED ORAL at 12:23

## 2023-01-26 RX ADMIN — ENOXAPARIN SODIUM 40 MILLIGRAM(S): 100 INJECTION SUBCUTANEOUS at 06:03

## 2023-01-26 NOTE — SWALLOW BEDSIDE ASSESSMENT ADULT - COMMENTS
H&P/hospital course continued: In ED, afebrile, , 135/84. WBC 15, Na 158, K 3.0, Cl 120, Glu 468, alk phos 136m BHB 0.7. pH 7.36, lactate 2.2. UA: +leuk esterase, many bacteria, WBC 11-25  CXR: Redemonstrated biapical scarring and left upper lobe bronchiectasis, unchanged. No focal consolidation.   CTH No acute intracranial hemorrhage. 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  Given 1.5L NS, ceftriaxone x1, haldol 2.5mg x1 in ED.   AMS is multifactorial: Multiple metabolic derangements - dehydration, hyperglycemia, hyperNa, hypoK. CTH no acute intracranial abnormalities. UA with +leuk esterase, many bacteria. On abx.   Neuro consulted: Impression  1) AMS likely 2/2 infection/UTI toxic metabolic encephalopathy   2) recent stroke - L MCA infarct 2/2 symptomatic L MCA ICAD ; worsening of R HP in setting of infection    CT Head: IMPRESSION: No acute intracranial hemorrhage. Evolving infarcts left corona radiata and left temporal and parietal lobe  CXRAY: IMPRESSION: Redemonstrated biapical scarring and left upper lobe bronchiectasis,  unchanged. No focal consolidation.    Speech-language evaluation completed 1/7/23 on previous admission. +Global aphasia. Bedside swallow evaluation not completed as patient passed dysphagia screen.
H&P/hospital course continued: In ED, afebrile, , 135/84. WBC 15, Na 158, K 3.0, Cl 120, Glu 468, alk phos 136m BHB 0.7. pH 7.36, lactate 2.2. UA: +leuk esterase, many bacteria, WBC 11-25  CXR: Redemonstrated biapical scarring and left upper lobe bronchiectasis, unchanged. No focal consolidation.   CTH No acute intracranial hemorrhage. 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  Given 1.5L NS, ceftriaxone x1, haldol 2.5mg x1 in ED.   AMS is multifactorial: Multiple metabolic derangements - dehydration, hyperglycemia, hyperNa, hypoK. CTH no acute intracranial abnormalities. UA with +leuk esterase, many bacteria. On abx.   Neuro consulted: Impression  1) AMS likely 2/2 infection/UTI toxic metabolic encephalopahthy   2) recent stroke - L MCA infarct 2/2 symptomatic L MCA ICAD ; worsening of R HP in setting of infection    CT Head: IMPRESSION: No acute intracranial hemorrhage. Evolving infarcts left corona radiata and left temporal and parietal lobe  CXRAY: IMPRESSION: Redemonstrated biapical scarring and left upper lobe bronchiectasis,  unchanged. No focal consolidation.    Speech-language evaluation completed 1/7/23 on previous admission. +Global aphasia. Bedside swallow evaluation not completed as patient passed dysphagia screen.

## 2023-01-26 NOTE — PROGRESS NOTE ADULT - SUBJECTIVE AND OBJECTIVE BOX
Neurology Progress Note    S: Patient seen and examined. NGT replaced ON     Medication:  MEDICATIONS  (STANDING):  aspirin  chewable 81 milliGRAM(s) Oral daily  atorvastatin 80 milliGRAM(s) Oral at bedtime  clopidogrel Tablet 75 milliGRAM(s) Oral daily  dextrose 5%. 1000 milliLiter(s) (100 mL/Hr) IV Continuous <Continuous>  dextrose 50% Injectable 25 Gram(s) IV Push once  dextrose 50% Injectable 12.5 Gram(s) IV Push once  dextrose 50% Injectable 25 Gram(s) IV Push once  dextrose Oral Gel 15 Gram(s) Oral once  enoxaparin Injectable 40 milliGRAM(s) SubCutaneous every 24 hours  ertapenem  IVPB 1000 milliGRAM(s) IV Intermittent every 24 hours  glucagon  Injectable 1 milliGRAM(s) IntraMuscular once  insulin glargine Injectable (LANTUS) 9 Unit(s) SubCutaneous at bedtime  insulin lispro (ADMELOG) corrective regimen sliding scale   SubCutaneous every 6 hours  Spinosad 0.9% topical suspension 1 Application(s) 1 Application(s) Topical once  tamsulosin 0.4 milliGRAM(s) Oral at bedtime    MEDICATIONS  (PRN):  acetaminophen    Suspension .. 650 milliGRAM(s) Enteral Tube every 6 hours PRN Temp greater or equal to 38C (100.4F)    Vitals:    Vital Signs Last 24 Hrs  T(C): 37.5 (25 Jan 2023 21:26), Max: 37.5 (25 Jan 2023 21:26)  T(F): 99.5 (25 Jan 2023 21:26), Max: 99.5 (25 Jan 2023 21:26)  HR: 98 (25 Jan 2023 21:26) (98 - 100)  BP: 131/56 (25 Jan 2023 21:26) (122/68 - 131/56)  BP(mean): --  RR: 18 (25 Jan 2023 21:26) (18 - 18)  SpO2: 93% (25 Jan 2023 21:26) (93% - 100%)    Parameters below as of 25 Jan 2023 21:26  Patient On (Oxygen Delivery Method): room air                General Exam:   General Appearance: Appropriately dressed and in no acute distress       Head: Normocephalic, atraumatic and no dysmorphic features  Ear, Nose, and Throat: Moist mucous membranes  CVS: S1S2+  Resp: No SOB, no wheeze or rhonchi  Abd: soft NTND  Extremities: No edema, no cyanosis  Skin: No bruises, no rashes        NEUROLOGICAL EXAM:    Mental status: Eyes open, awake, alert, attempts to produce speech, able to follow some simple commands, able to mimic at times , unable to ID objects  Cranial Nerves: Right facial droop, no nystagmus, blinks to threat B/L  Motor exam: Normal tone, no drift, Right hemiparesis RUE drift, 3-44/5, RLE drift, 3-4/5,, LUE 5/5, LLE 5/5  Sensation: Intact to light touch   Coordination/ Gait: Unable to participate with exam     I personally reviewed the below data/images/labs:  CBC Full  -  ( 26 Jan 2023 07:08 )  WBC Count : 7.56 K/uL  RBC Count : 3.38 M/uL  Hemoglobin : 9.1 g/dL  Hematocrit : 29.7 %  Platelet Count - Automated : 311 K/uL  Mean Cell Volume : 87.9 fl  Mean Cell Hemoglobin : 26.9 pg  Mean Cell Hemoglobin Concentration : 30.6 gm/dL  Auto Neutrophil # : x  Auto Lymphocyte # : x  Auto Monocyte # : x  Auto Eosinophil # : x  Auto Basophil # : x  Auto Neutrophil % : x  Auto Lymphocyte % : x  Auto Monocyte % : x  Auto Eosinophil % : x  Auto Basophil % : x    01-26    142  |  107  |  18  ----------------------------<  131<H>  3.7   |  27  |  0.63    Ca    8.0<L>      26 Jan 2023 07:07          `< from: CT Head No Cont (01.19.23 @ 13:45) >    ACC: [id number] EXAM:  CT BRAIN   ORDERED BY: CORINNE MADERA     PROCEDURE DATE:  01/19/2023          INTERPRETATION:  CLINICAL STATEMENT: Altered mental status    TECHNIQUE: CT of the head was performed without IV contrast.  RAPID   artificial intelligence was utilized for the preliminary evaluation of   intracranial hemorrhage.    COMPARISON: MRI brain 1/7/2023.    FINDINGS:  There is mild diffuse parenchymal volume loss. There are areas of low   attenuation in the periventricular white matter likely related to mild   chronic microvascular ischemic changes.    Evolving small infarcts noted in the left corona radiata.   Age-indeterminate infarct also noted in the left temporal and parietal   lobe    There is no acute intracranial hemorrhage, parenchymal mass, mass effect   or midline shift.. There is no hydrocephalus. Partial empty sella noted    The cranium is intact. Calcification noted adjacent to left frontal   artery table. The visualized paranasal sinuses are well-aerated.        IMPRESSION:  No acute intracranial hemorrhage.    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    --- End of Report ---                 Neurology Progress Note    S: Patient seen and examined. NGT replaced ON     Medication:  MEDICATIONS  (STANDING):  aspirin  chewable 81 milliGRAM(s) Oral daily  atorvastatin 80 milliGRAM(s) Oral at bedtime  clopidogrel Tablet 75 milliGRAM(s) Oral daily  dextrose 5%. 1000 milliLiter(s) (100 mL/Hr) IV Continuous <Continuous>  dextrose 50% Injectable 25 Gram(s) IV Push once  dextrose 50% Injectable 12.5 Gram(s) IV Push once  dextrose 50% Injectable 25 Gram(s) IV Push once  dextrose Oral Gel 15 Gram(s) Oral once  enoxaparin Injectable 40 milliGRAM(s) SubCutaneous every 24 hours  ertapenem  IVPB 1000 milliGRAM(s) IV Intermittent every 24 hours  glucagon  Injectable 1 milliGRAM(s) IntraMuscular once  insulin glargine Injectable (LANTUS) 9 Unit(s) SubCutaneous at bedtime  insulin lispro (ADMELOG) corrective regimen sliding scale   SubCutaneous every 6 hours  Spinosad 0.9% topical suspension 1 Application(s) 1 Application(s) Topical once  tamsulosin 0.4 milliGRAM(s) Oral at bedtime    MEDICATIONS  (PRN):  acetaminophen    Suspension .. 650 milliGRAM(s) Enteral Tube every 6 hours PRN Temp greater or equal to 38C (100.4F)    Vitals:    Vital Signs Last 24 Hrs  T(C): 37.5 (25 Jan 2023 21:26), Max: 37.5 (25 Jan 2023 21:26)  T(F): 99.5 (25 Jan 2023 21:26), Max: 99.5 (25 Jan 2023 21:26)  HR: 98 (25 Jan 2023 21:26) (98 - 100)  BP: 131/56 (25 Jan 2023 21:26) (122/68 - 131/56)  BP(mean): --  RR: 18 (25 Jan 2023 21:26) (18 - 18)  SpO2: 93% (25 Jan 2023 21:26) (93% - 100%)    Parameters below as of 25 Jan 2023 21:26  Patient On (Oxygen Delivery Method): room air                General Exam:   General Appearance: Appropriately dressed and in no acute distress       Head: Normocephalic, atraumatic and no dysmorphic features  Ear, Nose, and Throat: Moist mucous membranes  CVS: S1S2+  Resp: No SOB, no wheeze or rhonchi  Abd: soft NTND  Extremities: No edema, no cyanosis  Skin: No bruises, no rashes        NEUROLOGICAL EXAM:    Mental status: Eyes open, awake, alert, attempts to produce speech, able to follow some simple commands, able to mimic at times , unable to ID objects  Cranial Nerves: Right facial droop, no nystagmus, blinks to threat B/L  Motor exam: Normal tone, no drift, Right hemiparesis RUE drift, 3-44/5, RLE drift, 3-4/5,, LUE 5/5, LLE 5/5  Sensation: Intact to light touch   Coordination/ Gait: Unable to participate with exam     I personally reviewed the below data/images/labs:  CBC Full  -  ( 26 Jan 2023 07:08 )  WBC Count : 7.56 K/uL  RBC Count : 3.38 M/uL  Hemoglobin : 9.1 g/dL  Hematocrit : 29.7 %  Platelet Count - Automated : 311 K/uL  Mean Cell Volume : 87.9 fl  Mean Cell Hemoglobin : 26.9 pg  Mean Cell Hemoglobin Concentration : 30.6 gm/dL  Auto Neutrophil # : x  Auto Lymphocyte # : x  Auto Monocyte # : x  Auto Eosinophil # : x  Auto Basophil # : x  Auto Neutrophil % : x  Auto Lymphocyte % : x  Auto Monocyte % : x  Auto Eosinophil % : x  Auto Basophil % : x    01-26    142  |  107  |  18  ----------------------------<  131<H>  3.7   |  27  |  0.63    Ca    8.0<L>      26 Jan 2023 07:07          `< from: CT Head No Cont (01.19.23 @ 13:45) >    ACC: [id number] EXAM:  CT BRAIN   ORDERED BY: CORINNE MADERA     PROCEDURE DATE:  01/19/2023          INTERPRETATION:  CLINICAL STATEMENT: Altered mental status    TECHNIQUE: CT of the head was performed without IV contrast.  RAPID   artificial intelligence was utilized for the preliminary evaluation of   intracranial hemorrhage.    COMPARISON: MRI brain 1/7/2023.    FINDINGS:  There is mild diffuse parenchymal volume loss. There are areas of low   attenuation in the periventricular white matter likely related to mild   chronic microvascular ischemic changes.    Evolving small infarcts noted in the left corona radiata.   Age-indeterminate infarct also noted in the left temporal and parietal   lobe    There is no acute intracranial hemorrhage, parenchymal mass, mass effect   or midline shift.. There is no hydrocephalus. Partial empty sella noted    The cranium is intact. Calcification noted adjacent to left frontal   artery table. The visualized paranasal sinuses are well-aerated.        IMPRESSION:  No acute intracranial hemorrhage.    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    --- End of Report ---                 Neurology Progress Note    S: Patient seen and examined. NGT replaced ON     Medication:  MEDICATIONS  (STANDING):  aspirin  chewable 81 milliGRAM(s) Oral daily  atorvastatin 80 milliGRAM(s) Oral at bedtime  clopidogrel Tablet 75 milliGRAM(s) Oral daily  dextrose 5%. 1000 milliLiter(s) (100 mL/Hr) IV Continuous <Continuous>  dextrose 50% Injectable 25 Gram(s) IV Push once  dextrose 50% Injectable 12.5 Gram(s) IV Push once  dextrose 50% Injectable 25 Gram(s) IV Push once  dextrose Oral Gel 15 Gram(s) Oral once  enoxaparin Injectable 40 milliGRAM(s) SubCutaneous every 24 hours  ertapenem  IVPB 1000 milliGRAM(s) IV Intermittent every 24 hours  glucagon  Injectable 1 milliGRAM(s) IntraMuscular once  insulin glargine Injectable (LANTUS) 9 Unit(s) SubCutaneous at bedtime  insulin lispro (ADMELOG) corrective regimen sliding scale   SubCutaneous every 6 hours  Spinosad 0.9% topical suspension 1 Application(s) 1 Application(s) Topical once  tamsulosin 0.4 milliGRAM(s) Oral at bedtime    MEDICATIONS  (PRN):  acetaminophen    Suspension .. 650 milliGRAM(s) Enteral Tube every 6 hours PRN Temp greater or equal to 38C (100.4F)    Vitals:    Vital Signs Last 24 Hrs  T(C): 37.5 (25 Jan 2023 21:26), Max: 37.5 (25 Jan 2023 21:26)  T(F): 99.5 (25 Jan 2023 21:26), Max: 99.5 (25 Jan 2023 21:26)  HR: 98 (25 Jan 2023 21:26) (98 - 100)  BP: 131/56 (25 Jan 2023 21:26) (122/68 - 131/56)  BP(mean): --  RR: 18 (25 Jan 2023 21:26) (18 - 18)  SpO2: 93% (25 Jan 2023 21:26) (93% - 100%)    Parameters below as of 25 Jan 2023 21:26  Patient On (Oxygen Delivery Method): room air                General Exam:   General Appearance: Appropriately dressed and in no acute distress       Head: Normocephalic, atraumatic and no dysmorphic features  Ear, Nose, and Throat: Moist mucous membranes  CVS: S1S2+  Resp: No SOB, no wheeze or rhonchi  Abd: soft NTND  Extremities: No edema, no cyanosis  Skin: No bruises, no rashes        NEUROLOGICAL EXAM:    Mental status: Eyes open, awake, alert, attempts to produce speech, able to follow some simple commands, able to mimic at times , unable to ID objects  Cranial Nerves: Right facial droop, no nystagmus, blinks to threat B/L  Motor exam: Normal tone, no drift, Right hemiparesis RUE drift, 3-44/5, RLE drift, 3-4/5,, LUE 5/5, LLE 5/5  Sensation: Intact to light touch   Coordination/ Gait: Unable to participate with exam     I personally reviewed the below data/images/labs:  CBC Full  -  ( 26 Jan 2023 07:08 )  WBC Count : 7.56 K/uL  RBC Count : 3.38 M/uL  Hemoglobin : 9.1 g/dL  Hematocrit : 29.7 %  Platelet Count - Automated : 311 K/uL  Mean Cell Volume : 87.9 fl  Mean Cell Hemoglobin : 26.9 pg  Mean Cell Hemoglobin Concentration : 30.6 gm/dL  Auto Neutrophil # : x  Auto Lymphocyte # : x  Auto Monocyte # : x  Auto Eosinophil # : x  Auto Basophil # : x  Auto Neutrophil % : x  Auto Lymphocyte % : x  Auto Monocyte % : x  Auto Eosinophil % : x  Auto Basophil % : x    01-26    142  |  107  |  18  ----------------------------<  131<H>  3.7   |  27  |  0.63    Ca    8.0<L>      26 Jan 2023 07:07          `< from: CT Head No Cont (01.19.23 @ 13:45) >    ACC: [id number] EXAM:  CT BRAIN   ORDERED BY: CORINNE MADERA     PROCEDURE DATE:  01/19/2023          INTERPRETATION:  CLINICAL STATEMENT: Altered mental status    TECHNIQUE: CT of the head was performed without IV contrast.  RAPID   artificial intelligence was utilized for the preliminary evaluation of   intracranial hemorrhage.    COMPARISON: MRI brain 1/7/2023.    FINDINGS:  There is mild diffuse parenchymal volume loss. There are areas of low   attenuation in the periventricular white matter likely related to mild   chronic microvascular ischemic changes.    Evolving small infarcts noted in the left corona radiata.   Age-indeterminate infarct also noted in the left temporal and parietal   lobe    There is no acute intracranial hemorrhage, parenchymal mass, mass effect   or midline shift.. There is no hydrocephalus. Partial empty sella noted    The cranium is intact. Calcification noted adjacent to left frontal   artery table. The visualized paranasal sinuses are well-aerated.        IMPRESSION:  No acute intracranial hemorrhage.    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    --- End of Report ---

## 2023-01-26 NOTE — PROGRESS NOTE ADULT - ASSESSMENT
78 f with DM, dementia recently discharged about 1 week ago for L MCA CVA, brought in 1/19 with poor PO intake, lethargy and hyperglycemia to 500s.   afebrile, WBC: 15, Na: 158, glucose  468  blood cx negative, urine cx>3 organisms but with E-coli and pt was on ceftriaxone  CXR: unchanged scarring, no focal consolidation  head CT: evolving stroke  pt had leaking urine around the gonzalez with minimal output from the gonzalez and bladder scan c/w retention, gonzalez was exchanged 1/21 and again on 1/22 similar problem, urology came and could not irrigate the gonzalez so removed gonzalez after which a large mucous plug was pulled out from vaginal/urethral area- unclear as to where it originated. white/gray in color. 14f coude attempted prior to successful insertion of 16f gonzalez, cloudy brown urine drained.  there is a culture sent which is labeled abscess at catheter size which is likely the mucous plug and is growing klebsiella aerogenes, E. feacalis and E. avium  pt spiked to 100.6    initial leukocytosis and AMS with hypernatremia and hyperglycemia with dehydration, not sure if she had UTI at that point, urine cx showed >3 organisms and E-coli and was given ceftriaxone  had difficulty with draining gonzalez and retention, so urology removed the gonzalez and there was a large ?mucous plug pulled out from vaginal/uretral area which is growing klebsiella aerogenes, E. feacalis, E. avium and bacteroides,  pt febrile to 100.6 after but WBC normalized  lice s/p permethrin   * f/u the blood cx, negative for now  * c/w ertapenem for now ( for the klebsiella aerogenes and will also covers the bacteroides I don't believe we have to address the enterococcus, it is a polymicrobial culture and was a ?mucous plug) started 1/24 now day 3, s/p ceftriaxone 1/19-1/24  * will complete a 10 day course, but if ready for discharge will switch to PO  * monitor CBC/diff and renal function  * if persistent lice will need malathion     The above assessment and plan was discussed with the primary team    Minnie Baez MD  contact on teams  After 5pm and on weekends call [phone]       78 f with DM, dementia recently discharged about 1 week ago for L MCA CVA, brought in 1/19 with poor PO intake, lethargy and hyperglycemia to 500s.   afebrile, WBC: 15, Na: 158, glucose  468  blood cx negative, urine cx>3 organisms but with E-coli and pt was on ceftriaxone  CXR: unchanged scarring, no focal consolidation  head CT: evolving stroke  pt had leaking urine around the gonzalez with minimal output from the gonzalez and bladder scan c/w retention, gonzalez was exchanged 1/21 and again on 1/22 similar problem, urology came and could not irrigate the gonzalez so removed gonzalez after which a large mucous plug was pulled out from vaginal/urethral area- unclear as to where it originated. white/gray in color. 14f coude attempted prior to successful insertion of 16f gonzalez, cloudy brown urine drained.  there is a culture sent which is labeled abscess at catheter size which is likely the mucous plug and is growing klebsiella aerogenes, E. feacalis and E. avium  pt spiked to 100.6    initial leukocytosis and AMS with hypernatremia and hyperglycemia with dehydration, not sure if she had UTI at that point, urine cx showed >3 organisms and E-coli and was given ceftriaxone  had difficulty with draining gonzalez and retention, so urology removed the gonzalez and there was a large ?mucous plug pulled out from vaginal/uretral area which is growing klebsiella aerogenes, E. feacalis, E. avium and bacteroides,  pt febrile to 100.6 after but WBC normalized  lice s/p permethrin   * f/u the blood cx, negative for now  * c/w ertapenem for now ( for the klebsiella aerogenes and will also covers the bacteroides I don't believe we have to address the enterococcus, it is a polymicrobial culture and was a ?mucous plug) started 1/24 now day 3, s/p ceftriaxone 1/19-1/24  * will complete a 10 day course, but if ready for discharge will switch to PO  * monitor CBC/diff and renal function  * if persistent lice will need malathion     The above assessment and plan was discussed with the primary team    Minnie Baez MD  contact on teams  After 5pm and on weekends call [phone]       78 f with DM, dementia recently discharged about 1 week ago for L MCA CVA, brought in 1/19 with poor PO intake, lethargy and hyperglycemia to 500s.   afebrile, WBC: 15, Na: 158, glucose  468  blood cx negative, urine cx>3 organisms but with E-coli and pt was on ceftriaxone  CXR: unchanged scarring, no focal consolidation  head CT: evolving stroke  pt had leaking urine around the gonzalez with minimal output from the gonzalez and bladder scan c/w retention, gonzalez was exchanged 1/21 and again on 1/22 similar problem, urology came and could not irrigate the gonzalez so removed gonzalez after which a large mucous plug was pulled out from vaginal/urethral area- unclear as to where it originated. white/gray in color. 14f coude attempted prior to successful insertion of 16f gonzalez, cloudy brown urine drained.  there is a culture sent which is labeled abscess at catheter size which is likely the mucous plug and is growing klebsiella aerogenes, E. feacalis and E. avium  pt spiked to 100.6    initial leukocytosis and AMS with hypernatremia and hyperglycemia with dehydration, not sure if she had UTI at that point, urine cx showed >3 organisms and E-coli and was given ceftriaxone  had difficulty with draining gonzalez and retention, so urology removed the gonzalez and there was a large ?mucous plug pulled out from vaginal/uretral area which is growing klebsiella aerogenes, E. feacalis, E. avium and bacteroides,  pt febrile to 100.6 after but WBC normalized  lice s/p permethrin   * f/u the blood cx, negative for now  * c/w ertapenem for now ( for the klebsiella aerogenes and will also covers the bacteroides I don't believe we have to address the enterococcus, it is a polymicrobial culture and was a ?mucous plug) started 1/24 now day 3, s/p ceftriaxone 1/19-1/24  * will complete a 10 day course, but if ready for discharge will switch to PO  * monitor CBC/diff and renal function  * if persistent lice will need malathion     The above assessment and plan was discussed with the primary team    Minnie Baez MD  contact on teams  After 5pm and on weekends call [phone]

## 2023-01-26 NOTE — PROGRESS NOTE ADULT - ASSESSMENT
78F with dementia, T2DM, recently discharged about 1 week ago for CVA now presenting with poor PO intake, lethargy and hyperglycemia to 500s.     Altered mental status, UTI .   - Since recent stroke,  A&Ox0. More recently, poor po intake, lethargic. CTH no acute intracranial abnormalities. Multifactorial   - Multiple metabolic derangements - dehydration, hyperglycemia, hyperNa, hypoK  - S/P IVF D5 75 CC   - Monitor and replete electrolytes PRN   - UA with +leuk esterase, many bacteria. C/w ceftriaxone for now. F/u UCx -- E. coli  - on Ertapenem   - 01/19 BCx2 w/ Neg final   - 01/24 BCx2 w/ NGTD; F/u final   - Monitor fever curve, WBC trend   - Neuro eval consulted; F/u recs   - Aspiration precautions   - Discussed with family, agreed for NGT for feeding; NGT replaced ON 01/26   - Retention - replace gonzalez, check bladder US -- Noted, per attending discussion with family, family to decide on further work up     Uncontrolled type 2 diabetes mellitus with hyperglycemia.   - D/c on Metformin 500 mg BID  - Now with glucose 500s, improved to mid 300s with fluids. Also with mild ketosis (AG 17, bicarb 21, BHB 0.7)  - Endo eval appreciated, BHB now WNL  - Lantus 11 units QHs added to regimen & Sliding scale  - Monitor glucose levels closely, avoid hypo/hyperglycemia   - Check Ab given thin body habitus and ketosis (Glutamic Acid Decarboxylase, Zinc Transporter 8, Islet Cell Ab, and IA-2 Ab)  - S/PIVF administration  - Endo follow up; F/u recs      Cerebrovascular accident (CVA).  - Recent admission w/ Acute L MCA infarct, severe stenosis of the L M2 branch. MR with L MCA [place] infarcts   - CTH on admission showing No acute intracranial hemorrhage. 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 Per neurology, expected change seen on CT, not new lesions.   - C/w asa, plavix, statin.  - Neuro eval consulted; F/u recs   - Check MR brain -- pending     Febrile  - Cx with E. Faecalis, E. Avium; UCx with E. coli   - 01/19 BCx2 W/ Neg final   - 01/24 repeat BCx2  --> NGTD; F/u final  - ID eval consulted; F/u recs  - ABX as per ID, switched to ertapenem , sensitive  - If recurrent fever, check CT as per ID   - Monitor CBC, temp curve, VS and patient closely     Anemia  - Drop in hemoglobin   - Checking Iron, B12, Folate, Ferritin, TIBC -- not consistent with deficiency and Occult -- pending   - Monitor H/H closely, monitor for signs/symptoms of bleeding    Hypernatremia.   - S/P D5 50 CC x 10 hours   - F/u DM management as per above  - Monitor Na level closely, avoid overocrrection   - C/w Free water, monitor levels closely     Pediculosis   - ID eval   - Nix as per protocol   - Tx today 01/26  - Contact precautions    HypoK  - Monitor and replete electrolytes PRN     Hyperlipidemia.   -c/w atorvastatin.    Hypertension.    -amlodipine 5mg daily.      PPX  - Lovenox 40 Qd

## 2023-01-26 NOTE — PROGRESS NOTE ADULT - ASSESSMENT
78F with dementia, T2DM, recently discharged about 1 week ago for CVA now presenting with poor PO intake, lethargy and hyperglycemia to 500s.   Of note, patient admitted 1/6 for R facial droop, word finding difficulties. Imaging concerning for acute L MCA infarct.  Per family, since stroke patient nonverbal/unable to participate in meaningful communications, intermittently follows commands. For the last 3-4 days, patient with poor po intake.    In ED, afebrile, , 135/84. WBC 15, Na 158, K 3.0, Cl 120, Glu 468, alk phos 136m BHB 0.7. pH 7.36, lactate 2.2.   UA: +leuk esterase, many bacteria, WBC 11-25  CXR: Redemonstrated biapical scarring and left upper lobe bronchiectasis, unchanged. No focal consolidation.   CTH No acute intracranial hemorrhage. 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  Given 1.5L NS, ceftriaxone x1, haldol 2.5mg x1 in ED.   last admission: CTA with L MCA severe stenosis; distal L M2 occlusion .  TTE 1/8/23: EF 63% Mild mitral regurgitation ; A1c 9.4     Impression  1) AMS likely 2/2 infection/UTI toxic metabolic encephalopahthy   2) recent stroke - L MCA infarct 2/2 symptomatic L MCA ICAD ; worsening of R HP in setting of infection   - now on contact   - mittens. NGT replaced 1/26 early AM, f/u CXR   - f/u endocrine   - treatment of infection per primary team -->  CTX  - for secondary stroke prevention. DAPT x 3 months followeed by ASA 81mg dialy thereafter.   - hihg dose statin lipitor 40mg dialy   - avoid hypotension given L MCA ICAD   - telemetry  - PT/OT/SS/SLP, OOBC  - check FS, glucose control <180  - GI/DVT ppx  - Thank you for allowing me to participate in the care of this patient. Call with questions.   - spoke with daughter at bedside   Reji Greco MD  Vascular Neurology  Office: [phone]  78F with dementia, T2DM, recently discharged about 1 week ago for CVA now presenting with poor PO intake, lethargy and hyperglycemia to 500s.   Of note, patient admitted 1/6 for R facial droop, word finding difficulties. Imaging concerning for acute L MCA infarct.  Per family, since stroke patient nonverbal/unable to participate in meaningful communications, intermittently follows commands. For the last 3-4 days, patient with poor po intake.    In ED, afebrile, , 135/84. WBC 15, Na 158, K 3.0, Cl 120, Glu 468, alk phos 136m BHB 0.7. pH 7.36, lactate 2.2.   UA: +leuk esterase, many bacteria, WBC 11-25  CXR: Redemonstrated biapical scarring and left upper lobe bronchiectasis, unchanged. No focal consolidation.   CTH No acute intracranial hemorrhage. 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  Given 1.5L NS, ceftriaxone x1, haldol 2.5mg x1 in ED.   last admission: CTA with L MCA severe stenosis; distal L M2 occlusion .  TTE 1/8/23: EF 63% Mild mitral regurgitation ; A1c 9.4     Impression  1) AMS likely 2/2 infection/UTI toxic metabolic encephalopahthy   2) recent stroke - L MCA infarct 2/2 symptomatic L MCA ICAD ; worsening of R HP in setting of infection   - now on contact   - mittens. NGT replaced 1/26 early AM, f/u CXR   - f/u endocrine   - treatment of infection per primary team -->  CTX  - for secondary stroke prevention. DAPT x 3 months followeed by ASA 81mg dialy thereafter.   - hihg dose statin lipitor 40mg dialy   - avoid hypotension given L MCA ICAD   - telemetry  - PT/OT/SS/SLP, OOBC  - check FS, glucose control <180  - GI/DVT ppx  - Thank you for allowing me to participate in the care of this patient. Call with questions.   - spoke with daughter at bedside   Reji Greco MD  Vascular Neurology  Office: [phone]  78F with dementia, T2DM, recently discharged about 1 week ago for CVA now presenting with poor PO intake, lethargy and hyperglycemia to 500s.   Of note, patient admitted 1/6 for R facial droop, word finding difficulties. Imaging concerning for acute L MCA infarct.  Per family, since stroke patient nonverbal/unable to participate in meaningful communications, intermittently follows commands. For the last 3-4 days, patient with poor po intake.    In ED, afebrile, , 135/84. WBC 15, Na 158, K 3.0, Cl 120, Glu 468, alk phos 136m BHB 0.7. pH 7.36, lactate 2.2.   UA: +leuk esterase, many bacteria, WBC 11-25  CXR: Redemonstrated biapical scarring and left upper lobe bronchiectasis, unchanged. No focal consolidation.   CTH No acute intracranial hemorrhage. 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  Given 1.5L NS, ceftriaxone x1, haldol 2.5mg x1 in ED.   last admission: CTA with L MCA severe stenosis; distal L M2 occlusion .  TTE 1/8/23: EF 63% Mild mitral regurgitation ; A1c 9.4     Impression  1) AMS likely 2/2 infection/UTI toxic metabolic encephalopahthy   2) recent stroke - L MCA infarct 2/2 symptomatic L MCA ICAD ; worsening of R HP in setting of infection   - now on contact   - mittens. NGT replaced 1/26 early AM, f/u CXR   - f/u endocrine   - treatment of infection per primary team -->  CTX  - for secondary stroke prevention. DAPT x 3 months followeed by ASA 81mg dialy thereafter.   - hihg dose statin lipitor 40mg dialy   - avoid hypotension given L MCA ICAD   - telemetry  - PT/OT/SS/SLP, OOBC  - check FS, glucose control <180  - GI/DVT ppx  - Thank you for allowing me to participate in the care of this patient. Call with questions.   - spoke with daughter at bedside   Reji Greco MD  Vascular Neurology  Office: [phone]

## 2023-01-26 NOTE — SWALLOW BEDSIDE ASSESSMENT ADULT - ORAL PREPARATORY PHASE
reduced orientation to feeding task, no response to wet spoon on labial border or when placed on anterior portion of tongue, material spilling off tongue with no spontaneous attempts to manipulate despite verbal cues/tactile stimulation

## 2023-01-26 NOTE — CHART NOTE - NSCHARTNOTEFT_GEN_A_CORE
NGT replaced in Left nostrils; pt very active/fiesty during insertion despite having mittens on. Patent nares, no obstruction noted during insertion.  Will order CXR for comfirmation  given right nares was likely congested with hardened debris/mucous or dry blood from mild trauma to mucosa during NGT insertion   will continue nasal spray for couple days to minimize build up of mucous

## 2023-01-26 NOTE — SWALLOW BEDSIDE ASSESSMENT ADULT - SWALLOW EVAL: CRITERIA FOR SKILLED INTERVENTION MET
demonstrates skilled criteria for swallowing intervention
further plan of care TBD upon reassessment/demonstrates skilled criteria for swallowing intervention

## 2023-01-26 NOTE — PROGRESS NOTE ADULT - SUBJECTIVE AND OBJECTIVE BOX
[facility] [place] [facility]   MD PUSHPA WICK MD, PA KRISTINE SOLTANPOUR, DO INJUNG KO, NP    TEL:  OFFICE: [phone]    From 5pm-7am Answering Service [phone]    -- RENAL FOLLOW UP NOTE ---Date of Service 01-26-23 @ 11:05    Patient is a 78y old  Female who presents with a chief complaint of AMS, elevated finger sticks (25 Jan 2023 16:48)      Patient seen and examined at bedside.     VITALS:  T(F): 99.5 (01-25-23 @ 21:26), Max: 99.5 (01-25-23 @ 21:26)  HR: 98 (01-25-23 @ 21:26)  BP: 131/56 (01-25-23 @ 21:26)  RR: 18 (01-25-23 @ 21:26)  SpO2: 93% (01-25-23 @ 21:26)  Wt(kg): --    01-25 @ 07:01  -  01-26 @ 07:00  --------------------------------------------------------  IN: 0 mL / OUT: 900 mL / NET: -900 mL          PHYSICAL EXAM:  Constitutional: NAD  Neck: No JVD  Respiratory: CTAB, no wheezes, rales or rhonchi  Cardiovascular: S1, S2, RRR  Gastrointestinal: BS+, soft, NT/ND  Extremities: No peripheral edema    Hospital Medications:   MEDICATIONS  (STANDING):  aspirin  chewable 81 milliGRAM(s) Oral daily  atorvastatin 80 milliGRAM(s) Oral at bedtime  clopidogrel Tablet 75 milliGRAM(s) Oral daily  dextrose 5%. 1000 milliLiter(s) (100 mL/Hr) IV Continuous <Continuous>  dextrose 50% Injectable 25 Gram(s) IV Push once  dextrose 50% Injectable 12.5 Gram(s) IV Push once  dextrose 50% Injectable 25 Gram(s) IV Push once  dextrose Oral Gel 15 Gram(s) Oral once  enoxaparin Injectable 40 milliGRAM(s) SubCutaneous every 24 hours  ertapenem  IVPB 1000 milliGRAM(s) IV Intermittent every 24 hours  glucagon  Injectable 1 milliGRAM(s) IntraMuscular once  insulin glargine Injectable (LANTUS) 9 Unit(s) SubCutaneous at bedtime  insulin lispro (ADMELOG) corrective regimen sliding scale   SubCutaneous every 6 hours  Spinosad 0.9% topical suspension 1 Application(s) 1 Application(s) Topical once  tamsulosin 0.4 milliGRAM(s) Oral at bedtime      LABS:  01-26    142  |  107  |  18  ----------------------------<  131<H>  3.7   |  27  |  0.63    Ca    8.0<L>      26 Jan 2023 07:07      Creatinine Trend: 0.63 <--, 0.70 <--, 0.79 <--, 0.81 <--, 0.76 <--, 0.77 <--, 0.74 <--, 0.72 <--, 0.71 <--, 0.74 <--, 0.65 <--, 0.74 <--, 0.92 <--                                9.1    7.56  )-----------( 311      ( 26 Jan 2023 07:08 )             29.7     Urine Studies:  Urinalysis - [01-19-23 @ 13:15]      Color DARK BROWN / Appearance Turbid / SG 1.023 / pH 7.0      Gluc 200 mg/dL / Ketone Trace  / Bili Negative / Urobili 6 mg/dL       Blood Large / Protein >600 / Leuk Est Large / Nitrite Negative      RBC 15 / WBC 11-25 / Hyaline  / Gran 4 / Sq Epi  / Non Sq Epi Few / Bacteria Many      Iron 17, TIBC 187, %sat 9      [01-25-23 @ 07:18]  Ferritin 172      [01-25-23 @ 07:18]  Lipid: chol 238, TG 80, HDL 46, LDL --      [01-08-23 @ 02:47]        RADIOLOGY & ADDITIONAL STUDIES:   [facility] [place] [facility]   MD PUSHPA WICK MD, PA KRISTINE SOLTANPOUR, DO INJUNG KO, NP    TEL:  OFFICE: [phone]    From 5pm-7am Answering Service [phone]    -- RENAL FOLLOW UP NOTE ---Date of Service 01-26-23 @ 11:05    Patient is a 78y old  Female who presents with a chief complaint of AMS, elevated finger sticks (25 Jan 2023 16:48)      Patient seen and examined at bedside.     VITALS:  T(F): 99.5 (01-25-23 @ 21:26), Max: 99.5 (01-25-23 @ 21:26)  HR: 98 (01-25-23 @ 21:26)  BP: 131/56 (01-25-23 @ 21:26)  RR: 18 (01-25-23 @ 21:26)  SpO2: 93% (01-25-23 @ 21:26)  Wt(kg): --    01-25 @ 07:01  -  01-26 @ 07:00  --------------------------------------------------------  IN: 0 mL / OUT: 900 mL / NET: -900 mL          PHYSICAL EXAM:  Constitutional: NAD  Neck: No JVD  Respiratory: CTAB, no wheezes, rales or rhonchi  Cardiovascular: S1, S2, RRR  Gastrointestinal: BS+, soft, NT/ND  Extremities: No peripheral edema    Hospital Medications:   MEDICATIONS  (STANDING):  aspirin  chewable 81 milliGRAM(s) Oral daily  atorvastatin 80 milliGRAM(s) Oral at bedtime  clopidogrel Tablet 75 milliGRAM(s) Oral daily  dextrose 5%. 1000 milliLiter(s) (100 mL/Hr) IV Continuous <Continuous>  dextrose 50% Injectable 25 Gram(s) IV Push once  dextrose 50% Injectable 12.5 Gram(s) IV Push once  dextrose 50% Injectable 25 Gram(s) IV Push once  dextrose Oral Gel 15 Gram(s) Oral once  enoxaparin Injectable 40 milliGRAM(s) SubCutaneous every 24 hours  ertapenem  IVPB 1000 milliGRAM(s) IV Intermittent every 24 hours  glucagon  Injectable 1 milliGRAM(s) IntraMuscular once  insulin glargine Injectable (LANTUS) 9 Unit(s) SubCutaneous at bedtime  insulin lispro (ADMELOG) corrective regimen sliding scale   SubCutaneous every 6 hours  Spinosad 0.9% topical suspension 1 Application(s) 1 Application(s) Topical once  tamsulosin 0.4 milliGRAM(s) Oral at bedtime      LABS:  01-26    142  |  107  |  18  ----------------------------<  131<H>  3.7   |  27  |  0.63    Ca    8.0<L>      26 Jan 2023 07:07      Creatinine Trend: 0.63 <--, 0.70 <--, 0.79 <--, 0.81 <--, 0.76 <--, 0.77 <--, 0.74 <--, 0.72 <--, 0.71 <--, 0.74 <--, 0.65 <--, 0.74 <--, 0.92 <--                                9.1    7.56  )-----------( 311      ( 26 Jan 2023 07:08 )             29.7     Urine Studies:  Urinalysis - [01-19-23 @ 13:15]      Color DARK BROWN / Appearance Turbid / SG 1.023 / pH 7.0      Gluc 200 mg/dL / Ketone Trace  / Bili Negative / Urobili 6 mg/dL       Blood Large / Protein >600 / Leuk Est Large / Nitrite Negative      RBC 15 / WBC 11-25 / Hyaline  / Gran 4 / Sq Epi  / Non Sq Epi Few / Bacteria Many      Iron 17, TIBC 187, %sat 9      [01-25-23 @ 07:18]  Ferritin 172      [01-25-23 @ 07:18]  Lipid: chol 238, TG 80, HDL 46, LDL --      [01-08-23 @ 02:47]        RADIOLOGY & ADDITIONAL STUDIES:   [facility] [place] [facility]   MD PUSHPA WICK MD, PA KRISTINE SOLTANPOUR, DO INJUNG KO, NP    TEL:  OFFICE: [phone]    From 5pm-7am Answering Service [phone]    -- RENAL FOLLOW UP NOTE ---Date of Service 01-26-23 @ 11:05    Patient is a 78y old  Female who presents with a chief complaint of AMS, elevated finger sticks (25 Jan 2023 16:48)      Patient seen and examined at bedside.     VITALS:  T(F): 99.5 (01-25-23 @ 21:26), Max: 99.5 (01-25-23 @ 21:26)  HR: 98 (01-25-23 @ 21:26)  BP: 131/56 (01-25-23 @ 21:26)  RR: 18 (01-25-23 @ 21:26)  SpO2: 93% (01-25-23 @ 21:26)  Wt(kg): --    01-25 @ 07:01  -  01-26 @ 07:00  --------------------------------------------------------  IN: 0 mL / OUT: 900 mL / NET: -900 mL          PHYSICAL EXAM:  Constitutional: NAD  Neck: No JVD  Respiratory: CTAB, no wheezes, rales or rhonchi  Cardiovascular: S1, S2, RRR  Gastrointestinal: BS+, soft, NT/ND  Extremities: No peripheral edema    Hospital Medications:   MEDICATIONS  (STANDING):  aspirin  chewable 81 milliGRAM(s) Oral daily  atorvastatin 80 milliGRAM(s) Oral at bedtime  clopidogrel Tablet 75 milliGRAM(s) Oral daily  dextrose 5%. 1000 milliLiter(s) (100 mL/Hr) IV Continuous <Continuous>  dextrose 50% Injectable 25 Gram(s) IV Push once  dextrose 50% Injectable 12.5 Gram(s) IV Push once  dextrose 50% Injectable 25 Gram(s) IV Push once  dextrose Oral Gel 15 Gram(s) Oral once  enoxaparin Injectable 40 milliGRAM(s) SubCutaneous every 24 hours  ertapenem  IVPB 1000 milliGRAM(s) IV Intermittent every 24 hours  glucagon  Injectable 1 milliGRAM(s) IntraMuscular once  insulin glargine Injectable (LANTUS) 9 Unit(s) SubCutaneous at bedtime  insulin lispro (ADMELOG) corrective regimen sliding scale   SubCutaneous every 6 hours  Spinosad 0.9% topical suspension 1 Application(s) 1 Application(s) Topical once  tamsulosin 0.4 milliGRAM(s) Oral at bedtime      LABS:  01-26    142  |  107  |  18  ----------------------------<  131<H>  3.7   |  27  |  0.63    Ca    8.0<L>      26 Jan 2023 07:07      Creatinine Trend: 0.63 <--, 0.70 <--, 0.79 <--, 0.81 <--, 0.76 <--, 0.77 <--, 0.74 <--, 0.72 <--, 0.71 <--, 0.74 <--, 0.65 <--, 0.74 <--, 0.92 <--                                9.1    7.56  )-----------( 311      ( 26 Jan 2023 07:08 )             29.7     Urine Studies:  Urinalysis - [01-19-23 @ 13:15]      Color DARK BROWN / Appearance Turbid / SG 1.023 / pH 7.0      Gluc 200 mg/dL / Ketone Trace  / Bili Negative / Urobili 6 mg/dL       Blood Large / Protein >600 / Leuk Est Large / Nitrite Negative      RBC 15 / WBC 11-25 / Hyaline  / Gran 4 / Sq Epi  / Non Sq Epi Few / Bacteria Many      Iron 17, TIBC 187, %sat 9      [01-25-23 @ 07:18]  Ferritin 172      [01-25-23 @ 07:18]  Lipid: chol 238, TG 80, HDL 46, LDL --      [01-08-23 @ 02:47]        RADIOLOGY & ADDITIONAL STUDIES:

## 2023-01-26 NOTE — SWALLOW BEDSIDE ASSESSMENT ADULT - SWALLOW EVAL: DIAGNOSIS
Pt presents with an insufficient mentation to support PO feeding at this time. Despite providing multi-modal stimulation, pt lethargic, opens eyes briefly but does not establish eye contact, and is unable to achieve adequate alertness for exam. Pt will need reassessment of swallow once mentation improves to determine safest oral diet consistencies.
Pt continues to present with insufficient mentation to support PO feeding. Pt presents with poor orientation to feeding task and is not a candidate for oral feeding at this time.

## 2023-01-26 NOTE — PROGRESS NOTE ADULT - SUBJECTIVE AND OBJECTIVE BOX
seen earlier today     Chief Complaint: Type 2 Diabetes Mellitus     INTERVAL HX:  NGT replaced over night with BG above goal at noon and MN on 24H tf , somnlent on exam woke up breifly to verbal stimuli and attempted to pull NGT however did not pull notified RN       Review of Systems:  unable    Allergies    Benedryl Allergy Sinus (Hives)  penicillin (Rash)    Intolerances      MEDICATIONS  (STANDING):  aspirin  chewable 81 milliGRAM(s) Oral daily  atorvastatin 80 milliGRAM(s) Oral at bedtime  clopidogrel Tablet 75 milliGRAM(s) Oral daily  dextrose 5%. 1000 milliLiter(s) (100 mL/Hr) IV Continuous <Continuous>  dextrose 50% Injectable 25 Gram(s) IV Push once  dextrose 50% Injectable 12.5 Gram(s) IV Push once  dextrose 50% Injectable 25 Gram(s) IV Push once  dextrose Oral Gel 15 Gram(s) Oral once  enoxaparin Injectable 40 milliGRAM(s) SubCutaneous every 24 hours  ertapenem  IVPB 1000 milliGRAM(s) IV Intermittent every 24 hours  glucagon  Injectable 1 milliGRAM(s) IntraMuscular once  insulin glargine Injectable (LANTUS) 9 Unit(s) SubCutaneous at bedtime  insulin lispro (ADMELOG) corrective regimen sliding scale   SubCutaneous every 6 hours  Spinosad 0.9% topical suspension 1 Application(s) 1 Application(s) Topical once  tamsulosin 0.4 milliGRAM(s) Oral at bedtime        atorvastatin   80 milliGRAM(s) Oral (01-25-23 @ 23:48)    insulin glargine Injectable (LANTUS)   9 Unit(s) SubCutaneous (01-25-23 @ 22:00)    insulin lispro (ADMELOG) corrective regimen sliding scale   4 Unit(s) SubCutaneous (01-26-23 @ 12:41)   4 Unit(s) SubCutaneous (01-26-23 @ 00:12)   2 Unit(s) SubCutaneous (01-25-23 @ 17:55)        PHYSICAL EXAM:  VITALS: T(C): 36.4 (01-26-23 @ 12:27)  T(F): 97.5 (01-26-23 @ 12:27), Max: 99.5 (01-25-23 @ 21:26)  HR: 100 (01-26-23 @ 12:27) (98 - 100)  BP: 130/52 (01-26-23 @ 12:27) (130/52 - 131/56)  RR:  (18 - 18)  SpO2:  (93% - 96%)  Wt(kg): --  GENERAL: female laying in bed in NAD , nasal feeding tube with glucerna running at goal ; bilateral mittens on  Respiratory: Respirations unlabored   Extremities: Warm, no edema  NEURO: somnolent     LABS:  POCT Blood Glucose.: 224 mg/dL (01-26-23 @ 12:21)  POCT Blood Glucose.: 129 mg/dL (01-26-23 @ 06:02)  POCT Blood Glucose.: 202 mg/dL (01-26-23 @ 00:07)  POCT Blood Glucose.: 222 mg/dL (01-25-23 @ 22:30)  POCT Blood Glucose.: 171 mg/dL (01-25-23 @ 17:46)  POCT Blood Glucose.: 269 mg/dL (01-25-23 @ 12:15)  POCT Blood Glucose.: 242 mg/dL (01-25-23 @ 05:50)  POCT Blood Glucose.: 178 mg/dL (01-24-23 @ 23:46)  POCT Blood Glucose.: 160 mg/dL (01-24-23 @ 18:00)  POCT Blood Glucose.: 202 mg/dL (01-24-23 @ 12:54)  POCT Blood Glucose.: 231 mg/dL (01-24-23 @ 05:57)  POCT Blood Glucose.: 247 mg/dL (01-23-23 @ 23:43)  POCT Blood Glucose.: 186 mg/dL (01-23-23 @ 18:12)                          9.1    7.56  )-----------( 311      ( 26 Jan 2023 07:08 )             29.7     01-26    142  |  107  |  18  ----------------------------<  131<H>  3.7   |  27  |  0.63    Ca    8.0<L>      26 Jan 2023 07:07      eGFR: 90 mL/min/1.73m2 (26 Jan 2023 07:07)    01-08 Chol 238<H> Direct LDL -- LDL calculated 176<H> HDL 46<L> Trig 80  Thyroid Function Tests:      A1C with Estimated Average Glucose Result: 9.4 % (01-19-23 @ 13:11)  A1C with Estimated Average Glucose Result: 8.6 % (01-07-23 @ 05:40)    Estimated Average Glucose: 223 mg/dL (01-19-23 @ 13:11)  Estimated Average Glucose: 200 mg/dL (01-07-23 @ 05:40)        Diet, NPO with Tube Feed:   Tube Feeding Modality: Nasogastric  Glucerna 1.2 Wallace (GLUCERNARTH)  Total Volume for 24 Hours (mL): 1200  Continuous  Starting Tube Feed Rate mL per Hour: 20  Increase Tube Feed Rate by (mL): 10     Every 12 hours  Until Goal Tube Feed Rate (mL per Hour): 50  Tube Feed Duration (in Hours): 24  Tube Feed Start Time: 00:00 (01-23-23 @ 11:07) [Active]

## 2023-01-26 NOTE — SWALLOW BEDSIDE ASSESSMENT ADULT - SLP GENERAL OBSERVATIONS
Pt found in bed, eyes open, on room air, +NGT. Non-verbal, occasional phonation noted, not following directions, poor eye contact and flat affect. Family at bedside providing translation in [facility], patient localizes to sound but does not follow commands or respond verbally. Pt found in bed, eyes open, on room air, +NGT. Non-verbal, occasional phonation noted, not following directions, poor eye contact and flat affect. Family at bedside providing translation in [facility], patient localizes to sound but does not follow commands or respond verbally. Pt found in bed, eyes open, on room air, +NGT. Non-verbal, occasional phonation noted, not following directions, poor eye contact and flat affect. Family at bedside providing translation in [facility], patient localizes to sound but does not follow commands or respond verbally.

## 2023-01-26 NOTE — PROGRESS NOTE ADULT - SUBJECTIVE AND OBJECTIVE BOX
Follow Up:  fever, urinary retention, lice    Interval History: no more fever, plan for MRI    ROS:    Unobtainable because of mental status        Allergies  Benedryl Allergy Sinus (Hives)  penicillin (Rash)        ANTIMICROBIALS:  ertapenem  IVPB 1000 every 24 hours      OTHER MEDS:  acetaminophen    Suspension .. 650 milliGRAM(s) Enteral Tube every 6 hours PRN  aspirin  chewable 81 milliGRAM(s) Oral daily  atorvastatin 80 milliGRAM(s) Oral at bedtime  clopidogrel Tablet 75 milliGRAM(s) Oral daily  dextrose 5%. 1000 milliLiter(s) IV Continuous <Continuous>  dextrose 50% Injectable 25 Gram(s) IV Push once  dextrose 50% Injectable 12.5 Gram(s) IV Push once  dextrose 50% Injectable 25 Gram(s) IV Push once  dextrose Oral Gel 15 Gram(s) Oral once  enoxaparin Injectable 40 milliGRAM(s) SubCutaneous every 24 hours  glucagon  Injectable 1 milliGRAM(s) IntraMuscular once  insulin glargine Injectable (LANTUS) 11 Unit(s) SubCutaneous at bedtime  insulin lispro (ADMELOG) corrective regimen sliding scale   SubCutaneous every 6 hours  tamsulosin 0.4 milliGRAM(s) Oral at bedtime      Vital Signs Last 24 Hrs  T(C): 36.4 (26 Jan 2023 12:27), Max: 37.5 (25 Jan 2023 21:26)  T(F): 97.5 (26 Jan 2023 12:27), Max: 99.5 (25 Jan 2023 21:26)  HR: 100 (26 Jan 2023 12:27) (98 - 100)  BP: 130/52 (26 Jan 2023 12:27) (130/52 - 131/56)  BP(mean): --  RR: 18 (26 Jan 2023 12:27) (18 - 18)  SpO2: 96% (26 Jan 2023 12:27) (93% - 96%)    Parameters below as of 26 Jan 2023 12:27  Patient On (Oxygen Delivery Method): room air        Physical Exam:  General:    NAD, non toxic  Respiratory:   comfortable on Ra  abd:   soft,  not tender  :     no CVAT, no suprapubic tenderness  Musculoskeletal : no joint swelling  Skin:    no rash  vascular: no phlebitis                          9.1    7.56  )-----------( 311      ( 26 Jan 2023 07:08 )             29.7       01-26    142  |  107  |  18  ----------------------------<  131<H>  3.7   |  27  |  0.63    Ca    8.0<L>      26 Jan 2023 07:07            MICROBIOLOGY:  v  .Blood Blood-Peripheral  01-24-23   No growth to date.  --  --      .Blood Blood-Peripheral  01-24-23   No growth to date.  --  --      .Abscess Catheter Site  01-22-23   Moderate Enterococcus faecalis  Moderate Enterococcus avium  Rare Klebsiella aerogenes (Previously Enterobacter)  Moderate Bacteroides thetaiotamcron group "Susceptibilities not performed"  --  Enterococcus faecalis  Enterococcus avium  Klebsiella aerogenes (Previously Enterobacter)      Clean Catch Clean Catch (Midstream)  01-19-23   >=3 organisms. Probable collection contamination. including  >100,000 CFU/ml Escherichia coli  --  Escherichia coli      .Blood Blood-Peripheral  01-19-23   No Growth Final  --  --      .Blood Blood-Peripheral  01-19-23   No Growth Final  --  --      .Blood Blood-Peripheral  01-10-23   No Growth Final  --  --      .Blood Blood-Venous  01-10-23   No Growth Final  --  --                RADIOLOGY:  Images independently visualized and reviewed personally, findings as below  < from: Xray Chest 1 View- PORTABLE-Urgent (Xray Chest 1 View- PORTABLE-Urgent .) (01.26.23 @ 06:24) >  IMPRESSION:    Trace left pleural effusion. Enteric tube is in a satisfactory position.    < end of copied text >  < from: US Urinary Bladder (01.24.23 @ 15:44) >  IMPRESSION:  Urinary bladder distended with debris. Recommend correlation with   urinalysis.    A Rojas catheter was placed immediately prior to this examination, and   therefore pre and post void bladder volumes cannot be accurately obtained.    Irregular wall thickening along the right bladder versus eccentric   debris. Bladder neoplasm is not excluded. Continued follow-up is   recommended. Consider further evaluation with cystoscopy.      < end of copied text >  < from: CT Head No Cont (01.19.23 @ 13:45) >  IMPRESSION:  No acute intracranial hemorrhage.    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      < end of copied text >

## 2023-01-26 NOTE — SWALLOW BEDSIDE ASSESSMENT ADULT - SPECIFY REASON(S)
to subjectively assess the swallow mechanism r/o dysphagia
Pt seen for reassessment of swallow to determine safety of initiating an oral diet.

## 2023-01-26 NOTE — PROGRESS NOTE ADULT - ASSESSMENT
78 F with recent T2DM diagnosis and CVA (d/c 1 week ago), presenting with glucoses 500s, poor po intake and lethargy. Endocrine consulted for assistance with management of uncontrolled, recently dx DM. Now on continuous tube feeds at goal rate w/BG values above goal.     Uncontrolled type 2 diabetes mellitus with hyperglycemia.   ·  Plan:  - BG Goal 100-180mg/dl    -test BG Q6h  -Increase Lantus 11 units QHS  -Admelog moderate correction scale Q6h   -Please notify endocrine team of any change to TF regimen.   For d/c:   final recs TBD based on feeding modality/insulin requirements  if dc on PO intake consider   -Metformin 500 mg BID for 1 week then increase to 1000 mg BID. Check lactate prior to d/c  -May need low dose basal insulin (came in with glucose 500s, mild BHB elevation)  -Consider DPP4 inhibitor  -Patient can follow up at [address] [place] [address], [address]RD FLO[address], [place] [address] [phone]  -Patient will need opthalmology and podiatry follow up as outpatient.     Problem/Plan - 2:  ·  Problem: Hypertension.   ·  Plan: BP goal 130/80  - Manage per primary team.     Problem/Plan - 3:  ·  Problem: Hyperlipidemia.   ·  Plan: Continue with atorvastatin 80 mg daily   -   - Manage as outpatient         discussed with patient and RN   Contact via Microsoft Teams during business hours  On evenings and weekends (or if no response on Microsoft Teams) please call [phone] or page endocrine fellow on call.   For nonurgent matters please email [email or web address]    Please note that this patient may be followed by different provider tomorrow.  Notify endocrine 24 hours prior to discharge for final recommendations    greater than 50% of the encounter was spent counseling and/or coordination of care.  26 minutes spent on total encounter; The necessity of the time spent during the encounter on this date of service was due to development of plan of care/coordination of care/glycemic control through review of labs, blood glucose values and vital signs.  78 F with recent T2DM diagnosis and CVA (d/c 1 week ago), presenting with glucoses 500s, poor po intake and lethargy. Endocrine consulted for assistance with management of uncontrolled, recently dx DM. Now on continuous tube feeds at goal rate w/BG values above goal.     Uncontrolled type 2 diabetes mellitus with hyperglycemia.   ·  Plan:  - BG Goal 100-180mg/dl    -test BG Q6h  -Increase Lantus 11 units QHS  -Admelog moderate correction scale Q6h   -Please notify endocrine team of any change to TF regimen.   For d/c:   final recs TBD based on feeding modality/insulin requirements  if dc on PO intake consider   -Metformin 500 mg BID for 1 week then increase to 1000 mg BID. Check lactate prior to d/c  -May need low dose basal insulin (came in with glucose 500s, mild BHB elevation)  -Consider DPP4 inhibitor  -Patient can follow up at [address] [place] [address], [address]RD FLO[address], [place] [address] [phone]  -Patient will need opthalmology and podiatry follow up as outpatient.     Problem/Plan - 2:  ·  Problem: Hypertension.   ·  Plan: BP goal 130/80  - Manage per primary team.     Problem/Plan - 3:  ·  Problem: Hyperlipidemia.   ·  Plan: Continue with atorvastatin 80 mg daily   -   - Manage as outpatient         discussed with patient and RN   Contact via Microsoft Teams during business hours  On evenings and weekends (or if no response on Microsoft Teams) please call [phone] or page endocrine fellow on call.   For nonurgent matters please email [email or web address]    Please note that this patient may be followed by different provider tomorrow.  Notify endocrine 24 hours prior to discharge for final recommendations    greater than 50% of the encounter was spent counseling and/or coordination of care.  26 minutes spent on total encounter; The necessity of the time spent during the encounter on this date of service was due to development of plan of care/coordination of care/glycemic control through review of labs, blood glucose values and vital signs.  78 F with recent T2DM diagnosis and CVA (d/c 1 week ago), presenting with glucoses 500s, poor po intake and lethargy. Endocrine consulted for assistance with management of uncontrolled, recently dx DM. Now on continuous tube feeds at goal rate w/BG values above goal.     Uncontrolled type 2 diabetes mellitus with hyperglycemia.   ·  Plan:  - BG Goal 100-180mg/dl    -test BG Q6h  -Increase Lantus 11 units QHS  -Admelog moderate correction scale Q6h   -Please notify endocrine team of any change to TF regimen.   For d/c:   final recs TBD based on feeding modality/insulin requirements  if dc on PO intake consider   -Metformin 500 mg BID for 1 week then increase to 1000 mg BID. Check lactate prior to d/c  -May need low dose basal insulin (came in with glucose 500s, mild BHB elevation)  -Consider DPP4 inhibitor  -Patient can follow up at [address] [place] [address], [address]RD FLO[address], [place] [address] [phone]  -Patient will need opthalmology and podiatry follow up as outpatient.     Problem/Plan - 2:  ·  Problem: Hypertension.   ·  Plan: BP goal 130/80  - Manage per primary team.     Problem/Plan - 3:  ·  Problem: Hyperlipidemia.   ·  Plan: Continue with atorvastatin 80 mg daily   -   - Manage as outpatient         discussed with patient and RN   Contact via Microsoft Teams during business hours  On evenings and weekends (or if no response on Microsoft Teams) please call [phone] or page endocrine fellow on call.   For nonurgent matters please email [email or web address]    Please note that this patient may be followed by different provider tomorrow.  Notify endocrine 24 hours prior to discharge for final recommendations    greater than 50% of the encounter was spent counseling and/or coordination of care.  26 minutes spent on total encounter; The necessity of the time spent during the encounter on this date of service was due to development of plan of care/coordination of care/glycemic control through review of labs, blood glucose values and vital signs.  78 F with recent T2DM diagnosis and CVA (d/c 1 week ago), presenting with glucoses 500s, poor po intake and lethargy. Endocrine consulted for assistance with management of uncontrolled, recently dx DM. Now on continuous tube feeds at goal rate w/BG values above goal.     Uncontrolled type 2 diabetes mellitus with hyperglycemia.   ·  Plan:  - BG Goal 100-180mg/dl    -test BG Q6h  -Increase Lantus 11 units QHS  -Admelog moderate correction scale Q6h   -Please notify endocrine team of any change to TF regimen.   For d/c:   final recs TBD based on feeding modality/insulin requirements  if dc on PO intake consider   -Metformin 500 mg BID for 1 week then increase to 1000 mg BID. Check lactate prior to d/c  -May need low dose basal insulin (came in with glucose 500s, mild BHB elevation)  -Consider DPP4 inhibitor  -Patient can follow up at [address] [place] [address], [address]RD FLO[address], [place] [address] [phone]  -Patient will need opthalmology and podiatry follow up as outpatient.     Problem/Plan - 2:  ·  Problem: Hypertension.   ·  Plan: BP goal 130/80  - Manage per primary team.     Problem/Plan - 3:  ·  Problem: Hyperlipidemia.   ·  Plan: Continue with atorvastatin 80 mg daily   -   - Manage as outpatient         discussed with patient and RN PADMINI   Contact via Microsoft Teams during business hours  On evenings and weekends (or if no response on Microsoft Teams) please call [phone] or page endocrine fellow on call.   For nonurgent matters please email [email or web address]    Please note that this patient may be followed by different provider tomorrow.  Notify endocrine 24 hours prior to discharge for final recommendations    greater than 50% of the encounter was spent counseling and/or coordination of care.  26 minutes spent on total encounter; The necessity of the time spent during the encounter on this date of service was due to development of plan of care/coordination of care/glycemic control through review of labs, blood glucose values and vital signs.  78 F with recent T2DM diagnosis and CVA (d/c 1 week ago), presenting with glucoses 500s, poor po intake and lethargy. Endocrine consulted for assistance with management of uncontrolled, recently dx DM. Now on continuous tube feeds at goal rate w/BG values above goal.     Uncontrolled type 2 diabetes mellitus with hyperglycemia.   ·  Plan:  - BG Goal 100-180mg/dl    -test BG Q6h  -Increase Lantus 11 units QHS  -Admelog moderate correction scale Q6h   -Please notify endocrine team of any change to TF regimen.   For d/c:   final recs TBD based on feeding modality/insulin requirements  if dc on PO intake consider   -Metformin 500 mg BID for 1 week then increase to 1000 mg BID. Check lactate prior to d/c  -May need low dose basal insulin (came in with glucose 500s, mild BHB elevation)  -Consider DPP4 inhibitor  -Patient can follow up at [address] [place] [address], [address]RD FLO[address], [place] [address] [phone]  -Patient will need opthalmology and podiatry follow up as outpatient.     Problem/Plan - 2:  ·  Problem: Hypertension.   ·  Plan: BP goal 130/80  - Manage per primary team.     Problem/Plan - 3:  ·  Problem: Hyperlipidemia.   ·  Plan: Continue with atorvastatin 80 mg daily   -   - Manage as outpatient         discussed with patient and RN PADMINI   Contact via Microsoft Teams during business hours  On evenings and weekends (or if no response on Microsoft Teams) please call [phone] or page endocrine fellow on call.   For nonurgent matters please email [email or web address]    Please note that this patient may be followed by different provider tomorrow.  Notify endocrine 24 hours prior to discharge for final recommendations    greater than 50% of the encounter was spent counseling and/or coordination of care.  26 minutes spent on total encounter; The necessity of the time spent during the encounter on this date of service was due to development of plan of care/coordination of care/glycemic control through review of labs, blood glucose values and vital signs.  78 F with recent T2DM diagnosis and CVA (d/c 1 week ago), presenting with glucoses 500s, poor po intake and lethargy. Endocrine consulted for assistance with management of uncontrolled, recently dx DM. Now on continuous tube feeds at goal rate w/BG values above goal.     Uncontrolled type 2 diabetes mellitus with hyperglycemia.   ·  Plan:  - BG Goal 100-180mg/dl    -test BG Q6h  -Increase Lantus 11 units QHS  -Admelog moderate correction scale Q6h   -Please notify endocrine team of any change to TF regimen.   For d/c:   final recs TBD based on feeding modality/insulin requirements  if dc on PO intake consider   -Metformin 500 mg BID for 1 week then increase to 1000 mg BID. Check lactate prior to d/c  -May need low dose basal insulin (came in with glucose 500s, mild BHB elevation)  -Consider DPP4 inhibitor  -Patient can follow up at [address] [place] [address], [address] FLO[address], [place] [address] [phone]  -Patient will need opthalmology and podiatry follow up as outpatient.     Problem/Plan - 2:  ·  Problem: Hypertension.   ·  Plan: BP goal 130/80  - Manage per primary team.     Problem/Plan - 3:  ·  Problem: Hyperlipidemia.   ·  Plan: Continue with atorvastatin 80 mg daily   -   - Manage as outpatient         discussed with patient and RN PADMINI   Contact via Microsoft Teams during business hours  On evenings and weekends (or if no response on Microsoft Teams) please call [phone] or page endocrine fellow on call.   For nonurgent matters please email [email or web address]    Please note that this patient may be followed by different provider tomorrow.  Notify endocrine 24 hours prior to discharge for final recommendations    greater than 50% of the encounter was spent counseling and/or coordination of care.  26 minutes spent on total encounter; The necessity of the time spent during the encounter on this date of service was due to development of plan of care/coordination of care/glycemic control through review of labs, blood glucose values and vital signs.

## 2023-01-26 NOTE — PROGRESS NOTE ADULT - SUBJECTIVE AND OBJECTIVE BOX
Name of Patient : SARAH DISLA  MRN: [id number]  Date of visit: 01-26-23 @ 15:32      Subjective: Patient seen and examined. No new events except as noted.   Patient seen earlier this AM.   NGT re-placed overnight.  Planned for Tx today and MR tomorrow.     REVIEW OF SYSTEMS:  Unable to obtain ROS      MEDICATIONS:  MEDICATIONS  (STANDING):  aspirin  chewable 81 milliGRAM(s) Oral daily  atorvastatin 80 milliGRAM(s) Oral at bedtime  clopidogrel Tablet 75 milliGRAM(s) Oral daily  dextrose 5%. 1000 milliLiter(s) (100 mL/Hr) IV Continuous <Continuous>  dextrose 50% Injectable 25 Gram(s) IV Push once  dextrose 50% Injectable 12.5 Gram(s) IV Push once  dextrose 50% Injectable 25 Gram(s) IV Push once  dextrose Oral Gel 15 Gram(s) Oral once  enoxaparin Injectable 40 milliGRAM(s) SubCutaneous every 24 hours  ertapenem  IVPB 1000 milliGRAM(s) IV Intermittent every 24 hours  glucagon  Injectable 1 milliGRAM(s) IntraMuscular once  insulin glargine Injectable (LANTUS) 11 Unit(s) SubCutaneous at bedtime  insulin lispro (ADMELOG) corrective regimen sliding scale   SubCutaneous every 6 hours  Spinosad 0.9% topical suspension 1 Application(s) 1 Application(s) Topical once  tamsulosin 0.4 milliGRAM(s) Oral at bedtime      PHYSICAL EXAM:  T(C): 36.4 (01-26-23 @ 12:27), Max: 37.5 (01-25-23 @ 21:26)  HR: 100 (01-26-23 @ 12:27) (98 - 100)  BP: 130/52 (01-26-23 @ 12:27) (130/52 - 131/56)  RR: 18 (01-26-23 @ 12:27) (18 - 18)  SpO2: 96% (01-26-23 @ 12:27) (93% - 96%)  Wt(kg): --  I&O's Summary    25 Jan 2023 07:01  -  26 Jan 2023 07:00  --------------------------------------------------------  IN: 0 mL / OUT: 900 mL / NET: -900 mL          Appearance: Lying down in bed, NGT re-placed   HEENT: Eyes are open; NGT; Hair covered   Lymphatic: No lymphadenopathy   Cardiovascular: Normal S1 S2   Respiratory: normal effort , clear  Gastrointestinal:  Soft, Non-tender  Skin: No rashes grossly   Psychiatry:  Lethargic   Musculoskeletal: No edema        01-25-23 @ 07:01  -  01-26-23 @ 07:00  --------------------------------------------------------  IN: 0 mL / OUT: 900 mL / NET: -900 mL                                  9.1    7.56  )-----------( 311      ( 26 Jan 2023 07:08 )             29.7               01-26    142  |  107  |  18  ----------------------------<  131<H>  3.7   |  27  |  0.63    Ca    8.0<L>      26 Jan 2023 07:07                           Culture - Blood in AM (01.24.23 @ 07:50)   Specimen Source: .Blood Blood-Peripheral   Culture Results: No growth to date.     Culture - Blood in AM (01.24.23 @ 07:09)   Specimen Source: .Blood Blood-Peripheral   Culture Results: No growth to date.     Culture - Urine (01.19.23 @ 13:15)  - Ertapenem: S <=0.5   Specimen Source: Clean Catch Clean Catch (Midstream)   Culture Results:   >=3 organisms. Probable collection contamination. including   >100,000 CFU/ml Escherichia coli   Organism Identification: Escherichia coli   Organism: Escherichia coli   Method Type: DENA       < from: Xray Chest 1 View- PORTABLE-Urgent (Xray Chest 1 View- PORTABLE-Urgent .) (01.26.23 @ 06:24) >  PROCEDURE DATE:  01/26/2023    ******PRELIMINARY REPORT******      ******PRELIMINARY REPORT******           INTERPRETATION:  ngt tip is in the stomach        ******PRELIMINARY REPORT******      ******PRELIMINARY REPORT******     < end of copied text >   Name of Patient : SARAH DISLA  MRN: [id number]  Date of visit: 01-26-23 @ 15:32      Subjective: Patient seen and examined. No new events except as noted.   Patient seen earlier this AM.   NGT re-placed overnight.  Planned for Tx today and MR tomorrow.     REVIEW OF SYSTEMS:  Unable to obtain ROS      MEDICATIONS:  MEDICATIONS  (STANDING):  aspirin  chewable 81 milliGRAM(s) Oral daily  atorvastatin 80 milliGRAM(s) Oral at bedtime  clopidogrel Tablet 75 milliGRAM(s) Oral daily  dextrose 5%. 1000 milliLiter(s) (100 mL/Hr) IV Continuous <Continuous>  dextrose 50% Injectable 25 Gram(s) IV Push once  dextrose 50% Injectable 12.5 Gram(s) IV Push once  dextrose 50% Injectable 25 Gram(s) IV Push once  dextrose Oral Gel 15 Gram(s) Oral once  enoxaparin Injectable 40 milliGRAM(s) SubCutaneous every 24 hours  ertapenem  IVPB 1000 milliGRAM(s) IV Intermittent every 24 hours  glucagon  Injectable 1 milliGRAM(s) IntraMuscular once  insulin glargine Injectable (LANTUS) 11 Unit(s) SubCutaneous at bedtime  insulin lispro (ADMELOG) corrective regimen sliding scale   SubCutaneous every 6 hours  Spinosad 0.9% topical suspension 1 Application(s) 1 Application(s) Topical once  tamsulosin 0.4 milliGRAM(s) Oral at bedtime      PHYSICAL EXAM:  T(C): 36.4 (01-26-23 @ 12:27), Max: 37.5 (01-25-23 @ 21:26)  HR: 100 (01-26-23 @ 12:27) (98 - 100)  BP: 130/52 (01-26-23 @ 12:27) (130/52 - 131/56)  RR: 18 (01-26-23 @ 12:27) (18 - 18)  SpO2: 96% (01-26-23 @ 12:27) (93% - 96%)  Wt(kg): --  I&O's Summary    25 Jan 2023 07:01  -  26 Jan 2023 07:00  --------------------------------------------------------  IN: 0 mL / OUT: 900 mL / NET: -900 mL          Appearance: Lying down in bed, NGT re-placed   HEENT: Eyes are open; NGT; Hair covered   Lymphatic: No lymphadenopathy   Cardiovascular: Normal S1 S2   Respiratory: normal effort , clear  Gastrointestinal:  Soft, Non-tender  Skin: No rashes grossly   Psychiatry:  Lethargic   Musculoskeletal: No edema        01-25-23 @ 07:01  -  01-26-23 @ 07:00  --------------------------------------------------------  IN: 0 mL / OUT: 900 mL / NET: -900 mL                                  9.1    7.56  )-----------( 311      ( 26 Jan 2023 07:08 )             29.7               01-26    142  |  107  |  18  ----------------------------<  131<H>  3.7   |  27  |  0.63    Ca    8.0<L>      26 Jan 2023 07:07                           Culture - Blood in AM (01.24.23 @ 07:50)   Specimen Source: .Blood Blood-Peripheral   Culture Results: No growth to date.     Culture - Blood in AM (01.24.23 @ 07:09)   Specimen Source: .Blood Blood-Peripheral   Culture Results: No growth to date.     Culture - Urine (01.19.23 @ 13:15)  - Ertapenem: S <=0.5   Specimen Source: Clean Catch Clean Catch (Midstream)   Culture Results:   >=3 organisms. Probable collection contamination. including   >100,000 CFU/ml Escherichia coli   Organism Identification: Escherichia coli   Organism: Escherichia coli   Method Type: DENA       < from: Xray Chest 1 View- PORTABLE-Urgent (Xray Chest 1 View- PORTABLE-Urgent .) (01.26.23 @ 06:24) >  PROCEDURE DATE:  01/26/2023    ******PRELIMINARY REPORT******      ******PRELIMINARY REPORT******           INTERPRETATION:  ngt tip is in the stomach        ******PRELIMINARY REPORT******      ******PRELIMINARY REPORT******     < end of copied text >   Name of Patient : SAARH DISLA  MRN: [id number]  Date of visit: 01-26-23 @ 15:32      Subjective: Patient seen and examined. No new events except as noted.   Patient seen earlier this AM.   NGT re-placed overnight.  Planned for Tx today and MR tomorrow.     REVIEW OF SYSTEMS:  Unable to obtain ROS      MEDICATIONS:  MEDICATIONS  (STANDING):  aspirin  chewable 81 milliGRAM(s) Oral daily  atorvastatin 80 milliGRAM(s) Oral at bedtime  clopidogrel Tablet 75 milliGRAM(s) Oral daily  dextrose 5%. 1000 milliLiter(s) (100 mL/Hr) IV Continuous <Continuous>  dextrose 50% Injectable 25 Gram(s) IV Push once  dextrose 50% Injectable 12.5 Gram(s) IV Push once  dextrose 50% Injectable 25 Gram(s) IV Push once  dextrose Oral Gel 15 Gram(s) Oral once  enoxaparin Injectable 40 milliGRAM(s) SubCutaneous every 24 hours  ertapenem  IVPB 1000 milliGRAM(s) IV Intermittent every 24 hours  glucagon  Injectable 1 milliGRAM(s) IntraMuscular once  insulin glargine Injectable (LANTUS) 11 Unit(s) SubCutaneous at bedtime  insulin lispro (ADMELOG) corrective regimen sliding scale   SubCutaneous every 6 hours  Spinosad 0.9% topical suspension 1 Application(s) 1 Application(s) Topical once  tamsulosin 0.4 milliGRAM(s) Oral at bedtime      PHYSICAL EXAM:  T(C): 36.4 (01-26-23 @ 12:27), Max: 37.5 (01-25-23 @ 21:26)  HR: 100 (01-26-23 @ 12:27) (98 - 100)  BP: 130/52 (01-26-23 @ 12:27) (130/52 - 131/56)  RR: 18 (01-26-23 @ 12:27) (18 - 18)  SpO2: 96% (01-26-23 @ 12:27) (93% - 96%)  Wt(kg): --  I&O's Summary    25 Jan 2023 07:01  -  26 Jan 2023 07:00  --------------------------------------------------------  IN: 0 mL / OUT: 900 mL / NET: -900 mL          Appearance: Lying down in bed, NGT re-placed   HEENT: Eyes are open; NGT; Hair covered   Lymphatic: No lymphadenopathy   Cardiovascular: Normal S1 S2   Respiratory: normal effort , clear  Gastrointestinal:  Soft, Non-tender  Skin: No rashes grossly   Psychiatry:  Lethargic   Musculoskeletal: No edema        01-25-23 @ 07:01  -  01-26-23 @ 07:00  --------------------------------------------------------  IN: 0 mL / OUT: 900 mL / NET: -900 mL                                  9.1    7.56  )-----------( 311      ( 26 Jan 2023 07:08 )             29.7               01-26    142  |  107  |  18  ----------------------------<  131<H>  3.7   |  27  |  0.63    Ca    8.0<L>      26 Jan 2023 07:07                           Culture - Blood in AM (01.24.23 @ 07:50)   Specimen Source: .Blood Blood-Peripheral   Culture Results: No growth to date.     Culture - Blood in AM (01.24.23 @ 07:09)   Specimen Source: .Blood Blood-Peripheral   Culture Results: No growth to date.     Culture - Urine (01.19.23 @ 13:15)  - Ertapenem: S <=0.5   Specimen Source: Clean Catch Clean Catch (Midstream)   Culture Results:   >=3 organisms. Probable collection contamination. including   >100,000 CFU/ml Escherichia coli   Organism Identification: Escherichia coli   Organism: Escherichia coli   Method Type: DENA       < from: Xray Chest 1 View- PORTABLE-Urgent (Xray Chest 1 View- PORTABLE-Urgent .) (01.26.23 @ 06:24) >  PROCEDURE DATE:  01/26/2023    ******PRELIMINARY REPORT******      ******PRELIMINARY REPORT******           INTERPRETATION:  ngt tip is in the stomach        ******PRELIMINARY REPORT******      ******PRELIMINARY REPORT******     < end of copied text >

## 2023-01-26 NOTE — SWALLOW BEDSIDE ASSESSMENT ADULT - SLP PERTINENT HISTORY OF CURRENT PROBLEM
H&P: 78F with dementia, T2DM, recently discharged about 1 week ago for CVA now presenting with poor PO intake, lethargy and hyperglycemia to 500s. Of note, patient admitted 1/6 for R facial droop, word finding difficulties. Imaging concerning for acute L MCA infarct. Discharged home on 1/11. Majority of history obtained through family given mental status. Per family, since stroke patient nonverbal/unable to participate in meaningful communications, intermittently follows commands. For the last 3-4 days, patient with poor po intake. Reportedly only eating breakfast. Day of presentation, more lethargic with elevated finger sticks up to the 500s. Patient was evaluated by endocrine team during prior admission with recs to increase metformin to 1000mg BID. However, given poor PO intake, family has been giving 500mg daily.

## 2023-01-27 LAB
ALBUMIN SERPL ELPH-MCNC: 2.3 G/DL — LOW (ref 3.3–5)
ALP SERPL-CCNC: 180 U/L — HIGH (ref 40–120)
ALT FLD-CCNC: 40 U/L — SIGNIFICANT CHANGE UP (ref 10–45)
ANION GAP SERPL CALC-SCNC: 9 MMOL/L — SIGNIFICANT CHANGE UP (ref 5–17)
AST SERPL-CCNC: 75 U/L — HIGH (ref 10–40)
BILIRUB SERPL-MCNC: 0.2 MG/DL — SIGNIFICANT CHANGE UP (ref 0.2–1.2)
BUN SERPL-MCNC: 20 MG/DL — SIGNIFICANT CHANGE UP (ref 7–23)
CALCIUM SERPL-MCNC: 8.4 MG/DL — SIGNIFICANT CHANGE UP (ref 8.4–10.5)
CHLORIDE SERPL-SCNC: 103 MMOL/L — SIGNIFICANT CHANGE UP (ref 96–108)
CO2 SERPL-SCNC: 28 MMOL/L — SIGNIFICANT CHANGE UP (ref 22–31)
CREAT SERPL-MCNC: 0.79 MG/DL — SIGNIFICANT CHANGE UP (ref 0.5–1.3)
CULTURE RESULTS: SIGNIFICANT CHANGE UP
EGFR: 76 ML/MIN/1.73M2 — SIGNIFICANT CHANGE UP
GLUCOSE SERPL-MCNC: 168 MG/DL — HIGH (ref 70–99)
MAGNESIUM SERPL-MCNC: 2.1 MG/DL — SIGNIFICANT CHANGE UP (ref 1.6–2.6)
PHOSPHATE SERPL-MCNC: 2.6 MG/DL — SIGNIFICANT CHANGE UP (ref 2.5–4.5)
POTASSIUM SERPL-MCNC: 4.2 MMOL/L — SIGNIFICANT CHANGE UP (ref 3.5–5.3)
POTASSIUM SERPL-SCNC: 4.2 MMOL/L — SIGNIFICANT CHANGE UP (ref 3.5–5.3)
PROT SERPL-MCNC: 6.5 G/DL — SIGNIFICANT CHANGE UP (ref 6–8.3)
SODIUM SERPL-SCNC: 140 MMOL/L — SIGNIFICANT CHANGE UP (ref 135–145)
SPECIMEN SOURCE: SIGNIFICANT CHANGE UP

## 2023-01-27 PROCEDURE — 70450 CT HEAD/BRAIN W/O DYE: CPT | Mod: 26

## 2023-01-27 PROCEDURE — 99232 SBSQ HOSP IP/OBS MODERATE 35: CPT

## 2023-01-27 RX ORDER — INSULIN GLARGINE 100 [IU]/ML
13 INJECTION, SOLUTION SUBCUTANEOUS AT BEDTIME
Refills: 0 | Status: DISCONTINUED | OUTPATIENT
Start: 2023-01-27 | End: 2023-01-29

## 2023-01-27 RX ADMIN — ENOXAPARIN SODIUM 40 MILLIGRAM(S): 100 INJECTION SUBCUTANEOUS at 06:33

## 2023-01-27 RX ADMIN — Medication 2: at 17:50

## 2023-01-27 RX ADMIN — Medication 2: at 06:33

## 2023-01-27 RX ADMIN — TAMSULOSIN HYDROCHLORIDE 0.4 MILLIGRAM(S): 0.4 CAPSULE ORAL at 21:49

## 2023-01-27 RX ADMIN — ERTAPENEM SODIUM 120 MILLIGRAM(S): 1 INJECTION, POWDER, LYOPHILIZED, FOR SOLUTION INTRAMUSCULAR; INTRAVENOUS at 17:49

## 2023-01-27 RX ADMIN — INSULIN GLARGINE 13 UNIT(S): 100 INJECTION, SOLUTION SUBCUTANEOUS at 21:50

## 2023-01-27 RX ADMIN — Medication 2: at 13:13

## 2023-01-27 RX ADMIN — CLOPIDOGREL BISULFATE 75 MILLIGRAM(S): 75 TABLET, FILM COATED ORAL at 11:40

## 2023-01-27 RX ADMIN — ATORVASTATIN CALCIUM 80 MILLIGRAM(S): 80 TABLET, FILM COATED ORAL at 21:50

## 2023-01-27 RX ADMIN — Medication 81 MILLIGRAM(S): at 11:39

## 2023-01-27 RX ADMIN — Medication 2: at 00:58

## 2023-01-27 NOTE — PROGRESS NOTE ADULT - ASSESSMENT
78 F with recent T2DM diagnosis and CVA (d/c 1 week ago), presenting with glucoses 500s, poor po intake and lethargy. Endocrine consulted for assistance with management of uncontrolled, recently dx DM. Now on continuous tube feeds at goal rate w/BG values above goal.     Uncontrolled type 2 diabetes mellitus with hyperglycemia.   ·  Plan:  - BG Goal 100-180mg/dl    -test BG Q6h  -Increase Lantus 13 units QHS  -Admelog moderate correction scale Q6h   -Please notify endocrine team of any change to TF regimen.   - f/u CESAR, Zinc 8transporter, Islet, IA2AB (pending)  For d/c:   final recs TBD based on feeding modality/insulin requirements  if dc on PO intake consider   -Metformin 500 mg BID for 1 week then increase to 1000 mg BID. Check lactate prior to d/c  -May need low dose basal insulin (came in with glucose 500s, mild BHB elevation)  -Consider DPP4 inhibitor  -Patient can follow up at [address] [place] [address], [address] FLO[address], [place] [address] [phone]  -Patient will need opthalmology and podiatry follow up as outpatient.     Problem/Plan - 2:  ·  Problem: Hypertension.   ·  Plan: BP goal 130/80  - Manage per primary team.     Problem/Plan - 3:  ·  Problem: Hyperlipidemia.   ·  Plan: Continue with atorvastatin 80 mg daily   -   - Manage as outpatient         discussed with patient and RN   Contact via Microsoft Teams during business hours  On evenings and weekends (or if no response on Microsoft Teams) please call [phone] or page endocrine fellow on call.   For nonurgent matters please email [email or web address]    Please note that this patient may be followed by different provider tomorrow.  Notify endocrine 24 hours prior to discharge for final recommendations    greater than 50% of the encounter was spent counseling and/or coordination of care.  26 minutes spent on total encounter; The necessity of the time spent during the encounter on this date of service was due to development of plan of care/coordination of care/glycemic control through review of labs, blood glucose values and vital signs.  78 F with recent T2DM diagnosis and CVA (d/c 1 week ago), presenting with glucoses 500s, poor po intake and lethargy. Endocrine consulted for assistance with management of uncontrolled, recently dx DM. Now on continuous tube feeds at goal rate w/BG values above goal.     Uncontrolled type 2 diabetes mellitus with hyperglycemia.   ·  Plan:  - BG Goal 100-180mg/dl    -test BG Q6h  -Increase Lantus 13 units QHS  -Admelog moderate correction scale Q6h   -Please notify endocrine team of any change to TF regimen.   - f/u CESAR, Zinc 8transporter, Islet, IA2AB (pending)  For d/c:   final recs TBD based on feeding modality/insulin requirements  if dc on PO intake consider   -Metformin 500 mg BID for 1 week then increase to 1000 mg BID. Check lactate prior to d/c  -May need low dose basal insulin (came in with glucose 500s, mild BHB elevation)  -Consider DPP4 inhibitor  -Patient can follow up at [address] [place] [address], [address]RD FLO[address], [place] [address] [phone]  -Patient will need opthalmology and podiatry follow up as outpatient.     Problem/Plan - 2:  ·  Problem: Hypertension.   ·  Plan: BP goal 130/80  - Manage per primary team.     Problem/Plan - 3:  ·  Problem: Hyperlipidemia.   ·  Plan: Continue with atorvastatin 80 mg daily   -   - Manage as outpatient         discussed with patient and RN   Contact via Microsoft Teams during business hours  On evenings and weekends (or if no response on Microsoft Teams) please call [phone] or page endocrine fellow on call.   For nonurgent matters please email [email or web address]    Please note that this patient may be followed by different provider tomorrow.  Notify endocrine 24 hours prior to discharge for final recommendations    greater than 50% of the encounter was spent counseling and/or coordination of care.  26 minutes spent on total encounter; The necessity of the time spent during the encounter on this date of service was due to development of plan of care/coordination of care/glycemic control through review of labs, blood glucose values and vital signs.  78 F with recent T2DM diagnosis and CVA (d/c 1 week ago), presenting with glucoses 500s, poor po intake and lethargy. Endocrine consulted for assistance with management of uncontrolled, recently dx DM. Now on continuous tube feeds at goal rate w/BG values above goal.     Uncontrolled type 2 diabetes mellitus with hyperglycemia.   ·  Plan:  - BG Goal 100-180mg/dl    -test BG Q6h  -Increase Lantus 13 units QHS  -Admelog moderate correction scale Q6h   -Please notify endocrine team of any change to TF regimen.   - f/u CESAR, Zinc 8transporter, Islet, IA2AB (pending)  For d/c:   final recs TBD based on feeding modality/insulin requirements  if dc on PO intake consider   -Metformin 500 mg BID for 1 week then increase to 1000 mg BID. Check lactate prior to d/c  -May need low dose basal insulin (came in with glucose 500s, mild BHB elevation)  -Consider DPP4 inhibitor  -Patient can follow up at [address] [place] [address], [address]RD FLO[address], [place] [address] [phone]  -Patient will need opthalmology and podiatry follow up as outpatient.     Problem/Plan - 2:  ·  Problem: Hypertension.   ·  Plan: BP goal 130/80  - Manage per primary team.     Problem/Plan - 3:  ·  Problem: Hyperlipidemia.   ·  Plan: Continue with atorvastatin 80 mg daily   -   - Manage as outpatient         discussed with patient and RN   Contact via Microsoft Teams during business hours  On evenings and weekends (or if no response on Microsoft Teams) please call [phone] or page endocrine fellow on call.   For nonurgent matters please email [email or web address]    Please note that this patient may be followed by different provider tomorrow.  Notify endocrine 24 hours prior to discharge for final recommendations    greater than 50% of the encounter was spent counseling and/or coordination of care.  26 minutes spent on total encounter; The necessity of the time spent during the encounter on this date of service was due to development of plan of care/coordination of care/glycemic control through review of labs, blood glucose values and vital signs.

## 2023-01-27 NOTE — PHYSICAL THERAPY INITIAL EVALUATION ADULT - ADDITIONAL COMMENTS
Pt lives in a house on first floor, however resides in basement with flight of stairs and no handrail. Patient was independent with all ADLs and IADLs prior to admission. Lives with daughter. Ambulates with no AD.

## 2023-01-27 NOTE — OCCUPATIONAL THERAPY INITIAL EVALUATION ADULT - BED MOBILITY TRAINING, PT EVAL
Pt will complete bed mobility with min assist x1 within 4 weeks Pt will complete bed mobility with mod assist x1 within 4 weeks

## 2023-01-27 NOTE — PROGRESS NOTE ADULT - SUBJECTIVE AND OBJECTIVE BOX
Follow Up:  fever, urinary retention, lice    Interval History: no more fever, MRI showed Multiple evolving LEFT-SIDED subacute infarcts,  Findings suspicious for high LEFT frontal subarachnoid hemorrhage.       ROS:    Unobtainable because of mental status        Allergies  Benedryl Allergy Sinus (Hives)  penicillin (Rash)        ANTIMICROBIALS:  ertapenem  IVPB 1000 every 24 hours      OTHER MEDS:  acetaminophen    Suspension .. 650 milliGRAM(s) Enteral Tube every 6 hours PRN  aspirin  chewable 81 milliGRAM(s) Oral daily  atorvastatin 80 milliGRAM(s) Oral at bedtime  clopidogrel Tablet 75 milliGRAM(s) Oral daily  dextrose 5%. 1000 milliLiter(s) IV Continuous <Continuous>  dextrose 50% Injectable 25 Gram(s) IV Push once  dextrose 50% Injectable 12.5 Gram(s) IV Push once  dextrose 50% Injectable 25 Gram(s) IV Push once  dextrose Oral Gel 15 Gram(s) Oral once  glucagon  Injectable 1 milliGRAM(s) IntraMuscular once  insulin glargine Injectable (LANTUS) 13 Unit(s) SubCutaneous at bedtime  insulin lispro (ADMELOG) corrective regimen sliding scale   SubCutaneous every 6 hours  tamsulosin 0.4 milliGRAM(s) Oral at bedtime      Vital Signs Last 24 Hrs  T(C): 37 (27 Jan 2023 10:00), Max: 37.4 (26 Jan 2023 22:32)  T(F): 98.6 (27 Jan 2023 10:00), Max: 99.3 (26 Jan 2023 22:32)  HR: 90 (27 Jan 2023 10:00) (90 - 108)  BP: 122/60 (27 Jan 2023 10:00) (100/55 - 134/66)  BP(mean): --  RR: 18 (27 Jan 2023 10:00) (18 - 18)  SpO2: 98% (27 Jan 2023 10:00) (98% - 100%)    Parameters below as of 27 Jan 2023 10:00  Patient On (Oxygen Delivery Method): room air        Physical Exam:  General:    NAD, non toxic  Respiratory:   comfortable on Ra  abd:   soft,  not tender  :     no CVAT, no suprapubic tenderness  Musculoskeletal : no joint swelling  Skin:    no rash  vascular: no phlebitis                          9.1    7.56  )-----------( 311      ( 26 Jan 2023 07:08 )             29.7       01-27    140  |  103  |  20  ----------------------------<  168<H>  4.2   |  28  |  0.79    Ca    8.4      27 Jan 2023 06:54  Phos  2.6     01-27  Mg     2.1     01-27    TPro  6.5  /  Alb  2.3<L>  /  TBili  0.2  /  DBili  x   /  AST  75<H>  /  ALT  40  /  AlkPhos  180<H>  01-27          MICROBIOLOGY:  v  Catheterized Catheterized  01-24-23   >=3 organisms. Probable collection contamination.  --  --      .Blood Blood-Peripheral  01-24-23   No growth to date.  --  --      .Blood Blood-Peripheral  01-24-23   No growth to date.  --  --      .Abscess Catheter Site  01-22-23   Moderate Enterococcus faecalis  Moderate Enterococcus avium  Rare Klebsiella aerogenes (Previously Enterobacter)  Moderate Bacteroides thetaiotamcron group "Susceptibilities not performed"  --  Enterococcus faecalis  Enterococcus avium  Klebsiella aerogenes (Previously Enterobacter)      Clean Catch Clean Catch (Midstream)  01-19-23   >=3 organisms. Probable collection contamination. including  >100,000 CFU/ml Escherichia coli  --  Escherichia coli      .Blood Blood-Peripheral  01-19-23   No Growth Final  --  --      .Blood Blood-Peripheral  01-19-23   No Growth Final  --  --      .Blood Blood-Peripheral  01-10-23   No Growth Final  --  --      .Blood Blood-Venous  01-10-23   No Growth Final  --  --                RADIOLOGY:  Images independently visualized and reviewed personally, findings as below  < from: MR Head No Cont (01.26.23 @ 22:08) >  Compared with MRI Brain 1/7/2023.    1. Multiple evolving LEFT-SIDED subacute infarcts, as above.  2. Findings suspicious for high LEFT frontal subarachnoid hemorrhage.   Recommend correlation with noncontrast CT of the brain.  3. Additional findings above.    Findings and recommendations discussed in detail with PADMINI Carcamo at the   time of this interpretation.    < end of copied text >  < from: Xray Chest 1 View- PORTABLE-Urgent (Xray Chest 1 View- PORTABLE-Urgent .) (01.26.23 @ 06:24) >  IMPRESSION:    Trace left pleural effusion. Enteric tube is in a satisfactory position.      < end of copied text >  < from: US Urinary Bladder (01.24.23 @ 15:44) >  IMPRESSION:  Urinary bladder distended with debris. Recommend correlation with   urinalysis.    A Rojas catheter was placed immediately prior to this examination, and   therefore pre and post void bladder volumes cannot be accurately obtained.    Irregular wall thickening along the right bladder versus eccentric   debris. Bladder neoplasm is not excluded. Continued follow-up is   recommended. Consider further evaluation with cystoscopy.    < end of copied text >

## 2023-01-27 NOTE — OCCUPATIONAL THERAPY INITIAL EVALUATION ADULT - PERTINENT HX OF CURRENT PROBLEM, REHAB EVAL
Pt is a 78F with dementia, T2DM, recently discharged about 1 week ago for CVA now presenting with poor PO intake, lethargy and hyperglycemia to 500s.   Of note, patient admitted 1/6 for R facial droop, word finding difficulties. Imaging concerning for acute L MCA infarct. Discharged home on 1/11. Majority of history obtained through family given mental status. Per family, since stroke patient nonverbal/unable to participate in meaningful communications, intermittently follows commands. For the last 3-4 days, patient with poor po intake. Reportedly only eating breakfast. Day of presentation, more lethargic with elevated finger sticks up to the 500s. Patient was evaluated by endocrine team during prior admission with recs to increase metformin to 1000mg BID. However, given poor PO intake, family has been giving 500mg daily.    Xray Chest: IMPRESSION: Trace left pleural effusion. Enteric tube is in a satisfactory position.  MR Head: IMPRESSION: Compared with MRI Brain 1/7/2023. 1. Multiple evolving LEFT-SIDED subacute infarcts, as above. 2. Findings suspicious for high LEFT frontal subarachnoid hemorrhage. Recommend correlation with noncontrast CT of the brain. 3. Additional findings above.Findings and recommendations discussed in detail with PADMINI Carcamo at the time of this interpretation. US Urinary Bladder:   IMPRESSION: Urinary bladder distended with debris. Recommend correlation with   urinalysis. A Rojas catheter was placed immediately prior to this examination, and   therefore pre and post void bladder volumes cannot be accurately obtained. Irregular wall thickening along the right bladder versus eccentric debris. Bladder neoplasm is not excluded. Continued follow-up is   recommended. Consider further evaluation with cystoscopy.

## 2023-01-27 NOTE — CONSULT NOTE ADULT - ASSESSMENT
JennaDiego verduzco  78F Hx CVA at beginning of month (aphasia/gait imbalance from L MCA embolic stroke, on DAPT) had been AO1-2 prior to stroke now expressive>receptive aphasia intermittent FC. p/f lethargy/poor appetite. Adm med for sugars in 500s, UA+. No known trauma, has not complained of HA. Consulted for MR findings of poss SAH, significant motion artifact but L frontal SWI and corresponnding T2 lesion. CTA from 1/6/2023 w/LM2 stenosis but no vascular malformations  Exam: arousable, non-verbal, L gaze preference, can cross midline, mild R facial, BUE LOC, BLE wds, no FC   -Pending final read, but there is no hyperdensity suspicious for SAH on the CTH in the area corresponding to the susceptibility lesion on MRI, likely the MRI was a motion artifact.  -No acute neurosurgery intervention  -Please re-consult neurosurgery for new imaging findings or a decline in neuroexam

## 2023-01-27 NOTE — PROGRESS NOTE ADULT - SUBJECTIVE AND OBJECTIVE BOX
seen earlier today     Chief Complaint: Type 2 Diabetes Mellitus     INTERVAL HX: tolerating 24 h tf with BG above goal ; S&S eval noted       Review of Systems:  unable to obtain nonverbal    Allergies    Benedryl Allergy Sinus (Hives)  penicillin (Rash)    Intolerances      MEDICATIONS  (STANDING):  aspirin  chewable 81 milliGRAM(s) Oral daily  atorvastatin 80 milliGRAM(s) Oral at bedtime  clopidogrel Tablet 75 milliGRAM(s) Oral daily  dextrose 5%. 1000 milliLiter(s) (100 mL/Hr) IV Continuous <Continuous>  dextrose 50% Injectable 25 Gram(s) IV Push once  dextrose 50% Injectable 12.5 Gram(s) IV Push once  dextrose 50% Injectable 25 Gram(s) IV Push once  dextrose Oral Gel 15 Gram(s) Oral once  enoxaparin Injectable 40 milliGRAM(s) SubCutaneous every 24 hours  ertapenem  IVPB 1000 milliGRAM(s) IV Intermittent every 24 hours  glucagon  Injectable 1 milliGRAM(s) IntraMuscular once  insulin glargine Injectable (LANTUS) 11 Unit(s) SubCutaneous at bedtime  insulin lispro (ADMELOG) corrective regimen sliding scale   SubCutaneous every 6 hours  tamsulosin 0.4 milliGRAM(s) Oral at bedtime        atorvastatin   80 milliGRAM(s) Oral (01-26-23 @ 22:27)    insulin glargine Injectable (LANTUS)   11 Unit(s) SubCutaneous (01-26-23 @ 22:26)    insulin lispro (ADMELOG) corrective regimen sliding scale   2 Unit(s) SubCutaneous (01-27-23 @ 13:13)   2 Unit(s) SubCutaneous (01-27-23 @ 06:33)   2 Unit(s) SubCutaneous (01-27-23 @ 00:58)   2 Unit(s) SubCutaneous (01-26-23 @ 17:42)        PHYSICAL EXAM:  VITALS: T(C): 37 (01-27-23 @ 10:00)  T(F): 98.6 (01-27-23 @ 10:00), Max: 99.3 (01-26-23 @ 22:32)  HR: 90 (01-27-23 @ 10:00) (90 - 108)  BP: 122/60 (01-27-23 @ 10:00) (100/55 - 134/66)  RR:  (18 - 18)  SpO2:  (98% - 100%)  Wt(kg): --  GENERAL: female laying in bed in NAD, bilateral mittens, NGT with Glucerna  Respiratory: Respirations unlabored   Extremities: Warm, no edema  NEURO: somnolent    LABS:  POCT Blood Glucose.: 190 mg/dL (01-27-23 @ 13:05)  POCT Blood Glucose.: 199 mg/dL (01-27-23 @ 06:13)  POCT Blood Glucose.: 200 mg/dL (01-27-23 @ 00:54)  POCT Blood Glucose.: 187 mg/dL (01-26-23 @ 22:22)  POCT Blood Glucose.: 184 mg/dL (01-26-23 @ 17:37)  POCT Blood Glucose.: 224 mg/dL (01-26-23 @ 12:21)  POCT Blood Glucose.: 129 mg/dL (01-26-23 @ 06:02)  POCT Blood Glucose.: 202 mg/dL (01-26-23 @ 00:07)  POCT Blood Glucose.: 222 mg/dL (01-25-23 @ 22:30)  POCT Blood Glucose.: 171 mg/dL (01-25-23 @ 17:46)  POCT Blood Glucose.: 269 mg/dL (01-25-23 @ 12:15)  POCT Blood Glucose.: 242 mg/dL (01-25-23 @ 05:50)  POCT Blood Glucose.: 178 mg/dL (01-24-23 @ 23:46)  POCT Blood Glucose.: 160 mg/dL (01-24-23 @ 18:00)                          9.1    7.56  )-----------( 311      ( 26 Jan 2023 07:08 )             29.7     01-27    140  |  103  |  20  ----------------------------<  168<H>  4.2   |  28  |  0.79    Ca    8.4      27 Jan 2023 06:54  Phos  2.6     01-27  Mg     2.1     01-27    TPro  6.5  /  Alb  2.3<L>  /  TBili  0.2  /  DBili  x   /  AST  75<H>  /  ALT  40  /  AlkPhos  180<H>  01-27    eGFR: 76 mL/min/1.73m2 (27 Jan 2023 06:54)    01-08 Chol 238<H> Direct LDL -- LDL calculated 176<H> HDL 46<L> Trig 80  Thyroid Function Tests:      A1C with Estimated Average Glucose Result: 9.4 % (01-19-23 @ 13:11)  A1C with Estimated Average Glucose Result: 8.6 % (01-07-23 @ 05:40)    Estimated Average Glucose: 223 mg/dL (01-19-23 @ 13:11)  Estimated Average Glucose: 200 mg/dL (01-07-23 @ 05:40)        Diet, NPO with Tube Feed:   Tube Feeding Modality: Nasogastric  Glucerna 1.2 Wallace (GLUCERNARTH)  Total Volume for 24 Hours (mL): 1200  Continuous  Starting Tube Feed Rate mL per Hour: 20  Increase Tube Feed Rate by (mL): 10     Every 12 hours  Until Goal Tube Feed Rate (mL per Hour): 50  Tube Feed Duration (in Hours): 24  Tube Feed Start Time: 00:00 (01-23-23 @ 11:07) [Active]

## 2023-01-27 NOTE — PROGRESS NOTE ADULT - SUBJECTIVE AND OBJECTIVE BOX
Neurology Progress Note    S: Patient seen and examined. MRI done, c/f infarcts     Medication:  MEDICATIONS  (STANDING):  aspirin  chewable 81 milliGRAM(s) Oral daily  atorvastatin 80 milliGRAM(s) Oral at bedtime  clopidogrel Tablet 75 milliGRAM(s) Oral daily  dextrose 5%. 1000 milliLiter(s) (100 mL/Hr) IV Continuous <Continuous>  dextrose 50% Injectable 25 Gram(s) IV Push once  dextrose 50% Injectable 12.5 Gram(s) IV Push once  dextrose 50% Injectable 25 Gram(s) IV Push once  dextrose Oral Gel 15 Gram(s) Oral once  enoxaparin Injectable 40 milliGRAM(s) SubCutaneous every 24 hours  ertapenem  IVPB 1000 milliGRAM(s) IV Intermittent every 24 hours  glucagon  Injectable 1 milliGRAM(s) IntraMuscular once  insulin glargine Injectable (LANTUS) 11 Unit(s) SubCutaneous at bedtime  insulin lispro (ADMELOG) corrective regimen sliding scale   SubCutaneous every 6 hours  tamsulosin 0.4 milliGRAM(s) Oral at bedtime    MEDICATIONS  (PRN):  acetaminophen    Suspension .. 650 milliGRAM(s) Enteral Tube every 6 hours PRN Temp greater or equal to 38C (100.4F)    Vitals:    Vital Signs Last 24 Hrs  T(C): 37 (01-27-23 @ 10:00), Max: 37.4 (01-26-23 @ 22:32)  T(F): 98.6 (01-27-23 @ 10:00), Max: 99.3 (01-26-23 @ 22:32)  HR: 90 (01-27-23 @ 10:00) (90 - 108)  BP: 122/60 (01-27-23 @ 10:00) (100/55 - 134/66)  BP(mean): --  RR: 18 (01-27-23 @ 10:00) (18 - 18)  SpO2: 98% (01-27-23 @ 10:00) (98% - 100%)         General Exam:   General Appearance: Appropriately dressed and in no acute distress       Head: Normocephalic, atraumatic and no dysmorphic features  Ear, Nose, and Throat: Moist mucous membranes  CVS: S1S2+  Resp: No SOB, no wheeze or rhonchi  Abd: soft NTND  Extremities: No edema, no cyanosis  Skin: No bruises, no rashes        NEUROLOGICAL EXAM:    Mental status: Eyes open, awake, alert, attempts to produce speech, able to follow some simple commands, able to mimic at times , unable to ID objects  Cranial Nerves: Right facial droop, no nystagmus, blinks to threat B/L  Motor exam: Normal tone, no drift, Right hemiparesis RUE drift, 3-44/5, RLE drift, 3-4/5,, LUE 5/5, LLE 5/5  Sensation: Intact to light touch   Coordination/ Gait: Unable to participate with exam     I personally reviewed the below data/images/labs:    CBC Full  -  ( 26 Jan 2023 07:08 )  WBC Count : 7.56 K/uL  RBC Count : 3.38 M/uL  Hemoglobin : 9.1 g/dL  Hematocrit : 29.7 %  Platelet Count - Automated : 311 K/uL  Mean Cell Volume : 87.9 fl  Mean Cell Hemoglobin : 26.9 pg  Mean Cell Hemoglobin Concentration : 30.6 gm/dL  Auto Neutrophil # : x  Auto Lymphocyte # : x  Auto Monocyte # : x  Auto Eosinophil # : x  Auto Basophil # : x  Auto Neutrophil % : x  Auto Lymphocyte % : x  Auto Monocyte % : x  Auto Eosinophil % : x  Auto Basophil % : x    01-27    140  |  103  |  20  ----------------------------<  168<H>  4.2   |  28  |  0.79    Ca    8.4      27 Jan 2023 06:54  Phos  2.6     01-27  Mg     2.1     01-27    TPro  6.5  /  Alb  2.3<L>  /  TBili  0.2  /  DBili  x   /  AST  75<H>  /  ALT  40  /  AlkPhos  180<H>  01-27        `< from: CT Head No Cont (01.19.23 @ 13:45) >    ACC: [id number] EXAM:  CT BRAIN   ORDERED BY: CORINNE MADERA     PROCEDURE DATE:  01/19/2023          INTERPRETATION:  CLINICAL STATEMENT: Altered mental status    TECHNIQUE: CT of the head was performed without IV contrast.  RAPID   artificial intelligence was utilized for the preliminary evaluation of   intracranial hemorrhage.    COMPARISON: MRI brain 1/7/2023.    FINDINGS:  There is mild diffuse parenchymal volume loss. There are areas of low   attenuation in the periventricular white matter likely related to mild   chronic microvascular ischemic changes.    Evolving small infarcts noted in the left corona radiata.   Age-indeterminate infarct also noted in the left temporal and parietal   lobe    There is no acute intracranial hemorrhage, parenchymal mass, mass effect   or midline shift.. There is no hydrocephalus. Partial empty sella noted    The cranium is intact. Calcification noted adjacent to left frontal   artery table. The visualized paranasal sinuses are well-aerated.        IMPRESSION:  No acute intracranial hemorrhage.    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    --- End of Report ---        < from: MR Head No Cont (01.26.23 @ 22:08) >    ACC: [id number] EXAM:  MR BRAIN   ORDERED BY: POLLO CARCAMO     PROCEDURE DATE:  01/26/2023          INTERPRETATION:  CLINICAL STATEMENT: Multiple left MCA territory   infarcts. Altered mental status.    TECHNIQUE: Multiplanar multisequence noncontrast MRI of the brain was   performed. Diffusion weighted imaging was performed. Significant image   degradation by motion artifact.  COMPARISON: MRI brain 1/7/2023.    FINDINGS:    Corpus callosum, pituitary and pineal regions appear unremarkable. No  tonsillar herniation or evidence of marrow replacement process.   Hyperostosis frontalis. Degenerative changes visualized cervical spine.    CP angle and IAC regions appear within normal limits, as do the globes,   intraconal regions and major intracranial flow-voids. No paranasal sinus   air-fluid levels or opacification is evident.    New curvilinear-serpiginous susceptibility associated with the high left   frontal lobe. No evidence of acute hemorrhage. Slightly less pronounced   confluent restricted diffusion left lateral temporal lobe. Multiple less   pronounced foci of restricted diffusion with T2-FLAIR prolongation   centered in the left corona radiata, with extension into the ipsilateral   centrum semiovale. Several additional smaller less pronounced foci of   restricted diffusion left parieto-occipital and left posterior temporal   juxtacortical white matter as well as cortically based serpiginous   restricted diffusion left posterior temporal lobe.    IMPRESSION:    Compared with MRI Brain 1/7/2023.    1. Multiple evolving LEFT-SIDED subacute infarcts, as above.  2. Findings suspicious for high LEFT frontal subarachnoid hemorrhage.   Recommend correlation with noncontrast CT of the brain.  3. Additional findings above.    Findings and recommendations discussed in detail with PADMINI Carcamo at the   time of this interpretation.    --- End of Report ---           RAULITO SALAZAR M.D., ATTENDING RADIOLOGIST  This document has been electronically signed. Jan 27 2023 10:51AM    < end of copied text >           Neurology Progress Note    S: Patient seen and examined. MRI done, c/f infarcts     Medication:  MEDICATIONS  (STANDING):  aspirin  chewable 81 milliGRAM(s) Oral daily  atorvastatin 80 milliGRAM(s) Oral at bedtime  clopidogrel Tablet 75 milliGRAM(s) Oral daily  dextrose 5%. 1000 milliLiter(s) (100 mL/Hr) IV Continuous <Continuous>  dextrose 50% Injectable 25 Gram(s) IV Push once  dextrose 50% Injectable 12.5 Gram(s) IV Push once  dextrose 50% Injectable 25 Gram(s) IV Push once  dextrose Oral Gel 15 Gram(s) Oral once  enoxaparin Injectable 40 milliGRAM(s) SubCutaneous every 24 hours  ertapenem  IVPB 1000 milliGRAM(s) IV Intermittent every 24 hours  glucagon  Injectable 1 milliGRAM(s) IntraMuscular once  insulin glargine Injectable (LANTUS) 11 Unit(s) SubCutaneous at bedtime  insulin lispro (ADMELOG) corrective regimen sliding scale   SubCutaneous every 6 hours  tamsulosin 0.4 milliGRAM(s) Oral at bedtime    MEDICATIONS  (PRN):  acetaminophen    Suspension .. 650 milliGRAM(s) Enteral Tube every 6 hours PRN Temp greater or equal to 38C (100.4F)    Vitals:    Vital Signs Last 24 Hrs  T(C): 37 (01-27-23 @ 10:00), Max: 37.4 (01-26-23 @ 22:32)  T(F): 98.6 (01-27-23 @ 10:00), Max: 99.3 (01-26-23 @ 22:32)  HR: 90 (01-27-23 @ 10:00) (90 - 108)  BP: 122/60 (01-27-23 @ 10:00) (100/55 - 134/66)  BP(mean): --  RR: 18 (01-27-23 @ 10:00) (18 - 18)  SpO2: 98% (01-27-23 @ 10:00) (98% - 100%)         General Exam:   General Appearance: Appropriately dressed and in no acute distress       Head: Normocephalic, atraumatic and no dysmorphic features  Ear, Nose, and Throat: Moist mucous membranes  CVS: S1S2+  Resp: No SOB, no wheeze or rhonchi  Abd: soft NTND  Extremities: No edema, no cyanosis  Skin: No bruises, no rashes        NEUROLOGICAL EXAM:    Mental status: Eyes open, awake, alert, attempts to produce speech, able to follow some simple commands, able to mimic at times , unable to ID objects  Cranial Nerves: Right facial droop, no nystagmus, blinks to threat B/L  Motor exam: Normal tone, no drift, Right hemiparesis RUE drift, 3-44/5, RLE drift, 3-4/5,, LUE 5/5, LLE 5/5  Sensation: Intact to light touch   Coordination/ Gait: Unable to participate with exam     I personally reviewed the below data/images/labs:    CBC Full  -  ( 26 Jan 2023 07:08 )  WBC Count : 7.56 K/uL  RBC Count : 3.38 M/uL  Hemoglobin : 9.1 g/dL  Hematocrit : 29.7 %  Platelet Count - Automated : 311 K/uL  Mean Cell Volume : 87.9 fl  Mean Cell Hemoglobin : 26.9 pg  Mean Cell Hemoglobin Concentration : 30.6 gm/dL  Auto Neutrophil # : x  Auto Lymphocyte # : x  Auto Monocyte # : x  Auto Eosinophil # : x  Auto Basophil # : x  Auto Neutrophil % : x  Auto Lymphocyte % : x  Auto Monocyte % : x  Auto Eosinophil % : x  Auto Basophil % : x    01-27    140  |  103  |  20  ----------------------------<  168<H>  4.2   |  28  |  0.79    Ca    8.4      27 Jan 2023 06:54  Phos  2.6     01-27  Mg     2.1     01-27    TPro  6.5  /  Alb  2.3<L>  /  TBili  0.2  /  DBili  x   /  AST  75<H>  /  ALT  40  /  AlkPhos  180<H>  01-27        `< from: CT Head No Cont (01.19.23 @ 13:45) >    ACC: [id number] EXAM:  CT BRAIN   ORDERED BY: CORINNE MADERA     PROCEDURE DATE:  01/19/2023          INTERPRETATION:  CLINICAL STATEMENT: Altered mental status    TECHNIQUE: CT of the head was performed without IV contrast.  RAPID   artificial intelligence was utilized for the preliminary evaluation of   intracranial hemorrhage.    COMPARISON: MRI brain 1/7/2023.    FINDINGS:  There is mild diffuse parenchymal volume loss. There are areas of low   attenuation in the periventricular white matter likely related to mild   chronic microvascular ischemic changes.    Evolving small infarcts noted in the left corona radiata.   Age-indeterminate infarct also noted in the left temporal and parietal   lobe    There is no acute intracranial hemorrhage, parenchymal mass, mass effect   or midline shift.. There is no hydrocephalus. Partial empty sella noted    The cranium is intact. Calcification noted adjacent to left frontal   artery table. The visualized paranasal sinuses are well-aerated.        IMPRESSION:  No acute intracranial hemorrhage.    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    --- End of Report ---        < from: MR Head No Cont (01.26.23 @ 22:08) >    ACC: [id number] EXAM:  MR BRAIN   ORDERED BY: POLLO CARCAMO     PROCEDURE DATE:  01/26/2023          INTERPRETATION:  CLINICAL STATEMENT: Multiple left MCA territory   infarcts. Altered mental status.    TECHNIQUE: Multiplanar multisequence noncontrast MRI of the brain was   performed. Diffusion weighted imaging was performed. Significant image   degradation by motion artifact.  COMPARISON: MRI brain 1/7/2023.    FINDINGS:    Corpus callosum, pituitary and pineal regions appear unremarkable. No  tonsillar herniation or evidence of marrow replacement process.   Hyperostosis frontalis. Degenerative changes visualized cervical spine.    CP angle and IAC regions appear within normal limits, as do the globes,   intraconal regions and major intracranial flow-voids. No paranasal sinus   air-fluid levels or opacification is evident.    New curvilinear-serpiginous susceptibility associated with the high left   frontal lobe. No evidence of acute hemorrhage. Slightly less pronounced   confluent restricted diffusion left lateral temporal lobe. Multiple less   pronounced foci of restricted diffusion with T2-FLAIR prolongation   centered in the left corona radiata, with extension into the ipsilateral   centrum semiovale. Several additional smaller less pronounced foci of   restricted diffusion left parieto-occipital and left posterior temporal   juxtacortical white matter as well as cortically based serpiginous   restricted diffusion left posterior temporal lobe.    IMPRESSION:    Compared with MRI Brain 1/7/2023.    1. Multiple evolving LEFT-SIDED subacute infarcts, as above.  2. Findings suspicious for high LEFT frontal subarachnoid hemorrhage.   Recommend correlation with noncontrast CT of the brain.  3. Additional findings above.    Findings and recommendations discussed in detail with PADMINI Carcamo at the   time of this interpretation.    --- End of Report ---           RAULITO SALAZAR M.D., ATTENDING RADIOLOGIST  This document has been electronically signed. Jan 27 2023 10:51AM    < end of copied text >           Neurology Progress Note    S: Patient seen and examined. MRI done, c/f infarcts     Medication:  MEDICATIONS  (STANDING):  aspirin  chewable 81 milliGRAM(s) Oral daily  atorvastatin 80 milliGRAM(s) Oral at bedtime  clopidogrel Tablet 75 milliGRAM(s) Oral daily  dextrose 5%. 1000 milliLiter(s) (100 mL/Hr) IV Continuous <Continuous>  dextrose 50% Injectable 25 Gram(s) IV Push once  dextrose 50% Injectable 12.5 Gram(s) IV Push once  dextrose 50% Injectable 25 Gram(s) IV Push once  dextrose Oral Gel 15 Gram(s) Oral once  enoxaparin Injectable 40 milliGRAM(s) SubCutaneous every 24 hours  ertapenem  IVPB 1000 milliGRAM(s) IV Intermittent every 24 hours  glucagon  Injectable 1 milliGRAM(s) IntraMuscular once  insulin glargine Injectable (LANTUS) 11 Unit(s) SubCutaneous at bedtime  insulin lispro (ADMELOG) corrective regimen sliding scale   SubCutaneous every 6 hours  tamsulosin 0.4 milliGRAM(s) Oral at bedtime    MEDICATIONS  (PRN):  acetaminophen    Suspension .. 650 milliGRAM(s) Enteral Tube every 6 hours PRN Temp greater or equal to 38C (100.4F)    Vitals:    Vital Signs Last 24 Hrs  T(C): 37 (01-27-23 @ 10:00), Max: 37.4 (01-26-23 @ 22:32)  T(F): 98.6 (01-27-23 @ 10:00), Max: 99.3 (01-26-23 @ 22:32)  HR: 90 (01-27-23 @ 10:00) (90 - 108)  BP: 122/60 (01-27-23 @ 10:00) (100/55 - 134/66)  BP(mean): --  RR: 18 (01-27-23 @ 10:00) (18 - 18)  SpO2: 98% (01-27-23 @ 10:00) (98% - 100%)         General Exam:   General Appearance: Appropriately dressed and in no acute distress       Head: Normocephalic, atraumatic and no dysmorphic features  Ear, Nose, and Throat: Moist mucous membranes  CVS: S1S2+  Resp: No SOB, no wheeze or rhonchi  Abd: soft NTND  Extremities: No edema, no cyanosis  Skin: No bruises, no rashes        NEUROLOGICAL EXAM:    Mental status: Eyes open, awake, alert, attempts to produce speech, able to follow some simple commands, able to mimic at times , unable to ID objects  Cranial Nerves: Right facial droop, no nystagmus, blinks to threat B/L  Motor exam: Normal tone, no drift, Right hemiparesis RUE drift, 3-44/5, RLE drift, 3-4/5,, LUE 5/5, LLE 5/5  Sensation: Intact to light touch   Coordination/ Gait: Unable to participate with exam     I personally reviewed the below data/images/labs:    CBC Full  -  ( 26 Jan 2023 07:08 )  WBC Count : 7.56 K/uL  RBC Count : 3.38 M/uL  Hemoglobin : 9.1 g/dL  Hematocrit : 29.7 %  Platelet Count - Automated : 311 K/uL  Mean Cell Volume : 87.9 fl  Mean Cell Hemoglobin : 26.9 pg  Mean Cell Hemoglobin Concentration : 30.6 gm/dL  Auto Neutrophil # : x  Auto Lymphocyte # : x  Auto Monocyte # : x  Auto Eosinophil # : x  Auto Basophil # : x  Auto Neutrophil % : x  Auto Lymphocyte % : x  Auto Monocyte % : x  Auto Eosinophil % : x  Auto Basophil % : x    01-27    140  |  103  |  20  ----------------------------<  168<H>  4.2   |  28  |  0.79    Ca    8.4      27 Jan 2023 06:54  Phos  2.6     01-27  Mg     2.1     01-27    TPro  6.5  /  Alb  2.3<L>  /  TBili  0.2  /  DBili  x   /  AST  75<H>  /  ALT  40  /  AlkPhos  180<H>  01-27        `< from: CT Head No Cont (01.19.23 @ 13:45) >    ACC: [id number] EXAM:  CT BRAIN   ORDERED BY: CORINNE MADERA     PROCEDURE DATE:  01/19/2023          INTERPRETATION:  CLINICAL STATEMENT: Altered mental status    TECHNIQUE: CT of the head was performed without IV contrast.  RAPID   artificial intelligence was utilized for the preliminary evaluation of   intracranial hemorrhage.    COMPARISON: MRI brain 1/7/2023.    FINDINGS:  There is mild diffuse parenchymal volume loss. There are areas of low   attenuation in the periventricular white matter likely related to mild   chronic microvascular ischemic changes.    Evolving small infarcts noted in the left corona radiata.   Age-indeterminate infarct also noted in the left temporal and parietal   lobe    There is no acute intracranial hemorrhage, parenchymal mass, mass effect   or midline shift.. There is no hydrocephalus. Partial empty sella noted    The cranium is intact. Calcification noted adjacent to left frontal   artery table. The visualized paranasal sinuses are well-aerated.        IMPRESSION:  No acute intracranial hemorrhage.    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    --- End of Report ---        < from: MR Head No Cont (01.26.23 @ 22:08) >    ACC: [id number] EXAM:  MR BRAIN   ORDERED BY: POLLO CARCAMO     PROCEDURE DATE:  01/26/2023          INTERPRETATION:  CLINICAL STATEMENT: Multiple left MCA territory   infarcts. Altered mental status.    TECHNIQUE: Multiplanar multisequence noncontrast MRI of the brain was   performed. Diffusion weighted imaging was performed. Significant image   degradation by motion artifact.  COMPARISON: MRI brain 1/7/2023.    FINDINGS:    Corpus callosum, pituitary and pineal regions appear unremarkable. No  tonsillar herniation or evidence of marrow replacement process.   Hyperostosis frontalis. Degenerative changes visualized cervical spine.    CP angle and IAC regions appear within normal limits, as do the globes,   intraconal regions and major intracranial flow-voids. No paranasal sinus   air-fluid levels or opacification is evident.    New curvilinear-serpiginous susceptibility associated with the high left   frontal lobe. No evidence of acute hemorrhage. Slightly less pronounced   confluent restricted diffusion left lateral temporal lobe. Multiple less   pronounced foci of restricted diffusion with T2-FLAIR prolongation   centered in the left corona radiata, with extension into the ipsilateral   centrum semiovale. Several additional smaller less pronounced foci of   restricted diffusion left parieto-occipital and left posterior temporal   juxtacortical white matter as well as cortically based serpiginous   restricted diffusion left posterior temporal lobe.    IMPRESSION:    Compared with MRI Brain 1/7/2023.    1. Multiple evolving LEFT-SIDED subacute infarcts, as above.  2. Findings suspicious for high LEFT frontal subarachnoid hemorrhage.   Recommend correlation with noncontrast CT of the brain.  3. Additional findings above.    Findings and recommendations discussed in detail with PADMINI Carcamo at the   time of this interpretation.    --- End of Report ---           RAULITO SALAZAR M.D., ATTENDING RADIOLOGIST  This document has been electronically signed. Jan 27 2023 10:51AM    < end of copied text >

## 2023-01-27 NOTE — OCCUPATIONAL THERAPY INITIAL EVALUATION ADULT - GENERAL OBSERVATIONS, REHAB EVAL
Pt was received semi-supine, +NG tube, +IV, +bilateral mitts, +gonzalez and daughter at bedside Pt was received semi-supine, +NG tube, +IV, +bilateral mitts, +goznalez and daughter at bedside

## 2023-01-27 NOTE — OCCUPATIONAL THERAPY INITIAL EVALUATION ADULT - LIVES WITH, PROFILE
Pt lives in apartment with an elevator. Pt has a walk-in shower and lives with daughter./children Pt lives in apartment with an elevator. Pt has a walk-in shower and lives with son./children

## 2023-01-27 NOTE — PROGRESS NOTE ADULT - ASSESSMENT
78 f with DM, dementia recently discharged about 1 week ago for L MCA CVA, brought in 1/19 with poor PO intake, lethargy and hyperglycemia to 500s.   afebrile, WBC: 15, Na: 158, glucose  468  blood cx negative, urine cx>3 organisms but with E-coli and pt was on ceftriaxone  CXR: unchanged scarring, no focal consolidation  head CT: evolving stroke  pt had leaking urine around the gonzalez with minimal output from the gonzalez and bladder scan c/w retention, gonzalez was exchanged 1/21 and again on 1/22 similar problem, urology came and could not irrigate the gonzalez so removed gonzalez after which a large mucous plug was pulled out from vaginal/urethral area- unclear as to where it originated. white/gray in color. 14f coude attempted prior to successful insertion of 16f gonzalez, cloudy brown urine drained.  there is a culture sent which is labeled abscess at catheter size which is likely the mucous plug and is growing klebsiella aerogenes, E. feacalis and E. avium  pt spiked to 100.6    initial leukocytosis and AMS with hypernatremia and hyperglycemia with dehydration, not sure if she had UTI at that point, urine cx showed >3 organisms and E-coli and was given ceftriaxone  had difficulty with draining gonzalez and retention, so urology removed the gonzalez and there was a large ?mucous plug pulled out from vaginal/uretral area which is growing klebsiella aerogenes, E. faecalis, E. avium and bacteroides,  pt febrile to 100.6 after but WBC normalized  lice s/p permethrin   * blood cx negative   * c/w ertapenem for now ( for the klebsiella aerogenes and will also covers the bacteroides I don't believe we have to address the enterococcus, it is a polymicrobial culture and was a ?mucous plug) started 1/24 now day 4, s/p ceftriaxone 1/19-1/24  * will complete a 10 day course, but if ready for discharge will switch to PO  * monitor CBC/diff and renal function  * if persistent lice will need malathion     The above assessment and plan was discussed with the primary team    Minnie Baez MD  contact on teams  After 5pm and on weekends call [phone] 78 f with DM, dementia recently discharged about 1 week ago for L MCA CVA, brought in 1/19 with poor PO intake, lethargy and hyperglycemia to 500s.   afebrile, WBC: 15, Na: 158, glucose  468  blood cx negative, urine cx>3 organisms but with E-coli and pt was on ceftriaxone  CXR: unchanged scarring, no focal consolidation  head CT: evolving stroke  pt had leaking urine around the gonzalez with minimal output from the gonzalez and bladder scan c/w retention, gonzalez was exchanged 1/21 and again on 1/22 similar problem, urology came and could not irrigate the gonzalez so removed gonzalez after which a large mucous plug was pulled out from vaginal/urethral area- unclear as to where it originated. white/gray in color. 14f coude attempted prior to successful insertion of 16f gonzalez, cloudy brown urine drained.  there is a culture sent which is labeled abscess at catheter size which is likely the mucous plug and is growing klebsiella aerogenes, E. feacalis and E. avium  pt spiked to 100.6    initial leukocytosis and AMS with hypernatremia and hyperglycemia with dehydration, not sure if she had UTI at that point, urine cx showed >3 organisms and E-coli and was given ceftriaxone  had difficulty with draining gonzalez and retention, so urology removed the gonzalez and there was a large ?mucous plug pulled out from vaginal/uretral area which is growing klebsiella aerogenes, E. faecalis, E. avium and bacteroides,  pt febrile to 100.6 after but WBC normalized  lice s/p permethrin   * blood cx negative   * c/w ertapenem for now ( for the klebsiella aerogenes and will also covers the bacteroides I don't believe we have to address the enterococcus, it is a polymicrobial culture and was a ?mucous plug) started 1/24 now day 4, s/p ceftriaxone 1/19-1/24  * will complete a 10 day course, but if ready for discharge will switch to PO  * monitor CBC/diff and renal function  * if persistent lice will need malathion     The above assessment and plan was discussed with the primary team    Minnie Baez MD  contact on teams  After 5pm and on weekends call [phone] 78 f with DM, dementia recently discharged about 1 week ago for L MCA CVA, brought in 1/19 with poor PO intake, lethargy and hyperglycemia to 500s.   afebrile, WBC: 15, Na: 158, glucose  468  blood cx negative, urine cx>3 organisms but with E-coli and pt was on ceftriaxone  CXR: unchanged scarring, no focal consolidation  head CT: evolving stroke  pt had leaking urine around the gonzalez with minimal output from the gonzalez and bladder scan c/w retention, gonzalez was exchanged 1/21 and again on 1/22 similar problem, urology came and could not irrigate the gonzalez so removed gonzalez after which a large mucous plug was pulled out from vaginal/urethral area- unclear as to where it originated. white/gray in color. 14f coude attempted prior to successful insertion of 16f gonzalez, cloudy brown urine drained.  there is a culture sent which is labeled abscess at catheter size which is likely the mucous plug and is growing klebsiella aerogenes, E. feacalis and E. avium  pt spiked to 100.6    initial leukocytosis and AMS with hypernatremia and hyperglycemia with dehydration, not sure if she had UTI at that point, urine cx showed >3 organisms and E-coli and was given ceftriaxone  had difficulty with draining gonzalez and retention, so urology removed the gonzalez and there was a large ?mucous plug pulled out from vaginal/uretral area which is growing klebsiella aerogenes, E. faecalis, E. avium and bacteroides,  pt febrile to 100.6 after but WBC normalized  lice s/p permethrin   * blood cx negative   * c/w ertapenem for now ( for the klebsiella aerogenes and will also covers the bacteroides I don't believe we have to address the enterococcus, it is a polymicrobial culture and was a ?mucous plug) started 1/24 now day 4, s/p ceftriaxone 1/19-1/24  * will complete a 10 day course, but if ready for discharge will switch to PO  * monitor CBC/diff and renal function  * if persistent lice will need malathion     The above assessment and plan was discussed with the primary team    Minnie Baez MD  contact on teams  After 5pm and on weekends call [phone]

## 2023-01-27 NOTE — PROGRESS NOTE ADULT - ASSESSMENT
78F with dementia, T2DM, recently discharged about 1 week ago for CVA now presenting with poor PO intake, lethargy and hyperglycemia to 500s.     Altered mental status, UTI .   - Since recent stroke,  A&Ox0. More recently, poor po intake, lethargic. CTH no acute intracranial abnormalities. Multifactorial   - Multiple metabolic derangements - dehydration, hyperglycemia, hyperNa, hypoK  - S/P IVF D5 75 CC   - Monitor and replete electrolytes PRN   - UA with +leuk esterase, many bacteria. C/w ceftriaxone for now. F/u UCx -- E. coli  - on Ertapenem   - 01/19 BCx2 w/ Neg final   - 01/24 BCx2 w/ NGTD; F/u final   - Monitor fever curve, WBC trend   - Neuro eval consulted; F/u recs   - Aspiration precautions   - Discussed with family, agreed for NGT for feeding; NGT replaced ON 01/26   - Retention - replace gonzalez, check bladder US -- Noted, per attending discussion with family, family to decide on further work up     Uncontrolled type 2 diabetes mellitus with hyperglycemia.   - D/c on Metformin 500 mg BID  - Now with glucose 500s, improved to mid 300s with fluids. Also with mild ketosis (AG 17, bicarb 21, BHB 0.7)  - Endo eval appreciated, BHB now WNL  - Lantus 11 units QHs added to regimen & Sliding scale  - Monitor glucose levels closely, avoid hypo/hyperglycemia   - Check Ab given thin body habitus and ketosis (Glutamic Acid Decarboxylase, Zinc Transporter 8, Islet Cell Ab, and IA-2 Ab)  - S/PIVF administration  - Endo follow up; F/u recs      Cerebrovascular accident (CVA).  - Recent admission w/ Acute L MCA infarct, severe stenosis of the L M2 branch. MR with L MCA [place] infarcts   - CTH on admission showing No acute intracranial hemorrhage. 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 Per neurology, expected change seen on CT, not new lesions.   - C/w asa, plavix, statin.  - Neuro eval consulted; F/u recs   - Check MR brain -- C/F L sided Subacute infarcts, L frontal SAH; F/u neuro recs  - Checking CT head to evaluate for bleed  - Hold lovenox DVT PPX  - NSGY eval       Febrile  - Cx with E. Faecalis, E. Avium; UCx with E. coli   - 01/19 BCx2 W/ Neg final   - 01/24 repeat BCx2  --> NGTD; F/u final  - ID eval consulted; F/u recs  - ABX as per ID, switched to ertapenem , sensitive  - If recurrent fever, check CT as per ID   - Monitor CBC, temp curve, VS and patient closely     Anemia  - Drop in hemoglobin   - Checking Iron, B12, Folate, Ferritin, TIBC -- not consistent with deficiency and Occult -- pending   - Monitor H/H closely, monitor for signs/symptoms of bleeding    Hypernatremia.   - S/P D5 50 CC x 10 hours   - F/u DM management as per above  - Monitor Na level closely, avoid overocrrection   - C/w Free water, monitor levels closely     Pediculosis   - ID eval   - Nix as per protocol   - Tx today 01/26  - Contact precautions    HypoK  - Monitor and replete electrolytes PRN     Hyperlipidemia.   -c/w atorvastatin.    Hypertension.    -amlodipine 5mg daily.      PPX  - Lovenox 40 Qd

## 2023-01-27 NOTE — PROGRESS NOTE ADULT - SUBJECTIVE AND OBJECTIVE BOX
[facility]   MD PUSHPA WICK MD, PA KRISTINE SOLTANPOUR, DO INJUNG KO, NP    TEL:  OFFICE: [phone]    From 5pm-7am Answering Service [phone]    -- RENAL FOLLOW UP NOTE ---Date of Service 01-27-23 @ 11:32    Patient is a 78y old  Female who presents with a chief complaint of AMS, elevated finger sticks (26 Jan 2023 19:18)      Patient seen and examined at bedside.     VITALS:  T(F): 98.7 (01-27-23 @ 06:35), Max: 99.3 (01-26-23 @ 22:32)  HR: 108 (01-27-23 @ 06:35)  BP: 100/55 (01-27-23 @ 06:35)  RR: 18 (01-27-23 @ 06:35)  SpO2: 100% (01-27-23 @ 06:35)  Wt(kg): --    01-26 @ 07:01  -  01-27 @ 07:00  --------------------------------------------------------  IN: 1000 mL / OUT: 150 mL / NET: 850 mL          PHYSICAL EXAM:  Constitutional: NAD  Neck: No JVD  Respiratory: CTAB, no wheezes, rales or rhonchi  Cardiovascular: S1, S2, RRR  Gastrointestinal: BS+, soft, NT/ND  Extremities: No peripheral edema    Hospital Medications:   MEDICATIONS  (STANDING):  aspirin  chewable 81 milliGRAM(s) Oral daily  atorvastatin 80 milliGRAM(s) Oral at bedtime  clopidogrel Tablet 75 milliGRAM(s) Oral daily  dextrose 5%. 1000 milliLiter(s) (100 mL/Hr) IV Continuous <Continuous>  dextrose 50% Injectable 25 Gram(s) IV Push once  dextrose 50% Injectable 12.5 Gram(s) IV Push once  dextrose 50% Injectable 25 Gram(s) IV Push once  dextrose Oral Gel 15 Gram(s) Oral once  enoxaparin Injectable 40 milliGRAM(s) SubCutaneous every 24 hours  ertapenem  IVPB 1000 milliGRAM(s) IV Intermittent every 24 hours  glucagon  Injectable 1 milliGRAM(s) IntraMuscular once  insulin glargine Injectable (LANTUS) 11 Unit(s) SubCutaneous at bedtime  insulin lispro (ADMELOG) corrective regimen sliding scale   SubCutaneous every 6 hours  tamsulosin 0.4 milliGRAM(s) Oral at bedtime      LABS:  01-27    140  |  103  |  20  ----------------------------<  168<H>  4.2   |  28  |  0.79    Ca    8.4      27 Jan 2023 06:54  Phos  2.6     01-27  Mg     2.1     01-27    TPro  6.5  /  Alb  2.3<L>  /  TBili  0.2  /  DBili      /  AST  75<H>  /  ALT  40  /  AlkPhos  180<H>  01-27    Creatinine Trend: 0.79 <--, 0.63 <--, 0.70 <--, 0.79 <--, 0.81 <--, 0.76 <--, 0.77 <--, 0.74 <--, 0.72 <--    Albumin, Serum: 2.3 g/dL (01-27 @ 06:54)  Phosphorus Level, Serum: 2.6 mg/dL (01-27 @ 06:54)                              9.1    7.56  )-----------( 311      ( 26 Jan 2023 07:08 )             29.7     Urine Studies:  Urinalysis - [01-19-23 @ 13:15]      Color DARK BROWN / Appearance Turbid / SG 1.023 / pH 7.0      Gluc 200 mg/dL / Ketone Trace  / Bili Negative / Urobili 6 mg/dL       Blood Large / Protein >600 / Leuk Est Large / Nitrite Negative      RBC 15 / WBC 11-25 / Hyaline  / Gran 4 / Sq Epi  / Non Sq Epi Few / Bacteria Many      Iron 17, TIBC 187, %sat 9      [01-25-23 @ 07:18]  Ferritin 172      [01-25-23 @ 07:18]  Lipid: chol 238, TG 80, HDL 46, LDL --      [01-08-23 @ 02:47]        RADIOLOGY & ADDITIONAL STUDIES:   [facility] [place] [facility]   MD PUSHPA WICK MD, PA KRISTINE SOLTANPOUR, DO INJUNG KO, NP    TEL:  OFFICE: [phone]    From 5pm-7am Answering Service [phone]    -- RENAL FOLLOW UP NOTE ---Date of Service 01-27-23 @ 11:32    Patient is a 78y old  Female who presents with a chief complaint of AMS, elevated finger sticks (26 Jan 2023 19:18)      Patient seen and examined at bedside.     VITALS:  T(F): 98.7 (01-27-23 @ 06:35), Max: 99.3 (01-26-23 @ 22:32)  HR: 108 (01-27-23 @ 06:35)  BP: 100/55 (01-27-23 @ 06:35)  RR: 18 (01-27-23 @ 06:35)  SpO2: 100% (01-27-23 @ 06:35)  Wt(kg): --    01-26 @ 07:01  -  01-27 @ 07:00  --------------------------------------------------------  IN: 1000 mL / OUT: 150 mL / NET: 850 mL          PHYSICAL EXAM:  Constitutional: NAD  Neck: No JVD  Respiratory: CTAB, no wheezes, rales or rhonchi  Cardiovascular: S1, S2, RRR  Gastrointestinal: BS+, soft, NT/ND  Extremities: No peripheral edema    Hospital Medications:   MEDICATIONS  (STANDING):  aspirin  chewable 81 milliGRAM(s) Oral daily  atorvastatin 80 milliGRAM(s) Oral at bedtime  clopidogrel Tablet 75 milliGRAM(s) Oral daily  dextrose 5%. 1000 milliLiter(s) (100 mL/Hr) IV Continuous <Continuous>  dextrose 50% Injectable 25 Gram(s) IV Push once  dextrose 50% Injectable 12.5 Gram(s) IV Push once  dextrose 50% Injectable 25 Gram(s) IV Push once  dextrose Oral Gel 15 Gram(s) Oral once  enoxaparin Injectable 40 milliGRAM(s) SubCutaneous every 24 hours  ertapenem  IVPB 1000 milliGRAM(s) IV Intermittent every 24 hours  glucagon  Injectable 1 milliGRAM(s) IntraMuscular once  insulin glargine Injectable (LANTUS) 11 Unit(s) SubCutaneous at bedtime  insulin lispro (ADMELOG) corrective regimen sliding scale   SubCutaneous every 6 hours  tamsulosin 0.4 milliGRAM(s) Oral at bedtime      LABS:  01-27    140  |  103  |  20  ----------------------------<  168<H>  4.2   |  28  |  0.79    Ca    8.4      27 Jan 2023 06:54  Phos  2.6     01-27  Mg     2.1     01-27    TPro  6.5  /  Alb  2.3<L>  /  TBili  0.2  /  DBili      /  AST  75<H>  /  ALT  40  /  AlkPhos  180<H>  01-27    Creatinine Trend: 0.79 <--, 0.63 <--, 0.70 <--, 0.79 <--, 0.81 <--, 0.76 <--, 0.77 <--, 0.74 <--, 0.72 <--    Albumin, Serum: 2.3 g/dL (01-27 @ 06:54)  Phosphorus Level, Serum: 2.6 mg/dL (01-27 @ 06:54)                              9.1    7.56  )-----------( 311      ( 26 Jan 2023 07:08 )             29.7     Urine Studies:  Urinalysis - [01-19-23 @ 13:15]      Color DARK BROWN / Appearance Turbid / SG 1.023 / pH 7.0      Gluc 200 mg/dL / Ketone Trace  / Bili Negative / Urobili 6 mg/dL       Blood Large / Protein >600 / Leuk Est Large / Nitrite Negative      RBC 15 / WBC 11-25 / Hyaline  / Gran 4 / Sq Epi  / Non Sq Epi Few / Bacteria Many      Iron 17, TIBC 187, %sat 9      [01-25-23 @ 07:18]  Ferritin 172      [01-25-23 @ 07:18]  Lipid: chol 238, TG 80, HDL 46, LDL --      [01-08-23 @ 02:47]        RADIOLOGY & ADDITIONAL STUDIES:   [facility]   MD PUSHPA WICK MD, PA KRISTINE SOLTANPOUR, DO INJUNG KO, NP    TEL:  OFFICE: [phone]    From 5pm-7am Answering Service [phone]    -- RENAL FOLLOW UP NOTE ---Date of Service 01-27-23 @ 11:32    Patient is a 78y old  Female who presents with a chief complaint of AMS, elevated finger sticks (26 Jan 2023 19:18)      Patient seen and examined at bedside.     VITALS:  T(F): 98.7 (01-27-23 @ 06:35), Max: 99.3 (01-26-23 @ 22:32)  HR: 108 (01-27-23 @ 06:35)  BP: 100/55 (01-27-23 @ 06:35)  RR: 18 (01-27-23 @ 06:35)  SpO2: 100% (01-27-23 @ 06:35)  Wt(kg): --    01-26 @ 07:01  -  01-27 @ 07:00  --------------------------------------------------------  IN: 1000 mL / OUT: 150 mL / NET: 850 mL          PHYSICAL EXAM:  Constitutional: NAD  Neck: No JVD  Respiratory: CTAB, no wheezes, rales or rhonchi  Cardiovascular: S1, S2, RRR  Gastrointestinal: BS+, soft, NT/ND  Extremities: No peripheral edema    Hospital Medications:   MEDICATIONS  (STANDING):  aspirin  chewable 81 milliGRAM(s) Oral daily  atorvastatin 80 milliGRAM(s) Oral at bedtime  clopidogrel Tablet 75 milliGRAM(s) Oral daily  dextrose 5%. 1000 milliLiter(s) (100 mL/Hr) IV Continuous <Continuous>  dextrose 50% Injectable 25 Gram(s) IV Push once  dextrose 50% Injectable 12.5 Gram(s) IV Push once  dextrose 50% Injectable 25 Gram(s) IV Push once  dextrose Oral Gel 15 Gram(s) Oral once  enoxaparin Injectable 40 milliGRAM(s) SubCutaneous every 24 hours  ertapenem  IVPB 1000 milliGRAM(s) IV Intermittent every 24 hours  glucagon  Injectable 1 milliGRAM(s) IntraMuscular once  insulin glargine Injectable (LANTUS) 11 Unit(s) SubCutaneous at bedtime  insulin lispro (ADMELOG) corrective regimen sliding scale   SubCutaneous every 6 hours  tamsulosin 0.4 milliGRAM(s) Oral at bedtime      LABS:  01-27    140  |  103  |  20  ----------------------------<  168<H>  4.2   |  28  |  0.79    Ca    8.4      27 Jan 2023 06:54  Phos  2.6     01-27  Mg     2.1     01-27    TPro  6.5  /  Alb  2.3<L>  /  TBili  0.2  /  DBili      /  AST  75<H>  /  ALT  40  /  AlkPhos  180<H>  01-27    Creatinine Trend: 0.79 <--, 0.63 <--, 0.70 <--, 0.79 <--, 0.81 <--, 0.76 <--, 0.77 <--, 0.74 <--, 0.72 <--    Albumin, Serum: 2.3 g/dL (01-27 @ 06:54)  Phosphorus Level, Serum: 2.6 mg/dL (01-27 @ 06:54)                              9.1    7.56  )-----------( 311      ( 26 Jan 2023 07:08 )             29.7     Urine Studies:  Urinalysis - [01-19-23 @ 13:15]      Color DARK BROWN / Appearance Turbid / SG 1.023 / pH 7.0      Gluc 200 mg/dL / Ketone Trace  / Bili Negative / Urobili 6 mg/dL       Blood Large / Protein >600 / Leuk Est Large / Nitrite Negative      RBC 15 / WBC 11-25 / Hyaline  / Gran 4 / Sq Epi  / Non Sq Epi Few / Bacteria Many      Iron 17, TIBC 187, %sat 9      [01-25-23 @ 07:18]  Ferritin 172      [01-25-23 @ 07:18]  Lipid: chol 238, TG 80, HDL 46, LDL --      [01-08-23 @ 02:47]        RADIOLOGY & ADDITIONAL STUDIES:

## 2023-01-27 NOTE — PROGRESS NOTE ADULT - SUBJECTIVE AND OBJECTIVE BOX
Name of Patient : SARAH DISLA  MRN: [id number]  Date of visit: 01-27-23 @ 16:58      Subjective: Patient seen and examined. No new events except as noted.   Patient seen earlier this AM. S/P MR brain yesterday     REVIEW OF SYSTEMS:  Unable to obtain ROS 2/2 clinical condition     MEDICATIONS:  MEDICATIONS  (STANDING):  aspirin  chewable 81 milliGRAM(s) Oral daily  atorvastatin 80 milliGRAM(s) Oral at bedtime  clopidogrel Tablet 75 milliGRAM(s) Oral daily  dextrose 5%. 1000 milliLiter(s) (100 mL/Hr) IV Continuous <Continuous>  dextrose 50% Injectable 25 Gram(s) IV Push once  dextrose 50% Injectable 12.5 Gram(s) IV Push once  dextrose 50% Injectable 25 Gram(s) IV Push once  dextrose Oral Gel 15 Gram(s) Oral once  enoxaparin Injectable 40 milliGRAM(s) SubCutaneous every 24 hours  ertapenem  IVPB 1000 milliGRAM(s) IV Intermittent every 24 hours  glucagon  Injectable 1 milliGRAM(s) IntraMuscular once  insulin glargine Injectable (LANTUS) 13 Unit(s) SubCutaneous at bedtime  insulin lispro (ADMELOG) corrective regimen sliding scale   SubCutaneous every 6 hours  tamsulosin 0.4 milliGRAM(s) Oral at bedtime      PHYSICAL EXAM:  T(C): 37 (01-27-23 @ 10:00), Max: 37.4 (01-26-23 @ 22:32)  HR: 90 (01-27-23 @ 10:00) (90 - 108)  BP: 122/60 (01-27-23 @ 10:00) (100/55 - 134/66)  RR: 18 (01-27-23 @ 10:00) (18 - 18)  SpO2: 98% (01-27-23 @ 10:00) (98% - 100%)  Wt(kg): --  I&O's Summary    26 Jan 2023 07:01  -  27 Jan 2023 07:00  --------------------------------------------------------  IN: 1000 mL / OUT: 150 mL / NET: 850 mL    27 Jan 2023 07:01 - 27 Jan 2023 16:58  --------------------------------------------------------  IN: 490 mL / OUT: 300 mL / NET: 190 mL            Appearance: Lying down in bed, NGT re-placed   HEENT: Eyes are open; NGT; Hair covered   Lymphatic: No lymphadenopathy   Cardiovascular: Normal S1 S2   Respiratory: normal effort , clear  Gastrointestinal:  Soft, Non-tender  Skin: No rashes grossly   Psychiatry:  Lethargic   Musculoskeletal: No edema        01-26-23 @ 07:01  -  01-27-23 @ 07:00  --------------------------------------------------------  IN: 1000 mL / OUT: 150 mL / NET: 850 mL    01-27-23 @ 07:01  -  01-27-23 @ 16:58  --------------------------------------------------------  IN: 490 mL / OUT: 300 mL / NET: 190 mL                                  9.1    7.56  )-----------( 311      ( 26 Jan 2023 07:08 )             29.7               01-27    140  |  103  |  20  ----------------------------<  168<H>  4.2   |  28  |  0.79    Ca    8.4      27 Jan 2023 06:54  Phos  2.6     01-27  Mg     2.1     01-27    TPro  6.5  /  Alb  2.3<L>  /  TBili  0.2  /  DBili  x   /  AST  75<H>  /  ALT  40  /  AlkPhos  180<H>  01-27                         < from: MR Head No Cont (01.26.23 @ 22:08) >  IMPRESSION:    Compared with MRI Brain 1/7/2023.    1. Multiple evolving LEFT-SIDED subacute infarcts, as above.  2. Findings suspicious for high LEFT frontal subarachnoid hemorrhage.   Recommend correlation with noncontrast CT of the brain.  3. Additional findings above.    Findings and recommendations discussed in detail with PADMINI Carcamo at the   time of this interpretation.    --- End of Report ---    < end of copied text >      Culture - Blood in AM (01.24.23 @ 07:50)   Specimen Source: .Blood Blood-Peripheral   Culture Results:   No growth to date.     Culture - Blood in AM (01.24.23 @ 07:09)   Specimen Source: .Blood Blood-Peripheral   Culture Results:   No growth to date.    Name of Patient : SARAH DISLA  MRN: [id number]  Date of visit: 01-27-23 @ 16:58      Subjective: Patient seen and examined. No new events except as noted.   Patient seen earlier this AM. S/P MR brain yesterday     REVIEW OF SYSTEMS:  Unable to obtain ROS 2/2 clinical condition     MEDICATIONS:  MEDICATIONS  (STANDING):  aspirin  chewable 81 milliGRAM(s) Oral daily  atorvastatin 80 milliGRAM(s) Oral at bedtime  clopidogrel Tablet 75 milliGRAM(s) Oral daily  dextrose 5%. 1000 milliLiter(s) (100 mL/Hr) IV Continuous <Continuous>  dextrose 50% Injectable 25 Gram(s) IV Push once  dextrose 50% Injectable 12.5 Gram(s) IV Push once  dextrose 50% Injectable 25 Gram(s) IV Push once  dextrose Oral Gel 15 Gram(s) Oral once  enoxaparin Injectable 40 milliGRAM(s) SubCutaneous every 24 hours  ertapenem  IVPB 1000 milliGRAM(s) IV Intermittent every 24 hours  glucagon  Injectable 1 milliGRAM(s) IntraMuscular once  insulin glargine Injectable (LANTUS) 13 Unit(s) SubCutaneous at bedtime  insulin lispro (ADMELOG) corrective regimen sliding scale   SubCutaneous every 6 hours  tamsulosin 0.4 milliGRAM(s) Oral at bedtime      PHYSICAL EXAM:  T(C): 37 (01-27-23 @ 10:00), Max: 37.4 (01-26-23 @ 22:32)  HR: 90 (01-27-23 @ 10:00) (90 - 108)  BP: 122/60 (01-27-23 @ 10:00) (100/55 - 134/66)  RR: 18 (01-27-23 @ 10:00) (18 - 18)  SpO2: 98% (01-27-23 @ 10:00) (98% - 100%)  Wt(kg): --  I&O's Summary    26 Jan 2023 07:01  -  27 Jan 2023 07:00  --------------------------------------------------------  IN: 1000 mL / OUT: 150 mL / NET: 850 mL    27 Jan 2023 07:01 - 27 Jan 2023 16:58  --------------------------------------------------------  IN: 490 mL / OUT: 300 mL / NET: 190 mL            Appearance: Lying down in bed, NGT re-placed   HEENT: Eyes are open; NGT; Hair covered   Lymphatic: No lymphadenopathy   Cardiovascular: Normal S1 S2   Respiratory: normal effort , clear  Gastrointestinal:  Soft, Non-tender  Skin: No rashes grossly   Psychiatry:  Lethargic   Musculoskeletal: No edema        01-26-23 @ 07:01  -  01-27-23 @ 07:00  --------------------------------------------------------  IN: 1000 mL / OUT: 150 mL / NET: 850 mL    01-27-23 @ 07:01  -  01-27-23 @ 16:58  --------------------------------------------------------  IN: 490 mL / OUT: 300 mL / NET: 190 mL                                  9.1    7.56  )-----------( 311      ( 26 Jan 2023 07:08 )             29.7               01-27    140  |  103  |  20  ----------------------------<  168<H>  4.2   |  28  |  0.79    Ca    8.4      27 Jan 2023 06:54  Phos  2.6     01-27  Mg     2.1     01-27    TPro  6.5  /  Alb  2.3<L>  /  TBili  0.2  /  DBili  x   /  AST  75<H>  /  ALT  40  /  AlkPhos  180<H>  01-27                         < from: MR Head No Cont (01.26.23 @ 22:08) >  IMPRESSION:    Compared with MRI Brain 1/7/2023.    1. Multiple evolving LEFT-SIDED subacute infarcts, as above.  2. Findings suspicious for high LEFT frontal subarachnoid hemorrhage.   Recommend correlation with noncontrast CT of the brain.  3. Additional findings above.    Findings and recommendations discussed in detail with PADMINI Carcamo at the   time of this interpretation.    --- End of Report ---    < end of copied text >      Culture - Blood in AM (01.24.23 @ 07:50)   Specimen Source: .Blood Blood-Peripheral   Culture Results:   No growth to date.     Culture - Blood in AM (01.24.23 @ 07:09)   Specimen Source: .Blood Blood-Peripheral   Culture Results:   No growth to date.    Name of Patient : SARAH DISLA  MRN: [id number]  Date of visit: 01-27-23 @ 16:58      Subjective: Patient seen and examined. No new events except as noted.   Patient seen earlier this AM. S/P MR brain yesterday     REVIEW OF SYSTEMS:  Unable to obtain ROS 2/2 clinical condition     MEDICATIONS:  MEDICATIONS  (STANDING):  aspirin  chewable 81 milliGRAM(s) Oral daily  atorvastatin 80 milliGRAM(s) Oral at bedtime  clopidogrel Tablet 75 milliGRAM(s) Oral daily  dextrose 5%. 1000 milliLiter(s) (100 mL/Hr) IV Continuous <Continuous>  dextrose 50% Injectable 25 Gram(s) IV Push once  dextrose 50% Injectable 12.5 Gram(s) IV Push once  dextrose 50% Injectable 25 Gram(s) IV Push once  dextrose Oral Gel 15 Gram(s) Oral once  enoxaparin Injectable 40 milliGRAM(s) SubCutaneous every 24 hours  ertapenem  IVPB 1000 milliGRAM(s) IV Intermittent every 24 hours  glucagon  Injectable 1 milliGRAM(s) IntraMuscular once  insulin glargine Injectable (LANTUS) 13 Unit(s) SubCutaneous at bedtime  insulin lispro (ADMELOG) corrective regimen sliding scale   SubCutaneous every 6 hours  tamsulosin 0.4 milliGRAM(s) Oral at bedtime      PHYSICAL EXAM:  T(C): 37 (01-27-23 @ 10:00), Max: 37.4 (01-26-23 @ 22:32)  HR: 90 (01-27-23 @ 10:00) (90 - 108)  BP: 122/60 (01-27-23 @ 10:00) (100/55 - 134/66)  RR: 18 (01-27-23 @ 10:00) (18 - 18)  SpO2: 98% (01-27-23 @ 10:00) (98% - 100%)  Wt(kg): --  I&O's Summary    26 Jan 2023 07:01  -  27 Jan 2023 07:00  --------------------------------------------------------  IN: 1000 mL / OUT: 150 mL / NET: 850 mL    27 Jan 2023 07:01 - 27 Jan 2023 16:58  --------------------------------------------------------  IN: 490 mL / OUT: 300 mL / NET: 190 mL            Appearance: Lying down in bed, NGT re-placed   HEENT: Eyes are open; NGT; Hair covered   Lymphatic: No lymphadenopathy   Cardiovascular: Normal S1 S2   Respiratory: normal effort , clear  Gastrointestinal:  Soft, Non-tender  Skin: No rashes grossly   Psychiatry:  Lethargic   Musculoskeletal: No edema        01-26-23 @ 07:01  -  01-27-23 @ 07:00  --------------------------------------------------------  IN: 1000 mL / OUT: 150 mL / NET: 850 mL    01-27-23 @ 07:01  -  01-27-23 @ 16:58  --------------------------------------------------------  IN: 490 mL / OUT: 300 mL / NET: 190 mL                                  9.1    7.56  )-----------( 311      ( 26 Jan 2023 07:08 )             29.7               01-27    140  |  103  |  20  ----------------------------<  168<H>  4.2   |  28  |  0.79    Ca    8.4      27 Jan 2023 06:54  Phos  2.6     01-27  Mg     2.1     01-27    TPro  6.5  /  Alb  2.3<L>  /  TBili  0.2  /  DBili  x   /  AST  75<H>  /  ALT  40  /  AlkPhos  180<H>  01-27                         < from: MR Head No Cont (01.26.23 @ 22:08) >  IMPRESSION:    Compared with MRI Brain 1/7/2023.    1. Multiple evolving LEFT-SIDED subacute infarcts, as above.  2. Findings suspicious for high LEFT frontal subarachnoid hemorrhage.   Recommend correlation with noncontrast CT of the brain.  3. Additional findings above.    Findings and recommendations discussed in detail with PADMINI Carcamo at the   time of this interpretation.    --- End of Report ---    < end of copied text >      Culture - Blood in AM (01.24.23 @ 07:50)   Specimen Source: .Blood Blood-Peripheral   Culture Results:   No growth to date.     Culture - Blood in AM (01.24.23 @ 07:09)   Specimen Source: .Blood Blood-Peripheral   Culture Results:   No growth to date.

## 2023-01-27 NOTE — OCCUPATIONAL THERAPY INITIAL EVALUATION ADULT - ADL RETRAINING, OT EVAL
Pt will complete upper extremity dressing with min assist x1 within 4 weeks Pt will complete upper extremity dressing with mod assist x1 within 4 weeks

## 2023-01-27 NOTE — PROGRESS NOTE ADULT - ASSESSMENT
78F with dementia, T2DM, recently discharged about 1 week ago for CVA now presenting with poor PO intake, lethargy and hyperglycemia to 500s.   Of note, patient admitted 1/6 for R facial droop, word finding difficulties. Imaging concerning for acute L MCA infarct.  Per family, since stroke patient nonverbal/unable to participate in meaningful communications, intermittently follows commands. For the last 3-4 days, patient with poor po intake.    In ED, afebrile, , 135/84. WBC 15, Na 158, K 3.0, Cl 120, Glu 468, alk phos 136m BHB 0.7. pH 7.36, lactate 2.2.   UA: +leuk esterase, many bacteria, WBC 11-25  CXR: Redemonstrated biapical scarring and left upper lobe bronchiectasis, unchanged. No focal consolidation.   CTH No acute intracranial hemorrhage. 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  Given 1.5L NS, ceftriaxone x1, haldol 2.5mg x1 in ED.   last admission: CTA with L MCA severe stenosis; distal L M2 occlusion .  TTE 1/8/23: EF 63% Mild mitral regurgitation ; A1c 9.4   MRI brain with evolving subacute infarcts as expected. question of SAH     Impression  1) AMS likely 2/2 infection/UTI toxic metabolic encephalopahthy   2) recent stroke - L MCA infarct 2/2 symptomatic L MCA ICAD ; worsening of R HP in setting of infection   - would repeat CTH and call nsx for question of SAH.    - now on contact   - mittens. NGT replaced 1/26 early AM, f/u CXR   - f/u endocrine   - treatment of infection per primary team -->  CTX  - for secondary stroke prevention. DAPT x 3 months followeed by ASA 81mg dialy thereafter.   - hihg dose statin lipitor 40mg dialy   - avoid hypotension given L MCA ICAD   - telemetry  - PT/OT/SS/SLP, OOBC  - check FS, glucose control <180  - GI/DVT ppx  - Thank you for allowing me to participate in the care of this patient. Call with questions.   - spoke with daughter at bedside   Reji Greco MD  Vascular Neurology  Office: [phone]  78F with dementia, T2DM, recently discharged about 1 week ago for CVA now presenting with poor PO intake, lethargy and hyperglycemia to 500s.   Of note, patient admitted 1/6 for R facial droop, word finding difficulties. Imaging concerning for acute L MCA infarct.  Per family, since stroke patient nonverbal/unable to participate in meaningful communications, intermittently follows commands. For the last 3-4 days, patient with poor po intake.    In ED, afebrile, , 135/84. WBC 15, Na 158, K 3.0, Cl 120, Glu 468, alk phos 136m BHB 0.7. pH 7.36, lactate 2.2.   UA: +leuk esterase, many bacteria, WBC 11-25  CXR: Redemonstrated biapical scarring and left upper lobe bronchiectasis, unchanged. No focal consolidation.   CTH No acute intracranial hemorrhage. 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  Given 1.5L NS, ceftriaxone x1, haldol 2.5mg x1 in ED.   last admission: CTA with L MCA severe stenosis; distal L M2 occlusion .  TTE 1/8/23: EF 63% Mild mitral regurgitation ; A1c 9.4   MRI brain with evolving subacute infarcts as expected. question of SAH     Impression  1) AMS likely 2/2 infection/UTI toxic metabolic encephalopahthy   2) recent stroke - L MCA infarct 2/2 symptomatic L MCA ICAD ; worsening of R HP in setting of infection   - would repeat CTH and call nsx for question of SAH.    - now on contact   - mittens. NGT replaced 1/26 early AM, f/u CXR   - f/u endocrine   - treatment of infection per primary team -->  CTX  - for secondary stroke prevention. DAPT x 3 months followeed by ASA 81mg dialy thereafter.   - hihg dose statin lipitor 40mg dialy   - avoid hypotension given L MCA ICAD   - telemetry  - PT/OT/SS/SLP, OOBC  - check FS, glucose control <180  - GI/DVT ppx  - Thank you for allowing me to participate in the care of this patient. Call with questions.   - spoke with daughter at bedside   Reji Greco MD  Vascular Neurology  Office: [phone]  78F with dementia, T2DM, recently discharged about 1 week ago for CVA now presenting with poor PO intake, lethargy and hyperglycemia to 500s.   Of note, patient admitted 1/6 for R facial droop, word finding difficulties. Imaging concerning for acute L MCA infarct.  Per family, since stroke patient nonverbal/unable to participate in meaningful communications, intermittently follows commands. For the last 3-4 days, patient with poor po intake.    In ED, afebrile, , 135/84. WBC 15, Na 158, K 3.0, Cl 120, Glu 468, alk phos 136m BHB 0.7. pH 7.36, lactate 2.2.   UA: +leuk esterase, many bacteria, WBC 11-25  CXR: Redemonstrated biapical scarring and left upper lobe bronchiectasis, unchanged. No focal consolidation.   CTH No acute intracranial hemorrhage. 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  Given 1.5L NS, ceftriaxone x1, haldol 2.5mg x1 in ED.   last admission: CTA with L MCA severe stenosis; distal L M2 occlusion .  TTE 1/8/23: EF 63% Mild mitral regurgitation ; A1c 9.4   MRI brain with evolving subacute infarcts as expected. question of SAH     Impression  1) AMS likely 2/2 infection/UTI toxic metabolic encephalopahthy   2) recent stroke - L MCA infarct 2/2 symptomatic L MCA ICAD ; worsening of R HP in setting of infection   - would repeat CTH and call nsx for question of SAH.    - now on contact   - mittens. NGT replaced 1/26 early AM, f/u CXR   - f/u endocrine   - treatment of infection per primary team -->  CTX  - for secondary stroke prevention. DAPT x 3 months followeed by ASA 81mg dialy thereafter.   - hihg dose statin lipitor 40mg dialy   - avoid hypotension given L MCA ICAD   - telemetry  - PT/OT/SS/SLP, OOBC  - check FS, glucose control <180  - GI/DVT ppx  - Thank you for allowing me to participate in the care of this patient. Call with questions.   - spoke with daughter at bedside   Reji Greco MD  Vascular Neurology  Office: [phone]

## 2023-01-27 NOTE — CONSULT NOTE ADULT - SUBJECTIVE AND OBJECTIVE BOX
p (1480)     HPI: 78F Hx CVA at beginning of month (aphasia/gait imbalance from L MCA embolic stroke, on DAPT) had been AO1-2 prior to stroke now expressive>receptive aphasia intermittent FC. p/f lethargy/poor appetite. Adm med for sugars in 500s, UA+. No known trauma, has not complained of HA. Consulted for MR findings of poss SAH, significant motion artifact but L frontal SWI and corresponnding T2 lesion. CTA from 1/6/2023 w/LM2 stenosis but no vascular malformations  Exam: arousal non-verbal, L gaze preference, can cross midline, mild R facial, BUE LOC, BLE wds, no FC     --Anticoagulation:  aspirin  chewable 81 milliGRAM(s) Oral daily  clopidogrel Tablet 75 milliGRAM(s) Oral daily    =====================  PAST MEDICAL HISTORY   Dementia    CVA (cerebrovascular accident)    DM (diabetes mellitus)      PAST SURGICAL HISTORY   No significant past surgical history      Benedryl Allergy Sinus (Hives)  penicillin (Rash)      MEDICATIONS:  Antibiotics:  ertapenem  IVPB 1000 milliGRAM(s) IV Intermittent every 24 hours    Neuro:  acetaminophen    Suspension .. 650 milliGRAM(s) Enteral Tube every 6 hours PRN    Other:  atorvastatin 80 milliGRAM(s) Oral at bedtime  dextrose 5%. 1000 milliLiter(s) IV Continuous <Continuous>  dextrose 50% Injectable 25 Gram(s) IV Push once  dextrose 50% Injectable 12.5 Gram(s) IV Push once  dextrose 50% Injectable 25 Gram(s) IV Push once  dextrose Oral Gel 15 Gram(s) Oral once  glucagon  Injectable 1 milliGRAM(s) IntraMuscular once  insulin glargine Injectable (LANTUS) 13 Unit(s) SubCutaneous at bedtime  insulin lispro (ADMELOG) corrective regimen sliding scale   SubCutaneous every 6 hours  tamsulosin 0.4 milliGRAM(s) Oral at bedtime      SOCIAL HISTORY:   Occupation:   Marital Status:     FAMILY HISTORY:  No pertinent family history in first degree relatives        ROS: Negative except per HPI    LABS:                          9.1    7.56  )-----------( 311      ( 26 Jan 2023 07:08 )             29.7     01-27    140  |  103  |  20  ----------------------------<  168<H>  4.2   |  28  |  0.79    Ca    8.4      27 Jan 2023 06:54  Phos  2.6     01-27  Mg     2.1     01-27    TPro  6.5  /  Alb  2.3<L>  /  TBili  0.2  /  DBili  x   /  AST  75<H>  /  ALT  40  /  AlkPhos  180<H>  01-27

## 2023-01-27 NOTE — PHYSICAL THERAPY INITIAL EVALUATION ADULT - ORIENTATION, REHAB EVAL
Group Topic: [facility] Recovery Skills    Date: 8/28/2019  Start Time: 10:15 AM  End Time: 11:00 AM    Focus: Accepting Change  Number in attendance: 15 (5 RTC and 1 IOP)    Patient talking about dealing with change intellectually though 6 steps. These include Embrace your feelings about change, Understand that change is an inevitable part of life, Try to put change in perspective, Look on the bright side, Try to understand why the change unsettles you so much, Embrace your dynamic nature and ability to adapt. Group then watched a short clip on, Changing your Story of Addiction.     Participation: Active  Patient Response: Attentive   not oriented to person, place, time or situation

## 2023-01-28 LAB
ANION GAP SERPL CALC-SCNC: 7 MMOL/L — SIGNIFICANT CHANGE UP (ref 5–17)
BUN SERPL-MCNC: 21 MG/DL — SIGNIFICANT CHANGE UP (ref 7–23)
CALCIUM SERPL-MCNC: 8 MG/DL — LOW (ref 8.4–10.5)
CHLORIDE SERPL-SCNC: 97 MMOL/L — SIGNIFICANT CHANGE UP (ref 96–108)
CO2 SERPL-SCNC: 28 MMOL/L — SIGNIFICANT CHANGE UP (ref 22–31)
CREAT SERPL-MCNC: 0.86 MG/DL — SIGNIFICANT CHANGE UP (ref 0.5–1.3)
EGFR: 69 ML/MIN/1.73M2 — SIGNIFICANT CHANGE UP
GLUCOSE SERPL-MCNC: 170 MG/DL — HIGH (ref 70–99)
HCT VFR BLD CALC: 29 % — LOW (ref 34.5–45)
HGB BLD-MCNC: 8.8 G/DL — LOW (ref 11.5–15.5)
MAGNESIUM SERPL-MCNC: 2.1 MG/DL — SIGNIFICANT CHANGE UP (ref 1.6–2.6)
MCHC RBC-ENTMCNC: 26.4 PG — LOW (ref 27–34)
MCHC RBC-ENTMCNC: 30.3 GM/DL — LOW (ref 32–36)
MCV RBC AUTO: 87.1 FL — SIGNIFICANT CHANGE UP (ref 80–100)
NRBC # BLD: 0 /100 WBCS — SIGNIFICANT CHANGE UP (ref 0–0)
PHOSPHATE SERPL-MCNC: 3.2 MG/DL — SIGNIFICANT CHANGE UP (ref 2.5–4.5)
PLATELET # BLD AUTO: 342 K/UL — SIGNIFICANT CHANGE UP (ref 150–400)
POTASSIUM SERPL-MCNC: 4.3 MMOL/L — SIGNIFICANT CHANGE UP (ref 3.5–5.3)
POTASSIUM SERPL-SCNC: 4.3 MMOL/L — SIGNIFICANT CHANGE UP (ref 3.5–5.3)
RBC # BLD: 3.33 M/UL — LOW (ref 3.8–5.2)
RBC # FLD: 14.8 % — HIGH (ref 10.3–14.5)
SODIUM SERPL-SCNC: 132 MMOL/L — LOW (ref 135–145)
WBC # BLD: 7.85 K/UL — SIGNIFICANT CHANGE UP (ref 3.8–10.5)
WBC # FLD AUTO: 7.85 K/UL — SIGNIFICANT CHANGE UP (ref 3.8–10.5)
ZINC TRANSPORTER 8 AB, RESULT: <15 U/ML — SIGNIFICANT CHANGE UP

## 2023-01-28 PROCEDURE — 71045 X-RAY EXAM CHEST 1 VIEW: CPT | Mod: 26

## 2023-01-28 RX ADMIN — Medication 2: at 00:47

## 2023-01-28 RX ADMIN — Medication 2: at 13:17

## 2023-01-28 RX ADMIN — ERTAPENEM SODIUM 120 MILLIGRAM(S): 1 INJECTION, POWDER, LYOPHILIZED, FOR SOLUTION INTRAMUSCULAR; INTRAVENOUS at 18:44

## 2023-01-28 RX ADMIN — Medication 6: at 06:29

## 2023-01-28 NOTE — PROGRESS NOTE ADULT - SUBJECTIVE AND OBJECTIVE BOX
Name of Patient : SARAH DISLA  MRN: [id number]  Date of visit: 01-28-23       Subjective: Patient seen and examined. No new events except as noted.   Doing okay       REVIEW OF SYSTEMS:    CONSTITUTIONAL: No weakness, fevers or chills  EYES/ENT: No visual changes;  No vertigo or throat pain   NECK: No pain or stiffness  RESPIRATORY: No cough, wheezing, hemoptysis; No shortness of breath  CARDIOVASCULAR: No chest pain or palpitations  GASTROINTESTINAL: No abdominal or epigastric pain. No nausea, vomiting, or hematemesis; No diarrhea or constipation. No melena or hematochezia.  GENITOURINARY: No dysuria, frequency or hematuria  NEUROLOGICAL: No numbness or weakness  SKIN: No itching, burning, rashes, or lesions   All other review of systems is negative unless indicated above.    MEDICATIONS:  MEDICATIONS  (STANDING):  aspirin  chewable 81 milliGRAM(s) Oral daily  atorvastatin 80 milliGRAM(s) Oral at bedtime  clopidogrel Tablet 75 milliGRAM(s) Oral daily  dextrose 5%. 1000 milliLiter(s) (100 mL/Hr) IV Continuous <Continuous>  dextrose 50% Injectable 25 Gram(s) IV Push once  dextrose 50% Injectable 12.5 Gram(s) IV Push once  dextrose 50% Injectable 25 Gram(s) IV Push once  dextrose Oral Gel 15 Gram(s) Oral once  ertapenem  IVPB 1000 milliGRAM(s) IV Intermittent every 24 hours  glucagon  Injectable 1 milliGRAM(s) IntraMuscular once  insulin glargine Injectable (LANTUS) 13 Unit(s) SubCutaneous at bedtime  insulin lispro (ADMELOG) corrective regimen sliding scale   SubCutaneous every 6 hours  tamsulosin 0.4 milliGRAM(s) Oral at bedtime      PHYSICAL EXAM:  T(C): 37.2 (01-28-23 @ 21:28), Max: 37.4 (01-28-23 @ 06:52)  HR: 102 (01-28-23 @ 21:28) (98 - 102)  BP: 129/59 (01-28-23 @ 21:28) (116/77 - 129/59)  RR: 18 (01-28-23 @ 21:28) (18 - 18)  SpO2: 93% (01-28-23 @ 21:28) (93% - 97%)  Wt(kg): --  I&O's Summary    27 Jan 2023 07:01  -  28 Jan 2023 07:00  --------------------------------------------------------  IN: 1365 mL / OUT: 550 mL / NET: 815 mL    28 Jan 2023 07:01  -  28 Jan 2023 23:19  --------------------------------------------------------  IN: 0 mL / OUT: 1100 mL / NET: -1100 mL          Appearance: Normal	  HEENT:  PERRLA   Lymphatic: No lymphadenopathy   Cardiovascular: Normal S1 S2, no JVD  Respiratory: normal effort , clear  Gastrointestinal:  Soft, Non-tender  Skin: No rashes,  warm to touch  Psychiatry:  Mood & affect appropriate  Musculuskeletal: No edema    recent labs, Imaging and EKGs personally reviewed     01-27-23 @ 07:01 - 01-28-23 @ 07:00  --------------------------------------------------------  IN: 1365 mL / OUT: 550 mL / NET: 815 mL    01-28-23 @ 07:01 - 01-28-23 @ 23:19  --------------------------------------------------------  IN: 0 mL / OUT: 1100 mL / NET: -1100 mL                          8.8    7.85  )-----------( 342      ( 28 Jan 2023 09:51 )             29.0               01-28    132<L>  |  97  |  21  ----------------------------<  170<H>  4.3   |  28  |  0.86    Ca    8.0<L>      28 Jan 2023 09:51  Phos  3.2     01-28  Mg     2.1     01-28    TPro  6.5  /  Alb  2.3<L>  /  TBili  0.2  /  DBili  x   /  AST  75<H>  /  ALT  40  /  AlkPhos  180<H>  01-27                                    Name of Patient : SARAH DISLA  MRN: [id number]  Date of visit: 01-28-23       Subjective: Patient seen and examined. No new events except as noted.   Doing okay       REVIEW OF SYSTEMS:    CONSTITUTIONAL: No weakness, fevers or chills  EYES/ENT: No visual changes;  No vertigo or throat pain   NECK: No pain or stiffness  RESPIRATORY: No cough, wheezing, hemoptysis; No shortness of breath  CARDIOVASCULAR: No chest pain or palpitations  GASTROINTESTINAL: No abdominal or epigastric pain. No nausea, vomiting, or hematemesis; No diarrhea or constipation. No melena or hematochezia.  GENITOURINARY: No dysuria, frequency or hematuria  NEUROLOGICAL: No numbness or weakness  SKIN: No itching, burning, rashes, or lesions   All other review of systems is negative unless indicated above.    MEDICATIONS:  MEDICATIONS  (STANDING):  aspirin  chewable 81 milliGRAM(s) Oral daily  atorvastatin 80 milliGRAM(s) Oral at bedtime  clopidogrel Tablet 75 milliGRAM(s) Oral daily  dextrose 5%. 1000 milliLiter(s) (100 mL/Hr) IV Continuous <Continuous>  dextrose 50% Injectable 25 Gram(s) IV Push once  dextrose 50% Injectable 12.5 Gram(s) IV Push once  dextrose 50% Injectable 25 Gram(s) IV Push once  dextrose Oral Gel 15 Gram(s) Oral once  ertapenem  IVPB 1000 milliGRAM(s) IV Intermittent every 24 hours  glucagon  Injectable 1 milliGRAM(s) IntraMuscular once  insulin glargine Injectable (LANTUS) 13 Unit(s) SubCutaneous at bedtime  insulin lispro (ADMELOG) corrective regimen sliding scale   SubCutaneous every 6 hours  tamsulosin 0.4 milliGRAM(s) Oral at bedtime      PHYSICAL EXAM:  T(C): 37.2 (01-28-23 @ 21:28), Max: 37.4 (01-28-23 @ 06:52)  HR: 102 (01-28-23 @ 21:28) (98 - 102)  BP: 129/59 (01-28-23 @ 21:28) (116/77 - 129/59)  RR: 18 (01-28-23 @ 21:28) (18 - 18)  SpO2: 93% (01-28-23 @ 21:28) (93% - 97%)  Wt(kg): --  I&O's Summary    27 Jan 2023 07:01  -  28 Jan 2023 07:00  --------------------------------------------------------  IN: 1365 mL / OUT: 550 mL / NET: 815 mL    28 Jan 2023 07:01  -  28 Jan 2023 23:19  --------------------------------------------------------  IN: 0 mL / OUT: 1100 mL / NET: -1100 mL          Appearance: Normal	  HEENT:  PERRLA   Lymphatic: No lymphadenopathy   Cardiovascular: Normal S1 S2, no JVD  Respiratory: normal effort , clear  Gastrointestinal:  Soft, Non-tender  Skin: No rashes,  warm to touch  Psychiatry:  Mood & affect appropriate  Musculuskeletal: No edema    recent labs, Imaging and EKGs personally reviewed     01-27-23 @ 07:01 - 01-28-23 @ 07:00  --------------------------------------------------------  IN: 1365 mL / OUT: 550 mL / NET: 815 mL    01-28-23 @ 07:01 - 01-28-23 @ 23:19  --------------------------------------------------------  IN: 0 mL / OUT: 1100 mL / NET: -1100 mL                          8.8    7.85  )-----------( 342      ( 28 Jan 2023 09:51 )             29.0               01-28    132<L>  |  97  |  21  ----------------------------<  170<H>  4.3   |  28  |  0.86    Ca    8.0<L>      28 Jan 2023 09:51  Phos  3.2     01-28  Mg     2.1     01-28    TPro  6.5  /  Alb  2.3<L>  /  TBili  0.2  /  DBili  x   /  AST  75<H>  /  ALT  40  /  AlkPhos  180<H>  01-27                                    Name of Patient : SARAH DISLA  MRN: [id number]  Date of visit: 01-28-23       Subjective: Patient seen and examined. No new events except as noted.   Doing okay       REVIEW OF SYSTEMS:    CONSTITUTIONAL: No weakness, fevers or chills  EYES/ENT: No visual changes;  No vertigo or throat pain   NECK: No pain or stiffness  RESPIRATORY: No cough, wheezing, hemoptysis; No shortness of breath  CARDIOVASCULAR: No chest pain or palpitations  GASTROINTESTINAL: No abdominal or epigastric pain. No nausea, vomiting, or hematemesis; No diarrhea or constipation. No melena or hematochezia.  GENITOURINARY: No dysuria, frequency or hematuria  NEUROLOGICAL: No numbness or weakness  SKIN: No itching, burning, rashes, or lesions   All other review of systems is negative unless indicated above.    MEDICATIONS:  MEDICATIONS  (STANDING):  aspirin  chewable 81 milliGRAM(s) Oral daily  atorvastatin 80 milliGRAM(s) Oral at bedtime  clopidogrel Tablet 75 milliGRAM(s) Oral daily  dextrose 5%. 1000 milliLiter(s) (100 mL/Hr) IV Continuous <Continuous>  dextrose 50% Injectable 25 Gram(s) IV Push once  dextrose 50% Injectable 12.5 Gram(s) IV Push once  dextrose 50% Injectable 25 Gram(s) IV Push once  dextrose Oral Gel 15 Gram(s) Oral once  ertapenem  IVPB 1000 milliGRAM(s) IV Intermittent every 24 hours  glucagon  Injectable 1 milliGRAM(s) IntraMuscular once  insulin glargine Injectable (LANTUS) 13 Unit(s) SubCutaneous at bedtime  insulin lispro (ADMELOG) corrective regimen sliding scale   SubCutaneous every 6 hours  tamsulosin 0.4 milliGRAM(s) Oral at bedtime      PHYSICAL EXAM:  T(C): 37.2 (01-28-23 @ 21:28), Max: 37.4 (01-28-23 @ 06:52)  HR: 102 (01-28-23 @ 21:28) (98 - 102)  BP: 129/59 (01-28-23 @ 21:28) (116/77 - 129/59)  RR: 18 (01-28-23 @ 21:28) (18 - 18)  SpO2: 93% (01-28-23 @ 21:28) (93% - 97%)  Wt(kg): --  I&O's Summary    27 Jan 2023 07:01  -  28 Jan 2023 07:00  --------------------------------------------------------  IN: 1365 mL / OUT: 550 mL / NET: 815 mL    28 Jan 2023 07:01  -  28 Jan 2023 23:19  --------------------------------------------------------  IN: 0 mL / OUT: 1100 mL / NET: -1100 mL          Appearance: Normal	  HEENT:  PERRLA   Lymphatic: No lymphadenopathy   Cardiovascular: Normal S1 S2, no JVD  Respiratory: normal effort , clear  Gastrointestinal:  Soft, Non-tender  Skin: No rashes,  warm to touch  Psychiatry:  Mood & affect appropriate  Musculuskeletal: No edema    recent labs, Imaging and EKGs personally reviewed     01-27-23 @ 07:01 - 01-28-23 @ 07:00  --------------------------------------------------------  IN: 1365 mL / OUT: 550 mL / NET: 815 mL    01-28-23 @ 07:01 - 01-28-23 @ 23:19  --------------------------------------------------------  IN: 0 mL / OUT: 1100 mL / NET: -1100 mL                          8.8    7.85  )-----------( 342      ( 28 Jan 2023 09:51 )             29.0               01-28    132<L>  |  97  |  21  ----------------------------<  170<H>  4.3   |  28  |  0.86    Ca    8.0<L>      28 Jan 2023 09:51  Phos  3.2     01-28  Mg     2.1     01-28    TPro  6.5  /  Alb  2.3<L>  /  TBili  0.2  /  DBili  x   /  AST  75<H>  /  ALT  40  /  AlkPhos  180<H>  01-27                                    Name of Patient : SARAH DISLA  MRN: [id number]  Date of visit: 01-28-23       Subjective: Patient seen and examined. No new events except as noted.   Doing okay       MEDICATIONS:  MEDICATIONS  (STANDING):  aspirin  chewable 81 milliGRAM(s) Oral daily  atorvastatin 80 milliGRAM(s) Oral at bedtime  clopidogrel Tablet 75 milliGRAM(s) Oral daily  dextrose 5%. 1000 milliLiter(s) (100 mL/Hr) IV Continuous <Continuous>  dextrose 50% Injectable 25 Gram(s) IV Push once  dextrose 50% Injectable 12.5 Gram(s) IV Push once  dextrose 50% Injectable 25 Gram(s) IV Push once  dextrose Oral Gel 15 Gram(s) Oral once  ertapenem  IVPB 1000 milliGRAM(s) IV Intermittent every 24 hours  glucagon  Injectable 1 milliGRAM(s) IntraMuscular once  insulin glargine Injectable (LANTUS) 13 Unit(s) SubCutaneous at bedtime  insulin lispro (ADMELOG) corrective regimen sliding scale   SubCutaneous every 6 hours  tamsulosin 0.4 milliGRAM(s) Oral at bedtime      PHYSICAL EXAM:  T(C): 37.2 (01-28-23 @ 21:28), Max: 37.4 (01-28-23 @ 06:52)  HR: 102 (01-28-23 @ 21:28) (98 - 102)  BP: 129/59 (01-28-23 @ 21:28) (116/77 - 129/59)  RR: 18 (01-28-23 @ 21:28) (18 - 18)  SpO2: 93% (01-28-23 @ 21:28) (93% - 97%)  Wt(kg): --  I&O's Summary    27 Jan 2023 07:01  -  28 Jan 2023 07:00  --------------------------------------------------------  IN: 1365 mL / OUT: 550 mL / NET: 815 mL    28 Jan 2023 07:01  -  28 Jan 2023 23:19  --------------------------------------------------------  IN: 0 mL / OUT: 1100 mL / NET: -1100 mL          Appearance: frail   HEENT:  PERRLA   Lymphatic: No lymphadenopathy   Cardiovascular: Normal S1 S2, no JVD  Respiratory: normal effort , clear  Gastrointestinal:  Soft, Non-tender  Skin: No rashes,  warm to touch  Psychiatry:  Mood & affect appropriate  Musculuskeletal: No edema    recent labs, Imaging and EKGs personally reviewed     01-27-23 @ 07:01  -  01-28-23 @ 07:00  --------------------------------------------------------  IN: 1365 mL / OUT: 550 mL / NET: 815 mL    01-28-23 @ 07:01 - 01-28-23 @ 23:19  --------------------------------------------------------  IN: 0 mL / OUT: 1100 mL / NET: -1100 mL                          8.8    7.85  )-----------( 342      ( 28 Jan 2023 09:51 )             29.0               01-28    132<L>  |  97  |  21  ----------------------------<  170<H>  4.3   |  28  |  0.86    Ca    8.0<L>      28 Jan 2023 09:51  Phos  3.2     01-28  Mg     2.1     01-28    TPro  6.5  /  Alb  2.3<L>  /  TBili  0.2  /  DBili  x   /  AST  75<H>  /  ALT  40  /  AlkPhos  180<H>  01-27                                    Name of Patient : SARAH DISLA  MRN: [id number]  Date of visit: 01-28-23       Subjective: Patient seen and examined. No new events except as noted.   Doing okay       MEDICATIONS:  MEDICATIONS  (STANDING):  aspirin  chewable 81 milliGRAM(s) Oral daily  atorvastatin 80 milliGRAM(s) Oral at bedtime  clopidogrel Tablet 75 milliGRAM(s) Oral daily  dextrose 5%. 1000 milliLiter(s) (100 mL/Hr) IV Continuous <Continuous>  dextrose 50% Injectable 25 Gram(s) IV Push once  dextrose 50% Injectable 12.5 Gram(s) IV Push once  dextrose 50% Injectable 25 Gram(s) IV Push once  dextrose Oral Gel 15 Gram(s) Oral once  ertapenem  IVPB 1000 milliGRAM(s) IV Intermittent every 24 hours  glucagon  Injectable 1 milliGRAM(s) IntraMuscular once  insulin glargine Injectable (LANTUS) 13 Unit(s) SubCutaneous at bedtime  insulin lispro (ADMELOG) corrective regimen sliding scale   SubCutaneous every 6 hours  tamsulosin 0.4 milliGRAM(s) Oral at bedtime      PHYSICAL EXAM:  T(C): 37.2 (01-28-23 @ 21:28), Max: 37.4 (01-28-23 @ 06:52)  HR: 102 (01-28-23 @ 21:28) (98 - 102)  BP: 129/59 (01-28-23 @ 21:28) (116/77 - 129/59)  RR: 18 (01-28-23 @ 21:28) (18 - 18)  SpO2: 93% (01-28-23 @ 21:28) (93% - 97%)  Wt(kg): --  I&O's Summary    27 Jan 2023 07:01  -  28 Jan 2023 07:00  --------------------------------------------------------  IN: 1365 mL / OUT: 550 mL / NET: 815 mL    28 Jan 2023 07:01  -  28 Jan 2023 23:19  --------------------------------------------------------  IN: 0 mL / OUT: 1100 mL / NET: -1100 mL          Appearance: frail   HEENT:  PERRLA   Lymphatic: No lymphadenopathy   Cardiovascular: Normal S1 S2, no JVD  Respiratory: normal effort , clear  Gastrointestinal:  Soft, Non-tender  Skin: No rashes,  warm to touch  Psychiatry:  Mood & affect appropriate  Musculuskeletal: No edema    recent labs, Imaging and EKGs personally reviewed     01-27-23 @ 07:01  -  01-28-23 @ 07:00  --------------------------------------------------------  IN: 1365 mL / OUT: 550 mL / NET: 815 mL    01-28-23 @ 07:01 - 01-28-23 @ 23:19  --------------------------------------------------------  IN: 0 mL / OUT: 1100 mL / NET: -1100 mL                          8.8    7.85  )-----------( 342      ( 28 Jan 2023 09:51 )             29.0               01-28    132<L>  |  97  |  21  ----------------------------<  170<H>  4.3   |  28  |  0.86    Ca    8.0<L>      28 Jan 2023 09:51  Phos  3.2     01-28  Mg     2.1     01-28    TPro  6.5  /  Alb  2.3<L>  /  TBili  0.2  /  DBili  x   /  AST  75<H>  /  ALT  40  /  AlkPhos  180<H>  01-27                                    Name of Patient : SARAH DISLA  MRN: [id number]  Date of visit: 01-28-23       Subjective: Patient seen and examined. No new events except as noted.   Doing okay       MEDICATIONS:  MEDICATIONS  (STANDING):  aspirin  chewable 81 milliGRAM(s) Oral daily  atorvastatin 80 milliGRAM(s) Oral at bedtime  clopidogrel Tablet 75 milliGRAM(s) Oral daily  dextrose 5%. 1000 milliLiter(s) (100 mL/Hr) IV Continuous <Continuous>  dextrose 50% Injectable 25 Gram(s) IV Push once  dextrose 50% Injectable 12.5 Gram(s) IV Push once  dextrose 50% Injectable 25 Gram(s) IV Push once  dextrose Oral Gel 15 Gram(s) Oral once  ertapenem  IVPB 1000 milliGRAM(s) IV Intermittent every 24 hours  glucagon  Injectable 1 milliGRAM(s) IntraMuscular once  insulin glargine Injectable (LANTUS) 13 Unit(s) SubCutaneous at bedtime  insulin lispro (ADMELOG) corrective regimen sliding scale   SubCutaneous every 6 hours  tamsulosin 0.4 milliGRAM(s) Oral at bedtime      PHYSICAL EXAM:  T(C): 37.2 (01-28-23 @ 21:28), Max: 37.4 (01-28-23 @ 06:52)  HR: 102 (01-28-23 @ 21:28) (98 - 102)  BP: 129/59 (01-28-23 @ 21:28) (116/77 - 129/59)  RR: 18 (01-28-23 @ 21:28) (18 - 18)  SpO2: 93% (01-28-23 @ 21:28) (93% - 97%)  Wt(kg): --  I&O's Summary    27 Jan 2023 07:01  -  28 Jan 2023 07:00  --------------------------------------------------------  IN: 1365 mL / OUT: 550 mL / NET: 815 mL    28 Jan 2023 07:01  -  28 Jan 2023 23:19  --------------------------------------------------------  IN: 0 mL / OUT: 1100 mL / NET: -1100 mL          Appearance: frail   HEENT:  PERRLA   Lymphatic: No lymphadenopathy   Cardiovascular: Normal S1 S2, no JVD  Respiratory: normal effort , clear  Gastrointestinal:  Soft, Non-tender  Skin: No rashes,  warm to touch  Psychiatry:  Mood & affect appropriate  Musculuskeletal: No edema    recent labs, Imaging and EKGs personally reviewed     01-27-23 @ 07:01  -  01-28-23 @ 07:00  --------------------------------------------------------  IN: 1365 mL / OUT: 550 mL / NET: 815 mL    01-28-23 @ 07:01 - 01-28-23 @ 23:19  --------------------------------------------------------  IN: 0 mL / OUT: 1100 mL / NET: -1100 mL                          8.8    7.85  )-----------( 342      ( 28 Jan 2023 09:51 )             29.0               01-28    132<L>  |  97  |  21  ----------------------------<  170<H>  4.3   |  28  |  0.86    Ca    8.0<L>      28 Jan 2023 09:51  Phos  3.2     01-28  Mg     2.1     01-28    TPro  6.5  /  Alb  2.3<L>  /  TBili  0.2  /  DBili  x   /  AST  75<H>  /  ALT  40  /  AlkPhos  180<H>  01-27

## 2023-01-28 NOTE — PROGRESS NOTE ADULT - SUBJECTIVE AND OBJECTIVE BOX
Neurology Progress Note    S: Patient seen and examined. MRI done, c/f infarcts. CTH neg for hemorrhage     Medication:  MEDICATIONS  (STANDING):  aspirin  chewable 81 milliGRAM(s) Oral daily  atorvastatin 80 milliGRAM(s) Oral at bedtime  clopidogrel Tablet 75 milliGRAM(s) Oral daily  dextrose 5%. 1000 milliLiter(s) (100 mL/Hr) IV Continuous <Continuous>  dextrose 50% Injectable 25 Gram(s) IV Push once  dextrose 50% Injectable 12.5 Gram(s) IV Push once  dextrose 50% Injectable 25 Gram(s) IV Push once  dextrose Oral Gel 15 Gram(s) Oral once  ertapenem  IVPB 1000 milliGRAM(s) IV Intermittent every 24 hours  glucagon  Injectable 1 milliGRAM(s) IntraMuscular once  insulin glargine Injectable (LANTUS) 13 Unit(s) SubCutaneous at bedtime  insulin lispro (ADMELOG) corrective regimen sliding scale   SubCutaneous every 6 hours  tamsulosin 0.4 milliGRAM(s) Oral at bedtime    MEDICATIONS  (PRN):  acetaminophen    Suspension .. 650 milliGRAM(s) Enteral Tube every 6 hours PRN Temp greater or equal to 38C (100.4F)      Vitals:    Vital Signs Last 24 Hrs  T(C): 37.4 (01-28-23 @ 06:52), Max: 37.4 (01-28-23 @ 06:52)  T(F): 99.3 (01-28-23 @ 06:52), Max: 99.3 (01-28-23 @ 06:52)  HR: 100 (01-28-23 @ 06:52) (82 - 100)  BP: 121/71 (01-28-23 @ 06:52) (121/71 - 122/68)  BP(mean): --  RR: 18 (01-28-23 @ 06:52) (18 - 18)  SpO2: 95% (01-28-23 @ 06:52) (95% - 98%)      General Exam:   General Appearance: Appropriately dressed and in no acute distress       Head: Normocephalic, atraumatic and no dysmorphic features  Ear, Nose, and Throat: Moist mucous membranes  CVS: S1S2+  Resp: No SOB, no wheeze or rhonchi  Abd: soft NTND  Extremities: No edema, no cyanosis  Skin: No bruises, no rashes        NEUROLOGICAL EXAM:    Mental status: Eyes open, awake, alert, attempts to produce speech, able to follow some simple commands, able to mimic at times , unable to ID objects  Cranial Nerves: Right facial droop, no nystagmus, blinks to threat B/L  Motor exam: Normal tone, no drift, Right hemiparesis RUE drift, 3-44/5, RLE drift, 3-4/5,, LUE 5/5, LLE 5/5  Sensation: Intact to light touch   Coordination/ Gait: Unable to participate with exam     I personally reviewed the below data/images/labs:  no new labs     `< from: CT Head No Cont (01.19.23 @ 13:45) >    ACC: [id number] EXAM:  CT BRAIN   ORDERED BY: CORINNE MADERA     PROCEDURE DATE:  01/19/2023           INTERPRETATION:  CLINICAL STATEMENT: Altered mental status    TECHNIQUE: CT of the head was performed without IV contrast.  RAPID   artificial intelligence was utilized for the preliminary evaluation of   intracranial hemorrhage.    COMPARISON: MRI brain 1/7/2023.    FINDINGS:  There is mild diffuse parenchymal volume loss. There are areas of low   attenuation in the periventricular white matter likely related to mild   chronic microvascular ischemic changes.    Evolving small infarcts noted in the left corona radiata.   Age-indeterminate infarct also noted in the left temporal and parietal   lobe    There is no acute intracranial hemorrhage, parenchymal mass, mass effect   or midline shift.. There is no hydrocephalus. Partial empty sella noted    The cranium is intact. Calcification noted adjacent to left frontal   artery table. The visualized paranasal sinuses are well-aerated.        IMPRESSION:  No acute intracranial hemorrhage.    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    --- End of Report ---        < from: MR Head No Cont (01.26.23 @ 22:08) >    ACC: [id number] EXAM:  MR BRAIN   ORDERED BY: POLLO CARCAMO     PROCEDURE DATE:  01/26/2023          INTERPRETATION:  CLINICAL STATEMENT: Multiple left MCA territory   infarcts. Altered mental status.    TECHNIQUE: Multiplanar multisequence noncontrast MRI of the brain was   performed. Diffusion weighted imaging was performed. Significant image   degradation by motion artifact.  COMPARISON: MRI brain 1/7/2023.    FINDINGS:    Corpus callosum, pituitary and pineal regions appear unremarkable. No  tonsillar herniation or evidence of marrow replacement process.   Hyperostosis frontalis. Degenerative changes visualized cervical spine.    CP angle and IAC regions appear within normal limits, as do the globes,   intraconal regions and major intracranial flow-voids. No paranasal sinus   air-fluid levels or opacification is evident.    New curvilinear-serpiginous susceptibility associated with the high left   frontal lobe. No evidence of acute hemorrhage. Slightly less pronounced   confluent restricted diffusion left lateral temporal lobe. Multiple less   pronounced foci of restricted diffusion with T2-FLAIR prolongation   centered in the left corona radiata, with extension into the ipsilateral   centrum semiovale. Several additional smaller less pronounced foci of   restricted diffusion left parieto-occipital and left posterior temporal   juxtacortical white matter as well as cortically based serpiginous   restricted diffusion left posterior temporal lobe.    IMPRESSION:    Compared with MRI Brain 1/7/2023.    1. Multiple evolving LEFT-SIDED subacute infarcts, as above.  2. Findings suspicious for high LEFT frontal subarachnoid hemorrhage.   Recommend correlation with noncontrast CT of the brain.  3. Additional findings above.    Findings and recommendations discussed in detail with PADMINI Carcamo at the   time of this interpretation.    --- En   < from: CT Head No Cont (01.27.23 @ 19:09) >    ACC: [id number] EXAM:  CT BRAIN   ORDERED BY: POLLO CARCAMO     PROCEDURE DATE:  01/27/2023          INTERPRETATION:  CLINICAL INFORMATION: Subacute infarcts with possible   new subarachnoid hemorrhage on MRI.    TECHNIQUE:  Axial CT images were acquired through the head.  Intravenous contrast: None  Two-dimensional reformats were generated.    COMPARISON STUDY: Brain MRIs from 1/7/2023 and 1/26/2023.    FINDINGS:  There are evolving subacute infarcts in the left temporal lobe anteriorly   and within the left corona radiata.  There is no CT evidence of acute   intracranial hemorrhage, mass effect, midline shift or hydrocephalus.    There is mild generalized cerebral volume loss.    The paranasal sinuses are grossly clear.    The mastoids are underpneumatized bilaterally; no clinically significant   mastoid or middle ear effusion is visualized.    The patient is status post right-sided intraocular lens replacement.    The calvarium and skull base appear within normal limits..    IMPRESSION:  Evolving subacute infarcts in the left temporal lobe anteriorly and   within the left corona radiata.  No evidence of acute intracranial   hemorrhage.  No subarachnoid hemorrhage in the left frontal region as   questioned on prior MRI.    --- Endof Report ---        Neurology Progress Note    S: Patient seen and examined. MRI done, c/f infarcts. CTH neg for hemorrhage     Medication:  MEDICATIONS  (STANDING):  aspirin  chewable 81 milliGRAM(s) Oral daily  atorvastatin 80 milliGRAM(s) Oral at bedtime  clopidogrel Tablet 75 milliGRAM(s) Oral daily  dextrose 5%. 1000 milliLiter(s) (100 mL/Hr) IV Continuous <Continuous>  dextrose 50% Injectable 25 Gram(s) IV Push once  dextrose 50% Injectable 12.5 Gram(s) IV Push once  dextrose 50% Injectable 25 Gram(s) IV Push once  dextrose Oral Gel 15 Gram(s) Oral once  ertapenem  IVPB 1000 milliGRAM(s) IV Intermittent every 24 hours  glucagon  Injectable 1 milliGRAM(s) IntraMuscular once  insulin glargine Injectable (LANTUS) 13 Unit(s) SubCutaneous at bedtime  insulin lispro (ADMELOG) corrective regimen sliding scale   SubCutaneous every 6 hours  tamsulosin 0.4 milliGRAM(s) Oral at bedtime    MEDICATIONS  (PRN):  acetaminophen    Suspension .. 650 milliGRAM(s) Enteral Tube every 6 hours PRN Temp greater or equal to 38C (100.4F)      Vitals:    Vital Signs Last 24 Hrs  T(C): 37.4 (01-28-23 @ 06:52), Max: 37.4 (01-28-23 @ 06:52)  T(F): 99.3 (01-28-23 @ 06:52), Max: 99.3 (01-28-23 @ 06:52)  HR: 100 (01-28-23 @ 06:52) (82 - 100)  BP: 121/71 (01-28-23 @ 06:52) (121/71 - 122/68)  BP(mean): --  RR: 18 (01-28-23 @ 06:52) (18 - 18)  SpO2: 95% (01-28-23 @ 06:52) (95% - 98%)      General Exam:   General Appearance: Appropriately dressed and in no acute distress       Head: Normocephalic, atraumatic and no dysmorphic features  Ear, Nose, and Throat: Moist mucous membranes  CVS: S1S2+  Resp: No SOB, no wheeze or rhonchi  Abd: soft NTND  Extremities: No edema, no cyanosis  Skin: No bruises, no rashes        NEUROLOGICAL EXAM:    Mental status: Eyes open, awake, alert, attempts to produce speech, able to follow some simple commands, able to mimic at times , unable to ID objects  Cranial Nerves: Right facial droop, no nystagmus, blinks to threat B/L  Motor exam: Normal tone, no drift, Right hemiparesis RUE drift, 3-44/5, RLE drift, 3-4/5,, LUE 5/5, LLE 5/5  Sensation: Intact to light touch   Coordination/ Gait: Unable to participate with exam     I personally reviewed the below data/images/labs:  no new labs     `< from: CT Head No Cont (01.19.23 @ 13:45) >    ACC: [id number] EXAM:  CT BRAIN   ORDERED BY: CORINNE MADERA     PROCEDURE DATE:  01/19/2023           INTERPRETATION:  CLINICAL STATEMENT: Altered mental status    TECHNIQUE: CT of the head was performed without IV contrast.  RAPID   artificial intelligence was utilized for the preliminary evaluation of   intracranial hemorrhage.    COMPARISON: MRI brain 1/7/2023.    FINDINGS:  There is mild diffuse parenchymal volume loss. There are areas of low   attenuation in the periventricular white matter likely related to mild   chronic microvascular ischemic changes.    Evolving small infarcts noted in the left corona radiata.   Age-indeterminate infarct also noted in the left temporal and parietal   lobe    There is no acute intracranial hemorrhage, parenchymal mass, mass effect   or midline shift.. There is no hydrocephalus. Partial empty sella noted    The cranium is intact. Calcification noted adjacent to left frontal   artery table. The visualized paranasal sinuses are well-aerated.        IMPRESSION:  No acute intracranial hemorrhage.    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    --- End of Report ---        < from: MR Head No Cont (01.26.23 @ 22:08) >    ACC: [id number] EXAM:  MR BRAIN   ORDERED BY: POLLO CARCAMO     PROCEDURE DATE:  01/26/2023          INTERPRETATION:  CLINICAL STATEMENT: Multiple left MCA territory   infarcts. Altered mental status.    TECHNIQUE: Multiplanar multisequence noncontrast MRI of the brain was   performed. Diffusion weighted imaging was performed. Significant image   degradation by motion artifact.  COMPARISON: MRI brain 1/7/2023.    FINDINGS:    Corpus callosum, pituitary and pineal regions appear unremarkable. No  tonsillar herniation or evidence of marrow replacement process.   Hyperostosis frontalis. Degenerative changes visualized cervical spine.    CP angle and IAC regions appear within normal limits, as do the globes,   intraconal regions and major intracranial flow-voids. No paranasal sinus   air-fluid levels or opacification is evident.    New curvilinear-serpiginous susceptibility associated with the high left   frontal lobe. No evidence of acute hemorrhage. Slightly less pronounced   confluent restricted diffusion left lateral temporal lobe. Multiple less   pronounced foci of restricted diffusion with T2-FLAIR prolongation   centered in the left corona radiata, with extension into the ipsilateral   centrum semiovale. Several additional smaller less pronounced foci of   restricted diffusion left parieto-occipital and left posterior temporal   juxtacortical white matter as well as cortically based serpiginous   restricted diffusion left posterior temporal lobe.    IMPRESSION:    Compared with MRI Brain 1/7/2023.    1. Multiple evolving LEFT-SIDED subacute infarcts, as above.  2. Findings suspicious for high LEFT frontal subarachnoid hemorrhage.   Recommend correlation with noncontrast CT of the brain.  3. Additional findings above.    Findings and recommendations discussed in detail with PADMINI Carcamo at the   time of this interpretation.    --- En   < from: CT Head No Cont (01.27.23 @ 19:09) >    ACC: [id number] EXAM:  CT BRAIN   ORDERED BY: POLLO CARCAMO     PROCEDURE DATE:  01/27/2023          INTERPRETATION:  CLINICAL INFORMATION: Subacute infarcts with possible   new subarachnoid hemorrhage on MRI.    TECHNIQUE:  Axial CT images were acquired through the head.  Intravenous contrast: None  Two-dimensional reformats were generated.    COMPARISON STUDY: Brain MRIs from 1/7/2023 and 1/26/2023.    FINDINGS:  There are evolving subacute infarcts in the left temporal lobe anteriorly   and within the left corona radiata.  There is no CT evidence of acute   intracranial hemorrhage, mass effect, midline shift or hydrocephalus.    There is mild generalized cerebral volume loss.    The paranasal sinuses are grossly clear.    The mastoids are underpneumatized bilaterally; no clinically significant   mastoid or middle ear effusion is visualized.    The patient is status post right-sided intraocular lens replacement.    The calvarium and skull base appear within normal limits..    IMPRESSION:  Evolving subacute infarcts in the left temporal lobe anteriorly and   within the left corona radiata.  No evidence of acute intracranial   hemorrhage.  No subarachnoid hemorrhage in the left frontal region as   questioned on prior MRI.    --- Endof Report ---        Neurology Progress Note    S: Patient seen and examined. MRI done, c/f infarcts. CTH neg for hemorrhage     Medication:  MEDICATIONS  (STANDING):  aspirin  chewable 81 milliGRAM(s) Oral daily  atorvastatin 80 milliGRAM(s) Oral at bedtime  clopidogrel Tablet 75 milliGRAM(s) Oral daily  dextrose 5%. 1000 milliLiter(s) (100 mL/Hr) IV Continuous <Continuous>  dextrose 50% Injectable 25 Gram(s) IV Push once  dextrose 50% Injectable 12.5 Gram(s) IV Push once  dextrose 50% Injectable 25 Gram(s) IV Push once  dextrose Oral Gel 15 Gram(s) Oral once  ertapenem  IVPB 1000 milliGRAM(s) IV Intermittent every 24 hours  glucagon  Injectable 1 milliGRAM(s) IntraMuscular once  insulin glargine Injectable (LANTUS) 13 Unit(s) SubCutaneous at bedtime  insulin lispro (ADMELOG) corrective regimen sliding scale   SubCutaneous every 6 hours  tamsulosin 0.4 milliGRAM(s) Oral at bedtime    MEDICATIONS  (PRN):  acetaminophen    Suspension .. 650 milliGRAM(s) Enteral Tube every 6 hours PRN Temp greater or equal to 38C (100.4F)      Vitals:    Vital Signs Last 24 Hrs  T(C): 37.4 (01-28-23 @ 06:52), Max: 37.4 (01-28-23 @ 06:52)  T(F): 99.3 (01-28-23 @ 06:52), Max: 99.3 (01-28-23 @ 06:52)  HR: 100 (01-28-23 @ 06:52) (82 - 100)  BP: 121/71 (01-28-23 @ 06:52) (121/71 - 122/68)  BP(mean): --  RR: 18 (01-28-23 @ 06:52) (18 - 18)  SpO2: 95% (01-28-23 @ 06:52) (95% - 98%)      General Exam:   General Appearance: Appropriately dressed and in no acute distress       Head: Normocephalic, atraumatic and no dysmorphic features  Ear, Nose, and Throat: Moist mucous membranes  CVS: S1S2+  Resp: No SOB, no wheeze or rhonchi  Abd: soft NTND  Extremities: No edema, no cyanosis  Skin: No bruises, no rashes        NEUROLOGICAL EXAM:    Mental status: Eyes open, awake, alert, attempts to produce speech, able to follow some simple commands, able to mimic at times , unable to ID objects  Cranial Nerves: Right facial droop, no nystagmus, blinks to threat B/L  Motor exam: Normal tone, no drift, Right hemiparesis RUE drift, 3-44/5, RLE drift, 3-4/5,, LUE 5/5, LLE 5/5  Sensation: Intact to light touch   Coordination/ Gait: Unable to participate with exam     I personally reviewed the below data/images/labs:  no new labs     `< from: CT Head No Cont (01.19.23 @ 13:45) >    ACC: [id number] EXAM:  CT BRAIN   ORDERED BY: CORINNE MADERA     PROCEDURE DATE:  01/19/2023           INTERPRETATION:  CLINICAL STATEMENT: Altered mental status    TECHNIQUE: CT of the head was performed without IV contrast.  RAPID   artificial intelligence was utilized for the preliminary evaluation of   intracranial hemorrhage.    COMPARISON: MRI brain 1/7/2023.    FINDINGS:  There is mild diffuse parenchymal volume loss. There are areas of low   attenuation in the periventricular white matter likely related to mild   chronic microvascular ischemic changes.    Evolving small infarcts noted in the left corona radiata.   Age-indeterminate infarct also noted in the left temporal and parietal   lobe    There is no acute intracranial hemorrhage, parenchymal mass, mass effect   or midline shift.. There is no hydrocephalus. Partial empty sella noted    The cranium is intact. Calcification noted adjacent to left frontal   artery table. The visualized paranasal sinuses are well-aerated.        IMPRESSION:  No acute intracranial hemorrhage.    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    --- End of Report ---        < from: MR Head No Cont (01.26.23 @ 22:08) >    ACC: [id number] EXAM:  MR BRAIN   ORDERED BY: POLLO CARCAMO     PROCEDURE DATE:  01/26/2023          INTERPRETATION:  CLINICAL STATEMENT: Multiple left MCA territory   infarcts. Altered mental status.    TECHNIQUE: Multiplanar multisequence noncontrast MRI of the brain was   performed. Diffusion weighted imaging was performed. Significant image   degradation by motion artifact.  COMPARISON: MRI brain 1/7/2023.    FINDINGS:    Corpus callosum, pituitary and pineal regions appear unremarkable. No  tonsillar herniation or evidence of marrow replacement process.   Hyperostosis frontalis. Degenerative changes visualized cervical spine.    CP angle and IAC regions appear within normal limits, as do the globes,   intraconal regions and major intracranial flow-voids. No paranasal sinus   air-fluid levels or opacification is evident.    New curvilinear-serpiginous susceptibility associated with the high left   frontal lobe. No evidence of acute hemorrhage. Slightly less pronounced   confluent restricted diffusion left lateral temporal lobe. Multiple less   pronounced foci of restricted diffusion with T2-FLAIR prolongation   centered in the left corona radiata, with extension into the ipsilateral   centrum semiovale. Several additional smaller less pronounced foci of   restricted diffusion left parieto-occipital and left posterior temporal   juxtacortical white matter as well as cortically based serpiginous   restricted diffusion left posterior temporal lobe.    IMPRESSION:    Compared with MRI Brain 1/7/2023.    1. Multiple evolving LEFT-SIDED subacute infarcts, as above.  2. Findings suspicious for high LEFT frontal subarachnoid hemorrhage.   Recommend correlation with noncontrast CT of the brain.  3. Additional findings above.    Findings and recommendations discussed in detail with PADMINI Carcamo at the   time of this interpretation.    --- En   < from: CT Head No Cont (01.27.23 @ 19:09) >    ACC: [id number] EXAM:  CT BRAIN   ORDERED BY: POLLO CARCAMO     PROCEDURE DATE:  01/27/2023          INTERPRETATION:  CLINICAL INFORMATION: Subacute infarcts with possible   new subarachnoid hemorrhage on MRI.    TECHNIQUE:  Axial CT images were acquired through the head.  Intravenous contrast: None  Two-dimensional reformats were generated.    COMPARISON STUDY: Brain MRIs from 1/7/2023 and 1/26/2023.    FINDINGS:  There are evolving subacute infarcts in the left temporal lobe anteriorly   and within the left corona radiata.  There is no CT evidence of acute   intracranial hemorrhage, mass effect, midline shift or hydrocephalus.    There is mild generalized cerebral volume loss.    The paranasal sinuses are grossly clear.    The mastoids are underpneumatized bilaterally; no clinically significant   mastoid or middle ear effusion is visualized.    The patient is status post right-sided intraocular lens replacement.    The calvarium and skull base appear within normal limits..    IMPRESSION:  Evolving subacute infarcts in the left temporal lobe anteriorly and   within the left corona radiata.  No evidence of acute intracranial   hemorrhage.  No subarachnoid hemorrhage in the left frontal region as   questioned on prior MRI.    --- Endof Report ---

## 2023-01-28 NOTE — PROGRESS NOTE ADULT - ASSESSMENT
78F with dementia, T2DM, recently discharged about 1 week ago for CVA now presenting with poor PO intake, lethargy and hyperglycemia to 500s.     Altered mental status, UTI .   - Since recent stroke,  A&Ox0. More recently, poor po intake, lethargic. CTH no acute intracranial abnormalities. Multifactorial   - Multiple metabolic derangements - dehydration, hyperglycemia, hyperNa, hypoK  - S/P IVF D5 75 CC   - Monitor and replete electrolytes PRN   - UA with +leuk esterase, many bacteria. C/w ceftriaxone for now. F/u UCx -- E. coli  - on Ertapenem   - 01/19 BCx2 w/ Neg final   - 01/24 BCx2 w/ NGTD; F/u final   - Monitor fever curve, WBC trend   - Neuro eval consulted; F/u recs   - Aspiration precautions   - Discussed with family, agreed for NGT for feeding; NGT replaced ON 01/26   - Retention - replace gonzalez, check bladder US -- Noted, per attending discussion with family, family to decide on further work up     Uncontrolled type 2 diabetes mellitus with hyperglycemia.   - D/c on Metformin 500 mg BID  - Now with glucose 500s, improved to mid 300s with fluids. Also with mild ketosis (AG 17, bicarb 21, BHB 0.7)  - Endo eval appreciated, BHB now WNL  - Lantus 11 units QHs added to regimen & Sliding scale  - Monitor glucose levels closely, avoid hypo/hyperglycemia   - Check Ab given thin body habitus and ketosis (Glutamic Acid Decarboxylase, Zinc Transporter 8, Islet Cell Ab, and IA-2 Ab)  - S/PIVF administration  - Endo follow up; F/u recs      Cerebrovascular accident (CVA).  - Recent admission w/ Acute L MCA infarct, severe stenosis of the L M2 branch. MR with L MCA Territory infarcts   - CTH on admission showing No acute intracranial hemorrhage. 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 Per neurology, expected change seen on CT, not new lesions.   - C/w asa, plavix, statin.  - Neuro eval consulted; F/u recs   - Check MR brain -- C/F L sided Subacute infarcts, L frontal SAH; F/u neuro recs  - Checking CT head noted   - Hold lovenox DVT PPX  - NSGY eval appreciated       Febrile  - Cx with E. Faecalis, E. Avium; UCx with E. coli   - 01/19 BCx2 W/ Neg final   - 01/24 repeat BCx2  --> NGTD; F/u final  - ID eval consulted; F/u recs  - ABX as per ID, switched to ertapenem , sensitive  - If recurrent fever, check CT as per ID   - Monitor CBC, temp curve, VS and patient closely     Anemia  - Drop in hemoglobin   - Checking Iron, B12, Folate, Ferritin, TIBC -- not consistent with deficiency and Occult -- pending   - Monitor H/H closely, monitor for signs/symptoms of bleeding    Hypernatremia.   - S/P D5 50 CC x 10 hours   - F/u DM management as per above  - Monitor Na level closely, avoid overocrrection   - C/w Free water, monitor levels closely     Pediculosis   - ID eval   - Nix as per protocol   - Tx today 01/26  - Contact precautions    HypoK  - Monitor and replete electrolytes PRN     Hyperlipidemia.   -c/w atorvastatin.    Hypertension.    -amlodipine 5mg daily.      PPX  - Lovenox 40 Qd

## 2023-01-28 NOTE — PROGRESS NOTE ADULT - SUBJECTIVE AND OBJECTIVE BOX
[facility] [place] [facility]   MD PUSHPA WICK MD RUORU WONG, PA    TEL:  FROM 9 AM to 5 PM ---OFFICE: [phone]    FROM 5 PM - 9 AM PLEASE CALL ANSWERING SERVICE: [phone]    RENAL FOLLOW UP NOTE--Date of Service 01-28-23 @ 08:59  --------------------------------------------------------------------------------  HPI:      Pt seen and examined at bedside.       PAST HISTORY  --------------------------------------------------------------------------------  No significant changes to PMH, PSH, FHx, SHx, unless otherwise noted    ALLERGIES & MEDICATIONS  --------------------------------------------------------------------------------  Allergies    Benedryl Allergy Sinus (Hives)  penicillin (Rash)    Intolerances      Standing Inpatient Medications  aspirin  chewable 81 milliGRAM(s) Oral daily  atorvastatin 80 milliGRAM(s) Oral at bedtime  clopidogrel Tablet 75 milliGRAM(s) Oral daily  dextrose 5%. 1000 milliLiter(s) IV Continuous <Continuous>  dextrose 50% Injectable 25 Gram(s) IV Push once  dextrose 50% Injectable 12.5 Gram(s) IV Push once  dextrose 50% Injectable 25 Gram(s) IV Push once  dextrose Oral Gel 15 Gram(s) Oral once  ertapenem  IVPB 1000 milliGRAM(s) IV Intermittent every 24 hours  glucagon  Injectable 1 milliGRAM(s) IntraMuscular once  insulin glargine Injectable (LANTUS) 13 Unit(s) SubCutaneous at bedtime  insulin lispro (ADMELOG) corrective regimen sliding scale   SubCutaneous every 6 hours  tamsulosin 0.4 milliGRAM(s) Oral at bedtime    PRN Inpatient Medications  acetaminophen    Suspension .. 650 milliGRAM(s) Enteral Tube every 6 hours PRN      REVIEW OF SYSTEMS  --------------------------------------------------------------------------------  General: no fever  MSK: no edema     VITALS/PHYSICAL EXAM  --------------------------------------------------------------------------------  T(C): 37.4 (01-28-23 @ 06:52), Max: 37.4 (01-28-23 @ 06:52)  HR: 100 (01-28-23 @ 06:52) (82 - 100)  BP: 121/71 (01-28-23 @ 06:52) (121/71 - 122/68)  RR: 18 (01-28-23 @ 06:52) (18 - 18)  SpO2: 95% (01-28-23 @ 06:52) (95% - 98%)  Wt(kg): --        01-27-23 @ 07:01  -  01-28-23 @ 07:00  --------------------------------------------------------  IN: 1365 mL / OUT: 550 mL / NET: 815 mL      Physical Exam:  	Constitutional: NAD  Neck: No JVD  Respiratory: CTAB, no wheezes, rales or rhonchi  Cardiovascular: S1, S2, RRR  Gastrointestinal: BS+, soft, NT/ND  Extremities: No peripheral edema  LABS/STUDIES  --------------------------------------------------------------------------------    140  |  103  |  20  ----------------------------<  168      [01-27-23 @ 06:54]  4.2   |  28  |  0.79        Ca     8.4     [01-27-23 @ 06:54]      Mg     2.1     [01-27-23 @ 06:54]      Phos  2.6     [01-27-23 @ 06:54]    TPro  6.5  /  Alb  2.3  /  TBili  0.2  /  DBili  x   /  AST  75  /  ALT  40  /  AlkPhos  180  [01-27-23 @ 06:54]          Creatinine Trend:  SCr 0.79 [01-27 @ 06:54]  SCr 0.63 [01-26 @ 07:07]  SCr 0.70 [01-25 @ 12:19]  SCr 0.79 [01-24 @ 07:12]  SCr 0.81 [01-23 @ 07:43]    Urinalysis - [01-19-23 @ 13:15]      Color DARK BROWN / Appearance Turbid / SG 1.023 / pH 7.0      Gluc 200 mg/dL / Ketone Trace  / Bili Negative / Urobili 6 mg/dL       Blood Large / Protein >600 / Leuk Est Large / Nitrite Negative      RBC 15 / WBC 11-25 / Hyaline  / Gran 4 / Sq Epi  / Non Sq Epi Few / Bacteria Many      Iron 17, TIBC 187, %sat 9      [01-25-23 @ 07:18]  Ferritin 172      [01-25-23 @ 07:18]  Lipid: chol 238, TG 80, HDL 46, LDL --      [01-08-23 @ 02:47]       [facility] [place] [facility]   MD PUSHPA WICK MD RUORU WONG, PA    TEL:  FROM 9 AM to 5 PM ---OFFICE: [phone]    FROM 5 PM - 9 AM PLEASE CALL ANSWERING SERVICE: [phone]    RENAL FOLLOW UP NOTE--Date of Service 01-28-23 @ 08:59  --------------------------------------------------------------------------------  HPI:      Pt seen and examined at bedside.       PAST HISTORY  --------------------------------------------------------------------------------  No significant changes to PMH, PSH, FHx, SHx, unless otherwise noted    ALLERGIES & MEDICATIONS  --------------------------------------------------------------------------------  Allergies    Benedryl Allergy Sinus (Hives)  penicillin (Rash)    Intolerances      Standing Inpatient Medications  aspirin  chewable 81 milliGRAM(s) Oral daily  atorvastatin 80 milliGRAM(s) Oral at bedtime  clopidogrel Tablet 75 milliGRAM(s) Oral daily  dextrose 5%. 1000 milliLiter(s) IV Continuous <Continuous>  dextrose 50% Injectable 25 Gram(s) IV Push once  dextrose 50% Injectable 12.5 Gram(s) IV Push once  dextrose 50% Injectable 25 Gram(s) IV Push once  dextrose Oral Gel 15 Gram(s) Oral once  ertapenem  IVPB 1000 milliGRAM(s) IV Intermittent every 24 hours  glucagon  Injectable 1 milliGRAM(s) IntraMuscular once  insulin glargine Injectable (LANTUS) 13 Unit(s) SubCutaneous at bedtime  insulin lispro (ADMELOG) corrective regimen sliding scale   SubCutaneous every 6 hours  tamsulosin 0.4 milliGRAM(s) Oral at bedtime    PRN Inpatient Medications  acetaminophen    Suspension .. 650 milliGRAM(s) Enteral Tube every 6 hours PRN      REVIEW OF SYSTEMS  --------------------------------------------------------------------------------  General: no fever  MSK: no edema     VITALS/PHYSICAL EXAM  --------------------------------------------------------------------------------  T(C): 37.4 (01-28-23 @ 06:52), Max: 37.4 (01-28-23 @ 06:52)  HR: 100 (01-28-23 @ 06:52) (82 - 100)  BP: 121/71 (01-28-23 @ 06:52) (121/71 - 122/68)  RR: 18 (01-28-23 @ 06:52) (18 - 18)  SpO2: 95% (01-28-23 @ 06:52) (95% - 98%)  Wt(kg): --        01-27-23 @ 07:01  -  01-28-23 @ 07:00  --------------------------------------------------------  IN: 1365 mL / OUT: 550 mL / NET: 815 mL      Physical Exam:  	Constitutional: NAD  Neck: No JVD  Respiratory: CTAB, no wheezes, rales or rhonchi  Cardiovascular: S1, S2, RRR  Gastrointestinal: BS+, soft, NT/ND  Extremities: No peripheral edema  LABS/STUDIES  --------------------------------------------------------------------------------    140  |  103  |  20  ----------------------------<  168      [01-27-23 @ 06:54]  4.2   |  28  |  0.79        Ca     8.4     [01-27-23 @ 06:54]      Mg     2.1     [01-27-23 @ 06:54]      Phos  2.6     [01-27-23 @ 06:54]    TPro  6.5  /  Alb  2.3  /  TBili  0.2  /  DBili  x   /  AST  75  /  ALT  40  /  AlkPhos  180  [01-27-23 @ 06:54]          Creatinine Trend:  SCr 0.79 [01-27 @ 06:54]  SCr 0.63 [01-26 @ 07:07]  SCr 0.70 [01-25 @ 12:19]  SCr 0.79 [01-24 @ 07:12]  SCr 0.81 [01-23 @ 07:43]    Urinalysis - [01-19-23 @ 13:15]      Color DARK BROWN / Appearance Turbid / SG 1.023 / pH 7.0      Gluc 200 mg/dL / Ketone Trace  / Bili Negative / Urobili 6 mg/dL       Blood Large / Protein >600 / Leuk Est Large / Nitrite Negative      RBC 15 / WBC 11-25 / Hyaline  / Gran 4 / Sq Epi  / Non Sq Epi Few / Bacteria Many      Iron 17, TIBC 187, %sat 9      [01-25-23 @ 07:18]  Ferritin 172      [01-25-23 @ 07:18]  Lipid: chol 238, TG 80, HDL 46, LDL --      [01-08-23 @ 02:47]       [facility] [place] [facility]   MD PUSHPA WICK MD RUORU WONG, PA    TEL:  FROM 9 AM to 5 PM ---OFFICE: [phone]    FROM 5 PM - 9 AM PLEASE CALL ANSWERING SERVICE: [phone]    RENAL FOLLOW UP NOTE--Date of Service 01-28-23 @ 08:59  --------------------------------------------------------------------------------  HPI:      Pt seen and examined at bedside.       PAST HISTORY  --------------------------------------------------------------------------------  No significant changes to PMH, PSH, FHx, SHx, unless otherwise noted    ALLERGIES & MEDICATIONS  --------------------------------------------------------------------------------  Allergies    Benedryl Allergy Sinus (Hives)  penicillin (Rash)    Intolerances      Standing Inpatient Medications  aspirin  chewable 81 milliGRAM(s) Oral daily  atorvastatin 80 milliGRAM(s) Oral at bedtime  clopidogrel Tablet 75 milliGRAM(s) Oral daily  dextrose 5%. 1000 milliLiter(s) IV Continuous <Continuous>  dextrose 50% Injectable 25 Gram(s) IV Push once  dextrose 50% Injectable 12.5 Gram(s) IV Push once  dextrose 50% Injectable 25 Gram(s) IV Push once  dextrose Oral Gel 15 Gram(s) Oral once  ertapenem  IVPB 1000 milliGRAM(s) IV Intermittent every 24 hours  glucagon  Injectable 1 milliGRAM(s) IntraMuscular once  insulin glargine Injectable (LANTUS) 13 Unit(s) SubCutaneous at bedtime  insulin lispro (ADMELOG) corrective regimen sliding scale   SubCutaneous every 6 hours  tamsulosin 0.4 milliGRAM(s) Oral at bedtime    PRN Inpatient Medications  acetaminophen    Suspension .. 650 milliGRAM(s) Enteral Tube every 6 hours PRN      REVIEW OF SYSTEMS  --------------------------------------------------------------------------------  General: no fever  MSK: no edema     VITALS/PHYSICAL EXAM  --------------------------------------------------------------------------------  T(C): 37.4 (01-28-23 @ 06:52), Max: 37.4 (01-28-23 @ 06:52)  HR: 100 (01-28-23 @ 06:52) (82 - 100)  BP: 121/71 (01-28-23 @ 06:52) (121/71 - 122/68)  RR: 18 (01-28-23 @ 06:52) (18 - 18)  SpO2: 95% (01-28-23 @ 06:52) (95% - 98%)  Wt(kg): --        01-27-23 @ 07:01  -  01-28-23 @ 07:00  --------------------------------------------------------  IN: 1365 mL / OUT: 550 mL / NET: 815 mL      Physical Exam:  	Constitutional: NAD  Neck: No JVD  Respiratory: CTAB, no wheezes, rales or rhonchi  Cardiovascular: S1, S2, RRR  Gastrointestinal: BS+, soft, NT/ND  Extremities: No peripheral edema  LABS/STUDIES  --------------------------------------------------------------------------------    140  |  103  |  20  ----------------------------<  168      [01-27-23 @ 06:54]  4.2   |  28  |  0.79        Ca     8.4     [01-27-23 @ 06:54]      Mg     2.1     [01-27-23 @ 06:54]      Phos  2.6     [01-27-23 @ 06:54]    TPro  6.5  /  Alb  2.3  /  TBili  0.2  /  DBili  x   /  AST  75  /  ALT  40  /  AlkPhos  180  [01-27-23 @ 06:54]          Creatinine Trend:  SCr 0.79 [01-27 @ 06:54]  SCr 0.63 [01-26 @ 07:07]  SCr 0.70 [01-25 @ 12:19]  SCr 0.79 [01-24 @ 07:12]  SCr 0.81 [01-23 @ 07:43]    Urinalysis - [01-19-23 @ 13:15]      Color DARK BROWN / Appearance Turbid / SG 1.023 / pH 7.0      Gluc 200 mg/dL / Ketone Trace  / Bili Negative / Urobili 6 mg/dL       Blood Large / Protein >600 / Leuk Est Large / Nitrite Negative      RBC 15 / WBC 11-25 / Hyaline  / Gran 4 / Sq Epi  / Non Sq Epi Few / Bacteria Many      Iron 17, TIBC 187, %sat 9      [01-25-23 @ 07:18]  Ferritin 172      [01-25-23 @ 07:18]  Lipid: chol 238, TG 80, HDL 46, LDL --      [01-08-23 @ 02:47]

## 2023-01-28 NOTE — PROGRESS NOTE ADULT - ASSESSMENT
78F with dementia, T2DM, recently discharged about 1 week ago for CVA now presenting with poor PO intake, lethargy and hyperglycemia to 500s.   Of note, patient admitted 1/6 for R facial droop, word finding difficulties. Imaging concerning for acute L MCA infarct.  Per family, since stroke patient nonverbal/unable to participate in meaningful communications, intermittently follows commands. For the last 3-4 days, patient with poor po intake.    In ED, afebrile, , 135/84. WBC 15, Na 158, K 3.0, Cl 120, Glu 468, alk phos 136m BHB 0.7. pH 7.36, lactate 2.2.   UA: +leuk esterase, many bacteria, WBC 11-25  CXR: Redemonstrated biapical scarring and left upper lobe bronchiectasis, unchanged. No focal consolidation.   CTH No acute intracranial hemorrhage. 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  Given 1.5L NS, ceftriaxone x1, haldol 2.5mg x1 in ED.   last admission: CTA with L MCA severe stenosis; distal L M2 occlusion .  TTE 1/8/23: EF 63% Mild mitral regurgitation ; A1c 9.4   MRI brain with evolving subacute infarcts as expected. question of SAH   CTH evolviong subacute L hemisphere infarcts. no SAH found.     Impression  1) AMS likely 2/2 infection/UTI toxic metabolic encephalopahthy   2) recent stroke - L MCA infarct 2/2 symptomatic L MCA ICAD ; worsening of R HP in setting of infection     - Nsx eval--> No intervention.  despite there note stating SAH, no comment to stop anti thrombotics.  nevertheless CTH read is neg for SAH.  would c/w current treatement and repeat CTH again in 24 -48hrs or if there is a change in exam   - now on contact   - mittens. NGT replaced 1/26 early AM, f/u CXR   - f/u endocrine   - treatment of infection per primary team -->  CTX  - for secondary stroke prevention. DAPT x 3 months followeed by ASA 81mg dialy thereafter.   - hihg dose statin lipitor 40mg dialy   - avoid hypotension given L MCA ICAD   - telemetry  - PT/OT/SS/SLP, OOBC  - check FS, glucose control <180  - GI/DVT ppx  - Thank you for allowing me to participate in the care of this patient. Call with questions.      Reji Greco MD  Vascular Neurology  Office: [phone]  78F with dementia, T2DM, recently discharged about 1 week ago for CVA now presenting with poor PO intake, lethargy and hyperglycemia to 500s.   Of note, patient admitted 1/6 for R facial droop, word finding difficulties. Imaging concerning for acute L MCA infarct.  Per family, since stroke patient nonverbal/unable to participate in meaningful communications, intermittently follows commands. For the last 3-4 days, patient with poor po intake.    In ED, afebrile, , 135/84. WBC 15, Na 158, K 3.0, Cl 120, Glu 468, alk phos 136m BHB 0.7. pH 7.36, lactate 2.2.   UA: +leuk esterase, many bacteria, WBC 11-25  CXR: Redemonstrated biapical scarring and left upper lobe bronchiectasis, unchanged. No focal consolidation.   CTH No acute intracranial hemorrhage. 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  Given 1.5L NS, ceftriaxone x1, haldol 2.5mg x1 in ED.   last admission: CTA with L MCA severe stenosis; distal L M2 occlusion .  TTE 1/8/23: EF 63% Mild mitral regurgitation ; A1c 9.4   MRI brain with evolving subacute infarcts as expected. question of SAH   CTH evolviong subacute L hemisphere infarcts. no SAH found.     Impression  1) AMS likely 2/2 infection/UTI toxic metabolic encephalopahthy   2) recent stroke - L MCA infarct 2/2 symptomatic L MCA ICAD ; worsening of R HP in setting of infection     - Nsx eval--> No intervention.  despite there note stating SAH, no comment to stop anti thrombotics.  nevertheless CTH read is neg for SAH.  would c/w current treatement and repeat CTH again in 24 -48hrs or if there is a change in exam   - now on contact   - mittens. NGT replaced 1/26 early AM, f/u CXR   - f/u endocrine   - treatment of infection per primary team -->  CTX  - for secondary stroke prevention. DAPT x 3 months followeed by ASA 81mg dialy thereafter.   - hihg dose statin lipitor 40mg dialy   - avoid hypotension given L MCA ICAD   - telemetry  - PT/OT/SS/SLP, OOBC  - check FS, glucose control <180  - GI/DVT ppx  - Thank you for allowing me to participate in the care of this patient. Call with questions.      Reji Greco MD  Vascular Neurology  Office: [phone]  78F with dementia, T2DM, recently discharged about 1 week ago for CVA now presenting with poor PO intake, lethargy and hyperglycemia to 500s.   Of note, patient admitted 1/6 for R facial droop, word finding difficulties. Imaging concerning for acute L MCA infarct.  Per family, since stroke patient nonverbal/unable to participate in meaningful communications, intermittently follows commands. For the last 3-4 days, patient with poor po intake.    In ED, afebrile, , 135/84. WBC 15, Na 158, K 3.0, Cl 120, Glu 468, alk phos 136m BHB 0.7. pH 7.36, lactate 2.2.   UA: +leuk esterase, many bacteria, WBC 11-25  CXR: Redemonstrated biapical scarring and left upper lobe bronchiectasis, unchanged. No focal consolidation.   CTH No acute intracranial hemorrhage. 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  Given 1.5L NS, ceftriaxone x1, haldol 2.5mg x1 in ED.   last admission: CTA with L MCA severe stenosis; distal L M2 occlusion .  TTE 1/8/23: EF 63% Mild mitral regurgitation ; A1c 9.4   MRI brain with evolving subacute infarcts as expected. question of SAH   CTH evolviong subacute L hemisphere infarcts. no SAH found.     Impression  1) AMS likely 2/2 infection/UTI toxic metabolic encephalopahthy   2) recent stroke - L MCA infarct 2/2 symptomatic L MCA ICAD ; worsening of R HP in setting of infection     - Nsx eval--> No intervention.  despite there note stating SAH, no comment to stop anti thrombotics.  nevertheless CTH read is neg for SAH.  would c/w current treatement and repeat CTH again in 24 -48hrs or if there is a change in exam   - now on contact   - mittens. NGT replaced 1/26 early AM, f/u CXR   - f/u endocrine   - treatment of infection per primary team -->  CTX  - for secondary stroke prevention. DAPT x 3 months followeed by ASA 81mg dialy thereafter.   - hihg dose statin lipitor 40mg dialy   - avoid hypotension given L MCA ICAD   - telemetry  - PT/OT/SS/SLP, OOBC  - check FS, glucose control <180  - GI/DVT ppx  - Thank you for allowing me to participate in the care of this patient. Call with questions.      Reji Greco MD  Vascular Neurology  Office: [phone]

## 2023-01-29 LAB
ANION GAP SERPL CALC-SCNC: 6 MMOL/L — SIGNIFICANT CHANGE UP (ref 5–17)
BUN SERPL-MCNC: 19 MG/DL — SIGNIFICANT CHANGE UP (ref 7–23)
CALCIUM SERPL-MCNC: 8 MG/DL — LOW (ref 8.4–10.5)
CHLORIDE SERPL-SCNC: 94 MMOL/L — LOW (ref 96–108)
CO2 SERPL-SCNC: 28 MMOL/L — SIGNIFICANT CHANGE UP (ref 22–31)
CREAT SERPL-MCNC: 0.65 MG/DL — SIGNIFICANT CHANGE UP (ref 0.5–1.3)
CULTURE RESULTS: SIGNIFICANT CHANGE UP
EGFR: 90 ML/MIN/1.73M2 — SIGNIFICANT CHANGE UP
GLUCOSE SERPL-MCNC: 243 MG/DL — HIGH (ref 70–99)
POTASSIUM SERPL-MCNC: 4.4 MMOL/L — SIGNIFICANT CHANGE UP (ref 3.5–5.3)
POTASSIUM SERPL-SCNC: 4.4 MMOL/L — SIGNIFICANT CHANGE UP (ref 3.5–5.3)
SODIUM SERPL-SCNC: 128 MMOL/L — LOW (ref 135–145)
SPECIMEN SOURCE: SIGNIFICANT CHANGE UP

## 2023-01-29 PROCEDURE — 99232 SBSQ HOSP IP/OBS MODERATE 35: CPT

## 2023-01-29 RX ORDER — SODIUM CHLORIDE 9 MG/ML
1000 INJECTION INTRAMUSCULAR; INTRAVENOUS; SUBCUTANEOUS
Refills: 0 | Status: DISCONTINUED | OUTPATIENT
Start: 2023-01-29 | End: 2023-01-31

## 2023-01-29 RX ORDER — INSULIN GLARGINE 100 [IU]/ML
13 INJECTION, SOLUTION SUBCUTANEOUS
Refills: 0 | Status: DISCONTINUED | OUTPATIENT
Start: 2023-01-29 | End: 2023-01-30

## 2023-01-29 RX ADMIN — Medication 6: at 14:12

## 2023-01-29 RX ADMIN — ATORVASTATIN CALCIUM 80 MILLIGRAM(S): 80 TABLET, FILM COATED ORAL at 00:48

## 2023-01-29 RX ADMIN — Medication 4: at 17:53

## 2023-01-29 RX ADMIN — TAMSULOSIN HYDROCHLORIDE 0.4 MILLIGRAM(S): 0.4 CAPSULE ORAL at 00:49

## 2023-01-29 RX ADMIN — CLOPIDOGREL BISULFATE 75 MILLIGRAM(S): 75 TABLET, FILM COATED ORAL at 14:22

## 2023-01-29 RX ADMIN — INSULIN GLARGINE 13 UNIT(S): 100 INJECTION, SOLUTION SUBCUTANEOUS at 14:23

## 2023-01-29 RX ADMIN — SODIUM CHLORIDE 50 MILLILITER(S): 9 INJECTION INTRAMUSCULAR; INTRAVENOUS; SUBCUTANEOUS at 21:41

## 2023-01-29 RX ADMIN — ERTAPENEM SODIUM 120 MILLIGRAM(S): 1 INJECTION, POWDER, LYOPHILIZED, FOR SOLUTION INTRAMUSCULAR; INTRAVENOUS at 17:53

## 2023-01-29 RX ADMIN — Medication 2: at 05:26

## 2023-01-29 RX ADMIN — Medication 81 MILLIGRAM(S): at 14:22

## 2023-01-29 RX ADMIN — TAMSULOSIN HYDROCHLORIDE 0.4 MILLIGRAM(S): 0.4 CAPSULE ORAL at 21:42

## 2023-01-29 RX ADMIN — ATORVASTATIN CALCIUM 80 MILLIGRAM(S): 80 TABLET, FILM COATED ORAL at 21:42

## 2023-01-29 RX ADMIN — SODIUM CHLORIDE 50 MILLILITER(S): 9 INJECTION INTRAMUSCULAR; INTRAVENOUS; SUBCUTANEOUS at 17:54

## 2023-01-29 NOTE — PROGRESS NOTE ADULT - ASSESSMENT
78F with dementia, T2DM, recently discharged about 1 week ago for CVA now presenting with poor PO intake, lethargy and hyperglycemia to 500s.     Altered mental status, UTI .   - Since recent stroke,  A&Ox0. More recently, poor po intake, lethargic. CTH no acute intracranial abnormalities. Multifactorial   - Multiple metabolic derangements - dehydration, hyperglycemia, hyperNa, hypoK  - S/P IVF D5 75 CC   - Monitor and replete electrolytes PRN   - UA with +leuk esterase, many bacteria. C/w ceftriaxone for now. F/u UCx -- E. coli  - on Ertapenem   - 01/19 BCx2 w/ Neg final   - 01/24 BCx2 w/ NGTD; F/u final   - Monitor fever curve, WBC trend   - Neuro eval consulted; F/u recs   - Aspiration precautions   - Discussed with family, agreed for NGT for feeding; NGT replaced ON 01/26   - Retention - replace gonzalez, check bladder US -- Noted, per attending discussion with family, family to decide on further work up     Uncontrolled type 2 diabetes mellitus with hyperglycemia.   - D/c on Metformin 500 mg BID  - Now with glucose 500s, improved to mid 300s with fluids. Also with mild ketosis (AG 17, bicarb 21, BHB 0.7)  - Endo eval appreciated, BHB now WNL  - Lantus 11 units QHs added to regimen & Sliding scale  - Monitor glucose levels closely, avoid hypo/hyperglycemia   - Check Ab given thin body habitus and ketosis (Glutamic Acid Decarboxylase, Zinc Transporter 8, Islet Cell Ab, and IA-2 Ab)  - S/PIVF administration  - Endo follow up; F/u recs      Cerebrovascular accident (CVA).  - Recent admission w/ Acute L MCA infarct, severe stenosis of the L M2 branch. MR with L MCA [place] infarcts   - CTH on admission showing No acute intracranial hemorrhage. 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 Per neurology, expected change seen on CT, not new lesions.   - C/w asa, plavix, statin.  - Neuro eval consulted; F/u recs   - Check MR brain -- C/F L sided Subacute infarcts, L frontal SAH; F/u neuro recs  - Checking CT head noted   - Hold lovenox DVT PPX  - NSGY eval appreciated       Febrile  - Cx with E. Faecalis, E. Avium; UCx with E. coli   - 01/19 BCx2 W/ Neg final   - 01/24 repeat BCx2  --> NGTD; F/u final  - ID eval consulted; F/u recs  - ABX as per ID, switched to ertapenem , sensitive  - If recurrent fever, check CT as per ID   - Monitor CBC, temp curve, VS and patient closely     Anemia  - Drop in hemoglobin   - Checking Iron, B12, Folate, Ferritin, TIBC -- not consistent with deficiency and Occult   - Monitor H/H closely, monitor for signs/symptoms of bleeding    Hypernatremia.   - S/P D5 50 CC x 10 hours   - F/u DM management as per above  - Monitor Na level closely, avoid overocrrection   - C/w Free water, monitor levels closely     Pediculosis   - ID eval   - Nix as per protocol   - Tx today 01/26  - Contact precautions    HypoK  - Monitor and replete electrolytes PRN     Hyperlipidemia.   -c/w atorvastatin.    Hypertension.    -amlodipine 5mg daily.      PPX  - Lovenox 40 Qd

## 2023-01-29 NOTE — CHART NOTE - NSCHARTNOTEFT_GEN_A_CORE
Serum Na noted to be 128 this afternoon. Discussed results with nephro Dr. Albarado who recommends discontinuing free water via PEG and start Normal Saline 50cc/hr for 10hrs. Attending Dr. Castillo agrees with plan. Follow up BMP in AM to determine further recs.

## 2023-01-29 NOTE — PROGRESS NOTE ADULT - ASSESSMENT
78F with dementia, T2DM, recently discharged about 1 week ago for CVA now presenting with poor PO intake, lethargy and hyperglycemia to 500s.   Of note, patient admitted 1/6 for R facial droop, word finding difficulties. Imaging concerning for acute L MCA infarct. Discharged home on 1/11. Majority of history obtained through family given mental status. Per family, since stroke patient nonverbal/unable to participate in meaningful communications, intermittently follows commands. For the last 3-4 days, patient with poor po intake. Reportedly only eating breakfast. Day of presentation, more lethargic with elevated finger sticks up to the 500s. Patient was evaluated by endocrine team during prior admission with recs to increase metformin to 1000mg BID. However, given poor PO intake, family has been giving 500mg daily.     In ED, afebrile, , 135/84. WBC 15, Na 158, K 3.0, Cl 120, Glu 468, alk phos 136m BHB 0.7. pH 7.36, lactate 2.2. UA: +leuk esterase, many bacteria, WBC 11-25  CXR: Redemonstrated biapical scarring and left upper lobe bronchiectasis, unchanged. No focal consolidation.   CTH No acute intracranial hemorrhage. 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  Given 1.5L NS, ceftriaxone x1, haldol 2.5mg x1 in ED.     A/P    Hypernatremia/ Hyponatremia   no labs today   suggest to get urine osmol, urine Na, ordered stat BMP   Avoid overcorrection >6-8meq a day   monitor Na closely     Hypokalemia  stable   monitor K     Hypophosphatemia  Supplement as needed  Daily phosphorus     HTN  optimal   monitor BP closely     Proteinuria  Currently has UTI would repeat and then quantify

## 2023-01-29 NOTE — PROGRESS NOTE ADULT - PROBLEM SELECTOR PLAN 1
- BG Goal 100-180mg/dl    -test BG Q6h  -C/w Lantus 13 units daily (retime for 12pm starting today)  -cW Admelog moderate correction scale Q6h   -if TF stopped, may need D5 temporarily to prevent glucose drop.  -Please notify endocrine team of any change to TF regimen.   - f/u CESAR, Zinc 8transporter, Islet, IA2AB (pending)  For d/c:   final recs TBD based on feeding modality/insulin requirements  if dc on PO intake consider   -Metformin 500 mg BID for 1 week then increase to 1000 mg BID. Check lactate prior to d/c  -May need low dose basal insulin (came in with glucose 500s, mild BHB elevation)  -Consider DPP4 inhibitor  -Patient can follow up at -[address] [place] [address], [address], [place] [address] [phone]  -Patient will need opthalmology and podiatry follow up as outpatient. - BG Goal 100-180mg/dl    -test BG Q6h  -C/w Lantus 13 units daily (retime for 12pm starting today)  -cW Admelog moderate correction scale Q6h   -if TF stopped, may need D5 temporarily to prevent glucose drop.  -Please notify endocrine team of any change to TF regimen.   - f/u CESAR, Zinc 8transporter, Islet, IA2AB (pending)  For d/c:   final recs TBD based on feeding modality/insulin requirements  if dc on PO intake consider   -Metformin 500 mg BID for 1 week then increase to 1000 mg BID. Check lactate prior to d/c  -May need low dose basal insulin (came in with glucose 500s, mild BHB elevation)  -Consider DPP4 inhibitor  -Patient can follow up at -[address] [place] [address], [address], [place] [address] [phone]  -Patient will need opthalmology and podiatry follow up as outpatient. - BG Goal 100-180mg/dl    -test BG Q6h  -C/w Lantus 13 units daily (retime for 12pm starting today)  -cW Admelog moderate correction scale Q6h   -if TF stopped, may need D5 temporarily to prevent glucose drop.  -Please notify endocrine team of any change to TF regimen.   - f/u CESAR, Zinc 8transporter, Islet, IA2AB (pending)  For d/c:   final recs TBD based on feeding modality/insulin requirements  if dc on PO intake consider   -Metformin 500 mg BID for 1 week then increase to 1000 mg BID. Check lactate prior to d/c  -May need low dose basal insulin (came in with glucose 500s, mild BHB elevation)  -Consider DPP4 inhibitor  -Patient can follow up at -[address] [place] [address], [address], [place] [address] [phone]  -Patient will need opthalmology and podiatry follow up as outpatient.

## 2023-01-29 NOTE — PROGRESS NOTE ADULT - ASSESSMENT
78F with dementia, T2DM, recently discharged about 1 week ago for CVA now presenting with poor PO intake, lethargy and hyperglycemia to 500s.   Of note, patient admitted 1/6 for R facial droop, word finding difficulties. Imaging concerning for acute L MCA infarct.  Per family, since stroke patient nonverbal/unable to participate in meaningful communications, intermittently follows commands. For the last 3-4 days, patient with poor po intake.    In ED, afebrile, , 135/84. WBC 15, Na 158, K 3.0, Cl 120, Glu 468, alk phos 136m BHB 0.7. pH 7.36, lactate 2.2.   UA: +leuk esterase, many bacteria, WBC 11-25  CXR: Redemonstrated biapical scarring and left upper lobe bronchiectasis, unchanged. No focal consolidation.   CTH No acute intracranial hemorrhage. 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  Given 1.5L NS, ceftriaxone x1, haldol 2.5mg x1 in ED.   last admission: CTA with L MCA severe stenosis; distal L M2 occlusion .  TTE 1/8/23: EF 63% Mild mitral regurgitation ; A1c 9.4   MRI brain with evolving subacute infarcts as expected. question of SAH   CTH evolviong subacute L hemisphere infarcts. no SAH found.     Impression  1) AMS likely 2/2 infection/UTI toxic metabolic encephalopahthy   2) recent stroke - L MCA infarct 2/2 symptomatic L MCA ICAD ; worsening of R HP in setting of infection     - Nsx eval--> No intervention.  despite there note stating SAH, no comment to stop anti thrombotics.  nevertheless CTH read is neg for SAH.  would c/w current treatement and repeat CTH again in 24  hrs or if there is a change in exam   - now on contact   - mittens. NGT replaced 1/26 early AM, f/u CXR   - f/u endocrine   - treatment of infection per primary team -->  CTX  - for secondary stroke prevention. DAPT x 3 months followeed by ASA 81mg dialy thereafter.   - hihg dose statin lipitor 40mg dialy   - avoid hypotension given L MCA ICAD   - telemetry  - PT/OT/SS/SLP, OOBC  - check FS, glucose control <180  - GI/DVT ppx  - Thank you for allowing me to participate in the care of this patient. Call with questions.      Reji Greco MD  Vascular Neurology  Office: [phone]  78F with dementia, T2DM, recently discharged about 1 week ago for CVA now presenting with poor PO intake, lethargy and hyperglycemia to 500s.   Of note, patient admitted 1/6 for R facial droop, word finding difficulties. Imaging concerning for acute L MCA infarct.  Per family, since stroke patient nonverbal/unable to participate in meaningful communications, intermittently follows commands. For the last 3-4 days, patient with poor po intake.    In ED, afebrile, , 135/84. WBC 15, Na 158, K 3.0, Cl 120, Glu 468, alk phos 136m BHB 0.7. pH 7.36, lactate 2.2.   UA: +leuk esterase, many bacteria, WBC 11-25  CXR: Redemonstrated biapical scarring and left upper lobe bronchiectasis, unchanged. No focal consolidation.   CTH No acute intracranial hemorrhage. 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  Given 1.5L NS, ceftriaxone x1, haldol 2.5mg x1 in ED.   last admission: CTA with L MCA severe stenosis; distal L M2 occlusion .  TTE 1/8/23: EF 63% Mild mitral regurgitation ; A1c 9.4   MRI brain with evolving subacute infarcts as expected. question of SAH   CTH evolviong subacute L hemisphere infarcts. no SAH found.     Impression  1) AMS likely 2/2 infection/UTI toxic metabolic encephalopahthy   2) recent stroke - L MCA infarct 2/2 symptomatic L MCA ICAD ; worsening of R HP in setting of infection     - Nsx eval--> No intervention.  despite there note stating SAH, no comment to stop anti thrombotics.  nevertheless CTH read is neg for SAH.  would c/w current treatement and repeat CTH again in 24  hrs or if there is a change in exam   - now on contact   - mittens. NGT replaced 1/26 early AM, f/u CXR   - f/u endocrine   - treatment of infection per primary team -->  CTX  - for secondary stroke prevention. DAPT x 3 months followeed by ASA 81mg dialy thereafter.   - hihg dose statin lipitor 40mg dialy   - avoid hypotension given L MCA ICAD   - telemetry  - PT/OT/SS/SLP, OOBC  - check FS, glucose control <180  - GI/DVT ppx  - Thank you for allowing me to participate in the care of this patient. Call with questions.      Reji Greco MD  Vascular Neurology  Office: [phone]  78F with dementia, T2DM, recently discharged about 1 week ago for CVA now presenting with poor PO intake, lethargy and hyperglycemia to 500s.   Of note, patient admitted 1/6 for R facial droop, word finding difficulties. Imaging concerning for acute L MCA infarct.  Per family, since stroke patient nonverbal/unable to participate in meaningful communications, intermittently follows commands. For the last 3-4 days, patient with poor po intake.    In ED, afebrile, , 135/84. WBC 15, Na 158, K 3.0, Cl 120, Glu 468, alk phos 136m BHB 0.7. pH 7.36, lactate 2.2.   UA: +leuk esterase, many bacteria, WBC 11-25  CXR: Redemonstrated biapical scarring and left upper lobe bronchiectasis, unchanged. No focal consolidation.   CTH No acute intracranial hemorrhage. 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  Given 1.5L NS, ceftriaxone x1, haldol 2.5mg x1 in ED.   last admission: CTA with L MCA severe stenosis; distal L M2 occlusion .  TTE 1/8/23: EF 63% Mild mitral regurgitation ; A1c 9.4   MRI brain with evolving subacute infarcts as expected. question of SAH   CTH evolviong subacute L hemisphere infarcts. no SAH found.     Impression  1) AMS likely 2/2 infection/UTI toxic metabolic encephalopahthy   2) recent stroke - L MCA infarct 2/2 symptomatic L MCA ICAD ; worsening of R HP in setting of infection     - Nsx eval--> No intervention.  despite there note stating SAH, no comment to stop anti thrombotics.  nevertheless CTH read is neg for SAH.  would c/w current treatement and repeat CTH again in 24  hrs or if there is a change in exam   - now on contact   - mittens. NGT replaced 1/26 early AM, f/u CXR   - f/u endocrine   - treatment of infection per primary team -->  CTX  - for secondary stroke prevention. DAPT x 3 months followeed by ASA 81mg dialy thereafter.   - hihg dose statin lipitor 40mg dialy   - avoid hypotension given L MCA ICAD   - telemetry  - PT/OT/SS/SLP, OOBC  - check FS, glucose control <180  - GI/DVT ppx  - Thank you for allowing me to participate in the care of this patient. Call with questions.      Reij Greco MD  Vascular Neurology  Office: [phone]

## 2023-01-29 NOTE — PROGRESS NOTE ADULT - SUBJECTIVE AND OBJECTIVE BOX
Neurology Progress Note    S: Patient seen and examined. MRI done, c/f infarcts. CTH neg for hemorrhage ; pulled out ngt     Medication:  MEDICATIONS  (STANDING):  aspirin  chewable 81 milliGRAM(s) Oral daily  atorvastatin 80 milliGRAM(s) Oral at bedtime  clopidogrel Tablet 75 milliGRAM(s) Oral daily  dextrose 5%. 1000 milliLiter(s) (100 mL/Hr) IV Continuous <Continuous>  dextrose 50% Injectable 25 Gram(s) IV Push once  dextrose 50% Injectable 12.5 Gram(s) IV Push once  dextrose 50% Injectable 25 Gram(s) IV Push once  dextrose Oral Gel 15 Gram(s) Oral once  ertapenem  IVPB 1000 milliGRAM(s) IV Intermittent every 24 hours  glucagon  Injectable 1 milliGRAM(s) IntraMuscular once  insulin glargine Injectable (LANTUS) 13 Unit(s) SubCutaneous at bedtime  insulin lispro (ADMELOG) corrective regimen sliding scale   SubCutaneous every 6 hours  tamsulosin 0.4 milliGRAM(s) Oral at bedtime    MEDICATIONS  (PRN):  acetaminophen    Suspension .. 650 milliGRAM(s) Enteral Tube every 6 hours PRN Temp greater or equal to 38C (100.4F)    Vitals:      Vital Signs Last 24 Hrs  T(C): 37.1 (01-29-23 @ 05:29), Max: 37.2 (01-28-23 @ 14:10)  T(F): 98.7 (01-29-23 @ 05:29), Max: 98.9 (01-28-23 @ 14:10)  HR: 97 (01-29-23 @ 05:29) (97 - 102)  BP: 100/57 (01-29-23 @ 05:29) (100/57 - 129/59)  BP(mean): --  RR: 19 (01-29-23 @ 05:29) (18 - 19)  SpO2: 95% (01-29-23 @ 05:29) (93% - 97%)      General Exam:   General Appearance: Appropriately dressed and in no acute distress       Head: Normocephalic, atraumatic and no dysmorphic features  Ear, Nose, and Throat: Moist mucous membranes  CVS: S1S2+  Resp: No SOB, no wheeze or rhonchi  Abd: soft NTND  Extremities: No edema, no cyanosis  Skin: No bruises, no rashes        NEUROLOGICAL EXAM:    Mental status: Eyes open, awake, alert, attempts to produce speech, able to follow some simple commands, able to mimic at times , unable to ID objects  Cranial Nerves: Right facial droop, no nystagmus, blinks to threat B/L  Motor exam: Normal tone, no drift, Right hemiparesis RUE drift, 3-44/5, RLE drift, 3-4/5,, LUE 5/5, LLE 5/5  Sensation: Intact to light touch   Coordination/ Gait: Unable to participate with exam     I personally reviewed the below data/images/labs:  no new labs   CBC Full  -  ( 28 Jan 2023 09:51 )  WBC Count : 7.85 K/uL  RBC Count : 3.33 M/uL  Hemoglobin : 8.8 g/dL  Hematocrit : 29.0 %  Platelet Count - Automated : 342 K/uL  Mean Cell Volume : 87.1 fl  Mean Cell Hemoglobin : 26.4 pg  Mean Cell Hemoglobin Concentration : 30.3 gm/dL  Auto Neutrophil # : x  Auto Lymphocyte # : x  Auto Monocyte # : x  Auto Eosinophil # : x  Auto Basophil # : x  Auto Neutrophil % : x  Auto Lymphocyte % : x  Auto Monocyte % : x  Auto Eosinophil % : x  Auto Basophil % : x    01-28    132<L>  |  97  |  21  ----------------------------<  170<H>  4.3   |  28  |  0.86    Ca    8.0<L>      28 Jan 2023 09:51  Phos  3.2     01-28  Mg     2.1     01-28      `< from: CT Head No Cont (01.19.23 @ 13:45) >    ACC: [id number] EXAM:  CT BRAIN   ORDERED BY: CORINNE MADERA     PROCEDURE DATE:  01/19/2023           INTERPRETATION:  CLINICAL STATEMENT: Altered mental status    TECHNIQUE: CT of the head was performed without IV contrast.  RAPID   artificial intelligence was utilized for the preliminary evaluation of   intracranial hemorrhage.    COMPARISON: MRI brain 1/7/2023.    FINDINGS:  There is mild diffuse parenchymal volume loss. There are areas of low   attenuation in the periventricular white matter likely related to mild   chronic microvascular ischemic changes.    Evolving small infarcts noted in the left corona radiata.   Age-indeterminate infarct also noted in the left temporal and parietal   lobe    There is no acute intracranial hemorrhage, parenchymal mass, mass effect   or midline shift.. There is no hydrocephalus. Partial empty sella noted    The cranium is intact. Calcification noted adjacent to left frontal   artery table. The visualized paranasal sinuses are well-aerated.        IMPRESSION:  No acute intracranial hemorrhage.    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    --- End of Report ---        < from: MR Head No Cont (01.26.23 @ 22:08) >    ACC: [id number] EXAM:  MR BRAIN   ORDERED BY: POLLO CARCAMO     PROCEDURE DATE:  01/26/2023          INTERPRETATION:  CLINICAL STATEMENT: Multiple left MCA territory   infarcts. Altered mental status.    TECHNIQUE: Multiplanar multisequence noncontrast MRI of the brain was   performed. Diffusion weighted imaging was performed. Significant image   degradation by motion artifact.  COMPARISON: MRI brain 1/7/2023.    FINDINGS:    Corpus callosum, pituitary and pineal regions appear unremarkable. No  tonsillar herniation or evidence of marrow replacement process.   Hyperostosis frontalis. Degenerative changes visualized cervical spine.    CP angle and IAC regions appear within normal limits, as do the globes,   intraconal regions and major intracranial flow-voids. No paranasal sinus   air-fluid levels or opacification is evident.    New curvilinear-serpiginous susceptibility associated with the high left   frontal lobe. No evidence of acute hemorrhage. Slightly less pronounced   confluent restricted diffusion left lateral temporal lobe. Multiple less   pronounced foci of restricted diffusion with T2-FLAIR prolongation   centered in the left corona radiata, with extension into the ipsilateral   centrum semiovale. Several additional smaller less pronounced foci of   restricted diffusion left parieto-occipital and left posterior temporal   juxtacortical white matter as well as cortically based serpiginous   restricted diffusion left posterior temporal lobe.    IMPRESSION:    Compared with MRI Brain 1/7/2023.    1. Multiple evolving LEFT-SIDED subacute infarcts, as above.  2. Findings suspicious for high LEFT frontal subarachnoid hemorrhage.   Recommend correlation with noncontrast CT of the brain.  3. Additional findings above.    Findings and recommendations discussed in detail with PADMINI Carcamo at the   time of this interpretation.    --- En   < from: CT Head No Cont (01.27.23 @ 19:09) >    ACC: [id number] EXAM:  CT BRAIN   ORDERED BY: POLLO CARCAMO     PROCEDURE DATE:  01/27/2023          INTERPRETATION:  CLINICAL INFORMATION: Subacute infarcts with possible   new subarachnoid hemorrhage on MRI.    TECHNIQUE:  Axial CT images were acquired through the head.  Intravenous contrast: None  Two-dimensional reformats were generated.    COMPARISON STUDY: Brain MRIs from 1/7/2023 and 1/26/2023.    FINDINGS:  There are evolving subacute infarcts in the left temporal lobe anteriorly   and within the left corona radiata.  There is no CT evidence of acute   intracranial hemorrhage, mass effect, midline shift or hydrocephalus.    There is mild generalized cerebral volume loss.    The paranasal sinuses are grossly clear.    The mastoids are underpneumatized bilaterally; no clinically significant   mastoid or middle ear effusion is visualized.    The patient is status post right-sided intraocular lens replacement.    The calvarium and skull base appear within normal limits..    IMPRESSION:  Evolving subacute infarcts in the left temporal lobe anteriorly and   within the left corona radiata.  No evidence of acute intracranial   hemorrhage.  No subarachnoid hemorrhage in the left frontal region as   questioned on prior MRI.    --- Endof Report ---        Neurology Progress Note    S: Patient seen and examined. MRI done, c/f infarcts. CTH neg for hemorrhage ; pulled out ngt     Medication:  MEDICATIONS  (STANDING):  aspirin  chewable 81 milliGRAM(s) Oral daily  atorvastatin 80 milliGRAM(s) Oral at bedtime  clopidogrel Tablet 75 milliGRAM(s) Oral daily  dextrose 5%. 1000 milliLiter(s) (100 mL/Hr) IV Continuous <Continuous>  dextrose 50% Injectable 25 Gram(s) IV Push once  dextrose 50% Injectable 12.5 Gram(s) IV Push once  dextrose 50% Injectable 25 Gram(s) IV Push once  dextrose Oral Gel 15 Gram(s) Oral once  ertapenem  IVPB 1000 milliGRAM(s) IV Intermittent every 24 hours  glucagon  Injectable 1 milliGRAM(s) IntraMuscular once  insulin glargine Injectable (LANTUS) 13 Unit(s) SubCutaneous at bedtime  insulin lispro (ADMELOG) corrective regimen sliding scale   SubCutaneous every 6 hours  tamsulosin 0.4 milliGRAM(s) Oral at bedtime    MEDICATIONS  (PRN):  acetaminophen    Suspension .. 650 milliGRAM(s) Enteral Tube every 6 hours PRN Temp greater or equal to 38C (100.4F)    Vitals:      Vital Signs Last 24 Hrs  T(C): 37.1 (01-29-23 @ 05:29), Max: 37.2 (01-28-23 @ 14:10)  T(F): 98.7 (01-29-23 @ 05:29), Max: 98.9 (01-28-23 @ 14:10)  HR: 97 (01-29-23 @ 05:29) (97 - 102)  BP: 100/57 (01-29-23 @ 05:29) (100/57 - 129/59)  BP(mean): --  RR: 19 (01-29-23 @ 05:29) (18 - 19)  SpO2: 95% (01-29-23 @ 05:29) (93% - 97%)      General Exam:   General Appearance: Appropriately dressed and in no acute distress       Head: Normocephalic, atraumatic and no dysmorphic features  Ear, Nose, and Throat: Moist mucous membranes  CVS: S1S2+  Resp: No SOB, no wheeze or rhonchi  Abd: soft NTND  Extremities: No edema, no cyanosis  Skin: No bruises, no rashes        NEUROLOGICAL EXAM:    Mental status: Eyes open, awake, alert, attempts to produce speech, able to follow some simple commands, able to mimic at times , unable to ID objects  Cranial Nerves: Right facial droop, no nystagmus, blinks to threat B/L  Motor exam: Normal tone, no drift, Right hemiparesis RUE drift, 3-44/5, RLE drift, 3-4/5,, LUE 5/5, LLE 5/5  Sensation: Intact to light touch   Coordination/ Gait: Unable to participate with exam     I personally reviewed the below data/images/labs:  no new labs   CBC Full  -  ( 28 Jan 2023 09:51 )  WBC Count : 7.85 K/uL  RBC Count : 3.33 M/uL  Hemoglobin : 8.8 g/dL  Hematocrit : 29.0 %  Platelet Count - Automated : 342 K/uL  Mean Cell Volume : 87.1 fl  Mean Cell Hemoglobin : 26.4 pg  Mean Cell Hemoglobin Concentration : 30.3 gm/dL  Auto Neutrophil # : x  Auto Lymphocyte # : x  Auto Monocyte # : x  Auto Eosinophil # : x  Auto Basophil # : x  Auto Neutrophil % : x  Auto Lymphocyte % : x  Auto Monocyte % : x  Auto Eosinophil % : x  Auto Basophil % : x    01-28    132<L>  |  97  |  21  ----------------------------<  170<H>  4.3   |  28  |  0.86    Ca    8.0<L>      28 Jan 2023 09:51  Phos  3.2     01-28  Mg     2.1     01-28      `< from: CT Head No Cont (01.19.23 @ 13:45) >    ACC: [id number] EXAM:  CT BRAIN   ORDERED BY: CORINNE MADERA     PROCEDURE DATE:  01/19/2023           INTERPRETATION:  CLINICAL STATEMENT: Altered mental status    TECHNIQUE: CT of the head was performed without IV contrast.  RAPID   artificial intelligence was utilized for the preliminary evaluation of   intracranial hemorrhage.    COMPARISON: MRI brain 1/7/2023.    FINDINGS:  There is mild diffuse parenchymal volume loss. There are areas of low   attenuation in the periventricular white matter likely related to mild   chronic microvascular ischemic changes.    Evolving small infarcts noted in the left corona radiata.   Age-indeterminate infarct also noted in the left temporal and parietal   lobe    There is no acute intracranial hemorrhage, parenchymal mass, mass effect   or midline shift.. There is no hydrocephalus. Partial empty sella noted    The cranium is intact. Calcification noted adjacent to left frontal   artery table. The visualized paranasal sinuses are well-aerated.        IMPRESSION:  No acute intracranial hemorrhage.    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    --- End of Report ---        < from: MR Head No Cont (01.26.23 @ 22:08) >    ACC: [id number] EXAM:  MR BRAIN   ORDERED BY: POLLO CARCAMO     PROCEDURE DATE:  01/26/2023          INTERPRETATION:  CLINICAL STATEMENT: Multiple left MCA territory   infarcts. Altered mental status.    TECHNIQUE: Multiplanar multisequence noncontrast MRI of the brain was   performed. Diffusion weighted imaging was performed. Significant image   degradation by motion artifact.  COMPARISON: MRI brain 1/7/2023.    FINDINGS:    Corpus callosum, pituitary and pineal regions appear unremarkable. No  tonsillar herniation or evidence of marrow replacement process.   Hyperostosis frontalis. Degenerative changes visualized cervical spine.    CP angle and IAC regions appear within normal limits, as do the globes,   intraconal regions and major intracranial flow-voids. No paranasal sinus   air-fluid levels or opacification is evident.    New curvilinear-serpiginous susceptibility associated with the high left   frontal lobe. No evidence of acute hemorrhage. Slightly less pronounced   confluent restricted diffusion left lateral temporal lobe. Multiple less   pronounced foci of restricted diffusion with T2-FLAIR prolongation   centered in the left corona radiata, with extension into the ipsilateral   centrum semiovale. Several additional smaller less pronounced foci of   restricted diffusion left parieto-occipital and left posterior temporal   juxtacortical white matter as well as cortically based serpiginous   restricted diffusion left posterior temporal lobe.    IMPRESSION:    Compared with MRI Brain 1/7/2023.    1. Multiple evolving LEFT-SIDED subacute infarcts, as above.  2. Findings suspicious for high LEFT frontal subarachnoid hemorrhage.   Recommend correlation with noncontrast CT of the brain.  3. Additional findings above.    Findings and recommendations discussed in detail with PADMINI Carcamo at the   time of this interpretation.    --- En   < from: CT Head No Cont (01.27.23 @ 19:09) >    ACC: [id number] EXAM:  CT BRAIN   ORDERED BY: POLLO CARCAMO     PROCEDURE DATE:  01/27/2023          INTERPRETATION:  CLINICAL INFORMATION: Subacute infarcts with possible   new subarachnoid hemorrhage on MRI.    TECHNIQUE:  Axial CT images were acquired through the head.  Intravenous contrast: None  Two-dimensional reformats were generated.    COMPARISON STUDY: Brain MRIs from 1/7/2023 and 1/26/2023.    FINDINGS:  There are evolving subacute infarcts in the left temporal lobe anteriorly   and within the left corona radiata.  There is no CT evidence of acute   intracranial hemorrhage, mass effect, midline shift or hydrocephalus.    There is mild generalized cerebral volume loss.    The paranasal sinuses are grossly clear.    The mastoids are underpneumatized bilaterally; no clinically significant   mastoid or middle ear effusion is visualized.    The patient is status post right-sided intraocular lens replacement.    The calvarium and skull base appear within normal limits..    IMPRESSION:  Evolving subacute infarcts in the left temporal lobe anteriorly and   within the left corona radiata.  No evidence of acute intracranial   hemorrhage.  No subarachnoid hemorrhage in the left frontal region as   questioned on prior MRI.    --- Endof Report ---        Neurology Progress Note    S: Patient seen and examined. MRI done, c/f infarcts. CTH neg for hemorrhage ; pulled out ngt     Medication:  MEDICATIONS  (STANDING):  aspirin  chewable 81 milliGRAM(s) Oral daily  atorvastatin 80 milliGRAM(s) Oral at bedtime  clopidogrel Tablet 75 milliGRAM(s) Oral daily  dextrose 5%. 1000 milliLiter(s) (100 mL/Hr) IV Continuous <Continuous>  dextrose 50% Injectable 25 Gram(s) IV Push once  dextrose 50% Injectable 12.5 Gram(s) IV Push once  dextrose 50% Injectable 25 Gram(s) IV Push once  dextrose Oral Gel 15 Gram(s) Oral once  ertapenem  IVPB 1000 milliGRAM(s) IV Intermittent every 24 hours  glucagon  Injectable 1 milliGRAM(s) IntraMuscular once  insulin glargine Injectable (LANTUS) 13 Unit(s) SubCutaneous at bedtime  insulin lispro (ADMELOG) corrective regimen sliding scale   SubCutaneous every 6 hours  tamsulosin 0.4 milliGRAM(s) Oral at bedtime    MEDICATIONS  (PRN):  acetaminophen    Suspension .. 650 milliGRAM(s) Enteral Tube every 6 hours PRN Temp greater or equal to 38C (100.4F)    Vitals:      Vital Signs Last 24 Hrs  T(C): 37.1 (01-29-23 @ 05:29), Max: 37.2 (01-28-23 @ 14:10)  T(F): 98.7 (01-29-23 @ 05:29), Max: 98.9 (01-28-23 @ 14:10)  HR: 97 (01-29-23 @ 05:29) (97 - 102)  BP: 100/57 (01-29-23 @ 05:29) (100/57 - 129/59)  BP(mean): --  RR: 19 (01-29-23 @ 05:29) (18 - 19)  SpO2: 95% (01-29-23 @ 05:29) (93% - 97%)      General Exam:   General Appearance: Appropriately dressed and in no acute distress       Head: Normocephalic, atraumatic and no dysmorphic features  Ear, Nose, and Throat: Moist mucous membranes  CVS: S1S2+  Resp: No SOB, no wheeze or rhonchi  Abd: soft NTND  Extremities: No edema, no cyanosis  Skin: No bruises, no rashes        NEUROLOGICAL EXAM:    Mental status: Eyes open, awake, alert, attempts to produce speech, able to follow some simple commands, able to mimic at times , unable to ID objects  Cranial Nerves: Right facial droop, no nystagmus, blinks to threat B/L  Motor exam: Normal tone, no drift, Right hemiparesis RUE drift, 3-44/5, RLE drift, 3-4/5,, LUE 5/5, LLE 5/5  Sensation: Intact to light touch   Coordination/ Gait: Unable to participate with exam     I personally reviewed the below data/images/labs:  no new labs   CBC Full  -  ( 28 Jan 2023 09:51 )  WBC Count : 7.85 K/uL  RBC Count : 3.33 M/uL  Hemoglobin : 8.8 g/dL  Hematocrit : 29.0 %  Platelet Count - Automated : 342 K/uL  Mean Cell Volume : 87.1 fl  Mean Cell Hemoglobin : 26.4 pg  Mean Cell Hemoglobin Concentration : 30.3 gm/dL  Auto Neutrophil # : x  Auto Lymphocyte # : x  Auto Monocyte # : x  Auto Eosinophil # : x  Auto Basophil # : x  Auto Neutrophil % : x  Auto Lymphocyte % : x  Auto Monocyte % : x  Auto Eosinophil % : x  Auto Basophil % : x    01-28    132<L>  |  97  |  21  ----------------------------<  170<H>  4.3   |  28  |  0.86    Ca    8.0<L>      28 Jan 2023 09:51  Phos  3.2     01-28  Mg     2.1     01-28      `< from: CT Head No Cont (01.19.23 @ 13:45) >    ACC: [id number] EXAM:  CT BRAIN   ORDERED BY: CORINNE MADERA     PROCEDURE DATE:  01/19/2023           INTERPRETATION:  CLINICAL STATEMENT: Altered mental status    TECHNIQUE: CT of the head was performed without IV contrast.  RAPID   artificial intelligence was utilized for the preliminary evaluation of   intracranial hemorrhage.    COMPARISON: MRI brain 1/7/2023.    FINDINGS:  There is mild diffuse parenchymal volume loss. There are areas of low   attenuation in the periventricular white matter likely related to mild   chronic microvascular ischemic changes.    Evolving small infarcts noted in the left corona radiata.   Age-indeterminate infarct also noted in the left temporal and parietal   lobe    There is no acute intracranial hemorrhage, parenchymal mass, mass effect   or midline shift.. There is no hydrocephalus. Partial empty sella noted    The cranium is intact. Calcification noted adjacent to left frontal   artery table. The visualized paranasal sinuses are well-aerated.        IMPRESSION:  No acute intracranial hemorrhage.    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    --- End of Report ---        < from: MR Head No Cont (01.26.23 @ 22:08) >    ACC: [id number] EXAM:  MR BRAIN   ORDERED BY: POLLO CARCAMO     PROCEDURE DATE:  01/26/2023          INTERPRETATION:  CLINICAL STATEMENT: Multiple left MCA territory   infarcts. Altered mental status.    TECHNIQUE: Multiplanar multisequence noncontrast MRI of the brain was   performed. Diffusion weighted imaging was performed. Significant image   degradation by motion artifact.  COMPARISON: MRI brain 1/7/2023.    FINDINGS:    Corpus callosum, pituitary and pineal regions appear unremarkable. No  tonsillar herniation or evidence of marrow replacement process.   Hyperostosis frontalis. Degenerative changes visualized cervical spine.    CP angle and IAC regions appear within normal limits, as do the globes,   intraconal regions and major intracranial flow-voids. No paranasal sinus   air-fluid levels or opacification is evident.    New curvilinear-serpiginous susceptibility associated with the high left   frontal lobe. No evidence of acute hemorrhage. Slightly less pronounced   confluent restricted diffusion left lateral temporal lobe. Multiple less   pronounced foci of restricted diffusion with T2-FLAIR prolongation   centered in the left corona radiata, with extension into the ipsilateral   centrum semiovale. Several additional smaller less pronounced foci of   restricted diffusion left parieto-occipital and left posterior temporal   juxtacortical white matter as well as cortically based serpiginous   restricted diffusion left posterior temporal lobe.    IMPRESSION:    Compared with MRI Brain 1/7/2023.    1. Multiple evolving LEFT-SIDED subacute infarcts, as above.  2. Findings suspicious for high LEFT frontal subarachnoid hemorrhage.   Recommend correlation with noncontrast CT of the brain.  3. Additional findings above.    Findings and recommendations discussed in detail with PADMINI Carcamo at the   time of this interpretation.    --- En   < from: CT Head No Cont (01.27.23 @ 19:09) >    ACC: [id number] EXAM:  CT BRAIN   ORDERED BY: POLLO CARCAMO     PROCEDURE DATE:  01/27/2023          INTERPRETATION:  CLINICAL INFORMATION: Subacute infarcts with possible   new subarachnoid hemorrhage on MRI.    TECHNIQUE:  Axial CT images were acquired through the head.  Intravenous contrast: None  Two-dimensional reformats were generated.    COMPARISON STUDY: Brain MRIs from 1/7/2023 and 1/26/2023.    FINDINGS:  There are evolving subacute infarcts in the left temporal lobe anteriorly   and within the left corona radiata.  There is no CT evidence of acute   intracranial hemorrhage, mass effect, midline shift or hydrocephalus.    There is mild generalized cerebral volume loss.    The paranasal sinuses are grossly clear.    The mastoids are underpneumatized bilaterally; no clinically significant   mastoid or middle ear effusion is visualized.    The patient is status post right-sided intraocular lens replacement.    The calvarium and skull base appear within normal limits..    IMPRESSION:  Evolving subacute infarcts in the left temporal lobe anteriorly and   within the left corona radiata.  No evidence of acute intracranial   hemorrhage.  No subarachnoid hemorrhage in the left frontal region as   questioned on prior MRI.    --- Endof Report ---

## 2023-01-29 NOTE — PROGRESS NOTE ADULT - SUBJECTIVE AND OBJECTIVE BOX
[facility] [place] [facility]   MD PUSHPA WICK MD, PA KRISTINE SOLTANPOUR, DO INJUNG KO, NP    TEL:  OFFICE: [phone]    From 5pm-7am Answering Service [phone]    -- RENAL FOLLOW UP NOTE ---Date of Service 01-29-23 @ 13:25    Patient is a 78y old  Female who presents with a chief complaint of AMS, elevated finger sticks (29 Jan 2023 11:35)      Patient seen and examined at bedside.    VITALS:  T(F): 98.7 (01-29-23 @ 05:29), Max: 98.9 (01-28-23 @ 14:10)  HR: 97 (01-29-23 @ 05:29)  BP: 100/57 (01-29-23 @ 05:29)  RR: 19 (01-29-23 @ 05:29)  SpO2: 95% (01-29-23 @ 05:29)  Wt(kg): --    01-28 @ 07:01  -  01-29 @ 07:00  --------------------------------------------------------  IN: 0 mL / OUT: 1100 mL / NET: -1100 mL          PHYSICAL EXAM:  Constitutional: NAD  Neck: No JVD  Respiratory: CTAB, no wheezes, rales or rhonchi  Cardiovascular: S1, S2, RRR  Gastrointestinal: BS+, soft, NT/ND  Extremities: No peripheral edema    Hospital Medications:   MEDICATIONS  (STANDING):  aspirin  chewable 81 milliGRAM(s) Oral daily  atorvastatin 80 milliGRAM(s) Oral at bedtime  clopidogrel Tablet 75 milliGRAM(s) Oral daily  dextrose 5%. 1000 milliLiter(s) (100 mL/Hr) IV Continuous <Continuous>  dextrose 50% Injectable 25 Gram(s) IV Push once  dextrose 50% Injectable 12.5 Gram(s) IV Push once  dextrose 50% Injectable 25 Gram(s) IV Push once  dextrose Oral Gel 15 Gram(s) Oral once  ertapenem  IVPB 1000 milliGRAM(s) IV Intermittent every 24 hours  glucagon  Injectable 1 milliGRAM(s) IntraMuscular once  insulin glargine Injectable (LANTUS) 13 Unit(s) SubCutaneous <User Schedule>  insulin lispro (ADMELOG) corrective regimen sliding scale   SubCutaneous every 6 hours  tamsulosin 0.4 milliGRAM(s) Oral at bedtime      LABS:  01-28    132<L>  |  97  |  21  ----------------------------<  170<H>  4.3   |  28  |  0.86    Ca    8.0<L>      28 Jan 2023 09:51  Phos  3.2     01-28  Mg     2.1     01-28      Creatinine Trend: 0.86 <--, 0.79 <--, 0.63 <--, 0.70 <--, 0.79 <--, 0.81 <--, 0.76 <--                                8.8    7.85  )-----------( 342      ( 28 Jan 2023 09:51 )             29.0     Urine Studies:  Urinalysis - [01-19-23 @ 13:15]      Color DARK BROWN / Appearance Turbid / SG 1.023 / pH 7.0      Gluc 200 mg/dL / Ketone Trace  / Bili Negative / Urobili 6 mg/dL       Blood Large / Protein >600 / Leuk Est Large / Nitrite Negative      RBC 15 / WBC 11-25 / Hyaline  / Gran 4 / Sq Epi  / Non Sq Epi Few / Bacteria Many      Iron 17, TIBC 187, %sat 9      [01-25-23 @ 07:18]  Ferritin 172      [01-25-23 @ 07:18]  Lipid: chol 238, TG 80, HDL 46, LDL --      [01-08-23 @ 02:47]        RADIOLOGY & ADDITIONAL STUDIES:   [facility] [place] [facility]   MD PUSHPA WICK MD, PA KRISTINE SOLTANPOUR, DO INJUNG KO, NP    TEL:  OFFICE: [phone]    From 5pm-7am Answering Service [phone]    -- RENAL FOLLOW UP NOTE ---Date of Service 01-29-23 @ 13:25    Patient is a 78y old  Female who presents with a chief complaint of AMS, elevated finger sticks (29 Jan 2023 11:35)      Patient seen and examined at bedside.    VITALS:  T(F): 98.7 (01-29-23 @ 05:29), Max: 98.9 (01-28-23 @ 14:10)  HR: 97 (01-29-23 @ 05:29)  BP: 100/57 (01-29-23 @ 05:29)  RR: 19 (01-29-23 @ 05:29)  SpO2: 95% (01-29-23 @ 05:29)  Wt(kg): --    01-28 @ 07:01  -  01-29 @ 07:00  --------------------------------------------------------  IN: 0 mL / OUT: 1100 mL / NET: -1100 mL          PHYSICAL EXAM:  Constitutional: NAD  Neck: No JVD  Respiratory: CTAB, no wheezes, rales or rhonchi  Cardiovascular: S1, S2, RRR  Gastrointestinal: BS+, soft, NT/ND  Extremities: No peripheral edema    Hospital Medications:   MEDICATIONS  (STANDING):  aspirin  chewable 81 milliGRAM(s) Oral daily  atorvastatin 80 milliGRAM(s) Oral at bedtime  clopidogrel Tablet 75 milliGRAM(s) Oral daily  dextrose 5%. 1000 milliLiter(s) (100 mL/Hr) IV Continuous <Continuous>  dextrose 50% Injectable 25 Gram(s) IV Push once  dextrose 50% Injectable 12.5 Gram(s) IV Push once  dextrose 50% Injectable 25 Gram(s) IV Push once  dextrose Oral Gel 15 Gram(s) Oral once  ertapenem  IVPB 1000 milliGRAM(s) IV Intermittent every 24 hours  glucagon  Injectable 1 milliGRAM(s) IntraMuscular once  insulin glargine Injectable (LANTUS) 13 Unit(s) SubCutaneous <User Schedule>  insulin lispro (ADMELOG) corrective regimen sliding scale   SubCutaneous every 6 hours  tamsulosin 0.4 milliGRAM(s) Oral at bedtime      LABS:  01-28    132<L>  |  97  |  21  ----------------------------<  170<H>  4.3   |  28  |  0.86    Ca    8.0<L>      28 Jan 2023 09:51  Phos  3.2     01-28  Mg     2.1     01-28      Creatinine Trend: 0.86 <--, 0.79 <--, 0.63 <--, 0.70 <--, 0.79 <--, 0.81 <--, 0.76 <--                                8.8    7.85  )-----------( 342      ( 28 Jan 2023 09:51 )             29.0     Urine Studies:  Urinalysis - [01-19-23 @ 13:15]      Color DARK BROWN / Appearance Turbid / SG 1.023 / pH 7.0      Gluc 200 mg/dL / Ketone Trace  / Bili Negative / Urobili 6 mg/dL       Blood Large / Protein >600 / Leuk Est Large / Nitrite Negative      RBC 15 / WBC 11-25 / Hyaline  / Gran 4 / Sq Epi  / Non Sq Epi Few / Bacteria Many      Iron 17, TIBC 187, %sat 9      [01-25-23 @ 07:18]  Ferritin 172      [01-25-23 @ 07:18]  Lipid: chol 238, TG 80, HDL 46, LDL --      [01-08-23 @ 02:47]        RADIOLOGY & ADDITIONAL STUDIES:   [facility] [place] [facility]   MD PUSHPA WICK MD, PA KRISTINE SOLTANPOUR, DO INJUNG KO, NP    TEL:  OFFICE: [phone]    From 5pm-7am Answering Service [phone]    -- RENAL FOLLOW UP NOTE ---Date of Service 01-29-23 @ 13:25    Patient is a 78y old  Female who presents with a chief complaint of AMS, elevated finger sticks (29 Jan 2023 11:35)      Patient seen and examined at bedside.    VITALS:  T(F): 98.7 (01-29-23 @ 05:29), Max: 98.9 (01-28-23 @ 14:10)  HR: 97 (01-29-23 @ 05:29)  BP: 100/57 (01-29-23 @ 05:29)  RR: 19 (01-29-23 @ 05:29)  SpO2: 95% (01-29-23 @ 05:29)  Wt(kg): --    01-28 @ 07:01  -  01-29 @ 07:00  --------------------------------------------------------  IN: 0 mL / OUT: 1100 mL / NET: -1100 mL          PHYSICAL EXAM:  Constitutional: NAD  Neck: No JVD  Respiratory: CTAB, no wheezes, rales or rhonchi  Cardiovascular: S1, S2, RRR  Gastrointestinal: BS+, soft, NT/ND  Extremities: No peripheral edema    Hospital Medications:   MEDICATIONS  (STANDING):  aspirin  chewable 81 milliGRAM(s) Oral daily  atorvastatin 80 milliGRAM(s) Oral at bedtime  clopidogrel Tablet 75 milliGRAM(s) Oral daily  dextrose 5%. 1000 milliLiter(s) (100 mL/Hr) IV Continuous <Continuous>  dextrose 50% Injectable 25 Gram(s) IV Push once  dextrose 50% Injectable 12.5 Gram(s) IV Push once  dextrose 50% Injectable 25 Gram(s) IV Push once  dextrose Oral Gel 15 Gram(s) Oral once  ertapenem  IVPB 1000 milliGRAM(s) IV Intermittent every 24 hours  glucagon  Injectable 1 milliGRAM(s) IntraMuscular once  insulin glargine Injectable (LANTUS) 13 Unit(s) SubCutaneous <User Schedule>  insulin lispro (ADMELOG) corrective regimen sliding scale   SubCutaneous every 6 hours  tamsulosin 0.4 milliGRAM(s) Oral at bedtime      LABS:  01-28    132<L>  |  97  |  21  ----------------------------<  170<H>  4.3   |  28  |  0.86    Ca    8.0<L>      28 Jan 2023 09:51  Phos  3.2     01-28  Mg     2.1     01-28      Creatinine Trend: 0.86 <--, 0.79 <--, 0.63 <--, 0.70 <--, 0.79 <--, 0.81 <--, 0.76 <--                                8.8    7.85  )-----------( 342      ( 28 Jan 2023 09:51 )             29.0     Urine Studies:  Urinalysis - [01-19-23 @ 13:15]      Color DARK BROWN / Appearance Turbid / SG 1.023 / pH 7.0      Gluc 200 mg/dL / Ketone Trace  / Bili Negative / Urobili 6 mg/dL       Blood Large / Protein >600 / Leuk Est Large / Nitrite Negative      RBC 15 / WBC 11-25 / Hyaline  / Gran 4 / Sq Epi  / Non Sq Epi Few / Bacteria Many      Iron 17, TIBC 187, %sat 9      [01-25-23 @ 07:18]  Ferritin 172      [01-25-23 @ 07:18]  Lipid: chol 238, TG 80, HDL 46, LDL --      [01-08-23 @ 02:47]        RADIOLOGY & ADDITIONAL STUDIES:

## 2023-01-29 NOTE — PROGRESS NOTE ADULT - PROBLEM SELECTOR PLAN 3
Continue with atorvastatin 80 mg daily   -   - Manage as outpatient     discussed w/pt and team  Can be reached via TEAMS/pager: [phone]   office:  [phone] (M-F 9a-5pm)               [phone] (nights/weekends)   [email or web address] password [email or web address] Continue with atorvastatin 80 mg daily   -   - Manage as outpatient     discussed w/pt and team  Can be reached via TEAMS/pager: [phone]   office:  [phone] (M-F 9a-5pm)               [phone] (nights/weekends)   [email or web address] password [email or web address] Continue with atorvastatin 80 mg daily   -   - Manage as outpatient     discussed w/pt and team  Can be reached via TEAMS/pager: [phone]   office:  [phone] (M-F 9a-5pm)               [phone] (nights/weekends)   [email or web address] password [email or web address]

## 2023-01-29 NOTE — PROGRESS NOTE ADULT - SUBJECTIVE AND OBJECTIVE BOX
Diabetes Follow up note:    Chief complaint: T2DM    Interval Hx: NGT dislodged yesterday so tube feeds off last night. BG values improved overall over past 24 hours. Restarted on feeds overnight but lantus held prior to that time. Pt seen at bedside. TF running at goal rate and tolerating well.     Review of Systems:  non-verbal/lethargic. Unable to provide ROS.     MEDS:  atorvastatin 80 milliGRAM(s) Oral at bedtime    insulin glargine Injectable (LANTUS) 13 Unit(s) SubCutaneous <User Schedule>  insulin lispro (ADMELOG) corrective regimen sliding scale   SubCutaneous every 6 hours    ertapenem  IVPB 1000 milliGRAM(s) IV Intermittent every 24 hours    Allergies    Benedryl Allergy Sinus (Hives)  penicillin (Rash)      PE:  General: Female lying in bed. NAD.   Vital Signs Last 24 Hrs  T(C): 37.1 (29 Jan 2023 05:29), Max: 37.2 (28 Jan 2023 14:10)  T(F): 98.7 (29 Jan 2023 05:29), Max: 98.9 (28 Jan 2023 14:10)  HR: 97 (29 Jan 2023 05:29) (97 - 102)  BP: 100/57 (29 Jan 2023 05:29) (100/57 - 129/59)  BP(mean): --  RR: 19 (29 Jan 2023 05:29) (18 - 19)  SpO2: 95% (29 Jan 2023 05:29) (93% - 97%)    Parameters below as of 29 Jan 2023 05:29  Patient On (Oxygen Delivery Method): room air    HEENT: NGT in place.   Abd: Soft, NT,ND,   Extremities: Warm  Neuro: Somnolent     LABS:  POCT Blood Glucose.: 185 mg/dL (01-29-23 @ 05:10)  POCT Blood Glucose.: 92 mg/dL (01-29-23 @ 00:01)  POCT Blood Glucose.: 107 mg/dL (01-28-23 @ 21:23)  POCT Blood Glucose.: 127 mg/dL (01-28-23 @ 17:29)  POCT Blood Glucose.: 183 mg/dL (01-28-23 @ 13:16)  POCT Blood Glucose.: 143 mg/dL (01-28-23 @ 08:35)  POCT Blood Glucose.: 252 mg/dL (01-28-23 @ 06:27)  POCT Blood Glucose.: 186 mg/dL (01-28-23 @ 00:38)  POCT Blood Glucose.: 185 mg/dL (01-27-23 @ 21:14)  POCT Blood Glucose.: 161 mg/dL (01-27-23 @ 17:24)  POCT Blood Glucose.: 190 mg/dL (01-27-23 @ 13:05)  POCT Blood Glucose.: 199 mg/dL (01-27-23 @ 06:13)  POCT Blood Glucose.: 200 mg/dL (01-27-23 @ 00:54)  POCT Blood Glucose.: 187 mg/dL (01-26-23 @ 22:22)  POCT Blood Glucose.: 184 mg/dL (01-26-23 @ 17:37)  POCT Blood Glucose.: 224 mg/dL (01-26-23 @ 12:21)                            8.8    7.85  )-----------( 342      ( 28 Jan 2023 09:51 )             29.0       01-28    132<L>  |  97  |  21  ----------------------------<  170<H>  4.3   |  28  |  0.86    eGFR: 69    Ca    8.0<L>      01-28  Mg     2.1     01-28  Phos  3.2     01-28    TPro  6.5  /  Alb  2.3<L>  /  TBili  0.2  /  DBili  x   /  AST  75<H>  /  ALT  40  /  AlkPhos  180<H>  01-27        A1C with Estimated Average Glucose Result: 9.4 % (01-19-23 @ 13:11)  A1C with Estimated Average Glucose Result: 8.6 % (01-07-23 @ 05:40)          Contact number: alycia [phone] or [phone]       Diabetes Follow up note:    Chief complaint: T2DM    Interval Hx: NGT dislodged yesterday so tube feeds off last night. BG values improved overall over past 24 hours. Restarted on feeds overnight but lantus held prior to that time. Pt seen at bedside. TF running at goal rate and tolerating well.     Review of Systems:  non-verbal/lethargic. Unable to provide ROS.     MEDS:  atorvastatin 80 milliGRAM(s) Oral at bedtime    insulin glargine Injectable (LANTUS) 13 Unit(s) SubCutaneous <User Schedule>  insulin lispro (ADMELOG) corrective regimen sliding scale   SubCutaneous every 6 hours    ertapenem  IVPB 1000 milliGRAM(s) IV Intermittent every 24 hours    Allergies    Benedryl Allergy Sinus (Hives)  penicillin (Rash)      PE:  General: Female lying in bed. NAD.   Vital Signs Last 24 Hrs  T(C): 37.1 (29 Jan 2023 05:29), Max: 37.2 (28 Jan 2023 14:10)  T(F): 98.7 (29 Jan 2023 05:29), Max: 98.9 (28 Jan 2023 14:10)  HR: 97 (29 Jan 2023 05:29) (97 - 102)  BP: 100/57 (29 Jan 2023 05:29) (100/57 - 129/59)  BP(mean): --  RR: 19 (29 Jan 2023 05:29) (18 - 19)  SpO2: 95% (29 Jan 2023 05:29) (93% - 97%)    Parameters below as of 29 Jan 2023 05:29  Patient On (Oxygen Delivery Method): room air    HEENT: NGT in place.   Abd: Soft, NT,ND,   Extremities: Warm  Neuro: Somnolent     LABS:  POCT Blood Glucose.: 185 mg/dL (01-29-23 @ 05:10)  POCT Blood Glucose.: 92 mg/dL (01-29-23 @ 00:01)  POCT Blood Glucose.: 107 mg/dL (01-28-23 @ 21:23)  POCT Blood Glucose.: 127 mg/dL (01-28-23 @ 17:29)  POCT Blood Glucose.: 183 mg/dL (01-28-23 @ 13:16)  POCT Blood Glucose.: 143 mg/dL (01-28-23 @ 08:35)  POCT Blood Glucose.: 252 mg/dL (01-28-23 @ 06:27)  POCT Blood Glucose.: 186 mg/dL (01-28-23 @ 00:38)  POCT Blood Glucose.: 185 mg/dL (01-27-23 @ 21:14)  POCT Blood Glucose.: 161 mg/dL (01-27-23 @ 17:24)  POCT Blood Glucose.: 190 mg/dL (01-27-23 @ 13:05)  POCT Blood Glucose.: 199 mg/dL (01-27-23 @ 06:13)  POCT Blood Glucose.: 200 mg/dL (01-27-23 @ 00:54)  POCT Blood Glucose.: 187 mg/dL (01-26-23 @ 22:22)  POCT Blood Glucose.: 184 mg/dL (01-26-23 @ 17:37)  POCT Blood Glucose.: 224 mg/dL (01-26-23 @ 12:21)                            8.8    7.85  )-----------( 342      ( 28 Jan 2023 09:51 )             29.0       01-28    132<L>  |  97  |  21  ----------------------------<  170<H>  4.3   |  28  |  0.86    eGFR: 69    Ca    8.0<L>      01-28  Mg     2.1     01-28  Phos  3.2     01-28    TPro  6.5  /  Alb  2.3<L>  /  TBili  0.2  /  DBili  x   /  AST  75<H>  /  ALT  40  /  AlkPhos  180<H>  01-27        A1C with Estimated Average Glucose Result: 9.4 % (01-19-23 @ 13:11)  A1C with Estimated Average Glucose Result: 8.6 % (01-07-23 @ 05:40)          Contact number: alycia [phone] or [phone]       Diabetes Follow up note:    Chief complaint: T2DM    Interval Hx: NGT dislodged yesterday so tube feeds off last night. BG values improved overall over past 24 hours. Restarted on feeds overnight but lantus held prior to that time. Pt seen at bedside. TF running at goal rate and tolerating well.     Review of Systems:  non-verbal/lethargic. Unable to provide ROS.     MEDS:  atorvastatin 80 milliGRAM(s) Oral at bedtime    insulin glargine Injectable (LANTUS) 13 Unit(s) SubCutaneous <User Schedule>  insulin lispro (ADMELOG) corrective regimen sliding scale   SubCutaneous every 6 hours    ertapenem  IVPB 1000 milliGRAM(s) IV Intermittent every 24 hours    Allergies    Benedryl Allergy Sinus (Hives)  penicillin (Rash)      PE:  General: Female lying in bed. NAD.   Vital Signs Last 24 Hrs  T(C): 37.1 (29 Jan 2023 05:29), Max: 37.2 (28 Jan 2023 14:10)  T(F): 98.7 (29 Jan 2023 05:29), Max: 98.9 (28 Jan 2023 14:10)  HR: 97 (29 Jan 2023 05:29) (97 - 102)  BP: 100/57 (29 Jan 2023 05:29) (100/57 - 129/59)  BP(mean): --  RR: 19 (29 Jan 2023 05:29) (18 - 19)  SpO2: 95% (29 Jan 2023 05:29) (93% - 97%)    Parameters below as of 29 Jan 2023 05:29  Patient On (Oxygen Delivery Method): room air    HEENT: NGT in place.   Abd: Soft, NT,ND,   Extremities: Warm  Neuro: Somnolent     LABS:  POCT Blood Glucose.: 185 mg/dL (01-29-23 @ 05:10)  POCT Blood Glucose.: 92 mg/dL (01-29-23 @ 00:01)  POCT Blood Glucose.: 107 mg/dL (01-28-23 @ 21:23)  POCT Blood Glucose.: 127 mg/dL (01-28-23 @ 17:29)  POCT Blood Glucose.: 183 mg/dL (01-28-23 @ 13:16)  POCT Blood Glucose.: 143 mg/dL (01-28-23 @ 08:35)  POCT Blood Glucose.: 252 mg/dL (01-28-23 @ 06:27)  POCT Blood Glucose.: 186 mg/dL (01-28-23 @ 00:38)  POCT Blood Glucose.: 185 mg/dL (01-27-23 @ 21:14)  POCT Blood Glucose.: 161 mg/dL (01-27-23 @ 17:24)  POCT Blood Glucose.: 190 mg/dL (01-27-23 @ 13:05)  POCT Blood Glucose.: 199 mg/dL (01-27-23 @ 06:13)  POCT Blood Glucose.: 200 mg/dL (01-27-23 @ 00:54)  POCT Blood Glucose.: 187 mg/dL (01-26-23 @ 22:22)  POCT Blood Glucose.: 184 mg/dL (01-26-23 @ 17:37)  POCT Blood Glucose.: 224 mg/dL (01-26-23 @ 12:21)                            8.8    7.85  )-----------( 342      ( 28 Jan 2023 09:51 )             29.0       01-28    132<L>  |  97  |  21  ----------------------------<  170<H>  4.3   |  28  |  0.86    eGFR: 69    Ca    8.0<L>      01-28  Mg     2.1     01-28  Phos  3.2     01-28    TPro  6.5  /  Alb  2.3<L>  /  TBili  0.2  /  DBili  x   /  AST  75<H>  /  ALT  40  /  AlkPhos  180<H>  01-27        A1C with Estimated Average Glucose Result: 9.4 % (01-19-23 @ 13:11)  A1C with Estimated Average Glucose Result: 8.6 % (01-07-23 @ 05:40)          Contact number: alycia [phone] or [phone]

## 2023-01-29 NOTE — PROGRESS NOTE ADULT - SUBJECTIVE AND OBJECTIVE BOX
Name of Patient : SARAH DISLA  MRN: [id number]  Date of visit: 01-29-23       Subjective: Patient seen and examined. No new events except as noted.     REVIEW OF SYSTEMS:  limited    MEDICATIONS:  MEDICATIONS  (STANDING):  aspirin  chewable 81 milliGRAM(s) Oral daily  atorvastatin 80 milliGRAM(s) Oral at bedtime  clopidogrel Tablet 75 milliGRAM(s) Oral daily  dextrose 5%. 1000 milliLiter(s) (100 mL/Hr) IV Continuous <Continuous>  dextrose 50% Injectable 25 Gram(s) IV Push once  dextrose 50% Injectable 12.5 Gram(s) IV Push once  dextrose 50% Injectable 25 Gram(s) IV Push once  dextrose Oral Gel 15 Gram(s) Oral once  ertapenem  IVPB 1000 milliGRAM(s) IV Intermittent every 24 hours  glucagon  Injectable 1 milliGRAM(s) IntraMuscular once  insulin glargine Injectable (LANTUS) 13 Unit(s) SubCutaneous <User Schedule>  insulin lispro (ADMELOG) corrective regimen sliding scale   SubCutaneous every 6 hours  sodium chloride 0.9%. 1000 milliLiter(s) (50 mL/Hr) IV Continuous <Continuous>  tamsulosin 0.4 milliGRAM(s) Oral at bedtime      PHYSICAL EXAM:  T(C): 36.9 (01-29-23 @ 21:41), Max: 37.2 (01-29-23 @ 14:59)  HR: 99 (01-29-23 @ 21:41) (90 - 99)  BP: 147/75 (01-29-23 @ 21:41) (100/57 - 147/75)  RR: 18 (01-29-23 @ 21:41) (18 - 19)  SpO2: 96% (01-29-23 @ 21:41) (95% - 96%)  Wt(kg): --  I&O's Summary    28 Jan 2023 07:01  -  29 Jan 2023 07:00  --------------------------------------------------------  IN: 0 mL / OUT: 1100 mL / NET: -1100 mL          Appearance: arousable  HEENT:  PERRLA   Lymphatic: No lymphadenopathy   Cardiovascular: Normal S1 S2, no JVD  Respiratory: normal effort , clear  Gastrointestinal:  Soft, Non-tender  Skin: No rashes,  warm to touch  Psychiatry:  Mood & affect appropriate  Musculuskeletal: No edema    recent labs, Imaging and EKGs personally reviewed     01-28-23 @ 07:01  -  01-29-23 @ 07:00  --------------------------------------------------------  IN: 0 mL / OUT: 1100 mL / NET: -1100 mL                          8.8    7.85  )-----------( 342      ( 28 Jan 2023 09:51 )             29.0               01-29    128<L>  |  94<L>  |  19  ----------------------------<  243<H>  4.4   |  28  |  0.65    Ca    8.0<L>      29 Jan 2023 16:07  Phos  3.2     01-28  Mg     2.1     01-28                                    Name of Patient : SARAH DISLA  MRN: [id number]  Date of visit: 01-29-23       Subjective: Patient seen and examined. No new events except as noted.     REVIEW OF SYSTEMS:  limited    MEDICATIONS:  MEDICATIONS  (STANDING):  aspirin  chewable 81 milliGRAM(s) Oral daily  atorvastatin 80 milliGRAM(s) Oral at bedtime  clopidogrel Tablet 75 milliGRAM(s) Oral daily  dextrose 5%. 1000 milliLiter(s) (100 mL/Hr) IV Continuous <Continuous>  dextrose 50% Injectable 25 Gram(s) IV Push once  dextrose 50% Injectable 12.5 Gram(s) IV Push once  dextrose 50% Injectable 25 Gram(s) IV Push once  dextrose Oral Gel 15 Gram(s) Oral once  ertapenem  IVPB 1000 milliGRAM(s) IV Intermittent every 24 hours  glucagon  Injectable 1 milliGRAM(s) IntraMuscular once  insulin glargine Injectable (LANTUS) 13 Unit(s) SubCutaneous <User Schedule>  insulin lispro (ADMELOG) corrective regimen sliding scale   SubCutaneous every 6 hours  sodium chloride 0.9%. 1000 milliLiter(s) (50 mL/Hr) IV Continuous <Continuous>  tamsulosin 0.4 milliGRAM(s) Oral at bedtime      PHYSICAL EXAM:  T(C): 36.9 (01-29-23 @ 21:41), Max: 37.2 (01-29-23 @ 14:59)  HR: 99 (01-29-23 @ 21:41) (90 - 99)  BP: 147/75 (01-29-23 @ 21:41) (100/57 - 147/75)  RR: 18 (01-29-23 @ 21:41) (18 - 19)  SpO2: 96% (01-29-23 @ 21:41) (95% - 96%)  Wt(kg): --  I&O's Summary    28 Jan 2023 07:01  -  29 Jan 2023 07:00  --------------------------------------------------------  IN: 0 mL / OUT: 1100 mL / NET: -1100 mL          Appearance: arousable  HEENT:  PERRLA   Lymphatic: No lymphadenopathy   Cardiovascular: Normal S1 S2, no JVD  Respiratory: normal effort , clear  Gastrointestinal:  Soft, Non-tender  Skin: No rashes,  warm to touch  Psychiatry:  Mood & affect appropriate  Musculuskeletal: No edema    recent labs, Imaging and EKGs personally reviewed     01-28-23 @ 07:01  -  01-29-23 @ 07:00  --------------------------------------------------------  IN: 0 mL / OUT: 1100 mL / NET: -1100 mL                          8.8    7.85  )-----------( 342      ( 28 Jan 2023 09:51 )             29.0               01-29    128<L>  |  94<L>  |  19  ----------------------------<  243<H>  4.4   |  28  |  0.65    Ca    8.0<L>      29 Jan 2023 16:07  Phos  3.2     01-28  Mg     2.1     01-28                                    Name of Patient : SARAH DISLA  MRN: [id number]  Date of visit: 01-29-23       Subjective: Patient seen and examined. No new events except as noted.     REVIEW OF SYSTEMS:  limited    MEDICATIONS:  MEDICATIONS  (STANDING):  aspirin  chewable 81 milliGRAM(s) Oral daily  atorvastatin 80 milliGRAM(s) Oral at bedtime  clopidogrel Tablet 75 milliGRAM(s) Oral daily  dextrose 5%. 1000 milliLiter(s) (100 mL/Hr) IV Continuous <Continuous>  dextrose 50% Injectable 25 Gram(s) IV Push once  dextrose 50% Injectable 12.5 Gram(s) IV Push once  dextrose 50% Injectable 25 Gram(s) IV Push once  dextrose Oral Gel 15 Gram(s) Oral once  ertapenem  IVPB 1000 milliGRAM(s) IV Intermittent every 24 hours  glucagon  Injectable 1 milliGRAM(s) IntraMuscular once  insulin glargine Injectable (LANTUS) 13 Unit(s) SubCutaneous <User Schedule>  insulin lispro (ADMELOG) corrective regimen sliding scale   SubCutaneous every 6 hours  sodium chloride 0.9%. 1000 milliLiter(s) (50 mL/Hr) IV Continuous <Continuous>  tamsulosin 0.4 milliGRAM(s) Oral at bedtime      PHYSICAL EXAM:  T(C): 36.9 (01-29-23 @ 21:41), Max: 37.2 (01-29-23 @ 14:59)  HR: 99 (01-29-23 @ 21:41) (90 - 99)  BP: 147/75 (01-29-23 @ 21:41) (100/57 - 147/75)  RR: 18 (01-29-23 @ 21:41) (18 - 19)  SpO2: 96% (01-29-23 @ 21:41) (95% - 96%)  Wt(kg): --  I&O's Summary    28 Jan 2023 07:01  -  29 Jan 2023 07:00  --------------------------------------------------------  IN: 0 mL / OUT: 1100 mL / NET: -1100 mL          Appearance: arousable  HEENT:  PERRLA   Lymphatic: No lymphadenopathy   Cardiovascular: Normal S1 S2, no JVD  Respiratory: normal effort , clear  Gastrointestinal:  Soft, Non-tender  Skin: No rashes,  warm to touch  Psychiatry:  Mood & affect appropriate  Musculuskeletal: No edema    recent labs, Imaging and EKGs personally reviewed     01-28-23 @ 07:01  -  01-29-23 @ 07:00  --------------------------------------------------------  IN: 0 mL / OUT: 1100 mL / NET: -1100 mL                          8.8    7.85  )-----------( 342      ( 28 Jan 2023 09:51 )             29.0               01-29    128<L>  |  94<L>  |  19  ----------------------------<  243<H>  4.4   |  28  |  0.65    Ca    8.0<L>      29 Jan 2023 16:07  Phos  3.2     01-28  Mg     2.1     01-28

## 2023-01-29 NOTE — PROGRESS NOTE ADULT - ASSESSMENT
78 F with recent T2DM diagnosis and CVA (d/c 1 week ago), presenting with glucoses 500s, poor po intake and lethargy. Endocrine consulted for assistance with management of uncontrolled, recently dx DM. On continuous tube feeds w/BG values at goal on basal + scale. Basal held overnight as TF were off due to NGT dislodged. Back on feeds so will retime daily basal dose. BG goal (100-180mg/dl).

## 2023-01-30 LAB
ANION GAP SERPL CALC-SCNC: 6 MMOL/L — SIGNIFICANT CHANGE UP (ref 5–17)
ANION GAP SERPL CALC-SCNC: 7 MMOL/L — SIGNIFICANT CHANGE UP (ref 5–17)
BUN SERPL-MCNC: 16 MG/DL — SIGNIFICANT CHANGE UP (ref 7–23)
CALCIUM SERPL-MCNC: 7.9 MG/DL — LOW (ref 8.4–10.5)
CHLORIDE SERPL-SCNC: 97 MMOL/L — SIGNIFICANT CHANGE UP (ref 96–108)
CO2 SERPL-SCNC: 28 MMOL/L — SIGNIFICANT CHANGE UP (ref 22–31)
CO2 SERPL-SCNC: 29 MMOL/L — SIGNIFICANT CHANGE UP (ref 22–31)
CREAT SERPL-MCNC: 0.66 MG/DL — SIGNIFICANT CHANGE UP (ref 0.5–1.3)
CREAT SERPL-MCNC: 0.67 MG/DL — SIGNIFICANT CHANGE UP (ref 0.5–1.3)
EGFR: 89 ML/MIN/1.73M2 — SIGNIFICANT CHANGE UP
GLUCOSE SERPL-MCNC: 131 MG/DL — HIGH (ref 70–99)
GLUCOSE SERPL-MCNC: 133 MG/DL — HIGH (ref 70–99)
HCT VFR BLD CALC: 27 % — LOW (ref 34.5–45)
HGB BLD-MCNC: 8.5 G/DL — LOW (ref 11.5–15.5)
MAGNESIUM SERPL-MCNC: 2.2 MG/DL — SIGNIFICANT CHANGE UP (ref 1.6–2.6)
MCHC RBC-ENTMCNC: 26.9 PG — LOW (ref 27–34)
MCHC RBC-ENTMCNC: 31.5 GM/DL — LOW (ref 32–36)
MCV RBC AUTO: 85.4 FL — SIGNIFICANT CHANGE UP (ref 80–100)
NRBC # BLD: 0 /100 WBCS — SIGNIFICANT CHANGE UP (ref 0–0)
PHOSPHATE SERPL-MCNC: 2.9 MG/DL — SIGNIFICANT CHANGE UP (ref 2.5–4.5)
PLATELET # BLD AUTO: 372 K/UL — SIGNIFICANT CHANGE UP (ref 150–400)
POTASSIUM SERPL-MCNC: 4.2 MMOL/L — SIGNIFICANT CHANGE UP (ref 3.5–5.3)
POTASSIUM SERPL-MCNC: 4.3 MMOL/L — SIGNIFICANT CHANGE UP (ref 3.5–5.3)
POTASSIUM SERPL-SCNC: 4.2 MMOL/L — SIGNIFICANT CHANGE UP (ref 3.5–5.3)
POTASSIUM SERPL-SCNC: 4.3 MMOL/L — SIGNIFICANT CHANGE UP (ref 3.5–5.3)
RBC # BLD: 3.16 M/UL — LOW (ref 3.8–5.2)
RBC # FLD: 14.5 % — SIGNIFICANT CHANGE UP (ref 10.3–14.5)
SODIUM SERPL-SCNC: 132 MMOL/L — LOW (ref 135–145)
WBC # BLD: 6.08 K/UL — SIGNIFICANT CHANGE UP (ref 3.8–10.5)
WBC # FLD AUTO: 6.08 K/UL — SIGNIFICANT CHANGE UP (ref 3.8–10.5)

## 2023-01-30 PROCEDURE — 99232 SBSQ HOSP IP/OBS MODERATE 35: CPT

## 2023-01-30 PROCEDURE — 70450 CT HEAD/BRAIN W/O DYE: CPT | Mod: 26

## 2023-01-30 PROCEDURE — 71045 X-RAY EXAM CHEST 1 VIEW: CPT | Mod: 26

## 2023-01-30 PROCEDURE — 51702 INSERT TEMP BLADDER CATH: CPT

## 2023-01-30 RX ORDER — INSULIN GLARGINE 100 [IU]/ML
2 INJECTION, SOLUTION SUBCUTANEOUS ONCE
Refills: 0 | Status: COMPLETED | OUTPATIENT
Start: 2023-01-30 | End: 2023-01-30

## 2023-01-30 RX ORDER — INSULIN GLARGINE 100 [IU]/ML
15 INJECTION, SOLUTION SUBCUTANEOUS
Refills: 0 | Status: DISCONTINUED | OUTPATIENT
Start: 2023-01-31 | End: 2023-01-31

## 2023-01-30 RX ADMIN — Medication 81 MILLIGRAM(S): at 12:24

## 2023-01-30 RX ADMIN — Medication 4: at 12:25

## 2023-01-30 RX ADMIN — INSULIN GLARGINE 13 UNIT(S): 100 INJECTION, SOLUTION SUBCUTANEOUS at 12:25

## 2023-01-30 RX ADMIN — ATORVASTATIN CALCIUM 80 MILLIGRAM(S): 80 TABLET, FILM COATED ORAL at 23:30

## 2023-01-30 RX ADMIN — CLOPIDOGREL BISULFATE 75 MILLIGRAM(S): 75 TABLET, FILM COATED ORAL at 12:25

## 2023-01-30 RX ADMIN — TAMSULOSIN HYDROCHLORIDE 0.4 MILLIGRAM(S): 0.4 CAPSULE ORAL at 23:31

## 2023-01-30 RX ADMIN — Medication 2: at 17:25

## 2023-01-30 RX ADMIN — INSULIN GLARGINE 2 UNIT(S): 100 INJECTION, SOLUTION SUBCUTANEOUS at 17:24

## 2023-01-30 RX ADMIN — ERTAPENEM SODIUM 120 MILLIGRAM(S): 1 INJECTION, POWDER, LYOPHILIZED, FOR SOLUTION INTRAMUSCULAR; INTRAVENOUS at 16:52

## 2023-01-30 NOTE — PROGRESS NOTE ADULT - ASSESSMENT
78F with dementia, T2DM, recently discharged about 1 week ago for CVA now presenting with poor PO intake, lethargy and hyperglycemia to 500s.     Altered mental status, UTI .   - Since recent stroke,  A&Ox0. More recently, poor po intake, lethargic. CTH no acute intracranial abnormalities. Multifactorial   - Multiple metabolic derangements - dehydration, hyperglycemia, hyperNa, hypoK  - S/P IVF D5 75 CC   - Monitor and replete electrolytes PRN   - UA with +leuk esterase, many bacteria. C/w ceftriaxone for now. F/u UCx -- E. coli  - on Ertapenem   - 01/19 BCx2 w/ Neg final   - 01/24 BCx2 w/ NGTD; F/u final   - Monitor fever curve, WBC trend   - Neuro eval consulted; F/u recs   - Aspiration precautions   - Discussed with family, agreed for NGT for feeding; NGT replaced ON 01/26   - Retention - replace gonzalez, check bladder US -- Noted, per attending discussion with family, family to decide on further work up     Uncontrolled type 2 diabetes mellitus with hyperglycemia.   - D/c on Metformin 500 mg BID  - Now with glucose 500s, improved to mid 300s with fluids. Also with mild ketosis (AG 17, bicarb 21, BHB 0.7)  - Endo eval appreciated, BHB now WNL  - Lantus 11 units QHs added to regimen & Sliding scale  - Monitor glucose levels closely, avoid hypo/hyperglycemia   - Check Ab given thin body habitus and ketosis (Glutamic Acid Decarboxylase, Zinc Transporter 8, Islet Cell Ab, and IA-2 Ab)  - S/PIVF administration  - Endo follow up; F/u recs      Cerebrovascular accident (CVA).  - Recent admission w/ Acute L MCA infarct, severe stenosis of the L M2 branch. MR with L MCA Territory infarcts   - CTH on admission showing No acute intracranial hemorrhage. 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 Per neurology, expected change seen on CT, not new lesions.   - C/w asa, plavix, statin.  - Neuro eval consulted; F/u recs   - Check MR brain -- C/F L sided Subacute infarcts, L frontal SAH; F/u neuro recs  - Checking CT head noted   - Hold lovenox DVT PPX  - NSGY eval appreciated - no int at this time  - Repeat CT H 01/30  - GI EVal for PEG tube         Febrile  - Cx with E. Faecalis, E. Avium; UCx with E. coli   - 01/19 BCx2 W/ Neg final   - 01/24 repeat BCx2  --> Neg  final  - ID eval consulted; F/u recs  - ABX as per ID, switched to ertapenem , sensitive  - If recurrent fever, check CT as per ID   - Monitor CBC, temp curve, VS and patient closely     Anemia  - Drop in hemoglobin   - Checking Iron, B12, Folate, Ferritin, TIBC -- not consistent with deficiency and Occult   - Monitor H/H closely, monitor for signs/symptoms of bleeding    Hypernatremia.   - S/P D5 50 CC x 10 hours   - F/u DM management as per above  - Monitor Na level closely, avoid overocrrection   - C/w Free water, monitor levels closely     Pediculosis   - ID eval   - Nix as per protocol   - Tx today 01/26  - Contact precautions    HypoK  - Monitor and replete electrolytes PRN     Hyperlipidemia.   -c/w atorvastatin.    Hypertension.    -amlodipine 5mg daily.      PPX  - Lovenox 40 Qd

## 2023-01-30 NOTE — CONSULT NOTE ADULT - SUBJECTIVE AND OBJECTIVE BOX
Chief Complaint:  Patient is a 78y old  Female who presents with a chief complaint of AMS, elevated finger sticks (30 Jan 2023 08:43)      Date of service: 01-30-23 @ 11:43    HPI:    Pt is non-verbal, hx obtained from chart review and family. The patient is a 78 year old woman with DM, dementia and recent stroke, presenting with multiple metabolic derangements in the setting of poor PO intake. GI consulted for PEG evaluation.     Allergies:  Benedryl Allergy Sinus (Hives)  penicillin (Rash)      Home Medications:    Hospital Medications:  acetaminophen    Suspension .. 650 milliGRAM(s) Enteral Tube every 6 hours PRN  aspirin  chewable 81 milliGRAM(s) Oral daily  atorvastatin 80 milliGRAM(s) Oral at bedtime  clopidogrel Tablet 75 milliGRAM(s) Oral daily  dextrose 5%. 1000 milliLiter(s) IV Continuous <Continuous>  dextrose 50% Injectable 25 Gram(s) IV Push once  dextrose 50% Injectable 12.5 Gram(s) IV Push once  dextrose 50% Injectable 25 Gram(s) IV Push once  dextrose Oral Gel 15 Gram(s) Oral once  ertapenem  IVPB 1000 milliGRAM(s) IV Intermittent every 24 hours  glucagon  Injectable 1 milliGRAM(s) IntraMuscular once  insulin glargine Injectable (LANTUS) 13 Unit(s) SubCutaneous <User Schedule>  insulin lispro (ADMELOG) corrective regimen sliding scale   SubCutaneous every 6 hours  sodium chloride 0.9%. 1000 milliLiter(s) IV Continuous <Continuous>  tamsulosin 0.4 milliGRAM(s) Oral at bedtime      PMHX/PSHX:  Dementia    CVA (cerebrovascular accident)    DM (diabetes mellitus)    No significant past surgical history        Family history:  No pertinent family history in first degree relatives        Social History:   Denies ethanol use.  Denies illicit drug use.    ROS:   Unable to obtain      PHYSICAL EXAM:     GENERAL:  Appears stated age, well-groomed, well-nourished, no distress  HEENT:  NC/AT,  conjunctivae anicteric, clear and pink,   NECK: supple, trachea midline  CHEST:  Full & symmetric excursion, no increased effort, breath sounds clear  HEART:  Regular rhythm, no JVD  ABDOMEN:  Soft, non-tender, non-distended, normoactive bowel sounds,  no masses , no hepatosplenomegaly  EXTREMITIES:  no cyanosis,clubbing or edema  SKIN:  No rash, erythema, or, ecchymoses, no jaundice  NEURO:  Alert, non-focal, no asterixis  PSYCH: Appropriate affect, oriented to place and time  RECTAL: Deferred      Vital Signs:  Vital Signs Last 24 Hrs  T(C): 36.6 (30 Jan 2023 06:52), Max: 37.2 (29 Jan 2023 14:59)  T(F): 97.8 (30 Jan 2023 06:52), Max: 98.9 (29 Jan 2023 14:59)  HR: 95 (30 Jan 2023 06:52) (90 - 99)  BP: 105/62 (30 Jan 2023 06:52) (105/62 - 147/75)  BP(mean): --  RR: 18 (30 Jan 2023 06:52) (18 - 18)  SpO2: 98% (30 Jan 2023 06:52) (95% - 98%)    Parameters below as of 30 Jan 2023 06:52  Patient On (Oxygen Delivery Method): room air      Daily     Daily     LABS: Labs personally reviewed by me:                        8.5    6.08  )-----------( 372      ( 30 Jan 2023 07:01 )             27.0     01-30    132<L>  |  97  |  16  ----------------------------<  133<H>  4.2   |  29  |  0.67    Ca    7.9<L>      30 Jan 2023 06:59  Phos  2.9     01-30  Mg     2.2     01-30                Imaging personally reviewed by me:           Chief Complaint:  Patient is a 78y old  Female who presents with a chief complaint of AMS, elevated finger sticks (30 Jan 2023 08:43)      Date of service: 01-30-23 @ 11:43    HPI:    Pt is non-verbal, hx obtained from chart review and family. The patient is a 78 year old woman with DM, dementia and recent stroke, presenting with multiple metabolic derangements in the setting of poor PO intake. GI consulted for PEG evaluation. Pt is tolerating NGT feeds. Last EGD/Colon unknown.     Allergies:  Benedryl Allergy Sinus (Hives)  penicillin (Rash)      Home Medications:    Hospital Medications:  acetaminophen    Suspension .. 650 milliGRAM(s) Enteral Tube every 6 hours PRN  aspirin  chewable 81 milliGRAM(s) Oral daily  atorvastatin 80 milliGRAM(s) Oral at bedtime  clopidogrel Tablet 75 milliGRAM(s) Oral daily  dextrose 5%. 1000 milliLiter(s) IV Continuous <Continuous>  dextrose 50% Injectable 25 Gram(s) IV Push once  dextrose 50% Injectable 12.5 Gram(s) IV Push once  dextrose 50% Injectable 25 Gram(s) IV Push once  dextrose Oral Gel 15 Gram(s) Oral once  ertapenem  IVPB 1000 milliGRAM(s) IV Intermittent every 24 hours  glucagon  Injectable 1 milliGRAM(s) IntraMuscular once  insulin glargine Injectable (LANTUS) 13 Unit(s) SubCutaneous <User Schedule>  insulin lispro (ADMELOG) corrective regimen sliding scale   SubCutaneous every 6 hours  sodium chloride 0.9%. 1000 milliLiter(s) IV Continuous <Continuous>  tamsulosin 0.4 milliGRAM(s) Oral at bedtime      PMHX/PSHX:  Dementia    CVA (cerebrovascular accident)    DM (diabetes mellitus)    No significant past surgical history        Family history:  No pertinent family history in first degree relatives        Social History:   Denies ethanol use.  Denies illicit drug use.    ROS:   Unable to obtain      PHYSICAL EXAM:     GENERAL:  Appears stated age, well-groomed, well-nourished, no distress  HEENT:  NC/AT,  conjunctivae anicteric, clear and pink,   NECK: supple, trachea midline  CHEST:  Full & symmetric excursion, no increased effort, breath sounds clear  HEART:  Regular rhythm, no JVD  ABDOMEN:  Soft, non-tender, non-distended, normoactive bowel sounds,  no masses , no hepatosplenomegaly  EXTREMITIES:  no cyanosis,clubbing or edema  SKIN:  No rash, erythema, or, ecchymoses, no jaundice  NEURO:  Alert, non-focal, no asterixis  PSYCH: Appropriate affect, oriented to place and time  RECTAL: Deferred      Vital Signs:  Vital Signs Last 24 Hrs  T(C): 36.6 (30 Jan 2023 06:52), Max: 37.2 (29 Jan 2023 14:59)  T(F): 97.8 (30 Jan 2023 06:52), Max: 98.9 (29 Jan 2023 14:59)  HR: 95 (30 Jan 2023 06:52) (90 - 99)  BP: 105/62 (30 Jan 2023 06:52) (105/62 - 147/75)  BP(mean): --  RR: 18 (30 Jan 2023 06:52) (18 - 18)  SpO2: 98% (30 Jan 2023 06:52) (95% - 98%)    Parameters below as of 30 Jan 2023 06:52  Patient On (Oxygen Delivery Method): room air      Daily     Daily     LABS: Labs personally reviewed by me:                        8.5    6.08  )-----------( 372      ( 30 Jan 2023 07:01 )             27.0     01-30    132<L>  |  97  |  16  ----------------------------<  133<H>  4.2   |  29  |  0.67    Ca    7.9<L>      30 Jan 2023 06:59  Phos  2.9     01-30  Mg     2.2     01-30                Imaging personally reviewed by me:

## 2023-01-30 NOTE — PROVIDER CONTACT NOTE (OTHER) - SITUATION
FS- 107, pt getting Lantus 13 units at bedtime, tube feeding on hold, awaiting CXR to be done.
bladder scan =600ml urine retained ,heart rate 101 ,asymptomatic ,gonzalez drained 450ml ,leaking around gonzalez site w/pad wet
Patient with pediculosis, on contact isolation, with order to go for MRI of the head. Patient can wear a bouffant for procedure. MRI notified and to ensure proper cleaning and sanitizing.
pt lethargic ,unable to swallow po meds ,pt NPO
Rojas Catheter leaking, noted 1x loose BM
bladder scan done 286 retained,urine leaking from gonzalez ,gonzalez changed on 01/21 for same reason
1. Pt is lethergic/drowsy, unable to eat. Only response by repeated stimulation, gentle shaking   2. No output from Rojas. Bladder scan- 304 cc retention.
Pt on bilateral mittens restraint, pulled out NG Tube
NGT clogged

## 2023-01-30 NOTE — PROVIDER CONTACT NOTE (OTHER) - RECOMMENDATIONS
continue to monitor bladder scan and provider notification
flush gonzalez with 60 cc of NS
continue on NPO and provider notification
Hold lantus for now
insert a new NGT, PADMINI Brumfield aware
upgrade bilateral mittens restraint to bilateral wrist restraint, reinsert NG Tube
None
continue to monitor bladder scan q6hrs and provider notification

## 2023-01-30 NOTE — PROGRESS NOTE ADULT - SUBJECTIVE AND OBJECTIVE BOX
Name of Patient : SARAH DISLA  MRN: [id number]  Date of visit: 01-30-23 @ 17:34      Subjective: Patient seen and examined. No new events except as noted.   NGT clogged overnight, replaced     REVIEW OF SYSTEMS:  Limited     MEDICATIONS:  MEDICATIONS  (STANDING):  aspirin  chewable 81 milliGRAM(s) Oral daily  atorvastatin 80 milliGRAM(s) Oral at bedtime  clopidogrel Tablet 75 milliGRAM(s) Oral daily  dextrose 5%. 1000 milliLiter(s) (100 mL/Hr) IV Continuous <Continuous>  dextrose 50% Injectable 25 Gram(s) IV Push once  dextrose 50% Injectable 12.5 Gram(s) IV Push once  dextrose 50% Injectable 25 Gram(s) IV Push once  dextrose Oral Gel 15 Gram(s) Oral once  ertapenem  IVPB 1000 milliGRAM(s) IV Intermittent every 24 hours  glucagon  Injectable 1 milliGRAM(s) IntraMuscular once  insulin lispro (ADMELOG) corrective regimen sliding scale   SubCutaneous every 6 hours  sodium chloride 0.9%. 1000 milliLiter(s) (50 mL/Hr) IV Continuous <Continuous>  tamsulosin 0.4 milliGRAM(s) Oral at bedtime      PHYSICAL EXAM:  T(C): 36.8 (01-30-23 @ 11:48), Max: 36.9 (01-29-23 @ 21:41)  HR: 95 (01-30-23 @ 11:48) (95 - 99)  BP: 105/67 (01-30-23 @ 11:48) (105/62 - 147/75)  RR: 18 (01-30-23 @ 11:48) (18 - 18)  SpO2: 97% (01-30-23 @ 11:48) (96% - 98%)  Wt(kg): --  I&O's Summary    29 Jan 2023 07:01  -  30 Jan 2023 07:00  --------------------------------------------------------  IN: 0 mL / OUT: 0 mL / NET: 0 mL          Appearance: Weak appearing female   HEENT:  Eyes are open; NGT   Lymphatic: No lymphadenopathy   Cardiovascular: Normal S1 S2, no JVD  Respiratory: normal effort , clear  Gastrointestinal:  Soft, Non-tender  Skin: No rashes,  warm to touch  Psychiatry: Unable to assess ROS   Musculoskeletal: No edema        01-29-23 @ 07:01  -  01-30-23 @ 07:00  --------------------------------------------------------  IN: 0 mL / OUT: 0 mL / NET: 0 mL                            8.5    6.08  )-----------( 372      ( 30 Jan 2023 07:01 )             27.0               01-30    132<L>  |  97  |  16  ----------------------------<  133<H>  4.2   |  29  |  0.67    Ca    7.9<L>      30 Jan 2023 06:59  Phos  2.9     01-30  Mg     2.2     01-30                           < from: CT Head No Cont (01.27.23 @ 19:09) >  IMPRESSION:  Evolving subacute infarcts in the left temporal lobe anteriorly and   within the left corona radiata.  No evidence of acute intracranial   hemorrhage.  No subarachnoid hemorrhage in the left frontal region as   questioned on prior MRI.    < end of copied text >        Culture - Blood in AM (01.24.23 @ 07:50)   Specimen Source: .Blood Blood-Peripheral   Culture Results:   No Growth Final     Culture - Blood in AM (01.24.23 @ 07:09)   Specimen Source: .Blood Blood-Peripheral   Culture Results:   No Growth Final  Name of Patient : SARAH DISLA  MRN: [id number]  Date of visit: 01-30-23 @ 17:34      Subjective: Patient seen and examined. No new events except as noted.   NGT clogged overnight, replaced     REVIEW OF SYSTEMS:  Limited     MEDICATIONS:  MEDICATIONS  (STANDING):  aspirin  chewable 81 milliGRAM(s) Oral daily  atorvastatin 80 milliGRAM(s) Oral at bedtime  clopidogrel Tablet 75 milliGRAM(s) Oral daily  dextrose 5%. 1000 milliLiter(s) (100 mL/Hr) IV Continuous <Continuous>  dextrose 50% Injectable 25 Gram(s) IV Push once  dextrose 50% Injectable 12.5 Gram(s) IV Push once  dextrose 50% Injectable 25 Gram(s) IV Push once  dextrose Oral Gel 15 Gram(s) Oral once  ertapenem  IVPB 1000 milliGRAM(s) IV Intermittent every 24 hours  glucagon  Injectable 1 milliGRAM(s) IntraMuscular once  insulin lispro (ADMELOG) corrective regimen sliding scale   SubCutaneous every 6 hours  sodium chloride 0.9%. 1000 milliLiter(s) (50 mL/Hr) IV Continuous <Continuous>  tamsulosin 0.4 milliGRAM(s) Oral at bedtime      PHYSICAL EXAM:  T(C): 36.8 (01-30-23 @ 11:48), Max: 36.9 (01-29-23 @ 21:41)  HR: 95 (01-30-23 @ 11:48) (95 - 99)  BP: 105/67 (01-30-23 @ 11:48) (105/62 - 147/75)  RR: 18 (01-30-23 @ 11:48) (18 - 18)  SpO2: 97% (01-30-23 @ 11:48) (96% - 98%)  Wt(kg): --  I&O's Summary    29 Jan 2023 07:01  -  30 Jan 2023 07:00  --------------------------------------------------------  IN: 0 mL / OUT: 0 mL / NET: 0 mL          Appearance: Weak appearing female   HEENT:  Eyes are open; NGT   Lymphatic: No lymphadenopathy   Cardiovascular: Normal S1 S2, no JVD  Respiratory: normal effort , clear  Gastrointestinal:  Soft, Non-tender  Skin: No rashes,  warm to touch  Psychiatry: Unable to assess ROS   Musculoskeletal: No edema        01-29-23 @ 07:01  -  01-30-23 @ 07:00  --------------------------------------------------------  IN: 0 mL / OUT: 0 mL / NET: 0 mL                            8.5    6.08  )-----------( 372      ( 30 Jan 2023 07:01 )             27.0               01-30    132<L>  |  97  |  16  ----------------------------<  133<H>  4.2   |  29  |  0.67    Ca    7.9<L>      30 Jan 2023 06:59  Phos  2.9     01-30  Mg     2.2     01-30                           < from: CT Head No Cont (01.27.23 @ 19:09) >  IMPRESSION:  Evolving subacute infarcts in the left temporal lobe anteriorly and   within the left corona radiata.  No evidence of acute intracranial   hemorrhage.  No subarachnoid hemorrhage in the left frontal region as   questioned on prior MRI.    < end of copied text >        Culture - Blood in AM (01.24.23 @ 07:50)   Specimen Source: .Blood Blood-Peripheral   Culture Results:   No Growth Final     Culture - Blood in AM (01.24.23 @ 07:09)   Specimen Source: .Blood Blood-Peripheral   Culture Results:   No Growth Final  Name of Patient : SARAH DISLA  MRN: [id number]  Date of visit: 01-30-23 @ 17:34      Subjective: Patient seen and examined. No new events except as noted.   NGT clogged overnight, replaced     REVIEW OF SYSTEMS:  Limited     MEDICATIONS:  MEDICATIONS  (STANDING):  aspirin  chewable 81 milliGRAM(s) Oral daily  atorvastatin 80 milliGRAM(s) Oral at bedtime  clopidogrel Tablet 75 milliGRAM(s) Oral daily  dextrose 5%. 1000 milliLiter(s) (100 mL/Hr) IV Continuous <Continuous>  dextrose 50% Injectable 25 Gram(s) IV Push once  dextrose 50% Injectable 12.5 Gram(s) IV Push once  dextrose 50% Injectable 25 Gram(s) IV Push once  dextrose Oral Gel 15 Gram(s) Oral once  ertapenem  IVPB 1000 milliGRAM(s) IV Intermittent every 24 hours  glucagon  Injectable 1 milliGRAM(s) IntraMuscular once  insulin lispro (ADMELOG) corrective regimen sliding scale   SubCutaneous every 6 hours  sodium chloride 0.9%. 1000 milliLiter(s) (50 mL/Hr) IV Continuous <Continuous>  tamsulosin 0.4 milliGRAM(s) Oral at bedtime      PHYSICAL EXAM:  T(C): 36.8 (01-30-23 @ 11:48), Max: 36.9 (01-29-23 @ 21:41)  HR: 95 (01-30-23 @ 11:48) (95 - 99)  BP: 105/67 (01-30-23 @ 11:48) (105/62 - 147/75)  RR: 18 (01-30-23 @ 11:48) (18 - 18)  SpO2: 97% (01-30-23 @ 11:48) (96% - 98%)  Wt(kg): --  I&O's Summary    29 Jan 2023 07:01  -  30 Jan 2023 07:00  --------------------------------------------------------  IN: 0 mL / OUT: 0 mL / NET: 0 mL          Appearance: Weak appearing female   HEENT:  Eyes are open; NGT   Lymphatic: No lymphadenopathy   Cardiovascular: Normal S1 S2, no JVD  Respiratory: normal effort , clear  Gastrointestinal:  Soft, Non-tender  Skin: No rashes,  warm to touch  Psychiatry: Unable to assess ROS   Musculoskeletal: No edema        01-29-23 @ 07:01  -  01-30-23 @ 07:00  --------------------------------------------------------  IN: 0 mL / OUT: 0 mL / NET: 0 mL                            8.5    6.08  )-----------( 372      ( 30 Jan 2023 07:01 )             27.0               01-30    132<L>  |  97  |  16  ----------------------------<  133<H>  4.2   |  29  |  0.67    Ca    7.9<L>      30 Jan 2023 06:59  Phos  2.9     01-30  Mg     2.2     01-30                           < from: CT Head No Cont (01.27.23 @ 19:09) >  IMPRESSION:  Evolving subacute infarcts in the left temporal lobe anteriorly and   within the left corona radiata.  No evidence of acute intracranial   hemorrhage.  No subarachnoid hemorrhage in the left frontal region as   questioned on prior MRI.    < end of copied text >        Culture - Blood in AM (01.24.23 @ 07:50)   Specimen Source: .Blood Blood-Peripheral   Culture Results:   No Growth Final     Culture - Blood in AM (01.24.23 @ 07:09)   Specimen Source: .Blood Blood-Peripheral   Culture Results:   No Growth Final

## 2023-01-30 NOTE — PROGRESS NOTE ADULT - SUBJECTIVE AND OBJECTIVE BOX
Follow Up:  fever, urinary retention, lice    Interval History: no more fever, family is deciding about  PEG placement      ROS:    Unobtainable because of mental status      Allergies  Benedryl Allergy Sinus (Hives)  penicillin (Rash)        ANTIMICROBIALS:  ertapenem  IVPB 1000 every 24 hours      OTHER MEDS:  acetaminophen    Suspension .. 650 milliGRAM(s) Enteral Tube every 6 hours PRN  aspirin  chewable 81 milliGRAM(s) Oral daily  atorvastatin 80 milliGRAM(s) Oral at bedtime  clopidogrel Tablet 75 milliGRAM(s) Oral daily  dextrose 5%. 1000 milliLiter(s) IV Continuous <Continuous>  dextrose 50% Injectable 25 Gram(s) IV Push once  dextrose 50% Injectable 12.5 Gram(s) IV Push once  dextrose 50% Injectable 25 Gram(s) IV Push once  dextrose Oral Gel 15 Gram(s) Oral once  glucagon  Injectable 1 milliGRAM(s) IntraMuscular once  insulin lispro (ADMELOG) corrective regimen sliding scale   SubCutaneous every 6 hours  sodium chloride 0.9%. 1000 milliLiter(s) IV Continuous <Continuous>  tamsulosin 0.4 milliGRAM(s) Oral at bedtime      Vital Signs Last 24 Hrs  T(C): 36.8 (30 Jan 2023 11:48), Max: 36.9 (29 Jan 2023 21:41)  T(F): 98.2 (30 Jan 2023 11:48), Max: 98.4 (29 Jan 2023 21:41)  HR: 95 (30 Jan 2023 11:48) (95 - 99)  BP: 105/67 (30 Jan 2023 11:48) (105/62 - 147/75)  BP(mean): --  RR: 18 (30 Jan 2023 11:48) (18 - 18)  SpO2: 97% (30 Jan 2023 11:48) (96% - 98%)    Parameters below as of 30 Jan 2023 11:48  Patient On (Oxygen Delivery Method): room air        Physical Exam:  General:    NAD, non toxic  Respiratory:   comfortable on Ra  abd:   soft,  not tender  :   no suprapubic tenderness  Musculoskeletal : no joint swelling  Skin:    no rash  vascular: no phlebitis                          8.5    6.08  )-----------( 372      ( 30 Jan 2023 07:01 )             27.0       01-30    132<L>  |  97  |  16  ----------------------------<  133<H>  4.2   |  29  |  0.67    Ca    7.9<L>      30 Jan 2023 06:59  Phos  2.9     01-30  Mg     2.2     01-30            MICROBIOLOGY:  v  Catheterized Catheterized  01-24-23   >=3 organisms. Probable collection contamination.  --  --      .Blood Blood-Peripheral  01-24-23   No Growth Final  --  --      .Blood Blood-Peripheral  01-24-23   No Growth Final  --  --      .Abscess Catheter Site  01-22-23   Moderate Enterococcus faecalis  Moderate Enterococcus avium  Rare Klebsiella aerogenes (Previously Enterobacter)  Moderate Bacteroides thetaiotamcron group "Susceptibilities not performed"  --  Enterococcus faecalis  Enterococcus avium  Klebsiella aerogenes (Previously Enterobacter)      Clean Catch Clean Catch (Midstream)  01-19-23   >=3 organisms. Probable collection contamination. including  >100,000 CFU/ml Escherichia coli  --  Escherichia coli      .Blood Blood-Peripheral  01-19-23   No Growth Final  --  --      .Blood Blood-Peripheral  01-19-23   No Growth Final  --  --      .Blood Blood-Peripheral  01-10-23   No Growth Final  --  --      .Blood Blood-Venous  01-10-23   No Growth Final  --  --                RADIOLOGY:  Images independently visualized and reviewed personally, findings as below  < from: Xray Chest 1 View- PORTABLE-Urgent (Xray Chest 1 View- PORTABLE-Urgent .) (01.30.23 @ 03:19) >  IMPRESSION:  Clear lungs.    < end of copied text >  < from: CT Head No Cont (01.27.23 @ 19:09) >  IMPRESSION:  Evolving subacute infarcts in the left temporal lobe anteriorly and   within the left corona radiata.  No evidence of acute intracranial   hemorrhage.  No subarachnoid hemorrhage in the left frontal region as   questioned on prior MRI.    < end of copied text >

## 2023-01-30 NOTE — PROVIDER CONTACT NOTE (OTHER) - DATE AND TIME:
25-Jan-2023 14:48
30-Jan-2023 00:00
28-Jan-2023 21:30
20-Jan-2023 19:02
22-Jan-2023 23:00
28-Jan-2023 23:20
21-Jan-2023 22:00
22-Jan-2023 04:45
30-Jan-2023 06:45

## 2023-01-30 NOTE — PROVIDER CONTACT NOTE (OTHER) - ACTION/TREATMENT ORDERED:
FS  Full set of vitals  Rectal temperature
flush gonzalez with 60 cc of NS, will monitor, will refer to Urology in am
no new orders PA MADE AWARE
Bilateral wrist restraint ordered, NG Tube reinserted, CXR ordered post NG tube insertion for placement.
no new orders ,PA  MADE AWARE
Hold lantus for now
will follow w/urology
NGT removed, reinserted a new one, hold feeds for now until CXR done with results.

## 2023-01-30 NOTE — PROGRESS NOTE ADULT - ASSESSMENT
78F with dementia, T2DM, recently discharged about 1 week ago for CVA now presenting with poor PO intake, lethargy and hyperglycemia to 500s.   Of note, patient admitted 1/6 for R facial droop, word finding difficulties. Imaging concerning for acute L MCA infarct. Discharged home on 1/11. Majority of history obtained through family given mental status. Per family, since stroke patient nonverbal/unable to participate in meaningful communications, intermittently follows commands. For the last 3-4 days, patient with poor po intake. Reportedly only eating breakfast. Day of presentation, more lethargic with elevated finger sticks up to the 500s. Patient was evaluated by endocrine team during prior admission with recs to increase metformin to 1000mg BID. However, given poor PO intake, family has been giving 500mg daily.     In ED, afebrile, , 135/84. WBC 15, Na 158, K 3.0, Cl 120, Glu 468, alk phos 136m BHB 0.7. pH 7.36, lactate 2.2. UA: +leuk esterase, many bacteria, WBC 11-25  CXR: Redemonstrated biapical scarring and left upper lobe bronchiectasis, unchanged. No focal consolidation.   CTH No acute intracranial hemorrhage. 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  Given 1.5L NS, ceftriaxone x1, haldol 2.5mg x1 in ED.     A/P    Hypernatremia/ Hyponatremia   free water flush on hold    improving,   suggest to get urine osmol, urine Na  Avoid overcorrection >6-8meq a day   monitor Na closely     Hypocalcemia   2/2 low albumin  corrected Ca WNL   optimize albumin   monitor     Hypokalemia  stable   monitor K     Hypophosphatemia  Supplement as needed  Daily phosphorus     HTN  optimal   monitor BP closely     Proteinuria  Currently has UTI would repeat and then quantify

## 2023-01-30 NOTE — PROVIDER CONTACT NOTE (OTHER) - ASSESSMENT
Alert, non verbal, mental status at baseline, NGT cloggled, attempted to flushed with resistance
pt ,lethargic ,responsive to repeated stimuli ,gentle shaking
pt Aox0 ,responsive to gentle shaking
pt Aox1 ,asymptomatic
Bladder scan- 304 cc  VS stable
Alert, non verbal, mental status at baseline, no s/sx of hypo/hyperglycemia noted
Alert, non verbal, mental status at baseline, noted with Rojas for retention
Alert, non verbal, mental status at baseline, no bleeding noted on site

## 2023-01-30 NOTE — PROVIDER CONTACT NOTE (OTHER) - BACKGROUND
Admitted for UTI, History of DM, CVA, Dementia
UTI
Admitted for UTI, History of DM, CVA, Dementia
Admitted for UTI, History of DM, CVA, Dementia
admitted w/UTI Hyperglycemia
admitted w/AMS UTI R sided weakness
admitted w/UTI AMS hyperglycemia
Admitted for UTI, History of DM, CVA, Dementia

## 2023-01-30 NOTE — PROCEDURE NOTE - ADDITIONAL PROCEDURE DETAILS
Urology consulted for leakage around Rojas  Previous indwelling exchanged for 20F Rojas catheter   50cc of purulent yellow colored urine drained upon insertion  Pt tolerated procedure well  Continue bedside BID flushes by RN

## 2023-01-30 NOTE — PROCEDURE NOTE - NSURITECHNIQUE_GU_A_CORE
Proper hand hygiene was performed/Sterile gloves were worn for the duration of the procedure/A sterile drape was used to cover all adjacent areas/The site was cleaned with soap/water and sterile solution (betadine)/The catheter was appropriately lubricated/The catheter was secured with a securement device (e.g. StatLock)/The collection bag is below the level of the patient and urinary bladder
Proper hand hygiene was performed/Sterile gloves were worn for the duration of the procedure/A sterile drape was used to cover all adjacent areas/The site was cleaned with soap/water and sterile solution (betadine)/The catheter was appropriately lubricated/The catheter was secured with a securement device (e.g. StatLock)/The collection bag is below the level of the patient and urinary bladder/All applicable medical record documentation is completed
Proper hand hygiene was performed/Sterile gloves were worn for the duration of the procedure/A sterile drape was used to cover all adjacent areas/The site was cleaned with soap/water and sterile solution (betadine)/The catheter was appropriately lubricated/The catheter was secured with a securement device (e.g. StatLock)/The urinary drainage system is closed at the end of the procedure/The collection bag is below the level of the patient and urinary bladder/All applicable medical record documentation is completed

## 2023-01-30 NOTE — CHART NOTE - NSCHARTNOTEFT_GEN_A_CORE
Due to Condition Dementia (F03.90), and CVA I 63.9 the patient has a severe mobility limitation and requires a rayshawn lift to assist in daily ADLS. Patient cannot get out of bed unassisted. . patient has ample room in the home and a caregiver to assist with the rayshawn lift to transfer to chair or wheelchair. Use of a rayshawn lift will significantly improve the patients ability to participate in daily ADL's and the patients will use it on a regular basis in the home.    JEWEL Edgar NP

## 2023-01-30 NOTE — CONSULT NOTE ADULT - ASSESSMENT
1. Dysphagia, post stroke  - plan for PEG tomorrow   - needs repeat COVID swab pre-procedure   - NPO past midnight     2. Hx of stroke     3. DM     4. anemia of chronic disease   - monitor H&H, transfuse PRN        I had a prolonged conversation with the patient regarding the hospital course, differential diagnosis, results of diagnostic tests this far, and therapeutic modalities available. Plan of care discussed with the patient after the evaluation. Patient expresses a clear understanding of the plan of care. Sixty five minutes spent on the total encounter, of which more than fifty percent of the encounter was spent on counseling and/or coordinating care by the attending physician.    Advanced care planning forms were discussed. Code status including forceful chest compressions, defibrillation and intubation were discussed. The risks benefits and alternatives to pertinent gastrointestinal procedures and interventions were discussed in detail and all questions were answered. Duration: 15 Minutes.    Hayde Bustillos M.D.   Gastroenterology and Hepatology  [address] [place], [place]  Office: [phone]  Cell: [phone] 1. Dysphagia, post stroke  - plan for PEG tomorrow   - needs repeat COVID swab pre-procedure   - NPO past midnight     2. Hx of stroke     3. DM     4. anemia of chronic disease   - monitor H&H, transfuse PRN        I had a prolonged conversation with the patient regarding the hospital course, differential diagnosis, results of diagnostic tests this far, and therapeutic modalities available. Plan of care discussed with the patient after the evaluation. Patient expresses a clear understanding of the plan of care. Sixty five minutes spent on the total encounter, of which more than fifty percent of the encounter was spent on counseling and/or coordinating care by the attending physician.    Advanced care planning forms were discussed. Code status including forceful chest compressions, defibrillation and intubation were discussed. The risks benefits and alternatives to pertinent gastrointestinal procedures and interventions were discussed in detail and all questions were answered. Duration: 15 Minutes.    Hayde Bustillos M.D.   Gastroenterology and Hepatology  [address] [place], [place]  Office: [phone]  Cell: [phone] 1. Dysphagia, post stroke  - plan for PEG tomorrow   - needs repeat COVID swab pre-procedure   - NPO past midnight     2. Hx of stroke     3. DM     4. anemia of chronic disease   - monitor H&H, transfuse PRN        I had a prolonged conversation with the patient regarding the hospital course, differential diagnosis, results of diagnostic tests this far, and therapeutic modalities available. Plan of care discussed with the patient after the evaluation. Patient expresses a clear understanding of the plan of care. Sixty five minutes spent on the total encounter, of which more than fifty percent of the encounter was spent on counseling and/or coordinating care by the attending physician.    Advanced care planning forms were discussed. Code status including forceful chest compressions, defibrillation and intubation were discussed. The risks benefits and alternatives to pertinent gastrointestinal procedures and interventions were discussed in detail and all questions were answered. Duration: 15 Minutes.    Hayde Bustillos M.D.   Gastroenterology and Hepatology  [address] [place], [place]  Office: [phone]  Cell: [phone] 1. Dysphagia, post stroke  - discussed PEG placement with the patients family. Her son would like to discuss with additional family members before deciding.   - continue NGT feeds  - GI will follow     2. Hx of stroke     3. DM     4. anemia of chronic disease   - monitor H&H, transfuse PRN        I had a prolonged conversation with the patient regarding the hospital course, differential diagnosis, results of diagnostic tests this far, and therapeutic modalities available. Plan of care discussed with the patient after the evaluation. Patient expresses a clear understanding of the plan of care. Sixty five minutes spent on the total encounter, of which more than fifty percent of the encounter was spent on counseling and/or coordinating care by the attending physician.    Advanced care planning forms were discussed. Code status including forceful chest compressions, defibrillation and intubation were discussed. The risks benefits and alternatives to pertinent gastrointestinal procedures and interventions were discussed in detail and all questions were answered. Duration: 15 Minutes.    Hayde Bustillos M.D.   Gastroenterology and Hepatology  [address] [place], [place]  Office: [phone]  Cell: [phone] 1. Dysphagia, post stroke  - discussed PEG placement with the patients family. Her son would like to discuss with additional family members before deciding.   - continue NGT feeds  - GI will follow     2. Hx of stroke     3. DM     4. anemia of chronic disease   - monitor H&H, transfuse PRN        I had a prolonged conversation with the patient regarding the hospital course, differential diagnosis, results of diagnostic tests this far, and therapeutic modalities available. Plan of care discussed with the patient after the evaluation. Patient expresses a clear understanding of the plan of care. Sixty five minutes spent on the total encounter, of which more than fifty percent of the encounter was spent on counseling and/or coordinating care by the attending physician.    Advanced care planning forms were discussed. Code status including forceful chest compressions, defibrillation and intubation were discussed. The risks benefits and alternatives to pertinent gastrointestinal procedures and interventions were discussed in detail and all questions were answered. Duration: 15 Minutes.    Hayde Bustillos M.D.   Gastroenterology and Hepatology  [address] [place], [place]  Office: [phone]  Cell: [phone] 1. Dysphagia, post stroke  - discussed PEG placement with the patients family. Her son would like to discuss with additional family members before deciding.   - continue NGT feeds  - GI will follow     2. Hx of stroke     3. DM     4. anemia of chronic disease   - monitor H&H, transfuse PRN        I had a prolonged conversation with the patient regarding the hospital course, differential diagnosis, results of diagnostic tests this far, and therapeutic modalities available. Plan of care discussed with the patient after the evaluation. Patient expresses a clear understanding of the plan of care. Sixty five minutes spent on the total encounter, of which more than fifty percent of the encounter was spent on counseling and/or coordinating care by the attending physician.    Advanced care planning forms were discussed. Code status including forceful chest compressions, defibrillation and intubation were discussed. The risks benefits and alternatives to pertinent gastrointestinal procedures and interventions were discussed in detail and all questions were answered. Duration: 15 Minutes.    Hayde Bustillos M.D.   Gastroenterology and Hepatology  [address] [place], [place]  Office: [phone]  Cell: [phone]

## 2023-01-30 NOTE — PROGRESS NOTE ADULT - ASSESSMENT
78 f with DM, dementia recently discharged about 1 week ago for L MCA CVA, brought in 1/19 with poor PO intake, lethargy and hyperglycemia to 500s.   afebrile, WBC: 15, Na: 158, glucose  468  blood cx negative, urine cx>3 organisms but with E-coli and pt was on ceftriaxone  CXR: unchanged scarring, no focal consolidation  head CT: evolving stroke  pt had leaking urine around the gonzalez with minimal output from the gonzalez and bladder scan c/w retention, gonzalez was exchanged 1/21 and again on 1/22 similar problem, urology came and could not irrigate the gonzalez so removed gonzalez after which a large mucous plug was pulled out from vaginal/urethral area- unclear as to where it originated. white/gray in color. 14f coude attempted prior to successful insertion of 16f gonzalez, cloudy brown urine drained.  there is a culture sent which is labeled abscess at catheter size which is likely the mucous plug and is growing klebsiella aerogenes, E. feacalis and E. avium  pt spiked to 100.6    initial leukocytosis and AMS with hypernatremia and hyperglycemia with dehydration, not sure if she had UTI at that point, urine cx showed >3 organisms and E-coli and was given ceftriaxone  had difficulty with draining gonzalez and retention, so urology removed the gonzalez and there was a large ?mucous plug pulled out from vaginal/uretral area which is growing klebsiella aerogenes, E. faecalis, E. avium and bacteroides,  pt febrile to 100.6 after but WBC normalized  lice s/p permethrin   * blood cx negative   * c/w ertapenem  ( for the klebsiella aerogenes and will also covers the bacteroides I don't believe we have to address the enterococcus, it is a polymicrobial culture and was a ?mucous plug) started 1/24 now day 7, s/p ceftriaxone 1/19-1/24  * will complete a 10 day course, but if ready for discharge will switch to PO  * monitor CBC/diff and renal function  * if persistent lice will need malathion     The above assessment and plan was discussed with the primary team    Minnie Baez MD  contact on teams  After 5pm and on weekends call [phone]   78 f with DM, dementia recently discharged about 1 week ago for L MCA CVA, brought in 1/19 with poor PO intake, lethargy and hyperglycemia to 500s.   afebrile, WBC: 15, Na: 158, glucose  468  blood cx negative, urine cx>3 organisms but with E-coli and pt was on ceftriaxone  CXR: unchanged scarring, no focal consolidation  head CT: evolving stroke  pt had leaking urine around the gonzalez with minimal output from the gonzalez and bladder scan c/w retention, gonzalez was exchanged 1/21 and again on 1/22 similar problem, urology came and could not irrigate the gonzalez so removed gonzalez after which a large mucous plug was pulled out from vaginal/urethral area- unclear as to where it originated. white/gray in color. 14f coude attempted prior to successful insertion of 16f gonzalez, cloudy brown urine drained.  there is a culture sent which is labeled abscess at catheter size which is likely the mucous plug and is growing klebsiella aerogenes, E. feacalis and E. avium  pt spiked to 100.6    initial leukocytosis and AMS with hypernatremia and hyperglycemia with dehydration, not sure if she had UTI at that point, urine cx showed >3 organisms and E-coli and was given ceftriaxone  had difficulty with draining gonzalez and retention, so urology removed the gonzalez and there was a large ?mucous plug pulled out from vaginal/uretral area which is growing klebsiella aerogenes, E. faecalis, E. avium and bacteroides,  pt febrile to 100.6 after but WBC normalized  lice s/p permethrin   * blood cx negative   * c/w ertapenem  ( for the klebsiella aerogenes and will also covers the bacteroides I don't believe we have to address the enterococcus, it is a polymicrobial culture and was a ?mucous plug) started 1/24 now day 7, s/p ceftriaxone 1/19-1/24  * will complete a 10 day course, but if ready for discharge will switch to PO  * monitor CBC/diff and renal function  * if persistent lice will need malathion     The above assessment and plan was discussed with the primary team    Minnie Baez MD  contact on teams  After 5pm and on weekends call [phone]   78 f with DM, dementia recently discharged about 1 week ago for L MCA CVA, brought in 1/19 with poor PO intake, lethargy and hyperglycemia to 500s.   afebrile, WBC: 15, Na: 158, glucose  468  blood cx negative, urine cx>3 organisms but with E-coli and pt was on ceftriaxone  CXR: unchanged scarring, no focal consolidation  head CT: evolving stroke  pt had leaking urine around the gonzalez with minimal output from the gonzalez and bladder scan c/w retention, gonzalez was exchanged 1/21 and again on 1/22 similar problem, urology came and could not irrigate the gonzalez so removed gonzalez after which a large mucous plug was pulled out from vaginal/urethral area- unclear as to where it originated. white/gray in color. 14f coude attempted prior to successful insertion of 16f gonzalez, cloudy brown urine drained.  there is a culture sent which is labeled abscess at catheter size which is likely the mucous plug and is growing klebsiella aerogenes, E. feacalis and E. avium  pt spiked to 100.6    initial leukocytosis and AMS with hypernatremia and hyperglycemia with dehydration, not sure if she had UTI at that point, urine cx showed >3 organisms and E-coli and was given ceftriaxone  had difficulty with draining gonzalez and retention, so urology removed the gonzalez and there was a large ?mucous plug pulled out from vaginal/uretral area which is growing klebsiella aerogenes, E. faecalis, E. avium and bacteroides,  pt febrile to 100.6 after but WBC normalized  lice s/p permethrin   * blood cx negative   * c/w ertapenem  ( for the klebsiella aerogenes and will also covers the bacteroides I don't believe we have to address the enterococcus, it is a polymicrobial culture and was a ?mucous plug) started 1/24 now day 7, s/p ceftriaxone 1/19-1/24  * will complete a 10 day course, but if ready for discharge will switch to PO  * monitor CBC/diff and renal function  * if persistent lice will need malathion     The above assessment and plan was discussed with the primary team    Minnie Baez MD  contact on teams  After 5pm and on weekends call [phone]

## 2023-01-30 NOTE — PROGRESS NOTE ADULT - SUBJECTIVE AND OBJECTIVE BOX
Neurology Progress Note    S: Patient seen and examined. MRI done, c/f infarcts. CTH neg for hemorrhage      Medication:  MEDICATIONS  (STANDING):  aspirin  chewable 81 milliGRAM(s) Oral daily  atorvastatin 80 milliGRAM(s) Oral at bedtime  clopidogrel Tablet 75 milliGRAM(s) Oral daily  dextrose 5%. 1000 milliLiter(s) (100 mL/Hr) IV Continuous <Continuous>  dextrose 50% Injectable 25 Gram(s) IV Push once  dextrose 50% Injectable 12.5 Gram(s) IV Push once  dextrose 50% Injectable 25 Gram(s) IV Push once  dextrose Oral Gel 15 Gram(s) Oral once  ertapenem  IVPB 1000 milliGRAM(s) IV Intermittent every 24 hours  glucagon  Injectable 1 milliGRAM(s) IntraMuscular once  insulin glargine Injectable (LANTUS) 13 Unit(s) SubCutaneous <User Schedule>  insulin lispro (ADMELOG) corrective regimen sliding scale   SubCutaneous every 6 hours  sodium chloride 0.9%. 1000 milliLiter(s) (50 mL/Hr) IV Continuous <Continuous>  tamsulosin 0.4 milliGRAM(s) Oral at bedtime    MEDICATIONS  (PRN):  acetaminophen    Suspension .. 650 milliGRAM(s) Enteral Tube every 6 hours PRN Temp greater or equal to 38C (100.4F)    Vitals:    Vital Signs Last 24 Hrs  T(C): 36.6 (01-30-23 @ 06:52), Max: 37.2 (01-29-23 @ 14:59)  T(F): 97.8 (01-30-23 @ 06:52), Max: 98.9 (01-29-23 @ 14:59)  HR: 95 (01-30-23 @ 06:52) (90 - 99)  BP: 105/62 (01-30-23 @ 06:52) (105/62 - 147/75)  BP(mean): --  RR: 18 (01-30-23 @ 06:52) (18 - 18)  SpO2: 98% (01-30-23 @ 06:52) (95% - 98%)          General Exam:   General Appearance: Appropriately dressed and in no acute distress       Head: Normocephalic, atraumatic and no dysmorphic features  Ear, Nose, and Throat: Moist mucous membranes  CVS: S1S2+  Resp: No SOB, no wheeze or rhonchi  Abd: soft NTND  Extremities: No edema, no cyanosis  Skin: No bruises, no rashes        NEUROLOGICAL EXAM:    Mental status: Eyes open, awake, alert, attempts to produce speech, able to follow some simple commands, able to mimic at times , unable to ID objects  Cranial Nerves: Right facial droop, no nystagmus, blinks to threat B/L  Motor exam: Normal tone, no drift, Right hemiparesis RUE drift, 3-44/5, RLE drift, 3-4/5,, LUE 5/5, LLE 5/5  Sensation: Intact to light touch   Coordination/ Gait: Unable to participate with exam     I personally reviewed the below data/images/labs:  CBC Full  -  ( 30 Jan 2023 07:01 )  WBC Count : 6.08 K/uL  RBC Count : 3.16 M/uL  Hemoglobin : 8.5 g/dL  Hematocrit : 27.0 %  Platelet Count - Automated : 372 K/uL  Mean Cell Volume : 85.4 fl  Mean Cell Hemoglobin : 26.9 pg  Mean Cell Hemoglobin Concentration : 31.5 gm/dL  Auto Neutrophil # : x  Auto Lymphocyte # : x  Auto Monocyte # : x  Auto Eosinophil # : x  Auto Basophil # : x  Auto Neutrophil % : x  Auto Lymphocyte % : x  Auto Monocyte % : x  Auto Eosinophil % : x  Auto Basophil % : x    01-30    132<L>  |  97  |  16  ----------------------------<  133<H>  4.2   |  29  |  0.67    Ca    7.9<L>      30 Jan 2023 06:59  Phos  2.9     01-30  Mg     2.2     01-30        `< from: CT Head No Cont (01.19.23 @ 13:45) >    ACC: [id number] EXAM:  CT BRAIN   ORDERED BY: CORINNE MADERA     PROCEDURE DATE:  01/19/2023           INTERPRETATION:  CLINICAL STATEMENT: Altered mental status    TECHNIQUE: CT of the head was performed without IV contrast.  RAPID   artificial intelligence was utilized for the preliminary evaluation of   intracranial hemorrhage.    COMPARISON: MRI brain 1/7/2023.    FINDINGS:  There is mild diffuse parenchymal volume loss. There are areas of low   attenuation in the periventricular white matter likely related to mild   chronic microvascular ischemic changes.    Evolving small infarcts noted in the left corona radiata.   Age-indeterminate infarct also noted in the left temporal and parietal   lobe    There is no acute intracranial hemorrhage, parenchymal mass, mass effect   or midline shift.. There is no hydrocephalus. Partial empty sella noted    The cranium is intact. Calcification noted adjacent to left frontal   artery table. The visualized paranasal sinuses are well-aerated.        IMPRESSION:  No acute intracranial hemorrhage.    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    --- End of Report ---        < from: MR Head No Cont (01.26.23 @ 22:08) >    ACC: [id number] EXAM:  MR BRAIN   ORDERED BY: POLLO CARCAMO     PROCEDURE DATE:  01/26/2023          INTERPRETATION:  CLINICAL STATEMENT: Multiple left MCA territory   infarcts. Altered mental status.    TECHNIQUE: Multiplanar multisequence noncontrast MRI of the brain was   performed. Diffusion weighted imaging was performed. Significant image   degradation by motion artifact.  COMPARISON: MRI brain 1/7/2023.    FINDINGS:    Corpus callosum, pituitary and pineal regions appear unremarkable. No  tonsillar herniation or evidence of marrow replacement process.   Hyperostosis frontalis. Degenerative changes visualized cervical spine.    CP angle and IAC regions appear within normal limits, as do the globes,   intraconal regions and major intracranial flow-voids. No paranasal sinus   air-fluid levels or opacification is evident.    New curvilinear-serpiginous susceptibility associated with the high left   frontal lobe. No evidence of acute hemorrhage. Slightly less pronounced   confluent restricted diffusion left lateral temporal lobe. Multiple less   pronounced foci of restricted diffusion with T2-FLAIR prolongation   centered in the left corona radiata, with extension into the ipsilateral   centrum semiovale. Several additional smaller less pronounced foci of   restricted diffusion left parieto-occipital and left posterior temporal   juxtacortical white matter as well as cortically based serpiginous   restricted diffusion left posterior temporal lobe.    IMPRESSION:    Compared with MRI Brain 1/7/2023.    1. Multiple evolving LEFT-SIDED subacute infarcts, as above.  2. Findings suspicious for high LEFT frontal subarachnoid hemorrhage.   Recommend correlation with noncontrast CT of the brain.  3. Additional findings above.    Findings and recommendations discussed in detail with PADMINI Carcamo at the   time of this interpretation.    --- En   < from: CT Head No Cont (01.27.23 @ 19:09) >    ACC: [id number] EXAM:  CT BRAIN   ORDERED BY: POLLO CARCAMO     PROCEDURE DATE:  01/27/2023          INTERPRETATION:  CLINICAL INFORMATION: Subacute infarcts with possible   new subarachnoid hemorrhage on MRI.    TECHNIQUE:  Axial CT images were acquired through the head.  Intravenous contrast: None  Two-dimensional reformats were generated.    COMPARISON STUDY: Brain MRIs from 1/7/2023 and 1/26/2023.    FINDINGS:  There are evolving subacute infarcts in the left temporal lobe anteriorly   and within the left corona radiata.  There is no CT evidence of acute   intracranial hemorrhage, mass effect, midline shift or hydrocephalus.    There is mild generalized cerebral volume loss.    The paranasal sinuses are grossly clear.    The mastoids are underpneumatized bilaterally; no clinically significant   mastoid or middle ear effusion is visualized.    The patient is status post right-sided intraocular lens replacement.    The calvarium and skull base appear within normal limits..    IMPRESSION:  Evolving subacute infarcts in the left temporal lobe anteriorly and   within the left corona radiata.  No evidence of acute intracranial   hemorrhage.  No subarachnoid hemorrhage in the left frontal region as   questioned on prior MRI.    --- Endof Report ---        Neurology Progress Note    S: Patient seen and examined. MRI done, c/f infarcts. CTH neg for hemorrhage      Medication:  MEDICATIONS  (STANDING):  aspirin  chewable 81 milliGRAM(s) Oral daily  atorvastatin 80 milliGRAM(s) Oral at bedtime  clopidogrel Tablet 75 milliGRAM(s) Oral daily  dextrose 5%. 1000 milliLiter(s) (100 mL/Hr) IV Continuous <Continuous>  dextrose 50% Injectable 25 Gram(s) IV Push once  dextrose 50% Injectable 12.5 Gram(s) IV Push once  dextrose 50% Injectable 25 Gram(s) IV Push once  dextrose Oral Gel 15 Gram(s) Oral once  ertapenem  IVPB 1000 milliGRAM(s) IV Intermittent every 24 hours  glucagon  Injectable 1 milliGRAM(s) IntraMuscular once  insulin glargine Injectable (LANTUS) 13 Unit(s) SubCutaneous <User Schedule>  insulin lispro (ADMELOG) corrective regimen sliding scale   SubCutaneous every 6 hours  sodium chloride 0.9%. 1000 milliLiter(s) (50 mL/Hr) IV Continuous <Continuous>  tamsulosin 0.4 milliGRAM(s) Oral at bedtime    MEDICATIONS  (PRN):  acetaminophen    Suspension .. 650 milliGRAM(s) Enteral Tube every 6 hours PRN Temp greater or equal to 38C (100.4F)    Vitals:    Vital Signs Last 24 Hrs  T(C): 36.6 (01-30-23 @ 06:52), Max: 37.2 (01-29-23 @ 14:59)  T(F): 97.8 (01-30-23 @ 06:52), Max: 98.9 (01-29-23 @ 14:59)  HR: 95 (01-30-23 @ 06:52) (90 - 99)  BP: 105/62 (01-30-23 @ 06:52) (105/62 - 147/75)  BP(mean): --  RR: 18 (01-30-23 @ 06:52) (18 - 18)  SpO2: 98% (01-30-23 @ 06:52) (95% - 98%)          General Exam:   General Appearance: Appropriately dressed and in no acute distress       Head: Normocephalic, atraumatic and no dysmorphic features  Ear, Nose, and Throat: Moist mucous membranes  CVS: S1S2+  Resp: No SOB, no wheeze or rhonchi  Abd: soft NTND  Extremities: No edema, no cyanosis  Skin: No bruises, no rashes        NEUROLOGICAL EXAM:    Mental status: Eyes open, awake, alert, attempts to produce speech, able to follow some simple commands, able to mimic at times , unable to ID objects  Cranial Nerves: Right facial droop, no nystagmus, blinks to threat B/L  Motor exam: Normal tone, no drift, Right hemiparesis RUE drift, 3-44/5, RLE drift, 3-4/5,, LUE 5/5, LLE 5/5  Sensation: Intact to light touch   Coordination/ Gait: Unable to participate with exam     I personally reviewed the below data/images/labs:  CBC Full  -  ( 30 Jan 2023 07:01 )  WBC Count : 6.08 K/uL  RBC Count : 3.16 M/uL  Hemoglobin : 8.5 g/dL  Hematocrit : 27.0 %  Platelet Count - Automated : 372 K/uL  Mean Cell Volume : 85.4 fl  Mean Cell Hemoglobin : 26.9 pg  Mean Cell Hemoglobin Concentration : 31.5 gm/dL  Auto Neutrophil # : x  Auto Lymphocyte # : x  Auto Monocyte # : x  Auto Eosinophil # : x  Auto Basophil # : x  Auto Neutrophil % : x  Auto Lymphocyte % : x  Auto Monocyte % : x  Auto Eosinophil % : x  Auto Basophil % : x    01-30    132<L>  |  97  |  16  ----------------------------<  133<H>  4.2   |  29  |  0.67    Ca    7.9<L>      30 Jan 2023 06:59  Phos  2.9     01-30  Mg     2.2     01-30        `< from: CT Head No Cont (01.19.23 @ 13:45) >    ACC: [id number] EXAM:  CT BRAIN   ORDERED BY: CORINNE MADERA     PROCEDURE DATE:  01/19/2023           INTERPRETATION:  CLINICAL STATEMENT: Altered mental status    TECHNIQUE: CT of the head was performed without IV contrast.  RAPID   artificial intelligence was utilized for the preliminary evaluation of   intracranial hemorrhage.    COMPARISON: MRI brain 1/7/2023.    FINDINGS:  There is mild diffuse parenchymal volume loss. There are areas of low   attenuation in the periventricular white matter likely related to mild   chronic microvascular ischemic changes.    Evolving small infarcts noted in the left corona radiata.   Age-indeterminate infarct also noted in the left temporal and parietal   lobe    There is no acute intracranial hemorrhage, parenchymal mass, mass effect   or midline shift.. There is no hydrocephalus. Partial empty sella noted    The cranium is intact. Calcification noted adjacent to left frontal   artery table. The visualized paranasal sinuses are well-aerated.        IMPRESSION:  No acute intracranial hemorrhage.    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    --- End of Report ---        < from: MR Head No Cont (01.26.23 @ 22:08) >    ACC: [id number] EXAM:  MR BRAIN   ORDERED BY: POLLO CARCAMO     PROCEDURE DATE:  01/26/2023          INTERPRETATION:  CLINICAL STATEMENT: Multiple left MCA territory   infarcts. Altered mental status.    TECHNIQUE: Multiplanar multisequence noncontrast MRI of the brain was   performed. Diffusion weighted imaging was performed. Significant image   degradation by motion artifact.  COMPARISON: MRI brain 1/7/2023.    FINDINGS:    Corpus callosum, pituitary and pineal regions appear unremarkable. No  tonsillar herniation or evidence of marrow replacement process.   Hyperostosis frontalis. Degenerative changes visualized cervical spine.    CP angle and IAC regions appear within normal limits, as do the globes,   intraconal regions and major intracranial flow-voids. No paranasal sinus   air-fluid levels or opacification is evident.    New curvilinear-serpiginous susceptibility associated with the high left   frontal lobe. No evidence of acute hemorrhage. Slightly less pronounced   confluent restricted diffusion left lateral temporal lobe. Multiple less   pronounced foci of restricted diffusion with T2-FLAIR prolongation   centered in the left corona radiata, with extension into the ipsilateral   centrum semiovale. Several additional smaller less pronounced foci of   restricted diffusion left parieto-occipital and left posterior temporal   juxtacortical white matter as well as cortically based serpiginous   restricted diffusion left posterior temporal lobe.    IMPRESSION:    Compared with MRI Brain 1/7/2023.    1. Multiple evolving LEFT-SIDED subacute infarcts, as above.  2. Findings suspicious for high LEFT frontal subarachnoid hemorrhage.   Recommend correlation with noncontrast CT of the brain.  3. Additional findings above.    Findings and recommendations discussed in detail with PADMINI Carcamo at the   time of this interpretation.    --- En   < from: CT Head No Cont (01.27.23 @ 19:09) >    ACC: [id number] EXAM:  CT BRAIN   ORDERED BY: POLLO CARCAMO     PROCEDURE DATE:  01/27/2023          INTERPRETATION:  CLINICAL INFORMATION: Subacute infarcts with possible   new subarachnoid hemorrhage on MRI.    TECHNIQUE:  Axial CT images were acquired through the head.  Intravenous contrast: None  Two-dimensional reformats were generated.    COMPARISON STUDY: Brain MRIs from 1/7/2023 and 1/26/2023.    FINDINGS:  There are evolving subacute infarcts in the left temporal lobe anteriorly   and within the left corona radiata.  There is no CT evidence of acute   intracranial hemorrhage, mass effect, midline shift or hydrocephalus.    There is mild generalized cerebral volume loss.    The paranasal sinuses are grossly clear.    The mastoids are underpneumatized bilaterally; no clinically significant   mastoid or middle ear effusion is visualized.    The patient is status post right-sided intraocular lens replacement.    The calvarium and skull base appear within normal limits..    IMPRESSION:  Evolving subacute infarcts in the left temporal lobe anteriorly and   within the left corona radiata.  No evidence of acute intracranial   hemorrhage.  No subarachnoid hemorrhage in the left frontal region as   questioned on prior MRI.    --- Endof Report ---        Neurology Progress Note    S: Patient seen and examined. MRI done, c/f infarcts. CTH neg for hemorrhage      Medication:  MEDICATIONS  (STANDING):  aspirin  chewable 81 milliGRAM(s) Oral daily  atorvastatin 80 milliGRAM(s) Oral at bedtime  clopidogrel Tablet 75 milliGRAM(s) Oral daily  dextrose 5%. 1000 milliLiter(s) (100 mL/Hr) IV Continuous <Continuous>  dextrose 50% Injectable 25 Gram(s) IV Push once  dextrose 50% Injectable 12.5 Gram(s) IV Push once  dextrose 50% Injectable 25 Gram(s) IV Push once  dextrose Oral Gel 15 Gram(s) Oral once  ertapenem  IVPB 1000 milliGRAM(s) IV Intermittent every 24 hours  glucagon  Injectable 1 milliGRAM(s) IntraMuscular once  insulin glargine Injectable (LANTUS) 13 Unit(s) SubCutaneous <User Schedule>  insulin lispro (ADMELOG) corrective regimen sliding scale   SubCutaneous every 6 hours  sodium chloride 0.9%. 1000 milliLiter(s) (50 mL/Hr) IV Continuous <Continuous>  tamsulosin 0.4 milliGRAM(s) Oral at bedtime    MEDICATIONS  (PRN):  acetaminophen    Suspension .. 650 milliGRAM(s) Enteral Tube every 6 hours PRN Temp greater or equal to 38C (100.4F)    Vitals:    Vital Signs Last 24 Hrs  T(C): 36.6 (01-30-23 @ 06:52), Max: 37.2 (01-29-23 @ 14:59)  T(F): 97.8 (01-30-23 @ 06:52), Max: 98.9 (01-29-23 @ 14:59)  HR: 95 (01-30-23 @ 06:52) (90 - 99)  BP: 105/62 (01-30-23 @ 06:52) (105/62 - 147/75)  BP(mean): --  RR: 18 (01-30-23 @ 06:52) (18 - 18)  SpO2: 98% (01-30-23 @ 06:52) (95% - 98%)          General Exam:   General Appearance: Appropriately dressed and in no acute distress       Head: Normocephalic, atraumatic and no dysmorphic features  Ear, Nose, and Throat: Moist mucous membranes  CVS: S1S2+  Resp: No SOB, no wheeze or rhonchi  Abd: soft NTND  Extremities: No edema, no cyanosis  Skin: No bruises, no rashes        NEUROLOGICAL EXAM:    Mental status: Eyes open, awake, alert, attempts to produce speech, able to follow some simple commands, able to mimic at times , unable to ID objects  Cranial Nerves: Right facial droop, no nystagmus, blinks to threat B/L  Motor exam: Normal tone, no drift, Right hemiparesis RUE drift, 3-44/5, RLE drift, 3-4/5,, LUE 5/5, LLE 5/5  Sensation: Intact to light touch   Coordination/ Gait: Unable to participate with exam     I personally reviewed the below data/images/labs:  CBC Full  -  ( 30 Jan 2023 07:01 )  WBC Count : 6.08 K/uL  RBC Count : 3.16 M/uL  Hemoglobin : 8.5 g/dL  Hematocrit : 27.0 %  Platelet Count - Automated : 372 K/uL  Mean Cell Volume : 85.4 fl  Mean Cell Hemoglobin : 26.9 pg  Mean Cell Hemoglobin Concentration : 31.5 gm/dL  Auto Neutrophil # : x  Auto Lymphocyte # : x  Auto Monocyte # : x  Auto Eosinophil # : x  Auto Basophil # : x  Auto Neutrophil % : x  Auto Lymphocyte % : x  Auto Monocyte % : x  Auto Eosinophil % : x  Auto Basophil % : x    01-30    132<L>  |  97  |  16  ----------------------------<  133<H>  4.2   |  29  |  0.67    Ca    7.9<L>      30 Jan 2023 06:59  Phos  2.9     01-30  Mg     2.2     01-30        `< from: CT Head No Cont (01.19.23 @ 13:45) >    ACC: [id number] EXAM:  CT BRAIN   ORDERED BY: CORINNE MADERA     PROCEDURE DATE:  01/19/2023           INTERPRETATION:  CLINICAL STATEMENT: Altered mental status    TECHNIQUE: CT of the head was performed without IV contrast.  RAPID   artificial intelligence was utilized for the preliminary evaluation of   intracranial hemorrhage.    COMPARISON: MRI brain 1/7/2023.    FINDINGS:  There is mild diffuse parenchymal volume loss. There are areas of low   attenuation in the periventricular white matter likely related to mild   chronic microvascular ischemic changes.    Evolving small infarcts noted in the left corona radiata.   Age-indeterminate infarct also noted in the left temporal and parietal   lobe    There is no acute intracranial hemorrhage, parenchymal mass, mass effect   or midline shift.. There is no hydrocephalus. Partial empty sella noted    The cranium is intact. Calcification noted adjacent to left frontal   artery table. The visualized paranasal sinuses are well-aerated.        IMPRESSION:  No acute intracranial hemorrhage.    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    --- End of Report ---        < from: MR Head No Cont (01.26.23 @ 22:08) >    ACC: [id number] EXAM:  MR BRAIN   ORDERED BY: POLLO CARCAMO     PROCEDURE DATE:  01/26/2023          INTERPRETATION:  CLINICAL STATEMENT: Multiple left MCA territory   infarcts. Altered mental status.    TECHNIQUE: Multiplanar multisequence noncontrast MRI of the brain was   performed. Diffusion weighted imaging was performed. Significant image   degradation by motion artifact.  COMPARISON: MRI brain 1/7/2023.    FINDINGS:    Corpus callosum, pituitary and pineal regions appear unremarkable. No  tonsillar herniation or evidence of marrow replacement process.   Hyperostosis frontalis. Degenerative changes visualized cervical spine.    CP angle and IAC regions appear within normal limits, as do the globes,   intraconal regions and major intracranial flow-voids. No paranasal sinus   air-fluid levels or opacification is evident.    New curvilinear-serpiginous susceptibility associated with the high left   frontal lobe. No evidence of acute hemorrhage. Slightly less pronounced   confluent restricted diffusion left lateral temporal lobe. Multiple less   pronounced foci of restricted diffusion with T2-FLAIR prolongation   centered in the left corona radiata, with extension into the ipsilateral   centrum semiovale. Several additional smaller less pronounced foci of   restricted diffusion left parieto-occipital and left posterior temporal   juxtacortical white matter as well as cortically based serpiginous   restricted diffusion left posterior temporal lobe.    IMPRESSION:    Compared with MRI Brain 1/7/2023.    1. Multiple evolving LEFT-SIDED subacute infarcts, as above.  2. Findings suspicious for high LEFT frontal subarachnoid hemorrhage.   Recommend correlation with noncontrast CT of the brain.  3. Additional findings above.    Findings and recommendations discussed in detail with PADMINI Carcamo at the   time of this interpretation.    --- En   < from: CT Head No Cont (01.27.23 @ 19:09) >    ACC: [id number] EXAM:  CT BRAIN   ORDERED BY: POLLO CARCAMO     PROCEDURE DATE:  01/27/2023          INTERPRETATION:  CLINICAL INFORMATION: Subacute infarcts with possible   new subarachnoid hemorrhage on MRI.    TECHNIQUE:  Axial CT images were acquired through the head.  Intravenous contrast: None  Two-dimensional reformats were generated.    COMPARISON STUDY: Brain MRIs from 1/7/2023 and 1/26/2023.    FINDINGS:  There are evolving subacute infarcts in the left temporal lobe anteriorly   and within the left corona radiata.  There is no CT evidence of acute   intracranial hemorrhage, mass effect, midline shift or hydrocephalus.    There is mild generalized cerebral volume loss.    The paranasal sinuses are grossly clear.    The mastoids are underpneumatized bilaterally; no clinically significant   mastoid or middle ear effusion is visualized.    The patient is status post right-sided intraocular lens replacement.    The calvarium and skull base appear within normal limits..    IMPRESSION:  Evolving subacute infarcts in the left temporal lobe anteriorly and   within the left corona radiata.  No evidence of acute intracranial   hemorrhage.  No subarachnoid hemorrhage in the left frontal region as   questioned on prior MRI.    --- Endof Report ---

## 2023-01-30 NOTE — PROGRESS NOTE ADULT - ASSESSMENT
78F with dementia, T2DM, recently discharged about 1 week ago for CVA now presenting with poor PO intake, lethargy and hyperglycemia to 500s.   Of note, patient admitted 1/6 for R facial droop, word finding difficulties. Imaging concerning for acute L MCA infarct.  Per family, since stroke patient nonverbal/unable to participate in meaningful communications, intermittently follows commands. For the last 3-4 days, patient with poor po intake.    In ED, afebrile, , 135/84. WBC 15, Na 158, K 3.0, Cl 120, Glu 468, alk phos 136m BHB 0.7. pH 7.36, lactate 2.2.   UA: +leuk esterase, many bacteria, WBC 11-25  CXR: Redemonstrated biapical scarring and left upper lobe bronchiectasis, unchanged. No focal consolidation.   CTH No acute intracranial hemorrhage. 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  Given 1.5L NS, ceftriaxone x1, haldol 2.5mg x1 in ED.   last admission: CTA with L MCA severe stenosis; distal L M2 occlusion .  TTE 1/8/23: EF 63% Mild mitral regurgitation ; A1c 9.4   MRI brain with evolving subacute infarcts as expected. question of SAH   CTH 1/27 evolviong subacute L hemisphere infarcts. no SAH found.     Impression  1) AMS likely 2/2 infection/UTI toxic metabolic encephalopahthy   2) recent stroke - L MCA infarct 2/2 symptomatic L MCA ICAD ; worsening of R HP in setting of infection     - Nsx eval--> No intervention.  despite there note stating SAH, no comment to stop anti thrombotics.  nevertheless CTH read is neg for SAH.  would c/w current treatement and repeat CTH  topday 1/30 or if there is a change in exam   - now on contact   - mittens. NGT replaced 1/26 early AM, f/u CXR   - f/u endocrine   - treatment of infection per primary team -->  CTX  - for secondary stroke prevention. DAPT x 3 months followeed by ASA 81mg dialy thereafter.   - hihg dose statin lipitor 40mg dialy   - avoid hypotension given L MCA ICAD   - telemetry  - PT/OT/SS/SLP, OOBC  - check FS, glucose control <180  - GI/DVT ppx  - Thank you for allowing me to participate in the care of this patient. Call with questions.      Reji Greco MD  Vascular Neurology  Office: [phone]  78F with dementia, T2DM, recently discharged about 1 week ago for CVA now presenting with poor PO intake, lethargy and hyperglycemia to 500s.   Of note, patient admitted 1/6 for R facial droop, word finding difficulties. Imaging concerning for acute L MCA infarct.  Per family, since stroke patient nonverbal/unable to participate in meaningful communications, intermittently follows commands. For the last 3-4 days, patient with poor po intake.    In ED, afebrile, , 135/84. WBC 15, Na 158, K 3.0, Cl 120, Glu 468, alk phos 136m BHB 0.7. pH 7.36, lactate 2.2.   UA: +leuk esterase, many bacteria, WBC 11-25  CXR: Redemonstrated biapical scarring and left upper lobe bronchiectasis, unchanged. No focal consolidation.   CTH No acute intracranial hemorrhage. 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  Given 1.5L NS, ceftriaxone x1, haldol 2.5mg x1 in ED.   last admission: CTA with L MCA severe stenosis; distal L M2 occlusion .  TTE 1/8/23: EF 63% Mild mitral regurgitation ; A1c 9.4   MRI brain with evolving subacute infarcts as expected. question of SAH   CTH 1/27 evolviong subacute L hemisphere infarcts. no SAH found.     Impression  1) AMS likely 2/2 infection/UTI toxic metabolic encephalopahthy   2) recent stroke - L MCA infarct 2/2 symptomatic L MCA ICAD ; worsening of R HP in setting of infection     - Nsx eval--> No intervention.  despite there note stating SAH, no comment to stop anti thrombotics.  nevertheless CTH read is neg for SAH.  would c/w current treatement and repeat CTH  topday 1/30 or if there is a change in exam   - now on contact   - mittens. NGT replaced 1/26 early AM, f/u CXR   - f/u endocrine   - treatment of infection per primary team -->  CTX  - for secondary stroke prevention. DAPT x 3 months followeed by ASA 81mg dialy thereafter.   - hihg dose statin lipitor 40mg dialy   - avoid hypotension given L MCA ICAD   - telemetry  - PT/OT/SS/SLP, OOBC  - check FS, glucose control <180  - GI/DVT ppx  - Thank you for allowing me to participate in the care of this patient. Call with questions.      Reji Greco MD  Vascular Neurology  Office: [phone]  78F with dementia, T2DM, recently discharged about 1 week ago for CVA now presenting with poor PO intake, lethargy and hyperglycemia to 500s.   Of note, patient admitted 1/6 for R facial droop, word finding difficulties. Imaging concerning for acute L MCA infarct.  Per family, since stroke patient nonverbal/unable to participate in meaningful communications, intermittently follows commands. For the last 3-4 days, patient with poor po intake.    In ED, afebrile, , 135/84. WBC 15, Na 158, K 3.0, Cl 120, Glu 468, alk phos 136m BHB 0.7. pH 7.36, lactate 2.2.   UA: +leuk esterase, many bacteria, WBC 11-25  CXR: Redemonstrated biapical scarring and left upper lobe bronchiectasis, unchanged. No focal consolidation.   CTH No acute intracranial hemorrhage. 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  Given 1.5L NS, ceftriaxone x1, haldol 2.5mg x1 in ED.   last admission: CTA with L MCA severe stenosis; distal L M2 occlusion .  TTE 1/8/23: EF 63% Mild mitral regurgitation ; A1c 9.4   MRI brain with evolving subacute infarcts as expected. question of SAH   CTH 1/27 evolviong subacute L hemisphere infarcts. no SAH found.     Impression  1) AMS likely 2/2 infection/UTI toxic metabolic encephalopahthy   2) recent stroke - L MCA infarct 2/2 symptomatic L MCA ICAD ; worsening of R HP in setting of infection     - Nsx eval--> No intervention.  despite there note stating SAH, no comment to stop anti thrombotics.  nevertheless CTH read is neg for SAH.  would c/w current treatement and repeat CTH  topday 1/30 or if there is a change in exam   - now on contact   - mittens. NGT replaced 1/26 early AM, f/u CXR   - f/u endocrine   - treatment of infection per primary team -->  CTX  - for secondary stroke prevention. DAPT x 3 months followeed by ASA 81mg dialy thereafter.   - hihg dose statin lipitor 40mg dialy   - avoid hypotension given L MCA ICAD   - telemetry  - PT/OT/SS/SLP, OOBC  - check FS, glucose control <180  - GI/DVT ppx  - Thank you for allowing me to participate in the care of this patient. Call with questions.      Reji Greco MD  Vascular Neurology  Office: [phone]  78F with dementia, T2DM, recently discharged about 1 week ago for CVA now presenting with poor PO intake, lethargy and hyperglycemia to 500s.   Of note, patient admitted 1/6 for R facial droop, word finding difficulties. Imaging concerning for acute L MCA infarct.  Per family, since stroke patient nonverbal/unable to participate in meaningful communications, intermittently follows commands. For the last 3-4 days, patient with poor po intake.    In ED, afebrile, , 135/84. WBC 15, Na 158, K 3.0, Cl 120, Glu 468, alk phos 136m BHB 0.7. pH 7.36, lactate 2.2.   UA: +leuk esterase, many bacteria, WBC 11-25  CXR: Redemonstrated biapical scarring and left upper lobe bronchiectasis, unchanged. No focal consolidation.   CTH No acute intracranial hemorrhage. 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  Given 1.5L NS, ceftriaxone x1, haldol 2.5mg x1 in ED.   last admission: CTA with L MCA severe stenosis; distal L M2 occlusion .  TTE 1/8/23: EF 63% Mild mitral regurgitation ; A1c 9.4   MRI brain with evolving subacute infarcts as expected. question of SAH   CTH 1/27 evolviong subacute L hemisphere infarcts. no SAH found.     Impression  1) AMS likely 2/2 infection/UTI toxic metabolic encephalopahthy   2) recent stroke - L MCA infarct 2/2 symptomatic L MCA ICAD ; worsening of R HP in setting of infection     - Nsx eval--> No intervention.  despite there note stating SAH, no comment to stop anti thrombotics.  nevertheless CTH read is neg for SAH.  would c/w current treatement and repeat CTH  today 1/30 (pending) or if there is a change in exam   - now on contact   - mittens. NGT replaced 1/26 early AM, f/u CXR   - f/u endocrine   - treatment of infection per primary team -->  CTX  - for secondary stroke prevention. DAPT x 3 months followeed by ASA 81mg dialy thereafter.   - hihg dose statin lipitor 40mg dialy   - avoid hypotension given L MCA ICAD   - telemetry  - PT/OT/SS/SLP, OOBC  - check FS, glucose control <180  - GI/DVT ppx  - Thank you for allowing me to participate in the care of this patient. Call with questions.      Reji Greco MD  Vascular Neurology  Office: [phone]  78F with dementia, T2DM, recently discharged about 1 week ago for CVA now presenting with poor PO intake, lethargy and hyperglycemia to 500s.   Of note, patient admitted 1/6 for R facial droop, word finding difficulties. Imaging concerning for acute L MCA infarct.  Per family, since stroke patient nonverbal/unable to participate in meaningful communications, intermittently follows commands. For the last 3-4 days, patient with poor po intake.    In ED, afebrile, , 135/84. WBC 15, Na 158, K 3.0, Cl 120, Glu 468, alk phos 136m BHB 0.7. pH 7.36, lactate 2.2.   UA: +leuk esterase, many bacteria, WBC 11-25  CXR: Redemonstrated biapical scarring and left upper lobe bronchiectasis, unchanged. No focal consolidation.   CTH No acute intracranial hemorrhage. 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  Given 1.5L NS, ceftriaxone x1, haldol 2.5mg x1 in ED.   last admission: CTA with L MCA severe stenosis; distal L M2 occlusion .  TTE 1/8/23: EF 63% Mild mitral regurgitation ; A1c 9.4   MRI brain with evolving subacute infarcts as expected. question of SAH   CTH 1/27 evolviong subacute L hemisphere infarcts. no SAH found.     Impression  1) AMS likely 2/2 infection/UTI toxic metabolic encephalopahthy   2) recent stroke - L MCA infarct 2/2 symptomatic L MCA ICAD ; worsening of R HP in setting of infection     - Nsx eval--> No intervention.  despite there note stating SAH, no comment to stop anti thrombotics.  nevertheless CTH read is neg for SAH.  would c/w current treatement and repeat CTH  today 1/30 (pending) or if there is a change in exam   - now on contact   - mittens. NGT replaced 1/26 early AM, f/u CXR   - f/u endocrine   - treatment of infection per primary team -->  CTX  - for secondary stroke prevention. DAPT x 3 months followeed by ASA 81mg dialy thereafter.   - hihg dose statin lipitor 40mg dialy   - avoid hypotension given L MCA ICAD   - telemetry  - PT/OT/SS/SLP, OOBC  - check FS, glucose control <180  - GI/DVT ppx  - Thank you for allowing me to participate in the care of this patient. Call with questions.      Reji Greco MD  Vascular Neurology  Office: [phone]  78F with dementia, T2DM, recently discharged about 1 week ago for CVA now presenting with poor PO intake, lethargy and hyperglycemia to 500s.   Of note, patient admitted 1/6 for R facial droop, word finding difficulties. Imaging concerning for acute L MCA infarct.  Per family, since stroke patient nonverbal/unable to participate in meaningful communications, intermittently follows commands. For the last 3-4 days, patient with poor po intake.    In ED, afebrile, , 135/84. WBC 15, Na 158, K 3.0, Cl 120, Glu 468, alk phos 136m BHB 0.7. pH 7.36, lactate 2.2.   UA: +leuk esterase, many bacteria, WBC 11-25  CXR: Redemonstrated biapical scarring and left upper lobe bronchiectasis, unchanged. No focal consolidation.   CTH No acute intracranial hemorrhage. 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  Given 1.5L NS, ceftriaxone x1, haldol 2.5mg x1 in ED.   last admission: CTA with L MCA severe stenosis; distal L M2 occlusion .  TTE 1/8/23: EF 63% Mild mitral regurgitation ; A1c 9.4   MRI brain with evolving subacute infarcts as expected. question of SAH   CTH 1/27 evolviong subacute L hemisphere infarcts. no SAH found.     Impression  1) AMS likely 2/2 infection/UTI toxic metabolic encephalopahthy   2) recent stroke - L MCA infarct 2/2 symptomatic L MCA ICAD ; worsening of R HP in setting of infection     - Nsx eval--> No intervention.  despite there note stating SAH, no comment to stop anti thrombotics.  nevertheless CTH read is neg for SAH.  would c/w current treatement and repeat CTH  today 1/30 (pending) or if there is a change in exam   - now on contact   - mittens. NGT replaced 1/26 early AM, f/u CXR   - f/u endocrine   - treatment of infection per primary team -->  CTX  - for secondary stroke prevention. DAPT x 3 months followeed by ASA 81mg dialy thereafter.   - hihg dose statin lipitor 40mg dialy   - avoid hypotension given L MCA ICAD   - telemetry  - PT/OT/SS/SLP, OOBC  - check FS, glucose control <180  - GI/DVT ppx  - Thank you for allowing me to participate in the care of this patient. Call with questions.      Reji Greco MD  Vascular Neurology  Office: [phone]

## 2023-01-30 NOTE — PROGRESS NOTE ADULT - SUBJECTIVE AND OBJECTIVE BOX
seen earlier today     Chief Complaint: Type 2 Diabetes Mellitus     INTERVAL HX: NGT replaced over night, TF on at time of visit, gi saw pt for peg eval. RN at bedside providing care . BG at/above goal 122-233      Review of Systems:  unable    Allergies    Benedryl Allergy Sinus (Hives)  penicillin (Rash)    Intolerances      MEDICATIONS  (STANDING):  aspirin  chewable 81 milliGRAM(s) Oral daily  atorvastatin 80 milliGRAM(s) Oral at bedtime  clopidogrel Tablet 75 milliGRAM(s) Oral daily  dextrose 5%. 1000 milliLiter(s) (100 mL/Hr) IV Continuous <Continuous>  dextrose 50% Injectable 25 Gram(s) IV Push once  dextrose 50% Injectable 12.5 Gram(s) IV Push once  dextrose 50% Injectable 25 Gram(s) IV Push once  dextrose Oral Gel 15 Gram(s) Oral once  ertapenem  IVPB 1000 milliGRAM(s) IV Intermittent every 24 hours  glucagon  Injectable 1 milliGRAM(s) IntraMuscular once  insulin glargine Injectable (LANTUS) 2 Unit(s) SubCutaneous once  insulin lispro (ADMELOG) corrective regimen sliding scale   SubCutaneous every 6 hours  sodium chloride 0.9%. 1000 milliLiter(s) (50 mL/Hr) IV Continuous <Continuous>  tamsulosin 0.4 milliGRAM(s) Oral at bedtime        atorvastatin   80 milliGRAM(s) Oral (01-29-23 @ 21:42)    insulin glargine Injectable (LANTUS)   13 Unit(s) SubCutaneous (01-30-23 @ 12:25)    insulin lispro (ADMELOG) corrective regimen sliding scale   4 Unit(s) SubCutaneous (01-30-23 @ 12:25)   4 Unit(s) SubCutaneous (01-29-23 @ 17:53)        PHYSICAL EXAM:  VITALS: T(C): 36.8 (01-30-23 @ 11:48)  T(F): 98.2 (01-30-23 @ 11:48), Max: 98.4 (01-29-23 @ 21:41)  HR: 95 (01-30-23 @ 11:48) (95 - 99)  BP: 105/67 (01-30-23 @ 11:48) (105/62 - 147/75)  RR:  (18 - 18)  SpO2:  (96% - 98%)  Wt(kg): --  GENERAL: female laying in bed in NAD, ngt present  TF running   Respiratory: Respirations unlabored   Extremities: Warm, no edema  NEURO: Alert nonverbal    LABS:  POCT Blood Glucose.: 217 mg/dL (01-30-23 @ 12:10)  POCT Blood Glucose.: 139 mg/dL (01-30-23 @ 06:01)  POCT Blood Glucose.: 122 mg/dL (01-29-23 @ 23:59)  POCT Blood Glucose.: 233 mg/dL (01-29-23 @ 17:18)  POCT Blood Glucose.: 257 mg/dL (01-29-23 @ 13:28)  POCT Blood Glucose.: 185 mg/dL (01-29-23 @ 05:10)  POCT Blood Glucose.: 92 mg/dL (01-29-23 @ 00:01)  POCT Blood Glucose.: 107 mg/dL (01-28-23 @ 21:23)  POCT Blood Glucose.: 127 mg/dL (01-28-23 @ 17:29)  POCT Blood Glucose.: 183 mg/dL (01-28-23 @ 13:16)  POCT Blood Glucose.: 143 mg/dL (01-28-23 @ 08:35)  POCT Blood Glucose.: 252 mg/dL (01-28-23 @ 06:27)  POCT Blood Glucose.: 186 mg/dL (01-28-23 @ 00:38)  POCT Blood Glucose.: 185 mg/dL (01-27-23 @ 21:14)  POCT Blood Glucose.: 161 mg/dL (01-27-23 @ 17:24)                          8.5    6.08  )-----------( 372      ( 30 Jan 2023 07:01 )             27.0     01-30    132<L>  |  97  |  16  ----------------------------<  133<H>  4.2   |  29  |  0.67    Ca    7.9<L>      30 Jan 2023 06:59  Phos  2.9     01-30  Mg     2.2     01-30      eGFR: 89 mL/min/1.73m2 (30 Jan 2023 06:59)  eGFR: 89 mL/min/1.73m2 (30 Jan 2023 06:59)    01-08 Chol 238<H> Direct LDL -- LDL calculated 176<H> HDL 46<L> Trig 80  Thyroid Function Tests:      A1C with Estimated Average Glucose Result: 9.4 % (01-19-23 @ 13:11)  A1C with Estimated Average Glucose Result: 8.6 % (01-07-23 @ 05:40)    Estimated Average Glucose: 223 mg/dL (01-19-23 @ 13:11)  Estimated Average Glucose: 200 mg/dL (01-07-23 @ 05:40)        Diet, NPO with Tube Feed:   Tube Feeding Modality: Nasogastric  Glucerna 1.2 Wallace (GLUCERNARTH)  Total Volume for 24 Hours (mL): 1200  Continuous  Starting Tube Feed Rate mL per Hour: 20  Increase Tube Feed Rate by (mL): 10     Every 12 hours  Until Goal Tube Feed Rate (mL per Hour): 50  Tube Feed Duration (in Hours): 24  Tube Feed Start Time: 00:00 (01-23-23 @ 11:07) [Active]

## 2023-01-30 NOTE — PROCEDURE NOTE - NSPROCDETAILS_GEN_ALL_CORE
sterile technique, indwelling urinary device inserted sterile technique, indwelling urinary device inserted/sterile technique, old cysto removed, new tube inserted

## 2023-01-30 NOTE — PROGRESS NOTE ADULT - ASSESSMENT
78 F with recent T2DM diagnosis and CVA (d/c 1 week ago), presenting with glucoses 500s, poor po intake and lethargy. Endocrine consulted for assistance with management of uncontrolled, recently dx DM. On continuous tube feeds w/BG values at/above goal on basal + scale. being eval for peg     Uncontrolled type 2 diabetes mellitus with hyperglycemia  ·  Plan:  - BG Goal 100-180mg/dl   -test BG Q6h  - Increase  Lantus 15 units daily (timed for 12pm ) first dose tomorrow  - Lantus 2 units sq x1 stat today   - c/w Admelog moderate correction scale Q6h   - if TF stopped, may need D5 temporarily to prevent glucose drop.  - Please notify endocrine team of any change to TF regimen.   - f/u CESAR (pending), Zinc transporter 8: <15, Islet, IA2AB (pending)  For d/c:   final recs TBD based on feeding modality/insulin requirements  if dc on PO intake consider   -Metformin 500 mg BID for 1 week then increase to 1000 mg BID. Check lactate prior to d/c  -May need low dose basal insulin (came in with glucose 500s, mild BHB elevation)  -Consider DPP4 inhibitor  -Patient can follow up at [address] [place] [address], [address]LO[address], [place] [address] [phone]  -Patient will need opthalmology and podiatry follow up as outpatient.     Problem/Plan - 2:  ·  Problem: Hypertension.   ·  Plan: BP goal 130/80  - Manage per primary team.     Problem/Plan - 3:  ·  Problem: Hyperlipidemia.   ·  Plan: Continue with atorvastatin 80 mg daily   -   - Manage as outpatient     discussed with patient and RN   Contact via Microsoft Teams during business hours  On evenings and weekends (or if no response on Microsoft Teams) please call [phone] or page endocrine fellow on call.   For nonurgent matters please email [email or web address]    Please note that this patient may be followed by different provider tomorrow.  Notify endocrine 24 hours prior to discharge for final recommendations    greater than 50% of the encounter was spent counseling and/or coordination of care.  26 minutes spent on total encounter; The necessity of the time spent during the encounter on this date of service was due to development of plan of care/coordination of care/glycemic control through review of labs, blood glucose values and vital signs.   78 F with recent T2DM diagnosis and CVA (d/c 1 week ago), presenting with glucoses 500s, poor po intake and lethargy. Endocrine consulted for assistance with management of uncontrolled, recently dx DM. On continuous tube feeds w/BG values at/above goal on basal + scale. being eval for peg     Uncontrolled type 2 diabetes mellitus with hyperglycemia  ·  Plan:  - BG Goal 100-180mg/dl   -test BG Q6h  - Increase  Lantus 15 units daily (timed for 12pm ) first dose tomorrow  - Lantus 2 units sq x1 stat today   - c/w Admelog moderate correction scale Q6h   - if TF stopped, may need D5 temporarily to prevent glucose drop.  - Please notify endocrine team of any change to TF regimen.   - f/u CESAR (pending), Zinc transporter 8: <15, Islet, IA2AB (pending)  For d/c:   final recs TBD based on feeding modality/insulin requirements  if dc on PO intake consider   -Metformin 500 mg BID for 1 week then increase to 1000 mg BID. Check lactate prior to d/c  -May need low dose basal insulin (came in with glucose 500s, mild BHB elevation)  -Consider DPP4 inhibitor  -Patient can follow up at [address] [place] [address], [address]LO[address], [place] [address] [phone]  -Patient will need opthalmology and podiatry follow up as outpatient.     Problem/Plan - 2:  ·  Problem: Hypertension.   ·  Plan: BP goal 130/80  - Manage per primary team.     Problem/Plan - 3:  ·  Problem: Hyperlipidemia.   ·  Plan: Continue with atorvastatin 80 mg daily   -   - Manage as outpatient     discussed with patient and RN   Contact via Microsoft Teams during business hours  On evenings and weekends (or if no response on Microsoft Teams) please call [phone] or page endocrine fellow on call.   For nonurgent matters please email [email or web address]    Please note that this patient may be followed by different provider tomorrow.  Notify endocrine 24 hours prior to discharge for final recommendations    greater than 50% of the encounter was spent counseling and/or coordination of care.  26 minutes spent on total encounter; The necessity of the time spent during the encounter on this date of service was due to development of plan of care/coordination of care/glycemic control through review of labs, blood glucose values and vital signs.   78 F with recent T2DM diagnosis and CVA (d/c 1 week ago), presenting with glucoses 500s, poor po intake and lethargy. Endocrine consulted for assistance with management of uncontrolled, recently dx DM. On continuous tube feeds w/BG values at/above goal on basal + scale. being eval for peg     Uncontrolled type 2 diabetes mellitus with hyperglycemia  ·  Plan:  - BG Goal 100-180mg/dl   -test BG Q6h  - Increase  Lantus 15 units daily (timed for 12pm ) first dose tomorrow  - Lantus 2 units sq x1 stat today   - c/w Admelog moderate correction scale Q6h   - if TF stopped, may need D5 temporarily to prevent glucose drop.  - Please notify endocrine team of any change to TF regimen.   - f/u CESAR (pending), Zinc transporter 8: <15, Islet, IA2AB (pending)  For d/c:   final recs TBD based on feeding modality/insulin requirements  if dc on PO intake consider   -Metformin 500 mg BID for 1 week then increase to 1000 mg BID. Check lactate prior to d/c  -May need low dose basal insulin (came in with glucose 500s, mild BHB elevation)  -Consider DPP4 inhibitor  -Patient can follow up at [address] [place] [address], [address]LO[address], [place] [address] [phone]  -Patient will need opthalmology and podiatry follow up as outpatient.     Problem/Plan - 2:  ·  Problem: Hypertension.   ·  Plan: BP goal 130/80  - Manage per primary team.     Problem/Plan - 3:  ·  Problem: Hyperlipidemia.   ·  Plan: Continue with atorvastatin 80 mg daily   -   - Manage as outpatient     discussed with patient and RN   Contact via Microsoft Teams during business hours  On evenings and weekends (or if no response on Microsoft Teams) please call [phone] or page endocrine fellow on call.   For nonurgent matters please email [email or web address]    Please note that this patient may be followed by different provider tomorrow.  Notify endocrine 24 hours prior to discharge for final recommendations    greater than 50% of the encounter was spent counseling and/or coordination of care.  26 minutes spent on total encounter; The necessity of the time spent during the encounter on this date of service was due to development of plan of care/coordination of care/glycemic control through review of labs, blood glucose values and vital signs.

## 2023-01-30 NOTE — PROGRESS NOTE ADULT - SUBJECTIVE AND OBJECTIVE BOX
[facility]   MD PUSHPA WICK MD, PA KRISTINE SOLTANPOUR, DO INJUNG KO, NP    TEL:  OFFICE: [phone]    From 5pm-7am Answering Service [phone]    -- RENAL FOLLOW UP NOTE ---Date of Service 01-30-23 @ 12:54    Patient is a 78y old  Female who presents with a chief complaint of AMS, elevated finger sticks (30 Jan 2023 11:42)      Patient seen and examined at bedside. No chest pain/sob    VITALS:  T(F): 97.8 (01-30-23 @ 06:52), Max: 98.9 (01-29-23 @ 14:59)  HR: 95 (01-30-23 @ 06:52)  BP: 105/62 (01-30-23 @ 06:52)  RR: 18 (01-30-23 @ 06:52)  SpO2: 98% (01-30-23 @ 06:52)  Wt(kg): --    01-29 @ 07:01  -  01-30 @ 07:00  --------------------------------------------------------  IN: 0 mL / OUT: 0 mL / NET: 0 mL          PHYSICAL EXAM:  Constitutional: NAD  Neck: No JVD  Respiratory: CTAB, no wheezes, rales or rhonchi  Cardiovascular: S1, S2, RRR  Gastrointestinal: BS+, soft, NT/ND  Extremities: No peripheral edema    Hospital Medications:   MEDICATIONS  (STANDING):  aspirin  chewable 81 milliGRAM(s) Oral daily  atorvastatin 80 milliGRAM(s) Oral at bedtime  clopidogrel Tablet 75 milliGRAM(s) Oral daily  dextrose 5%. 1000 milliLiter(s) (100 mL/Hr) IV Continuous <Continuous>  dextrose 50% Injectable 25 Gram(s) IV Push once  dextrose 50% Injectable 12.5 Gram(s) IV Push once  dextrose 50% Injectable 25 Gram(s) IV Push once  dextrose Oral Gel 15 Gram(s) Oral once  ertapenem  IVPB 1000 milliGRAM(s) IV Intermittent every 24 hours  glucagon  Injectable 1 milliGRAM(s) IntraMuscular once  insulin glargine Injectable (LANTUS) 13 Unit(s) SubCutaneous <User Schedule>  insulin lispro (ADMELOG) corrective regimen sliding scale   SubCutaneous every 6 hours  sodium chloride 0.9%. 1000 milliLiter(s) (50 mL/Hr) IV Continuous <Continuous>  tamsulosin 0.4 milliGRAM(s) Oral at bedtime      LABS:  01-30    132<L>  |  97  |  16  ----------------------------<  133<H>  4.2   |  29  |  0.67    Ca    7.9<L>      30 Jan 2023 06:59  Phos  2.9     01-30  Mg     2.2     01-30      Creatinine Trend: 0.67 <--, 0.65 <--, 0.86 <--, 0.79 <--, 0.63 <--, 0.70 <--, 0.79 <--    Phosphorus Level, Serum: 2.9 mg/dL (01-30 @ 06:59)                              8.5    6.08  )-----------( 372      ( 30 Jan 2023 07:01 )             27.0     Urine Studies:  Urinalysis - [01-19-23 @ 13:15]      Color DARK BROWN / Appearance Turbid / SG 1.023 / pH 7.0      Gluc 200 mg/dL / Ketone Trace  / Bili Negative / Urobili 6 mg/dL       Blood Large / Protein >600 / Leuk Est Large / Nitrite Negative      RBC 15 / WBC 11-25 / Hyaline  / Gran 4 / Sq Epi  / Non Sq Epi Few / Bacteria Many      Iron 17, TIBC 187, %sat 9      [01-25-23 @ 07:18]  Ferritin 172      [01-25-23 @ 07:18]  Lipid: chol 238, TG 80, HDL 46, LDL --      [01-08-23 @ 02:47]        RADIOLOGY & ADDITIONAL STUDIES:   [facility] [place]   MD PUSHPA WICK MD, PA KRISTINE SOLTANPOUR, DO INJUNG KO, NP    TEL:  OFFICE: [phone]    From 5pm-7am Answering Service [phone]    -- RENAL FOLLOW UP NOTE ---Date of Service 01-30-23 @ 12:54    Patient is a 78y old  Female who presents with a chief complaint of AMS, elevated finger sticks (30 Jan 2023 11:42)      Patient seen and examined at bedside. No chest pain/sob    VITALS:  T(F): 97.8 (01-30-23 @ 06:52), Max: 98.9 (01-29-23 @ 14:59)  HR: 95 (01-30-23 @ 06:52)  BP: 105/62 (01-30-23 @ 06:52)  RR: 18 (01-30-23 @ 06:52)  SpO2: 98% (01-30-23 @ 06:52)  Wt(kg): --    01-29 @ 07:01  -  01-30 @ 07:00  --------------------------------------------------------  IN: 0 mL / OUT: 0 mL / NET: 0 mL          PHYSICAL EXAM:  Constitutional: NAD  Neck: No JVD  Respiratory: CTAB, no wheezes, rales or rhonchi  Cardiovascular: S1, S2, RRR  Gastrointestinal: BS+, soft, NT/ND  Extremities: No peripheral edema    Hospital Medications:   MEDICATIONS  (STANDING):  aspirin  chewable 81 milliGRAM(s) Oral daily  atorvastatin 80 milliGRAM(s) Oral at bedtime  clopidogrel Tablet 75 milliGRAM(s) Oral daily  dextrose 5%. 1000 milliLiter(s) (100 mL/Hr) IV Continuous <Continuous>  dextrose 50% Injectable 25 Gram(s) IV Push once  dextrose 50% Injectable 12.5 Gram(s) IV Push once  dextrose 50% Injectable 25 Gram(s) IV Push once  dextrose Oral Gel 15 Gram(s) Oral once  ertapenem  IVPB 1000 milliGRAM(s) IV Intermittent every 24 hours  glucagon  Injectable 1 milliGRAM(s) IntraMuscular once  insulin glargine Injectable (LANTUS) 13 Unit(s) SubCutaneous <User Schedule>  insulin lispro (ADMELOG) corrective regimen sliding scale   SubCutaneous every 6 hours  sodium chloride 0.9%. 1000 milliLiter(s) (50 mL/Hr) IV Continuous <Continuous>  tamsulosin 0.4 milliGRAM(s) Oral at bedtime      LABS:  01-30    132<L>  |  97  |  16  ----------------------------<  133<H>  4.2   |  29  |  0.67    Ca    7.9<L>      30 Jan 2023 06:59  Phos  2.9     01-30  Mg     2.2     01-30      Creatinine Trend: 0.67 <--, 0.65 <--, 0.86 <--, 0.79 <--, 0.63 <--, 0.70 <--, 0.79 <--    Phosphorus Level, Serum: 2.9 mg/dL (01-30 @ 06:59)                              8.5    6.08  )-----------( 372      ( 30 Jan 2023 07:01 )             27.0     Urine Studies:  Urinalysis - [01-19-23 @ 13:15]      Color DARK BROWN / Appearance Turbid / SG 1.023 / pH 7.0      Gluc 200 mg/dL / Ketone Trace  / Bili Negative / Urobili 6 mg/dL       Blood Large / Protein >600 / Leuk Est Large / Nitrite Negative      RBC 15 / WBC 11-25 / Hyaline  / Gran 4 / Sq Epi  / Non Sq Epi Few / Bacteria Many      Iron 17, TIBC 187, %sat 9      [01-25-23 @ 07:18]  Ferritin 172      [01-25-23 @ 07:18]  Lipid: chol 238, TG 80, HDL 46, LDL --      [01-08-23 @ 02:47]        RADIOLOGY & ADDITIONAL STUDIES:   [facility] [place] [facility]   MD PUSHPA WICK MD, PA KRISTINE SOLTANPOUR, DO INJUNG KO, NP    TEL:  OFFICE: [phone]    From 5pm-7am Answering Service [phone]    -- RENAL FOLLOW UP NOTE ---Date of Service 01-30-23 @ 12:54    Patient is a 78y old  Female who presents with a chief complaint of AMS, elevated finger sticks (30 Jan 2023 11:42)      Patient seen and examined at bedside. No chest pain/sob    VITALS:  T(F): 97.8 (01-30-23 @ 06:52), Max: 98.9 (01-29-23 @ 14:59)  HR: 95 (01-30-23 @ 06:52)  BP: 105/62 (01-30-23 @ 06:52)  RR: 18 (01-30-23 @ 06:52)  SpO2: 98% (01-30-23 @ 06:52)  Wt(kg): --    01-29 @ 07:01  -  01-30 @ 07:00  --------------------------------------------------------  IN: 0 mL / OUT: 0 mL / NET: 0 mL          PHYSICAL EXAM:  Constitutional: NAD  Neck: No JVD  Respiratory: CTAB, no wheezes, rales or rhonchi  Cardiovascular: S1, S2, RRR  Gastrointestinal: BS+, soft, NT/ND  Extremities: No peripheral edema    Hospital Medications:   MEDICATIONS  (STANDING):  aspirin  chewable 81 milliGRAM(s) Oral daily  atorvastatin 80 milliGRAM(s) Oral at bedtime  clopidogrel Tablet 75 milliGRAM(s) Oral daily  dextrose 5%. 1000 milliLiter(s) (100 mL/Hr) IV Continuous <Continuous>  dextrose 50% Injectable 25 Gram(s) IV Push once  dextrose 50% Injectable 12.5 Gram(s) IV Push once  dextrose 50% Injectable 25 Gram(s) IV Push once  dextrose Oral Gel 15 Gram(s) Oral once  ertapenem  IVPB 1000 milliGRAM(s) IV Intermittent every 24 hours  glucagon  Injectable 1 milliGRAM(s) IntraMuscular once  insulin glargine Injectable (LANTUS) 13 Unit(s) SubCutaneous <User Schedule>  insulin lispro (ADMELOG) corrective regimen sliding scale   SubCutaneous every 6 hours  sodium chloride 0.9%. 1000 milliLiter(s) (50 mL/Hr) IV Continuous <Continuous>  tamsulosin 0.4 milliGRAM(s) Oral at bedtime      LABS:  01-30    132<L>  |  97  |  16  ----------------------------<  133<H>  4.2   |  29  |  0.67    Ca    7.9<L>      30 Jan 2023 06:59  Phos  2.9     01-30  Mg     2.2     01-30      Creatinine Trend: 0.67 <--, 0.65 <--, 0.86 <--, 0.79 <--, 0.63 <--, 0.70 <--, 0.79 <--    Phosphorus Level, Serum: 2.9 mg/dL (01-30 @ 06:59)                              8.5    6.08  )-----------( 372      ( 30 Jan 2023 07:01 )             27.0     Urine Studies:  Urinalysis - [01-19-23 @ 13:15]      Color DARK BROWN / Appearance Turbid / SG 1.023 / pH 7.0      Gluc 200 mg/dL / Ketone Trace  / Bili Negative / Urobili 6 mg/dL       Blood Large / Protein >600 / Leuk Est Large / Nitrite Negative      RBC 15 / WBC 11-25 / Hyaline  / Gran 4 / Sq Epi  / Non Sq Epi Few / Bacteria Many      Iron 17, TIBC 187, %sat 9      [01-25-23 @ 07:18]  Ferritin 172      [01-25-23 @ 07:18]  Lipid: chol 238, TG 80, HDL 46, LDL --      [01-08-23 @ 02:47]        RADIOLOGY & ADDITIONAL STUDIES:

## 2023-01-30 NOTE — PROVIDER CONTACT NOTE (OTHER) - REASON
Patient is cleared for MRI from Infection Prevention point of view
Pt on bilateral mittens restraint, pulled out NG Tube
pt lethargic ,unable to swallow po meds ,pt NPO
Pt is lethergic/drowsy, unable to eat. No output from [facility]
FS- 107, pt getting Lantus 13 units at bedtime, tube feeding on hold
bladder scan =600ml urine retained
NGT clogged
Rojas Catheter leaking, noted 1x loose BM
bladder scan done 286 ,urine leaking from gonzalez

## 2023-01-31 LAB
ANION GAP SERPL CALC-SCNC: 7 MMOL/L — SIGNIFICANT CHANGE UP (ref 5–17)
BUN SERPL-MCNC: 16 MG/DL — SIGNIFICANT CHANGE UP (ref 7–23)
CALCIUM SERPL-MCNC: 8.2 MG/DL — LOW (ref 8.4–10.5)
CHLORIDE SERPL-SCNC: 98 MMOL/L — SIGNIFICANT CHANGE UP (ref 96–108)
CO2 SERPL-SCNC: 28 MMOL/L — SIGNIFICANT CHANGE UP (ref 22–31)
CREAT SERPL-MCNC: 0.56 MG/DL — SIGNIFICANT CHANGE UP (ref 0.5–1.3)
EGFR: 93 ML/MIN/1.73M2 — SIGNIFICANT CHANGE UP
GAD65 AB SER-MCNC: 0 NMOL/L — SIGNIFICANT CHANGE UP
GLUCOSE SERPL-MCNC: 207 MG/DL — HIGH (ref 70–99)
ISLET CELL512 AB SER-SCNC: 0 NMOL/L — SIGNIFICANT CHANGE UP
POTASSIUM SERPL-MCNC: 4.2 MMOL/L — SIGNIFICANT CHANGE UP (ref 3.5–5.3)
POTASSIUM SERPL-SCNC: 4.2 MMOL/L — SIGNIFICANT CHANGE UP (ref 3.5–5.3)
SODIUM SERPL-SCNC: 133 MMOL/L — LOW (ref 135–145)

## 2023-01-31 PROCEDURE — 99232 SBSQ HOSP IP/OBS MODERATE 35: CPT

## 2023-01-31 RX ORDER — INSULIN GLARGINE 100 [IU]/ML
6 INJECTION, SOLUTION SUBCUTANEOUS
Refills: 0 | Status: DISCONTINUED | OUTPATIENT
Start: 2023-02-01 | End: 2023-02-01

## 2023-01-31 RX ORDER — SODIUM CHLORIDE 9 MG/ML
1000 INJECTION, SOLUTION INTRAVENOUS
Refills: 0 | Status: DISCONTINUED | OUTPATIENT
Start: 2023-01-31 | End: 2023-01-31

## 2023-01-31 RX ORDER — INSULIN LISPRO 100/ML
VIAL (ML) SUBCUTANEOUS EVERY 6 HOURS
Refills: 0 | Status: DISCONTINUED | OUTPATIENT
Start: 2023-01-31 | End: 2023-02-02

## 2023-01-31 RX ORDER — SODIUM CHLORIDE 9 MG/ML
1000 INJECTION, SOLUTION INTRAVENOUS
Refills: 0 | Status: DISCONTINUED | OUTPATIENT
Start: 2023-01-31 | End: 2023-02-04

## 2023-01-31 RX ADMIN — CLOPIDOGREL BISULFATE 75 MILLIGRAM(S): 75 TABLET, FILM COATED ORAL at 12:19

## 2023-01-31 RX ADMIN — SODIUM CHLORIDE 75 MILLILITER(S): 9 INJECTION, SOLUTION INTRAVENOUS at 17:51

## 2023-01-31 RX ADMIN — INSULIN GLARGINE 15 UNIT(S): 100 INJECTION, SOLUTION SUBCUTANEOUS at 12:23

## 2023-01-31 RX ADMIN — Medication 2: at 12:23

## 2023-01-31 RX ADMIN — ERTAPENEM SODIUM 120 MILLIGRAM(S): 1 INJECTION, POWDER, LYOPHILIZED, FOR SOLUTION INTRAMUSCULAR; INTRAVENOUS at 15:36

## 2023-01-31 RX ADMIN — Medication 81 MILLIGRAM(S): at 12:18

## 2023-01-31 RX ADMIN — SODIUM CHLORIDE 50 MILLILITER(S): 9 INJECTION, SOLUTION INTRAVENOUS at 15:36

## 2023-01-31 NOTE — PROGRESS NOTE ADULT - ASSESSMENT
78F with dementia, T2DM, recently discharged about 1 week ago for CVA now presenting with poor PO intake, lethargy and hyperglycemia to 500s.   Of note, patient admitted 1/6 for R facial droop, word finding difficulties. Imaging concerning for acute L MCA infarct. Discharged home on 1/11. Majority of history obtained through family given mental status. Per family, since stroke patient nonverbal/unable to participate in meaningful communications, intermittently follows commands. For the last 3-4 days, patient with poor po intake. Reportedly only eating breakfast. Day of presentation, more lethargic with elevated finger sticks up to the 500s. Patient was evaluated by endocrine team during prior admission with recs to increase metformin to 1000mg BID. However, given poor PO intake, family has been giving 500mg daily.     In ED, afebrile, , 135/84. WBC 15, Na 158, K 3.0, Cl 120, Glu 468, alk phos 136m BHB 0.7. pH 7.36, lactate 2.2. UA: +leuk esterase, many bacteria, WBC 11-25  CXR: Redemonstrated biapical scarring and left upper lobe bronchiectasis, unchanged. No focal consolidation.   CTH No acute intracranial hemorrhage. 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  Given 1.5L NS, ceftriaxone x1, haldol 2.5mg x1 in ED.     A/P    Hypernatremia/ Hyponatremia   free water flush on hold    improving,   Avoid overcorrection >6-8meq a day   monitor Na closely     Hypocalcemia   2/2 low albumin  corrected Ca WNL   optimize albumin   monitor     Hypokalemia  stable   monitor K     Hypophosphatemia  Supplement as needed  Daily phosphorus     HTN  optimal   monitor BP closely     Proteinuria  Currently has UTI would repeat and then quantify   78F with dementia, T2DM, recently discharged about 1 week ago for CVA now presenting with poor PO intake, lethargy and hyperglycemia to 500s.   Of note, patient admitted 1/6 for R facial droop, word finding difficulties. Imaging concerning for acute L MCA infarct. Discharged home on 1/11. Majority of history obtained through family given mental status. Per family, since stroke patient nonverbal/unable to participate in meaningful communications, intermittently follows commands. For the last 3-4 days, patient with poor po intake. Reportedly only eating breakfast. Day of presentation, more lethargic with elevated finger sticks up to the 500s. Patient was evaluated by endocrine team during prior admission with recs to increase metformin to 1000mg BID. However, given poor PO intake, family has been giving 500mg daily.     In ED, afebrile, , 135/84. WBC 15, Na 158, K 3.0, Cl 120, Glu 468, alk phos 136m BHB 0.7. pH 7.36, lactate 2.2. UA: +leuk esterase, many bacteria, WBC 11-25  CXR: Redemonstrated biapical scarring and left upper lobe bronchiectasis, unchanged. No focal consolidation.   CTH No acute intracranial hemorrhage. 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  Given 1.5L NS, ceftriaxone x1, haldol 2.5mg x1 in ED.     A/P    Hypernatremia/ Hyponatremia   free water flush on hold    improving,   optimize glucose   Avoid overcorrection >6-8meq a day   monitor Na closely     Hypocalcemia   2/2 low albumin  corrected Ca WNL   optimize albumin   monitor     Hypokalemia  stable   monitor K     Hypophosphatemia  Supplement as needed  Daily phosphorus     HTN  optimal   monitor BP closely     Proteinuria  Currently has UTI would repeat and then quantify

## 2023-01-31 NOTE — PROGRESS NOTE ADULT - SUBJECTIVE AND OBJECTIVE BOX
Neurology Progress Note    S: Patient seen and examined. MRI done, c/f infarcts. CTH neg for hemorrhage      Medication:    MEDICATIONS  (STANDING):  aspirin  chewable 81 milliGRAM(s) Oral daily  atorvastatin 80 milliGRAM(s) Oral at bedtime  clopidogrel Tablet 75 milliGRAM(s) Oral daily  dextrose 5%. 1000 milliLiter(s) (100 mL/Hr) IV Continuous <Continuous>  dextrose 50% Injectable 25 Gram(s) IV Push once  dextrose 50% Injectable 12.5 Gram(s) IV Push once  dextrose 50% Injectable 25 Gram(s) IV Push once  dextrose Oral Gel 15 Gram(s) Oral once  ertapenem  IVPB 1000 milliGRAM(s) IV Intermittent every 24 hours  glucagon  Injectable 1 milliGRAM(s) IntraMuscular once  insulin glargine Injectable (LANTUS) 15 Unit(s) SubCutaneous <User Schedule>  insulin lispro (ADMELOG) corrective regimen sliding scale   SubCutaneous every 6 hours  sodium chloride 0.9%. 1000 milliLiter(s) (50 mL/Hr) IV Continuous <Continuous>  tamsulosin 0.4 milliGRAM(s) Oral at bedtime    MEDICATIONS  (PRN):  acetaminophen    Suspension .. 650 milliGRAM(s) Enteral Tube every 6 hours PRN Temp greater or equal to 38C (100.4F)    Vitals:  Vital Signs Last 24 Hrs  T(C): 37.5 (01-30-23 @ 22:39), Max: 37.5 (01-30-23 @ 22:39)  T(F): 99.5 (01-30-23 @ 22:39), Max: 99.5 (01-30-23 @ 22:39)  HR: 97 (01-30-23 @ 22:39) (95 - 97)  BP: 97/60 (01-30-23 @ 22:39) (97/60 - 105/67)  BP(mean): --  RR: 18 (01-30-23 @ 22:39) (18 - 18)  SpO2: 93% (01-30-23 @ 22:39) (93% - 97%)            General Exam:   General Appearance: Appropriately dressed and in no acute distress       Head: Normocephalic, atraumatic and no dysmorphic features  Ear, Nose, and Throat: Moist mucous membranes  CVS: S1S2+  Resp: No SOB, no wheeze or rhonchi  Abd: soft NTND  Extremities: No edema, no cyanosis  Skin: No bruises, no rashes        NEUROLOGICAL EXAM:    Mental status: Eyes open, awake, alert, attempts to produce speech, able to follow some simple commands, able to mimic at times , unable to ID objects  Cranial Nerves: Right facial droop, no nystagmus, blinks to threat B/L  Motor exam: Normal tone, no drift, Right hemiparesis RUE drift, 3-44/5, RLE drift, 3-4/5,, LUE 5/5, LLE 5/5  Sensation: Intact to light touch   Coordination/ Gait: Unable to participate with exam     I personally reviewed the below data/images/labs:  CBC Full  -  ( 30 Jan 2023 07:01 )  WBC Count : 6.08 K/uL  RBC Count : 3.16 M/uL  Hemoglobin : 8.5 g/dL  Hematocrit : 27.0 %  Platelet Count - Automated : 372 K/uL  Mean Cell Volume : 85.4 fl  Mean Cell Hemoglobin : 26.9 pg  Mean Cell Hemoglobin Concentration : 31.5 gm/dL  Auto Neutrophil # : x  Auto Lymphocyte # : x  Auto Monocyte # : x  Auto Eosinophil # : x  Auto Basophil # : x  Auto Neutrophil % : x  Auto Lymphocyte % : x  Auto Monocyte % : x  Auto Eosinophil % : x  Auto Basophil % : x      01-30    132<L>  |  97  |  16  ----------------------------<  133<H>  4.2   |  29  |  0.67    Ca    7.9<L>      30 Jan 2023 06:59  Phos  2.9     01-30  Mg     2.2     01-30      `< from: CT Head No Cont (01.19.23 @ 13:45) >    ACC: [id number] EXAM:  CT BRAIN   ORDERED BY: CORINNE MADERA     PROCEDURE DATE:  01/19/2023           INTERPRETATION:  CLINICAL STATEMENT: Altered mental status    TECHNIQUE: CT of the head was performed without IV contrast.  RAPID   artificial intelligence was utilized for the preliminary evaluation of   intracranial hemorrhage.    COMPARISON: MRI brain 1/7/2023.    FINDINGS:  There is mild diffuse parenchymal volume loss. There are areas of low   attenuation in the periventricular white matter likely related to mild   chronic microvascular ischemic changes.    Evolving small infarcts noted in the left corona radiata.   Age-indeterminate infarct also noted in the left temporal and parietal   lobe    There is no acute intracranial hemorrhage, parenchymal mass, mass effect   or midline shift.. There is no hydrocephalus. Partial empty sella noted    The cranium is intact. Calcification noted adjacent to left frontal   artery table. The visualized paranasal sinuses are well-aerated.        IMPRESSION:  No acute intracranial hemorrhage.    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    --- End of Report ---        < from: MR Head No Cont (01.26.23 @ 22:08) >    ACC: [id number] EXAM:  MR BRAIN   ORDERED BY: POLLO LYON     PROCEDURE DATE:  01/26/2023          INTERPRETATION:  CLINICAL STATEMENT: Multiple left MCA territory   infarcts. Altered mental status.    TECHNIQUE: Multiplanar multisequence noncontrast MRI of the brain was   performed. Diffusion weighted imaging was performed. Significant image   degradation by motion artifact.  COMPARISON: MRI brain 1/7/2023.    FINDINGS:    Corpus callosum, pituitary and pineal regions appear unremarkable. No  tonsillar herniation or evidence of marrow replacement process.   Hyperostosis frontalis. Degenerative changes visualized cervical spine.    CP angle and IAC regions appear within normal limits, as do the globes,   intraconal regions and major intracranial flow-voids. No paranasal sinus   air-fluid levels or opacification is evident.    New curvilinear-serpiginous susceptibility associated with the high left   frontal lobe. No evidence of acute hemorrhage. Slightly less pronounced   confluent restricted diffusion left lateral temporal lobe. Multiple less   pronounced foci of restricted diffusion with T2-FLAIR prolongation   centered in the left corona radiata, with extension into the ipsilateral   centrum semiovale. Several additional smaller less pronounced foci of   restricted diffusion left parieto-occipital and left posterior temporal   juxtacortical white matter as well as cortically based serpiginous   restricted diffusion left posterior temporal lobe.    IMPRESSION:    Compared with MRI Brain 1/7/2023.    1. Multiple evolving LEFT-SIDED subacute infarcts, as above.  2. Findings suspicious for high LEFT frontal subarachnoid hemorrhage.   Recommend correlation with noncontrast CT of the brain.  3. Additional findings above.    Findings and recommendations discussed in detail with PADMINI Lyon at the   time of this interpretation.    --- En   < from: CT Head No Cont (01.27.23 @ 19:09) >    ACC: [id number] EXAM:  CT BRAIN   ORDERED BY: POLLO LYON     PROCEDURE DATE:  01/27/2023          INTERPRETATION:  CLINICAL INFORMATION: Subacute infarcts with possible   new subarachnoid hemorrhage on MRI.    TECHNIQUE:  Axial CT images were acquired through the head.  Intravenous contrast: None  Two-dimensional reformats were generated.    COMPARISON STUDY: Brain MRIs from 1/7/2023 and 1/26/2023.    FINDINGS:  There are evolving subacute infarcts in the left temporal lobe anteriorly   and within the left corona radiata.  There is no CT evidence of acute   intracranial hemorrhage, mass effect, midline shift or hydrocephalus.    There is mild generalized cerebral volume loss.    The paranasal sinuses are grossly clear.    The mastoids are underpneumatized bilaterally; no clinically significant   mastoid or middle ear effusion is visualized.    The patient is status post right-sided intraocular lens replacement.    The calvarium and skull base appear within normal limits..    IMPRESSION:  Evolving subacute infarcts in the left temporal lobe anteriorly and   within the left corona radiata.  No evidence of acute intracranial   hemorrhage.  No subarachnoid hemorrhage in the left frontal region as   questioned on prior MRI.    --- Endof Report ---    < from: CT Head No Cont (01.30.23 @ 23:02) >    ACC: [id number] EXAM:  CT BRAIN   ORDERED BY: ESAU OAKLEY     PROCEDURE DATE:  01/30/2023          INTERPRETATION:  Noncontrast CT of the brain.    CLINICAL INDICATION:  Altered mental status. Acute left MCA territory   infarcts.    TECHNIQUE : Axial CT scanning of the brain was obtained from the skull   base to the vertex without the administration of intravenous contrast.   Sagittal and coronal reformats were provided.    COMPARISON: MRI brain 1/26/2023. CT brain 1/27/2023.    FINDINGS:    Redemonstration of scattered acute left MCA territory infarcts. No CT   evidence for hemorrhagic transformation.    No hydrocephalus, midline shift, or effacement of basal cisterns.    No acute intracranial hemorrhage.    The visualized paranasal sinuses and mastoid air cells are clear.    IMPRESSION:    No significant interval change.    Redemonstration of scattered acute left MCA territory infarcts. No CT   evidence for hemorrhagic transformation.      --- End of Report ---            CHELSIE RYAN MD; Attending Radiologist  This document has been electronically signed. Jan 31 2023  9:12AM    < end of copied text >      Neurology Progress Note    S: Patient seen and examined. MRI done, c/f infarcts. CTH neg for hemorrhage      Medication:    MEDICATIONS  (STANDING):  aspirin  chewable 81 milliGRAM(s) Oral daily  atorvastatin 80 milliGRAM(s) Oral at bedtime  clopidogrel Tablet 75 milliGRAM(s) Oral daily  dextrose 5%. 1000 milliLiter(s) (100 mL/Hr) IV Continuous <Continuous>  dextrose 50% Injectable 25 Gram(s) IV Push once  dextrose 50% Injectable 12.5 Gram(s) IV Push once  dextrose 50% Injectable 25 Gram(s) IV Push once  dextrose Oral Gel 15 Gram(s) Oral once  ertapenem  IVPB 1000 milliGRAM(s) IV Intermittent every 24 hours  glucagon  Injectable 1 milliGRAM(s) IntraMuscular once  insulin glargine Injectable (LANTUS) 15 Unit(s) SubCutaneous <User Schedule>  insulin lispro (ADMELOG) corrective regimen sliding scale   SubCutaneous every 6 hours  sodium chloride 0.9%. 1000 milliLiter(s) (50 mL/Hr) IV Continuous <Continuous>  tamsulosin 0.4 milliGRAM(s) Oral at bedtime    MEDICATIONS  (PRN):  acetaminophen    Suspension .. 650 milliGRAM(s) Enteral Tube every 6 hours PRN Temp greater or equal to 38C (100.4F)    Vitals:  Vital Signs Last 24 Hrs  T(C): 37.5 (01-30-23 @ 22:39), Max: 37.5 (01-30-23 @ 22:39)  T(F): 99.5 (01-30-23 @ 22:39), Max: 99.5 (01-30-23 @ 22:39)  HR: 97 (01-30-23 @ 22:39) (95 - 97)  BP: 97/60 (01-30-23 @ 22:39) (97/60 - 105/67)  BP(mean): --  RR: 18 (01-30-23 @ 22:39) (18 - 18)  SpO2: 93% (01-30-23 @ 22:39) (93% - 97%)            General Exam:   General Appearance: Appropriately dressed and in no acute distress       Head: Normocephalic, atraumatic and no dysmorphic features  Ear, Nose, and Throat: Moist mucous membranes  CVS: S1S2+  Resp: No SOB, no wheeze or rhonchi  Abd: soft NTND  Extremities: No edema, no cyanosis  Skin: No bruises, no rashes        NEUROLOGICAL EXAM:    Mental status: Eyes open, awake, alert, attempts to produce speech, able to follow some simple commands, able to mimic at times , unable to ID objects  Cranial Nerves: Right facial droop, no nystagmus, blinks to threat B/L  Motor exam: Normal tone, no drift, Right hemiparesis RUE drift, 3-44/5, RLE drift, 3-4/5,, LUE 5/5, LLE 5/5  Sensation: Intact to light touch   Coordination/ Gait: Unable to participate with exam     I personally reviewed the below data/images/labs:  CBC Full  -  ( 30 Jan 2023 07:01 )  WBC Count : 6.08 K/uL  RBC Count : 3.16 M/uL  Hemoglobin : 8.5 g/dL  Hematocrit : 27.0 %  Platelet Count - Automated : 372 K/uL  Mean Cell Volume : 85.4 fl  Mean Cell Hemoglobin : 26.9 pg  Mean Cell Hemoglobin Concentration : 31.5 gm/dL  Auto Neutrophil # : x  Auto Lymphocyte # : x  Auto Monocyte # : x  Auto Eosinophil # : x  Auto Basophil # : x  Auto Neutrophil % : x  Auto Lymphocyte % : x  Auto Monocyte % : x  Auto Eosinophil % : x  Auto Basophil % : x      01-30    132<L>  |  97  |  16  ----------------------------<  133<H>  4.2   |  29  |  0.67    Ca    7.9<L>      30 Jan 2023 06:59  Phos  2.9     01-30  Mg     2.2     01-30      `< from: CT Head No Cont (01.19.23 @ 13:45) >    ACC: [id number] EXAM:  CT BRAIN   ORDERED BY: CORINNE MADERA     PROCEDURE DATE:  01/19/2023           INTERPRETATION:  CLINICAL STATEMENT: Altered mental status    TECHNIQUE: CT of the head was performed without IV contrast.  RAPID   artificial intelligence was utilized for the preliminary evaluation of   intracranial hemorrhage.    COMPARISON: MRI brain 1/7/2023.    FINDINGS:  There is mild diffuse parenchymal volume loss. There are areas of low   attenuation in the periventricular white matter likely related to mild   chronic microvascular ischemic changes.    Evolving small infarcts noted in the left corona radiata.   Age-indeterminate infarct also noted in the left temporal and parietal   lobe    There is no acute intracranial hemorrhage, parenchymal mass, mass effect   or midline shift.. There is no hydrocephalus. Partial empty sella noted    The cranium is intact. Calcification noted adjacent to left frontal   artery table. The visualized paranasal sinuses are well-aerated.        IMPRESSION:  No acute intracranial hemorrhage.    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    --- End of Report ---        < from: MR Head No Cont (01.26.23 @ 22:08) >    ACC: [id number] EXAM:  MR BRAIN   ORDERED BY: POLLO LYON     PROCEDURE DATE:  01/26/2023          INTERPRETATION:  CLINICAL STATEMENT: Multiple left MCA territory   infarcts. Altered mental status.    TECHNIQUE: Multiplanar multisequence noncontrast MRI of the brain was   performed. Diffusion weighted imaging was performed. Significant image   degradation by motion artifact.  COMPARISON: MRI brain 1/7/2023.    FINDINGS:    Corpus callosum, pituitary and pineal regions appear unremarkable. No  tonsillar herniation or evidence of marrow replacement process.   Hyperostosis frontalis. Degenerative changes visualized cervical spine.    CP angle and IAC regions appear within normal limits, as do the globes,   intraconal regions and major intracranial flow-voids. No paranasal sinus   air-fluid levels or opacification is evident.    New curvilinear-serpiginous susceptibility associated with the high left   frontal lobe. No evidence of acute hemorrhage. Slightly less pronounced   confluent restricted diffusion left lateral temporal lobe. Multiple less   pronounced foci of restricted diffusion with T2-FLAIR prolongation   centered in the left corona radiata, with extension into the ipsilateral   centrum semiovale. Several additional smaller less pronounced foci of   restricted diffusion left parieto-occipital and left posterior temporal   juxtacortical white matter as well as cortically based serpiginous   restricted diffusion left posterior temporal lobe.    IMPRESSION:    Compared with MRI Brain 1/7/2023.    1. Multiple evolving LEFT-SIDED subacute infarcts, as above.  2. Findings suspicious for high LEFT frontal subarachnoid hemorrhage.   Recommend correlation with noncontrast CT of the brain.  3. Additional findings above.    Findings and recommendations discussed in detail with PADMINI Lyon at the   time of this interpretation.    --- En   < from: CT Head No Cont (01.27.23 @ 19:09) >    ACC: [id number] EXAM:  CT BRAIN   ORDERED BY: POLLO LYON     PROCEDURE DATE:  01/27/2023          INTERPRETATION:  CLINICAL INFORMATION: Subacute infarcts with possible   new subarachnoid hemorrhage on MRI.    TECHNIQUE:  Axial CT images were acquired through the head.  Intravenous contrast: None  Two-dimensional reformats were generated.    COMPARISON STUDY: Brain MRIs from 1/7/2023 and 1/26/2023.    FINDINGS:  There are evolving subacute infarcts in the left temporal lobe anteriorly   and within the left corona radiata.  There is no CT evidence of acute   intracranial hemorrhage, mass effect, midline shift or hydrocephalus.    There is mild generalized cerebral volume loss.    The paranasal sinuses are grossly clear.    The mastoids are underpneumatized bilaterally; no clinically significant   mastoid or middle ear effusion is visualized.    The patient is status post right-sided intraocular lens replacement.    The calvarium and skull base appear within normal limits..    IMPRESSION:  Evolving subacute infarcts in the left temporal lobe anteriorly and   within the left corona radiata.  No evidence of acute intracranial   hemorrhage.  No subarachnoid hemorrhage in the left frontal region as   questioned on prior MRI.    --- Endof Report ---    < from: CT Head No Cont (01.30.23 @ 23:02) >    ACC: [id number] EXAM:  CT BRAIN   ORDERED BY: ESAU OAKLEY     PROCEDURE DATE:  01/30/2023          INTERPRETATION:  Noncontrast CT of the brain.    CLINICAL INDICATION:  Altered mental status. Acute left MCA territory   infarcts.    TECHNIQUE : Axial CT scanning of the brain was obtained from the skull   base to the vertex without the administration of intravenous contrast.   Sagittal and coronal reformats were provided.    COMPARISON: MRI brain 1/26/2023. CT brain 1/27/2023.    FINDINGS:    Redemonstration of scattered acute left MCA territory infarcts. No CT   evidence for hemorrhagic transformation.    No hydrocephalus, midline shift, or effacement of basal cisterns.    No acute intracranial hemorrhage.    The visualized paranasal sinuses and mastoid air cells are clear.    IMPRESSION:    No significant interval change.    Redemonstration of scattered acute left MCA territory infarcts. No CT   evidence for hemorrhagic transformation.      --- End of Report ---            CHELSIE RYAN MD; Attending Radiologist  This document has been electronically signed. Jan 31 2023  9:12AM    < end of copied text >      Neurology Progress Note    S: Patient seen and examined. MRI done, c/f infarcts. CTH neg for hemorrhage      Medication:    MEDICATIONS  (STANDING):  aspirin  chewable 81 milliGRAM(s) Oral daily  atorvastatin 80 milliGRAM(s) Oral at bedtime  clopidogrel Tablet 75 milliGRAM(s) Oral daily  dextrose 5%. 1000 milliLiter(s) (100 mL/Hr) IV Continuous <Continuous>  dextrose 50% Injectable 25 Gram(s) IV Push once  dextrose 50% Injectable 12.5 Gram(s) IV Push once  dextrose 50% Injectable 25 Gram(s) IV Push once  dextrose Oral Gel 15 Gram(s) Oral once  ertapenem  IVPB 1000 milliGRAM(s) IV Intermittent every 24 hours  glucagon  Injectable 1 milliGRAM(s) IntraMuscular once  insulin glargine Injectable (LANTUS) 15 Unit(s) SubCutaneous <User Schedule>  insulin lispro (ADMELOG) corrective regimen sliding scale   SubCutaneous every 6 hours  sodium chloride 0.9%. 1000 milliLiter(s) (50 mL/Hr) IV Continuous <Continuous>  tamsulosin 0.4 milliGRAM(s) Oral at bedtime    MEDICATIONS  (PRN):  acetaminophen    Suspension .. 650 milliGRAM(s) Enteral Tube every 6 hours PRN Temp greater or equal to 38C (100.4F)    Vitals:  Vital Signs Last 24 Hrs  T(C): 37.5 (01-30-23 @ 22:39), Max: 37.5 (01-30-23 @ 22:39)  T(F): 99.5 (01-30-23 @ 22:39), Max: 99.5 (01-30-23 @ 22:39)  HR: 97 (01-30-23 @ 22:39) (95 - 97)  BP: 97/60 (01-30-23 @ 22:39) (97/60 - 105/67)  BP(mean): --  RR: 18 (01-30-23 @ 22:39) (18 - 18)  SpO2: 93% (01-30-23 @ 22:39) (93% - 97%)            General Exam:   General Appearance: Appropriately dressed and in no acute distress       Head: Normocephalic, atraumatic and no dysmorphic features  Ear, Nose, and Throat: Moist mucous membranes  CVS: S1S2+  Resp: No SOB, no wheeze or rhonchi  Abd: soft NTND  Extremities: No edema, no cyanosis  Skin: No bruises, no rashes        NEUROLOGICAL EXAM:    Mental status: Eyes open, awake, alert, attempts to produce speech, able to follow some simple commands, able to mimic at times , unable to ID objects  Cranial Nerves: Right facial droop, no nystagmus, blinks to threat B/L  Motor exam: Normal tone, no drift, Right hemiparesis RUE drift, 3-44/5, RLE drift, 3-4/5,, LUE 5/5, LLE 5/5  Sensation: Intact to light touch   Coordination/ Gait: Unable to participate with exam     I personally reviewed the below data/images/labs:  CBC Full  -  ( 30 Jan 2023 07:01 )  WBC Count : 6.08 K/uL  RBC Count : 3.16 M/uL  Hemoglobin : 8.5 g/dL  Hematocrit : 27.0 %  Platelet Count - Automated : 372 K/uL  Mean Cell Volume : 85.4 fl  Mean Cell Hemoglobin : 26.9 pg  Mean Cell Hemoglobin Concentration : 31.5 gm/dL  Auto Neutrophil # : x  Auto Lymphocyte # : x  Auto Monocyte # : x  Auto Eosinophil # : x  Auto Basophil # : x  Auto Neutrophil % : x  Auto Lymphocyte % : x  Auto Monocyte % : x  Auto Eosinophil % : x  Auto Basophil % : x      01-30    132<L>  |  97  |  16  ----------------------------<  133<H>  4.2   |  29  |  0.67    Ca    7.9<L>      30 Jan 2023 06:59  Phos  2.9     01-30  Mg     2.2     01-30      `< from: CT Head No Cont (01.19.23 @ 13:45) >    ACC: [id number] EXAM:  CT BRAIN   ORDERED BY: CORINNE MADERA     PROCEDURE DATE:  01/19/2023           INTERPRETATION:  CLINICAL STATEMENT: Altered mental status    TECHNIQUE: CT of the head was performed without IV contrast.  RAPID   artificial intelligence was utilized for the preliminary evaluation of   intracranial hemorrhage.    COMPARISON: MRI brain 1/7/2023.    FINDINGS:  There is mild diffuse parenchymal volume loss. There are areas of low   attenuation in the periventricular white matter likely related to mild   chronic microvascular ischemic changes.    Evolving small infarcts noted in the left corona radiata.   Age-indeterminate infarct also noted in the left temporal and parietal   lobe    There is no acute intracranial hemorrhage, parenchymal mass, mass effect   or midline shift.. There is no hydrocephalus. Partial empty sella noted    The cranium is intact. Calcification noted adjacent to left frontal   artery table. The visualized paranasal sinuses are well-aerated.        IMPRESSION:  No acute intracranial hemorrhage.    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    --- End of Report ---        < from: MR Head No Cont (01.26.23 @ 22:08) >    ACC: [id number] EXAM:  MR BRAIN   ORDERED BY: POLLO LYON     PROCEDURE DATE:  01/26/2023          INTERPRETATION:  CLINICAL STATEMENT: Multiple left MCA territory   infarcts. Altered mental status.    TECHNIQUE: Multiplanar multisequence noncontrast MRI of the brain was   performed. Diffusion weighted imaging was performed. Significant image   degradation by motion artifact.  COMPARISON: MRI brain 1/7/2023.    FINDINGS:    Corpus callosum, pituitary and pineal regions appear unremarkable. No  tonsillar herniation or evidence of marrow replacement process.   Hyperostosis frontalis. Degenerative changes visualized cervical spine.    CP angle and IAC regions appear within normal limits, as do the globes,   intraconal regions and major intracranial flow-voids. No paranasal sinus   air-fluid levels or opacification is evident.    New curvilinear-serpiginous susceptibility associated with the high left   frontal lobe. No evidence of acute hemorrhage. Slightly less pronounced   confluent restricted diffusion left lateral temporal lobe. Multiple less   pronounced foci of restricted diffusion with T2-FLAIR prolongation   centered in the left corona radiata, with extension into the ipsilateral   centrum semiovale. Several additional smaller less pronounced foci of   restricted diffusion left parieto-occipital and left posterior temporal   juxtacortical white matter as well as cortically based serpiginous   restricted diffusion left posterior temporal lobe.    IMPRESSION:    Compared with MRI Brain 1/7/2023.    1. Multiple evolving LEFT-SIDED subacute infarcts, as above.  2. Findings suspicious for high LEFT frontal subarachnoid hemorrhage.   Recommend correlation with noncontrast CT of the brain.  3. Additional findings above.    Findings and recommendations discussed in detail with PADMINI Lyon at the   time of this interpretation.    --- En   < from: CT Head No Cont (01.27.23 @ 19:09) >    ACC: [id number] EXAM:  CT BRAIN   ORDERED BY: POLLO LYON     PROCEDURE DATE:  01/27/2023          INTERPRETATION:  CLINICAL INFORMATION: Subacute infarcts with possible   new subarachnoid hemorrhage on MRI.    TECHNIQUE:  Axial CT images were acquired through the head.  Intravenous contrast: None  Two-dimensional reformats were generated.    COMPARISON STUDY: Brain MRIs from 1/7/2023 and 1/26/2023.    FINDINGS:  There are evolving subacute infarcts in the left temporal lobe anteriorly   and within the left corona radiata.  There is no CT evidence of acute   intracranial hemorrhage, mass effect, midline shift or hydrocephalus.    There is mild generalized cerebral volume loss.    The paranasal sinuses are grossly clear.    The mastoids are underpneumatized bilaterally; no clinically significant   mastoid or middle ear effusion is visualized.    The patient is status post right-sided intraocular lens replacement.    The calvarium and skull base appear within normal limits..    IMPRESSION:  Evolving subacute infarcts in the left temporal lobe anteriorly and   within the left corona radiata.  No evidence of acute intracranial   hemorrhage.  No subarachnoid hemorrhage in the left frontal region as   questioned on prior MRI.    --- Endof Report ---    < from: CT Head No Cont (01.30.23 @ 23:02) >    ACC: [id number] EXAM:  CT BRAIN   ORDERED BY: ESAU OAKLEY     PROCEDURE DATE:  01/30/2023          INTERPRETATION:  Noncontrast CT of the brain.    CLINICAL INDICATION:  Altered mental status. Acute left MCA territory   infarcts.    TECHNIQUE : Axial CT scanning of the brain was obtained from the skull   base to the vertex without the administration of intravenous contrast.   Sagittal and coronal reformats were provided.    COMPARISON: MRI brain 1/26/2023. CT brain 1/27/2023.    FINDINGS:    Redemonstration of scattered acute left MCA territory infarcts. No CT   evidence for hemorrhagic transformation.    No hydrocephalus, midline shift, or effacement of basal cisterns.    No acute intracranial hemorrhage.    The visualized paranasal sinuses and mastoid air cells are clear.    IMPRESSION:    No significant interval change.    Redemonstration of scattered acute left MCA territory infarcts. No CT   evidence for hemorrhagic transformation.      --- End of Report ---            CHELSIE RYAN MD; Attending Radiologist  This document has been electronically signed. Jan 31 2023  9:12AM    < end of copied text >

## 2023-01-31 NOTE — PROGRESS NOTE ADULT - PROBLEM SELECTOR PLAN 3
Continue with atorvastatin 80 mg daily   -   - Manage as outpatient     discussed w/pt/daughter/team  Can be reached via TEAMS/pager: [phone]   office:  [phone] (M-F 9a-5pm)               [phone] (nights/weekends)   [email or web address] password [email or web address] Continue with atorvastatin 80 mg daily   -   - Manage as outpatient     discussed w/pt/daughter/team  Can be reached via TEAMS/pager: [phone]   office:  [phone] (M-F 9a-5pm)               [phone] (nights/weekends)   [email or web address] password [email or web address] Continue with atorvastatin 80 mg daily   -   - Manage as outpatient     discussed w/pt/daughter/team  Can be reached via TEAMS/pager: [phone]   office:  [phone] (M-F 9a-5pm)               [phone] (nights/weekends)   [email or web address] password [email or web address]

## 2023-01-31 NOTE — PROGRESS NOTE ADULT - SUBJECTIVE AND OBJECTIVE BOX
Diabetes Follow up note:    Chief complaint: T2DM    Interval Hx: BG values 100-200 over past 24 hours. Pt seen at bedside w/daughter present. Pt self-removed NGT after receiving insulin at noon today. Currently NPO. Daughter said family still deciding if she will get a PEG tube.     Review of Systems:  Pt non-verbal. mother endorsed pt able to understand her in native language.     MEDS:  atorvastatin 80 milliGRAM(s) Oral at bedtime  insulin glargine Injectable (LANTUS) 15 Unit(s) SubCutaneous <User Schedule>  insulin lispro (ADMELOG) corrective regimen sliding scale   SubCutaneous every 6 hours    ertapenem  IVPB 1000 milliGRAM(s) IV Intermittent every 24 hours    Allergies    Benedryl Allergy Sinus (Hives)  penicillin (Rash)        PE:  General: Female lying in bed. NAD on mitten restraints.   Vital Signs Last 24 Hrs  T(C): 37.5 (30 Jan 2023 22:39), Max: 37.5 (30 Jan 2023 22:39)  T(F): 99.5 (30 Jan 2023 22:39), Max: 99.5 (30 Jan 2023 22:39)  HR: 97 (30 Jan 2023 22:39) (97 - 97)  BP: 97/60 (30 Jan 2023 22:39) (97/60 - 97/60)  BP(mean): --  RR: 18 (30 Jan 2023 22:39) (18 - 18)  SpO2: 93% (30 Jan 2023 22:39) (93% - 93%)    Parameters below as of 30 Jan 2023 22:39  Patient On (Oxygen Delivery Method): room air      Abd: Soft, NT,ND,   Extremities: Warm  Neuro: Alert.     LABS:  POCT Blood Glucose.: 200 mg/dL (01-31-23 @ 12:21)  POCT Blood Glucose.: 142 mg/dL (01-31-23 @ 06:11)  POCT Blood Glucose.: 107 mg/dL (01-30-23 @ 23:25)  POCT Blood Glucose.: 178 mg/dL (01-30-23 @ 17:08)  POCT Blood Glucose.: 217 mg/dL (01-30-23 @ 12:10)  POCT Blood Glucose.: 139 mg/dL (01-30-23 @ 06:01)  POCT Blood Glucose.: 122 mg/dL (01-29-23 @ 23:59)  POCT Blood Glucose.: 233 mg/dL (01-29-23 @ 17:18)  POCT Blood Glucose.: 257 mg/dL (01-29-23 @ 13:28)  POCT Blood Glucose.: 185 mg/dL (01-29-23 @ 05:10)  POCT Blood Glucose.: 92 mg/dL (01-29-23 @ 00:01)  POCT Blood Glucose.: 107 mg/dL (01-28-23 @ 21:23)  POCT Blood Glucose.: 127 mg/dL (01-28-23 @ 17:29)                            8.5    6.08  )-----------( 372      ( 30 Jan 2023 07:01 )             27.0       01-31    133<L>  |  98  |  16  ----------------------------<  207<H>  4.2   |  28  |  0.56    eGFR: 93    Ca    8.2<L>      01-31  Mg     2.2     01-30  Phos  2.9     01-30        A1C with Estimated Average Glucose Result: 9.4 % (01-19-23 @ 13:11)  A1C with Estimated Average Glucose Result: 8.6 % (01-07-23 @ 05:40)    Islet Antigen (IA-2) Antibody, Serum (01.20.23 @ 06:48)   Islet Antigen (IA-2) Antibody, Serum: 0.00    Glutamic Acid Decarboxylase Antibody: 0.00: -          Contact number: alycia [phone] or [phone]       Diabetes Follow up note:    Chief complaint: T2DM    Interval Hx: BG values 100-200 over past 24 hours. Pt seen at bedside w/daughter present. Pt self-removed NGT after receiving insulin at noon today. Currently NPO. Daughter said family still deciding if she will get a PEG tube.     Review of Systems:  Pt non-verbal. mother endorsed pt able to understand her in native language.     MEDS:  atorvastatin 80 milliGRAM(s) Oral at bedtime  insulin glargine Injectable (LANTUS) 15 Unit(s) SubCutaneous <User Schedule>  insulin lispro (ADMELOG) corrective regimen sliding scale   SubCutaneous every 6 hours    ertapenem  IVPB 1000 milliGRAM(s) IV Intermittent every 24 hours    Allergies    Benedryl Allergy Sinus (Hives)  penicillin (Rash)        PE:  General: Female lying in bed. NAD on mitten restraints.   Vital Signs Last 24 Hrs  T(C): 37.5 (30 Jan 2023 22:39), Max: 37.5 (30 Jan 2023 22:39)  T(F): 99.5 (30 Jan 2023 22:39), Max: 99.5 (30 Jan 2023 22:39)  HR: 97 (30 Jan 2023 22:39) (97 - 97)  BP: 97/60 (30 Jan 2023 22:39) (97/60 - 97/60)  BP(mean): --  RR: 18 (30 Jan 2023 22:39) (18 - 18)  SpO2: 93% (30 Jan 2023 22:39) (93% - 93%)    Parameters below as of 30 Jan 2023 22:39  Patient On (Oxygen Delivery Method): room air      Abd: Soft, NT,ND,   Extremities: Warm  Neuro: Alert.     LABS:  POCT Blood Glucose.: 200 mg/dL (01-31-23 @ 12:21)  POCT Blood Glucose.: 142 mg/dL (01-31-23 @ 06:11)  POCT Blood Glucose.: 107 mg/dL (01-30-23 @ 23:25)  POCT Blood Glucose.: 178 mg/dL (01-30-23 @ 17:08)  POCT Blood Glucose.: 217 mg/dL (01-30-23 @ 12:10)  POCT Blood Glucose.: 139 mg/dL (01-30-23 @ 06:01)  POCT Blood Glucose.: 122 mg/dL (01-29-23 @ 23:59)  POCT Blood Glucose.: 233 mg/dL (01-29-23 @ 17:18)  POCT Blood Glucose.: 257 mg/dL (01-29-23 @ 13:28)  POCT Blood Glucose.: 185 mg/dL (01-29-23 @ 05:10)  POCT Blood Glucose.: 92 mg/dL (01-29-23 @ 00:01)  POCT Blood Glucose.: 107 mg/dL (01-28-23 @ 21:23)  POCT Blood Glucose.: 127 mg/dL (01-28-23 @ 17:29)                            8.5    6.08  )-----------( 372      ( 30 Jan 2023 07:01 )             27.0       01-31    133<L>  |  98  |  16  ----------------------------<  207<H>  4.2   |  28  |  0.56    eGFR: 93    Ca    8.2<L>      01-31  Mg     2.2     01-30  Phos  2.9     01-30        A1C with Estimated Average Glucose Result: 9.4 % (01-19-23 @ 13:11)  A1C with Estimated Average Glucose Result: 8.6 % (01-07-23 @ 05:40)    Islet Antigen (IA-2) Antibody, Serum (01.20.23 @ 06:48)   Islet Antigen (IA-2) Antibody, Serum: 0.00    Glutamic Acid Decarboxylase Antibody: 0.00: -          Contact number: alycia [phone] or [phone]       Diabetes Follow up note:    Chief complaint: T2DM    Interval Hx: BG values 100-200 over past 24 hours. Pt seen at bedside w/daughter present. Pt self-removed NGT after receiving insulin at noon today. Currently NPO. Daughter said family still deciding if she will get a PEG tube.     Review of Systems:  Pt non-verbal. mother endorsed pt able to understand her in native language.     MEDS:  atorvastatin 80 milliGRAM(s) Oral at bedtime  insulin glargine Injectable (LANTUS) 15 Unit(s) SubCutaneous <User Schedule>  insulin lispro (ADMELOG) corrective regimen sliding scale   SubCutaneous every 6 hours    ertapenem  IVPB 1000 milliGRAM(s) IV Intermittent every 24 hours    Allergies    Benedryl Allergy Sinus (Hives)  penicillin (Rash)        PE:  General: Female lying in bed. NAD on mitten restraints.   Vital Signs Last 24 Hrs  T(C): 37.5 (30 Jan 2023 22:39), Max: 37.5 (30 Jan 2023 22:39)  T(F): 99.5 (30 Jan 2023 22:39), Max: 99.5 (30 Jan 2023 22:39)  HR: 97 (30 Jan 2023 22:39) (97 - 97)  BP: 97/60 (30 Jan 2023 22:39) (97/60 - 97/60)  BP(mean): --  RR: 18 (30 Jan 2023 22:39) (18 - 18)  SpO2: 93% (30 Jan 2023 22:39) (93% - 93%)    Parameters below as of 30 Jan 2023 22:39  Patient On (Oxygen Delivery Method): room air      Abd: Soft, NT,ND,   Extremities: Warm  Neuro: Alert.     LABS:  POCT Blood Glucose.: 200 mg/dL (01-31-23 @ 12:21)  POCT Blood Glucose.: 142 mg/dL (01-31-23 @ 06:11)  POCT Blood Glucose.: 107 mg/dL (01-30-23 @ 23:25)  POCT Blood Glucose.: 178 mg/dL (01-30-23 @ 17:08)  POCT Blood Glucose.: 217 mg/dL (01-30-23 @ 12:10)  POCT Blood Glucose.: 139 mg/dL (01-30-23 @ 06:01)  POCT Blood Glucose.: 122 mg/dL (01-29-23 @ 23:59)  POCT Blood Glucose.: 233 mg/dL (01-29-23 @ 17:18)  POCT Blood Glucose.: 257 mg/dL (01-29-23 @ 13:28)  POCT Blood Glucose.: 185 mg/dL (01-29-23 @ 05:10)  POCT Blood Glucose.: 92 mg/dL (01-29-23 @ 00:01)  POCT Blood Glucose.: 107 mg/dL (01-28-23 @ 21:23)  POCT Blood Glucose.: 127 mg/dL (01-28-23 @ 17:29)                            8.5    6.08  )-----------( 372      ( 30 Jan 2023 07:01 )             27.0       01-31    133<L>  |  98  |  16  ----------------------------<  207<H>  4.2   |  28  |  0.56    eGFR: 93    Ca    8.2<L>      01-31  Mg     2.2     01-30  Phos  2.9     01-30        A1C with Estimated Average Glucose Result: 9.4 % (01-19-23 @ 13:11)  A1C with Estimated Average Glucose Result: 8.6 % (01-07-23 @ 05:40)    Islet Antigen (IA-2) Antibody, Serum (01.20.23 @ 06:48)   Islet Antigen (IA-2) Antibody, Serum: 0.00    Glutamic Acid Decarboxylase Antibody: 0.00: -          Contact number: alycia [phone] or [phone]

## 2023-01-31 NOTE — PROGRESS NOTE ADULT - ASSESSMENT
1. Dysphagia, post stroke  - discussed PEG placement with the patients family. Her son would like to discuss with additional family members before deciding.   Tentatively planned for PEG on Thursday 2/2 pending family decision.   - continue NGT feeds  - GI will follow     2. Hx of stroke     3. DM     4. anemia of chronic disease   - monitor H&H, transfuse PRN          Attending supervision statement: I have personally seen and examined the patient. I fully participated in the care of this patient. I have made amendments to the documentation where necessary, and agree with the history, physical exam, and plan as outlined by the ACP.    [facility]   Gastroenterology and Hepatology  Office: [phone]   1. Dysphagia, post stroke  - discussed PEG placement with the patients family. Her son would like to discuss with additional family members before deciding.   Tentatively planned for PEG on Thursday 2/2 pending family decision.   - continue NGT feeds  - GI will follow     2. Hx of stroke     3. DM     4. anemia of chronic disease   - monitor H&H, transfuse PRN          Attending supervision statement: I have personally seen and examined the patient. I fully participated in the care of this patient. I have made amendments to the documentation where necessary, and agree with the history, physical exam, and plan as outlined by the ACP.    [facility]   Gastroenterology and Hepatology  Office: [phone]   1. Dysphagia, post stroke  - discussed PEG placement with the patients family. Her son would like to discuss with additional family members before deciding.   Tentatively planned for PEG on Thursday 2/2 pending family decision.   - continue NGT feeds  - GI will follow     2. Hx of stroke     3. DM     4. anemia of chronic disease   - monitor H&H, transfuse PRN          Attending supervision statement: I have personally seen and examined the patient. I fully participated in the care of this patient. I have made amendments to the documentation where necessary, and agree with the history, physical exam, and plan as outlined by the ACP.    [facility]   Gastroenterology and Hepatology  Office: [phone]   1. Dysphagia, post stroke  - discussed PEG placement with the patients family. Her son would like to discuss with additional family members before deciding.   PEG placement Wednesday 2/1 or Thursday 2/2 pending family decision.   - keep NPO after midnight   - pre-procedure covid pcr ordered     2. Hx of stroke     3. DM     4. anemia of chronic disease   - monitor H&H, transfuse PRN          Attending supervision statement: I have personally seen and examined the patient. I fully participated in the care of this patient. I have made amendments to the documentation where necessary, and agree with the history, physical exam, and plan as outlined by the ACP.    [facility]   Gastroenterology and Hepatology  Office: [phone]   1. Dysphagia, post stroke  - discussed PEG placement with the patients family. Her son would like to discuss with additional family members before deciding.   PEG placement Wednesday 2/1 or Thursday 2/2 pending family decision.   - keep NPO after midnight   - pre-procedure covid pcr ordered     2. Hx of stroke     3. DM     4. anemia of chronic disease   - monitor H&H, transfuse PRN          Attending supervision statement: I have personally seen and examined the patient. I fully participated in the care of this patient. I have made amendments to the documentation where necessary, and agree with the history, physical exam, and plan as outlined by the ACP.    [facility]   Gastroenterology and Hepatology  Office: [phone]   1. Dysphagia, post stroke  - discussed PEG placement with the patients family. Her son would like to discuss with additional family members before deciding.   PEG placement Wednesday 2/1 or Thursday 2/2 pending family decision.   - keep NPO after midnight   - pre-procedure covid pcr ordered     2. Hx of stroke     3. DM     4. anemia of chronic disease   - monitor H&H, transfuse PRN          Attending supervision statement: I have personally seen and examined the patient. I fully participated in the care of this patient. I have made amendments to the documentation where necessary, and agree with the history, physical exam, and plan as outlined by the ACP.    [facility]   Gastroenterology and Hepatology  Office: [phone]

## 2023-01-31 NOTE — PROGRESS NOTE ADULT - ASSESSMENT
78 F with recent T2DM diagnosis and CVA (d/c 1 week ago), presenting with glucoses 500s, poor po intake and lethargy. Endocrine consulted for assistance with management of uncontrolled, recently dx DM. On continuous feeds/now off after pt dislodged NGT. At risk for hypoglycemia since receiving full basal insulin dose today. No plan for restarting feeds today per team. Recommend IV fluids to prevent glucose drop. BG goal (100-180mg/dl).

## 2023-01-31 NOTE — PROGRESS NOTE ADULT - SUBJECTIVE AND OBJECTIVE BOX
Name of Patient : SARAH DISLA  MRN: [id number]  Date of visit: 01-31-23 @ 17:22      Subjective: Patient seen and examined. No new events except as noted.   Patient seen earlier this AM. Lying down in bed  NGT   Possible plans for PEGs as per GI     REVIEW OF SYSTEMS:  Unable to obtain     MEDICATIONS:  MEDICATIONS  (STANDING):  aspirin  chewable 81 milliGRAM(s) Oral daily  atorvastatin 80 milliGRAM(s) Oral at bedtime  clopidogrel Tablet 75 milliGRAM(s) Oral daily  dextrose 5% + sodium chloride 0.9%. 1000 milliLiter(s) (50 mL/Hr) IV Continuous <Continuous>  dextrose 5%. 1000 milliLiter(s) (100 mL/Hr) IV Continuous <Continuous>  dextrose 50% Injectable 25 Gram(s) IV Push once  dextrose 50% Injectable 12.5 Gram(s) IV Push once  dextrose 50% Injectable 25 Gram(s) IV Push once  dextrose Oral Gel 15 Gram(s) Oral once  glucagon  Injectable 1 milliGRAM(s) IntraMuscular once  insulin lispro (ADMELOG) corrective regimen sliding scale   SubCutaneous every 6 hours  tamsulosin 0.4 milliGRAM(s) Oral at bedtime      PHYSICAL EXAM:  T(C): 37.4 (01-31-23 @ 14:46), Max: 37.5 (01-30-23 @ 22:39)  HR: 99 (01-31-23 @ 14:46) (97 - 99)  BP: 156/64 (01-31-23 @ 14:46) (97/60 - 156/64)  RR: 18 (01-31-23 @ 14:46) (18 - 18)  SpO2: 97% (01-31-23 @ 14:46) (93% - 97%)  Wt(kg): --  I&O's Summary    30 Jan 2023 07:01  -  31 Jan 2023 07:00  --------------------------------------------------------  IN: 850 mL / OUT: 1900 mL / NET: -1050 mL    31 Jan 2023 07:01  -  31 Jan 2023 17:22  --------------------------------------------------------  IN: 0 mL / OUT: 450 mL / NET: -450 mL          Appearance: Weak appearing female   HEENT:  Eyes are open; NGT   Lymphatic: No lymphadenopathy   Cardiovascular: Normal S1 S2, no JVD  Respiratory: normal effort , clear  Gastrointestinal:  Soft, Non-tender  Skin: No rashes,  warm to touch  Psychiatry: Unable to assess ROS   Musculoskeletal: No edema          01-30-23 @ 07:01  -  01-31-23 @ 07:00  --------------------------------------------------------  IN: 850 mL / OUT: 1900 mL / NET: -1050 mL    01-31-23 @ 07:01 - 01-31-23 @ 17:22  --------------------------------------------------------  IN: 0 mL / OUT: 450 mL / NET: -450 mL                                8.5    6.08  )-----------( 372      ( 30 Jan 2023 07:01 )             27.0               01-31    133<L>  |  98  |  16  ----------------------------<  207<H>  4.2   |  28  |  0.56    Ca    8.2<L>      31 Jan 2023 11:53  Phos  2.9     01-30  Mg     2.2     01-30                              Name of Patient : SARAH DISLA  MRN: [id number]  Date of visit: 01-31-23 @ 17:22      Subjective: Patient seen and examined. No new events except as noted.   Patient seen earlier this AM. Lying down in bed  NGT   Possible plans for PEGs as per GI     REVIEW OF SYSTEMS:  Unable to obtain     MEDICATIONS:  MEDICATIONS  (STANDING):  aspirin  chewable 81 milliGRAM(s) Oral daily  atorvastatin 80 milliGRAM(s) Oral at bedtime  clopidogrel Tablet 75 milliGRAM(s) Oral daily  dextrose 5% + sodium chloride 0.9%. 1000 milliLiter(s) (50 mL/Hr) IV Continuous <Continuous>  dextrose 5%. 1000 milliLiter(s) (100 mL/Hr) IV Continuous <Continuous>  dextrose 50% Injectable 25 Gram(s) IV Push once  dextrose 50% Injectable 12.5 Gram(s) IV Push once  dextrose 50% Injectable 25 Gram(s) IV Push once  dextrose Oral Gel 15 Gram(s) Oral once  glucagon  Injectable 1 milliGRAM(s) IntraMuscular once  insulin lispro (ADMELOG) corrective regimen sliding scale   SubCutaneous every 6 hours  tamsulosin 0.4 milliGRAM(s) Oral at bedtime      PHYSICAL EXAM:  T(C): 37.4 (01-31-23 @ 14:46), Max: 37.5 (01-30-23 @ 22:39)  HR: 99 (01-31-23 @ 14:46) (97 - 99)  BP: 156/64 (01-31-23 @ 14:46) (97/60 - 156/64)  RR: 18 (01-31-23 @ 14:46) (18 - 18)  SpO2: 97% (01-31-23 @ 14:46) (93% - 97%)  Wt(kg): --  I&O's Summary    30 Jan 2023 07:01  -  31 Jan 2023 07:00  --------------------------------------------------------  IN: 850 mL / OUT: 1900 mL / NET: -1050 mL    31 Jan 2023 07:01  -  31 Jan 2023 17:22  --------------------------------------------------------  IN: 0 mL / OUT: 450 mL / NET: -450 mL          Appearance: Weak appearing female   HEENT:  Eyes are open; NGT   Lymphatic: No lymphadenopathy   Cardiovascular: Normal S1 S2, no JVD  Respiratory: normal effort , clear  Gastrointestinal:  Soft, Non-tender  Skin: No rashes,  warm to touch  Psychiatry: Unable to assess ROS   Musculoskeletal: No edema          01-30-23 @ 07:01  -  01-31-23 @ 07:00  --------------------------------------------------------  IN: 850 mL / OUT: 1900 mL / NET: -1050 mL    01-31-23 @ 07:01 - 01-31-23 @ 17:22  --------------------------------------------------------  IN: 0 mL / OUT: 450 mL / NET: -450 mL                                8.5    6.08  )-----------( 372      ( 30 Jan 2023 07:01 )             27.0               01-31    133<L>  |  98  |  16  ----------------------------<  207<H>  4.2   |  28  |  0.56    Ca    8.2<L>      31 Jan 2023 11:53  Phos  2.9     01-30  Mg     2.2     01-30                              Name of Patient : SARAH DISLA  MRN: [id number]  Date of visit: 01-31-23 @ 17:22      Subjective: Patient seen and examined. No new events except as noted.   Patient seen earlier this AM. Lying down in bed  NGT   Possible plans for PEGs as per GI     REVIEW OF SYSTEMS:  Unable to obtain     MEDICATIONS:  MEDICATIONS  (STANDING):  aspirin  chewable 81 milliGRAM(s) Oral daily  atorvastatin 80 milliGRAM(s) Oral at bedtime  clopidogrel Tablet 75 milliGRAM(s) Oral daily  dextrose 5% + sodium chloride 0.9%. 1000 milliLiter(s) (50 mL/Hr) IV Continuous <Continuous>  dextrose 5%. 1000 milliLiter(s) (100 mL/Hr) IV Continuous <Continuous>  dextrose 50% Injectable 25 Gram(s) IV Push once  dextrose 50% Injectable 12.5 Gram(s) IV Push once  dextrose 50% Injectable 25 Gram(s) IV Push once  dextrose Oral Gel 15 Gram(s) Oral once  glucagon  Injectable 1 milliGRAM(s) IntraMuscular once  insulin lispro (ADMELOG) corrective regimen sliding scale   SubCutaneous every 6 hours  tamsulosin 0.4 milliGRAM(s) Oral at bedtime      PHYSICAL EXAM:  T(C): 37.4 (01-31-23 @ 14:46), Max: 37.5 (01-30-23 @ 22:39)  HR: 99 (01-31-23 @ 14:46) (97 - 99)  BP: 156/64 (01-31-23 @ 14:46) (97/60 - 156/64)  RR: 18 (01-31-23 @ 14:46) (18 - 18)  SpO2: 97% (01-31-23 @ 14:46) (93% - 97%)  Wt(kg): --  I&O's Summary    30 Jan 2023 07:01  -  31 Jan 2023 07:00  --------------------------------------------------------  IN: 850 mL / OUT: 1900 mL / NET: -1050 mL    31 Jan 2023 07:01  -  31 Jan 2023 17:22  --------------------------------------------------------  IN: 0 mL / OUT: 450 mL / NET: -450 mL          Appearance: Weak appearing female   HEENT:  Eyes are open; NGT   Lymphatic: No lymphadenopathy   Cardiovascular: Normal S1 S2, no JVD  Respiratory: normal effort , clear  Gastrointestinal:  Soft, Non-tender  Skin: No rashes,  warm to touch  Psychiatry: Unable to assess ROS   Musculoskeletal: No edema          01-30-23 @ 07:01  -  01-31-23 @ 07:00  --------------------------------------------------------  IN: 850 mL / OUT: 1900 mL / NET: -1050 mL    01-31-23 @ 07:01 - 01-31-23 @ 17:22  --------------------------------------------------------  IN: 0 mL / OUT: 450 mL / NET: -450 mL                                8.5    6.08  )-----------( 372      ( 30 Jan 2023 07:01 )             27.0               01-31    133<L>  |  98  |  16  ----------------------------<  207<H>  4.2   |  28  |  0.56    Ca    8.2<L>      31 Jan 2023 11:53  Phos  2.9     01-30  Mg     2.2     01-30

## 2023-01-31 NOTE — PROGRESS NOTE ADULT - PROBLEM SELECTOR PLAN 1
- BG Goal 100-180mg/dl   -test BG Q6h  - If remains NPO would change Lantus to 6 units daily  -change Admelog to low correction scale Q6h  - Recommend D5 + NS @ 50 cc/hr while NPO since pt received full Lantus dose today. Can reevaluate need tomorrow.   - Please notify endocrine team of any change to TF regimen.   - auto-immune DM antibodies negative to r/o RAJ.   For d/c:   final recs TBD based on feeding modality/insulin requirements  if dc on PO intake consider   -Metformin 500 mg BID for 1 week then increase to 1000 mg BID. Check lactate prior to d/c  -May need low dose basal insulin (came in with glucose 500s, mild BHB elevation)  -Consider DPP4 inhibitor  -Patient can follow up at -[address] [place] [address], [address]LO[address], [place] [address] [phone]  -Patient will need opthalmology and podiatry follow up as outpatient. - BG Goal 100-180mg/dl   -test BG Q6h  - If remains NPO would change Lantus to 6 units daily  -change Admelog to low correction scale Q6h  - Recommend D5 + NS @ 50 cc/hr while NPO since pt received full Lantus dose today. Can reevaluate need tomorrow.   - Please notify endocrine team of any change to TF regimen.   - auto-immune DM antibodies negative to r/o RAJ.   For d/c:   final recs TBD based on feeding modality/insulin requirements  if dc on PO intake consider   -Metformin 500 mg BID for 1 week then increase to 1000 mg BID. Check lactate prior to d/c  -May need low dose basal insulin (came in with glucose 500s, mild BHB elevation)  -Consider DPP4 inhibitor  -Patient can follow up at -[address] [place] [address], [address]LO[address], [place] [address] [phone]  -Patient will need opthalmology and podiatry follow up as outpatient. - BG Goal 100-180mg/dl   -test BG Q6h  - If remains NPO would change Lantus to 6 units daily  -change Admelog to low correction scale Q6h  - Recommend D5 + NS @ 50 cc/hr while NPO since pt received full Lantus dose today. Can reevaluate need tomorrow.   - Please notify endocrine team of any change to TF regimen.   - auto-immune DM antibodies negative to r/o RAJ.   For d/c:   final recs TBD based on feeding modality/insulin requirements  if dc on PO intake consider   -Metformin 500 mg BID for 1 week then increase to 1000 mg BID. Check lactate prior to d/c  -May need low dose basal insulin (came in with glucose 500s, mild BHB elevation)  -Consider DPP4 inhibitor  -Patient can follow up at -[address] [place] [address], [address]LO[address], [place] [address] [phone]  -Patient will need opthalmology and podiatry follow up as outpatient.

## 2023-01-31 NOTE — PROGRESS NOTE ADULT - ASSESSMENT
78F with dementia, T2DM, recently discharged about 1 week ago for CVA now presenting with poor PO intake, lethargy and hyperglycemia to 500s.   Of note, patient admitted 1/6 for R facial droop, word finding difficulties. Imaging concerning for acute L MCA infarct.  Per family, since stroke patient nonverbal/unable to participate in meaningful communications, intermittently follows commands. For the last 3-4 days, patient with poor po intake.    In ED, afebrile, , 135/84. WBC 15, Na 158, K 3.0, Cl 120, Glu 468, alk phos 136m BHB 0.7. pH 7.36, lactate 2.2.   UA: +leuk esterase, many bacteria, WBC 11-25  CXR: Redemonstrated biapical scarring and left upper lobe bronchiectasis, unchanged. No focal consolidation.   CTH No acute intracranial hemorrhage. 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  Given 1.5L NS, ceftriaxone x1, haldol 2.5mg x1 in ED.   last admission: CTA with L MCA severe stenosis; distal L M2 occlusion .  TTE 1/8/23: EF 63% Mild mitral regurgitation ; A1c 9.4   MRI brain with evolving subacute infarcts as expected. question of SAH   CTH 1/27 evolviong subacute L hemisphere infarcts. no SAH found.   CTH 1/30 unchanged ; scaterred L MCA infarct no hemorrhage   Impression  1) AMS likely 2/2 infection/UTI toxic metabolic encephalopahthy   2) recent stroke - L MCA infarct 2/2 symptomatic L MCA ICAD ; worsening of R HP in setting of infection     - GI for possible PEG, may need to stop plavix, no objection   - Nsx eval--> No intervention.  despite there note stating SAH, no comment to stop anti thrombotics.  nevertheless CTH read is neg for SAH.  no hemorrhage on CTH   - now on contact   - mittens. NGT replaced 1/26 early AM, f/u CXR   - f/u endocrine   - treatment of infection per primary team -->  CTX  - for secondary stroke prevention. DAPT x 3 months followeed by ASA 81mg dialy thereafter.   - hihg dose statin lipitor 40mg dialy   - avoid hypotension given L MCA ICAD   - telemetry  - PT/OT/SS/SLP, OOBC  - check FS, glucose control <180  - GI/DVT ppx  - Thank you for allowing me to participate in the care of this patient. Call with questions.      Reji Greco MD  Vascular Neurology  Office: [phone]  78F with dementia, T2DM, recently discharged about 1 week ago for CVA now presenting with poor PO intake, lethargy and hyperglycemia to 500s.   Of note, patient admitted 1/6 for R facial droop, word finding difficulties. Imaging concerning for acute L MCA infarct.  Per family, since stroke patient nonverbal/unable to participate in meaningful communications, intermittently follows commands. For the last 3-4 days, patient with poor po intake.    In ED, afebrile, , 135/84. WBC 15, Na 158, K 3.0, Cl 120, Glu 468, alk phos 136m BHB 0.7. pH 7.36, lactate 2.2.   UA: +leuk esterase, many bacteria, WBC 11-25  CXR: Redemonstrated biapical scarring and left upper lobe bronchiectasis, unchanged. No focal consolidation.   CTH No acute intracranial hemorrhage. 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  Given 1.5L NS, ceftriaxone x1, haldol 2.5mg x1 in ED.   last admission: CTA with L MCA severe stenosis; distal L M2 occlusion .  TTE 1/8/23: EF 63% Mild mitral regurgitation ; A1c 9.4   MRI brain with evolving subacute infarcts as expected. question of SAH   CTH 1/27 evolviong subacute L hemisphere infarcts. no SAH found.   CTH 1/30 unchanged ; scaterred L MCA infarct no hemorrhage   Impression  1) AMS likely 2/2 infection/UTI toxic metabolic encephalopahthy   2) recent stroke - L MCA infarct 2/2 symptomatic L MCA ICAD ; worsening of R HP in setting of infection     - GI for possible PEG, may need to stop plavix, no objection   - Nsx eval--> No intervention.  despite there note stating SAH, no comment to stop anti thrombotics.  nevertheless CTH read is neg for SAH.  no hemorrhage on CTH   - now on contact   - mittens. NGT replaced 1/26 early AM, f/u CXR   - f/u endocrine   - treatment of infection per primary team -->  CTX  - for secondary stroke prevention. DAPT x 3 months followeed by ASA 81mg dialy thereafter.   - hihg dose statin lipitor 40mg dialy   - avoid hypotension given L MCA ICAD   - telemetry  - PT/OT/SS/SLP, OOBC  - check FS, glucose control <180  - GI/DVT ppx  - Thank you for allowing me to participate in the care of this patient. Call with questions.      Reji Greco MD  Vascular Neurology  Office: [phone]  78F with dementia, T2DM, recently discharged about 1 week ago for CVA now presenting with poor PO intake, lethargy and hyperglycemia to 500s.   Of note, patient admitted 1/6 for R facial droop, word finding difficulties. Imaging concerning for acute L MCA infarct.  Per family, since stroke patient nonverbal/unable to participate in meaningful communications, intermittently follows commands. For the last 3-4 days, patient with poor po intake.    In ED, afebrile, , 135/84. WBC 15, Na 158, K 3.0, Cl 120, Glu 468, alk phos 136m BHB 0.7. pH 7.36, lactate 2.2.   UA: +leuk esterase, many bacteria, WBC 11-25  CXR: Redemonstrated biapical scarring and left upper lobe bronchiectasis, unchanged. No focal consolidation.   CTH No acute intracranial hemorrhage. 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  Given 1.5L NS, ceftriaxone x1, haldol 2.5mg x1 in ED.   last admission: CTA with L MCA severe stenosis; distal L M2 occlusion .  TTE 1/8/23: EF 63% Mild mitral regurgitation ; A1c 9.4   MRI brain with evolving subacute infarcts as expected. question of SAH   CTH 1/27 evolviong subacute L hemisphere infarcts. no SAH found.   CTH 1/30 unchanged ; scaterred L MCA infarct no hemorrhage   Impression  1) AMS likely 2/2 infection/UTI toxic metabolic encephalopahthy   2) recent stroke - L MCA infarct 2/2 symptomatic L MCA ICAD ; worsening of R HP in setting of infection     - GI for possible PEG, may need to stop plavix, no objection   - Nsx eval--> No intervention.  despite there note stating SAH, no comment to stop anti thrombotics.  nevertheless CTH read is neg for SAH.  no hemorrhage on CTH   - now on contact   - mittens. NGT replaced 1/26 early AM, f/u CXR   - f/u endocrine   - treatment of infection per primary team -->  CTX  - for secondary stroke prevention. DAPT x 3 months followeed by ASA 81mg dialy thereafter.   - hihg dose statin lipitor 40mg dialy   - avoid hypotension given L MCA ICAD   - telemetry  - PT/OT/SS/SLP, OOBC  - check FS, glucose control <180  - GI/DVT ppx  - Thank you for allowing me to participate in the care of this patient. Call with questions.      Reji Greco MD  Vascular Neurology  Office: [phone]

## 2023-01-31 NOTE — PROGRESS NOTE ADULT - SUBJECTIVE AND OBJECTIVE BOX
Chief Complaint:  Patient is a 78y old  Female who presents with a chief complaint of AMS, elevated finger sticks (31 Jan 2023 08:12)      Date of service 01-31-23 @ 12:31      Interval Events:   Patient seen and examined.  Tolerating tube feeds via NGT.   No acute overnight events.    Hospital Medications:  acetaminophen    Suspension .. 650 milliGRAM(s) Enteral Tube every 6 hours PRN  aspirin  chewable 81 milliGRAM(s) Oral daily  atorvastatin 80 milliGRAM(s) Oral at bedtime  clopidogrel Tablet 75 milliGRAM(s) Oral daily  dextrose 5%. 1000 milliLiter(s) IV Continuous <Continuous>  dextrose 50% Injectable 25 Gram(s) IV Push once  dextrose 50% Injectable 12.5 Gram(s) IV Push once  dextrose 50% Injectable 25 Gram(s) IV Push once  dextrose Oral Gel 15 Gram(s) Oral once  ertapenem  IVPB 1000 milliGRAM(s) IV Intermittent every 24 hours  glucagon  Injectable 1 milliGRAM(s) IntraMuscular once  insulin glargine Injectable (LANTUS) 15 Unit(s) SubCutaneous <User Schedule>  insulin lispro (ADMELOG) corrective regimen sliding scale   SubCutaneous every 6 hours  sodium chloride 0.9%. 1000 milliLiter(s) IV Continuous <Continuous>  tamsulosin 0.4 milliGRAM(s) Oral at bedtime        Review of Systems:  unable to obtain    PHYSICAL EXAM:   Vital Signs:  Vital Signs Last 24 Hrs  T(C): 37.5 (30 Jan 2023 22:39), Max: 37.5 (30 Jan 2023 22:39)  T(F): 99.5 (30 Jan 2023 22:39), Max: 99.5 (30 Jan 2023 22:39)  HR: 97 (30 Jan 2023 22:39) (97 - 97)  BP: 97/60 (30 Jan 2023 22:39) (97/60 - 97/60)  BP(mean): --  RR: 18 (30 Jan 2023 22:39) (18 - 18)  SpO2: 93% (30 Jan 2023 22:39) (93% - 93%)    Parameters below as of 30 Jan 2023 22:39  Patient On (Oxygen Delivery Method): room air      Daily     Daily       PHYSICAL EXAM:     GENERAL:  Appears stated age, well-groomed, well-nourished, no distress  HEENT:  NC/AT,  conjunctivae anicteric, clear and pink, +NGT  NECK: supple, trachea midline  CHEST:  Full & symmetric excursion, no increased effort, breath sounds clear  HEART:  Regular rhythm, no JVD  ABDOMEN:  Soft, non-tender, non-distended, normoactive bowel sounds,  no masses , no hepatosplenomegaly  EXTREMITIES:  no cyanosis,clubbing or edema  SKIN:  No rash, erythema, or, ecchymoses, no jaundice  NEURO:  non-focal, no asterixis  RECTAL: Deferred      LABS Personally reviewed by me:                        8.5    6.08  )-----------( 372      ( 30 Jan 2023 07:01 )             27.0       01-31    133<L>  |  98  |  16  ----------------------------<  207<H>  4.2   |  28  |  0.56    Ca    8.2<L>      31 Jan 2023 11:53  Phos  2.9     01-30  Mg     2.2     01-30                                    8.5    6.08  )-----------( 372      ( 30 Jan 2023 07:01 )             27.0       Imaging personally reviewed by me:

## 2023-01-31 NOTE — PROGRESS NOTE ADULT - ASSESSMENT
78F with dementia, T2DM, recently discharged about 1 week ago for CVA now presenting with poor PO intake, lethargy and hyperglycemia to 500s.     Altered mental status, UTI .   - Since recent stroke,  A&Ox0. More recently, poor po intake, lethargic. CTH no acute intracranial abnormalities. Multifactorial   - Multiple metabolic derangements - dehydration, hyperglycemia, hyperNa, hypoK  - S/P IVF D5 75 CC   - Monitor and replete electrolytes PRN   - UA with +leuk esterase, many bacteria. C/w ceftriaxone for now. F/u UCx -- E. coli  - on Ertapenem   - 01/19 BCx2 w/ Neg final   - 01/24 BCx2 w/ NGTD; F/u final   - Monitor fever curve, WBC trend   - Neuro eval consulted; F/u recs   - Aspiration precautions   - Discussed with family, agreed for NGT for feeding; NGT replaced ON 01/26   - Retention - replace gonzalez, check bladder US -- Noted, per attending discussion with family, family to decide on further work up     Uncontrolled type 2 diabetes mellitus with hyperglycemia.   - D/c on Metformin 500 mg BID  - Now with glucose 500s, improved to mid 300s with fluids. Also with mild ketosis (AG 17, bicarb 21, BHB 0.7)  - Endo eval appreciated, BHB now WNL  - Lantus 11 units QHs added to regimen & Sliding scale  - Monitor glucose levels closely, avoid hypo/hyperglycemia   - Check Ab given thin body habitus and ketosis (Glutamic Acid Decarboxylase, Zinc Transporter 8, Islet Cell Ab, and IA-2 Ab)  - S/PIVF administration  - Endo follow up; F/u recs      Cerebrovascular accident (CVA).  - Recent admission w/ Acute L MCA infarct, severe stenosis of the L M2 branch. MR with L MCA [place] infarcts   - CTH on admission showing No acute intracranial hemorrhage. 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 Per neurology, expected change seen on CT, not new lesions.   - C/w asa, plavix, statin.  - Neuro eval consulted; F/u recs   - Check MR brain -- C/F L sided Subacute infarcts, L frontal SAH; F/u neuro recs  - Checking CT head noted   - Hold lovenox DVT PPX  - NSGY eval appreciated - no int at this time  - Repeat CT H 01/30  - GI EVal for PEG tube -- Pending family decision tentative for 02/01 or 02/02         Febrile  - Cx with E. Faecalis, E. Avium; UCx with E. coli   - 01/19 BCx2 W/ Neg final   - 01/24 repeat BCx2  --> Neg  final  - ID eval consulted; F/u recs  - ABX as per ID, switched to ertapenem , sensitive  - If recurrent fever, check CT as per ID   - Monitor CBC, temp curve, VS and patient closely     Anemia  - Drop in hemoglobin   - Checking Iron, B12, Folate, Ferritin, TIBC -- not consistent with deficiency and Occult   - Monitor H/H closely, monitor for signs/symptoms of bleeding    Hypernatremia.   - S/P D5 50 CC x 10 hours   - F/u DM management as per above  - Monitor Na level closely, avoid overocrrection   - C/w Free water, monitor levels closely     Pediculosis   - ID eval   - Nix as per protocol   - Tx today 01/26  - Contact precautions    HypoK  - Monitor and replete electrolytes PRN     Hyperlipidemia.   -c/w atorvastatin.    Hypertension.    -amlodipine 5mg daily.      PPX  - Lovenox 40 Qd

## 2023-02-01 LAB
HCT VFR BLD CALC: 27.8 % — LOW (ref 34.5–45)
HGB BLD-MCNC: 8.8 G/DL — LOW (ref 11.5–15.5)
MCHC RBC-ENTMCNC: 26.8 PG — LOW (ref 27–34)
MCHC RBC-ENTMCNC: 31.7 GM/DL — LOW (ref 32–36)
MCV RBC AUTO: 84.8 FL — SIGNIFICANT CHANGE UP (ref 80–100)
NRBC # BLD: 0 /100 WBCS — SIGNIFICANT CHANGE UP (ref 0–0)
PLATELET # BLD AUTO: 414 K/UL — HIGH (ref 150–400)
RBC # BLD: 3.28 M/UL — LOW (ref 3.8–5.2)
RBC # FLD: 14.6 % — HIGH (ref 10.3–14.5)
WBC # BLD: 5.86 K/UL — SIGNIFICANT CHANGE UP (ref 3.8–10.5)
WBC # FLD AUTO: 5.86 K/UL — SIGNIFICANT CHANGE UP (ref 3.8–10.5)

## 2023-02-01 PROCEDURE — 99232 SBSQ HOSP IP/OBS MODERATE 35: CPT

## 2023-02-01 RX ORDER — ERTAPENEM SODIUM 1 G/1
1000 INJECTION, POWDER, LYOPHILIZED, FOR SOLUTION INTRAMUSCULAR; INTRAVENOUS EVERY 24 HOURS
Refills: 0 | Status: COMPLETED | OUTPATIENT
Start: 2023-02-01 | End: 2023-02-02

## 2023-02-01 RX ORDER — PANTOPRAZOLE SODIUM 20 MG/1
40 TABLET, DELAYED RELEASE ORAL
Refills: 0 | Status: DISCONTINUED | OUTPATIENT
Start: 2023-02-01 | End: 2023-02-06

## 2023-02-01 RX ORDER — DEXTROSE 50 % IN WATER 50 %
12.5 SYRINGE (ML) INTRAVENOUS ONCE
Refills: 0 | Status: COMPLETED | OUTPATIENT
Start: 2023-02-01 | End: 2023-02-01

## 2023-02-01 RX ADMIN — PANTOPRAZOLE SODIUM 40 MILLIGRAM(S): 20 TABLET, DELAYED RELEASE ORAL at 18:15

## 2023-02-01 RX ADMIN — Medication 1: at 18:21

## 2023-02-01 RX ADMIN — SODIUM CHLORIDE 75 MILLILITER(S): 9 INJECTION, SOLUTION INTRAVENOUS at 05:01

## 2023-02-01 RX ADMIN — TAMSULOSIN HYDROCHLORIDE 0.4 MILLIGRAM(S): 0.4 CAPSULE ORAL at 21:11

## 2023-02-01 RX ADMIN — ERTAPENEM SODIUM 120 MILLIGRAM(S): 1 INJECTION, POWDER, LYOPHILIZED, FOR SOLUTION INTRAMUSCULAR; INTRAVENOUS at 15:34

## 2023-02-01 RX ADMIN — ATORVASTATIN CALCIUM 80 MILLIGRAM(S): 80 TABLET, FILM COATED ORAL at 21:16

## 2023-02-01 RX ADMIN — Medication 12.5 GRAM(S): at 01:23

## 2023-02-01 NOTE — PRE-ANESTHESIA EVALUATION ADULT - NSANTHPMHFT_GEN_ALL_CORE
See notes for extensive medical history    **78F with dementia, T2DM, recently discharged about 1 week ago for CVA now presenting with poor PO intake, lethargy and hyperglycemia - for PEG

## 2023-02-01 NOTE — PROGRESS NOTE ADULT - ASSESSMENT
78 F with recent T2DM diagnosis and CVA (d/c 1 week ago), presenting with glucoses 500s, poor po intake and lethargy. Endocrine consulted for assistance with management of uncontrolled, recently dx DM. Now off TF awaiting PEG. BG tightly controlled/low over past 24 hours after receiving full dose of basal insulin w/discontinuation of feeds after dislodging NG tube. On D5 infusion while NPO, will hold basal insulin for now. BG goal (100-180mg/dl).

## 2023-02-01 NOTE — PROGRESS NOTE ADULT - SUBJECTIVE AND OBJECTIVE BOX
Name of Patient : SARAH DISLA  MRN: [id number]  Date of visit: 02-01-23      Subjective: Patient seen and examined. No new events except as noted.   Doing okay   plan for PEG today     MEDICATIONS:  MEDICATIONS  (STANDING):  aspirin  chewable 81 milliGRAM(s) Oral daily  atorvastatin 80 milliGRAM(s) Oral at bedtime  dextrose 5% + sodium chloride 0.9%. 1000 milliLiter(s) (75 mL/Hr) IV Continuous <Continuous>  dextrose 5%. 1000 milliLiter(s) (100 mL/Hr) IV Continuous <Continuous>  dextrose 50% Injectable 25 Gram(s) IV Push once  dextrose 50% Injectable 12.5 Gram(s) IV Push once  dextrose 50% Injectable 25 Gram(s) IV Push once  dextrose Oral Gel 15 Gram(s) Oral once  ertapenem  IVPB 1000 milliGRAM(s) IV Intermittent every 24 hours  glucagon  Injectable 1 milliGRAM(s) IntraMuscular once  insulin lispro (ADMELOG) corrective regimen sliding scale   SubCutaneous every 6 hours  pantoprazole   Suspension 40 milliGRAM(s) Oral two times a day  tamsulosin 0.4 milliGRAM(s) Oral at bedtime      PHYSICAL EXAM:  T(C): 36.9 (02-01-23 @ 11:37), Max: 36.9 (02-01-23 @ 10:11)  HR: 84 (02-01-23 @ 12:58) (83 - 93)  BP: 155/67 (02-01-23 @ 12:58) (106/74 - 155/67)  RR: 16 (02-01-23 @ 12:58) (16 - 18)  SpO2: 99% (02-01-23 @ 12:58) (93% - 99%)  Wt(kg): --  I&O's Summary    31 Jan 2023 07:01  -  01 Feb 2023 07:00  --------------------------------------------------------  IN: 1400 mL / OUT: 450 mL / NET: 950 mL    01 Feb 2023 07:01  -  01 Feb 2023 18:52  --------------------------------------------------------  IN: 0 mL / OUT: 300 mL / NET: -300 mL      Height (cm): 157.5 (02-01 @ 11:37)  Weight (kg): 58.1 (02-01 @ 11:37)  BMI (kg/m2): 23.4 (02-01 @ 11:37)  BSA (m2): 1.58 (02-01 @ 11:37)    Appearance: Normal	  HEENT:  PERRLA   Lymphatic: No lymphadenopathy   Cardiovascular: Normal S1 S2, no JVD  Respiratory: normal effort , clear  Gastrointestinal:  Soft, Non-tender  Skin: No rashes,  warm to touch  Psychiatry:  Mood & affect appropriate  Musculuskeletal: No edema    recent labs, Imaging and EKGs personally reviewed     01-31-23 @ 07:01  -  02-01-23 @ 07:00  --------------------------------------------------------  IN: 1400 mL / OUT: 450 mL / NET: 950 mL    02-01-23 @ 07:01  -  02-01-23 @ 18:52  --------------------------------------------------------  IN: 0 mL / OUT: 300 mL / NET: -300 mL                01-31    133<L>  |  98  |  16  ----------------------------<  207<H>  4.2   |  28  |  0.56    Ca    8.2<L>      31 Jan 2023 11:53                                    Name of Patient : SARAH DISLA  MRN: [id number]  Date of visit: 02-01-23      Subjective: Patient seen and examined. No new events except as noted.   Doing okay   plan for PEG today     MEDICATIONS:  MEDICATIONS  (STANDING):  aspirin  chewable 81 milliGRAM(s) Oral daily  atorvastatin 80 milliGRAM(s) Oral at bedtime  dextrose 5% + sodium chloride 0.9%. 1000 milliLiter(s) (75 mL/Hr) IV Continuous <Continuous>  dextrose 5%. 1000 milliLiter(s) (100 mL/Hr) IV Continuous <Continuous>  dextrose 50% Injectable 25 Gram(s) IV Push once  dextrose 50% Injectable 12.5 Gram(s) IV Push once  dextrose 50% Injectable 25 Gram(s) IV Push once  dextrose Oral Gel 15 Gram(s) Oral once  ertapenem  IVPB 1000 milliGRAM(s) IV Intermittent every 24 hours  glucagon  Injectable 1 milliGRAM(s) IntraMuscular once  insulin lispro (ADMELOG) corrective regimen sliding scale   SubCutaneous every 6 hours  pantoprazole   Suspension 40 milliGRAM(s) Oral two times a day  tamsulosin 0.4 milliGRAM(s) Oral at bedtime      PHYSICAL EXAM:  T(C): 36.9 (02-01-23 @ 11:37), Max: 36.9 (02-01-23 @ 10:11)  HR: 84 (02-01-23 @ 12:58) (83 - 93)  BP: 155/67 (02-01-23 @ 12:58) (106/74 - 155/67)  RR: 16 (02-01-23 @ 12:58) (16 - 18)  SpO2: 99% (02-01-23 @ 12:58) (93% - 99%)  Wt(kg): --  I&O's Summary    31 Jan 2023 07:01  -  01 Feb 2023 07:00  --------------------------------------------------------  IN: 1400 mL / OUT: 450 mL / NET: 950 mL    01 Feb 2023 07:01  -  01 Feb 2023 18:52  --------------------------------------------------------  IN: 0 mL / OUT: 300 mL / NET: -300 mL      Height (cm): 157.5 (02-01 @ 11:37)  Weight (kg): 58.1 (02-01 @ 11:37)  BMI (kg/m2): 23.4 (02-01 @ 11:37)  BSA (m2): 1.58 (02-01 @ 11:37)    Appearance: Normal	  HEENT:  PERRLA   Lymphatic: No lymphadenopathy   Cardiovascular: Normal S1 S2, no JVD  Respiratory: normal effort , clear  Gastrointestinal:  Soft, Non-tender  Skin: No rashes,  warm to touch  Psychiatry:  Mood & affect appropriate  Musculuskeletal: No edema    recent labs, Imaging and EKGs personally reviewed     01-31-23 @ 07:01  -  02-01-23 @ 07:00  --------------------------------------------------------  IN: 1400 mL / OUT: 450 mL / NET: 950 mL    02-01-23 @ 07:01  -  02-01-23 @ 18:52  --------------------------------------------------------  IN: 0 mL / OUT: 300 mL / NET: -300 mL                01-31    133<L>  |  98  |  16  ----------------------------<  207<H>  4.2   |  28  |  0.56    Ca    8.2<L>      31 Jan 2023 11:53                                    Name of Patient : SARAH DISLA  MRN: [id number]  Date of visit: 02-01-23      Subjective: Patient seen and examined. No new events except as noted.   Doing okay   plan for PEG today     MEDICATIONS:  MEDICATIONS  (STANDING):  aspirin  chewable 81 milliGRAM(s) Oral daily  atorvastatin 80 milliGRAM(s) Oral at bedtime  dextrose 5% + sodium chloride 0.9%. 1000 milliLiter(s) (75 mL/Hr) IV Continuous <Continuous>  dextrose 5%. 1000 milliLiter(s) (100 mL/Hr) IV Continuous <Continuous>  dextrose 50% Injectable 25 Gram(s) IV Push once  dextrose 50% Injectable 12.5 Gram(s) IV Push once  dextrose 50% Injectable 25 Gram(s) IV Push once  dextrose Oral Gel 15 Gram(s) Oral once  ertapenem  IVPB 1000 milliGRAM(s) IV Intermittent every 24 hours  glucagon  Injectable 1 milliGRAM(s) IntraMuscular once  insulin lispro (ADMELOG) corrective regimen sliding scale   SubCutaneous every 6 hours  pantoprazole   Suspension 40 milliGRAM(s) Oral two times a day  tamsulosin 0.4 milliGRAM(s) Oral at bedtime      PHYSICAL EXAM:  T(C): 36.9 (02-01-23 @ 11:37), Max: 36.9 (02-01-23 @ 10:11)  HR: 84 (02-01-23 @ 12:58) (83 - 93)  BP: 155/67 (02-01-23 @ 12:58) (106/74 - 155/67)  RR: 16 (02-01-23 @ 12:58) (16 - 18)  SpO2: 99% (02-01-23 @ 12:58) (93% - 99%)  Wt(kg): --  I&O's Summary    31 Jan 2023 07:01  -  01 Feb 2023 07:00  --------------------------------------------------------  IN: 1400 mL / OUT: 450 mL / NET: 950 mL    01 Feb 2023 07:01  -  01 Feb 2023 18:52  --------------------------------------------------------  IN: 0 mL / OUT: 300 mL / NET: -300 mL      Height (cm): 157.5 (02-01 @ 11:37)  Weight (kg): 58.1 (02-01 @ 11:37)  BMI (kg/m2): 23.4 (02-01 @ 11:37)  BSA (m2): 1.58 (02-01 @ 11:37)    Appearance: Normal	  HEENT:  PERRLA   Lymphatic: No lymphadenopathy   Cardiovascular: Normal S1 S2, no JVD  Respiratory: normal effort , clear  Gastrointestinal:  Soft, Non-tender  Skin: No rashes,  warm to touch  Psychiatry:  Mood & affect appropriate  Musculuskeletal: No edema    recent labs, Imaging and EKGs personally reviewed     01-31-23 @ 07:01  -  02-01-23 @ 07:00  --------------------------------------------------------  IN: 1400 mL / OUT: 450 mL / NET: 950 mL    02-01-23 @ 07:01  -  02-01-23 @ 18:52  --------------------------------------------------------  IN: 0 mL / OUT: 300 mL / NET: -300 mL                01-31    133<L>  |  98  |  16  ----------------------------<  207<H>  4.2   |  28  |  0.56    Ca    8.2<L>      31 Jan 2023 11:53

## 2023-02-01 NOTE — PRE-ANESTHESIA EVALUATION ADULT - BP NONINVASIVE MEAN (MM HG)
Spoke/LVM with the patient explaining that Dr. Ashley will no longer be available on 12/29/20. Rescheduled patient's appt to 01/27/21 at 9A with Dr. Ashley.    David Choi MS, OTC   Sports Medicine Assistant   [facility] [place]                 72

## 2023-02-01 NOTE — PROGRESS NOTE ADULT - ASSESSMENT
78F with dementia, T2DM, recently discharged about 1 week ago for CVA now presenting with poor PO intake, lethargy and hyperglycemia to 500s.     Altered mental status, UTI .   - Since recent stroke,  A&Ox0. More recently, poor po intake, lethargic. CTH no acute intracranial abnormalities. Multifactorial   - Multiple metabolic derangements - dehydration, hyperglycemia, hyperNa, hypoK  - S/P IVF D5 75 CC   - Monitor and replete electrolytes PRN   - UA with +leuk esterase, many bacteria. C/w ceftriaxone for now. F/u UCx -- E. coli  - on Ertapenem   - 01/19 BCx2 w/ Neg final   - 01/24 BCx2 w/ NGTD; F/u final   - Monitor fever curve, WBC trend   - Neuro eval consulted; F/u recs   - Aspiration precautions   - Discussed with family, agreed for NGT for feeding; NGT replaced ON 01/26   - Retention - replace gonzalez, check bladder US -- Noted, per attending discussion with family, family to decide on further work up     Uncontrolled type 2 diabetes mellitus with hyperglycemia.   - D/c on Metformin 500 mg BID  - Now with glucose 500s, improved to mid 300s with fluids. Also with mild ketosis (AG 17, bicarb 21, BHB 0.7)  - Endo eval appreciated, BHB now WNL  - Lantus 11 units QHs added to regimen & Sliding scale  - Monitor glucose levels closely, avoid hypo/hyperglycemia   - Check Ab given thin body habitus and ketosis (Glutamic Acid Decarboxylase, Zinc Transporter 8, Islet Cell Ab, and IA-2 Ab)  - S/PIVF administration  - Endo follow up; F/u recs      Cerebrovascular accident (CVA).  - Recent admission w/ Acute L MCA infarct, severe stenosis of the L M2 branch. MR with L MCA Territory infarcts   - CTH on admission showing No acute intracranial hemorrhage. 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 Per neurology, expected change seen on CT, not new lesions.   - C/w asa, plavix, statin.  - Neuro eval consulted; F/u recs   - Check MR brain -- C/F L sided Subacute infarcts, L frontal SAH; F/u neuro recs  - Checking CT head noted   - Hold lovenox DVT PPX  - NSGY eval appreciated - no int at this time  - Repeat CT H 01/30  - GI EVal for PEG tube -- plan for PEG today         Febrile  - Cx with E. Faecalis, E. Avium; UCx with E. coli   - 01/19 BCx2 W/ Neg final   - 01/24 repeat BCx2  --> Neg  final  - ID eval consulted; F/u recs  - ABX as per ID, switched to ertapenem , sensitive  - If recurrent fever, check CT as per ID   - Monitor CBC, temp curve, VS and patient closely     Anemia  - Drop in hemoglobin   - Checking Iron, B12, Folate, Ferritin, TIBC -- not consistent with deficiency and Occult   - Monitor H/H closely, monitor for signs/symptoms of bleeding    Hypernatremia.   - S/P D5 50 CC x 10 hours   - F/u DM management as per above  - Monitor Na level closely, avoid overocrrection   - C/w Free water, monitor levels closely     Pediculosis   - ID eval   - Nix as per protocol   - Tx today 01/26  - Contact precautions    HypoK  - Monitor and replete electrolytes PRN     Hyperlipidemia.   -c/w atorvastatin.    Hypertension.    -amlodipine 5mg daily.      PPX  - Lovenox 40 Qd

## 2023-02-01 NOTE — PROGRESS NOTE ADULT - ASSESSMENT
78F with dementia, T2DM, recently discharged about 1 week ago for CVA now presenting with poor PO intake, lethargy and hyperglycemia to 500s.   Of note, patient admitted 1/6 for R facial droop, word finding difficulties. Imaging concerning for acute L MCA infarct. Discharged home on 1/11. Majority of history obtained through family given mental status. Per family, since stroke patient nonverbal/unable to participate in meaningful communications, intermittently follows commands. For the last 3-4 days, patient with poor po intake. Reportedly only eating breakfast. Day of presentation, more lethargic with elevated finger sticks up to the 500s. Patient was evaluated by endocrine team during prior admission with recs to increase metformin to 1000mg BID. However, given poor PO intake, family has been giving 500mg daily.     In ED, afebrile, , 135/84. WBC 15, Na 158, K 3.0, Cl 120, Glu 468, alk phos 136m BHB 0.7. pH 7.36, lactate 2.2. UA: +leuk esterase, many bacteria, WBC 11-25  CXR: Redemonstrated biapical scarring and left upper lobe bronchiectasis, unchanged. No focal consolidation.   CTH No acute intracranial hemorrhage. 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  Given 1.5L NS, ceftriaxone x1, haldol 2.5mg x1 in ED.     A/P    Hypernatremia/ Hyponatremia   free water flush on hold    improving,   no labs today   optimize glucose   Avoid overcorrection >6-8meq a day   monitor Na closely     Hypocalcemia   2/2 low albumin  corrected Ca WNL   optimize albumin   monitor     Hypokalemia  stable   monitor K     Hypophosphatemia  Supplement as needed  Daily phosphorus     HTN  acceptable   monitor BP closely     Proteinuria  Currently has UTI would repeat and then quantify

## 2023-02-01 NOTE — PROCEDURE NOTE - NSINDICATIONS_GEN_A_CORE
urinry obstruction or retention

## 2023-02-01 NOTE — PROGRESS NOTE ADULT - SUBJECTIVE AND OBJECTIVE BOX
Neurology Progress Note    S: Patient seen and examined. MRI done, c/f infarcts. CTH neg for hemorrhage  ; endoscopy today     Medication:  MEDICATIONS  (STANDING):  aspirin  chewable 81 milliGRAM(s) Oral daily  atorvastatin 80 milliGRAM(s) Oral at bedtime  dextrose 5% + sodium chloride 0.9%. 1000 milliLiter(s) (75 mL/Hr) IV Continuous <Continuous>  dextrose 5%. 1000 milliLiter(s) (100 mL/Hr) IV Continuous <Continuous>  dextrose 50% Injectable 25 Gram(s) IV Push once  dextrose 50% Injectable 12.5 Gram(s) IV Push once  dextrose 50% Injectable 25 Gram(s) IV Push once  dextrose Oral Gel 15 Gram(s) Oral once  ertapenem  IVPB 1000 milliGRAM(s) IV Intermittent every 24 hours  glucagon  Injectable 1 milliGRAM(s) IntraMuscular once  insulin lispro (ADMELOG) corrective regimen sliding scale   SubCutaneous every 6 hours  tamsulosin 0.4 milliGRAM(s) Oral at bedtime    MEDICATIONS  (PRN):  acetaminophen    Suspension .. 650 milliGRAM(s) Enteral Tube every 6 hours PRN Temp greater or equal to 38C (100.4F)  l to 38C (100.4F)    Vitals:    Vital Signs Last 24 Hrs  T(C): 36.9 (02-01-23 @ 10:11), Max: 37.4 (01-31-23 @ 14:46)  T(F): 98.4 (02-01-23 @ 10:11), Max: 99.3 (01-31-23 @ 14:46)  HR: 83 (02-01-23 @ 04:59) (83 - 99)  BP: 108/65 (02-01-23 @ 10:11) (106/74 - 156/64)  BP(mean): --  RR: 18 (02-01-23 @ 04:59) (18 - 18)  SpO2: 98% (02-01-23 @ 10:11) (93% - 98%)          General Exam:   General Appearance: Appropriately dressed and in no acute distress       Head: Normocephalic, atraumatic and no dysmorphic features  Ear, Nose, and Throat: Moist mucous membranes  CVS: S1S2+  Resp: No SOB, no wheeze or rhonchi  Abd: soft NTND  Extremities: No edema, no cyanosis  Skin: No bruises, no rashes        NEUROLOGICAL EXAM:    Mental status: Eyes open, awake, alert, attempts to produce speech, able to follow some simple commands, able to mimic at times , unable to ID objects  Cranial Nerves: Right facial droop, no nystagmus, blinks to threat B/L  Motor exam: Normal tone, no drift, Right hemiparesis RUE drift, 3-44/5, RLE drift, 3-4/5,, LUE 5/5, LLE 5/5  Sensation: Intact to light touch   Coordination/ Gait: Unable to participate with exam     I personally reviewed the below data/images/labs:  01-31    133<L>  |  98  |  16  ----------------------------<  207<H>  4.2   |  28  |  0.56    Ca    8.2<L>      31 Jan 2023 11:53          `< from: CT Head No Cont (01.19.23 @ 13:45) >    ACC: [id number] EXAM:  CT BRAIN   ORDERED BY: CORINNE MADERA     PROCEDURE DATE:  01/19/2023           INTERPRETATION:  CLINICAL STATEMENT: Altered mental status    TECHNIQUE: CT of the head was performed without IV contrast.  RAPID   artificial intelligence was utilized for the preliminary evaluation of   intracranial hemorrhage.    COMPARISON: MRI brain 1/7/2023.    FINDINGS:  There is mild diffuse parenchymal volume loss. There are areas of low   attenuation in the periventricular white matter likely related to mild   chronic microvascular ischemic changes.    Evolving small infarcts noted in the left corona radiata.   Age-indeterminate infarct also noted in the left temporal and parietal   lobe    There is no acute intracranial hemorrhage, parenchymal mass, mass effect   or midline shift.. There is no hydrocephalus. Partial empty sella noted    The cranium is intact. Calcification noted adjacent to left frontal   artery table. The visualized paranasal sinuses are well-aerated.        IMPRESSION:  No acute intracranial hemorrhage.    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    --- End of Report ---        < from: MR Head No Cont (01.26.23 @ 22:08) >    ACC: [id number] EXAM:  MR BRAIN   ORDERED BY: POLLO LYON     PROCEDURE DATE:  01/26/2023          INTERPRETATION:  CLINICAL STATEMENT: Multiple left MCA territory   infarcts. Altered mental status.    TECHNIQUE: Multiplanar multisequence noncontrast MRI of the brain was   performed. Diffusion weighted imaging was performed. Significant image   degradation by motion artifact.  COMPARISON: MRI brain 1/7/2023.    FINDINGS:    Corpus callosum, pituitary and pineal regions appear unremarkable. No  tonsillar herniation or evidence of marrow replacement process.   Hyperostosis frontalis. Degenerative changes visualized cervical spine.    CP angle and IAC regions appear within normal limits, as do the globes,   intraconal regions and major intracranial flow-voids. No paranasal sinus   air-fluid levels or opacification is evident.    New curvilinear-serpiginous susceptibility associated with the high left   frontal lobe. No evidence of acute hemorrhage. Slightly less pronounced   confluent restricted diffusion left lateral temporal lobe. Multiple less   pronounced foci of restricted diffusion with T2-FLAIR prolongation   centered in the left corona radiata, with extension into the ipsilateral   centrum semiovale. Several additional smaller less pronounced foci of   restricted diffusion left parieto-occipital and left posterior temporal   juxtacortical white matter as well as cortically based serpiginous   restricted diffusion left posterior temporal lobe.    IMPRESSION:    Compared with MRI Brain 1/7/2023.    1. Multiple evolving LEFT-SIDED subacute infarcts, as above.  2. Findings suspicious for high LEFT frontal subarachnoid hemorrhage.   Recommend correlation with noncontrast CT of the brain.  3. Additional findings above.    Findings and recommendations discussed in detail with PADMINI Lyon at the   time of this interpretation.    --- En   < from: CT Head No Cont (01.27.23 @ 19:09) >    ACC: [id number] EXAM:  CT BRAIN   ORDERED BY: POLLO LYON     PROCEDURE DATE:  01/27/2023          INTERPRETATION:  CLINICAL INFORMATION: Subacute infarcts with possible   new subarachnoid hemorrhage on MRI.    TECHNIQUE:  Axial CT images were acquired through the head.  Intravenous contrast: None  Two-dimensional reformats were generated.    COMPARISON STUDY: Brain MRIs from 1/7/2023 and 1/26/2023.    FINDINGS:  There are evolving subacute infarcts in the left temporal lobe anteriorly   and within the left corona radiata.  There is no CT evidence of acute   intracranial hemorrhage, mass effect, midline shift or hydrocephalus.    There is mild generalized cerebral volume loss.    The paranasal sinuses are grossly clear.    The mastoids are underpneumatized bilaterally; no clinically significant   mastoid or middle ear effusion is visualized.    The patient is status post right-sided intraocular lens replacement.    The calvarium and skull base appear within normal limits..    IMPRESSION:  Evolving subacute infarcts in the left temporal lobe anteriorly and   within the left corona radiata.  No evidence of acute intracranial   hemorrhage.  No subarachnoid hemorrhage in the left frontal region as   questioned on prior MRI.    --- Endof Report ---    < from: CT Head No Cont (01.30.23 @ 23:02) >    ACC: [id number] EXAM:  CT BRAIN   ORDERED BY: ESAU OAKLEY     PROCEDURE DATE:  01/30/2023          INTERPRETATION:  Noncontrast CT of the brain.    CLINICAL INDICATION:  Altered mental status. Acute left MCA territory   infarcts.    TECHNIQUE : Axial CT scanning of the brain was obtained from the skull   base to the vertex without the administration of intravenous contrast.   Sagittal and coronal reformats were provided.    COMPARISON: MRI brain 1/26/2023. CT brain 1/27/2023.    FINDINGS:    Redemonstration of scattered acute left MCA territory infarcts. No CT   evidence for hemorrhagic transformation.    No hydrocephalus, midline shift, or effacement of basal cisterns.    No acute intracranial hemorrhage.    The visualized paranasal sinuses and mastoid air cells are clear.    IMPRESSION:    No significant interval change.    Redemonstration of scattered acute left MCA territory infarcts. No CT   evidence for hemorrhagic transformation.      --- End of Report ---            CHELSIE RYAN MD; Attending Radiologist  This document has been electronically signed. Jan 31 2023  9:12AM    < end of copied text >      Neurology Progress Note    S: Patient seen and examined. MRI done, c/f infarcts. CTH neg for hemorrhage  ; endoscopy today     Medication:  MEDICATIONS  (STANDING):  aspirin  chewable 81 milliGRAM(s) Oral daily  atorvastatin 80 milliGRAM(s) Oral at bedtime  dextrose 5% + sodium chloride 0.9%. 1000 milliLiter(s) (75 mL/Hr) IV Continuous <Continuous>  dextrose 5%. 1000 milliLiter(s) (100 mL/Hr) IV Continuous <Continuous>  dextrose 50% Injectable 25 Gram(s) IV Push once  dextrose 50% Injectable 12.5 Gram(s) IV Push once  dextrose 50% Injectable 25 Gram(s) IV Push once  dextrose Oral Gel 15 Gram(s) Oral once  ertapenem  IVPB 1000 milliGRAM(s) IV Intermittent every 24 hours  glucagon  Injectable 1 milliGRAM(s) IntraMuscular once  insulin lispro (ADMELOG) corrective regimen sliding scale   SubCutaneous every 6 hours  tamsulosin 0.4 milliGRAM(s) Oral at bedtime    MEDICATIONS  (PRN):  acetaminophen    Suspension .. 650 milliGRAM(s) Enteral Tube every 6 hours PRN Temp greater or equal to 38C (100.4F)  l to 38C (100.4F)    Vitals:    Vital Signs Last 24 Hrs  T(C): 36.9 (02-01-23 @ 10:11), Max: 37.4 (01-31-23 @ 14:46)  T(F): 98.4 (02-01-23 @ 10:11), Max: 99.3 (01-31-23 @ 14:46)  HR: 83 (02-01-23 @ 04:59) (83 - 99)  BP: 108/65 (02-01-23 @ 10:11) (106/74 - 156/64)  BP(mean): --  RR: 18 (02-01-23 @ 04:59) (18 - 18)  SpO2: 98% (02-01-23 @ 10:11) (93% - 98%)          General Exam:   General Appearance: Appropriately dressed and in no acute distress       Head: Normocephalic, atraumatic and no dysmorphic features  Ear, Nose, and Throat: Moist mucous membranes  CVS: S1S2+  Resp: No SOB, no wheeze or rhonchi  Abd: soft NTND  Extremities: No edema, no cyanosis  Skin: No bruises, no rashes        NEUROLOGICAL EXAM:    Mental status: Eyes open, awake, alert, attempts to produce speech, able to follow some simple commands, able to mimic at times , unable to ID objects  Cranial Nerves: Right facial droop, no nystagmus, blinks to threat B/L  Motor exam: Normal tone, no drift, Right hemiparesis RUE drift, 3-44/5, RLE drift, 3-4/5,, LUE 5/5, LLE 5/5  Sensation: Intact to light touch   Coordination/ Gait: Unable to participate with exam     I personally reviewed the below data/images/labs:  01-31    133<L>  |  98  |  16  ----------------------------<  207<H>  4.2   |  28  |  0.56    Ca    8.2<L>      31 Jan 2023 11:53          `< from: CT Head No Cont (01.19.23 @ 13:45) >    ACC: [id number] EXAM:  CT BRAIN   ORDERED BY: CORINNE MADERA     PROCEDURE DATE:  01/19/2023           INTERPRETATION:  CLINICAL STATEMENT: Altered mental status    TECHNIQUE: CT of the head was performed without IV contrast.  RAPID   artificial intelligence was utilized for the preliminary evaluation of   intracranial hemorrhage.    COMPARISON: MRI brain 1/7/2023.    FINDINGS:  There is mild diffuse parenchymal volume loss. There are areas of low   attenuation in the periventricular white matter likely related to mild   chronic microvascular ischemic changes.    Evolving small infarcts noted in the left corona radiata.   Age-indeterminate infarct also noted in the left temporal and parietal   lobe    There is no acute intracranial hemorrhage, parenchymal mass, mass effect   or midline shift.. There is no hydrocephalus. Partial empty sella noted    The cranium is intact. Calcification noted adjacent to left frontal   artery table. The visualized paranasal sinuses are well-aerated.        IMPRESSION:  No acute intracranial hemorrhage.    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    --- End of Report ---        < from: MR Head No Cont (01.26.23 @ 22:08) >    ACC: [id number] EXAM:  MR BRAIN   ORDERED BY: POLLO LYON     PROCEDURE DATE:  01/26/2023          INTERPRETATION:  CLINICAL STATEMENT: Multiple left MCA territory   infarcts. Altered mental status.    TECHNIQUE: Multiplanar multisequence noncontrast MRI of the brain was   performed. Diffusion weighted imaging was performed. Significant image   degradation by motion artifact.  COMPARISON: MRI brain 1/7/2023.    FINDINGS:    Corpus callosum, pituitary and pineal regions appear unremarkable. No  tonsillar herniation or evidence of marrow replacement process.   Hyperostosis frontalis. Degenerative changes visualized cervical spine.    CP angle and IAC regions appear within normal limits, as do the globes,   intraconal regions and major intracranial flow-voids. No paranasal sinus   air-fluid levels or opacification is evident.    New curvilinear-serpiginous susceptibility associated with the high left   frontal lobe. No evidence of acute hemorrhage. Slightly less pronounced   confluent restricted diffusion left lateral temporal lobe. Multiple less   pronounced foci of restricted diffusion with T2-FLAIR prolongation   centered in the left corona radiata, with extension into the ipsilateral   centrum semiovale. Several additional smaller less pronounced foci of   restricted diffusion left parieto-occipital and left posterior temporal   juxtacortical white matter as well as cortically based serpiginous   restricted diffusion left posterior temporal lobe.    IMPRESSION:    Compared with MRI Brain 1/7/2023.    1. Multiple evolving LEFT-SIDED subacute infarcts, as above.  2. Findings suspicious for high LEFT frontal subarachnoid hemorrhage.   Recommend correlation with noncontrast CT of the brain.  3. Additional findings above.    Findings and recommendations discussed in detail with PADMINI Lyon at the   time of this interpretation.    --- En   < from: CT Head No Cont (01.27.23 @ 19:09) >    ACC: [id number] EXAM:  CT BRAIN   ORDERED BY: POLLO LYON     PROCEDURE DATE:  01/27/2023          INTERPRETATION:  CLINICAL INFORMATION: Subacute infarcts with possible   new subarachnoid hemorrhage on MRI.    TECHNIQUE:  Axial CT images were acquired through the head.  Intravenous contrast: None  Two-dimensional reformats were generated.    COMPARISON STUDY: Brain MRIs from 1/7/2023 and 1/26/2023.    FINDINGS:  There are evolving subacute infarcts in the left temporal lobe anteriorly   and within the left corona radiata.  There is no CT evidence of acute   intracranial hemorrhage, mass effect, midline shift or hydrocephalus.    There is mild generalized cerebral volume loss.    The paranasal sinuses are grossly clear.    The mastoids are underpneumatized bilaterally; no clinically significant   mastoid or middle ear effusion is visualized.    The patient is status post right-sided intraocular lens replacement.    The calvarium and skull base appear within normal limits..    IMPRESSION:  Evolving subacute infarcts in the left temporal lobe anteriorly and   within the left corona radiata.  No evidence of acute intracranial   hemorrhage.  No subarachnoid hemorrhage in the left frontal region as   questioned on prior MRI.    --- Endof Report ---    < from: CT Head No Cont (01.30.23 @ 23:02) >    ACC: [id number] EXAM:  CT BRAIN   ORDERED BY: ESAU OAKLEY     PROCEDURE DATE:  01/30/2023          INTERPRETATION:  Noncontrast CT of the brain.    CLINICAL INDICATION:  Altered mental status. Acute left MCA territory   infarcts.    TECHNIQUE : Axial CT scanning of the brain was obtained from the skull   base to the vertex without the administration of intravenous contrast.   Sagittal and coronal reformats were provided.    COMPARISON: MRI brain 1/26/2023. CT brain 1/27/2023.    FINDINGS:    Redemonstration of scattered acute left MCA territory infarcts. No CT   evidence for hemorrhagic transformation.    No hydrocephalus, midline shift, or effacement of basal cisterns.    No acute intracranial hemorrhage.    The visualized paranasal sinuses and mastoid air cells are clear.    IMPRESSION:    No significant interval change.    Redemonstration of scattered acute left MCA territory infarcts. No CT   evidence for hemorrhagic transformation.      --- End of Report ---            CHELSIE RYAN MD; Attending Radiologist  This document has been electronically signed. Jan 31 2023  9:12AM    < end of copied text >      Neurology Progress Note    S: Patient seen and examined. MRI done, c/f infarcts. CTH neg for hemorrhage  ; endoscopy today     Medication:  MEDICATIONS  (STANDING):  aspirin  chewable 81 milliGRAM(s) Oral daily  atorvastatin 80 milliGRAM(s) Oral at bedtime  dextrose 5% + sodium chloride 0.9%. 1000 milliLiter(s) (75 mL/Hr) IV Continuous <Continuous>  dextrose 5%. 1000 milliLiter(s) (100 mL/Hr) IV Continuous <Continuous>  dextrose 50% Injectable 25 Gram(s) IV Push once  dextrose 50% Injectable 12.5 Gram(s) IV Push once  dextrose 50% Injectable 25 Gram(s) IV Push once  dextrose Oral Gel 15 Gram(s) Oral once  ertapenem  IVPB 1000 milliGRAM(s) IV Intermittent every 24 hours  glucagon  Injectable 1 milliGRAM(s) IntraMuscular once  insulin lispro (ADMELOG) corrective regimen sliding scale   SubCutaneous every 6 hours  tamsulosin 0.4 milliGRAM(s) Oral at bedtime    MEDICATIONS  (PRN):  acetaminophen    Suspension .. 650 milliGRAM(s) Enteral Tube every 6 hours PRN Temp greater or equal to 38C (100.4F)  l to 38C (100.4F)    Vitals:    Vital Signs Last 24 Hrs  T(C): 36.9 (02-01-23 @ 10:11), Max: 37.4 (01-31-23 @ 14:46)  T(F): 98.4 (02-01-23 @ 10:11), Max: 99.3 (01-31-23 @ 14:46)  HR: 83 (02-01-23 @ 04:59) (83 - 99)  BP: 108/65 (02-01-23 @ 10:11) (106/74 - 156/64)  BP(mean): --  RR: 18 (02-01-23 @ 04:59) (18 - 18)  SpO2: 98% (02-01-23 @ 10:11) (93% - 98%)          General Exam:   General Appearance: Appropriately dressed and in no acute distress       Head: Normocephalic, atraumatic and no dysmorphic features  Ear, Nose, and Throat: Moist mucous membranes  CVS: S1S2+  Resp: No SOB, no wheeze or rhonchi  Abd: soft NTND  Extremities: No edema, no cyanosis  Skin: No bruises, no rashes        NEUROLOGICAL EXAM:    Mental status: Eyes open, awake, alert, attempts to produce speech, able to follow some simple commands, able to mimic at times , unable to ID objects  Cranial Nerves: Right facial droop, no nystagmus, blinks to threat B/L  Motor exam: Normal tone, no drift, Right hemiparesis RUE drift, 3-44/5, RLE drift, 3-4/5,, LUE 5/5, LLE 5/5  Sensation: Intact to light touch   Coordination/ Gait: Unable to participate with exam     I personally reviewed the below data/images/labs:  01-31    133<L>  |  98  |  16  ----------------------------<  207<H>  4.2   |  28  |  0.56    Ca    8.2<L>      31 Jan 2023 11:53          `< from: CT Head No Cont (01.19.23 @ 13:45) >    ACC: [id number] EXAM:  CT BRAIN   ORDERED BY: CORINNE MADERA     PROCEDURE DATE:  01/19/2023           INTERPRETATION:  CLINICAL STATEMENT: Altered mental status    TECHNIQUE: CT of the head was performed without IV contrast.  RAPID   artificial intelligence was utilized for the preliminary evaluation of   intracranial hemorrhage.    COMPARISON: MRI brain 1/7/2023.    FINDINGS:  There is mild diffuse parenchymal volume loss. There are areas of low   attenuation in the periventricular white matter likely related to mild   chronic microvascular ischemic changes.    Evolving small infarcts noted in the left corona radiata.   Age-indeterminate infarct also noted in the left temporal and parietal   lobe    There is no acute intracranial hemorrhage, parenchymal mass, mass effect   or midline shift.. There is no hydrocephalus. Partial empty sella noted    The cranium is intact. Calcification noted adjacent to left frontal   artery table. The visualized paranasal sinuses are well-aerated.        IMPRESSION:  No acute intracranial hemorrhage.    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    --- End of Report ---        < from: MR Head No Cont (01.26.23 @ 22:08) >    ACC: [id number] EXAM:  MR BRAIN   ORDERED BY: POLLO LYON     PROCEDURE DATE:  01/26/2023          INTERPRETATION:  CLINICAL STATEMENT: Multiple left MCA territory   infarcts. Altered mental status.    TECHNIQUE: Multiplanar multisequence noncontrast MRI of the brain was   performed. Diffusion weighted imaging was performed. Significant image   degradation by motion artifact.  COMPARISON: MRI brain 1/7/2023.    FINDINGS:    Corpus callosum, pituitary and pineal regions appear unremarkable. No  tonsillar herniation or evidence of marrow replacement process.   Hyperostosis frontalis. Degenerative changes visualized cervical spine.    CP angle and IAC regions appear within normal limits, as do the globes,   intraconal regions and major intracranial flow-voids. No paranasal sinus   air-fluid levels or opacification is evident.    New curvilinear-serpiginous susceptibility associated with the high left   frontal lobe. No evidence of acute hemorrhage. Slightly less pronounced   confluent restricted diffusion left lateral temporal lobe. Multiple less   pronounced foci of restricted diffusion with T2-FLAIR prolongation   centered in the left corona radiata, with extension into the ipsilateral   centrum semiovale. Several additional smaller less pronounced foci of   restricted diffusion left parieto-occipital and left posterior temporal   juxtacortical white matter as well as cortically based serpiginous   restricted diffusion left posterior temporal lobe.    IMPRESSION:    Compared with MRI Brain 1/7/2023.    1. Multiple evolving LEFT-SIDED subacute infarcts, as above.  2. Findings suspicious for high LEFT frontal subarachnoid hemorrhage.   Recommend correlation with noncontrast CT of the brain.  3. Additional findings above.    Findings and recommendations discussed in detail with PADMINI Lyon at the   time of this interpretation.    --- En   < from: CT Head No Cont (01.27.23 @ 19:09) >    ACC: [id number] EXAM:  CT BRAIN   ORDERED BY: POLLO LYON     PROCEDURE DATE:  01/27/2023          INTERPRETATION:  CLINICAL INFORMATION: Subacute infarcts with possible   new subarachnoid hemorrhage on MRI.    TECHNIQUE:  Axial CT images were acquired through the head.  Intravenous contrast: None  Two-dimensional reformats were generated.    COMPARISON STUDY: Brain MRIs from 1/7/2023 and 1/26/2023.    FINDINGS:  There are evolving subacute infarcts in the left temporal lobe anteriorly   and within the left corona radiata.  There is no CT evidence of acute   intracranial hemorrhage, mass effect, midline shift or hydrocephalus.    There is mild generalized cerebral volume loss.    The paranasal sinuses are grossly clear.    The mastoids are underpneumatized bilaterally; no clinically significant   mastoid or middle ear effusion is visualized.    The patient is status post right-sided intraocular lens replacement.    The calvarium and skull base appear within normal limits..    IMPRESSION:  Evolving subacute infarcts in the left temporal lobe anteriorly and   within the left corona radiata.  No evidence of acute intracranial   hemorrhage.  No subarachnoid hemorrhage in the left frontal region as   questioned on prior MRI.    --- Endof Report ---    < from: CT Head No Cont (01.30.23 @ 23:02) >    ACC: [id number] EXAM:  CT BRAIN   ORDERED BY: ESAU OAKLEY     PROCEDURE DATE:  01/30/2023          INTERPRETATION:  Noncontrast CT of the brain.    CLINICAL INDICATION:  Altered mental status. Acute left MCA territory   infarcts.    TECHNIQUE : Axial CT scanning of the brain was obtained from the skull   base to the vertex without the administration of intravenous contrast.   Sagittal and coronal reformats were provided.    COMPARISON: MRI brain 1/26/2023. CT brain 1/27/2023.    FINDINGS:    Redemonstration of scattered acute left MCA territory infarcts. No CT   evidence for hemorrhagic transformation.    No hydrocephalus, midline shift, or effacement of basal cisterns.    No acute intracranial hemorrhage.    The visualized paranasal sinuses and mastoid air cells are clear.    IMPRESSION:    No significant interval change.    Redemonstration of scattered acute left MCA territory infarcts. No CT   evidence for hemorrhagic transformation.      --- End of Report ---            CHELSIE RYAN MD; Attending Radiologist  This document has been electronically signed. Jan 31 2023  9:12AM    < end of copied text >

## 2023-02-01 NOTE — PRE PROCEDURE NOTE - PRE PROCEDURE EVALUATION
Attending Physician:               jaya             Procedure: egd peg    Indication for Procedure: ftt  ________________________________________________________  PAST MEDICAL & SURGICAL HISTORY:  Dementia      CVA (cerebrovascular accident)      DM (diabetes mellitus)      No significant past surgical history        ALLERGIES:  Benedryl Allergy Sinus (Hives)  penicillin (Rash)    HOME MEDICATIONS:    AICD/PPM: [ ] yes   [ ] no    PERTINENT LAB DATA:    01-31    133<L>  |  98  |  16  ----------------------------<  207<H>  4.2   |  28  |  0.56    Ca    8.2<L>      31 Jan 2023 11:53                  PHYSICAL EXAMINATION:    T(C): 36.9  HR: 83  BP: 108/65  RR: 18  SpO2: 98%    Constitutional: NAD  HEENT: PERRLA, EOMI,    Neck:  No JVD  Respiratory: CTAB/L  Cardiovascular: S1 and S2  Gastrointestinal: BS+, soft, NT/ND  Extremities: No peripheral edema  Neurological: A/O x 3, no focal deficits  Psychiatric: Normal mood, normal affect  Skin: No rashes    ASA Class: I [ ]  II [ ]  III [ x]  IV [ ]    COMMENTS:    The patient is a suitable candidate for the planned procedure unless box checked [ ]  No, explain:

## 2023-02-01 NOTE — PROGRESS NOTE ADULT - ASSESSMENT
78F with dementia, T2DM, recently discharged about 1 week ago for CVA now presenting with poor PO intake, lethargy and hyperglycemia to 500s.   Of note, patient admitted 1/6 for R facial droop, word finding difficulties. Imaging concerning for acute L MCA infarct.  Per family, since stroke patient nonverbal/unable to participate in meaningful communications, intermittently follows commands. For the last 3-4 days, patient with poor po intake.    In ED, afebrile, , 135/84. WBC 15, Na 158, K 3.0, Cl 120, Glu 468, alk phos 136m BHB 0.7. pH 7.36, lactate 2.2.   UA: +leuk esterase, many bacteria, WBC 11-25  CXR: Redemonstrated biapical scarring and left upper lobe bronchiectasis, unchanged. No focal consolidation.   CTH No acute intracranial hemorrhage. 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  Given 1.5L NS, ceftriaxone x1, haldol 2.5mg x1 in ED.   last admission: CTA with L MCA severe stenosis; distal L M2 occlusion .  TTE 1/8/23: EF 63% Mild mitral regurgitation ; A1c 9.4   MRI brain with evolving subacute infarcts as expected. question of SAH   CTH 1/27 evolviong subacute L hemisphere infarcts. no SAH found.   CTH 1/30 unchanged ; scaterred L MCA infarct no hemorrhage   Impression  1) AMS likely 2/2 infection/UTI toxic metabolic encephalopahthy   2) recent stroke - L MCA infarct 2/2 symptomatic L MCA ICAD ; worsening of R HP in setting of infection     - GI for possible PEG, may need to stop plavix, no objection ; endoscopy today 2/1   - Nsx eval--> No intervention.  despite there note stating SAH, no comment to stop anti thrombotics.  nevertheless CTH read is neg for SAH.  no hemorrhage on CTH   - now on contact   - mittens. NGT replaced 1/26 early AM, f/u CXR   - f/u endocrine   - treatment of infection per primary team -->  CTX  - for secondary stroke prevention. DAPT x 3 months followeed by ASA 81mg dialy thereafter.   - hihg dose statin lipitor 40mg dialy   - avoid hypotension given L MCA ICAD   - telemetry  - PT/OT/SS/SLP, OOBC  - check FS, glucose control <180  - GI/DVT ppx  - Thank you for allowing me to participate in the care of this patient. Call with questions.      Reji Greco MD  Vascular Neurology  Office: [phone]  78F with dementia, T2DM, recently discharged about 1 week ago for CVA now presenting with poor PO intake, lethargy and hyperglycemia to 500s.   Of note, patient admitted 1/6 for R facial droop, word finding difficulties. Imaging concerning for acute L MCA infarct.  Per family, since stroke patient nonverbal/unable to participate in meaningful communications, intermittently follows commands. For the last 3-4 days, patient with poor po intake.    In ED, afebrile, , 135/84. WBC 15, Na 158, K 3.0, Cl 120, Glu 468, alk phos 136m BHB 0.7. pH 7.36, lactate 2.2.   UA: +leuk esterase, many bacteria, WBC 11-25  CXR: Redemonstrated biapical scarring and left upper lobe bronchiectasis, unchanged. No focal consolidation.   CTH No acute intracranial hemorrhage. 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  Given 1.5L NS, ceftriaxone x1, haldol 2.5mg x1 in ED.   last admission: CTA with L MCA severe stenosis; distal L M2 occlusion .  TTE 1/8/23: EF 63% Mild mitral regurgitation ; A1c 9.4   MRI brain with evolving subacute infarcts as expected. question of SAH   CTH 1/27 evolviong subacute L hemisphere infarcts. no SAH found.   CTH 1/30 unchanged ; scaterred L MCA infarct no hemorrhage   Impression  1) AMS likely 2/2 infection/UTI toxic metabolic encephalopahthy   2) recent stroke - L MCA infarct 2/2 symptomatic L MCA ICAD ; worsening of R HP in setting of infection     - GI for possible PEG, may need to stop plavix, no objection ; endoscopy today 2/1   - Nsx eval--> No intervention.  despite there note stating SAH, no comment to stop anti thrombotics.  nevertheless CTH read is neg for SAH.  no hemorrhage on CTH   - now on contact   - mittens. NGT replaced 1/26 early AM, f/u CXR   - f/u endocrine   - treatment of infection per primary team -->  CTX  - for secondary stroke prevention. DAPT x 3 months followeed by ASA 81mg dialy thereafter.   - hihg dose statin lipitor 40mg dialy   - avoid hypotension given L MCA ICAD   - telemetry  - PT/OT/SS/SLP, OOBC  - check FS, glucose control <180  - GI/DVT ppx  - Thank you for allowing me to participate in the care of this patient. Call with questions.      Reji Greco MD  Vascular Neurology  Office: [phone]  78F with dementia, T2DM, recently discharged about 1 week ago for CVA now presenting with poor PO intake, lethargy and hyperglycemia to 500s.   Of note, patient admitted 1/6 for R facial droop, word finding difficulties. Imaging concerning for acute L MCA infarct.  Per family, since stroke patient nonverbal/unable to participate in meaningful communications, intermittently follows commands. For the last 3-4 days, patient with poor po intake.    In ED, afebrile, , 135/84. WBC 15, Na 158, K 3.0, Cl 120, Glu 468, alk phos 136m BHB 0.7. pH 7.36, lactate 2.2.   UA: +leuk esterase, many bacteria, WBC 11-25  CXR: Redemonstrated biapical scarring and left upper lobe bronchiectasis, unchanged. No focal consolidation.   CTH No acute intracranial hemorrhage. 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  Given 1.5L NS, ceftriaxone x1, haldol 2.5mg x1 in ED.   last admission: CTA with L MCA severe stenosis; distal L M2 occlusion .  TTE 1/8/23: EF 63% Mild mitral regurgitation ; A1c 9.4   MRI brain with evolving subacute infarcts as expected. question of SAH   CTH 1/27 evolviong subacute L hemisphere infarcts. no SAH found.   CTH 1/30 unchanged ; scaterred L MCA infarct no hemorrhage   Impression  1) AMS likely 2/2 infection/UTI toxic metabolic encephalopahthy   2) recent stroke - L MCA infarct 2/2 symptomatic L MCA ICAD ; worsening of R HP in setting of infection     - GI for possible PEG, may need to stop plavix, no objection ; endoscopy today 2/1   - Nsx eval--> No intervention.  despite there note stating SAH, no comment to stop anti thrombotics.  nevertheless CTH read is neg for SAH.  no hemorrhage on CTH   - now on contact   - mittens. NGT replaced 1/26 early AM, f/u CXR   - f/u endocrine   - treatment of infection per primary team -->  CTX  - for secondary stroke prevention. DAPT x 3 months followeed by ASA 81mg dialy thereafter.   - hihg dose statin lipitor 40mg dialy   - avoid hypotension given L MCA ICAD   - telemetry  - PT/OT/SS/SLP, OOBC  - check FS, glucose control <180  - GI/DVT ppx  - Thank you for allowing me to participate in the care of this patient. Call with questions.      Reji Greco MD  Vascular Neurology  Office: [phone]

## 2023-02-01 NOTE — PROGRESS NOTE ADULT - SUBJECTIVE AND OBJECTIVE BOX
Chief Complaint:  Patient is a 78y old  Female who presents with a chief complaint of AMS, elevated finger sticks (01 Feb 2023 11:38)      Date of service 02-01-23 @ 13:48      Interval Events:   Patient seen and examined.   s/p PEG placement.     Hospital Medications:  acetaminophen    Suspension .. 650 milliGRAM(s) Enteral Tube every 6 hours PRN  aspirin  chewable 81 milliGRAM(s) Oral daily  atorvastatin 80 milliGRAM(s) Oral at bedtime  dextrose 5% + sodium chloride 0.9%. 1000 milliLiter(s) IV Continuous <Continuous>  dextrose 5%. 1000 milliLiter(s) IV Continuous <Continuous>  dextrose 50% Injectable 25 Gram(s) IV Push once  dextrose 50% Injectable 12.5 Gram(s) IV Push once  dextrose 50% Injectable 25 Gram(s) IV Push once  dextrose Oral Gel 15 Gram(s) Oral once  ertapenem  IVPB 1000 milliGRAM(s) IV Intermittent every 24 hours  glucagon  Injectable 1 milliGRAM(s) IntraMuscular once  insulin lispro (ADMELOG) corrective regimen sliding scale   SubCutaneous every 6 hours  pantoprazole   Suspension 40 milliGRAM(s) Oral two times a day  tamsulosin 0.4 milliGRAM(s) Oral at bedtime        Review of Systems:  unable to obtain    PHYSICAL EXAM:   Vital Signs:  Vital Signs Last 24 Hrs  T(C): 36.9 (01 Feb 2023 11:37), Max: 37.4 (31 Jan 2023 14:46)  T(F): 98.4 (01 Feb 2023 10:11), Max: 99.3 (31 Jan 2023 14:46)  HR: 84 (01 Feb 2023 12:58) (83 - 99)  BP: 155/67 (01 Feb 2023 12:58) (106/74 - 156/64)  BP(mean): 73 (01 Feb 2023 11:37) (73 - 73)  RR: 16 (01 Feb 2023 12:58) (16 - 18)  SpO2: 99% (01 Feb 2023 12:58) (93% - 99%)    Parameters below as of 01 Feb 2023 12:58  Patient On (Oxygen Delivery Method): room air      Daily Height in cm: 157.48 (01 Feb 2023 11:37)    Daily       PHYSICAL EXAM:     GENERAL:  Appears stated age, well-groomed, well-nourished, no distress  HEENT:  NC/AT,  conjunctivae anicteric, clear and pink,   NECK: supple, trachea midline  CHEST:  Full & symmetric excursion, no increased effort, breath sounds clear  HEART:  Regular rhythm, no JVD  ABDOMEN:  Soft, non-tender, non-distended, normoactive bowel sounds,  no masses , no hepatosplenomegaly  EXTREMITIES:  no cyanosis,clubbing or edema  SKIN:  No rash, erythema, or, ecchymoses, no jaundice  NEURO:  non-focal, no asterixis  RECTAL: Deferred      LABS Personally reviewed by me:      01-31    133<L>  |  98  |  16  ----------------------------<  207<H>  4.2   |  28  |  0.56    Ca    8.2<L>      31 Jan 2023 11:53                                    8.5    6.08  )-----------( 372      ( 30 Jan 2023 07:01 )             27.0       Imaging personally reviewed by me:

## 2023-02-01 NOTE — PROGRESS NOTE ADULT - SUBJECTIVE AND OBJECTIVE BOX
Diabetes Follow up note:    Chief complaint: T2DM    Interval Hx: Pt w/hypoglycemia overnight while off TF. Received 1/2 amp D50. D5 iv fluids increased to 75 cc/hr. Pt NPO today awaiting PEG placement.     Review of Systems:  unable to provide ROS 2/2 AMS    MEDS:  atorvastatin 80 milliGRAM(s) Oral at bedtime  insulin lispro (ADMELOG) corrective regimen sliding scale   SubCutaneous every 6 hours    ertapenem  IVPB 1000 milliGRAM(s) IV Intermittent every 24 hours    Allergies    Benedryl Allergy Sinus (Hives)  penicillin (Rash)        PE:  General: Female lying in bed. NAD.   Vital Signs Last 24 Hrs  T(C): 36.9 (01 Feb 2023 10:11), Max: 37.4 (31 Jan 2023 14:46)  T(F): 98.4 (01 Feb 2023 10:11), Max: 99.3 (31 Jan 2023 14:46)  HR: 83 (01 Feb 2023 04:59) (83 - 99)  BP: 108/65 (01 Feb 2023 10:11) (106/74 - 156/64)  BP(mean): --  RR: 18 (01 Feb 2023 04:59) (18 - 18)  SpO2: 98% (01 Feb 2023 10:11) (93% - 98%)    Parameters below as of 01 Feb 2023 04:59  Patient On (Oxygen Delivery Method): room air      Abd: Soft, NT,ND,   Extremities: Warm  Neuro: Alert.     LABS:  POCT Blood Glucose.: 87 mg/dL (02-01-23 @ 06:13)  POCT Blood Glucose.: 161 mg/dL (02-01-23 @ 01:55)  POCT Blood Glucose.: 156 mg/dL (02-01-23 @ 01:40)  POCT Blood Glucose.: 70 mg/dL (02-01-23 @ 01:05)  POCT Blood Glucose.: 69 mg/dL (02-01-23 @ 00:55)  POCT Blood Glucose.: 73 mg/dL (01-31-23 @ 17:29)  POCT Blood Glucose.: 200 mg/dL (01-31-23 @ 12:21)  POCT Blood Glucose.: 142 mg/dL (01-31-23 @ 06:11)  POCT Blood Glucose.: 107 mg/dL (01-30-23 @ 23:25)  POCT Blood Glucose.: 178 mg/dL (01-30-23 @ 17:08)  POCT Blood Glucose.: 217 mg/dL (01-30-23 @ 12:10)  POCT Blood Glucose.: 139 mg/dL (01-30-23 @ 06:01)  POCT Blood Glucose.: 122 mg/dL (01-29-23 @ 23:59)  POCT Blood Glucose.: 233 mg/dL (01-29-23 @ 17:18)  POCT Blood Glucose.: 257 mg/dL (01-29-23 @ 13:28)          01-31    133<L>  |  98  |  16  ----------------------------<  207<H>  4.2   |  28  |  0.56    eGFR: 93    Ca    8.2<L>      01-31  Mg     2.2     01-30  Phos  2.9     01-30      A1C with Estimated Average Glucose Result: 9.4 % (01-19-23 @ 13:11)  A1C with Estimated Average Glucose Result: 8.6 % (01-07-23 @ 05:40)          Contact number: alycia [phone] or [phone]       Diabetes Follow up note:    Chief complaint: T2DM    Interval Hx: Pt w/hypoglycemia overnight while off TF. Received 1/2 amp D50. D5 iv fluids increased to 75 cc/hr. Pt NPO today awaiting PEG placement.     Review of Systems:  unable to provide ROS 2/2 AMS    MEDS:  atorvastatin 80 milliGRAM(s) Oral at bedtime  insulin lispro (ADMELOG) corrective regimen sliding scale   SubCutaneous every 6 hours    ertapenem  IVPB 1000 milliGRAM(s) IV Intermittent every 24 hours    Allergies    Benedryl Allergy Sinus (Hives)  penicillin (Rash)        PE:  General: Female lying in bed. NAD.   Vital Signs Last 24 Hrs  T(C): 36.9 (01 Feb 2023 10:11), Max: 37.4 (31 Jan 2023 14:46)  T(F): 98.4 (01 Feb 2023 10:11), Max: 99.3 (31 Jan 2023 14:46)  HR: 83 (01 Feb 2023 04:59) (83 - 99)  BP: 108/65 (01 Feb 2023 10:11) (106/74 - 156/64)  BP(mean): --  RR: 18 (01 Feb 2023 04:59) (18 - 18)  SpO2: 98% (01 Feb 2023 10:11) (93% - 98%)    Parameters below as of 01 Feb 2023 04:59  Patient On (Oxygen Delivery Method): room air      Abd: Soft, NT,ND,   Extremities: Warm  Neuro: Alert.     LABS:  POCT Blood Glucose.: 87 mg/dL (02-01-23 @ 06:13)  POCT Blood Glucose.: 161 mg/dL (02-01-23 @ 01:55)  POCT Blood Glucose.: 156 mg/dL (02-01-23 @ 01:40)  POCT Blood Glucose.: 70 mg/dL (02-01-23 @ 01:05)  POCT Blood Glucose.: 69 mg/dL (02-01-23 @ 00:55)  POCT Blood Glucose.: 73 mg/dL (01-31-23 @ 17:29)  POCT Blood Glucose.: 200 mg/dL (01-31-23 @ 12:21)  POCT Blood Glucose.: 142 mg/dL (01-31-23 @ 06:11)  POCT Blood Glucose.: 107 mg/dL (01-30-23 @ 23:25)  POCT Blood Glucose.: 178 mg/dL (01-30-23 @ 17:08)  POCT Blood Glucose.: 217 mg/dL (01-30-23 @ 12:10)  POCT Blood Glucose.: 139 mg/dL (01-30-23 @ 06:01)  POCT Blood Glucose.: 122 mg/dL (01-29-23 @ 23:59)  POCT Blood Glucose.: 233 mg/dL (01-29-23 @ 17:18)  POCT Blood Glucose.: 257 mg/dL (01-29-23 @ 13:28)          01-31    133<L>  |  98  |  16  ----------------------------<  207<H>  4.2   |  28  |  0.56    eGFR: 93    Ca    8.2<L>      01-31  Mg     2.2     01-30  Phos  2.9     01-30      A1C with Estimated Average Glucose Result: 9.4 % (01-19-23 @ 13:11)  A1C with Estimated Average Glucose Result: 8.6 % (01-07-23 @ 05:40)          Contact number: alycia [phone] or [phone]       Diabetes Follow up note:    Chief complaint: T2DM    Interval Hx: Pt w/hypoglycemia overnight while off TF. Received 1/2 amp D50. D5 iv fluids increased to 75 cc/hr. Pt NPO today awaiting PEG placement.     Review of Systems:  unable to provide ROS 2/2 AMS    MEDS:  atorvastatin 80 milliGRAM(s) Oral at bedtime  insulin lispro (ADMELOG) corrective regimen sliding scale   SubCutaneous every 6 hours    ertapenem  IVPB 1000 milliGRAM(s) IV Intermittent every 24 hours    Allergies    Benedryl Allergy Sinus (Hives)  penicillin (Rash)        PE:  General: Female lying in bed. NAD.   Vital Signs Last 24 Hrs  T(C): 36.9 (01 Feb 2023 10:11), Max: 37.4 (31 Jan 2023 14:46)  T(F): 98.4 (01 Feb 2023 10:11), Max: 99.3 (31 Jan 2023 14:46)  HR: 83 (01 Feb 2023 04:59) (83 - 99)  BP: 108/65 (01 Feb 2023 10:11) (106/74 - 156/64)  BP(mean): --  RR: 18 (01 Feb 2023 04:59) (18 - 18)  SpO2: 98% (01 Feb 2023 10:11) (93% - 98%)    Parameters below as of 01 Feb 2023 04:59  Patient On (Oxygen Delivery Method): room air      Abd: Soft, NT,ND,   Extremities: Warm  Neuro: Alert.     LABS:  POCT Blood Glucose.: 87 mg/dL (02-01-23 @ 06:13)  POCT Blood Glucose.: 161 mg/dL (02-01-23 @ 01:55)  POCT Blood Glucose.: 156 mg/dL (02-01-23 @ 01:40)  POCT Blood Glucose.: 70 mg/dL (02-01-23 @ 01:05)  POCT Blood Glucose.: 69 mg/dL (02-01-23 @ 00:55)  POCT Blood Glucose.: 73 mg/dL (01-31-23 @ 17:29)  POCT Blood Glucose.: 200 mg/dL (01-31-23 @ 12:21)  POCT Blood Glucose.: 142 mg/dL (01-31-23 @ 06:11)  POCT Blood Glucose.: 107 mg/dL (01-30-23 @ 23:25)  POCT Blood Glucose.: 178 mg/dL (01-30-23 @ 17:08)  POCT Blood Glucose.: 217 mg/dL (01-30-23 @ 12:10)  POCT Blood Glucose.: 139 mg/dL (01-30-23 @ 06:01)  POCT Blood Glucose.: 122 mg/dL (01-29-23 @ 23:59)  POCT Blood Glucose.: 233 mg/dL (01-29-23 @ 17:18)  POCT Blood Glucose.: 257 mg/dL (01-29-23 @ 13:28)          01-31    133<L>  |  98  |  16  ----------------------------<  207<H>  4.2   |  28  |  0.56    eGFR: 93    Ca    8.2<L>      01-31  Mg     2.2     01-30  Phos  2.9     01-30      A1C with Estimated Average Glucose Result: 9.4 % (01-19-23 @ 13:11)  A1C with Estimated Average Glucose Result: 8.6 % (01-07-23 @ 05:40)          Contact number: alycia [phone] or [phone]

## 2023-02-01 NOTE — PROGRESS NOTE ADULT - ASSESSMENT
1. Dysphagia, post stroke  - s/p EGD with PEG placement   - NPO for 4 hours, then water and medicine by PEG  - EGD: Gastritis. Duodenal ulcers. PPI BID ordered    2. Hx of stroke     3. DM     4. anemia of chronic disease   - monitor H&H, transfuse PRN          Attending supervision statement: I have personally seen and examined the patient. I fully participated in the care of this patient. I have made amendments to the documentation where necessary, and agree with the history, physical exam, and plan as outlined by the ACP.    [facility]   Gastroenterology and Hepatology  Office: [phone]   1. Dysphagia, post stroke  - s/p EGD with PEG placement   - NPO for 4 hours, then water and medicine by PEG  - EGD: Gastritis. Duodenal ulcers. PPI BID ordered    2. Hx of stroke     3. DM     4. anemia of chronic disease   - monitor H&H, transfuse PRN          Attending supervision statement: I have personally seen and examined the patient. I fully participated in the care of this patient. I have made amendments to the documentation where necessary, and agree with the history, physical exam, and plan as outlined by the ACP.    [facility]   Gastroenterology and Hepatology  Office: [phone]   1. Dysphagia, post stroke  - s/p EGD with PEG placement   - NPO for 4 hours, then water and medicine by PEG  - EGD: Gastritis. Duodenal ulcers. PPI BID ordered    2. Hx of stroke     3. DM     4. anemia of chronic disease   - monitor H&H, transfuse PRN          Attending supervision statement: I have personally seen and examined the patient. I fully participated in the care of this patient. I have made amendments to the documentation where necessary, and agree with the history, physical exam, and plan as outlined by the ACP.    [facility]   Gastroenterology and Hepatology  Office: [phone]

## 2023-02-01 NOTE — CONSULT NOTE ADULT - REASON FOR ADMISSION
AMS, elevated finger sticks

## 2023-02-01 NOTE — PROGRESS NOTE ADULT - PROBLEM SELECTOR PLAN 1
BG Goal 100-180mg/dl   -test BG Q6h  Discontinue Lantus for now, tightly controlled  -c/w Admelog low correction scale Q6h  - Recommend D5 + NS @ 75 cc/hr while NPO for now while NPO/BG tightly controlled  - Please notify endocrine team of any change to TF regimen.   - auto-immune DM antibodies negative to r/o RAJ.   For d/c:   final recs TBD based on feeding modality/insulin requirements  if dc on PO intake consider   -Metformin 500 mg BID for 1 week then increase to 1000 mg BID. Check lactate prior to d/c  -May need low dose basal insulin (came in with glucose 500s, mild BHB elevation)  -Consider DPP4 inhibitor  -Patient can follow up at [address] [facility], [address], [place] [address] [phone]  -Patient will need opthalmology and podiatry follow up as outpatient. BG Goal 100-180mg/dl   -test BG Q6h  Discontinue Lantus for now, tightly controlled  -c/w Admelog low correction scale Q6h  - Recommend D5 + NS @ 75 cc/hr while NPO for now while NPO/BG tightly controlled  - Please notify endocrine team of any change to TF regimen.   - auto-immune DM antibodies negative to r/o RAJ.   For d/c:   final recs TBD based on feeding modality/insulin requirements  if dc on PO intake consider   -Metformin 500 mg BID for 1 week then increase to 1000 mg BID. Check lactate prior to d/c  -May need low dose basal insulin (came in with glucose 500s, mild BHB elevation)  -Consider DPP4 inhibitor  -Patient can follow up at [address] [facility], [address], [place] [address] [phone]  -Patient will need opthalmology and podiatry follow up as outpatient. BG Goal 100-180mg/dl   -test BG Q6h  Discontinue Lantus for now, tightly controlled  -c/w Admelog low correction scale Q6h  - Recommend D5 + NS @ 75 cc/hr while NPO for now while NPO/BG tightly controlled  - Please notify endocrine team of any change to TF regimen.   - auto-immune DM antibodies negative to r/o RAJ.   For d/c:   final recs TBD based on feeding modality/insulin requirements  if dc on PO intake consider   -Metformin 500 mg BID for 1 week then increase to 1000 mg BID. Check lactate prior to d/c  -May need low dose basal insulin (came in with glucose 500s, mild BHB elevation)  -Consider DPP4 inhibitor  -Patient can follow up at [address] [facility], [address], [place] [address] [phone]  -Patient will need opthalmology and podiatry follow up as outpatient.

## 2023-02-01 NOTE — PROGRESS NOTE ADULT - PROBLEM SELECTOR PLAN 3
Continue with atorvastatin 80 mg daily   -   - Manage as outpatient     Can be reached via TEAMS/pager: [phone]   office:  [phone] (M-F 9a-5pm)               [phone] (nights/weekends)   [email or web address] password [email or web address]. Continue with atorvastatin 80 mg daily   -   - Manage as outpatient     Can be reached via TEAMS/pager: [phone]   office:  [phone] (M-F 9a-5pm)               [phone] (nights/weekends)   [email or web address] password [email or web address]. Continue with atorvastatin 80 mg daily   -   - Manage as outpatient     Can be reached via TEAMS/pager: [phone]   office:  [phone] (M-F 9a-5pm)               [phone] (nights/weekends)   [email or web address] password [email or web address].

## 2023-02-01 NOTE — PROVIDER CONTACT NOTE (HYPOGLYCEMIA EVENT) - NS PROVIDER CONTACT BACKGROUND-HYPO
Age: 78y    Gender: Female    POCT Blood Glucose:  161 mg/dL (02-01-23 @ 01:55)  156 mg/dL (02-01-23 @ 01:40)  70 mg/dL (02-01-23 @ 01:05)  69 mg/dL (02-01-23 @ 00:55)  73 mg/dL (01-31-23 @ 17:29)  200 mg/dL (01-31-23 @ 12:21)  142 mg/dL (01-31-23 @ 06:11)      eMAR:  dextrose 50% Injectable   12.5 Gram(s) IV Push (02-01-23 @ 01:23)    insulin glargine Injectable (LANTUS)   15 Unit(s) SubCutaneous (01-31-23 @ 12:23)    insulin lispro (ADMELOG) corrective regimen sliding scale   2 Unit(s) SubCutaneous (01-31-23 @ 12:23)    Pt FSq6 & NPO per orders. S/p NGT removal per pt 1/31 around 12:30 & pt on IVF D5W + NS @ 75mL/hr. PADMINI Araujo notified of pt FS=69, per PA's direction repeated FS=70. Activated & administered dextrose 50% 12.5g IVP -- 15 min DW=582 & 30 min QG=884, PADMINI Araujo made aware. Age: 78y    Gender: Female    POCT Blood Glucose:  161 mg/dL (02-01-23 @ 01:55)  156 mg/dL (02-01-23 @ 01:40)  70 mg/dL (02-01-23 @ 01:05)  69 mg/dL (02-01-23 @ 00:55)  73 mg/dL (01-31-23 @ 17:29)  200 mg/dL (01-31-23 @ 12:21)  142 mg/dL (01-31-23 @ 06:11)      eMAR:  dextrose 50% Injectable   12.5 Gram(s) IV Push (02-01-23 @ 01:23)    insulin glargine Injectable (LANTUS)   15 Unit(s) SubCutaneous (01-31-23 @ 12:23)    insulin lispro (ADMELOG) corrective regimen sliding scale   2 Unit(s) SubCutaneous (01-31-23 @ 12:23)    Pt FSq6 & NPO per orders. S/p NGT removal per pt 1/31 around 12:30 & pt on IVF D5W + NS @ 75mL/hr. PADMINI Araujo notified of pt FS=69, per PA's direction repeated FS=70. Activated & administered dextrose 50% 12.5g IVP -- 15 min CL=114 & 30 min LF=969, PADMINI Araujo made aware. Age: 78y    Gender: Female    POCT Blood Glucose:  161 mg/dL (02-01-23 @ 01:55)  156 mg/dL (02-01-23 @ 01:40)  70 mg/dL (02-01-23 @ 01:05)  69 mg/dL (02-01-23 @ 00:55)  73 mg/dL (01-31-23 @ 17:29)  200 mg/dL (01-31-23 @ 12:21)  142 mg/dL (01-31-23 @ 06:11)      eMAR:  dextrose 50% Injectable   12.5 Gram(s) IV Push (02-01-23 @ 01:23)    insulin glargine Injectable (LANTUS)   15 Unit(s) SubCutaneous (01-31-23 @ 12:23)    insulin lispro (ADMELOG) corrective regimen sliding scale   2 Unit(s) SubCutaneous (01-31-23 @ 12:23)    Pt FSq6 & NPO per orders. S/p NGT removal per pt 1/31 around 12:30 & pt on IVF D5W + NS @ 75mL/hr. PADMINI Araujo notified of pt FS=69, per PA's direction repeated FS=70. Activated & administered dextrose 50% 12.5g IVP -- 15 min RL=732 & 30 min ZW=673, PADMINI rAaujo made aware.

## 2023-02-01 NOTE — CONSULT NOTE ADULT - ASSESSMENT
78 year old female with recurrent urinary retention, pyuria and hematuria of unclear etiology  - CT Urogram ordered  - Monitor urine output and color  - Flush catheter q shift  - Continue antibiotics   - will follow  78 year old female with recurrent urinary retention, pyuria and hematuria of unclear etiology  - CT Urogram ordered, will need CT cystogram as well. The quality of urine remains to be purulent after several attempts at catheter changes, need to rule out fistula as a couse.   - Monitor urine output and color  - Flush catheter q shift  - Continue antibiotics per primary tea  - will follow   - WIll need eventual outpatient cystoscopy to complete gross hematuria work up.  - Plan discussed with Dr. Adorno

## 2023-02-01 NOTE — CONSULT NOTE ADULT - CONSULT REQUESTED DATE/TIME
30-Jan-2023 13:00
20-Jan-2023 12:46
21-Jan-2023 07:01
27-Jan-2023
24-Jan-2023 12:29
19-Jan-2023 16:40
01-Feb-2023 17:15

## 2023-02-01 NOTE — PROCEDURE NOTE - NSFINDINGS_GEN_A_CORE
positive return of urine in the collection bag
hematuria
purulent fluid/hematuria/positive return of urine in the collection bag
positive return of urine in the collection bag

## 2023-02-01 NOTE — PROGRESS NOTE ADULT - SUBJECTIVE AND OBJECTIVE BOX
[facility] [place]   MD PUSHPA WICK MD, PA KRISTINE SOLTANPOUR, DO INJUNG KO, NP    TEL:  OFFICE: [phone]    From 5pm-7am Answering Service [phone]    -- RENAL FOLLOW UP NOTE ---Date of Service 02-01-23 @ 14:34    Patient is a 78y old  Female who presents with a chief complaint of AMS, elevated finger sticks (01 Feb 2023 13:48)      Patient seen and examined at bedside.    VITALS:  T(F): 98.4 (02-01-23 @ 10:11), Max: 99.3 (01-31-23 @ 14:46)  HR: 84 (02-01-23 @ 12:58)  BP: 155/67 (02-01-23 @ 12:58)  RR: 16 (02-01-23 @ 12:58)  SpO2: 99% (02-01-23 @ 12:58)  Wt(kg): --    01-31 @ 07:01  -  02-01 @ 07:00  --------------------------------------------------------  IN: 1400 mL / OUT: 450 mL / NET: 950 mL      Height (cm): 157.5 (02-01 @ 11:37)  Weight (kg): 58.1 (02-01 @ 11:37)  BMI (kg/m2): 23.4 (02-01 @ 11:37)  BSA (m2): 1.58 (02-01 @ 11:37)    PHYSICAL EXAM:  Constitutional: NAD  Neck: No JVD  Respiratory: CTAB, no wheezes, rales or rhonchi  Cardiovascular: S1, S2, RRR  Gastrointestinal: BS+, soft, NT/ND  Extremities: No peripheral edema    Hospital Medications:   MEDICATIONS  (STANDING):  aspirin  chewable 81 milliGRAM(s) Oral daily  atorvastatin 80 milliGRAM(s) Oral at bedtime  dextrose 5% + sodium chloride 0.9%. 1000 milliLiter(s) (75 mL/Hr) IV Continuous <Continuous>  dextrose 5%. 1000 milliLiter(s) (100 mL/Hr) IV Continuous <Continuous>  dextrose 50% Injectable 25 Gram(s) IV Push once  dextrose 50% Injectable 12.5 Gram(s) IV Push once  dextrose 50% Injectable 25 Gram(s) IV Push once  dextrose Oral Gel 15 Gram(s) Oral once  ertapenem  IVPB 1000 milliGRAM(s) IV Intermittent every 24 hours  glucagon  Injectable 1 milliGRAM(s) IntraMuscular once  insulin lispro (ADMELOG) corrective regimen sliding scale   SubCutaneous every 6 hours  pantoprazole   Suspension 40 milliGRAM(s) Oral two times a day  tamsulosin 0.4 milliGRAM(s) Oral at bedtime      LABS:  01-31    133<L>  |  98  |  16  ----------------------------<  207<H>  4.2   |  28  |  0.56    Ca    8.2<L>      31 Jan 2023 11:53      Creatinine Trend: 0.56 <--, 0.67 <--, 0.65 <--, 0.86 <--, 0.79 <--, 0.63 <--            Urine Studies:  Urinalysis - [01-19-23 @ 13:15]      Color DARK BROWN / Appearance Turbid / SG 1.023 / pH 7.0      Gluc 200 mg/dL / Ketone Trace  / Bili Negative / Urobili 6 mg/dL       Blood Large / Protein >600 / Leuk Est Large / Nitrite Negative      RBC 15 / WBC 11-25 / Hyaline  / Gran 4 / Sq Epi  / Non Sq Epi Few / Bacteria Many      Iron 17, TIBC 187, %sat 9      [01-25-23 @ 07:18]  Ferritin 172      [01-25-23 @ 07:18]  Lipid: chol 238, TG 80, HDL 46, LDL --      [01-08-23 @ 02:47]        RADIOLOGY & ADDITIONAL STUDIES:   [facility] [place] [facility]   MD PUSHPA WICK MD, PA KRISTINE SOLTANPOUR, DO INJUNG KO, NP    TEL:  OFFICE: [phone]    From 5pm-7am Answering Service [phone]    -- RENAL FOLLOW UP NOTE ---Date of Service 02-01-23 @ 14:34    Patient is a 78y old  Female who presents with a chief complaint of AMS, elevated finger sticks (01 Feb 2023 13:48)      Patient seen and examined at bedside.    VITALS:  T(F): 98.4 (02-01-23 @ 10:11), Max: 99.3 (01-31-23 @ 14:46)  HR: 84 (02-01-23 @ 12:58)  BP: 155/67 (02-01-23 @ 12:58)  RR: 16 (02-01-23 @ 12:58)  SpO2: 99% (02-01-23 @ 12:58)  Wt(kg): --    01-31 @ 07:01  -  02-01 @ 07:00  --------------------------------------------------------  IN: 1400 mL / OUT: 450 mL / NET: 950 mL      Height (cm): 157.5 (02-01 @ 11:37)  Weight (kg): 58.1 (02-01 @ 11:37)  BMI (kg/m2): 23.4 (02-01 @ 11:37)  BSA (m2): 1.58 (02-01 @ 11:37)    PHYSICAL EXAM:  Constitutional: NAD  Neck: No JVD  Respiratory: CTAB, no wheezes, rales or rhonchi  Cardiovascular: S1, S2, RRR  Gastrointestinal: BS+, soft, NT/ND  Extremities: No peripheral edema    Hospital Medications:   MEDICATIONS  (STANDING):  aspirin  chewable 81 milliGRAM(s) Oral daily  atorvastatin 80 milliGRAM(s) Oral at bedtime  dextrose 5% + sodium chloride 0.9%. 1000 milliLiter(s) (75 mL/Hr) IV Continuous <Continuous>  dextrose 5%. 1000 milliLiter(s) (100 mL/Hr) IV Continuous <Continuous>  dextrose 50% Injectable 25 Gram(s) IV Push once  dextrose 50% Injectable 12.5 Gram(s) IV Push once  dextrose 50% Injectable 25 Gram(s) IV Push once  dextrose Oral Gel 15 Gram(s) Oral once  ertapenem  IVPB 1000 milliGRAM(s) IV Intermittent every 24 hours  glucagon  Injectable 1 milliGRAM(s) IntraMuscular once  insulin lispro (ADMELOG) corrective regimen sliding scale   SubCutaneous every 6 hours  pantoprazole   Suspension 40 milliGRAM(s) Oral two times a day  tamsulosin 0.4 milliGRAM(s) Oral at bedtime      LABS:  01-31    133<L>  |  98  |  16  ----------------------------<  207<H>  4.2   |  28  |  0.56    Ca    8.2<L>      31 Jan 2023 11:53      Creatinine Trend: 0.56 <--, 0.67 <--, 0.65 <--, 0.86 <--, 0.79 <--, 0.63 <--            Urine Studies:  Urinalysis - [01-19-23 @ 13:15]      Color DARK BROWN / Appearance Turbid / SG 1.023 / pH 7.0      Gluc 200 mg/dL / Ketone Trace  / Bili Negative / Urobili 6 mg/dL       Blood Large / Protein >600 / Leuk Est Large / Nitrite Negative      RBC 15 / WBC 11-25 / Hyaline  / Gran 4 / Sq Epi  / Non Sq Epi Few / Bacteria Many      Iron 17, TIBC 187, %sat 9      [01-25-23 @ 07:18]  Ferritin 172      [01-25-23 @ 07:18]  Lipid: chol 238, TG 80, HDL 46, LDL --      [01-08-23 @ 02:47]        RADIOLOGY & ADDITIONAL STUDIES:   [facility] [place] [facility]   MD PUSHPA WICK MD, PA KRISTINE SOLTANPOUR, DO INJUNG KO, NP    TEL:  OFFICE: [phone]    From 5pm-7am Answering Service [phone]    -- RENAL FOLLOW UP NOTE ---Date of Service 02-01-23 @ 14:34    Patient is a 78y old  Female who presents with a chief complaint of AMS, elevated finger sticks (01 Feb 2023 13:48)      Patient seen and examined at bedside.    VITALS:  T(F): 98.4 (02-01-23 @ 10:11), Max: 99.3 (01-31-23 @ 14:46)  HR: 84 (02-01-23 @ 12:58)  BP: 155/67 (02-01-23 @ 12:58)  RR: 16 (02-01-23 @ 12:58)  SpO2: 99% (02-01-23 @ 12:58)  Wt(kg): --    01-31 @ 07:01  -  02-01 @ 07:00  --------------------------------------------------------  IN: 1400 mL / OUT: 450 mL / NET: 950 mL      Height (cm): 157.5 (02-01 @ 11:37)  Weight (kg): 58.1 (02-01 @ 11:37)  BMI (kg/m2): 23.4 (02-01 @ 11:37)  BSA (m2): 1.58 (02-01 @ 11:37)    PHYSICAL EXAM:  Constitutional: NAD  Neck: No JVD  Respiratory: CTAB, no wheezes, rales or rhonchi  Cardiovascular: S1, S2, RRR  Gastrointestinal: BS+, soft, NT/ND  Extremities: No peripheral edema    Hospital Medications:   MEDICATIONS  (STANDING):  aspirin  chewable 81 milliGRAM(s) Oral daily  atorvastatin 80 milliGRAM(s) Oral at bedtime  dextrose 5% + sodium chloride 0.9%. 1000 milliLiter(s) (75 mL/Hr) IV Continuous <Continuous>  dextrose 5%. 1000 milliLiter(s) (100 mL/Hr) IV Continuous <Continuous>  dextrose 50% Injectable 25 Gram(s) IV Push once  dextrose 50% Injectable 12.5 Gram(s) IV Push once  dextrose 50% Injectable 25 Gram(s) IV Push once  dextrose Oral Gel 15 Gram(s) Oral once  ertapenem  IVPB 1000 milliGRAM(s) IV Intermittent every 24 hours  glucagon  Injectable 1 milliGRAM(s) IntraMuscular once  insulin lispro (ADMELOG) corrective regimen sliding scale   SubCutaneous every 6 hours  pantoprazole   Suspension 40 milliGRAM(s) Oral two times a day  tamsulosin 0.4 milliGRAM(s) Oral at bedtime      LABS:  01-31    133<L>  |  98  |  16  ----------------------------<  207<H>  4.2   |  28  |  0.56    Ca    8.2<L>      31 Jan 2023 11:53      Creatinine Trend: 0.56 <--, 0.67 <--, 0.65 <--, 0.86 <--, 0.79 <--, 0.63 <--            Urine Studies:  Urinalysis - [01-19-23 @ 13:15]      Color DARK BROWN / Appearance Turbid / SG 1.023 / pH 7.0      Gluc 200 mg/dL / Ketone Trace  / Bili Negative / Urobili 6 mg/dL       Blood Large / Protein >600 / Leuk Est Large / Nitrite Negative      RBC 15 / WBC 11-25 / Hyaline  / Gran 4 / Sq Epi  / Non Sq Epi Few / Bacteria Many      Iron 17, TIBC 187, %sat 9      [01-25-23 @ 07:18]  Ferritin 172      [01-25-23 @ 07:18]  Lipid: chol 238, TG 80, HDL 46, LDL --      [01-08-23 @ 02:47]        RADIOLOGY & ADDITIONAL STUDIES:

## 2023-02-02 LAB
ANION GAP SERPL CALC-SCNC: 7 MMOL/L — SIGNIFICANT CHANGE UP (ref 5–17)
BUN SERPL-MCNC: 6 MG/DL — LOW (ref 7–23)
CALCIUM SERPL-MCNC: 7.9 MG/DL — LOW (ref 8.4–10.5)
CHLORIDE SERPL-SCNC: 104 MMOL/L — SIGNIFICANT CHANGE UP (ref 96–108)
CO2 SERPL-SCNC: 25 MMOL/L — SIGNIFICANT CHANGE UP (ref 22–31)
CREAT SERPL-MCNC: 0.54 MG/DL — SIGNIFICANT CHANGE UP (ref 0.5–1.3)
EGFR: 94 ML/MIN/1.73M2 — SIGNIFICANT CHANGE UP
GLUCOSE SERPL-MCNC: 172 MG/DL — HIGH (ref 70–99)
HCT VFR BLD CALC: 29.2 % — LOW (ref 34.5–45)
HGB BLD-MCNC: 8.7 G/DL — LOW (ref 11.5–15.5)
MCHC RBC-ENTMCNC: 26.3 PG — LOW (ref 27–34)
MCHC RBC-ENTMCNC: 29.8 GM/DL — LOW (ref 32–36)
MCV RBC AUTO: 88.2 FL — SIGNIFICANT CHANGE UP (ref 80–100)
NRBC # BLD: 0 /100 WBCS — SIGNIFICANT CHANGE UP (ref 0–0)
PHOSPHATE SERPL-MCNC: 2.5 MG/DL — SIGNIFICANT CHANGE UP (ref 2.5–4.5)
PLATELET # BLD AUTO: 394 K/UL — SIGNIFICANT CHANGE UP (ref 150–400)
POTASSIUM SERPL-MCNC: 3.6 MMOL/L — SIGNIFICANT CHANGE UP (ref 3.5–5.3)
POTASSIUM SERPL-SCNC: 3.6 MMOL/L — SIGNIFICANT CHANGE UP (ref 3.5–5.3)
RBC # BLD: 3.31 M/UL — LOW (ref 3.8–5.2)
RBC # FLD: 14.8 % — HIGH (ref 10.3–14.5)
SODIUM SERPL-SCNC: 136 MMOL/L — SIGNIFICANT CHANGE UP (ref 135–145)
WBC # BLD: 5.32 K/UL — SIGNIFICANT CHANGE UP (ref 3.8–10.5)
WBC # FLD AUTO: 5.32 K/UL — SIGNIFICANT CHANGE UP (ref 3.8–10.5)

## 2023-02-02 PROCEDURE — 51702 INSERT TEMP BLADDER CATH: CPT

## 2023-02-02 PROCEDURE — 99232 SBSQ HOSP IP/OBS MODERATE 35: CPT

## 2023-02-02 PROCEDURE — 99221 1ST HOSP IP/OBS SF/LOW 40: CPT | Mod: 25

## 2023-02-02 RX ORDER — INSULIN LISPRO 100/ML
VIAL (ML) SUBCUTANEOUS EVERY 6 HOURS
Refills: 0 | Status: DISCONTINUED | OUTPATIENT
Start: 2023-02-02 | End: 2023-02-06

## 2023-02-02 RX ORDER — CLOPIDOGREL BISULFATE 75 MG/1
75 TABLET, FILM COATED ORAL DAILY
Refills: 0 | Status: DISCONTINUED | OUTPATIENT
Start: 2023-02-02 | End: 2023-02-06

## 2023-02-02 RX ADMIN — Medication 2: at 17:46

## 2023-02-02 RX ADMIN — SODIUM CHLORIDE 75 MILLILITER(S): 9 INJECTION, SOLUTION INTRAVENOUS at 21:00

## 2023-02-02 RX ADMIN — TAMSULOSIN HYDROCHLORIDE 0.4 MILLIGRAM(S): 0.4 CAPSULE ORAL at 22:11

## 2023-02-02 RX ADMIN — SODIUM CHLORIDE 75 MILLILITER(S): 9 INJECTION, SOLUTION INTRAVENOUS at 22:11

## 2023-02-02 RX ADMIN — ERTAPENEM SODIUM 120 MILLIGRAM(S): 1 INJECTION, POWDER, LYOPHILIZED, FOR SOLUTION INTRAMUSCULAR; INTRAVENOUS at 14:13

## 2023-02-02 RX ADMIN — Medication 1: at 11:35

## 2023-02-02 RX ADMIN — SODIUM CHLORIDE 75 MILLILITER(S): 9 INJECTION, SOLUTION INTRAVENOUS at 11:36

## 2023-02-02 RX ADMIN — PANTOPRAZOLE SODIUM 40 MILLIGRAM(S): 20 TABLET, DELAYED RELEASE ORAL at 06:32

## 2023-02-02 RX ADMIN — PANTOPRAZOLE SODIUM 40 MILLIGRAM(S): 20 TABLET, DELAYED RELEASE ORAL at 17:02

## 2023-02-02 RX ADMIN — Medication 1: at 06:31

## 2023-02-02 RX ADMIN — ATORVASTATIN CALCIUM 80 MILLIGRAM(S): 80 TABLET, FILM COATED ORAL at 22:11

## 2023-02-02 RX ADMIN — Medication 81 MILLIGRAM(S): at 11:29

## 2023-02-02 NOTE — PROGRESS NOTE ADULT - ASSESSMENT
1. Dysphagia, post stroke  - s/p EGD with PEG placement   - EGD: Gastritis. Duodenal ulcers. PPI BID ordered  - okay to start tube feeds via PEG    2. Hx of stroke     3. DM     4. anemia of chronic disease   - monitor H&H, transfuse PRN          Attending supervision statement: I have personally seen and examined the patient. I fully participated in the care of this patient. I have made amendments to the documentation where necessary, and agree with the history, physical exam, and plan as outlined by the ACP.    [facility]   Gastroenterology and Hepatology  Office: [phone]   1. Dysphagia, post stroke  - s/p EGD with PEG placement   - EGD: Gastritis. Duodenal ulcers. PPI BID ordered  - okay to start tube feeds via PEG    2. Hx of stroke     3. DM     4. anemia of chronic disease   - monitor H&H, transfuse PRN          Attending supervision statement: I have personally seen and examined the patient. I fully participated in the care of this patient. I have made amendments to the documentation where necessary, and agree with the history, physical exam, and plan as outlined by the ACP.    [facility]   Gastroenterology and Hepatology  Office: [phone]   1. Dysphagia, post stroke  - s/p EGD with PEG placement   - EGD: Gastritis. Duodenal ulcers. PPI BID ordered  - okay to start tube feeds via PEG    2. Hx of stroke     3. DM     4. anemia of chronic disease   - monitor H&H, transfuse PRN          Attending supervision statement: I have personally seen and examined the patient. I fully participated in the care of this patient. I have made amendments to the documentation where necessary, and agree with the history, physical exam, and plan as outlined by the ACP.    [facility]   Gastroenterology and Hepatology  Office: [phone]

## 2023-02-02 NOTE — CHART NOTE - NSCHARTNOTEFT_GEN_A_CORE
Age: 78y    Gender: Female    POCT Blood Glucose:  194 mg/dL (02-02-23 @ 11:32)  174 mg/dL (02-02-23 @ 06:27)  149 mg/dL (02-01-23 @ 23:59)  154 mg/dL (02-01-23 @ 18:20)      eMAR:  atorvastatin   80 milliGRAM(s) Oral (02-01-23 @ 21:16)    insulin lispro (ADMELOG) corrective regimen sliding scale   1 Unit(s) SubCutaneous (02-02-23 @ 11:35)   1 Unit(s) SubCutaneous (02-02-23 @ 06:31)   1 Unit(s) SubCutaneous (02-01-23 @ 18:21)    noted TF restarted this afternoon however will not be at goal until tomorrow evening   - Change to admelog moderate scale q6h while TF being uptitrated  -If BG trending >200 by MN FS  primary team  can restart lantus at lower dose-lantus 6 units sq qhs otherwise continue to hold lantus for now & monitor   - further adjustments based on glycemic trends     discussed with Cristiano WAYNE and Emelina ALVAREZ

## 2023-02-02 NOTE — PROGRESS NOTE ADULT - SUBJECTIVE AND OBJECTIVE BOX
Follow Up:  fever, urinary retention, lice    Interval History: s/p EGD and PEG, gonzalez was clogged again and urology placed another gonzalez again mucoid fluid drained and some hematuria but no fever and WBC normal      ROS:    Unobtainable because of mental status        Allergies  Benedryl Allergy Sinus (Hives)  penicillin (Rash)        ANTIMICROBIALS:      OTHER MEDS:  acetaminophen    Suspension .. 650 milliGRAM(s) Enteral Tube every 6 hours PRN  aspirin  chewable 81 milliGRAM(s) Oral daily  atorvastatin 80 milliGRAM(s) Oral at bedtime  clopidogrel Tablet 75 milliGRAM(s) Oral daily  dextrose 5% + sodium chloride 0.9%. 1000 milliLiter(s) IV Continuous <Continuous>  dextrose 5%. 1000 milliLiter(s) IV Continuous <Continuous>  dextrose 50% Injectable 25 Gram(s) IV Push once  dextrose 50% Injectable 12.5 Gram(s) IV Push once  dextrose 50% Injectable 25 Gram(s) IV Push once  dextrose Oral Gel 15 Gram(s) Oral once  glucagon  Injectable 1 milliGRAM(s) IntraMuscular once  insulin lispro (ADMELOG) corrective regimen sliding scale   SubCutaneous every 6 hours  pantoprazole   Suspension 40 milliGRAM(s) Oral two times a day  tamsulosin 0.4 milliGRAM(s) Oral at bedtime      Vital Signs Last 24 Hrs  T(C): 36.9 (02 Feb 2023 12:23), Max: 36.9 (02 Feb 2023 12:23)  T(F): 98.5 (02 Feb 2023 12:23), Max: 98.5 (02 Feb 2023 12:23)  HR: 98 (02 Feb 2023 12:23) (82 - 98)  BP: 138/71 (02 Feb 2023 12:23) (110/82 - 138/71)  BP(mean): --  RR: 17 (02 Feb 2023 12:23) (17 - 17)  SpO2: 97% (02 Feb 2023 12:23) (97% - 98%)    Parameters below as of 02 Feb 2023 12:23  Patient On (Oxygen Delivery Method): room air        Physical Exam:  General:    NAD, non toxic  Respiratory:   comfortable on Ra  abd:   soft,  not tender  :   no suprapubic tenderness  Musculoskeletal : no joint swelling  Skin:    no rash  vascular: no phlebitis                          8.7    5.32  )-----------( 394      ( 02 Feb 2023 06:51 )             29.2       02-02    136  |  104  |  6<L>  ----------------------------<  172<H>  3.6   |  25  |  0.54    Ca    7.9<L>      02 Feb 2023 06:51  Phos  2.5     02-02            MICROBIOLOGY:  v  Catheterized Catheterized  01-24-23   >=3 organisms. Probable collection contamination.  --  --      .Blood Blood-Peripheral  01-24-23   No Growth Final  --  --      .Blood Blood-Peripheral  01-24-23   No Growth Final  --  --      .Abscess Catheter Site  01-22-23   Moderate Enterococcus faecalis  Moderate Enterococcus avium  Rare Klebsiella aerogenes (Previously Enterobacter)  Moderate Bacteroides thetaiotamcron group "Susceptibilities not performed"  --  Enterococcus faecalis  Enterococcus avium  Klebsiella aerogenes (Previously Enterobacter)      Clean Catch Clean Catch (Midstream)  01-19-23   >=3 organisms. Probable collection contamination. including  >100,000 CFU/ml Escherichia coli  --  Escherichia coli      .Blood Blood-Peripheral  01-19-23   No Growth Final  --  --      .Blood Blood-Peripheral  01-19-23   No Growth Final  --  --      .Blood Blood-Peripheral  01-10-23   No Growth Final  --  --      .Blood Blood-Venous  01-10-23   No Growth Final  --  --                RADIOLOGY:  Images independently visualized and reviewed personally, findings as below  < from: CT Head No Cont (01.30.23 @ 23:02) >  IMPRESSION:    No significant interval change.    Redemonstration of scattered acute left MCA territory infarcts. No CT   evidence for hemorrhagic transformation.    < end of copied text >  < from: Xray Chest 1 View- PORTABLE-Urgent (Xray Chest 1 View- PORTABLE-Urgent .) (01.30.23 @ 03:19) >  FINDINGS:    Enteric tube with tip in the stomach.  Heart size is normal.  Clear lungs.  The visualized osseous structures demonstrate no acute pathology.      IMPRESSION:  Clear lungs.    < end of copied text >

## 2023-02-02 NOTE — PROGRESS NOTE ADULT - ASSESSMENT
78F with dementia, T2DM, recently discharged about 1 week ago for CVA now presenting with poor PO intake, lethargy and hyperglycemia to 500s.   Of note, patient admitted 1/6 for R facial droop, word finding difficulties. Imaging concerning for acute L MCA infarct. Discharged home on 1/11. Majority of history obtained through family given mental status. Per family, since stroke patient nonverbal/unable to participate in meaningful communications, intermittently follows commands. For the last 3-4 days, patient with poor po intake. Reportedly only eating breakfast. Day of presentation, more lethargic with elevated finger sticks up to the 500s. Patient was evaluated by endocrine team during prior admission with recs to increase metformin to 1000mg BID. However, given poor PO intake, family has been giving 500mg daily.     In ED, afebrile, , 135/84. WBC 15, Na 158, K 3.0, Cl 120, Glu 468, alk phos 136m BHB 0.7. pH 7.36, lactate 2.2. UA: +leuk esterase, many bacteria, WBC 11-25  CXR: Redemonstrated biapical scarring and left upper lobe bronchiectasis, unchanged. No focal consolidation.   CTH No acute intracranial hemorrhage. 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  Given 1.5L NS, ceftriaxone x1, haldol 2.5mg x1 in ED.     A/P    Hypernatremia/ Hyponatremia   free water flush on hold    improved   optimize glucose   Avoid overcorrection >6-8meq a day   monitor Na closely     Hypocalcemia   2/2 low albumin  corrected Ca WNL   optimize albumin   monitor     Hypokalemia  stable   monitor K     Hypophosphatemia  Supplement as needed  Daily phosphorus     HTN  optimal   monitor BP closely     Proteinuria  Currently has UTI would repeat and then quantify

## 2023-02-02 NOTE — PROGRESS NOTE ADULT - SUBJECTIVE AND OBJECTIVE BOX
Chief Complaint:  Patient is a 78y old  Female who presents with a chief complaint of AMS, elevated finger sticks (02 Feb 2023 10:10)      Date of service 02-02-23 @ 11:27      Interval Events:   Patient seen and examined.  s/p PEG placement     Hospital Medications:  acetaminophen    Suspension .. 650 milliGRAM(s) Enteral Tube every 6 hours PRN  aspirin  chewable 81 milliGRAM(s) Oral daily  atorvastatin 80 milliGRAM(s) Oral at bedtime  dextrose 5% + sodium chloride 0.9%. 1000 milliLiter(s) IV Continuous <Continuous>  dextrose 5%. 1000 milliLiter(s) IV Continuous <Continuous>  dextrose 50% Injectable 25 Gram(s) IV Push once  dextrose 50% Injectable 12.5 Gram(s) IV Push once  dextrose 50% Injectable 25 Gram(s) IV Push once  dextrose Oral Gel 15 Gram(s) Oral once  ertapenem  IVPB 1000 milliGRAM(s) IV Intermittent every 24 hours  glucagon  Injectable 1 milliGRAM(s) IntraMuscular once  insulin lispro (ADMELOG) corrective regimen sliding scale   SubCutaneous every 6 hours  pantoprazole   Suspension 40 milliGRAM(s) Oral two times a day  tamsulosin 0.4 milliGRAM(s) Oral at bedtime        Review of Systems:  unable to obtain    PHYSICAL EXAM:   Vital Signs:  Vital Signs Last 24 Hrs  T(C): 36.7 (02 Feb 2023 06:35), Max: 36.9 (01 Feb 2023 11:37)  T(F): 98 (02 Feb 2023 06:35), Max: 98.2 (01 Feb 2023 21:44)  HR: 85 (02 Feb 2023 06:35) (82 - 86)  BP: 110/82 (02 Feb 2023 06:35) (108/65 - 155/67)  BP(mean): 73 (01 Feb 2023 11:37) (73 - 73)  RR: 17 (02 Feb 2023 06:35) (16 - 18)  SpO2: 98% (02 Feb 2023 06:35) (97% - 99%)    Parameters below as of 02 Feb 2023 06:35  Patient On (Oxygen Delivery Method): room air      Daily Height in cm: 157.48 (01 Feb 2023 11:37)    Daily       PHYSICAL EXAM:     GENERAL:  Appears stated age, well-groomed, well-nourished, no distress  HEENT:  NC/AT,  conjunctivae anicteric, clear and pink,   NECK: supple, trachea midline  CHEST:  Full & symmetric excursion, no increased effort, breath sounds clear  HEART:  Regular rhythm, no JVD  ABDOMEN:  Soft, non-tender, non-distended, normoactive bowel sounds,  no masses , no hepatosplenomegaly,+gastrostomy (c,d,i)   EXTREMITIES:  no cyanosis,clubbing or edema  SKIN:  No rash, erythema, or, ecchymoses, no jaundice  NEURO:   non-focal, no asterixis  RECTAL: Deferred      LABS Personally reviewed by me:                        8.7    5.32  )-----------( 394      ( 02 Feb 2023 06:51 )             29.2     Mean Cell Volume: 88.2 fl (02-02-23 @ 06:51)    02-02    136  |  104  |  6<L>  ----------------------------<  172<H>  3.6   |  25  |  0.54    Ca    7.9<L>      02 Feb 2023 06:51  Phos  2.5     02-02                                    8.7    5.32  )-----------( 394      ( 02 Feb 2023 06:51 )             29.2                         8.8    5.86  )-----------( 414      ( 01 Feb 2023 22:10 )             27.8       Imaging personally reviewed by me:

## 2023-02-02 NOTE — PROGRESS NOTE ADULT - SUBJECTIVE AND OBJECTIVE BOX
Name of Patient : SARAH DISLA  MRN: [id number]  Date of visit: 02-02-23 @ 16:23      Subjective: Patient seen and examined. No new events except as noted.   Patient seen earlier this AM. S/P EGD with PEG tube placement yesterday   Episode of hematuria    REVIEW OF SYSTEMS:  Unable to obtain ROS     MEDICATIONS:  MEDICATIONS  (STANDING):  aspirin  chewable 81 milliGRAM(s) Oral daily  atorvastatin 80 milliGRAM(s) Oral at bedtime  dextrose 5% + sodium chloride 0.9%. 1000 milliLiter(s) (75 mL/Hr) IV Continuous <Continuous>  dextrose 5%. 1000 milliLiter(s) (100 mL/Hr) IV Continuous <Continuous>  dextrose 50% Injectable 25 Gram(s) IV Push once  dextrose 50% Injectable 12.5 Gram(s) IV Push once  dextrose 50% Injectable 25 Gram(s) IV Push once  dextrose Oral Gel 15 Gram(s) Oral once  glucagon  Injectable 1 milliGRAM(s) IntraMuscular once  insulin lispro (ADMELOG) corrective regimen sliding scale   SubCutaneous every 6 hours  pantoprazole   Suspension 40 milliGRAM(s) Oral two times a day  tamsulosin 0.4 milliGRAM(s) Oral at bedtime      PHYSICAL EXAM:  T(C): 36.9 (02-02-23 @ 12:23), Max: 36.9 (02-02-23 @ 12:23)  HR: 98 (02-02-23 @ 12:23) (82 - 98)  BP: 138/71 (02-02-23 @ 12:23) (110/82 - 138/71)  RR: 17 (02-02-23 @ 12:23) (17 - 17)  SpO2: 97% (02-02-23 @ 12:23) (97% - 98%)  Wt(kg): --  I&O's Summary    01 Feb 2023 07:01  -  02 Feb 2023 07:00  --------------------------------------------------------  IN: 0 mL / OUT: 1500 mL / NET: -1500 mL    02 Feb 2023 07:01  -  02 Feb 2023 16:23  --------------------------------------------------------  IN: 0 mL / OUT: 1000 mL / NET: -1000 mL          Appearance: Awake, calm, lying down in bed 	  HEENT: Eyes are open   Lymphatic: No lymphadenopathy   Cardiovascular: Normal S1 S2   Respiratory: normal effort , clear  Gastrointestinal:  Soft, + PEG tube   Skin: No rashes,  warm to touch  Psychiatry: Aphasic   Musculoskeletal: No edema          02-01-23 @ 07:01  -  02-02-23 @ 07:00  --------------------------------------------------------  IN: 0 mL / OUT: 1500 mL / NET: -1500 mL    02-02-23 @ 07:01  -  02-02-23 @ 16:23  --------------------------------------------------------  IN: 0 mL / OUT: 1000 mL / NET: -1000 mL                              8.7    5.32  )-----------( 394      ( 02 Feb 2023 06:51 )             29.2               02-02    136  |  104  |  6<L>  ----------------------------<  172<H>  3.6   |  25  |  0.54    Ca    7.9<L>      02 Feb 2023 06:51  Phos  2.5     02-02             Culture - Blood in AM (01.24.23 @ 07:50)   Specimen Source: .Blood Blood-Peripheral   Culture Results:   No Growth Final     Culture - Blood in AM (01.24.23 @ 07:09)   Specimen Source: .Blood Blood-Peripheral   Culture Results:   No Growth Final               < from: Upper Endoscopy (02.01.23 @ 11:12) >  Impression:          - Gastritis. Duodenal ulcers.                       - Successful PEG placement.  Recommendation:      - Return patient to [facility] for [facility].                       - PPI BID                       - NPO for 4 hours, then water and medicine by PEG.                                                                                                          < end of copied text >             Name of Patient : SARAH DISLA  MRN: [id number]  Date of visit: 02-02-23 @ 16:23      Subjective: Patient seen and examined. No new events except as noted.   Patient seen earlier this AM. S/P EGD with PEG tube placement yesterday   Episode of hematuria    REVIEW OF SYSTEMS:  Unable to obtain ROS     MEDICATIONS:  MEDICATIONS  (STANDING):  aspirin  chewable 81 milliGRAM(s) Oral daily  atorvastatin 80 milliGRAM(s) Oral at bedtime  dextrose 5% + sodium chloride 0.9%. 1000 milliLiter(s) (75 mL/Hr) IV Continuous <Continuous>  dextrose 5%. 1000 milliLiter(s) (100 mL/Hr) IV Continuous <Continuous>  dextrose 50% Injectable 25 Gram(s) IV Push once  dextrose 50% Injectable 12.5 Gram(s) IV Push once  dextrose 50% Injectable 25 Gram(s) IV Push once  dextrose Oral Gel 15 Gram(s) Oral once  glucagon  Injectable 1 milliGRAM(s) IntraMuscular once  insulin lispro (ADMELOG) corrective regimen sliding scale   SubCutaneous every 6 hours  pantoprazole   Suspension 40 milliGRAM(s) Oral two times a day  tamsulosin 0.4 milliGRAM(s) Oral at bedtime      PHYSICAL EXAM:  T(C): 36.9 (02-02-23 @ 12:23), Max: 36.9 (02-02-23 @ 12:23)  HR: 98 (02-02-23 @ 12:23) (82 - 98)  BP: 138/71 (02-02-23 @ 12:23) (110/82 - 138/71)  RR: 17 (02-02-23 @ 12:23) (17 - 17)  SpO2: 97% (02-02-23 @ 12:23) (97% - 98%)  Wt(kg): --  I&O's Summary    01 Feb 2023 07:01  -  02 Feb 2023 07:00  --------------------------------------------------------  IN: 0 mL / OUT: 1500 mL / NET: -1500 mL    02 Feb 2023 07:01  -  02 Feb 2023 16:23  --------------------------------------------------------  IN: 0 mL / OUT: 1000 mL / NET: -1000 mL          Appearance: Awake, calm, lying down in bed 	  HEENT: Eyes are open   Lymphatic: No lymphadenopathy   Cardiovascular: Normal S1 S2   Respiratory: normal effort , clear  Gastrointestinal:  Soft, + PEG tube   Skin: No rashes,  warm to touch  Psychiatry: Aphasic   Musculoskeletal: No edema          02-01-23 @ 07:01  -  02-02-23 @ 07:00  --------------------------------------------------------  IN: 0 mL / OUT: 1500 mL / NET: -1500 mL    02-02-23 @ 07:01  -  02-02-23 @ 16:23  --------------------------------------------------------  IN: 0 mL / OUT: 1000 mL / NET: -1000 mL                              8.7    5.32  )-----------( 394      ( 02 Feb 2023 06:51 )             29.2               02-02    136  |  104  |  6<L>  ----------------------------<  172<H>  3.6   |  25  |  0.54    Ca    7.9<L>      02 Feb 2023 06:51  Phos  2.5     02-02             Culture - Blood in AM (01.24.23 @ 07:50)   Specimen Source: .Blood Blood-Peripheral   Culture Results:   No Growth Final     Culture - Blood in AM (01.24.23 @ 07:09)   Specimen Source: .Blood Blood-Peripheral   Culture Results:   No Growth Final               < from: Upper Endoscopy (02.01.23 @ 11:12) >  Impression:          - Gastritis. Duodenal ulcers.                       - Successful PEG placement.  Recommendation:      - Return patient to [facility] for ongoing care.                       - PPI BID                       - NPO for 4 hours, then water and medicine by PEG.                                                                                                          < end of copied text >             Name of Patient : SARAH DISLA  MRN: [id number]  Date of visit: 02-02-23 @ 16:23      Subjective: Patient seen and examined. No new events except as noted.   Patient seen earlier this AM. S/P EGD with PEG tube placement yesterday   Episode of hematuria    REVIEW OF SYSTEMS:  Unable to obtain ROS     MEDICATIONS:  MEDICATIONS  (STANDING):  aspirin  chewable 81 milliGRAM(s) Oral daily  atorvastatin 80 milliGRAM(s) Oral at bedtime  dextrose 5% + sodium chloride 0.9%. 1000 milliLiter(s) (75 mL/Hr) IV Continuous <Continuous>  dextrose 5%. 1000 milliLiter(s) (100 mL/Hr) IV Continuous <Continuous>  dextrose 50% Injectable 25 Gram(s) IV Push once  dextrose 50% Injectable 12.5 Gram(s) IV Push once  dextrose 50% Injectable 25 Gram(s) IV Push once  dextrose Oral Gel 15 Gram(s) Oral once  glucagon  Injectable 1 milliGRAM(s) IntraMuscular once  insulin lispro (ADMELOG) corrective regimen sliding scale   SubCutaneous every 6 hours  pantoprazole   Suspension 40 milliGRAM(s) Oral two times a day  tamsulosin 0.4 milliGRAM(s) Oral at bedtime      PHYSICAL EXAM:  T(C): 36.9 (02-02-23 @ 12:23), Max: 36.9 (02-02-23 @ 12:23)  HR: 98 (02-02-23 @ 12:23) (82 - 98)  BP: 138/71 (02-02-23 @ 12:23) (110/82 - 138/71)  RR: 17 (02-02-23 @ 12:23) (17 - 17)  SpO2: 97% (02-02-23 @ 12:23) (97% - 98%)  Wt(kg): --  I&O's Summary    01 Feb 2023 07:01  -  02 Feb 2023 07:00  --------------------------------------------------------  IN: 0 mL / OUT: 1500 mL / NET: -1500 mL    02 Feb 2023 07:01  -  02 Feb 2023 16:23  --------------------------------------------------------  IN: 0 mL / OUT: 1000 mL / NET: -1000 mL          Appearance: Awake, calm, lying down in bed 	  HEENT: Eyes are open   Lymphatic: No lymphadenopathy   Cardiovascular: Normal S1 S2   Respiratory: normal effort , clear  Gastrointestinal:  Soft, + PEG tube   Skin: No rashes,  warm to touch  Psychiatry: Aphasic   Musculoskeletal: No edema          02-01-23 @ 07:01  -  02-02-23 @ 07:00  --------------------------------------------------------  IN: 0 mL / OUT: 1500 mL / NET: -1500 mL    02-02-23 @ 07:01  -  02-02-23 @ 16:23  --------------------------------------------------------  IN: 0 mL / OUT: 1000 mL / NET: -1000 mL                              8.7    5.32  )-----------( 394      ( 02 Feb 2023 06:51 )             29.2               02-02    136  |  104  |  6<L>  ----------------------------<  172<H>  3.6   |  25  |  0.54    Ca    7.9<L>      02 Feb 2023 06:51  Phos  2.5     02-02             Culture - Blood in AM (01.24.23 @ 07:50)   Specimen Source: .Blood Blood-Peripheral   Culture Results:   No Growth Final     Culture - Blood in AM (01.24.23 @ 07:09)   Specimen Source: .Blood Blood-Peripheral   Culture Results:   No Growth Final               < from: Upper Endoscopy (02.01.23 @ 11:12) >  Impression:          - Gastritis. Duodenal ulcers.                       - Successful PEG placement.  Recommendation:      - Return patient to [facility] for ongoing care.                       - PPI BID                       - NPO for 4 hours, then water and medicine by PEG.                                                                                                          < end of copied text >

## 2023-02-02 NOTE — PROGRESS NOTE ADULT - ASSESSMENT
78 f with DM, dementia recently discharged about 1 week ago for L MCA CVA, brought in 1/19 with poor PO intake, lethargy and hyperglycemia to 500s.   afebrile, WBC: 15, Na: 158, glucose  468  blood cx negative, urine cx>3 organisms but with E-coli and pt was on ceftriaxone  CXR: unchanged scarring, no focal consolidation  head CT: evolving stroke  pt had leaking urine around the gonzalez with minimal output from the gonzalez and bladder scan c/w retention, gonzalez was exchanged 1/21 and again on 1/22 similar problem, urology came and could not irrigate the gonzalez so removed gonzalez after which a large mucous plug was pulled out from vaginal/urethral area- unclear as to where it originated. white/gray in color. 14f coude attempted prior to successful insertion of 16f gonzalez, cloudy brown urine drained.  there is a culture sent which is labeled abscess at catheter size which is likely the mucous plug and is growing klebsiella aerogenes, E. feacalis and E. avium  pt spiked to 100.6    initial leukocytosis and AMS with hypernatremia and hyperglycemia with dehydration, not sure if she had UTI at that point, urine cx showed >3 organisms and E-coli and was given ceftriaxone  had difficulty with draining gonzalez and retention, so urology removed the gonzalez and there was a large ?mucous plug pulled out from vaginal/uretral area which is growing klebsiella aerogenes, E. faecalis, E. avium and bacteroides,  anoter feverto 100.6 after but WBC normalized  lice s/p permethrin   * blood cx negative and pt has frequent clogged gonzalez was upsized and again clogged, yesterday urology placed a new and mucoid fluid drained  * completed 10 days of ertapenem today s/p ceftriaxone 1/19-1/24  * urinary retention management as per urology  * will sign off, please call with questions  The above assessment and plan was discussed with the primary team    Minnie Baez MD  contact on teams  After 5pm and on weekends call [phone]     78 f with DM, dementia recently discharged about 1 week ago for L MCA CVA, brought in 1/19 with poor PO intake, lethargy and hyperglycemia to 500s.   afebrile, WBC: 15, Na: 158, glucose  468  blood cx negative, urine cx>3 organisms but with E-coli and pt was on ceftriaxone  CXR: unchanged scarring, no focal consolidation  head CT: evolving stroke  pt had leaking urine around the gonzalez with minimal output from the gonzalez and bladder scan c/w retention, gonzalez was exchanged 1/21 and again on 1/22 similar problem, urology came and could not irrigate the gonzalez so removed gonzalez after which a large mucous plug was pulled out from vaginal/urethral area- unclear as to where it originated. white/gray in color. 14f coude attempted prior to successful insertion of 16f gonzalez, cloudy brown urine drained.  there is a culture sent which is labeled abscess at catheter size which is likely the mucous plug and is growing klebsiella aerogenes, E. feacalis and E. avium  pt spiked to 100.6    initial leukocytosis and AMS with hypernatremia and hyperglycemia with dehydration, not sure if she had UTI at that point, urine cx showed >3 organisms and E-coli and was given ceftriaxone  had difficulty with draining gonzalez and retention, so urology removed the gonzalez and there was a large ?mucous plug pulled out from vaginal/uretral area which is growing klebsiella aerogenes, E. faecalis, E. avium and bacteroides,  anoter feverto 100.6 after but WBC normalized  lice s/p permethrin   * blood cx negative and pt has frequent clogged gonzalez was upsized and again clogged, yesterday urology placed a new and mucoid fluid drained  * completed 10 days of ertapenem today s/p ceftriaxone 1/19-1/24  * urinary retention management as per urology  * will sign off, please call with questions  The above assessment and plan was discussed with the primary team    Minnie Baez MD  contact on teams  After 5pm and on weekends call [phone]     78 f with DM, dementia recently discharged about 1 week ago for L MCA CVA, brought in 1/19 with poor PO intake, lethargy and hyperglycemia to 500s.   afebrile, WBC: 15, Na: 158, glucose  468  blood cx negative, urine cx>3 organisms but with E-coli and pt was on ceftriaxone  CXR: unchanged scarring, no focal consolidation  head CT: evolving stroke  pt had leaking urine around the gonzalez with minimal output from the gonzalez and bladder scan c/w retention, gonzalez was exchanged 1/21 and again on 1/22 similar problem, urology came and could not irrigate the gonzalez so removed gonzalez after which a large mucous plug was pulled out from vaginal/urethral area- unclear as to where it originated. white/gray in color. 14f coude attempted prior to successful insertion of 16f gonzalez, cloudy brown urine drained.  there is a culture sent which is labeled abscess at catheter size which is likely the mucous plug and is growing klebsiella aerogenes, E. feacalis and E. avium  pt spiked to 100.6    initial leukocytosis and AMS with hypernatremia and hyperglycemia with dehydration, not sure if she had UTI at that point, urine cx showed >3 organisms and E-coli and was given ceftriaxone  had difficulty with draining gonzalez and retention, so urology removed the gonzalez and there was a large ?mucous plug pulled out from vaginal/uretral area which is growing klebsiella aerogenes, E. faecalis, E. avium and bacteroides,  anoter feverto 100.6 after but WBC normalized  lice s/p permethrin   * blood cx negative and pt has frequent clogged gonzalez was upsized and again clogged, yesterday urology placed a new and mucoid fluid drained  * completed 10 days of ertapenem today s/p ceftriaxone 1/19-1/24  * urinary retention management as per urology  * will sign off, please call with questions  The above assessment and plan was discussed with the primary team    Minnie Baez MD  contact on teams  After 5pm and on weekends call [phone]

## 2023-02-02 NOTE — PROGRESS NOTE ADULT - SUBJECTIVE AND OBJECTIVE BOX
Neurology Progress Note    S: Patient seen and examined. MRI done, c/f infarcts. CTH neg for hemorrhage       Medication:  MEDICATIONS  (STANDING):  aspirin  chewable 81 milliGRAM(s) Oral daily  atorvastatin 80 milliGRAM(s) Oral at bedtime  dextrose 5% + sodium chloride 0.9%. 1000 milliLiter(s) (75 mL/Hr) IV Continuous <Continuous>  dextrose 5%. 1000 milliLiter(s) (100 mL/Hr) IV Continuous <Continuous>  dextrose 50% Injectable 25 Gram(s) IV Push once  dextrose 50% Injectable 12.5 Gram(s) IV Push once  dextrose 50% Injectable 25 Gram(s) IV Push once  dextrose Oral Gel 15 Gram(s) Oral once  ertapenem  IVPB 1000 milliGRAM(s) IV Intermittent every 24 hours  glucagon  Injectable 1 milliGRAM(s) IntraMuscular once  insulin lispro (ADMELOG) corrective regimen sliding scale   SubCutaneous every 6 hours  pantoprazole   Suspension 40 milliGRAM(s) Oral two times a day  tamsulosin 0.4 milliGRAM(s) Oral at bedtime    MEDICATIONS  (PRN):  acetaminophen    Suspension .. 650 milliGRAM(s) Enteral Tube every 6 hours PRN Temp greater or equal to 38C (100.4F)      Vitals:      Vital Signs Last 24 Hrs  T(C): 36.7 (02 Feb 2023 06:35), Max: 36.9 (01 Feb 2023 11:37)  T(F): 98 (02 Feb 2023 06:35), Max: 98.2 (01 Feb 2023 21:44)  HR: 85 (02 Feb 2023 06:35) (82 - 86)  BP: 110/82 (02 Feb 2023 06:35) (108/65 - 155/67)  BP(mean): 73 (01 Feb 2023 11:37) (73 - 73)  RR: 17 (02 Feb 2023 06:35) (16 - 18)  SpO2: 98% (02 Feb 2023 06:35) (97% - 99%)    Parameters below as of 02 Feb 2023 06:35  Patient On (Oxygen Delivery Method): room air            General Exam:   General Appearance: Appropriately dressed and in no acute distress       Head: Normocephalic, atraumatic and no dysmorphic features  Ear, Nose, and Throat: Moist mucous membranes  CVS: S1S2+  Resp: No SOB, no wheeze or rhonchi  Abd: soft NTND  Extremities: No edema, no cyanosis  Skin: No bruises, no rashes        NEUROLOGICAL EXAM:    Mental status: Eyes open, awake, alert, attempts to produce speech, able to follow some simple commands, able to mimic at times , unable to ID objects  Cranial Nerves: Right facial droop, no nystagmus, blinks to threat B/L  Motor exam: Normal tone, no drift, Right hemiparesis RUE drift, 3-44/5, RLE drift, 3-4/5,, LUE 5/5, LLE 5/5  Sensation: Intact to light touch   Coordination/ Gait: Unable to participate with exam     I personally reviewed the below data/images/labs:  CBC Full  -  ( 02 Feb 2023 06:51 )  WBC Count : 5.32 K/uL  RBC Count : 3.31 M/uL  Hemoglobin : 8.7 g/dL  Hematocrit : 29.2 %  Platelet Count - Automated : 394 K/uL  Mean Cell Volume : 88.2 fl  Mean Cell Hemoglobin : 26.3 pg  Mean Cell Hemoglobin Concentration : 29.8 gm/dL  Auto Neutrophil # : x  Auto Lymphocyte # : x  Auto Monocyte # : x  Auto Eosinophil # : x  Auto Basophil # : x  Auto Neutrophil % : x  Auto Lymphocyte % : x  Auto Monocyte % : x  Auto Eosinophil % : x  Auto Basophil % : x    02-02    136  |  104  |  6<L>  ----------------------------<  172<H>  3.6   |  25  |  0.54    Ca    7.9<L>      02 Feb 2023 06:51  Phos  2.5     02-02          `< from: CT Head No Cont (01.19.23 @ 13:45) >    ACC: [id number] EXAM:  CT BRAIN   ORDERED BY: CORINNE MADERA     PROCEDURE DATE:  01/19/2023           INTERPRETATION:  CLINICAL STATEMENT: Altered mental status    TECHNIQUE: CT of the head was performed without IV contrast.  RAPID   artificial intelligence was utilized for the preliminary evaluation of   intracranial hemorrhage.    COMPARISON: MRI brain 1/7/2023.    FINDINGS:  There is mild diffuse parenchymal volume loss. There are areas of low   attenuation in the periventricular white matter likely related to mild   chronic microvascular ischemic changes.    Evolving small infarcts noted in the left corona radiata.   Age-indeterminate infarct also noted in the left temporal and parietal   lobe    There is no acute intracranial hemorrhage, parenchymal mass, mass effect   or midline shift.. There is no hydrocephalus. Partial empty sella noted    The cranium is intact. Calcification noted adjacent to left frontal   artery table. The visualized paranasal sinuses are well-aerated.        IMPRESSION:  No acute intracranial hemorrhage.    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    --- End of Report ---        < from: MR Head No Cont (01.26.23 @ 22:08) >    ACC: [id number] EXAM:  MR BRAIN   ORDERED BY: POLLO LYON     PROCEDURE DATE:  01/26/2023          INTERPRETATION:  CLINICAL STATEMENT: Multiple left MCA territory   infarcts. Altered mental status.    TECHNIQUE: Multiplanar multisequence noncontrast MRI of the brain was   performed. Diffusion weighted imaging was performed. Significant image   degradation by motion artifact.  COMPARISON: MRI brain 1/7/2023.    FINDINGS:    Corpus callosum, pituitary and pineal regions appear unremarkable. No  tonsillar herniation or evidence of marrow replacement process.   Hyperostosis frontalis. Degenerative changes visualized cervical spine.    CP angle and IAC regions appear within normal limits, as do the globes,   intraconal regions and major intracranial flow-voids. No paranasal sinus   air-fluid levels or opacification is evident.    New curvilinear-serpiginous susceptibility associated with the high left   frontal lobe. No evidence of acute hemorrhage. Slightly less pronounced   confluent restricted diffusion left lateral temporal lobe. Multiple less   pronounced foci of restricted diffusion with T2-FLAIR prolongation   centered in the left corona radiata, with extension into the ipsilateral   centrum semiovale. Several additional smaller less pronounced foci of   restricted diffusion left parieto-occipital and left posterior temporal   juxtacortical white matter as well as cortically based serpiginous   restricted diffusion left posterior temporal lobe.    IMPRESSION:    Compared with MRI Brain 1/7/2023.    1. Multiple evolving LEFT-SIDED subacute infarcts, as above.  2. Findings suspicious for high LEFT frontal subarachnoid hemorrhage.   Recommend correlation with noncontrast CT of the brain.  3. Additional findings above.    Findings and recommendations discussed in detail with PADMINI Lyon at the   time of this interpretation.    --- En   < from: CT Head No Cont (01.27.23 @ 19:09) >    ACC: [id number] EXAM:  CT BRAIN   ORDERED BY: POLLO LYON     PROCEDURE DATE:  01/27/2023          INTERPRETATION:  CLINICAL INFORMATION: Subacute infarcts with possible   new subarachnoid hemorrhage on MRI.    TECHNIQUE:  Axial CT images were acquired through the head.  Intravenous contrast: None  Two-dimensional reformats were generated.    COMPARISON STUDY: Brain MRIs from 1/7/2023 and 1/26/2023.    FINDINGS:  There are evolving subacute infarcts in the left temporal lobe anteriorly   and within the left corona radiata.  There is no CT evidence of acute   intracranial hemorrhage, mass effect, midline shift or hydrocephalus.    There is mild generalized cerebral volume loss.    The paranasal sinuses are grossly clear.    The mastoids are underpneumatized bilaterally; no clinically significant   mastoid or middle ear effusion is visualized.    The patient is status post right-sided intraocular lens replacement.    The calvarium and skull base appear within normal limits..    IMPRESSION:  Evolving subacute infarcts in the left temporal lobe anteriorly and   within the left corona radiata.  No evidence of acute intracranial   hemorrhage.  No subarachnoid hemorrhage in the left frontal region as   questioned on prior MRI.    --- Endof Report ---    < from: CT Head No Cont (01.30.23 @ 23:02) >    ACC: [id number] EXAM:  CT BRAIN   ORDERED BY: ESAU OAKLEY     PROCEDURE DATE:  01/30/2023          INTERPRETATION:  Noncontrast CT of the brain.    CLINICAL INDICATION:  Altered mental status. Acute left MCA territory   infarcts.    TECHNIQUE : Axial CT scanning of the brain was obtained from the skull   base to the vertex without the administration of intravenous contrast.   Sagittal and coronal reformats were provided.    COMPARISON: MRI brain 1/26/2023. CT brain 1/27/2023.    FINDINGS:    Redemonstration of scattered acute left MCA territory infarcts. No CT   evidence for hemorrhagic transformation.    No hydrocephalus, midline shift, or effacement of basal cisterns.    No acute intracranial hemorrhage.    The visualized paranasal sinuses and mastoid air cells are clear.    IMPRESSION:    No significant interval change.    Redemonstration of scattered acute left MCA territory infarcts. No CT   evidence for hemorrhagic transformation.      --- End of Report ---            CHELSIE RYAN MD; Attending Radiologist  This document has been electronically signed. Jan 31 2023  9:12AM    < end of copied text >      Neurology Progress Note    S: Patient seen and examined. MRI done, c/f infarcts. CTH neg for hemorrhage       Medication:  MEDICATIONS  (STANDING):  aspirin  chewable 81 milliGRAM(s) Oral daily  atorvastatin 80 milliGRAM(s) Oral at bedtime  dextrose 5% + sodium chloride 0.9%. 1000 milliLiter(s) (75 mL/Hr) IV Continuous <Continuous>  dextrose 5%. 1000 milliLiter(s) (100 mL/Hr) IV Continuous <Continuous>  dextrose 50% Injectable 25 Gram(s) IV Push once  dextrose 50% Injectable 12.5 Gram(s) IV Push once  dextrose 50% Injectable 25 Gram(s) IV Push once  dextrose Oral Gel 15 Gram(s) Oral once  ertapenem  IVPB 1000 milliGRAM(s) IV Intermittent every 24 hours  glucagon  Injectable 1 milliGRAM(s) IntraMuscular once  insulin lispro (ADMELOG) corrective regimen sliding scale   SubCutaneous every 6 hours  pantoprazole   Suspension 40 milliGRAM(s) Oral two times a day  tamsulosin 0.4 milliGRAM(s) Oral at bedtime    MEDICATIONS  (PRN):  acetaminophen    Suspension .. 650 milliGRAM(s) Enteral Tube every 6 hours PRN Temp greater or equal to 38C (100.4F)      Vitals:      Vital Signs Last 24 Hrs  T(C): 36.7 (02 Feb 2023 06:35), Max: 36.9 (01 Feb 2023 11:37)  T(F): 98 (02 Feb 2023 06:35), Max: 98.2 (01 Feb 2023 21:44)  HR: 85 (02 Feb 2023 06:35) (82 - 86)  BP: 110/82 (02 Feb 2023 06:35) (108/65 - 155/67)  BP(mean): 73 (01 Feb 2023 11:37) (73 - 73)  RR: 17 (02 Feb 2023 06:35) (16 - 18)  SpO2: 98% (02 Feb 2023 06:35) (97% - 99%)    Parameters below as of 02 Feb 2023 06:35  Patient On (Oxygen Delivery Method): room air            General Exam:   General Appearance: Appropriately dressed and in no acute distress       Head: Normocephalic, atraumatic and no dysmorphic features  Ear, Nose, and Throat: Moist mucous membranes  CVS: S1S2+  Resp: No SOB, no wheeze or rhonchi  Abd: soft NTND  Extremities: No edema, no cyanosis  Skin: No bruises, no rashes        NEUROLOGICAL EXAM:    Mental status: Eyes open, awake, alert, attempts to produce speech, able to follow some simple commands, able to mimic at times , unable to ID objects  Cranial Nerves: Right facial droop, no nystagmus, blinks to threat B/L  Motor exam: Normal tone, no drift, Right hemiparesis RUE drift, 3-44/5, RLE drift, 3-4/5,, LUE 5/5, LLE 5/5  Sensation: Intact to light touch   Coordination/ Gait: Unable to participate with exam     I personally reviewed the below data/images/labs:  CBC Full  -  ( 02 Feb 2023 06:51 )  WBC Count : 5.32 K/uL  RBC Count : 3.31 M/uL  Hemoglobin : 8.7 g/dL  Hematocrit : 29.2 %  Platelet Count - Automated : 394 K/uL  Mean Cell Volume : 88.2 fl  Mean Cell Hemoglobin : 26.3 pg  Mean Cell Hemoglobin Concentration : 29.8 gm/dL  Auto Neutrophil # : x  Auto Lymphocyte # : x  Auto Monocyte # : x  Auto Eosinophil # : x  Auto Basophil # : x  Auto Neutrophil % : x  Auto Lymphocyte % : x  Auto Monocyte % : x  Auto Eosinophil % : x  Auto Basophil % : x    02-02    136  |  104  |  6<L>  ----------------------------<  172<H>  3.6   |  25  |  0.54    Ca    7.9<L>      02 Feb 2023 06:51  Phos  2.5     02-02          `< from: CT Head No Cont (01.19.23 @ 13:45) >    ACC: [id number] EXAM:  CT BRAIN   ORDERED BY: CORINNE MADERA     PROCEDURE DATE:  01/19/2023           INTERPRETATION:  CLINICAL STATEMENT: Altered mental status    TECHNIQUE: CT of the head was performed without IV contrast.  RAPID   artificial intelligence was utilized for the preliminary evaluation of   intracranial hemorrhage.    COMPARISON: MRI brain 1/7/2023.    FINDINGS:  There is mild diffuse parenchymal volume loss. There are areas of low   attenuation in the periventricular white matter likely related to mild   chronic microvascular ischemic changes.    Evolving small infarcts noted in the left corona radiata.   Age-indeterminate infarct also noted in the left temporal and parietal   lobe    There is no acute intracranial hemorrhage, parenchymal mass, mass effect   or midline shift.. There is no hydrocephalus. Partial empty sella noted    The cranium is intact. Calcification noted adjacent to left frontal   artery table. The visualized paranasal sinuses are well-aerated.        IMPRESSION:  No acute intracranial hemorrhage.    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    --- End of Report ---        < from: MR Head No Cont (01.26.23 @ 22:08) >    ACC: [id number] EXAM:  MR BRAIN   ORDERED BY: POLLO LYON     PROCEDURE DATE:  01/26/2023          INTERPRETATION:  CLINICAL STATEMENT: Multiple left MCA territory   infarcts. Altered mental status.    TECHNIQUE: Multiplanar multisequence noncontrast MRI of the brain was   performed. Diffusion weighted imaging was performed. Significant image   degradation by motion artifact.  COMPARISON: MRI brain 1/7/2023.    FINDINGS:    Corpus callosum, pituitary and pineal regions appear unremarkable. No  tonsillar herniation or evidence of marrow replacement process.   Hyperostosis frontalis. Degenerative changes visualized cervical spine.    CP angle and IAC regions appear within normal limits, as do the globes,   intraconal regions and major intracranial flow-voids. No paranasal sinus   air-fluid levels or opacification is evident.    New curvilinear-serpiginous susceptibility associated with the high left   frontal lobe. No evidence of acute hemorrhage. Slightly less pronounced   confluent restricted diffusion left lateral temporal lobe. Multiple less   pronounced foci of restricted diffusion with T2-FLAIR prolongation   centered in the left corona radiata, with extension into the ipsilateral   centrum semiovale. Several additional smaller less pronounced foci of   restricted diffusion left parieto-occipital and left posterior temporal   juxtacortical white matter as well as cortically based serpiginous   restricted diffusion left posterior temporal lobe.    IMPRESSION:    Compared with MRI Brain 1/7/2023.    1. Multiple evolving LEFT-SIDED subacute infarcts, as above.  2. Findings suspicious for high LEFT frontal subarachnoid hemorrhage.   Recommend correlation with noncontrast CT of the brain.  3. Additional findings above.    Findings and recommendations discussed in detail with PADMINI Lyon at the   time of this interpretation.    --- En   < from: CT Head No Cont (01.27.23 @ 19:09) >    ACC: [id number] EXAM:  CT BRAIN   ORDERED BY: POLLO LYON     PROCEDURE DATE:  01/27/2023          INTERPRETATION:  CLINICAL INFORMATION: Subacute infarcts with possible   new subarachnoid hemorrhage on MRI.    TECHNIQUE:  Axial CT images were acquired through the head.  Intravenous contrast: None  Two-dimensional reformats were generated.    COMPARISON STUDY: Brain MRIs from 1/7/2023 and 1/26/2023.    FINDINGS:  There are evolving subacute infarcts in the left temporal lobe anteriorly   and within the left corona radiata.  There is no CT evidence of acute   intracranial hemorrhage, mass effect, midline shift or hydrocephalus.    There is mild generalized cerebral volume loss.    The paranasal sinuses are grossly clear.    The mastoids are underpneumatized bilaterally; no clinically significant   mastoid or middle ear effusion is visualized.    The patient is status post right-sided intraocular lens replacement.    The calvarium and skull base appear within normal limits..    IMPRESSION:  Evolving subacute infarcts in the left temporal lobe anteriorly and   within the left corona radiata.  No evidence of acute intracranial   hemorrhage.  No subarachnoid hemorrhage in the left frontal region as   questioned on prior MRI.    --- Endof Report ---    < from: CT Head No Cont (01.30.23 @ 23:02) >    ACC: [id number] EXAM:  CT BRAIN   ORDERED BY: ESAU OAKLEY     PROCEDURE DATE:  01/30/2023          INTERPRETATION:  Noncontrast CT of the brain.    CLINICAL INDICATION:  Altered mental status. Acute left MCA territory   infarcts.    TECHNIQUE : Axial CT scanning of the brain was obtained from the skull   base to the vertex without the administration of intravenous contrast.   Sagittal and coronal reformats were provided.    COMPARISON: MRI brain 1/26/2023. CT brain 1/27/2023.    FINDINGS:    Redemonstration of scattered acute left MCA territory infarcts. No CT   evidence for hemorrhagic transformation.    No hydrocephalus, midline shift, or effacement of basal cisterns.    No acute intracranial hemorrhage.    The visualized paranasal sinuses and mastoid air cells are clear.    IMPRESSION:    No significant interval change.    Redemonstration of scattered acute left MCA territory infarcts. No CT   evidence for hemorrhagic transformation.      --- End of Report ---            CHELSIE RYAN MD; Attending Radiologist  This document has been electronically signed. Jan 31 2023  9:12AM    < end of copied text >      Neurology Progress Note    S: Patient seen and examined. MRI done, c/f infarcts. CTH neg for hemorrhage       Medication:  MEDICATIONS  (STANDING):  aspirin  chewable 81 milliGRAM(s) Oral daily  atorvastatin 80 milliGRAM(s) Oral at bedtime  dextrose 5% + sodium chloride 0.9%. 1000 milliLiter(s) (75 mL/Hr) IV Continuous <Continuous>  dextrose 5%. 1000 milliLiter(s) (100 mL/Hr) IV Continuous <Continuous>  dextrose 50% Injectable 25 Gram(s) IV Push once  dextrose 50% Injectable 12.5 Gram(s) IV Push once  dextrose 50% Injectable 25 Gram(s) IV Push once  dextrose Oral Gel 15 Gram(s) Oral once  ertapenem  IVPB 1000 milliGRAM(s) IV Intermittent every 24 hours  glucagon  Injectable 1 milliGRAM(s) IntraMuscular once  insulin lispro (ADMELOG) corrective regimen sliding scale   SubCutaneous every 6 hours  pantoprazole   Suspension 40 milliGRAM(s) Oral two times a day  tamsulosin 0.4 milliGRAM(s) Oral at bedtime    MEDICATIONS  (PRN):  acetaminophen    Suspension .. 650 milliGRAM(s) Enteral Tube every 6 hours PRN Temp greater or equal to 38C (100.4F)      Vitals:      Vital Signs Last 24 Hrs  T(C): 36.7 (02 Feb 2023 06:35), Max: 36.9 (01 Feb 2023 11:37)  T(F): 98 (02 Feb 2023 06:35), Max: 98.2 (01 Feb 2023 21:44)  HR: 85 (02 Feb 2023 06:35) (82 - 86)  BP: 110/82 (02 Feb 2023 06:35) (108/65 - 155/67)  BP(mean): 73 (01 Feb 2023 11:37) (73 - 73)  RR: 17 (02 Feb 2023 06:35) (16 - 18)  SpO2: 98% (02 Feb 2023 06:35) (97% - 99%)    Parameters below as of 02 Feb 2023 06:35  Patient On (Oxygen Delivery Method): room air            General Exam:   General Appearance: Appropriately dressed and in no acute distress       Head: Normocephalic, atraumatic and no dysmorphic features  Ear, Nose, and Throat: Moist mucous membranes  CVS: S1S2+  Resp: No SOB, no wheeze or rhonchi  Abd: soft NTND  Extremities: No edema, no cyanosis  Skin: No bruises, no rashes        NEUROLOGICAL EXAM:    Mental status: Eyes open, awake, alert, attempts to produce speech, able to follow some simple commands, able to mimic at times , unable to ID objects  Cranial Nerves: Right facial droop, no nystagmus, blinks to threat B/L  Motor exam: Normal tone, no drift, Right hemiparesis RUE drift, 3-44/5, RLE drift, 3-4/5,, LUE 5/5, LLE 5/5  Sensation: Intact to light touch   Coordination/ Gait: Unable to participate with exam     I personally reviewed the below data/images/labs:  CBC Full  -  ( 02 Feb 2023 06:51 )  WBC Count : 5.32 K/uL  RBC Count : 3.31 M/uL  Hemoglobin : 8.7 g/dL  Hematocrit : 29.2 %  Platelet Count - Automated : 394 K/uL  Mean Cell Volume : 88.2 fl  Mean Cell Hemoglobin : 26.3 pg  Mean Cell Hemoglobin Concentration : 29.8 gm/dL  Auto Neutrophil # : x  Auto Lymphocyte # : x  Auto Monocyte # : x  Auto Eosinophil # : x  Auto Basophil # : x  Auto Neutrophil % : x  Auto Lymphocyte % : x  Auto Monocyte % : x  Auto Eosinophil % : x  Auto Basophil % : x    02-02    136  |  104  |  6<L>  ----------------------------<  172<H>  3.6   |  25  |  0.54    Ca    7.9<L>      02 Feb 2023 06:51  Phos  2.5     02-02          `< from: CT Head No Cont (01.19.23 @ 13:45) >    ACC: [id number] EXAM:  CT BRAIN   ORDERED BY: CORINNE MADERA     PROCEDURE DATE:  01/19/2023           INTERPRETATION:  CLINICAL STATEMENT: Altered mental status    TECHNIQUE: CT of the head was performed without IV contrast.  RAPID   artificial intelligence was utilized for the preliminary evaluation of   intracranial hemorrhage.    COMPARISON: MRI brain 1/7/2023.    FINDINGS:  There is mild diffuse parenchymal volume loss. There are areas of low   attenuation in the periventricular white matter likely related to mild   chronic microvascular ischemic changes.    Evolving small infarcts noted in the left corona radiata.   Age-indeterminate infarct also noted in the left temporal and parietal   lobe    There is no acute intracranial hemorrhage, parenchymal mass, mass effect   or midline shift.. There is no hydrocephalus. Partial empty sella noted    The cranium is intact. Calcification noted adjacent to left frontal   artery table. The visualized paranasal sinuses are well-aerated.        IMPRESSION:  No acute intracranial hemorrhage.    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    --- End of Report ---        < from: MR Head No Cont (01.26.23 @ 22:08) >    ACC: [id number] EXAM:  MR BRAIN   ORDERED BY: POLLO LYON     PROCEDURE DATE:  01/26/2023          INTERPRETATION:  CLINICAL STATEMENT: Multiple left MCA territory   infarcts. Altered mental status.    TECHNIQUE: Multiplanar multisequence noncontrast MRI of the brain was   performed. Diffusion weighted imaging was performed. Significant image   degradation by motion artifact.  COMPARISON: MRI brain 1/7/2023.    FINDINGS:    Corpus callosum, pituitary and pineal regions appear unremarkable. No  tonsillar herniation or evidence of marrow replacement process.   Hyperostosis frontalis. Degenerative changes visualized cervical spine.    CP angle and IAC regions appear within normal limits, as do the globes,   intraconal regions and major intracranial flow-voids. No paranasal sinus   air-fluid levels or opacification is evident.    New curvilinear-serpiginous susceptibility associated with the high left   frontal lobe. No evidence of acute hemorrhage. Slightly less pronounced   confluent restricted diffusion left lateral temporal lobe. Multiple less   pronounced foci of restricted diffusion with T2-FLAIR prolongation   centered in the left corona radiata, with extension into the ipsilateral   centrum semiovale. Several additional smaller less pronounced foci of   restricted diffusion left parieto-occipital and left posterior temporal   juxtacortical white matter as well as cortically based serpiginous   restricted diffusion left posterior temporal lobe.    IMPRESSION:    Compared with MRI Brain 1/7/2023.    1. Multiple evolving LEFT-SIDED subacute infarcts, as above.  2. Findings suspicious for high LEFT frontal subarachnoid hemorrhage.   Recommend correlation with noncontrast CT of the brain.  3. Additional findings above.    Findings and recommendations discussed in detail with PADMINI Lyon at the   time of this interpretation.    --- En   < from: CT Head No Cont (01.27.23 @ 19:09) >    ACC: [id number] EXAM:  CT BRAIN   ORDERED BY: POLLO LYON     PROCEDURE DATE:  01/27/2023          INTERPRETATION:  CLINICAL INFORMATION: Subacute infarcts with possible   new subarachnoid hemorrhage on MRI.    TECHNIQUE:  Axial CT images were acquired through the head.  Intravenous contrast: None  Two-dimensional reformats were generated.    COMPARISON STUDY: Brain MRIs from 1/7/2023 and 1/26/2023.    FINDINGS:  There are evolving subacute infarcts in the left temporal lobe anteriorly   and within the left corona radiata.  There is no CT evidence of acute   intracranial hemorrhage, mass effect, midline shift or hydrocephalus.    There is mild generalized cerebral volume loss.    The paranasal sinuses are grossly clear.    The mastoids are underpneumatized bilaterally; no clinically significant   mastoid or middle ear effusion is visualized.    The patient is status post right-sided intraocular lens replacement.    The calvarium and skull base appear within normal limits..    IMPRESSION:  Evolving subacute infarcts in the left temporal lobe anteriorly and   within the left corona radiata.  No evidence of acute intracranial   hemorrhage.  No subarachnoid hemorrhage in the left frontal region as   questioned on prior MRI.    --- Endof Report ---    < from: CT Head No Cont (01.30.23 @ 23:02) >    ACC: [id number] EXAM:  CT BRAIN   ORDERED BY: ESAU OAKLEY     PROCEDURE DATE:  01/30/2023          INTERPRETATION:  Noncontrast CT of the brain.    CLINICAL INDICATION:  Altered mental status. Acute left MCA territory   infarcts.    TECHNIQUE : Axial CT scanning of the brain was obtained from the skull   base to the vertex without the administration of intravenous contrast.   Sagittal and coronal reformats were provided.    COMPARISON: MRI brain 1/26/2023. CT brain 1/27/2023.    FINDINGS:    Redemonstration of scattered acute left MCA territory infarcts. No CT   evidence for hemorrhagic transformation.    No hydrocephalus, midline shift, or effacement of basal cisterns.    No acute intracranial hemorrhage.    The visualized paranasal sinuses and mastoid air cells are clear.    IMPRESSION:    No significant interval change.    Redemonstration of scattered acute left MCA territory infarcts. No CT   evidence for hemorrhagic transformation.      --- End of Report ---            CHELSIE RYAN MD; Attending Radiologist  This document has been electronically signed. Jan 31 2023  9:12AM    < end of copied text >

## 2023-02-02 NOTE — CHART NOTE - NSCHARTNOTEFT_GEN_A_CORE
Nutrition Follow Up Note  Patient seen for: Nutrition Services Follow up    Chart reviewed, events noted. " 78F with dementia, T2DM, recently discharged about 1 week ago for CVA now presenting with poor PO intake, lethargy and hyperglycemia to 500s."     Source: [] Patient       [x] Current Medical Record        [X] RN        [] Family at bedside       [] Other:    -If unable to interview patient: [] Trach/Vent/BiPAP  [X] Disoriented/confused/inappropriate to interview    Diet Order:   Diet, NPO with Tube Feed:   Tube Feeding Modality: Nasogastric  Glucerna 1.2 Wallace (GLUCERNARTH)  Total Volume for 24 Hours (mL): 1200  Continuous  Starting Tube Feed Rate {mL per Hour}: 20  Increase Tube Feed Rate by (mL): 10     Every 12 hours  Until Goal Tube Feed Rate (mL per Hour): 50  Tube Feed Duration (in Hours): 24  Tube Feed Start Time: 00:00 (02-02-23)    - Is current order appropriate/adequate? [X] Yes  []  No:     - Regimen at goal provides 1200 mL total volume, 966mL free water, 1440kcal/d and 72g pro/day (26kcal/kg and 1.3 g/kg) based on upper IBW range of 54.8 kg.    - Nutrition-related concerns:      - s/p PEG placement on 2/1/23      - Hyponatremic--> Na 136 (02-02), within goal range       - S/P EGD, as per GI documentation: Gastritis. Duodenal ulcers. PPI BID ordered      - As per care coordination documentation, Discharge plan is [facility]     GI:  Last BM 2/1/23  Bowel Regimen? [] Yes   [X] No    Weights: 58.1 kg/ 128 pounds (1/19/23)    Nutritionally Pertinent MEDICATIONS  (STANDING):  atorvastatin  dextrose 5% + sodium chloride 0.9%.  dextrose 5%.  dextrose 50% Injectable  dextrose 50% Injectable  dextrose 50% Injectable  dextrose Oral Gel  glucagon  Injectable  insulin lispro (ADMELOG) corrective regimen sliding scale  pantoprazole   Suspension    Pertinent Labs: 02-02 @ 06:51: Na 136, BUN 6<L>, Cr 0.54, <H>, K+ 3.6, Phos 2.5, Mg --, Alk Phos --, ALT/SGPT --, AST/SGOT --, HbA1c --    A1C with Estimated Average Glucose Result: 9.4 % (01-19-23 @ 13:11)  A1C with Estimated Average Glucose Result: 8.6 % (01-07-23 @ 05:40)    Finger Sticks:  POCT Blood Glucose.: 194 mg/dL (02-02 @ 11:32)  POCT Blood Glucose.: 174 mg/dL (02-02 @ 06:27)  POCT Blood Glucose.: 149 mg/dL (02-01 @ 23:59)  POCT Blood Glucose.: 154 mg/dL (02-01 @ 18:20)      Skin per nursing documentation: Stage 1 pressure injuries noted to bilateral heels  Edema: +2 edema noted to bilateral arms as per flow sheets    Estimated Needs:   [X] no change since previous assessment: Based on upper IBW of 54.8 kg  25-30 kcal/kg= 1370- 1644 kcal  1.2-1.4 gm pro= 65.76-76.7 gm/pro  Defer fluid needs to medical team   [] recalculated:     Previous Nutrition Diagnosis:   1.Inadequate Oral Intake   Nutrition Diagnosis is: [X] ongoing  [] resolved [] not applicable     New Nutrition Diagnosis: [X] Not applicable    Nutrition Care Plan:  [X] In Progress  [] Achieved  [] Not applicable    Nutrition Interventions:     Education Provided:       [] Yes:  [X] No:        Recommendations:      1. As tolerated, continue Glucerna 1.2 @ 20 mL/hr and advance by 10mL q12H until goal rate of 50mL/hr x24hrs is reached.  2 .Defer diet/texture advancement to medical team/SLP as indicated   3 RD remains available to adjust EN formulary, volume/rate  4. Defer free water flushes recommendations to medical team    Monitoring and Evaluation:   Continue to monitor GI tolerance, skin integrity, labs, weight, and bowel movement regularity.     RD remains available upon request and will follow up per protocol  Sharron Seaman, MS,RDN, CDN, Pager # [phone] or TEAMS Nutrition Follow Up Note  Patient seen for: Nutrition Services Follow up    Chart reviewed, events noted. " 78F with dementia, T2DM, recently discharged about 1 week ago for CVA now presenting with poor PO intake, lethargy and hyperglycemia to 500s."     Source: [] Patient       [x] Current Medical Record        [X] RN        [] Family at bedside       [] Other:    -If unable to interview patient: [] Trach/Vent/BiPAP  [X] Disoriented/confused/inappropriate to interview    Diet Order:   Diet, NPO with Tube Feed:   Tube Feeding Modality: Nasogastric  Glucerna 1.2 Wallace (GLUCERNARTH)  Total Volume for 24 Hours (mL): 1200  Continuous  Starting Tube Feed Rate {mL per Hour}: 20  Increase Tube Feed Rate by (mL): 10     Every 12 hours  Until Goal Tube Feed Rate (mL per Hour): 50  Tube Feed Duration (in Hours): 24  Tube Feed Start Time: 00:00 (02-02-23)    - Is current order appropriate/adequate? [X] Yes  []  No:     - Regimen at goal provides 1200 mL total volume, 966mL free water, 1440kcal/d and 72g pro/day (26kcal/kg and 1.3 g/kg) based on upper IBW range of 54.8 kg.    - Nutrition-related concerns:      - s/p PEG placement on 2/1/23      - Hyponatremic--> Na 136 (02-02), within goal range       - S/P EGD, as per GI documentation: Gastritis. Duodenal ulcers. PPI BID ordered      - As per care coordination documentation, Discharge plan is [facility]     GI:  Last BM 2/1/23  Bowel Regimen? [] Yes   [X] No    Weights: 58.1 kg/ 128 pounds (1/19/23)    Nutritionally Pertinent MEDICATIONS  (STANDING):  atorvastatin  dextrose 5% + sodium chloride 0.9%.  dextrose 5%.  dextrose 50% Injectable  dextrose 50% Injectable  dextrose 50% Injectable  dextrose Oral Gel  glucagon  Injectable  insulin lispro (ADMELOG) corrective regimen sliding scale  pantoprazole   Suspension    Pertinent Labs: 02-02 @ 06:51: Na 136, BUN 6<L>, Cr 0.54, <H>, K+ 3.6, Phos 2.5, Mg --, Alk Phos --, ALT/SGPT --, AST/SGOT --, HbA1c --    A1C with Estimated Average Glucose Result: 9.4 % (01-19-23 @ 13:11)  A1C with Estimated Average Glucose Result: 8.6 % (01-07-23 @ 05:40)    Finger Sticks:  POCT Blood Glucose.: 194 mg/dL (02-02 @ 11:32)  POCT Blood Glucose.: 174 mg/dL (02-02 @ 06:27)  POCT Blood Glucose.: 149 mg/dL (02-01 @ 23:59)  POCT Blood Glucose.: 154 mg/dL (02-01 @ 18:20)      Skin per nursing documentation: Stage 1 pressure injuries noted to bilateral heels  Edema: +2 edema noted to bilateral arms as per flow sheets    Estimated Needs:   [X] no change since previous assessment: Based on upper IBW of 54.8 kg  25-30 kcal/kg= 1370- 1644 kcal  1.2-1.4 gm pro= 65.76-76.7 gm/pro  Defer fluid needs to medical team   [] recalculated:     Previous Nutrition Diagnosis:   1.Inadequate Oral Intake   Nutrition Diagnosis is: [X] ongoing  [] resolved [] not applicable     New Nutrition Diagnosis: [X] Not applicable    Nutrition Care Plan:  [X] In Progress  [] Achieved  [] Not applicable    Nutrition Interventions:     Education Provided:       [] Yes:  [X] No:        Recommendations:      1. As tolerated, continue Glucerna 1.2 @ 20 mL/hr and advance by 10mL q12H until goal rate of 50mL/hr x24hrs is reached.  2 .Defer diet/texture advancement to medical team/SLP as indicated   3 RD remains available to adjust EN formulary, volume/rate  4. Defer free water flushes recommendations to medical team    Monitoring and Evaluation:   Continue to monitor GI tolerance, skin integrity, labs, weight, and bowel movement regularity.     RD remains available upon request and will follow up per protocol  Sharron Seaman, MS,RDN, CDN, Pager # [phone] or TEAMS Nutrition Follow Up Note  Patient seen for: Nutrition Services Follow up    Chart reviewed, events noted. " 78F with dementia, T2DM, recently discharged about 1 week ago for CVA now presenting with poor PO intake, lethargy and hyperglycemia to 500s."     Source: [] Patient       [x] Current Medical Record        [X] RN        [] Family at bedside       [] Other:    -If unable to interview patient: [] Trach/Vent/BiPAP  [X] Disoriented/confused/inappropriate to interview    Diet Order:   Diet, NPO with Tube Feed:   Tube Feeding Modality: Nasogastric  Glucerna 1.2 Wallace (GLUCERNARTH)  Total Volume for 24 Hours (mL): 1200  Continuous  Starting Tube Feed Rate {mL per Hour}: 20  Increase Tube Feed Rate by (mL): 10     Every 12 hours  Until Goal Tube Feed Rate (mL per Hour): 50  Tube Feed Duration (in Hours): 24  Tube Feed Start Time: 00:00 (02-02-23)    - Is current order appropriate/adequate? [X] Yes  []  No:     - Regimen at goal provides 1200 mL total volume, 966mL free water, 1440kcal/d and 72g pro/day (26kcal/kg and 1.3 g/kg) based on upper IBW range of 54.8 kg.    - Nutrition-related concerns:      - s/p PEG placement on 2/1/23      - Hyponatremic--> Na 136 (02-02), within goal range       - S/P EGD, as per GI documentation: Gastritis. Duodenal ulcers. PPI BID ordered      - As per care coordination documentation, Discharge plan is [facility]     GI:  Last BM 2/1/23  Bowel Regimen? [] Yes   [X] No    Weights: 58.1 kg/ 128 pounds (1/19/23)    Nutritionally Pertinent MEDICATIONS  (STANDING):  atorvastatin  dextrose 5% + sodium chloride 0.9%.  dextrose 5%.  dextrose 50% Injectable  dextrose 50% Injectable  dextrose 50% Injectable  dextrose Oral Gel  glucagon  Injectable  insulin lispro (ADMELOG) corrective regimen sliding scale  pantoprazole   Suspension    Pertinent Labs: 02-02 @ 06:51: Na 136, BUN 6<L>, Cr 0.54, <H>, K+ 3.6, Phos 2.5, Mg --, Alk Phos --, ALT/SGPT --, AST/SGOT --, HbA1c --    A1C with Estimated Average Glucose Result: 9.4 % (01-19-23 @ 13:11)  A1C with Estimated Average Glucose Result: 8.6 % (01-07-23 @ 05:40)    Finger Sticks:  POCT Blood Glucose.: 194 mg/dL (02-02 @ 11:32)  POCT Blood Glucose.: 174 mg/dL (02-02 @ 06:27)  POCT Blood Glucose.: 149 mg/dL (02-01 @ 23:59)  POCT Blood Glucose.: 154 mg/dL (02-01 @ 18:20)      Skin per nursing documentation: Stage 1 pressure injuries noted to bilateral heels  Edema: +2 edema noted to bilateral arms as per flow sheets    Estimated Needs:   [X] no change since previous assessment: Based on upper IBW of 54.8 kg  25-30 kcal/kg= 1370- 1644 kcal  1.2-1.4 gm pro= 65.76-76.7 gm/pro  Defer fluid needs to medical team   [] recalculated:     Previous Nutrition Diagnosis:   1.Inadequate Oral Intake   Nutrition Diagnosis is: [X] ongoing  [] resolved [] not applicable     New Nutrition Diagnosis: [X] Not applicable    Nutrition Care Plan:  [X] In Progress  [] Achieved  [] Not applicable    Nutrition Interventions:     Education Provided:       [] Yes:  [X] No:        Recommendations:      1. As tolerated, continue Glucerna 1.2 @ 20 mL/hr and advance by 10mL q12H until goal rate of 50mL/hr x24hrs is reached.  2 .Defer diet/texture advancement to medical team/SLP as indicated   3 RD remains available to adjust EN formulary, volume/rate  4. Defer free water flushes recommendations to medical team    Monitoring and Evaluation:   Continue to monitor GI tolerance, skin integrity, labs, weight, and bowel movement regularity.     RD remains available upon request and will follow up per protocol  Sharron Seaman, MS,RDN, CDN, Pager # [phone] or TEAMS

## 2023-02-02 NOTE — PROGRESS NOTE ADULT - ASSESSMENT
78F with dementia, T2DM, recently discharged about 1 week ago for CVA now presenting with poor PO intake, lethargy and hyperglycemia to 500s.     Altered mental status, UTI .   - Since recent stroke,  A&Ox0. More recently, poor po intake, lethargic. CTH no acute intracranial abnormalities. Multifactorial   - Multiple metabolic derangements - dehydration, hyperglycemia, hyperNa, hypoK  - S/P IVF D5 75 CC   - Monitor and replete electrolytes PRN   - UA with +leuk esterase, many bacteria. C/w ceftriaxone for now. F/u UCx -- E. coli  - on Ertapenem   - 01/19 BCx2 w/ Neg final   - 01/24 BCx2 w/ Neg final   - Monitor fever curve, WBC trend   - Neuro eval consulted; F/u recs   - Aspiration precautions   - Discussed with family, agreed for NGT for feeding; NGT replaced ON 01/26   - Retention - replace gonzalez, check bladder US -- Noted, per attending discussion with family, family to decide on further work up     Uncontrolled type 2 diabetes mellitus with hyperglycemia.   - D/c on Metformin 500 mg BID  - Now with glucose 500s, improved to mid 300s with fluids. Also with mild ketosis (AG 17, bicarb 21, BHB 0.7)  - Endo eval appreciated, BHB now WNL  - Lantus 11 units QHs added to regimen & Sliding scale  - Monitor glucose levels closely, avoid hypo/hyperglycemia   - Check Ab given thin body habitus and ketosis (Glutamic Acid Decarboxylase, Zinc Transporter 8, Islet Cell Ab, and IA-2 Ab)  - S/PIVF administration  - Endo follow up; F/u recs      Cerebrovascular accident (CVA).  - Recent admission w/ Acute L MCA infarct, severe stenosis of the L M2 branch. MR with L MCA Territory infarcts   - CTH on admission showing No acute intracranial hemorrhage. 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 Per neurology, expected change seen on CT, not new lesions.   - C/w asa, plavix, statin.  - Neuro eval consulted; F/u recs   - Check MR brain -- C/F L sided Subacute infarcts, L frontal SAH; F/u neuro recs  - Checking CT head noted   - Hold lovenox DVT PPX  - NSGY eval appreciated - no int at this time  - Repeat CT H 01/30      Dysphagia  - SP PEG tube and EGD 02/01 with gastritis and duodenal ulcers. C/w PPI BID via peg  - Plan to begin feeds and up-titrate as per protocol   - Aspiration precautions     Hematuria  - Urology follow up appreciated  - Pending CT Urogram   - Bladder US as noted; F/u urology recs       Febrile  - Cx with E. Faecalis, E. Avium; UCx with E. coli   - 01/19 BCx2 W/ Neg final   - 01/24 repeat BCx2  --> Neg  final  - ID eval consulted; F/u recs  - ABX as per ID, switched to ertapenem , sensitive  - If recurrent fever, check CT as per ID   - Monitor CBC, temp curve, VS and patient closely     Anemia  - Drop in hemoglobin   - Checking Iron, B12, Folate, Ferritin, TIBC -- not consistent with deficiency and Occult   - Monitor H/H closely, monitor for signs/symptoms of bleeding    Hypernatremia.   - S/P D5 50 CC x 10 hours   - F/u DM management as per above  - Monitor Na level closely, avoid overocrrection   - C/w Free water, monitor levels closely     Pediculosis   - ID eval   - Nix as per protocol   - Tx today 01/26  - Contact precautions    HypoK  - Monitor and replete electrolytes PRN     Hyperlipidemia.   -c/w atorvastatin.    Hypertension.    -amlodipine 5mg daily.      PPX  - Lovenox 40 Qd

## 2023-02-02 NOTE — PROGRESS NOTE ADULT - SUBJECTIVE AND OBJECTIVE BOX
[facility] [place] NEPHROLOGY PRACTICE   MD PUSHPA WICK MD, PA KRISTINE SOLTANPOUR, DO INJUNG KO, NP    TEL:  OFFICE: [phone]    From 5pm-7am Answering Service [phone]    -- RENAL FOLLOW UP NOTE ---Date of Service 02-02-23 @ 10:10    Patient is a 78y old  Female who presents with a chief complaint of AMS, elevated finger sticks (01 Feb 2023 17:14)      Patient seen and examined at bedside. No chest pain/sob    VITALS:  T(F): 98 (02-02-23 @ 06:35), Max: 98.4 (02-01-23 @ 10:11)  HR: 85 (02-02-23 @ 06:35)  BP: 110/82 (02-02-23 @ 06:35)  RR: 17 (02-02-23 @ 06:35)  SpO2: 98% (02-02-23 @ 06:35)  Wt(kg): --    02-01 @ 07:01  -  02-02 @ 07:00  --------------------------------------------------------  IN: 0 mL / OUT: 1500 mL / NET: -1500 mL      Height (cm): 157.5 (02-01 @ 11:37)  Weight (kg): 58.1 (02-01 @ 11:37)  BMI (kg/m2): 23.4 (02-01 @ 11:37)  BSA (m2): 1.58 (02-01 @ 11:37)    PHYSICAL EXAM:  Constitutional: NAD  Neck: No JVD  Respiratory: CTAB, no wheezes, rales or rhonchi  Cardiovascular: S1, S2, RRR  Gastrointestinal: BS+, soft, NT/ND  Extremities: No peripheral edema    Hospital Medications:   MEDICATIONS  (STANDING):  aspirin  chewable 81 milliGRAM(s) Oral daily  atorvastatin 80 milliGRAM(s) Oral at bedtime  dextrose 5% + sodium chloride 0.9%. 1000 milliLiter(s) (75 mL/Hr) IV Continuous <Continuous>  dextrose 5%. 1000 milliLiter(s) (100 mL/Hr) IV Continuous <Continuous>  dextrose 50% Injectable 25 Gram(s) IV Push once  dextrose 50% Injectable 12.5 Gram(s) IV Push once  dextrose 50% Injectable 25 Gram(s) IV Push once  dextrose Oral Gel 15 Gram(s) Oral once  ertapenem  IVPB 1000 milliGRAM(s) IV Intermittent every 24 hours  glucagon  Injectable 1 milliGRAM(s) IntraMuscular once  insulin lispro (ADMELOG) corrective regimen sliding scale   SubCutaneous every 6 hours  pantoprazole   Suspension 40 milliGRAM(s) Oral two times a day  tamsulosin 0.4 milliGRAM(s) Oral at bedtime      LABS:  02-02    136  |  104  |  6<L>  ----------------------------<  172<H>  3.6   |  25  |  0.54    Ca    7.9<L>      02 Feb 2023 06:51  Phos  2.5     02-02      Creatinine Trend: 0.54 <--, 0.56 <--, 0.67 <--, 0.65 <--, 0.86 <--, 0.79 <--    Phosphorus Level, Serum: 2.5 mg/dL (02-02 @ 06:51)                              8.7    5.32  )-----------( 394      ( 02 Feb 2023 06:51 )             29.2     Urine Studies:  Urinalysis - [01-19-23 @ 13:15]      Color DARK BROWN / Appearance Turbid / SG 1.023 / pH 7.0      Gluc 200 mg/dL / Ketone Trace  / Bili Negative / Urobili 6 mg/dL       Blood Large / Protein >600 / Leuk Est Large / Nitrite Negative      RBC 15 / WBC 11-25 / Hyaline  / Gran 4 / Sq Epi  / Non Sq Epi Few / Bacteria Many      Iron 17, TIBC 187, %sat 9      [01-25-23 @ 07:18]  Ferritin 172      [01-25-23 @ 07:18]  Lipid: chol 238, TG 80, HDL 46, LDL --      [01-08-23 @ 02:47]        RADIOLOGY & ADDITIONAL STUDIES:   [facility] [place] [facility]   MD PUSPHA WICK MD, PA KRISTINE SOLTANPOUR, DO INJUNG KO, NP    TEL:  OFFICE: [phone]    From 5pm-7am Answering Service [phone]    -- RENAL FOLLOW UP NOTE ---Date of Service 02-02-23 @ 10:10    Patient is a 78y old  Female who presents with a chief complaint of AMS, elevated finger sticks (01 Feb 2023 17:14)      Patient seen and examined at bedside. No chest pain/sob    VITALS:  T(F): 98 (02-02-23 @ 06:35), Max: 98.4 (02-01-23 @ 10:11)  HR: 85 (02-02-23 @ 06:35)  BP: 110/82 (02-02-23 @ 06:35)  RR: 17 (02-02-23 @ 06:35)  SpO2: 98% (02-02-23 @ 06:35)  Wt(kg): --    02-01 @ 07:01  -  02-02 @ 07:00  --------------------------------------------------------  IN: 0 mL / OUT: 1500 mL / NET: -1500 mL      Height (cm): 157.5 (02-01 @ 11:37)  Weight (kg): 58.1 (02-01 @ 11:37)  BMI (kg/m2): 23.4 (02-01 @ 11:37)  BSA (m2): 1.58 (02-01 @ 11:37)    PHYSICAL EXAM:  Constitutional: NAD  Neck: No JVD  Respiratory: CTAB, no wheezes, rales or rhonchi  Cardiovascular: S1, S2, RRR  Gastrointestinal: BS+, soft, NT/ND  Extremities: No peripheral edema    Hospital Medications:   MEDICATIONS  (STANDING):  aspirin  chewable 81 milliGRAM(s) Oral daily  atorvastatin 80 milliGRAM(s) Oral at bedtime  dextrose 5% + sodium chloride 0.9%. 1000 milliLiter(s) (75 mL/Hr) IV Continuous <Continuous>  dextrose 5%. 1000 milliLiter(s) (100 mL/Hr) IV Continuous <Continuous>  dextrose 50% Injectable 25 Gram(s) IV Push once  dextrose 50% Injectable 12.5 Gram(s) IV Push once  dextrose 50% Injectable 25 Gram(s) IV Push once  dextrose Oral Gel 15 Gram(s) Oral once  ertapenem  IVPB 1000 milliGRAM(s) IV Intermittent every 24 hours  glucagon  Injectable 1 milliGRAM(s) IntraMuscular once  insulin lispro (ADMELOG) corrective regimen sliding scale   SubCutaneous every 6 hours  pantoprazole   Suspension 40 milliGRAM(s) Oral two times a day  tamsulosin 0.4 milliGRAM(s) Oral at bedtime      LABS:  02-02    136  |  104  |  6<L>  ----------------------------<  172<H>  3.6   |  25  |  0.54    Ca    7.9<L>      02 Feb 2023 06:51  Phos  2.5     02-02      Creatinine Trend: 0.54 <--, 0.56 <--, 0.67 <--, 0.65 <--, 0.86 <--, 0.79 <--    Phosphorus Level, Serum: 2.5 mg/dL (02-02 @ 06:51)                              8.7    5.32  )-----------( 394      ( 02 Feb 2023 06:51 )             29.2     Urine Studies:  Urinalysis - [01-19-23 @ 13:15]      Color DARK BROWN / Appearance Turbid / SG 1.023 / pH 7.0      Gluc 200 mg/dL / Ketone Trace  / Bili Negative / Urobili 6 mg/dL       Blood Large / Protein >600 / Leuk Est Large / Nitrite Negative      RBC 15 / WBC 11-25 / Hyaline  / Gran 4 / Sq Epi  / Non Sq Epi Few / Bacteria Many      Iron 17, TIBC 187, %sat 9      [01-25-23 @ 07:18]  Ferritin 172      [01-25-23 @ 07:18]  Lipid: chol 238, TG 80, HDL 46, LDL --      [01-08-23 @ 02:47]        RADIOLOGY & ADDITIONAL STUDIES:   [facility] [place] [facility]ROLOGY PRACT[facility]   MD PUSHPA WICK MD, PA KRISTINE SOLTANPOUR, DO INJUNG KO, NP    TEL:  OFFICE: [phone]    From 5pm-7am Answering Service [phone]    -- RENAL FOLLOW UP NOTE ---Date of Service 02-02-23 @ 10:10    Patient is a 78y old  Female who presents with a chief complaint of AMS, elevated finger sticks (01 Feb 2023 17:14)      Patient seen and examined at bedside. No chest pain/sob    VITALS:  T(F): 98 (02-02-23 @ 06:35), Max: 98.4 (02-01-23 @ 10:11)  HR: 85 (02-02-23 @ 06:35)  BP: 110/82 (02-02-23 @ 06:35)  RR: 17 (02-02-23 @ 06:35)  SpO2: 98% (02-02-23 @ 06:35)  Wt(kg): --    02-01 @ 07:01  -  02-02 @ 07:00  --------------------------------------------------------  IN: 0 mL / OUT: 1500 mL / NET: -1500 mL      Height (cm): 157.5 (02-01 @ 11:37)  Weight (kg): 58.1 (02-01 @ 11:37)  BMI (kg/m2): 23.4 (02-01 @ 11:37)  BSA (m2): 1.58 (02-01 @ 11:37)    PHYSICAL EXAM:  Constitutional: NAD  Neck: No JVD  Respiratory: CTAB, no wheezes, rales or rhonchi  Cardiovascular: S1, S2, RRR  Gastrointestinal: BS+, soft, NT/ND  Extremities: No peripheral edema    Hospital Medications:   MEDICATIONS  (STANDING):  aspirin  chewable 81 milliGRAM(s) Oral daily  atorvastatin 80 milliGRAM(s) Oral at bedtime  dextrose 5% + sodium chloride 0.9%. 1000 milliLiter(s) (75 mL/Hr) IV Continuous <Continuous>  dextrose 5%. 1000 milliLiter(s) (100 mL/Hr) IV Continuous <Continuous>  dextrose 50% Injectable 25 Gram(s) IV Push once  dextrose 50% Injectable 12.5 Gram(s) IV Push once  dextrose 50% Injectable 25 Gram(s) IV Push once  dextrose Oral Gel 15 Gram(s) Oral once  ertapenem  IVPB 1000 milliGRAM(s) IV Intermittent every 24 hours  glucagon  Injectable 1 milliGRAM(s) IntraMuscular once  insulin lispro (ADMELOG) corrective regimen sliding scale   SubCutaneous every 6 hours  pantoprazole   Suspension 40 milliGRAM(s) Oral two times a day  tamsulosin 0.4 milliGRAM(s) Oral at bedtime      LABS:  02-02    136  |  104  |  6<L>  ----------------------------<  172<H>  3.6   |  25  |  0.54    Ca    7.9<L>      02 Feb 2023 06:51  Phos  2.5     02-02      Creatinine Trend: 0.54 <--, 0.56 <--, 0.67 <--, 0.65 <--, 0.86 <--, 0.79 <--    Phosphorus Level, Serum: 2.5 mg/dL (02-02 @ 06:51)                              8.7    5.32  )-----------( 394      ( 02 Feb 2023 06:51 )             29.2     Urine Studies:  Urinalysis - [01-19-23 @ 13:15]      Color DARK BROWN / Appearance Turbid / SG 1.023 / pH 7.0      Gluc 200 mg/dL / Ketone Trace  / Bili Negative / Urobili 6 mg/dL       Blood Large / Protein >600 / Leuk Est Large / Nitrite Negative      RBC 15 / WBC 11-25 / Hyaline  / Gran 4 / Sq Epi  / Non Sq Epi Few / Bacteria Many      Iron 17, TIBC 187, %sat 9      [01-25-23 @ 07:18]  Ferritin 172      [01-25-23 @ 07:18]  Lipid: chol 238, TG 80, HDL 46, LDL --      [01-08-23 @ 02:47]        RADIOLOGY & ADDITIONAL STUDIES:

## 2023-02-02 NOTE — PROGRESS NOTE ADULT - ASSESSMENT
78F with dementia, T2DM, recently discharged about 1 week ago for CVA now presenting with poor PO intake, lethargy and hyperglycemia to 500s.   Of note, patient admitted 1/6 for R facial droop, word finding difficulties. Imaging concerning for acute L MCA infarct.  Per family, since stroke patient nonverbal/unable to participate in meaningful communications, intermittently follows commands. For the last 3-4 days, patient with poor po intake.    In ED, afebrile, , 135/84. WBC 15, Na 158, K 3.0, Cl 120, Glu 468, alk phos 136m BHB 0.7. pH 7.36, lactate 2.2.   UA: +leuk esterase, many bacteria, WBC 11-25  CXR: Redemonstrated biapical scarring and left upper lobe bronchiectasis, unchanged. No focal consolidation.   CTH No acute intracranial hemorrhage. 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  Given 1.5L NS, ceftriaxone x1, haldol 2.5mg x1 in ED.   last admission: CTA with L MCA severe stenosis; distal L M2 occlusion .  TTE 1/8/23: EF 63% Mild mitral regurgitation ; A1c 9.4   MRI brain with evolving subacute infarcts as expected. question of SAH   CTH 1/27 evolviong subacute L hemisphere infarcts. no SAH found.   CTH 1/30 unchanged ; scaterred L MCA infarct no hemorrhage   s/p EGD/peg    Impression  1) AMS likely 2/2 infection/UTI toxic metabolic encephalopahthy   2) recent stroke - L MCA infarct 2/2 symptomatic L MCA ICAD ; worsening of R HP in setting of infection     -  for urogram   - Nsx eval--> No intervention.  despite there note stating SAH, no comment to stop anti thrombotics.  nevertheless CTH read is neg for SAH.  no hemorrhage on CTH   - now on contact   - f/u endocrine   - treatment of infection per primary team -->  CTX  - for secondary stroke prevention. DAPT x 3 months followeed by ASA 81mg dialy thereafter.   - hihg dose statin lipitor 40mg dialy   - avoid hypotension given L MCA ICAD   - telemetry  - PT/OT/SS/SLP, OOBC  - check FS, glucose control <180  - GI/DVT ppx  - Thank you for allowing me to participate in the care of this patient. Call with questions.      Reji Greco MD  Vascular Neurology  Office: [phone]  78F with dementia, T2DM, recently discharged about 1 week ago for CVA now presenting with poor PO intake, lethargy and hyperglycemia to 500s.   Of note, patient admitted 1/6 for R facial droop, word finding difficulties. Imaging concerning for acute L MCA infarct.  Per family, since stroke patient nonverbal/unable to participate in meaningful communications, intermittently follows commands. For the last 3-4 days, patient with poor po intake.    In ED, afebrile, , 135/84. WBC 15, Na 158, K 3.0, Cl 120, Glu 468, alk phos 136m BHB 0.7. pH 7.36, lactate 2.2.   UA: +leuk esterase, many bacteria, WBC 11-25  CXR: Redemonstrated biapical scarring and left upper lobe bronchiectasis, unchanged. No focal consolidation.   CTH No acute intracranial hemorrhage. 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  Given 1.5L NS, ceftriaxone x1, haldol 2.5mg x1 in ED.   last admission: CTA with L MCA severe stenosis; distal L M2 occlusion .  TTE 1/8/23: EF 63% Mild mitral regurgitation ; A1c 9.4   MRI brain with evolving subacute infarcts as expected. question of SAH   CTH 1/27 evolviong subacute L hemisphere infarcts. no SAH found.   CTH 1/30 unchanged ; scaterred L MCA infarct no hemorrhage   s/p EGD/peg    Impression  1) AMS likely 2/2 infection/UTI toxic metabolic encephalopahthy   2) recent stroke - L MCA infarct 2/2 symptomatic L MCA ICAD ; worsening of R HP in setting of infection     -  for urogram   - Nsx eval--> No intervention.  despite there note stating SAH, no comment to stop anti thrombotics.  nevertheless CTH read is neg for SAH.  no hemorrhage on CTH   - now on contact   - f/u endocrine   - treatment of infection per primary team -->  CTX  - for secondary stroke prevention. DAPT x 3 months followeed by ASA 81mg dialy thereafter.   - hihg dose statin lipitor 40mg dialy   - avoid hypotension given L MCA ICAD   - telemetry  - PT/OT/SS/SLP, OOBC  - check FS, glucose control <180  - GI/DVT ppx  - Thank you for allowing me to participate in the care of this patient. Call with questions.      Reji Greco MD  Vascular Neurology  Office: [phone]  78F with dementia, T2DM, recently discharged about 1 week ago for CVA now presenting with poor PO intake, lethargy and hyperglycemia to 500s.   Of note, patient admitted 1/6 for R facial droop, word finding difficulties. Imaging concerning for acute L MCA infarct.  Per family, since stroke patient nonverbal/unable to participate in meaningful communications, intermittently follows commands. For the last 3-4 days, patient with poor po intake.    In ED, afebrile, , 135/84. WBC 15, Na 158, K 3.0, Cl 120, Glu 468, alk phos 136m BHB 0.7. pH 7.36, lactate 2.2.   UA: +leuk esterase, many bacteria, WBC 11-25  CXR: Redemonstrated biapical scarring and left upper lobe bronchiectasis, unchanged. No focal consolidation.   CTH No acute intracranial hemorrhage. 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  Given 1.5L NS, ceftriaxone x1, haldol 2.5mg x1 in ED.   last admission: CTA with L MCA severe stenosis; distal L M2 occlusion .  TTE 1/8/23: EF 63% Mild mitral regurgitation ; A1c 9.4   MRI brain with evolving subacute infarcts as expected. question of SAH   CTH 1/27 evolviong subacute L hemisphere infarcts. no SAH found.   CTH 1/30 unchanged ; scaterred L MCA infarct no hemorrhage   s/p EGD/peg    Impression  1) AMS likely 2/2 infection/UTI toxic metabolic encephalopahthy   2) recent stroke - L MCA infarct 2/2 symptomatic L MCA ICAD ; worsening of R HP in setting of infection     -  for urogram   - Nsx eval--> No intervention.  despite there note stating SAH, no comment to stop anti thrombotics.  nevertheless CTH read is neg for SAH.  no hemorrhage on CTH   - now on contact   - f/u endocrine   - treatment of infection per primary team -->  CTX  - for secondary stroke prevention. DAPT x 3 months followeed by ASA 81mg dialy thereafter.   - hihg dose statin lipitor 40mg dialy   - avoid hypotension given L MCA ICAD   - telemetry  - PT/OT/SS/SLP, OOBC  - check FS, glucose control <180  - GI/DVT ppx  - Thank you for allowing me to participate in the care of this patient. Call with questions.      Reji Greco MD  Vascular Neurology  Office: [phone]

## 2023-02-03 LAB
HCT VFR BLD CALC: 28 % — LOW (ref 34.5–45)
HGB BLD-MCNC: 8.7 G/DL — LOW (ref 11.5–15.5)
MCHC RBC-ENTMCNC: 26.6 PG — LOW (ref 27–34)
MCHC RBC-ENTMCNC: 31.1 GM/DL — LOW (ref 32–36)
MCV RBC AUTO: 85.6 FL — SIGNIFICANT CHANGE UP (ref 80–100)
NRBC # BLD: 0 /100 WBCS — SIGNIFICANT CHANGE UP (ref 0–0)
PLATELET # BLD AUTO: 384 K/UL — SIGNIFICANT CHANGE UP (ref 150–400)
RBC # BLD: 3.27 M/UL — LOW (ref 3.8–5.2)
RBC # FLD: 15 % — HIGH (ref 10.3–14.5)
WBC # BLD: 5.92 K/UL — SIGNIFICANT CHANGE UP (ref 3.8–10.5)
WBC # FLD AUTO: 5.92 K/UL — SIGNIFICANT CHANGE UP (ref 3.8–10.5)

## 2023-02-03 PROCEDURE — 99232 SBSQ HOSP IP/OBS MODERATE 35: CPT

## 2023-02-03 PROCEDURE — 74177 CT ABD & PELVIS W/CONTRAST: CPT | Mod: 26

## 2023-02-03 RX ORDER — INSULIN GLARGINE 100 [IU]/ML
6 INJECTION, SOLUTION SUBCUTANEOUS ONCE
Refills: 0 | Status: COMPLETED | OUTPATIENT
Start: 2023-02-03 | End: 2023-02-03

## 2023-02-03 RX ORDER — INSULIN GLARGINE 100 [IU]/ML
6 INJECTION, SOLUTION SUBCUTANEOUS EVERY MORNING
Refills: 0 | Status: DISCONTINUED | OUTPATIENT
Start: 2023-02-04 | End: 2023-02-04

## 2023-02-03 RX ADMIN — Medication 4: at 06:03

## 2023-02-03 RX ADMIN — CLOPIDOGREL BISULFATE 75 MILLIGRAM(S): 75 TABLET, FILM COATED ORAL at 11:54

## 2023-02-03 RX ADMIN — Medication 2: at 18:23

## 2023-02-03 RX ADMIN — Medication 4: at 00:19

## 2023-02-03 RX ADMIN — SODIUM CHLORIDE 75 MILLILITER(S): 9 INJECTION, SOLUTION INTRAVENOUS at 22:12

## 2023-02-03 RX ADMIN — PANTOPRAZOLE SODIUM 40 MILLIGRAM(S): 20 TABLET, DELAYED RELEASE ORAL at 17:17

## 2023-02-03 RX ADMIN — ATORVASTATIN CALCIUM 80 MILLIGRAM(S): 80 TABLET, FILM COATED ORAL at 22:05

## 2023-02-03 RX ADMIN — PANTOPRAZOLE SODIUM 40 MILLIGRAM(S): 20 TABLET, DELAYED RELEASE ORAL at 05:57

## 2023-02-03 RX ADMIN — TAMSULOSIN HYDROCHLORIDE 0.4 MILLIGRAM(S): 0.4 CAPSULE ORAL at 22:05

## 2023-02-03 RX ADMIN — Medication 4: at 12:16

## 2023-02-03 RX ADMIN — INSULIN GLARGINE 6 UNIT(S): 100 INJECTION, SOLUTION SUBCUTANEOUS at 11:54

## 2023-02-03 RX ADMIN — Medication 81 MILLIGRAM(S): at 11:54

## 2023-02-03 NOTE — PROGRESS NOTE ADULT - SUBJECTIVE AND OBJECTIVE BOX
Name of Patient : SARAH DISLA  MRN: [id number]  Date of visit: 02-03-23 @ 14:59      Subjective: Patient seen and examined. No new events except as noted.   Patient seen earlier this AM. Lying down in bed  PEG feeds resumed    REVIEW OF SYSTEMS:  Unable to obtain ROS    MEDICATIONS:  MEDICATIONS  (STANDING):  aspirin  chewable 81 milliGRAM(s) Oral daily  atorvastatin 80 milliGRAM(s) Oral at bedtime  clopidogrel Tablet 75 milliGRAM(s) Oral daily  dextrose 5% + sodium chloride 0.9%. 1000 milliLiter(s) (75 mL/Hr) IV Continuous <Continuous>  dextrose 5%. 1000 milliLiter(s) (100 mL/Hr) IV Continuous <Continuous>  dextrose 50% Injectable 25 Gram(s) IV Push once  dextrose 50% Injectable 12.5 Gram(s) IV Push once  dextrose 50% Injectable 25 Gram(s) IV Push once  dextrose Oral Gel 15 Gram(s) Oral once  glucagon  Injectable 1 milliGRAM(s) IntraMuscular once  insulin lispro (ADMELOG) corrective regimen sliding scale   SubCutaneous every 6 hours  pantoprazole   Suspension 40 milliGRAM(s) Oral two times a day  tamsulosin 0.4 milliGRAM(s) Oral at bedtime      PHYSICAL EXAM:  T(C): 36.9 (02-03-23 @ 04:35), Max: 36.9 (02-03-23 @ 04:35)  HR: 98 (02-03-23 @ 04:35) (91 - 98)  BP: 127/68 (02-03-23 @ 04:35) (115/65 - 127/68)  RR: 18 (02-03-23 @ 04:35) (18 - 18)  SpO2: 98% (02-03-23 @ 04:35) (97% - 98%)  Wt(kg): --  I&O's Summary    02 Feb 2023 07:01  -  03 Feb 2023 07:00  --------------------------------------------------------  IN: 0 mL / OUT: 2600 mL / NET: -2600 mL          Appearance: Awake, calm, lying down in bed 	  HEENT: Eyes are open   Lymphatic: No lymphadenopathy   Cardiovascular: Normal S1 S2   Respiratory: normal effort , clear  Gastrointestinal:  Soft, + PEG tube   : hematuria   Skin: No rashes,  warm to touch  Psychiatry: Aphasic   Musculoskeletal: No edema        02-02-23 @ 07:01  -  02-03-23 @ 07:00  --------------------------------------------------------  IN: 0 mL / OUT: 2600 mL / NET: -2600 mL                                8.7    5.92  )-----------( 384      ( 03 Feb 2023 06:43 )             28.0               02-02    136  |  104  |  6<L>  ----------------------------<  172<H>  3.6   |  25  |  0.54    Ca    7.9<L>      02 Feb 2023 06:51  Phos  2.5     02-02                              Name of Patient : SARAH DISLA  MRN: [id number]  Date of visit: 02-03-23 @ 14:59      Subjective: Patient seen and examined. No new events except as noted.   Patient seen earlier this AM. Lying down in bed  PEG feeds resumed    REVIEW OF SYSTEMS:  Unable to obtain ROS    MEDICATIONS:  MEDICATIONS  (STANDING):  aspirin  chewable 81 milliGRAM(s) Oral daily  atorvastatin 80 milliGRAM(s) Oral at bedtime  clopidogrel Tablet 75 milliGRAM(s) Oral daily  dextrose 5% + sodium chloride 0.9%. 1000 milliLiter(s) (75 mL/Hr) IV Continuous <Continuous>  dextrose 5%. 1000 milliLiter(s) (100 mL/Hr) IV Continuous <Continuous>  dextrose 50% Injectable 25 Gram(s) IV Push once  dextrose 50% Injectable 12.5 Gram(s) IV Push once  dextrose 50% Injectable 25 Gram(s) IV Push once  dextrose Oral Gel 15 Gram(s) Oral once  glucagon  Injectable 1 milliGRAM(s) IntraMuscular once  insulin lispro (ADMELOG) corrective regimen sliding scale   SubCutaneous every 6 hours  pantoprazole   Suspension 40 milliGRAM(s) Oral two times a day  tamsulosin 0.4 milliGRAM(s) Oral at bedtime      PHYSICAL EXAM:  T(C): 36.9 (02-03-23 @ 04:35), Max: 36.9 (02-03-23 @ 04:35)  HR: 98 (02-03-23 @ 04:35) (91 - 98)  BP: 127/68 (02-03-23 @ 04:35) (115/65 - 127/68)  RR: 18 (02-03-23 @ 04:35) (18 - 18)  SpO2: 98% (02-03-23 @ 04:35) (97% - 98%)  Wt(kg): --  I&O's Summary    02 Feb 2023 07:01  -  03 Feb 2023 07:00  --------------------------------------------------------  IN: 0 mL / OUT: 2600 mL / NET: -2600 mL          Appearance: Awake, calm, lying down in bed 	  HEENT: Eyes are open   Lymphatic: No lymphadenopathy   Cardiovascular: Normal S1 S2   Respiratory: normal effort , clear  Gastrointestinal:  Soft, + PEG tube   : hematuria   Skin: No rashes,  warm to touch  Psychiatry: Aphasic   Musculoskeletal: No edema        02-02-23 @ 07:01  -  02-03-23 @ 07:00  --------------------------------------------------------  IN: 0 mL / OUT: 2600 mL / NET: -2600 mL                                8.7    5.92  )-----------( 384      ( 03 Feb 2023 06:43 )             28.0               02-02    136  |  104  |  6<L>  ----------------------------<  172<H>  3.6   |  25  |  0.54    Ca    7.9<L>      02 Feb 2023 06:51  Phos  2.5     02-02                              Name of Patient : SARAH DISLA  MRN: [id number]  Date of visit: 02-03-23 @ 14:59      Subjective: Patient seen and examined. No new events except as noted.   Patient seen earlier this AM. Lying down in bed  PEG feeds resumed    REVIEW OF SYSTEMS:  Unable to obtain ROS    MEDICATIONS:  MEDICATIONS  (STANDING):  aspirin  chewable 81 milliGRAM(s) Oral daily  atorvastatin 80 milliGRAM(s) Oral at bedtime  clopidogrel Tablet 75 milliGRAM(s) Oral daily  dextrose 5% + sodium chloride 0.9%. 1000 milliLiter(s) (75 mL/Hr) IV Continuous <Continuous>  dextrose 5%. 1000 milliLiter(s) (100 mL/Hr) IV Continuous <Continuous>  dextrose 50% Injectable 25 Gram(s) IV Push once  dextrose 50% Injectable 12.5 Gram(s) IV Push once  dextrose 50% Injectable 25 Gram(s) IV Push once  dextrose Oral Gel 15 Gram(s) Oral once  glucagon  Injectable 1 milliGRAM(s) IntraMuscular once  insulin lispro (ADMELOG) corrective regimen sliding scale   SubCutaneous every 6 hours  pantoprazole   Suspension 40 milliGRAM(s) Oral two times a day  tamsulosin 0.4 milliGRAM(s) Oral at bedtime      PHYSICAL EXAM:  T(C): 36.9 (02-03-23 @ 04:35), Max: 36.9 (02-03-23 @ 04:35)  HR: 98 (02-03-23 @ 04:35) (91 - 98)  BP: 127/68 (02-03-23 @ 04:35) (115/65 - 127/68)  RR: 18 (02-03-23 @ 04:35) (18 - 18)  SpO2: 98% (02-03-23 @ 04:35) (97% - 98%)  Wt(kg): --  I&O's Summary    02 Feb 2023 07:01  -  03 Feb 2023 07:00  --------------------------------------------------------  IN: 0 mL / OUT: 2600 mL / NET: -2600 mL          Appearance: Awake, calm, lying down in bed 	  HEENT: Eyes are open   Lymphatic: No lymphadenopathy   Cardiovascular: Normal S1 S2   Respiratory: normal effort , clear  Gastrointestinal:  Soft, + PEG tube   : hematuria   Skin: No rashes,  warm to touch  Psychiatry: Aphasic   Musculoskeletal: No edema        02-02-23 @ 07:01  -  02-03-23 @ 07:00  --------------------------------------------------------  IN: 0 mL / OUT: 2600 mL / NET: -2600 mL                                8.7    5.92  )-----------( 384      ( 03 Feb 2023 06:43 )             28.0               02-02    136  |  104  |  6<L>  ----------------------------<  172<H>  3.6   |  25  |  0.54    Ca    7.9<L>      02 Feb 2023 06:51  Phos  2.5     02-02

## 2023-02-03 NOTE — PROGRESS NOTE ADULT - ASSESSMENT
78F with dementia, T2DM, recently discharged about 1 week ago for CVA now presenting with poor PO intake, lethargy and hyperglycemia to 500s.   Of note, patient admitted 1/6 for R facial droop, word finding difficulties. Imaging concerning for acute L MCA infarct.  Per family, since stroke patient nonverbal/unable to participate in meaningful communications, intermittently follows commands. For the last 3-4 days, patient with poor po intake.    In ED, afebrile, , 135/84. WBC 15, Na 158, K 3.0, Cl 120, Glu 468, alk phos 136m BHB 0.7. pH 7.36, lactate 2.2.   UA: +leuk esterase, many bacteria, WBC 11-25  CXR: Redemonstrated biapical scarring and left upper lobe bronchiectasis, unchanged. No focal consolidation.   CTH No acute intracranial hemorrhage. 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  Given 1.5L NS, ceftriaxone x1, haldol 2.5mg x1 in ED.   last admission: CTA with L MCA severe stenosis; distal L M2 occlusion .  TTE 1/8/23: EF 63% Mild mitral regurgitation ; A1c 9.4   MRI brain with evolving subacute infarcts as expected. question of SAH   CTH 1/27 evolviong subacute L hemisphere infarcts. no SAH found.   CTH 1/30 unchanged ; scaterred L MCA infarct no hemorrhage   s/p EGD/peg    Impression  1) AMS likely 2/2 infection/UTI toxic metabolic encephalopahthy   2) recent stroke - L MCA infarct 2/2 symptomatic L MCA ICAD ; worsening of R HP in setting of infection     -  f/u   - Nsx eval--> No intervention.  despite there note stating SAH, no comment to stop anti thrombotics.  nevertheless CTH read is neg for SAH.  no hemorrhage on CTH   - now on contact   - f/u endocrine   - treatment of infection per primary team -->  CTX, completed   - for secondary stroke prevention. DAPT x 3 months followeed by ASA 81mg dialy thereafter.   - hihg dose statin lipitor 40mg dialy   - avoid hypotension given L MCA ICAD   - telemetry  - PT/OT/SS/SLP, OOBC  - check FS, glucose control <180  - GI/DVT ppx  - Thank you for allowing me to participate in the care of this patient. Call with questions.    - dc plan   Reji Greco MD  Vascular Neurology  Office: [phone]  78F with dementia, T2DM, recently discharged about 1 week ago for CVA now presenting with poor PO intake, lethargy and hyperglycemia to 500s.   Of note, patient admitted 1/6 for R facial droop, word finding difficulties. Imaging concerning for acute L MCA infarct.  Per family, since stroke patient nonverbal/unable to participate in meaningful communications, intermittently follows commands. For the last 3-4 days, patient with poor po intake.    In ED, afebrile, , 135/84. WBC 15, Na 158, K 3.0, Cl 120, Glu 468, alk phos 136m BHB 0.7. pH 7.36, lactate 2.2.   UA: +leuk esterase, many bacteria, WBC 11-25  CXR: Redemonstrated biapical scarring and left upper lobe bronchiectasis, unchanged. No focal consolidation.   CTH No acute intracranial hemorrhage. 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  Given 1.5L NS, ceftriaxone x1, haldol 2.5mg x1 in ED.   last admission: CTA with L MCA severe stenosis; distal L M2 occlusion .  TTE 1/8/23: EF 63% Mild mitral regurgitation ; A1c 9.4   MRI brain with evolving subacute infarcts as expected. question of SAH   CTH 1/27 evolviong subacute L hemisphere infarcts. no SAH found.   CTH 1/30 unchanged ; scaterred L MCA infarct no hemorrhage   s/p EGD/peg    Impression  1) AMS likely 2/2 infection/UTI toxic metabolic encephalopahthy   2) recent stroke - L MCA infarct 2/2 symptomatic L MCA ICAD ; worsening of R HP in setting of infection     -  f/u   - Nsx eval--> No intervention.  despite there note stating SAH, no comment to stop anti thrombotics.  nevertheless CTH read is neg for SAH.  no hemorrhage on CTH   - now on contact   - f/u endocrine   - treatment of infection per primary team -->  CTX, completed   - for secondary stroke prevention. DAPT x 3 months followeed by ASA 81mg dialy thereafter.   - hihg dose statin lipitor 40mg dialy   - avoid hypotension given L MCA ICAD   - telemetry  - PT/OT/SS/SLP, OOBC  - check FS, glucose control <180  - GI/DVT ppx  - Thank you for allowing me to participate in the care of this patient. Call with questions.    - dc plan   Reji Greco MD  Vascular Neurology  Office: [phone]  78F with dementia, T2DM, recently discharged about 1 week ago for CVA now presenting with poor PO intake, lethargy and hyperglycemia to 500s.   Of note, patient admitted 1/6 for R facial droop, word finding difficulties. Imaging concerning for acute L MCA infarct.  Per family, since stroke patient nonverbal/unable to participate in meaningful communications, intermittently follows commands. For the last 3-4 days, patient with poor po intake.    In ED, afebrile, , 135/84. WBC 15, Na 158, K 3.0, Cl 120, Glu 468, alk phos 136m BHB 0.7. pH 7.36, lactate 2.2.   UA: +leuk esterase, many bacteria, WBC 11-25  CXR: Redemonstrated biapical scarring and left upper lobe bronchiectasis, unchanged. No focal consolidation.   CTH No acute intracranial hemorrhage. 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  Given 1.5L NS, ceftriaxone x1, haldol 2.5mg x1 in ED.   last admission: CTA with L MCA severe stenosis; distal L M2 occlusion .  TTE 1/8/23: EF 63% Mild mitral regurgitation ; A1c 9.4   MRI brain with evolving subacute infarcts as expected. question of SAH   CTH 1/27 evolviong subacute L hemisphere infarcts. no SAH found.   CTH 1/30 unchanged ; scaterred L MCA infarct no hemorrhage   s/p EGD/peg    Impression  1) AMS likely 2/2 infection/UTI toxic metabolic encephalopahthy   2) recent stroke - L MCA infarct 2/2 symptomatic L MCA ICAD ; worsening of R HP in setting of infection     -  f/u   - Nsx eval--> No intervention.  despite there note stating SAH, no comment to stop anti thrombotics.  nevertheless CTH read is neg for SAH.  no hemorrhage on CTH   - now on contact   - f/u endocrine   - treatment of infection per primary team -->  CTX, completed   - for secondary stroke prevention. DAPT x 3 months followeed by ASA 81mg dialy thereafter.   - hihg dose statin lipitor 40mg dialy   - avoid hypotension given L MCA ICAD   - telemetry  - PT/OT/SS/SLP, OOBC  - check FS, glucose control <180  - GI/DVT ppx  - Thank you for allowing me to participate in the care of this patient. Call with questions.    - dc plan   Reji Greco MD  Vascular Neurology  Office: [phone]

## 2023-02-03 NOTE — PROGRESS NOTE ADULT - ASSESSMENT
78 year old female with recurrent urinary retention, pyuria and hematuria of unclear etiology  Blood cultures negative, urine cultures contaminated.   Urine color improving     Patient seen and examined by Dr. Adorno    - CT Urogram ordered  - Please order CT Cystogram to evaluate for fistula  - Monitor urine output and color  - Flush catheter q shift  - Continue antibiotics per primary team  - WIll need eventual outpatient cystoscopy to complete gross hematuria work up.  - Plan discussed with Dr. Adorno    [facility]  [address] [place] [address], [address] [address] [phone]     78 year old female with recurrent urinary retention, pyuria and hematuria of unclear etiology  Blood cultures negative, urine cultures contaminated.   Urine color improving     Patient seen and examined by Dr. Adorno    - CT Urogram ordered  - Please order CT Cystogram to evaluate for fistula  - Monitor urine output and color  - Flush catheter q shift  - Continue antibiotics per primary team  - WIll need eventual outpatient cystoscopy to complete gross hematuria work up.  - Plan discussed with Dr. Adorno    [facility]  [address] [place] [address], [address] [address] [phone]     78 year old female with recurrent urinary retention, pyuria and hematuria of unclear etiology  Blood cultures negative, urine cultures contaminated.   Urine color improving     Patient seen and examined by Dr. Adorno    - CT Urogram ordered  - Please order CT Cystogram to evaluate for fistula  - Monitor urine output and color  - Flush catheter q shift  - Continue antibiotics per primary team  - WIll need eventual outpatient cystoscopy to complete gross hematuria work up.  - Plan discussed with Dr. Adorno    [facility]  [address] [place] [address], [address] [address] [phone]

## 2023-02-03 NOTE — PROGRESS NOTE ADULT - SUBJECTIVE AND OBJECTIVE BOX
Neurology Progress Note    S: Patient seen and examined. MRI done, c/f infarcts. CTH neg for hemorrhage   on contact     Medication:    MEDICATIONS  (STANDING):  aspirin  chewable 81 milliGRAM(s) Oral daily  atorvastatin 80 milliGRAM(s) Oral at bedtime  clopidogrel Tablet 75 milliGRAM(s) Oral daily  dextrose 5% + sodium chloride 0.9%. 1000 milliLiter(s) (75 mL/Hr) IV Continuous <Continuous>  dextrose 5%. 1000 milliLiter(s) (100 mL/Hr) IV Continuous <Continuous>  dextrose 50% Injectable 25 Gram(s) IV Push once  dextrose 50% Injectable 12.5 Gram(s) IV Push once  dextrose 50% Injectable 25 Gram(s) IV Push once  dextrose Oral Gel 15 Gram(s) Oral once  glucagon  Injectable 1 milliGRAM(s) IntraMuscular once  insulin lispro (ADMELOG) corrective regimen sliding scale   SubCutaneous every 6 hours  pantoprazole   Suspension 40 milliGRAM(s) Oral two times a day  tamsulosin 0.4 milliGRAM(s) Oral at bedtime    MEDICATIONS  (PRN):  acetaminophen    Suspension .. 650 milliGRAM(s) Enteral Tube every 6 hours PRN Temp greater or equal to 38C (100.4F)      Vitals:      Vital Signs Last 24 Hrs  T(C): 36.9 (02-03-23 @ 04:35), Max: 36.9 (02-03-23 @ 04:35)  T(F): 98.5 (02-03-23 @ 04:35), Max: 98.5 (02-03-23 @ 04:35)  HR: 98 (02-03-23 @ 04:35) (91 - 98)  BP: 127/68 (02-03-23 @ 04:35) (115/65 - 127/68)  BP(mean): --  RR: 18 (02-03-23 @ 04:35) (18 - 18)  SpO2: 98% (02-03-23 @ 04:35) (97% - 98%)              General Exam:   General Appearance: Appropriately dressed and in no acute distress       Head: Normocephalic, atraumatic and no dysmorphic features  Ear, Nose, and Throat: Moist mucous membranes  CVS: S1S2+  Resp: No SOB, no wheeze or rhonchi  Abd: soft NTND  Extremities: No edema, no cyanosis  Skin: No bruises, no rashes        NEUROLOGICAL EXAM:    Mental status: Eyes open, awake, alert, attempts to produce speech, able to follow some simple commands, able to mimic at times , unable to ID objects  Cranial Nerves: Right facial droop, no nystagmus, blinks to threat B/L  Motor exam: Normal tone, no drift, Right hemiparesis RUE drift, 3-44/5, RLE drift, 3-4/5,, LUE 5/5, LLE 5/5  Sensation: Intact to light touch   Coordination/ Gait: Unable to participate with exam     I personally reviewed the below data/images/labs:  CBC Full  -  ( 03 Feb 2023 06:43 )  WBC Count : 5.92 K/uL  RBC Count : 3.27 M/uL  Hemoglobin : 8.7 g/dL  Hematocrit : 28.0 %  Platelet Count - Automated : 384 K/uL  Mean Cell Volume : 85.6 fl  Mean Cell Hemoglobin : 26.6 pg  Mean Cell Hemoglobin Concentration : 31.1 gm/dL  Auto Neutrophil # : x  Auto Lymphocyte # : x  Auto Monocyte # : x  Auto Eosinophil # : x  Auto Basophil # : x  Auto Neutrophil % : x  Auto Lymphocyte % : x  Auto Monocyte % : x  Auto Eosinophil % : x  Auto Basophil % : x      02-02    136  |  104  |  6<L>  ----------------------------<  172<H>  3.6   |  25  |  0.54    Ca    7.9<L>      02 Feb 2023 06:51  Phos  2.5     02-02      `< from: CT Head No Cont (01.19.23 @ 13:45) >    ACC: [id number] EXAM:  CT BRAIN   ORDERED BY: CORINNE MADERA     PROCEDURE DATE:  01/19/2023           INTERPRETATION:  CLINICAL STATEMENT: Altered mental status    TECHNIQUE: CT of the head was performed without IV contrast.  RAPID   artificial intelligence was utilized for the preliminary evaluation of   intracranial hemorrhage.    COMPARISON: MRI brain 1/7/2023.    FINDINGS:  There is mild diffuse parenchymal volume loss. There are areas of low   attenuation in the periventricular white matter likely related to mild   chronic microvascular ischemic changes.    Evolving small infarcts noted in the left corona radiata.   Age-indeterminate infarct also noted in the left temporal and parietal   lobe    There is no acute intracranial hemorrhage, parenchymal mass, mass effect   or midline shift.. There is no hydrocephalus. Partial empty sella noted    The cranium is intact. Calcification noted adjacent to left frontal   artery table. The visualized paranasal sinuses are well-aerated.        IMPRESSION:  No acute intracranial hemorrhage.    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    --- End of Report ---        < from: MR Head No Cont (01.26.23 @ 22:08) >    ACC: [id number] EXAM:  MR BRAIN   ORDERED BY: POLLO LYON     PROCEDURE DATE:  01/26/2023          INTERPRETATION:  CLINICAL STATEMENT: Multiple left MCA territory   infarcts. Altered mental status.    TECHNIQUE: Multiplanar multisequence noncontrast MRI of the brain was   performed. Diffusion weighted imaging was performed. Significant image   degradation by motion artifact.  COMPARISON: MRI brain 1/7/2023.    FINDINGS:    Corpus callosum, pituitary and pineal regions appear unremarkable. No  tonsillar herniation or evidence of marrow replacement process.   Hyperostosis frontalis. Degenerative changes visualized cervical spine.    CP angle and IAC regions appear within normal limits, as do the globes,   intraconal regions and major intracranial flow-voids. No paranasal sinus   air-fluid levels or opacification is evident.    New curvilinear-serpiginous susceptibility associated with the high left   frontal lobe. No evidence of acute hemorrhage. Slightly less pronounced   confluent restricted diffusion left lateral temporal lobe. Multiple less   pronounced foci of restricted diffusion with T2-FLAIR prolongation   centered in the left corona radiata, with extension into the ipsilateral   centrum semiovale. Several additional smaller less pronounced foci of   restricted diffusion left parieto-occipital and left posterior temporal   juxtacortical white matter as well as cortically based serpiginous   restricted diffusion left posterior temporal lobe.    IMPRESSION:    Compared with MRI Brain 1/7/2023.    1. Multiple evolving LEFT-SIDED subacute infarcts, as above.  2. Findings suspicious for high LEFT frontal subarachnoid hemorrhage.   Recommend correlation with noncontrast CT of the brain.  3. Additional findings above.    Findings and recommendations discussed in detail with PADMINI Lyon at the   time of this interpretation.    --- En   < from: CT Head No Cont (01.27.23 @ 19:09) >    ACC: [id number] EXAM:  CT BRAIN   ORDERED BY: POLLO LYON     PROCEDURE DATE:  01/27/2023          INTERPRETATION:  CLINICAL INFORMATION: Subacute infarcts with possible   new subarachnoid hemorrhage on MRI.    TECHNIQUE:  Axial CT images were acquired through the head.  Intravenous contrast: None  Two-dimensional reformats were generated.    COMPARISON STUDY: Brain MRIs from 1/7/2023 and 1/26/2023.    FINDINGS:  There are evolving subacute infarcts in the left temporal lobe anteriorly   and within the left corona radiata.  There is no CT evidence of acute   intracranial hemorrhage, mass effect, midline shift or hydrocephalus.    There is mild generalized cerebral volume loss.    The paranasal sinuses are grossly clear.    The mastoids are underpneumatized bilaterally; no clinically significant   mastoid or middle ear effusion is visualized.    The patient is status post right-sided intraocular lens replacement.    The calvarium and skull base appear within normal limits..    IMPRESSION:  Evolving subacute infarcts in the left temporal lobe anteriorly and   within the left corona radiata.  No evidence of acute intracranial   hemorrhage.  No subarachnoid hemorrhage in the left frontal region as   questioned on prior MRI.    --- Endof Report ---    < from: CT Head No Cont (01.30.23 @ 23:02) >    ACC: [id number] EXAM:  CT BRAIN   ORDERED BY: ESAU OAKLEY     PROCEDURE DATE:  01/30/2023          INTERPRETATION:  Noncontrast CT of the brain.    CLINICAL INDICATION:  Altered mental status. Acute left MCA territory   infarcts.    TECHNIQUE : Axial CT scanning of the brain was obtained from the skull   base to the vertex without the administration of intravenous contrast.   Sagittal and coronal reformats were provided.    COMPARISON: MRI brain 1/26/2023. CT brain 1/27/2023.    FINDINGS:    Redemonstration of scattered acute left MCA territory infarcts. No CT   evidence for hemorrhagic transformation.    No hydrocephalus, midline shift, or effacement of basal cisterns.    No acute intracranial hemorrhage.    The visualized paranasal sinuses and mastoid air cells are clear.    IMPRESSION:    No significant interval change.    Redemonstration of scattered acute left MCA territory infarcts. No CT   evidence for hemorrhagic transformation.      --- End of Report ---            CHELSIE RYAN MD; Attending Radiologist  This document has been electronically signed. Jan 31 2023  9:12AM    < end of copied text >      Neurology Progress Note    S: Patient seen and examined. MRI done, c/f infarcts. CTH neg for hemorrhage   on contact     Medication:    MEDICATIONS  (STANDING):  aspirin  chewable 81 milliGRAM(s) Oral daily  atorvastatin 80 milliGRAM(s) Oral at bedtime  clopidogrel Tablet 75 milliGRAM(s) Oral daily  dextrose 5% + sodium chloride 0.9%. 1000 milliLiter(s) (75 mL/Hr) IV Continuous <Continuous>  dextrose 5%. 1000 milliLiter(s) (100 mL/Hr) IV Continuous <Continuous>  dextrose 50% Injectable 25 Gram(s) IV Push once  dextrose 50% Injectable 12.5 Gram(s) IV Push once  dextrose 50% Injectable 25 Gram(s) IV Push once  dextrose Oral Gel 15 Gram(s) Oral once  glucagon  Injectable 1 milliGRAM(s) IntraMuscular once  insulin lispro (ADMELOG) corrective regimen sliding scale   SubCutaneous every 6 hours  pantoprazole   Suspension 40 milliGRAM(s) Oral two times a day  tamsulosin 0.4 milliGRAM(s) Oral at bedtime    MEDICATIONS  (PRN):  acetaminophen    Suspension .. 650 milliGRAM(s) Enteral Tube every 6 hours PRN Temp greater or equal to 38C (100.4F)      Vitals:      Vital Signs Last 24 Hrs  T(C): 36.9 (02-03-23 @ 04:35), Max: 36.9 (02-03-23 @ 04:35)  T(F): 98.5 (02-03-23 @ 04:35), Max: 98.5 (02-03-23 @ 04:35)  HR: 98 (02-03-23 @ 04:35) (91 - 98)  BP: 127/68 (02-03-23 @ 04:35) (115/65 - 127/68)  BP(mean): --  RR: 18 (02-03-23 @ 04:35) (18 - 18)  SpO2: 98% (02-03-23 @ 04:35) (97% - 98%)              General Exam:   General Appearance: Appropriately dressed and in no acute distress       Head: Normocephalic, atraumatic and no dysmorphic features  Ear, Nose, and Throat: Moist mucous membranes  CVS: S1S2+  Resp: No SOB, no wheeze or rhonchi  Abd: soft NTND  Extremities: No edema, no cyanosis  Skin: No bruises, no rashes        NEUROLOGICAL EXAM:    Mental status: Eyes open, awake, alert, attempts to produce speech, able to follow some simple commands, able to mimic at times , unable to ID objects  Cranial Nerves: Right facial droop, no nystagmus, blinks to threat B/L  Motor exam: Normal tone, no drift, Right hemiparesis RUE drift, 3-44/5, RLE drift, 3-4/5,, LUE 5/5, LLE 5/5  Sensation: Intact to light touch   Coordination/ Gait: Unable to participate with exam     I personally reviewed the below data/images/labs:  CBC Full  -  ( 03 Feb 2023 06:43 )  WBC Count : 5.92 K/uL  RBC Count : 3.27 M/uL  Hemoglobin : 8.7 g/dL  Hematocrit : 28.0 %  Platelet Count - Automated : 384 K/uL  Mean Cell Volume : 85.6 fl  Mean Cell Hemoglobin : 26.6 pg  Mean Cell Hemoglobin Concentration : 31.1 gm/dL  Auto Neutrophil # : x  Auto Lymphocyte # : x  Auto Monocyte # : x  Auto Eosinophil # : x  Auto Basophil # : x  Auto Neutrophil % : x  Auto Lymphocyte % : x  Auto Monocyte % : x  Auto Eosinophil % : x  Auto Basophil % : x      02-02    136  |  104  |  6<L>  ----------------------------<  172<H>  3.6   |  25  |  0.54    Ca    7.9<L>      02 Feb 2023 06:51  Phos  2.5     02-02      `< from: CT Head No Cont (01.19.23 @ 13:45) >    ACC: [id number] EXAM:  CT BRAIN   ORDERED BY: CORINNE MADERA     PROCEDURE DATE:  01/19/2023           INTERPRETATION:  CLINICAL STATEMENT: Altered mental status    TECHNIQUE: CT of the head was performed without IV contrast.  RAPID   artificial intelligence was utilized for the preliminary evaluation of   intracranial hemorrhage.    COMPARISON: MRI brain 1/7/2023.    FINDINGS:  There is mild diffuse parenchymal volume loss. There are areas of low   attenuation in the periventricular white matter likely related to mild   chronic microvascular ischemic changes.    Evolving small infarcts noted in the left corona radiata.   Age-indeterminate infarct also noted in the left temporal and parietal   lobe    There is no acute intracranial hemorrhage, parenchymal mass, mass effect   or midline shift.. There is no hydrocephalus. Partial empty sella noted    The cranium is intact. Calcification noted adjacent to left frontal   artery table. The visualized paranasal sinuses are well-aerated.        IMPRESSION:  No acute intracranial hemorrhage.    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    --- End of Report ---        < from: MR Head No Cont (01.26.23 @ 22:08) >    ACC: [id number] EXAM:  MR BRAIN   ORDERED BY: POLLO LYON     PROCEDURE DATE:  01/26/2023          INTERPRETATION:  CLINICAL STATEMENT: Multiple left MCA territory   infarcts. Altered mental status.    TECHNIQUE: Multiplanar multisequence noncontrast MRI of the brain was   performed. Diffusion weighted imaging was performed. Significant image   degradation by motion artifact.  COMPARISON: MRI brain 1/7/2023.    FINDINGS:    Corpus callosum, pituitary and pineal regions appear unremarkable. No  tonsillar herniation or evidence of marrow replacement process.   Hyperostosis frontalis. Degenerative changes visualized cervical spine.    CP angle and IAC regions appear within normal limits, as do the globes,   intraconal regions and major intracranial flow-voids. No paranasal sinus   air-fluid levels or opacification is evident.    New curvilinear-serpiginous susceptibility associated with the high left   frontal lobe. No evidence of acute hemorrhage. Slightly less pronounced   confluent restricted diffusion left lateral temporal lobe. Multiple less   pronounced foci of restricted diffusion with T2-FLAIR prolongation   centered in the left corona radiata, with extension into the ipsilateral   centrum semiovale. Several additional smaller less pronounced foci of   restricted diffusion left parieto-occipital and left posterior temporal   juxtacortical white matter as well as cortically based serpiginous   restricted diffusion left posterior temporal lobe.    IMPRESSION:    Compared with MRI Brain 1/7/2023.    1. Multiple evolving LEFT-SIDED subacute infarcts, as above.  2. Findings suspicious for high LEFT frontal subarachnoid hemorrhage.   Recommend correlation with noncontrast CT of the brain.  3. Additional findings above.    Findings and recommendations discussed in detail with PADMINI Lyon at the   time of this interpretation.    --- En   < from: CT Head No Cont (01.27.23 @ 19:09) >    ACC: [id number] EXAM:  CT BRAIN   ORDERED BY: POLLO LYON     PROCEDURE DATE:  01/27/2023          INTERPRETATION:  CLINICAL INFORMATION: Subacute infarcts with possible   new subarachnoid hemorrhage on MRI.    TECHNIQUE:  Axial CT images were acquired through the head.  Intravenous contrast: None  Two-dimensional reformats were generated.    COMPARISON STUDY: Brain MRIs from 1/7/2023 and 1/26/2023.    FINDINGS:  There are evolving subacute infarcts in the left temporal lobe anteriorly   and within the left corona radiata.  There is no CT evidence of acute   intracranial hemorrhage, mass effect, midline shift or hydrocephalus.    There is mild generalized cerebral volume loss.    The paranasal sinuses are grossly clear.    The mastoids are underpneumatized bilaterally; no clinically significant   mastoid or middle ear effusion is visualized.    The patient is status post right-sided intraocular lens replacement.    The calvarium and skull base appear within normal limits..    IMPRESSION:  Evolving subacute infarcts in the left temporal lobe anteriorly and   within the left corona radiata.  No evidence of acute intracranial   hemorrhage.  No subarachnoid hemorrhage in the left frontal region as   questioned on prior MRI.    --- Endof Report ---    < from: CT Head No Cont (01.30.23 @ 23:02) >    ACC: [id number] EXAM:  CT BRAIN   ORDERED BY: ESAU OAKLEY     PROCEDURE DATE:  01/30/2023          INTERPRETATION:  Noncontrast CT of the brain.    CLINICAL INDICATION:  Altered mental status. Acute left MCA territory   infarcts.    TECHNIQUE : Axial CT scanning of the brain was obtained from the skull   base to the vertex without the administration of intravenous contrast.   Sagittal and coronal reformats were provided.    COMPARISON: MRI brain 1/26/2023. CT brain 1/27/2023.    FINDINGS:    Redemonstration of scattered acute left MCA territory infarcts. No CT   evidence for hemorrhagic transformation.    No hydrocephalus, midline shift, or effacement of basal cisterns.    No acute intracranial hemorrhage.    The visualized paranasal sinuses and mastoid air cells are clear.    IMPRESSION:    No significant interval change.    Redemonstration of scattered acute left MCA territory infarcts. No CT   evidence for hemorrhagic transformation.      --- End of Report ---            CHELSIE RYAN MD; Attending Radiologist  This document has been electronically signed. Jan 31 2023  9:12AM    < end of copied text >      Neurology Progress Note    S: Patient seen and examined. MRI done, c/f infarcts. CTH neg for hemorrhage   on contact     Medication:    MEDICATIONS  (STANDING):  aspirin  chewable 81 milliGRAM(s) Oral daily  atorvastatin 80 milliGRAM(s) Oral at bedtime  clopidogrel Tablet 75 milliGRAM(s) Oral daily  dextrose 5% + sodium chloride 0.9%. 1000 milliLiter(s) (75 mL/Hr) IV Continuous <Continuous>  dextrose 5%. 1000 milliLiter(s) (100 mL/Hr) IV Continuous <Continuous>  dextrose 50% Injectable 25 Gram(s) IV Push once  dextrose 50% Injectable 12.5 Gram(s) IV Push once  dextrose 50% Injectable 25 Gram(s) IV Push once  dextrose Oral Gel 15 Gram(s) Oral once  glucagon  Injectable 1 milliGRAM(s) IntraMuscular once  insulin lispro (ADMELOG) corrective regimen sliding scale   SubCutaneous every 6 hours  pantoprazole   Suspension 40 milliGRAM(s) Oral two times a day  tamsulosin 0.4 milliGRAM(s) Oral at bedtime    MEDICATIONS  (PRN):  acetaminophen    Suspension .. 650 milliGRAM(s) Enteral Tube every 6 hours PRN Temp greater or equal to 38C (100.4F)      Vitals:      Vital Signs Last 24 Hrs  T(C): 36.9 (02-03-23 @ 04:35), Max: 36.9 (02-03-23 @ 04:35)  T(F): 98.5 (02-03-23 @ 04:35), Max: 98.5 (02-03-23 @ 04:35)  HR: 98 (02-03-23 @ 04:35) (91 - 98)  BP: 127/68 (02-03-23 @ 04:35) (115/65 - 127/68)  BP(mean): --  RR: 18 (02-03-23 @ 04:35) (18 - 18)  SpO2: 98% (02-03-23 @ 04:35) (97% - 98%)              General Exam:   General Appearance: Appropriately dressed and in no acute distress       Head: Normocephalic, atraumatic and no dysmorphic features  Ear, Nose, and Throat: Moist mucous membranes  CVS: S1S2+  Resp: No SOB, no wheeze or rhonchi  Abd: soft NTND  Extremities: No edema, no cyanosis  Skin: No bruises, no rashes        NEUROLOGICAL EXAM:    Mental status: Eyes open, awake, alert, attempts to produce speech, able to follow some simple commands, able to mimic at times , unable to ID objects  Cranial Nerves: Right facial droop, no nystagmus, blinks to threat B/L  Motor exam: Normal tone, no drift, Right hemiparesis RUE drift, 3-44/5, RLE drift, 3-4/5,, LUE 5/5, LLE 5/5  Sensation: Intact to light touch   Coordination/ Gait: Unable to participate with exam     I personally reviewed the below data/images/labs:  CBC Full  -  ( 03 Feb 2023 06:43 )  WBC Count : 5.92 K/uL  RBC Count : 3.27 M/uL  Hemoglobin : 8.7 g/dL  Hematocrit : 28.0 %  Platelet Count - Automated : 384 K/uL  Mean Cell Volume : 85.6 fl  Mean Cell Hemoglobin : 26.6 pg  Mean Cell Hemoglobin Concentration : 31.1 gm/dL  Auto Neutrophil # : x  Auto Lymphocyte # : x  Auto Monocyte # : x  Auto Eosinophil # : x  Auto Basophil # : x  Auto Neutrophil % : x  Auto Lymphocyte % : x  Auto Monocyte % : x  Auto Eosinophil % : x  Auto Basophil % : x      02-02    136  |  104  |  6<L>  ----------------------------<  172<H>  3.6   |  25  |  0.54    Ca    7.9<L>      02 Feb 2023 06:51  Phos  2.5     02-02      `< from: CT Head No Cont (01.19.23 @ 13:45) >    ACC: [id number] EXAM:  CT BRAIN   ORDERED BY: CORINNE MADERA     PROCEDURE DATE:  01/19/2023           INTERPRETATION:  CLINICAL STATEMENT: Altered mental status    TECHNIQUE: CT of the head was performed without IV contrast.  RAPID   artificial intelligence was utilized for the preliminary evaluation of   intracranial hemorrhage.    COMPARISON: MRI brain 1/7/2023.    FINDINGS:  There is mild diffuse parenchymal volume loss. There are areas of low   attenuation in the periventricular white matter likely related to mild   chronic microvascular ischemic changes.    Evolving small infarcts noted in the left corona radiata.   Age-indeterminate infarct also noted in the left temporal and parietal   lobe    There is no acute intracranial hemorrhage, parenchymal mass, mass effect   or midline shift.. There is no hydrocephalus. Partial empty sella noted    The cranium is intact. Calcification noted adjacent to left frontal   artery table. The visualized paranasal sinuses are well-aerated.        IMPRESSION:  No acute intracranial hemorrhage.    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    --- End of Report ---        < from: MR Head No Cont (01.26.23 @ 22:08) >    ACC: [id number] EXAM:  MR BRAIN   ORDERED BY: POLLO LYON     PROCEDURE DATE:  01/26/2023          INTERPRETATION:  CLINICAL STATEMENT: Multiple left MCA territory   infarcts. Altered mental status.    TECHNIQUE: Multiplanar multisequence noncontrast MRI of the brain was   performed. Diffusion weighted imaging was performed. Significant image   degradation by motion artifact.  COMPARISON: MRI brain 1/7/2023.    FINDINGS:    Corpus callosum, pituitary and pineal regions appear unremarkable. No  tonsillar herniation or evidence of marrow replacement process.   Hyperostosis frontalis. Degenerative changes visualized cervical spine.    CP angle and IAC regions appear within normal limits, as do the globes,   intraconal regions and major intracranial flow-voids. No paranasal sinus   air-fluid levels or opacification is evident.    New curvilinear-serpiginous susceptibility associated with the high left   frontal lobe. No evidence of acute hemorrhage. Slightly less pronounced   confluent restricted diffusion left lateral temporal lobe. Multiple less   pronounced foci of restricted diffusion with T2-FLAIR prolongation   centered in the left corona radiata, with extension into the ipsilateral   centrum semiovale. Several additional smaller less pronounced foci of   restricted diffusion left parieto-occipital and left posterior temporal   juxtacortical white matter as well as cortically based serpiginous   restricted diffusion left posterior temporal lobe.    IMPRESSION:    Compared with MRI Brain 1/7/2023.    1. Multiple evolving LEFT-SIDED subacute infarcts, as above.  2. Findings suspicious for high LEFT frontal subarachnoid hemorrhage.   Recommend correlation with noncontrast CT of the brain.  3. Additional findings above.    Findings and recommendations discussed in detail with PADMINI Lyon at the   time of this interpretation.    --- En   < from: CT Head No Cont (01.27.23 @ 19:09) >    ACC: [id number] EXAM:  CT BRAIN   ORDERED BY: POLLO LYON     PROCEDURE DATE:  01/27/2023          INTERPRETATION:  CLINICAL INFORMATION: Subacute infarcts with possible   new subarachnoid hemorrhage on MRI.    TECHNIQUE:  Axial CT images were acquired through the head.  Intravenous contrast: None  Two-dimensional reformats were generated.    COMPARISON STUDY: Brain MRIs from 1/7/2023 and 1/26/2023.    FINDINGS:  There are evolving subacute infarcts in the left temporal lobe anteriorly   and within the left corona radiata.  There is no CT evidence of acute   intracranial hemorrhage, mass effect, midline shift or hydrocephalus.    There is mild generalized cerebral volume loss.    The paranasal sinuses are grossly clear.    The mastoids are underpneumatized bilaterally; no clinically significant   mastoid or middle ear effusion is visualized.    The patient is status post right-sided intraocular lens replacement.    The calvarium and skull base appear within normal limits..    IMPRESSION:  Evolving subacute infarcts in the left temporal lobe anteriorly and   within the left corona radiata.  No evidence of acute intracranial   hemorrhage.  No subarachnoid hemorrhage in the left frontal region as   questioned on prior MRI.    --- Endof Report ---    < from: CT Head No Cont (01.30.23 @ 23:02) >    ACC: [id number] EXAM:  CT BRAIN   ORDERED BY: ESAU OAKLEY     PROCEDURE DATE:  01/30/2023          INTERPRETATION:  Noncontrast CT of the brain.    CLINICAL INDICATION:  Altered mental status. Acute left MCA territory   infarcts.    TECHNIQUE : Axial CT scanning of the brain was obtained from the skull   base to the vertex without the administration of intravenous contrast.   Sagittal and coronal reformats were provided.    COMPARISON: MRI brain 1/26/2023. CT brain 1/27/2023.    FINDINGS:    Redemonstration of scattered acute left MCA territory infarcts. No CT   evidence for hemorrhagic transformation.    No hydrocephalus, midline shift, or effacement of basal cisterns.    No acute intracranial hemorrhage.    The visualized paranasal sinuses and mastoid air cells are clear.    IMPRESSION:    No significant interval change.    Redemonstration of scattered acute left MCA territory infarcts. No CT   evidence for hemorrhagic transformation.      --- End of Report ---            CHELSIE RYAN MD; Attending Radiologist  This document has been electronically signed. Jan 31 2023  9:12AM    < end of copied text >

## 2023-02-03 NOTE — PROGRESS NOTE ADULT - SUBJECTIVE AND OBJECTIVE BOX
seen earlier today     Chief Complaint: Type 2 Diabetes Mellitus     INTERVAL HX: TF at 30cc/hr via peg at time of visit,TF not yet at goal, BG above goal trendign 200s however lantus not started overnight      Review of Systems:  unable   Allergies    Benedryl Allergy Sinus (Hives)  penicillin (Rash)    Intolerances      MEDICATIONS  (STANDING):  aspirin  chewable 81 milliGRAM(s) Oral daily  atorvastatin 80 milliGRAM(s) Oral at bedtime  clopidogrel Tablet 75 milliGRAM(s) Oral daily  dextrose 5% + sodium chloride 0.9%. 1000 milliLiter(s) (75 mL/Hr) IV Continuous <Continuous>  dextrose 5%. 1000 milliLiter(s) (100 mL/Hr) IV Continuous <Continuous>  dextrose 50% Injectable 25 Gram(s) IV Push once  dextrose 50% Injectable 12.5 Gram(s) IV Push once  dextrose 50% Injectable 25 Gram(s) IV Push once  dextrose Oral Gel 15 Gram(s) Oral once  glucagon  Injectable 1 milliGRAM(s) IntraMuscular once  insulin lispro (ADMELOG) corrective regimen sliding scale   SubCutaneous every 6 hours  pantoprazole   Suspension 40 milliGRAM(s) Oral two times a day  tamsulosin 0.4 milliGRAM(s) Oral at bedtime        atorvastatin   80 milliGRAM(s) Oral (02-02-23 @ 22:11)    insulin glargine Injectable (LANTUS)   6 Unit(s) SubCutaneous (02-03-23 @ 11:54)    insulin lispro (ADMELOG) corrective regimen sliding scale   4 Unit(s) SubCutaneous (02-03-23 @ 12:16)   4 Unit(s) SubCutaneous (02-03-23 @ 06:03)   4 Unit(s) SubCutaneous (02-03-23 @ 00:19)   2 Unit(s) SubCutaneous (02-02-23 @ 17:46)        PHYSICAL EXAM:  VITALS: T(C): 36.9 (02-03-23 @ 04:35)  T(F): 98.5 (02-03-23 @ 04:35), Max: 98.5 (02-03-23 @ 04:35)  HR: 98 (02-03-23 @ 04:35) (91 - 98)  BP: 127/68 (02-03-23 @ 04:35) (115/65 - 127/68)  RR:  (18 - 18)  SpO2:  (97% - 98%)  Wt(kg): --  GENERAL: female laying in bed in NAD receiving TF via peg   Respiratory: Respirations unlabored   Extremities: Warm, no edema  NEURO: nonverbal     LABS:  POCT Blood Glucose.: 203 mg/dL (02-03-23 @ 11:34)  POCT Blood Glucose.: 208 mg/dL (02-03-23 @ 05:59)  POCT Blood Glucose.: 212 mg/dL (02-03-23 @ 00:08)  POCT Blood Glucose.: 154 mg/dL (02-02-23 @ 17:38)  POCT Blood Glucose.: 194 mg/dL (02-02-23 @ 11:32)  POCT Blood Glucose.: 174 mg/dL (02-02-23 @ 06:27)  POCT Blood Glucose.: 149 mg/dL (02-01-23 @ 23:59)  POCT Blood Glucose.: 154 mg/dL (02-01-23 @ 18:20)  POCT Blood Glucose.: 136 mg/dL (02-01-23 @ 12:31)  POCT Blood Glucose.: 87 mg/dL (02-01-23 @ 06:13)  POCT Blood Glucose.: 161 mg/dL (02-01-23 @ 01:55)  POCT Blood Glucose.: 156 mg/dL (02-01-23 @ 01:40)  POCT Blood Glucose.: 70 mg/dL (02-01-23 @ 01:05)  POCT Blood Glucose.: 69 mg/dL (02-01-23 @ 00:55)  POCT Blood Glucose.: 73 mg/dL (01-31-23 @ 17:29)                          8.7    5.92  )-----------( 384      ( 03 Feb 2023 06:43 )             28.0     02-02    136  |  104  |  6<L>  ----------------------------<  172<H>  3.6   |  25  |  0.54    Ca    7.9<L>      02 Feb 2023 06:51  Phos  2.5     02-02 01-08 Chol 238<H> Direct LDL -- LDL calculated 176<H> HDL 46<L> Trig 80  Thyroid Function Tests:      A1C with Estimated Average Glucose Result: 9.4 % (01-19-23 @ 13:11)  A1C with Estimated Average Glucose Result: 8.6 % (01-07-23 @ 05:40)    Estimated Average Glucose: 223 mg/dL (01-19-23 @ 13:11)  Estimated Average Glucose: 200 mg/dL (01-07-23 @ 05:40)        Diet, NPO with Tube Feed:   Tube Feeding Modality: Nasogastric  Glucerna 1.2 Wallace (GLUCERNARTH)  Total Volume for 24 Hours (mL): 1200  Continuous  Starting Tube Feed Rate mL per Hour: 20  Increase Tube Feed Rate by (mL): 10     Every 12 hours  Until Goal Tube Feed Rate (mL per Hour): 50  Tube Feed Duration (in Hours): 24  Tube Feed Start Time: 00:00 (02-02-23 @ 12:43) [Active]

## 2023-02-03 NOTE — PROGRESS NOTE ADULT - SUBJECTIVE AND OBJECTIVE BOX
Subjective    Patient seen and examined. Unable to respond to questions    Objective    Vital signs  T(F): , Max: 98.5 (02-02-23 @ 12:23)  HR: 98 (02-03-23 @ 04:35)  BP: 127/68 (02-03-23 @ 04:35)  SpO2: 98% (02-03-23 @ 04:35)  Wt(kg): --    Output     02-02 @ 07:01  -  02-03 @ 07:00  --------------------------------------------------------  IN: 0 mL / OUT: 2600 mL / NET: -2600 mL        General: NAD  Abdomen: soft/non-tender/non-distended  : gonzalez in place draining clear yellow urine with chunks of sediment     Labs      02-03 @ 06:43    WBC 5.92  / Hct 28.0  / SCr --       02-02 @ 06:51    WBC 5.32  / Hct 29.2  / SCr 0.54

## 2023-02-03 NOTE — PROGRESS NOTE ADULT - NS ATTEND AMEND GEN_ALL_CORE FT
Pt care and plan discussed and reviewed with PA. Plan as outlined above edited by me to reflect our discussion. Advanced care planning/advanced directives discussed with patient/family. DNR status including forceful chest compressions to attempt to restart the heart, ventilator support/artificial breathing, electric shock, artificial nutrition, health care proxy, Molst form all discussed with pt. Pt wishes to consider.
discussed with patient;s son. will make decision by tomorrow after discussion with family
Pt care and plan discussed and reviewed with PA. Plan as outlined above edited by me to reflect our discussion.
Pt care and plan discussed and reviewed with PA. Plan as outlined above edited by me to reflect our discussion. Advanced care planning/advanced directives discussed with patient/family. DNR status including forceful chest compressions to attempt to restart the heart, ventilator support/artificial breathing, electric shock, artificial nutrition, health care proxy, Molst form all discussed with pt. Pt wishes to consider.

## 2023-02-03 NOTE — PROGRESS NOTE ADULT - ASSESSMENT
78 F with recent T2DM diagnosis and CVA (d/c 1 week ago), presenting with glucoses 500s, poor po intake and lethargy. Endocrine consulted for assistance with management of uncontrolled, recently dx DM. Now s/p  PEG.        Uncontrolled type 2 diabetes mellitus with hyperglycemia.   TF via peg started, not yet at goal with BG 200s, restart lantus at lower dose   ·  Plan:   - BG Goal 100-180mg/dl   -test BG Q6h  - Start Lantus 6 units sq qam first dose 2/3  - c/w Admelog moderate correction scale Q6h  - Please notify endocrine team of any change to TF regimen.   - auto-immune DM antibodies negative to r/o RAJ.   For d/c:   final recs TBD based on feeding modality/insulin requirements  pt remains on 24H TF via peg, at discharge likely will need lantus & scale if remains on 24H TF   -Patient can follow up at [address] [place] [address], [address]RD FLO[address], [place] [address] [phone]  -Patient will need opthalmology and podiatry follow up as outpatient.     Problem/Plan - 2:  ·  Problem: Hypertension.   ·  Plan: BP goal 130/80  - Manage per primary team.     Problem/Plan - 3:  ·  Problem: Hyperlipidemia.   ·  Plan: Continue with atorvastatin 80 mg daily   -   - Manage as outpatient         discussed with patient and RN   Contact via Microsoft Teams during business hours  To reach covering provider access AMION via sunrise tools  For Urgent matters/after-hours/weekends/holidays please page endocrine fellow on call   For nonurgent matters please email [email or web address]    Please note that this patient may be followed by different provider tomorrow.  Notify endocrine 24 hours prior to discharge for final recommendations    greater than 50% of the encounter was spent counseling and/or coordination of care.  26 minutes spent on total encounter; The necessity of the time spent during the encounter on this date of service was due to development of plan of care/coordination of care/glycemic control through review of labs, blood glucose values and vital signs.  78 F with recent T2DM diagnosis and CVA (d/c 1 week ago), presenting with glucoses 500s, poor po intake and lethargy. Endocrine consulted for assistance with management of uncontrolled, recently dx DM. Now s/p  PEG.        Uncontrolled type 2 diabetes mellitus with hyperglycemia.   TF via peg started, not yet at goal with BG 200s, restart lantus at lower dose   ·  Plan:   - BG Goal 100-180mg/dl   -test BG Q6h  - Start Lantus 6 units sq qam first dose 2/3  - c/w Admelog moderate correction scale Q6h  - Please notify endocrine team of any change to TF regimen.   - auto-immune DM antibodies negative to r/o RAJ.   For d/c:   final recs TBD based on feeding modality/insulin requirements  pt remains on 24H TF via peg, at discharge likely will need lantus & scale if remains on 24H TF   -Patient can follow up at [address] [place] [address], [address] FLO[address], [place] [address] [phone]  -Patient will need opthalmology and podiatry follow up as outpatient.     Problem/Plan - 2:  ·  Problem: Hypertension.   ·  Plan: BP goal 130/80  - Manage per primary team.     Problem/Plan - 3:  ·  Problem: Hyperlipidemia.   ·  Plan: Continue with atorvastatin 80 mg daily   -   - Manage as outpatient         discussed with patient and RN   Contact via Microsoft Teams during business hours  To reach covering provider access AMION via sunrise tools  For Urgent matters/after-hours/weekends/holidays please page endocrine fellow on call   For nonurgent matters please email [email or web address]    Please note that this patient may be followed by different provider tomorrow.  Notify endocrine 24 hours prior to discharge for final recommendations    greater than 50% of the encounter was spent counseling and/or coordination of care.  26 minutes spent on total encounter; The necessity of the time spent during the encounter on this date of service was due to development of plan of care/coordination of care/glycemic control through review of labs, blood glucose values and vital signs.  78 F with recent T2DM diagnosis and CVA (d/c 1 week ago), presenting with glucoses 500s, poor po intake and lethargy. Endocrine consulted for assistance with management of uncontrolled, recently dx DM. Now s/p  PEG.        Uncontrolled type 2 diabetes mellitus with hyperglycemia.   TF via peg started, not yet at goal with BG 200s, restart lantus at lower dose   ·  Plan:   - BG Goal 100-180mg/dl   -test BG Q6h  - Start Lantus 6 units sq qam first dose 2/3  - c/w Admelog moderate correction scale Q6h  - Please notify endocrine team of any change to TF regimen.   - auto-immune DM antibodies negative to r/o RAJ.   For d/c:   final recs TBD based on feeding modality/insulin requirements  pt remains on 24H TF via peg, at discharge likely will need lantus & scale if remains on 24H TF   -Patient can follow up at [address] [place] [address], [address] FLO[address], [place] [address] [phone]  -Patient will need opthalmology and podiatry follow up as outpatient.     Problem/Plan - 2:  ·  Problem: Hypertension.   ·  Plan: BP goal 130/80  - Manage per primary team.     Problem/Plan - 3:  ·  Problem: Hyperlipidemia.   ·  Plan: Continue with atorvastatin 80 mg daily   -   - Manage as outpatient         discussed with patient and RN   Contact via Microsoft Teams during business hours  To reach covering provider access AMION via sunrise tools  For Urgent matters/after-hours/weekends/holidays please page endocrine fellow on call   For nonurgent matters please email [email or web address]    Please note that this patient may be followed by different provider tomorrow.  Notify endocrine 24 hours prior to discharge for final recommendations    greater than 50% of the encounter was spent counseling and/or coordination of care.  26 minutes spent on total encounter; The necessity of the time spent during the encounter on this date of service was due to development of plan of care/coordination of care/glycemic control through review of labs, blood glucose values and vital signs.

## 2023-02-03 NOTE — PROGRESS NOTE ADULT - SUBJECTIVE AND OBJECTIVE BOX
[facility]   MD PUSHPA WICK MD, PA KRISTINE SOLTANPOUR, DO INJUNG KO, NP    TEL:  OFFICE: [phone]    From 5pm-7am Answering Service [phone]    -- RENAL FOLLOW UP NOTE ---Date of Service 02-03-23 @ 11:37    Patient is a 78y old  Female who presents with a chief complaint of AMS, elevated finger sticks (03 Feb 2023 08:35)      Patient seen and examined at bedside.    VITALS:  T(F): 98.5 (02-03-23 @ 04:35), Max: 98.5 (02-02-23 @ 12:23)  HR: 98 (02-03-23 @ 04:35)  BP: 127/68 (02-03-23 @ 04:35)  RR: 18 (02-03-23 @ 04:35)  SpO2: 98% (02-03-23 @ 04:35)  Wt(kg): --    02-02 @ 07:01  -  02-03 @ 07:00  --------------------------------------------------------  IN: 0 mL / OUT: 2600 mL / NET: -2600 mL          PHYSICAL EXAM:  Constitutional: NAD  Neck: No JVD  Respiratory: CTAB, no wheezes, rales or rhonchi  Cardiovascular: S1, S2, RRR  Gastrointestinal: BS+, soft, NT/ND  Extremities: No peripheral edema    Hospital Medications:   MEDICATIONS  (STANDING):  aspirin  chewable 81 milliGRAM(s) Oral daily  atorvastatin 80 milliGRAM(s) Oral at bedtime  clopidogrel Tablet 75 milliGRAM(s) Oral daily  dextrose 5% + sodium chloride 0.9%. 1000 milliLiter(s) (75 mL/Hr) IV Continuous <Continuous>  dextrose 5%. 1000 milliLiter(s) (100 mL/Hr) IV Continuous <Continuous>  dextrose 50% Injectable 25 Gram(s) IV Push once  dextrose 50% Injectable 12.5 Gram(s) IV Push once  dextrose 50% Injectable 25 Gram(s) IV Push once  dextrose Oral Gel 15 Gram(s) Oral once  glucagon  Injectable 1 milliGRAM(s) IntraMuscular once  insulin glargine Injectable (LANTUS) 6 Unit(s) SubCutaneous once  insulin lispro (ADMELOG) corrective regimen sliding scale   SubCutaneous every 6 hours  pantoprazole   Suspension 40 milliGRAM(s) Oral two times a day  tamsulosin 0.4 milliGRAM(s) Oral at bedtime      LABS:  02-02    136  |  104  |  6<L>  ----------------------------<  172<H>  3.6   |  25  |  0.54    Ca    7.9<L>      02 Feb 2023 06:51  Phos  2.5     02-02      Creatinine Trend: 0.54 <--, 0.56 <--, 0.67 <--, 0.65 <--, 0.86 <--                                8.7    5.92  )-----------( 384      ( 03 Feb 2023 06:43 )             28.0     Urine Studies:  Urinalysis - [01-19-23 @ 13:15]      Color DARK BROWN / Appearance Turbid / SG 1.023 / pH 7.0      Gluc 200 mg/dL / Ketone Trace  / Bili Negative / Urobili 6 mg/dL       Blood Large / Protein >600 / Leuk Est Large / Nitrite Negative      RBC 15 / WBC 11-25 / Hyaline  / Gran 4 / Sq Epi  / Non Sq Epi Few / Bacteria Many      Iron 17, TIBC 187, %sat 9      [01-25-23 @ 07:18]  Ferritin 172      [01-25-23 @ 07:18]  Lipid: chol 238, TG 80, HDL 46, LDL --      [01-08-23 @ 02:47]        RADIOLOGY & ADDITIONAL STUDIES:   [facility]   MD PUSHPA WICK MD, PA KRISTINE SOLTANPOUR, DO INJUNG KO, NP    TEL:  OFFICE: [phone]    From 5pm-7am Answering Service [phone]    -- RENAL FOLLOW UP NOTE ---Date of Service 02-03-23 @ 11:37    Patient is a 78y old  Female who presents with a chief complaint of AMS, elevated finger sticks (03 Feb 2023 08:35)      Patient seen and examined at bedside.    VITALS:  T(F): 98.5 (02-03-23 @ 04:35), Max: 98.5 (02-02-23 @ 12:23)  HR: 98 (02-03-23 @ 04:35)  BP: 127/68 (02-03-23 @ 04:35)  RR: 18 (02-03-23 @ 04:35)  SpO2: 98% (02-03-23 @ 04:35)  Wt(kg): --    02-02 @ 07:01  -  02-03 @ 07:00  --------------------------------------------------------  IN: 0 mL / OUT: 2600 mL / NET: -2600 mL          PHYSICAL EXAM:  Constitutional: NAD  Neck: No JVD  Respiratory: CTAB, no wheezes, rales or rhonchi  Cardiovascular: S1, S2, RRR  Gastrointestinal: BS+, soft, NT/ND  Extremities: No peripheral edema    Hospital Medications:   MEDICATIONS  (STANDING):  aspirin  chewable 81 milliGRAM(s) Oral daily  atorvastatin 80 milliGRAM(s) Oral at bedtime  clopidogrel Tablet 75 milliGRAM(s) Oral daily  dextrose 5% + sodium chloride 0.9%. 1000 milliLiter(s) (75 mL/Hr) IV Continuous <Continuous>  dextrose 5%. 1000 milliLiter(s) (100 mL/Hr) IV Continuous <Continuous>  dextrose 50% Injectable 25 Gram(s) IV Push once  dextrose 50% Injectable 12.5 Gram(s) IV Push once  dextrose 50% Injectable 25 Gram(s) IV Push once  dextrose Oral Gel 15 Gram(s) Oral once  glucagon  Injectable 1 milliGRAM(s) IntraMuscular once  insulin glargine Injectable (LANTUS) 6 Unit(s) SubCutaneous once  insulin lispro (ADMELOG) corrective regimen sliding scale   SubCutaneous every 6 hours  pantoprazole   Suspension 40 milliGRAM(s) Oral two times a day  tamsulosin 0.4 milliGRAM(s) Oral at bedtime      LABS:  02-02    136  |  104  |  6<L>  ----------------------------<  172<H>  3.6   |  25  |  0.54    Ca    7.9<L>      02 Feb 2023 06:51  Phos  2.5     02-02      Creatinine Trend: 0.54 <--, 0.56 <--, 0.67 <--, 0.65 <--, 0.86 <--                                8.7    5.92  )-----------( 384      ( 03 Feb 2023 06:43 )             28.0     Urine Studies:  Urinalysis - [01-19-23 @ 13:15]      Color DARK BROWN / Appearance Turbid / SG 1.023 / pH 7.0      Gluc 200 mg/dL / Ketone Trace  / Bili Negative / Urobili 6 mg/dL       Blood Large / Protein >600 / Leuk Est Large / Nitrite Negative      RBC 15 / WBC 11-25 / Hyaline  / Gran 4 / Sq Epi  / Non Sq Epi Few / Bacteria Many      Iron 17, TIBC 187, %sat 9      [01-25-23 @ 07:18]  Ferritin 172      [01-25-23 @ 07:18]  Lipid: chol 238, TG 80, HDL 46, LDL --      [01-08-23 @ 02:47]        RADIOLOGY & ADDITIONAL STUDIES:   [facility] [place] [facility]   MD PUSHPA WICK MD, PA KRISTINE SOLTANPOUR, DO INJUNG KO, NP    TEL:  OFFICE: [phone]    From 5pm-7am Answering Service [phone]    -- RENAL FOLLOW UP NOTE ---Date of Service 02-03-23 @ 11:37    Patient is a 78y old  Female who presents with a chief complaint of AMS, elevated finger sticks (03 Feb 2023 08:35)      Patient seen and examined at bedside.    VITALS:  T(F): 98.5 (02-03-23 @ 04:35), Max: 98.5 (02-02-23 @ 12:23)  HR: 98 (02-03-23 @ 04:35)  BP: 127/68 (02-03-23 @ 04:35)  RR: 18 (02-03-23 @ 04:35)  SpO2: 98% (02-03-23 @ 04:35)  Wt(kg): --    02-02 @ 07:01  -  02-03 @ 07:00  --------------------------------------------------------  IN: 0 mL / OUT: 2600 mL / NET: -2600 mL          PHYSICAL EXAM:  Constitutional: NAD  Neck: No JVD  Respiratory: CTAB, no wheezes, rales or rhonchi  Cardiovascular: S1, S2, RRR  Gastrointestinal: BS+, soft, NT/ND  Extremities: No peripheral edema    Hospital Medications:   MEDICATIONS  (STANDING):  aspirin  chewable 81 milliGRAM(s) Oral daily  atorvastatin 80 milliGRAM(s) Oral at bedtime  clopidogrel Tablet 75 milliGRAM(s) Oral daily  dextrose 5% + sodium chloride 0.9%. 1000 milliLiter(s) (75 mL/Hr) IV Continuous <Continuous>  dextrose 5%. 1000 milliLiter(s) (100 mL/Hr) IV Continuous <Continuous>  dextrose 50% Injectable 25 Gram(s) IV Push once  dextrose 50% Injectable 12.5 Gram(s) IV Push once  dextrose 50% Injectable 25 Gram(s) IV Push once  dextrose Oral Gel 15 Gram(s) Oral once  glucagon  Injectable 1 milliGRAM(s) IntraMuscular once  insulin glargine Injectable (LANTUS) 6 Unit(s) SubCutaneous once  insulin lispro (ADMELOG) corrective regimen sliding scale   SubCutaneous every 6 hours  pantoprazole   Suspension 40 milliGRAM(s) Oral two times a day  tamsulosin 0.4 milliGRAM(s) Oral at bedtime      LABS:  02-02    136  |  104  |  6<L>  ----------------------------<  172<H>  3.6   |  25  |  0.54    Ca    7.9<L>      02 Feb 2023 06:51  Phos  2.5     02-02      Creatinine Trend: 0.54 <--, 0.56 <--, 0.67 <--, 0.65 <--, 0.86 <--                                8.7    5.92  )-----------( 384      ( 03 Feb 2023 06:43 )             28.0     Urine Studies:  Urinalysis - [01-19-23 @ 13:15]      Color DARK BROWN / Appearance Turbid / SG 1.023 / pH 7.0      Gluc 200 mg/dL / Ketone Trace  / Bili Negative / Urobili 6 mg/dL       Blood Large / Protein >600 / Leuk Est Large / Nitrite Negative      RBC 15 / WBC 11-25 / Hyaline  / Gran 4 / Sq Epi  / Non Sq Epi Few / Bacteria Many      Iron 17, TIBC 187, %sat 9      [01-25-23 @ 07:18]  Ferritin 172      [01-25-23 @ 07:18]  Lipid: chol 238, TG 80, HDL 46, LDL --      [01-08-23 @ 02:47]        RADIOLOGY & ADDITIONAL STUDIES:

## 2023-02-03 NOTE — PROGRESS NOTE ADULT - ASSESSMENT
1. Dysphagia, post stroke  - s/p EGD with PEG placement   - EGD: Gastritis. Duodenal ulcers. PPI BID ordered  - tolerating tube feeds via PEG    2. Hx of stroke     3. DM     4. anemia of chronic disease   - monitor H&H, transfuse PRN          Attending supervision statement: I have personally seen and examined the patient. I fully participated in the care of this patient. I have made amendments to the documentation where necessary, and agree with the history, physical exam, and plan as outlined by the ACP.    [facility]   Gastroenterology and Hepatology  Office: [phone]   1. Dysphagia, post stroke  - s/p EGD with PEG placement   - EGD: Gastritis. Duodenal ulcers. PPI BID ordered  - tolerating tube feeds via PEG    2. Hx of stroke     3. DM     4. anemia of chronic disease   - monitor H&H, transfuse PRN          Attending supervision statement: I have personally seen and examined the patient. I fully participated in the care of this patient. I have made amendments to the documentation where necessary, and agree with the history, physical exam, and plan as outlined by the ACP.    [facility]   Gastroenterology and Hepatology  Office: [phone]   1. Dysphagia, post stroke  - s/p EGD with PEG placement   - EGD: Gastritis. Duodenal ulcers. PPI BID ordered  - tolerating tube feeds via PEG    2. Hx of stroke     3. DM     4. anemia of chronic disease   - monitor H&H, transfuse PRN          Attending supervision statement: I have personally seen and examined the patient. I fully participated in the care of this patient. I have made amendments to the documentation where necessary, and agree with the history, physical exam, and plan as outlined by the ACP.    [facility]   Gastroenterology and Hepatology  Office: [phone]

## 2023-02-03 NOTE — PROGRESS NOTE ADULT - SUBJECTIVE AND OBJECTIVE BOX
Chief Complaint:  Patient is a 78y old  Female who presents with a chief complaint of AMS, elevated finger sticks (03 Feb 2023 11:36)      Date of service 02-03-23 @ 14:31      Interval Events:   Patient seen and examined.   Tolerating tube feeds via PEG.     Hospital Medications:  acetaminophen    Suspension .. 650 milliGRAM(s) Enteral Tube every 6 hours PRN  aspirin  chewable 81 milliGRAM(s) Oral daily  atorvastatin 80 milliGRAM(s) Oral at bedtime  clopidogrel Tablet 75 milliGRAM(s) Oral daily  dextrose 5% + sodium chloride 0.9%. 1000 milliLiter(s) IV Continuous <Continuous>  dextrose 5%. 1000 milliLiter(s) IV Continuous <Continuous>  dextrose 50% Injectable 25 Gram(s) IV Push once  dextrose 50% Injectable 12.5 Gram(s) IV Push once  dextrose 50% Injectable 25 Gram(s) IV Push once  dextrose Oral Gel 15 Gram(s) Oral once  glucagon  Injectable 1 milliGRAM(s) IntraMuscular once  insulin lispro (ADMELOG) corrective regimen sliding scale   SubCutaneous every 6 hours  pantoprazole   Suspension 40 milliGRAM(s) Oral two times a day  tamsulosin 0.4 milliGRAM(s) Oral at bedtime        Review of Systems:  unable to obtain    PHYSICAL EXAM:   Vital Signs:  Vital Signs Last 24 Hrs  T(C): 36.9 (03 Feb 2023 04:35), Max: 36.9 (03 Feb 2023 04:35)  T(F): 98.5 (03 Feb 2023 04:35), Max: 98.5 (03 Feb 2023 04:35)  HR: 98 (03 Feb 2023 04:35) (91 - 98)  BP: 127/68 (03 Feb 2023 04:35) (115/65 - 127/68)  BP(mean): --  RR: 18 (03 Feb 2023 04:35) (18 - 18)  SpO2: 98% (03 Feb 2023 04:35) (97% - 98%)    Parameters below as of 03 Feb 2023 04:35  Patient On (Oxygen Delivery Method): room air      Daily     Daily       PHYSICAL EXAM:     GENERAL:  Appears stated age, well-groomed, well-nourished, no distress  HEENT:  NC/AT,  conjunctivae anicteric, clear and pink,   NECK: supple, trachea midline  CHEST:  Full & symmetric excursion, no increased effort, breath sounds clear  HEART:  Regular rhythm, no JVD  ABDOMEN:  Soft, non-tender, non-distended, normoactive bowel sounds,  no masses , no hepatosplenomegaly, +gastrostomy (c,d,i)  EXTREMITIES:  no cyanosis,clubbing or edema  SKIN:  No rash, erythema, or, ecchymoses, no jaundice  NEURO:   non-focal, no asterixis  RECTAL: Deferred      LABS Personally reviewed by me:                        8.7    5.92  )-----------( 384      ( 03 Feb 2023 06:43 )             28.0     Mean Cell Volume: 85.6 fl (02-03-23 @ 06:43)    02-02    136  |  104  |  6<L>  ----------------------------<  172<H>  3.6   |  25  |  0.54    Ca    7.9<L>      02 Feb 2023 06:51  Phos  2.5     02-02                                    8.7    5.92  )-----------( 384      ( 03 Feb 2023 06:43 )             28.0                         8.7    5.32  )-----------( 394      ( 02 Feb 2023 06:51 )             29.2                         8.8    5.86  )-----------( 414      ( 01 Feb 2023 22:10 )             27.8       Imaging personally reviewed by me:

## 2023-02-03 NOTE — PROGRESS NOTE ADULT - ASSESSMENT
78F with dementia, T2DM, recently discharged about 1 week ago for CVA now presenting with poor PO intake, lethargy and hyperglycemia to 500s.     Altered mental status, UTI .   - Since recent stroke,  A&Ox0. More recently, poor po intake, lethargic. CTH no acute intracranial abnormalities. Multifactorial   - Multiple metabolic derangements - dehydration, hyperglycemia, hyperNa, hypoK  - S/P IVF D5 75 CC   - Monitor and replete electrolytes PRN   - UA with +leuk esterase, many bacteria. C/w ceftriaxone for now. F/u UCx -- E. coli  - on Ertapenem   - 01/19 BCx2 w/ Neg final   - 01/24 BCx2 w/ Neg final   - Monitor fever curve, WBC trend   - Neuro eval consulted; F/u recs   - Aspiration precautions   - Discussed with family, agreed for NGT for feeding; NGT replaced ON 01/26   - Retention - replace gonzalez, check bladder US -- Noted, per attending discussion with family, family to decide on further work up -- Pending CT urogram per Urology     Uncontrolled type 2 diabetes mellitus with hyperglycemia.   - D/c on Metformin 500 mg BID  - Now with glucose 500s, improved to mid 300s with fluids. Also with mild ketosis (AG 17, bicarb 21, BHB 0.7)  - Endo eval appreciated, BHB now WNL  - Lantus 11 units QHs added to regimen & Sliding scale  - Monitor glucose levels closely, avoid hypo/hyperglycemia   - Check Ab given thin body habitus and ketosis (Glutamic Acid Decarboxylase, Zinc Transporter 8, Islet Cell Ab, and IA-2 Ab)  - S/PIVF administration  - Endo follow up; F/u recs      Cerebrovascular accident (CVA).  - Recent admission w/ Acute L MCA infarct, severe stenosis of the L M2 branch. MR with L MCA Territory infarcts   - CTH on admission showing No acute intracranial hemorrhage. 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 Per neurology, expected change seen on CT, not new lesions.   - C/w asa, plavix, statin.  - Neuro eval consulted; F/u recs   - Check MR brain -- C/F L sided Subacute infarcts, L frontal SAH; F/u neuro recs  - Checking CT head noted   - Hold lovenox DVT PPX  - NSGY eval appreciated - no int at this time  - Repeat CT H 01/30; F/u neuro recs       Dysphagia  - SP PEG tube and EGD 02/01 with gastritis and duodenal ulcers. C/w PPI BID via peg  - Plan to begin feeds and up-titrate as per protocol   - Aspiration precautions     Hematuria  - Urology follow up appreciated  - Pending CT Urogram   - Bladder US as noted; F/u urology recs       Febrile  - Cx with E. Faecalis, E. Avium; UCx with E. coli   - 01/19 BCx2 W/ Neg final   - 01/24 repeat BCx2  --> Neg  final  - ID eval consulted; F/u recs  - ABX as per ID, switched to ertapenem , sensitive  - If recurrent fever, check CT as per ID   - Monitor CBC, temp curve, VS and patient closely     Anemia  - Drop in hemoglobin   - Checking Iron, B12, Folate, Ferritin, TIBC -- not consistent with deficiency and Occult   - Monitor H/H closely, monitor for signs/symptoms of bleeding    Hypernatremia.   - S/P D5 50 CC x 10 hours   - F/u DM management as per above  - Monitor Na level closely, avoid overocrrection   - C/w Free water, monitor levels closely     Pediculosis   - ID eval   - Nix as per protocol   - Tx today 01/26  - Contact precautions    HypoK  - Monitor and replete electrolytes PRN     Hyperlipidemia.   -c/w atorvastatin.    Hypertension.    -amlodipine 5mg daily.      PPX  - Lovenox 40 Qd

## 2023-02-04 LAB
ANION GAP SERPL CALC-SCNC: 9 MMOL/L — SIGNIFICANT CHANGE UP (ref 5–17)
APPEARANCE UR: ABNORMAL
BACTERIA # UR AUTO: NEGATIVE — SIGNIFICANT CHANGE UP
BILIRUB UR-MCNC: NEGATIVE — SIGNIFICANT CHANGE UP
BUN SERPL-MCNC: 9 MG/DL — SIGNIFICANT CHANGE UP (ref 7–23)
CALCIUM SERPL-MCNC: 7.7 MG/DL — LOW (ref 8.4–10.5)
CHLORIDE SERPL-SCNC: 102 MMOL/L — SIGNIFICANT CHANGE UP (ref 96–108)
CO2 SERPL-SCNC: 24 MMOL/L — SIGNIFICANT CHANGE UP (ref 22–31)
COLOR SPEC: SIGNIFICANT CHANGE UP
CREAT SERPL-MCNC: 0.5 MG/DL — SIGNIFICANT CHANGE UP (ref 0.5–1.3)
DIFF PNL FLD: ABNORMAL
EGFR: 95 ML/MIN/1.73M2 — SIGNIFICANT CHANGE UP
EPI CELLS # UR: 6 /HPF — HIGH
GLUCOSE SERPL-MCNC: 219 MG/DL — HIGH (ref 70–99)
GLUCOSE UR QL: NEGATIVE — SIGNIFICANT CHANGE UP
HCT VFR BLD CALC: 27.8 % — LOW (ref 34.5–45)
HGB BLD-MCNC: 8.4 G/DL — LOW (ref 11.5–15.5)
HYALINE CASTS # UR AUTO: 2700 /LPF — HIGH (ref 0–2)
KETONES UR-MCNC: NEGATIVE — SIGNIFICANT CHANGE UP
LEUKOCYTE ESTERASE UR-ACNC: ABNORMAL
MCHC RBC-ENTMCNC: 26.4 PG — LOW (ref 27–34)
MCHC RBC-ENTMCNC: 30.2 GM/DL — LOW (ref 32–36)
MCV RBC AUTO: 87.4 FL — SIGNIFICANT CHANGE UP (ref 80–100)
NITRITE UR-MCNC: NEGATIVE — SIGNIFICANT CHANGE UP
NRBC # BLD: 0 /100 WBCS — SIGNIFICANT CHANGE UP (ref 0–0)
PH UR: 7 — SIGNIFICANT CHANGE UP (ref 5–8)
PLATELET # BLD AUTO: 405 K/UL — HIGH (ref 150–400)
POTASSIUM SERPL-MCNC: 3.4 MMOL/L — LOW (ref 3.5–5.3)
POTASSIUM SERPL-SCNC: 3.4 MMOL/L — LOW (ref 3.5–5.3)
PROT UR-MCNC: ABNORMAL
RBC # BLD: 3.18 M/UL — LOW (ref 3.8–5.2)
RBC # FLD: 15.3 % — HIGH (ref 10.3–14.5)
RBC CASTS # UR COMP ASSIST: >50 /HPF — HIGH (ref 0–4)
SARS-COV-2 RNA SPEC QL NAA+PROBE: SIGNIFICANT CHANGE UP
SODIUM SERPL-SCNC: 135 MMOL/L — SIGNIFICANT CHANGE UP (ref 135–145)
SP GR SPEC: 1.01 — LOW (ref 1.01–1.02)
UROBILINOGEN FLD QL: NEGATIVE — SIGNIFICANT CHANGE UP
WBC # BLD: 7.11 K/UL — SIGNIFICANT CHANGE UP (ref 3.8–10.5)
WBC # FLD AUTO: 7.11 K/UL — SIGNIFICANT CHANGE UP (ref 3.8–10.5)
WBC UR QL: >50 /HPF — HIGH (ref 0–5)

## 2023-02-04 PROCEDURE — 99232 SBSQ HOSP IP/OBS MODERATE 35: CPT

## 2023-02-04 RX ORDER — INSULIN GLARGINE 100 [IU]/ML
10 INJECTION, SOLUTION SUBCUTANEOUS EVERY MORNING
Refills: 0 | Status: DISCONTINUED | OUTPATIENT
Start: 2023-02-04 | End: 2023-02-05

## 2023-02-04 RX ADMIN — Medication 81 MILLIGRAM(S): at 12:58

## 2023-02-04 RX ADMIN — ATORVASTATIN CALCIUM 80 MILLIGRAM(S): 80 TABLET, FILM COATED ORAL at 22:02

## 2023-02-04 RX ADMIN — CLOPIDOGREL BISULFATE 75 MILLIGRAM(S): 75 TABLET, FILM COATED ORAL at 12:58

## 2023-02-04 RX ADMIN — Medication 2: at 12:59

## 2023-02-04 RX ADMIN — PANTOPRAZOLE SODIUM 40 MILLIGRAM(S): 20 TABLET, DELAYED RELEASE ORAL at 18:39

## 2023-02-04 RX ADMIN — Medication 2: at 18:38

## 2023-02-04 RX ADMIN — Medication 4: at 06:07

## 2023-02-04 RX ADMIN — PANTOPRAZOLE SODIUM 40 MILLIGRAM(S): 20 TABLET, DELAYED RELEASE ORAL at 06:06

## 2023-02-04 RX ADMIN — INSULIN GLARGINE 10 UNIT(S): 100 INJECTION, SOLUTION SUBCUTANEOUS at 10:49

## 2023-02-04 RX ADMIN — TAMSULOSIN HYDROCHLORIDE 0.4 MILLIGRAM(S): 0.4 CAPSULE ORAL at 22:02

## 2023-02-04 RX ADMIN — Medication 2: at 00:58

## 2023-02-04 NOTE — PROGRESS NOTE ADULT - ASSESSMENT
1. Dysphagia, post stroke  - s/p EGD with PEG placement   - EGD: Gastritis. Duodenal ulcers. PPI BID   - tolerating tube feeds via PEG    2. Hx of stroke     3. DM     4. anemia of chronic disease   - monitor H&H, transfuse PRN          Attending supervision statement: I have personally seen and examined the patient. I fully participated in the care of this patient. I have made amendments to the documentation where necessary, and agree with the history, physical exam, and plan as outlined by the ACP.    [facility]   Gastroenterology and Hepatology  Office: [phone]   1. Dysphagia, post stroke  - s/p EGD with PEG placement   - EGD: Gastritis. Duodenal ulcers. PPI BID   - tolerating tube feeds via PEG    2. Hx of stroke     3. DM     4. anemia of chronic disease   - monitor H&H, transfuse PRN          Attending supervision statement: I have personally seen and examined the patient. I fully participated in the care of this patient. I have made amendments to the documentation where necessary, and agree with the history, physical exam, and plan as outlined by the ACP.    [facility]   Gastroenterology and Hepatology  Office: [phone]   1. Dysphagia, post stroke  - s/p EGD with PEG placement   - EGD: Gastritis. Duodenal ulcers. PPI BID   - tolerating tube feeds via PEG    2. Hx of stroke     3. DM     4. anemia of chronic disease   - monitor H&H, transfuse PRN          Attending supervision statement: I have personally seen and examined the patient. I fully participated in the care of this patient. I have made amendments to the documentation where necessary, and agree with the history, physical exam, and plan as outlined by the ACP.    [facility] [place]   Gastroenterology and Hepatology  Office: [phone]

## 2023-02-04 NOTE — CHART NOTE - NSCHARTNOTEFT_GEN_A_CORE
Reviewed CT Urogram, bladder with gonzalez in placed and thickened wall with significant fecal impaction of rectum.   Continue to recommend CT Cystogram to rule out fistula.   D/w Dr. Cline  Urology      < from: CT Abdomen and Pelvis w/ IV Cont (02.03.23 @ 23:30) >    IMPRESSION:  Bladder is collapsed on a Gonzalez catheter.  Enhancement and thickening of   the underdistended bladder wall with suggestion of minimal perivesicular   infiltrative changes, correlate with urinalysis to exclude cystitis.    No hydronephrosis, renal mass or renalcalculus bilaterally.    Findings suggesting rectal impaction.    < end of copied text >

## 2023-02-04 NOTE — PROGRESS NOTE ADULT - SUBJECTIVE AND OBJECTIVE BOX
Neurology Progress Note    S: Patient seen and examined. MRI done, c/f infarcts. CTH neg for hemorrhage   on contact     Medication:    MEDICATIONS  (STANDING):  aspirin  chewable 81 milliGRAM(s) Oral daily  atorvastatin 80 milliGRAM(s) Oral at bedtime  clopidogrel Tablet 75 milliGRAM(s) Oral daily  dextrose 5%. 1000 milliLiter(s) (100 mL/Hr) IV Continuous <Continuous>  dextrose 50% Injectable 25 Gram(s) IV Push once  dextrose 50% Injectable 12.5 Gram(s) IV Push once  dextrose 50% Injectable 25 Gram(s) IV Push once  dextrose Oral Gel 15 Gram(s) Oral once  glucagon  Injectable 1 milliGRAM(s) IntraMuscular once  insulin glargine Injectable (LANTUS) 10 Unit(s) SubCutaneous every morning  insulin lispro (ADMELOG) corrective regimen sliding scale   SubCutaneous every 6 hours  pantoprazole   Suspension 40 milliGRAM(s) Oral two times a day  tamsulosin 0.4 milliGRAM(s) Oral at bedtime    MEDICATIONS  (PRN):  acetaminophen    Suspension .. 650 milliGRAM(s) Enteral Tube every 6 hours PRN Temp greater or equal to 38C (100.4F)    Vitals:      Vital Signs Last 24 Hrs  T(C): 36.9 (02-04-23 @ 06:41), Max: 36.9 (02-04-23 @ 06:41)  T(F): 98.5 (02-04-23 @ 06:41), Max: 98.5 (02-04-23 @ 06:41)  HR: 96 (02-04-23 @ 06:41) (92 - 96)  BP: 128/67 (02-04-23 @ 06:41) (128/67 - 140/66)  BP(mean): --  RR: 18 (02-04-23 @ 06:41) (17 - 18)  SpO2: 99% (02-04-23 @ 06:41) (96% - 99%)                General Exam:   General Appearance: Appropriately dressed and in no acute distress       Head: Normocephalic, atraumatic and no dysmorphic features  Ear, Nose, and Throat: Moist mucous membranes  CVS: S1S2+  Resp: No SOB, no wheeze or rhonchi  Abd: soft NTND  Extremities: No edema, no cyanosis  Skin: No bruises, no rashes        NEUROLOGICAL EXAM:    Mental status: Eyes open, awake, alert, attempts to produce speech, able to follow some simple commands, able to mimic at times , unable to ID objects  Cranial Nerves: Right facial droop, no nystagmus, blinks to threat B/L  Motor exam: Normal tone, no drift, Right hemiparesis RUE drift, 3-44/5, RLE drift, 3-4/5,, LUE 5/5, LLE 5/5  Sensation: Intact to light touch   Coordination/ Gait: Unable to participate with exam     I personally reviewed the below data/images/labs:  CBC Full  -  ( 04 Feb 2023 07:50 )  WBC Count : 7.11 K/uL  RBC Count : 3.18 M/uL  Hemoglobin : 8.4 g/dL  Hematocrit : 27.8 %  Platelet Count - Automated : 405 K/uL  Mean Cell Volume : 87.4 fl  Mean Cell Hemoglobin : 26.4 pg  Mean Cell Hemoglobin Concentration : 30.2 gm/dL  Auto Neutrophil # : x  Auto Lymphocyte # : x  Auto Monocyte # : x  Auto Eosinophil # : x  Auto Basophil # : x  Auto Neutrophil % : x  Auto Lymphocyte % : x  Auto Monocyte % : x  Auto Eosinophil % : x  Auto Basophil % : x    02-04    135  |  102  |  9   ----------------------------<  219<H>  3.4<L>   |  24  |  0.50    Ca    7.7<L>      04 Feb 2023 07:50          `< from: CT Head No Cont (01.19.23 @ 13:45) >    ACC: [id number] EXAM:  CT BRAIN   ORDERED BY: CORINNE MADERA     PROCEDURE DATE:  01/19/2023           INTERPRETATION:  CLINICAL STATEMENT: Altered mental status    TECHNIQUE: CT of the head was performed without IV contrast.  RAPID   artificial intelligence was utilized for the preliminary evaluation of   intracranial hemorrhage.    COMPARISON: MRI brain 1/7/2023.    FINDINGS:  There is mild diffuse parenchymal volume loss. There are areas of low   attenuation in the periventricular white matter likely related to mild   chronic microvascular ischemic changes.    Evolving small infarcts noted in the left corona radiata.   Age-indeterminate infarct also noted in the left temporal and parietal   lobe    There is no acute intracranial hemorrhage, parenchymal mass, mass effect   or midline shift.. There is no hydrocephalus. Partial empty sella noted    The cranium is intact. Calcification noted adjacent to left frontal   artery table. The visualized paranasal sinuses are well-aerated.        IMPRESSION:  No acute intracranial hemorrhage.    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    --- End of Report ---        < from: MR Head No Cont (01.26.23 @ 22:08) >    ACC: [id number] EXAM:  MR BRAIN   ORDERED BY: POLLO LYON     PROCEDURE DATE:  01/26/2023          INTERPRETATION:  CLINICAL STATEMENT: Multiple left MCA territory   infarcts. Altered mental status.    TECHNIQUE: Multiplanar multisequence noncontrast MRI of the brain was   performed. Diffusion weighted imaging was performed. Significant image   degradation by motion artifact.  COMPARISON: MRI brain 1/7/2023.    FINDINGS:    Corpus callosum, pituitary and pineal regions appear unremarkable. No  tonsillar herniation or evidence of marrow replacement process.   Hyperostosis frontalis. Degenerative changes visualized cervical spine.    CP angle and IAC regions appear within normal limits, as do the globes,   intraconal regions and major intracranial flow-voids. No paranasal sinus   air-fluid levels or opacification is evident.    New curvilinear-serpiginous susceptibility associated with the high left   frontal lobe. No evidence of acute hemorrhage. Slightly less pronounced   confluent restricted diffusion left lateral temporal lobe. Multiple less   pronounced foci of restricted diffusion with T2-FLAIR prolongation   centered in the left corona radiata, with extension into the ipsilateral   centrum semiovale. Several additional smaller less pronounced foci of   restricted diffusion left parieto-occipital and left posterior temporal   juxtacortical white matter as well as cortically based serpiginous   restricted diffusion left posterior temporal lobe.    IMPRESSION:    Compared with MRI Brain 1/7/2023.    1. Multiple evolving LEFT-SIDED subacute infarcts, as above.  2. Findings suspicious for high LEFT frontal subarachnoid hemorrhage.   Recommend correlation with noncontrast CT of the brain.  3. Additional findings above.    Findings and recommendations discussed in detail with PADMINI Lyon at the   time of this interpretation.    --- En   < from: CT Head No Cont (01.27.23 @ 19:09) >    ACC: [id number] EXAM:  CT BRAIN   ORDERED BY: POLLO LYON     PROCEDURE DATE:  01/27/2023          INTERPRETATION:  CLINICAL INFORMATION: Subacute infarcts with possible   new subarachnoid hemorrhage on MRI.    TECHNIQUE:  Axial CT images were acquired through the head.  Intravenous contrast: None  Two-dimensional reformats were generated.    COMPARISON STUDY: Brain MRIs from 1/7/2023 and 1/26/2023.    FINDINGS:  There are evolving subacute infarcts in the left temporal lobe anteriorly   and within the left corona radiata.  There is no CT evidence of acute   intracranial hemorrhage, mass effect, midline shift or hydrocephalus.    There is mild generalized cerebral volume loss.    The paranasal sinuses are grossly clear.    The mastoids are underpneumatized bilaterally; no clinically significant   mastoid or middle ear effusion is visualized.    The patient is status post right-sided intraocular lens replacement.    The calvarium and skull base appear within normal limits..    IMPRESSION:  Evolving subacute infarcts in the left temporal lobe anteriorly and   within the left corona radiata.  No evidence of acute intracranial   hemorrhage.  No subarachnoid hemorrhage in the left frontal region as   questioned on prior MRI.    --- Endof Report ---    < from: CT Head No Cont (01.30.23 @ 23:02) >    ACC: [id number] EXAM:  CT BRAIN   ORDERED BY: ESAU OAKLEY     PROCEDURE DATE:  01/30/2023          INTERPRETATION:  Noncontrast CT of the brain.    CLINICAL INDICATION:  Altered mental status. Acute left MCA territory   infarcts.    TECHNIQUE : Axial CT scanning of the brain was obtained from the skull   base to the vertex without the administration of intravenous contrast.   Sagittal and coronal reformats were provided.    COMPARISON: MRI brain 1/26/2023. CT brain 1/27/2023.    FINDINGS:    Redemonstration of scattered acute left MCA territory infarcts. No CT   evidence for hemorrhagic transformation.    No hydrocephalus, midline shift, or effacement of basal cisterns.    No acute intracranial hemorrhage.    The visualized paranasal sinuses and mastoid air cells are clear.    IMPRESSION:    No significant interval change.    Redemonstration of scattered acute left MCA territory infarcts. No CT   evidence for hemorrhagic transformation.      --- End of Report ---            CHELSIE RYAN MD; Attending Radiologist  This document has been electronically signed. Jan 31 2023  9:12AM    < end of copied text >      Neurology Progress Note    S: Patient seen and examined. MRI done, c/f infarcts. CTH neg for hemorrhage   on contact     Medication:    MEDICATIONS  (STANDING):  aspirin  chewable 81 milliGRAM(s) Oral daily  atorvastatin 80 milliGRAM(s) Oral at bedtime  clopidogrel Tablet 75 milliGRAM(s) Oral daily  dextrose 5%. 1000 milliLiter(s) (100 mL/Hr) IV Continuous <Continuous>  dextrose 50% Injectable 25 Gram(s) IV Push once  dextrose 50% Injectable 12.5 Gram(s) IV Push once  dextrose 50% Injectable 25 Gram(s) IV Push once  dextrose Oral Gel 15 Gram(s) Oral once  glucagon  Injectable 1 milliGRAM(s) IntraMuscular once  insulin glargine Injectable (LANTUS) 10 Unit(s) SubCutaneous every morning  insulin lispro (ADMELOG) corrective regimen sliding scale   SubCutaneous every 6 hours  pantoprazole   Suspension 40 milliGRAM(s) Oral two times a day  tamsulosin 0.4 milliGRAM(s) Oral at bedtime    MEDICATIONS  (PRN):  acetaminophen    Suspension .. 650 milliGRAM(s) Enteral Tube every 6 hours PRN Temp greater or equal to 38C (100.4F)    Vitals:      Vital Signs Last 24 Hrs  T(C): 36.9 (02-04-23 @ 06:41), Max: 36.9 (02-04-23 @ 06:41)  T(F): 98.5 (02-04-23 @ 06:41), Max: 98.5 (02-04-23 @ 06:41)  HR: 96 (02-04-23 @ 06:41) (92 - 96)  BP: 128/67 (02-04-23 @ 06:41) (128/67 - 140/66)  BP(mean): --  RR: 18 (02-04-23 @ 06:41) (17 - 18)  SpO2: 99% (02-04-23 @ 06:41) (96% - 99%)                General Exam:   General Appearance: Appropriately dressed and in no acute distress       Head: Normocephalic, atraumatic and no dysmorphic features  Ear, Nose, and Throat: Moist mucous membranes  CVS: S1S2+  Resp: No SOB, no wheeze or rhonchi  Abd: soft NTND  Extremities: No edema, no cyanosis  Skin: No bruises, no rashes        NEUROLOGICAL EXAM:    Mental status: Eyes open, awake, alert, attempts to produce speech, able to follow some simple commands, able to mimic at times , unable to ID objects  Cranial Nerves: Right facial droop, no nystagmus, blinks to threat B/L  Motor exam: Normal tone, no drift, Right hemiparesis RUE drift, 3-44/5, RLE drift, 3-4/5,, LUE 5/5, LLE 5/5  Sensation: Intact to light touch   Coordination/ Gait: Unable to participate with exam     I personally reviewed the below data/images/labs:  CBC Full  -  ( 04 Feb 2023 07:50 )  WBC Count : 7.11 K/uL  RBC Count : 3.18 M/uL  Hemoglobin : 8.4 g/dL  Hematocrit : 27.8 %  Platelet Count - Automated : 405 K/uL  Mean Cell Volume : 87.4 fl  Mean Cell Hemoglobin : 26.4 pg  Mean Cell Hemoglobin Concentration : 30.2 gm/dL  Auto Neutrophil # : x  Auto Lymphocyte # : x  Auto Monocyte # : x  Auto Eosinophil # : x  Auto Basophil # : x  Auto Neutrophil % : x  Auto Lymphocyte % : x  Auto Monocyte % : x  Auto Eosinophil % : x  Auto Basophil % : x    02-04    135  |  102  |  9   ----------------------------<  219<H>  3.4<L>   |  24  |  0.50    Ca    7.7<L>      04 Feb 2023 07:50          `< from: CT Head No Cont (01.19.23 @ 13:45) >    ACC: [id number] EXAM:  CT BRAIN   ORDERED BY: CORINNE MADERA     PROCEDURE DATE:  01/19/2023           INTERPRETATION:  CLINICAL STATEMENT: Altered mental status    TECHNIQUE: CT of the head was performed without IV contrast.  RAPID   artificial intelligence was utilized for the preliminary evaluation of   intracranial hemorrhage.    COMPARISON: MRI brain 1/7/2023.    FINDINGS:  There is mild diffuse parenchymal volume loss. There are areas of low   attenuation in the periventricular white matter likely related to mild   chronic microvascular ischemic changes.    Evolving small infarcts noted in the left corona radiata.   Age-indeterminate infarct also noted in the left temporal and parietal   lobe    There is no acute intracranial hemorrhage, parenchymal mass, mass effect   or midline shift.. There is no hydrocephalus. Partial empty sella noted    The cranium is intact. Calcification noted adjacent to left frontal   artery table. The visualized paranasal sinuses are well-aerated.        IMPRESSION:  No acute intracranial hemorrhage.    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    --- End of Report ---        < from: MR Head No Cont (01.26.23 @ 22:08) >    ACC: [id number] EXAM:  MR BRAIN   ORDERED BY: POLLO LYON     PROCEDURE DATE:  01/26/2023          INTERPRETATION:  CLINICAL STATEMENT: Multiple left MCA territory   infarcts. Altered mental status.    TECHNIQUE: Multiplanar multisequence noncontrast MRI of the brain was   performed. Diffusion weighted imaging was performed. Significant image   degradation by motion artifact.  COMPARISON: MRI brain 1/7/2023.    FINDINGS:    Corpus callosum, pituitary and pineal regions appear unremarkable. No  tonsillar herniation or evidence of marrow replacement process.   Hyperostosis frontalis. Degenerative changes visualized cervical spine.    CP angle and IAC regions appear within normal limits, as do the globes,   intraconal regions and major intracranial flow-voids. No paranasal sinus   air-fluid levels or opacification is evident.    New curvilinear-serpiginous susceptibility associated with the high left   frontal lobe. No evidence of acute hemorrhage. Slightly less pronounced   confluent restricted diffusion left lateral temporal lobe. Multiple less   pronounced foci of restricted diffusion with T2-FLAIR prolongation   centered in the left corona radiata, with extension into the ipsilateral   centrum semiovale. Several additional smaller less pronounced foci of   restricted diffusion left parieto-occipital and left posterior temporal   juxtacortical white matter as well as cortically based serpiginous   restricted diffusion left posterior temporal lobe.    IMPRESSION:    Compared with MRI Brain 1/7/2023.    1. Multiple evolving LEFT-SIDED subacute infarcts, as above.  2. Findings suspicious for high LEFT frontal subarachnoid hemorrhage.   Recommend correlation with noncontrast CT of the brain.  3. Additional findings above.    Findings and recommendations discussed in detail with PADMINI Lyon at the   time of this interpretation.    --- En   < from: CT Head No Cont (01.27.23 @ 19:09) >    ACC: [id number] EXAM:  CT BRAIN   ORDERED BY: POLLO LYON     PROCEDURE DATE:  01/27/2023          INTERPRETATION:  CLINICAL INFORMATION: Subacute infarcts with possible   new subarachnoid hemorrhage on MRI.    TECHNIQUE:  Axial CT images were acquired through the head.  Intravenous contrast: None  Two-dimensional reformats were generated.    COMPARISON STUDY: Brain MRIs from 1/7/2023 and 1/26/2023.    FINDINGS:  There are evolving subacute infarcts in the left temporal lobe anteriorly   and within the left corona radiata.  There is no CT evidence of acute   intracranial hemorrhage, mass effect, midline shift or hydrocephalus.    There is mild generalized cerebral volume loss.    The paranasal sinuses are grossly clear.    The mastoids are underpneumatized bilaterally; no clinically significant   mastoid or middle ear effusion is visualized.    The patient is status post right-sided intraocular lens replacement.    The calvarium and skull base appear within normal limits..    IMPRESSION:  Evolving subacute infarcts in the left temporal lobe anteriorly and   within the left corona radiata.  No evidence of acute intracranial   hemorrhage.  No subarachnoid hemorrhage in the left frontal region as   questioned on prior MRI.    --- Endof Report ---    < from: CT Head No Cont (01.30.23 @ 23:02) >    ACC: [id number] EXAM:  CT BRAIN   ORDERED BY: ESAU OAKLEY     PROCEDURE DATE:  01/30/2023          INTERPRETATION:  Noncontrast CT of the brain.    CLINICAL INDICATION:  Altered mental status. Acute left MCA territory   infarcts.    TECHNIQUE : Axial CT scanning of the brain was obtained from the skull   base to the vertex without the administration of intravenous contrast.   Sagittal and coronal reformats were provided.    COMPARISON: MRI brain 1/26/2023. CT brain 1/27/2023.    FINDINGS:    Redemonstration of scattered acute left MCA territory infarcts. No CT   evidence for hemorrhagic transformation.    No hydrocephalus, midline shift, or effacement of basal cisterns.    No acute intracranial hemorrhage.    The visualized paranasal sinuses and mastoid air cells are clear.    IMPRESSION:    No significant interval change.    Redemonstration of scattered acute left MCA territory infarcts. No CT   evidence for hemorrhagic transformation.      --- End of Report ---            CHELSIE RYAN MD; Attending Radiologist  This document has been electronically signed. Jan 31 2023  9:12AM    < end of copied text >      Neurology Progress Note    S: Patient seen and examined. MRI done, c/f infarcts. CTH neg for hemorrhage   on contact     Medication:    MEDICATIONS  (STANDING):  aspirin  chewable 81 milliGRAM(s) Oral daily  atorvastatin 80 milliGRAM(s) Oral at bedtime  clopidogrel Tablet 75 milliGRAM(s) Oral daily  dextrose 5%. 1000 milliLiter(s) (100 mL/Hr) IV Continuous <Continuous>  dextrose 50% Injectable 25 Gram(s) IV Push once  dextrose 50% Injectable 12.5 Gram(s) IV Push once  dextrose 50% Injectable 25 Gram(s) IV Push once  dextrose Oral Gel 15 Gram(s) Oral once  glucagon  Injectable 1 milliGRAM(s) IntraMuscular once  insulin glargine Injectable (LANTUS) 10 Unit(s) SubCutaneous every morning  insulin lispro (ADMELOG) corrective regimen sliding scale   SubCutaneous every 6 hours  pantoprazole   Suspension 40 milliGRAM(s) Oral two times a day  tamsulosin 0.4 milliGRAM(s) Oral at bedtime    MEDICATIONS  (PRN):  acetaminophen    Suspension .. 650 milliGRAM(s) Enteral Tube every 6 hours PRN Temp greater or equal to 38C (100.4F)    Vitals:      Vital Signs Last 24 Hrs  T(C): 36.9 (02-04-23 @ 06:41), Max: 36.9 (02-04-23 @ 06:41)  T(F): 98.5 (02-04-23 @ 06:41), Max: 98.5 (02-04-23 @ 06:41)  HR: 96 (02-04-23 @ 06:41) (92 - 96)  BP: 128/67 (02-04-23 @ 06:41) (128/67 - 140/66)  BP(mean): --  RR: 18 (02-04-23 @ 06:41) (17 - 18)  SpO2: 99% (02-04-23 @ 06:41) (96% - 99%)                General Exam:   General Appearance: Appropriately dressed and in no acute distress       Head: Normocephalic, atraumatic and no dysmorphic features  Ear, Nose, and Throat: Moist mucous membranes  CVS: S1S2+  Resp: No SOB, no wheeze or rhonchi  Abd: soft NTND  Extremities: No edema, no cyanosis  Skin: No bruises, no rashes        NEUROLOGICAL EXAM:    Mental status: Eyes open, awake, alert, attempts to produce speech, able to follow some simple commands, able to mimic at times , unable to ID objects  Cranial Nerves: Right facial droop, no nystagmus, blinks to threat B/L  Motor exam: Normal tone, no drift, Right hemiparesis RUE drift, 3-44/5, RLE drift, 3-4/5,, LUE 5/5, LLE 5/5  Sensation: Intact to light touch   Coordination/ Gait: Unable to participate with exam     I personally reviewed the below data/images/labs:  CBC Full  -  ( 04 Feb 2023 07:50 )  WBC Count : 7.11 K/uL  RBC Count : 3.18 M/uL  Hemoglobin : 8.4 g/dL  Hematocrit : 27.8 %  Platelet Count - Automated : 405 K/uL  Mean Cell Volume : 87.4 fl  Mean Cell Hemoglobin : 26.4 pg  Mean Cell Hemoglobin Concentration : 30.2 gm/dL  Auto Neutrophil # : x  Auto Lymphocyte # : x  Auto Monocyte # : x  Auto Eosinophil # : x  Auto Basophil # : x  Auto Neutrophil % : x  Auto Lymphocyte % : x  Auto Monocyte % : x  Auto Eosinophil % : x  Auto Basophil % : x    02-04    135  |  102  |  9   ----------------------------<  219<H>  3.4<L>   |  24  |  0.50    Ca    7.7<L>      04 Feb 2023 07:50          `< from: CT Head No Cont (01.19.23 @ 13:45) >    ACC: [id number] EXAM:  CT BRAIN   ORDERED BY: CORINNE MADERA     PROCEDURE DATE:  01/19/2023           INTERPRETATION:  CLINICAL STATEMENT: Altered mental status    TECHNIQUE: CT of the head was performed without IV contrast.  RAPID   artificial intelligence was utilized for the preliminary evaluation of   intracranial hemorrhage.    COMPARISON: MRI brain 1/7/2023.    FINDINGS:  There is mild diffuse parenchymal volume loss. There are areas of low   attenuation in the periventricular white matter likely related to mild   chronic microvascular ischemic changes.    Evolving small infarcts noted in the left corona radiata.   Age-indeterminate infarct also noted in the left temporal and parietal   lobe    There is no acute intracranial hemorrhage, parenchymal mass, mass effect   or midline shift.. There is no hydrocephalus. Partial empty sella noted    The cranium is intact. Calcification noted adjacent to left frontal   artery table. The visualized paranasal sinuses are well-aerated.        IMPRESSION:  No acute intracranial hemorrhage.    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    --- End of Report ---        < from: MR Head No Cont (01.26.23 @ 22:08) >    ACC: [id number] EXAM:  MR BRAIN   ORDERED BY: POLLO LYON     PROCEDURE DATE:  01/26/2023          INTERPRETATION:  CLINICAL STATEMENT: Multiple left MCA territory   infarcts. Altered mental status.    TECHNIQUE: Multiplanar multisequence noncontrast MRI of the brain was   performed. Diffusion weighted imaging was performed. Significant image   degradation by motion artifact.  COMPARISON: MRI brain 1/7/2023.    FINDINGS:    Corpus callosum, pituitary and pineal regions appear unremarkable. No  tonsillar herniation or evidence of marrow replacement process.   Hyperostosis frontalis. Degenerative changes visualized cervical spine.    CP angle and IAC regions appear within normal limits, as do the globes,   intraconal regions and major intracranial flow-voids. No paranasal sinus   air-fluid levels or opacification is evident.    New curvilinear-serpiginous susceptibility associated with the high left   frontal lobe. No evidence of acute hemorrhage. Slightly less pronounced   confluent restricted diffusion left lateral temporal lobe. Multiple less   pronounced foci of restricted diffusion with T2-FLAIR prolongation   centered in the left corona radiata, with extension into the ipsilateral   centrum semiovale. Several additional smaller less pronounced foci of   restricted diffusion left parieto-occipital and left posterior temporal   juxtacortical white matter as well as cortically based serpiginous   restricted diffusion left posterior temporal lobe.    IMPRESSION:    Compared with MRI Brain 1/7/2023.    1. Multiple evolving LEFT-SIDED subacute infarcts, as above.  2. Findings suspicious for high LEFT frontal subarachnoid hemorrhage.   Recommend correlation with noncontrast CT of the brain.  3. Additional findings above.    Findings and recommendations discussed in detail with PADMINI Lyon at the   time of this interpretation.    --- En   < from: CT Head No Cont (01.27.23 @ 19:09) >    ACC: [id number] EXAM:  CT BRAIN   ORDERED BY: POLLO LYON     PROCEDURE DATE:  01/27/2023          INTERPRETATION:  CLINICAL INFORMATION: Subacute infarcts with possible   new subarachnoid hemorrhage on MRI.    TECHNIQUE:  Axial CT images were acquired through the head.  Intravenous contrast: None  Two-dimensional reformats were generated.    COMPARISON STUDY: Brain MRIs from 1/7/2023 and 1/26/2023.    FINDINGS:  There are evolving subacute infarcts in the left temporal lobe anteriorly   and within the left corona radiata.  There is no CT evidence of acute   intracranial hemorrhage, mass effect, midline shift or hydrocephalus.    There is mild generalized cerebral volume loss.    The paranasal sinuses are grossly clear.    The mastoids are underpneumatized bilaterally; no clinically significant   mastoid or middle ear effusion is visualized.    The patient is status post right-sided intraocular lens replacement.    The calvarium and skull base appear within normal limits..    IMPRESSION:  Evolving subacute infarcts in the left temporal lobe anteriorly and   within the left corona radiata.  No evidence of acute intracranial   hemorrhage.  No subarachnoid hemorrhage in the left frontal region as   questioned on prior MRI.    --- Endof Report ---    < from: CT Head No Cont (01.30.23 @ 23:02) >    ACC: [id number] EXAM:  CT BRAIN   ORDERED BY: ESAU OAKLEY     PROCEDURE DATE:  01/30/2023          INTERPRETATION:  Noncontrast CT of the brain.    CLINICAL INDICATION:  Altered mental status. Acute left MCA territory   infarcts.    TECHNIQUE : Axial CT scanning of the brain was obtained from the skull   base to the vertex without the administration of intravenous contrast.   Sagittal and coronal reformats were provided.    COMPARISON: MRI brain 1/26/2023. CT brain 1/27/2023.    FINDINGS:    Redemonstration of scattered acute left MCA territory infarcts. No CT   evidence for hemorrhagic transformation.    No hydrocephalus, midline shift, or effacement of basal cisterns.    No acute intracranial hemorrhage.    The visualized paranasal sinuses and mastoid air cells are clear.    IMPRESSION:    No significant interval change.    Redemonstration of scattered acute left MCA territory infarcts. No CT   evidence for hemorrhagic transformation.      --- End of Report ---            CHELSIE RYAN MD; Attending Radiologist  This document has been electronically signed. Jan 31 2023  9:12AM    < end of copied text >

## 2023-02-04 NOTE — PROGRESS NOTE ADULT - SUBJECTIVE AND OBJECTIVE BOX
Name of Patient : SARAH DISLA  MRN: [id number]  Date of visit: 23       Subjective: Patient seen and examined. No new events except as noted.       MEDICATIONS:  MEDICATIONS  (STANDING):  aspirin  chewable 81 milliGRAM(s) Oral daily  atorvastatin 80 milliGRAM(s) Oral at bedtime  clopidogrel Tablet 75 milliGRAM(s) Oral daily  dextrose 5%. 1000 milliLiter(s) (100 mL/Hr) IV Continuous <Continuous>  dextrose 50% Injectable 25 Gram(s) IV Push once  dextrose 50% Injectable 12.5 Gram(s) IV Push once  dextrose 50% Injectable 25 Gram(s) IV Push once  dextrose Oral Gel 15 Gram(s) Oral once  glucagon  Injectable 1 milliGRAM(s) IntraMuscular once  insulin glargine Injectable (LANTUS) 10 Unit(s) SubCutaneous every morning  insulin lispro (ADMELOG) corrective regimen sliding scale   SubCutaneous every 6 hours  pantoprazole   Suspension 40 milliGRAM(s) Oral two times a day  tamsulosin 0.4 milliGRAM(s) Oral at bedtime      PHYSICAL EXAM:  T(C): 36.7 (23 @ 21:18), Max: 36.9 (23 @ 06:41)  HR: 86 (23 @ 21:18) (86 - 96)  BP: 118/62 (23 @ 21:18) (118/62 - 128/67)  RR: 17 (23 @ 21:18) (17 - 18)  SpO2: 100% (23 @ 21:18) (99% - 100%)  Wt(kg): --  I&O's Summary    2023 07:  -  2023 07:00  --------------------------------------------------------  IN: 0 mL / OUT: 3800 mL / NET: -3800 mL    2023 07:01  -  2023 23:55  --------------------------------------------------------  IN: 1100 mL / OUT: 1600 mL / NET: -500 mL          Appearance: calm   HEENT:  PERRLA   Lymphatic: No lymphadenopathy   Cardiovascular: Normal S1 S2, no JVD  Respiratory: normal effort , clear  Gastrointestinal:  Soft, Non-tender  Skin: No rashes,  warm to touch  Psychiatry:  Mood & affect appropriate  Musculuskeletal: No edema    recent labs, Imaging and EKGs personally reviewed     23 @ 07:01  -  23 @ 07:00  --------------------------------------------------------  IN: 0 mL / OUT: 3800 mL / NET: -3800 mL    23 @ 07:01  -  23 @ 23:55  --------------------------------------------------------  IN: 1100 mL / OUT: 1600 mL / NET: -500 mL                          8.4    7.11  )-----------( 405      ( 2023 07:50 )             27.8                   135  |  102  |  9   ----------------------------<  219<H>  3.4<L>   |  24  |  0.50    Ca    7.7<L>      2023 07:50                         Urinalysis Basic - ( 2023 18:44 )    Color: DARK BROWN / Appearance: Turbid / S.009 / pH: x  Gluc: x / Ketone: Negative  / Bili: Negative / Urobili: Negative   Blood: x / Protein: 100 mg/dL / Nitrite: Negative   Leuk Esterase: Large / RBC: >50 /hpf / WBC >50 /HPF   Sq Epi: x / Non Sq Epi: 6 /hpf / Bacteria: Negative               Name of Patient : SARAH DISLA  MRN: [id number]  Date of visit: 23       Subjective: Patient seen and examined. No new events except as noted.       MEDICATIONS:  MEDICATIONS  (STANDING):  aspirin  chewable 81 milliGRAM(s) Oral daily  atorvastatin 80 milliGRAM(s) Oral at bedtime  clopidogrel Tablet 75 milliGRAM(s) Oral daily  dextrose 5%. 1000 milliLiter(s) (100 mL/Hr) IV Continuous <Continuous>  dextrose 50% Injectable 25 Gram(s) IV Push once  dextrose 50% Injectable 12.5 Gram(s) IV Push once  dextrose 50% Injectable 25 Gram(s) IV Push once  dextrose Oral Gel 15 Gram(s) Oral once  glucagon  Injectable 1 milliGRAM(s) IntraMuscular once  insulin glargine Injectable (LANTUS) 10 Unit(s) SubCutaneous every morning  insulin lispro (ADMELOG) corrective regimen sliding scale   SubCutaneous every 6 hours  pantoprazole   Suspension 40 milliGRAM(s) Oral two times a day  tamsulosin 0.4 milliGRAM(s) Oral at bedtime      PHYSICAL EXAM:  T(C): 36.7 (23 @ 21:18), Max: 36.9 (23 @ 06:41)  HR: 86 (23 @ 21:18) (86 - 96)  BP: 118/62 (23 @ 21:18) (118/62 - 128/67)  RR: 17 (23 @ 21:18) (17 - 18)  SpO2: 100% (23 @ 21:18) (99% - 100%)  Wt(kg): --  I&O's Summary    2023 07:  -  2023 07:00  --------------------------------------------------------  IN: 0 mL / OUT: 3800 mL / NET: -3800 mL    2023 07:01  -  2023 23:55  --------------------------------------------------------  IN: 1100 mL / OUT: 1600 mL / NET: -500 mL          Appearance: calm   HEENT:  PERRLA   Lymphatic: No lymphadenopathy   Cardiovascular: Normal S1 S2, no JVD  Respiratory: normal effort , clear  Gastrointestinal:  Soft, Non-tender  Skin: No rashes,  warm to touch  Psychiatry:  Mood & affect appropriate  Musculuskeletal: No edema    recent labs, Imaging and EKGs personally reviewed     23 @ 07:01  -  23 @ 07:00  --------------------------------------------------------  IN: 0 mL / OUT: 3800 mL / NET: -3800 mL    23 @ 07:01  -  23 @ 23:55  --------------------------------------------------------  IN: 1100 mL / OUT: 1600 mL / NET: -500 mL                          8.4    7.11  )-----------( 405      ( 2023 07:50 )             27.8                   135  |  102  |  9   ----------------------------<  219<H>  3.4<L>   |  24  |  0.50    Ca    7.7<L>      2023 07:50                         Urinalysis Basic - ( 2023 18:44 )    Color: DARK BROWN / Appearance: Turbid / S.009 / pH: x  Gluc: x / Ketone: Negative  / Bili: Negative / Urobili: Negative   Blood: x / Protein: 100 mg/dL / Nitrite: Negative   Leuk Esterase: Large / RBC: >50 /hpf / WBC >50 /HPF   Sq Epi: x / Non Sq Epi: 6 /hpf / Bacteria: Negative               Name of Patient : SARAH DISLA  MRN: [id number]  Date of visit: 23       Subjective: Patient seen and examined. No new events except as noted.       MEDICATIONS:  MEDICATIONS  (STANDING):  aspirin  chewable 81 milliGRAM(s) Oral daily  atorvastatin 80 milliGRAM(s) Oral at bedtime  clopidogrel Tablet 75 milliGRAM(s) Oral daily  dextrose 5%. 1000 milliLiter(s) (100 mL/Hr) IV Continuous <Continuous>  dextrose 50% Injectable 25 Gram(s) IV Push once  dextrose 50% Injectable 12.5 Gram(s) IV Push once  dextrose 50% Injectable 25 Gram(s) IV Push once  dextrose Oral Gel 15 Gram(s) Oral once  glucagon  Injectable 1 milliGRAM(s) IntraMuscular once  insulin glargine Injectable (LANTUS) 10 Unit(s) SubCutaneous every morning  insulin lispro (ADMELOG) corrective regimen sliding scale   SubCutaneous every 6 hours  pantoprazole   Suspension 40 milliGRAM(s) Oral two times a day  tamsulosin 0.4 milliGRAM(s) Oral at bedtime      PHYSICAL EXAM:  T(C): 36.7 (23 @ 21:18), Max: 36.9 (23 @ 06:41)  HR: 86 (23 @ 21:18) (86 - 96)  BP: 118/62 (23 @ 21:18) (118/62 - 128/67)  RR: 17 (23 @ 21:18) (17 - 18)  SpO2: 100% (23 @ 21:18) (99% - 100%)  Wt(kg): --  I&O's Summary    2023 07:  -  2023 07:00  --------------------------------------------------------  IN: 0 mL / OUT: 3800 mL / NET: -3800 mL    2023 07:01  -  2023 23:55  --------------------------------------------------------  IN: 1100 mL / OUT: 1600 mL / NET: -500 mL          Appearance: calm   HEENT:  PERRLA   Lymphatic: No lymphadenopathy   Cardiovascular: Normal S1 S2, no JVD  Respiratory: normal effort , clear  Gastrointestinal:  Soft, Non-tender  Skin: No rashes,  warm to touch  Psychiatry:  Mood & affect appropriate  Musculuskeletal: No edema    recent labs, Imaging and EKGs personally reviewed     23 @ 07:01  -  23 @ 07:00  --------------------------------------------------------  IN: 0 mL / OUT: 3800 mL / NET: -3800 mL    23 @ 07:01  -  23 @ 23:55  --------------------------------------------------------  IN: 1100 mL / OUT: 1600 mL / NET: -500 mL                          8.4    7.11  )-----------( 405      ( 2023 07:50 )             27.8                   135  |  102  |  9   ----------------------------<  219<H>  3.4<L>   |  24  |  0.50    Ca    7.7<L>      2023 07:50                         Urinalysis Basic - ( 2023 18:44 )    Color: DARK BROWN / Appearance: Turbid / S.009 / pH: x  Gluc: x / Ketone: Negative  / Bili: Negative / Urobili: Negative   Blood: x / Protein: 100 mg/dL / Nitrite: Negative   Leuk Esterase: Large / RBC: >50 /hpf / WBC >50 /HPF   Sq Epi: x / Non Sq Epi: 6 /hpf / Bacteria: Negative

## 2023-02-04 NOTE — PROGRESS NOTE ADULT - ASSESSMENT
78 F with recent T2DM diagnosis and CVA (d/c 1 week ago), presenting with glucoses 500s, poor po intake and lethargy. Endocrine consulted for assistance with management of uncontrolled, recently dx DM. Now s/p  PEG. Continuous feeds now at goal rate w/BG values above goal. Also noted to still be on D5 from when NPO 3 days ago. BG goal (100-180mg/dl).

## 2023-02-04 NOTE — PROGRESS NOTE ADULT - SUBJECTIVE AND OBJECTIVE BOX
[facility] [place] [facility]   MD PUSHPA WICK MD KRISTINE SOLTANPOUR, PADMINI SWANSON    TEL:  OFFICE: [phone]  From 5pm-7am Answering Service [phone]    -- RENAL FOLLOW UP NOTE ---Date of Service 02-04-23 @ 16:48    Patient is a 78y old  Female who presents with a chief complaint of AMS, elevated finger sticks (04 Feb 2023 12:22)      Patient seen and examined at bedside. No chest pain/sob    VITALS:  T(F): 98.1 (02-04-23 @ 13:06), Max: 98.5 (02-04-23 @ 06:41)  HR: 89 (02-04-23 @ 13:06)  BP: 128/67 (02-04-23 @ 13:06)  RR: 18 (02-04-23 @ 13:06)  SpO2: 99% (02-04-23 @ 13:06)  Wt(kg): --    02-03 @ 07:01  -  02-04 @ 07:00  --------------------------------------------------------  IN: 0 mL / OUT: 3800 mL / NET: -3800 mL    02-04 @ 07:01  -  02-04 @ 16:48  --------------------------------------------------------  IN: 250 mL / OUT: 850 mL / NET: -600 mL          PHYSICAL EXAM:  General: NAD  Neck: No JVD  Respiratory: CTAB, no wheezes, rales or rhonchi  Cardiovascular: S1, S2, RRR  Gastrointestinal: BS+, soft, NT/ND  Extremities: No peripheral edema    Hospital Medications:   MEDICATIONS  (STANDING):  aspirin  chewable 81 milliGRAM(s) Oral daily  atorvastatin 80 milliGRAM(s) Oral at bedtime  clopidogrel Tablet 75 milliGRAM(s) Oral daily  dextrose 5%. 1000 milliLiter(s) (100 mL/Hr) IV Continuous <Continuous>  dextrose 50% Injectable 25 Gram(s) IV Push once  dextrose 50% Injectable 12.5 Gram(s) IV Push once  dextrose 50% Injectable 25 Gram(s) IV Push once  dextrose Oral Gel 15 Gram(s) Oral once  glucagon  Injectable 1 milliGRAM(s) IntraMuscular once  insulin glargine Injectable (LANTUS) 10 Unit(s) SubCutaneous every morning  insulin lispro (ADMELOG) corrective regimen sliding scale   SubCutaneous every 6 hours  pantoprazole   Suspension 40 milliGRAM(s) Oral two times a day  tamsulosin 0.4 milliGRAM(s) Oral at bedtime      LABS:  02-04    135  |  102  |  9   ----------------------------<  219<H>  3.4<L>   |  24  |  0.50    Ca    7.7<L>      04 Feb 2023 07:50      Creatinine Trend: 0.50 <--, 0.54 <--, 0.56 <--, 0.67 <--, 0.65 <--                                8.4    7.11  )-----------( 405      ( 04 Feb 2023 07:50 )             27.8     Urine Studies:  Urinalysis - [01-19-23 @ 13:15]      Color DARK BROWN / Appearance Turbid / SG 1.023 / pH 7.0      Gluc 200 mg/dL / Ketone Trace  / Bili Negative / Urobili 6 mg/dL       Blood Large / Protein >600 / Leuk Est Large / Nitrite Negative      RBC 15 / WBC 11-25 / Hyaline  / Gran 4 / Sq Epi  / Non Sq Epi Few / Bacteria Many      Iron 17, TIBC 187, %sat 9      [01-25-23 @ 07:18]  Ferritin 172      [01-25-23 @ 07:18]  Lipid: chol 238, TG 80, HDL 46, LDL --      [01-08-23 @ 02:47]        RADIOLOGY & ADDITIONAL STUDIES:   [facility] [place] [facility]   MD PUHSPA WICK MD KRISTINE SOLTANPOUR, PADMINI SWANSON    TEL:  OFFICE: [phone]  From 5pm-7am Answering Service [phone]    -- RENAL FOLLOW UP NOTE ---Date of Service 02-04-23 @ 16:48    Patient is a 78y old  Female who presents with a chief complaint of AMS, elevated finger sticks (04 Feb 2023 12:22)      Patient seen and examined at bedside. No chest pain/sob    VITALS:  T(F): 98.1 (02-04-23 @ 13:06), Max: 98.5 (02-04-23 @ 06:41)  HR: 89 (02-04-23 @ 13:06)  BP: 128/67 (02-04-23 @ 13:06)  RR: 18 (02-04-23 @ 13:06)  SpO2: 99% (02-04-23 @ 13:06)  Wt(kg): --    02-03 @ 07:01  -  02-04 @ 07:00  --------------------------------------------------------  IN: 0 mL / OUT: 3800 mL / NET: -3800 mL    02-04 @ 07:01  -  02-04 @ 16:48  --------------------------------------------------------  IN: 250 mL / OUT: 850 mL / NET: -600 mL          PHYSICAL EXAM:  General: NAD  Neck: No JVD  Respiratory: CTAB, no wheezes, rales or rhonchi  Cardiovascular: S1, S2, RRR  Gastrointestinal: BS+, soft, NT/ND  Extremities: No peripheral edema    Hospital Medications:   MEDICATIONS  (STANDING):  aspirin  chewable 81 milliGRAM(s) Oral daily  atorvastatin 80 milliGRAM(s) Oral at bedtime  clopidogrel Tablet 75 milliGRAM(s) Oral daily  dextrose 5%. 1000 milliLiter(s) (100 mL/Hr) IV Continuous <Continuous>  dextrose 50% Injectable 25 Gram(s) IV Push once  dextrose 50% Injectable 12.5 Gram(s) IV Push once  dextrose 50% Injectable 25 Gram(s) IV Push once  dextrose Oral Gel 15 Gram(s) Oral once  glucagon  Injectable 1 milliGRAM(s) IntraMuscular once  insulin glargine Injectable (LANTUS) 10 Unit(s) SubCutaneous every morning  insulin lispro (ADMELOG) corrective regimen sliding scale   SubCutaneous every 6 hours  pantoprazole   Suspension 40 milliGRAM(s) Oral two times a day  tamsulosin 0.4 milliGRAM(s) Oral at bedtime      LABS:  02-04    135  |  102  |  9   ----------------------------<  219<H>  3.4<L>   |  24  |  0.50    Ca    7.7<L>      04 Feb 2023 07:50      Creatinine Trend: 0.50 <--, 0.54 <--, 0.56 <--, 0.67 <--, 0.65 <--                                8.4    7.11  )-----------( 405      ( 04 Feb 2023 07:50 )             27.8     Urine Studies:  Urinalysis - [01-19-23 @ 13:15]      Color DARK BROWN / Appearance Turbid / SG 1.023 / pH 7.0      Gluc 200 mg/dL / Ketone Trace  / Bili Negative / Urobili 6 mg/dL       Blood Large / Protein >600 / Leuk Est Large / Nitrite Negative      RBC 15 / WBC 11-25 / Hyaline  / Gran 4 / Sq Epi  / Non Sq Epi Few / Bacteria Many      Iron 17, TIBC 187, %sat 9      [01-25-23 @ 07:18]  Ferritin 172      [01-25-23 @ 07:18]  Lipid: chol 238, TG 80, HDL 46, LDL --      [01-08-23 @ 02:47]        RADIOLOGY & ADDITIONAL STUDIES:   [facility] [place] [place] [facility]ACT[facility]   MD PUSHPA WICK MD KRISTINE SOLTANPOUR, PADMINI SWANSON    TEL:  OFFICE: [phone]  From 5pm-7am Answering Service [phone]    -- RENAL FOLLOW UP NOTE ---Date of Service 02-04-23 @ 16:48    Patient is a 78y old  Female who presents with a chief complaint of AMS, elevated finger sticks (04 Feb 2023 12:22)      Patient seen and examined at bedside. No chest pain/sob    VITALS:  T(F): 98.1 (02-04-23 @ 13:06), Max: 98.5 (02-04-23 @ 06:41)  HR: 89 (02-04-23 @ 13:06)  BP: 128/67 (02-04-23 @ 13:06)  RR: 18 (02-04-23 @ 13:06)  SpO2: 99% (02-04-23 @ 13:06)  Wt(kg): --    02-03 @ 07:01  -  02-04 @ 07:00  --------------------------------------------------------  IN: 0 mL / OUT: 3800 mL / NET: -3800 mL    02-04 @ 07:01  -  02-04 @ 16:48  --------------------------------------------------------  IN: 250 mL / OUT: 850 mL / NET: -600 mL          PHYSICAL EXAM:  General: NAD  Neck: No JVD  Respiratory: CTAB, no wheezes, rales or rhonchi  Cardiovascular: S1, S2, RRR  Gastrointestinal: BS+, soft, NT/ND  Extremities: No peripheral edema    Hospital Medications:   MEDICATIONS  (STANDING):  aspirin  chewable 81 milliGRAM(s) Oral daily  atorvastatin 80 milliGRAM(s) Oral at bedtime  clopidogrel Tablet 75 milliGRAM(s) Oral daily  dextrose 5%. 1000 milliLiter(s) (100 mL/Hr) IV Continuous <Continuous>  dextrose 50% Injectable 25 Gram(s) IV Push once  dextrose 50% Injectable 12.5 Gram(s) IV Push once  dextrose 50% Injectable 25 Gram(s) IV Push once  dextrose Oral Gel 15 Gram(s) Oral once  glucagon  Injectable 1 milliGRAM(s) IntraMuscular once  insulin glargine Injectable (LANTUS) 10 Unit(s) SubCutaneous every morning  insulin lispro (ADMELOG) corrective regimen sliding scale   SubCutaneous every 6 hours  pantoprazole   Suspension 40 milliGRAM(s) Oral two times a day  tamsulosin 0.4 milliGRAM(s) Oral at bedtime      LABS:  02-04    135  |  102  |  9   ----------------------------<  219<H>  3.4<L>   |  24  |  0.50    Ca    7.7<L>      04 Feb 2023 07:50      Creatinine Trend: 0.50 <--, 0.54 <--, 0.56 <--, 0.67 <--, 0.65 <--                                8.4    7.11  )-----------( 405      ( 04 Feb 2023 07:50 )             27.8     Urine Studies:  Urinalysis - [01-19-23 @ 13:15]      Color DARK BROWN / Appearance Turbid / SG 1.023 / pH 7.0      Gluc 200 mg/dL / Ketone Trace  / Bili Negative / Urobili 6 mg/dL       Blood Large / Protein >600 / Leuk Est Large / Nitrite Negative      RBC 15 / WBC 11-25 / Hyaline  / Gran 4 / Sq Epi  / Non Sq Epi Few / Bacteria Many      Iron 17, TIBC 187, %sat 9      [01-25-23 @ 07:18]  Ferritin 172      [01-25-23 @ 07:18]  Lipid: chol 238, TG 80, HDL 46, LDL --      [01-08-23 @ 02:47]        RADIOLOGY & ADDITIONAL STUDIES:

## 2023-02-04 NOTE — PROGRESS NOTE ADULT - PROBLEM SELECTOR PLAN 1
-test BG Q6h  - Increase Lantus 10 units QAM.   - c/w Admelog moderate correction scale Q6h  -Discontinued IV fluids.   - Please notify endocrine team of any change to TF regimen.   - auto-immune DM antibodies negative to r/o RAJ.   For d/c:   final recs TBD based on feeding modality/insulin requirements  pt remains on 24H TF via peg, at discharge likely will need lantus & scale if remains on 24H TF   -Patient can follow up at [address] [facility], [address], [place] [address] [phone]  -Patient will need opthalmology and podiatry follow up as outpatient. -test BG Q6h  - Increase Lantus 10 units QAM.   - c/w Admelog moderate correction scale Q6h  -Discontinued IV fluids.   - Please notify endocrine team of any change to TF regimen.   - auto-immune DM antibodies negative to r/o RAJ.   For d/c:   final recs TBD based on feeding modality/insulin requirements  pt remains on 24H TF via peg, at discharge likely will need lantus & scale if remains on 24H TF   -Patient can follow up at [address] [facility], [address], [place] [address] [phone]  -Patient will need opthalmology and podiatry follow up as outpatient. -test BG Q6h  - Increase Lantus 10 units QAM.   - c/w Admelog moderate correction scale Q6h  -Discontinued IV fluids.   - Please notify endocrine team of any change to TF regimen.   - auto-immune DM antibodies negative to r/o RAJ.   For d/c:   final recs TBD based on feeding modality/insulin requirements  pt remains on 24H TF via peg, at discharge likely will need lantus & scale if remains on 24H TF   -Patient can follow up at [address] [facility], [address], [place] [address] [phone]  -Patient will need opthalmology and podiatry follow up as outpatient.

## 2023-02-04 NOTE — PROGRESS NOTE ADULT - SUBJECTIVE AND OBJECTIVE BOX
Diabetes Follow up note:    Chief complaint: T2DM    Interval Hx: Pt restarted on feeds after PEG placement. Now at goal rate. BG values 170-200s over past 24 hours. Also noted to be on D5 infusion still.     Review of Systems:  Lethargic. Unable to provide ROS.     MEDS:  atorvastatin 80 milliGRAM(s) Oral at bedtime  insulin glargine Injectable (LANTUS) 10 Unit(s) SubCutaneous every morning  insulin lispro (ADMELOG) corrective regimen sliding scale   SubCutaneous every 6 hours      Allergies    Benedryl Allergy Sinus (Hives)  penicillin (Rash)        PE:  General: Female lying in bed. NAD.   Vital Signs Last 24 Hrs  T(C): 36.9 (04 Feb 2023 06:41), Max: 36.9 (04 Feb 2023 06:41)  T(F): 98.5 (04 Feb 2023 06:41), Max: 98.5 (04 Feb 2023 06:41)  HR: 96 (04 Feb 2023 06:41) (92 - 96)  BP: 128/67 (04 Feb 2023 06:41) (128/67 - 140/66)  BP(mean): --  RR: 18 (04 Feb 2023 06:41) (17 - 18)  SpO2: 99% (04 Feb 2023 06:41) (96% - 99%)    Parameters below as of 04 Feb 2023 06:41  Patient On (Oxygen Delivery Method): room air      Abd: Soft, NT,ND, PEG in place.   Extremities: Warm  Neuro: Sleepy at time of visit. non-verbal    LABS:  POCT Blood Glucose.: 192 mg/dL (02-04-23 @ 10:09)  POCT Blood Glucose.: 223 mg/dL (02-04-23 @ 06:00)  POCT Blood Glucose.: 196 mg/dL (02-04-23 @ 00:49)  POCT Blood Glucose.: 177 mg/dL (02-03-23 @ 17:43)  POCT Blood Glucose.: 203 mg/dL (02-03-23 @ 11:34)  POCT Blood Glucose.: 208 mg/dL (02-03-23 @ 05:59)  POCT Blood Glucose.: 212 mg/dL (02-03-23 @ 00:08)  POCT Blood Glucose.: 154 mg/dL (02-02-23 @ 17:38)  POCT Blood Glucose.: 194 mg/dL (02-02-23 @ 11:32)  POCT Blood Glucose.: 174 mg/dL (02-02-23 @ 06:27)  POCT Blood Glucose.: 149 mg/dL (02-01-23 @ 23:59)  POCT Blood Glucose.: 154 mg/dL (02-01-23 @ 18:20)  POCT Blood Glucose.: 136 mg/dL (02-01-23 @ 12:31)                            8.4    7.11  )-----------( 405      ( 04 Feb 2023 07:50 )             27.8       02-04    135  |  102  |  9   ----------------------------<  219<H>  3.4<L>   |  24  |  0.50    eGFR: 95    Ca    7.7<L>      02-04  Phos  2.5     02-02      A1C with Estimated Average Glucose Result: 9.4 % (01-19-23 @ 13:11)  A1C with Estimated Average Glucose Result: 8.6 % (01-07-23 @ 05:40)          Contact number: alycia [phone] or [phone]       Diabetes Follow up note:    Chief complaint: T2DM    Interval Hx: Pt restarted on feeds after PEG placement. Now at goal rate. BG values 170-200s over past 24 hours. Also noted to be on D5 infusion still.     Review of Systems:  Lethargic. Unable to provide ROS.     MEDS:  atorvastatin 80 milliGRAM(s) Oral at bedtime  insulin glargine Injectable (LANTUS) 10 Unit(s) SubCutaneous every morning  insulin lispro (ADMELOG) corrective regimen sliding scale   SubCutaneous every 6 hours      Allergies    Benedryl Allergy Sinus (Hives)  penicillin (Rash)        PE:  General: Female lying in bed. NAD.   Vital Signs Last 24 Hrs  T(C): 36.9 (04 Feb 2023 06:41), Max: 36.9 (04 Feb 2023 06:41)  T(F): 98.5 (04 Feb 2023 06:41), Max: 98.5 (04 Feb 2023 06:41)  HR: 96 (04 Feb 2023 06:41) (92 - 96)  BP: 128/67 (04 Feb 2023 06:41) (128/67 - 140/66)  BP(mean): --  RR: 18 (04 Feb 2023 06:41) (17 - 18)  SpO2: 99% (04 Feb 2023 06:41) (96% - 99%)    Parameters below as of 04 Feb 2023 06:41  Patient On (Oxygen Delivery Method): room air      Abd: Soft, NT,ND, PEG in place.   Extremities: Warm  Neuro: Sleepy at time of visit. non-verbal    LABS:  POCT Blood Glucose.: 192 mg/dL (02-04-23 @ 10:09)  POCT Blood Glucose.: 223 mg/dL (02-04-23 @ 06:00)  POCT Blood Glucose.: 196 mg/dL (02-04-23 @ 00:49)  POCT Blood Glucose.: 177 mg/dL (02-03-23 @ 17:43)  POCT Blood Glucose.: 203 mg/dL (02-03-23 @ 11:34)  POCT Blood Glucose.: 208 mg/dL (02-03-23 @ 05:59)  POCT Blood Glucose.: 212 mg/dL (02-03-23 @ 00:08)  POCT Blood Glucose.: 154 mg/dL (02-02-23 @ 17:38)  POCT Blood Glucose.: 194 mg/dL (02-02-23 @ 11:32)  POCT Blood Glucose.: 174 mg/dL (02-02-23 @ 06:27)  POCT Blood Glucose.: 149 mg/dL (02-01-23 @ 23:59)  POCT Blood Glucose.: 154 mg/dL (02-01-23 @ 18:20)  POCT Blood Glucose.: 136 mg/dL (02-01-23 @ 12:31)                            8.4    7.11  )-----------( 405      ( 04 Feb 2023 07:50 )             27.8       02-04    135  |  102  |  9   ----------------------------<  219<H>  3.4<L>   |  24  |  0.50    eGFR: 95    Ca    7.7<L>      02-04  Phos  2.5     02-02      A1C with Estimated Average Glucose Result: 9.4 % (01-19-23 @ 13:11)  A1C with Estimated Average Glucose Result: 8.6 % (01-07-23 @ 05:40)          Contact number: alycia [phone] or [phone]       Diabetes Follow up note:    Chief complaint: T2DM    Interval Hx: Pt restarted on feeds after PEG placement. Now at goal rate. BG values 170-200s over past 24 hours. Also noted to be on D5 infusion still.     Review of Systems:  Lethargic. Unable to provide ROS.     MEDS:  atorvastatin 80 milliGRAM(s) Oral at bedtime  insulin glargine Injectable (LANTUS) 10 Unit(s) SubCutaneous every morning  insulin lispro (ADMELOG) corrective regimen sliding scale   SubCutaneous every 6 hours      Allergies    Benedryl Allergy Sinus (Hives)  penicillin (Rash)        PE:  General: Female lying in bed. NAD.   Vital Signs Last 24 Hrs  T(C): 36.9 (04 Feb 2023 06:41), Max: 36.9 (04 Feb 2023 06:41)  T(F): 98.5 (04 Feb 2023 06:41), Max: 98.5 (04 Feb 2023 06:41)  HR: 96 (04 Feb 2023 06:41) (92 - 96)  BP: 128/67 (04 Feb 2023 06:41) (128/67 - 140/66)  BP(mean): --  RR: 18 (04 Feb 2023 06:41) (17 - 18)  SpO2: 99% (04 Feb 2023 06:41) (96% - 99%)    Parameters below as of 04 Feb 2023 06:41  Patient On (Oxygen Delivery Method): room air      Abd: Soft, NT,ND, PEG in place.   Extremities: Warm  Neuro: Sleepy at time of visit. non-verbal    LABS:  POCT Blood Glucose.: 192 mg/dL (02-04-23 @ 10:09)  POCT Blood Glucose.: 223 mg/dL (02-04-23 @ 06:00)  POCT Blood Glucose.: 196 mg/dL (02-04-23 @ 00:49)  POCT Blood Glucose.: 177 mg/dL (02-03-23 @ 17:43)  POCT Blood Glucose.: 203 mg/dL (02-03-23 @ 11:34)  POCT Blood Glucose.: 208 mg/dL (02-03-23 @ 05:59)  POCT Blood Glucose.: 212 mg/dL (02-03-23 @ 00:08)  POCT Blood Glucose.: 154 mg/dL (02-02-23 @ 17:38)  POCT Blood Glucose.: 194 mg/dL (02-02-23 @ 11:32)  POCT Blood Glucose.: 174 mg/dL (02-02-23 @ 06:27)  POCT Blood Glucose.: 149 mg/dL (02-01-23 @ 23:59)  POCT Blood Glucose.: 154 mg/dL (02-01-23 @ 18:20)  POCT Blood Glucose.: 136 mg/dL (02-01-23 @ 12:31)                            8.4    7.11  )-----------( 405      ( 04 Feb 2023 07:50 )             27.8       02-04    135  |  102  |  9   ----------------------------<  219<H>  3.4<L>   |  24  |  0.50    eGFR: 95    Ca    7.7<L>      02-04  Phos  2.5     02-02      A1C with Estimated Average Glucose Result: 9.4 % (01-19-23 @ 13:11)  A1C with Estimated Average Glucose Result: 8.6 % (01-07-23 @ 05:40)          Contact number: alycia [phone] or [phone]

## 2023-02-04 NOTE — PROGRESS NOTE ADULT - ASSESSMENT
78F with dementia, T2DM, recently discharged about 1 week ago for CVA now presenting with poor PO intake, lethargy and hyperglycemia to 500s.   Of note, patient admitted 1/6 for R facial droop, word finding difficulties. Imaging concerning for acute L MCA infarct.  Per family, since stroke patient nonverbal/unable to participate in meaningful communications, intermittently follows commands. For the last 3-4 days, patient with poor po intake.    In ED, afebrile, , 135/84. WBC 15, Na 158, K 3.0, Cl 120, Glu 468, alk phos 136m BHB 0.7. pH 7.36, lactate 2.2.   UA: +leuk esterase, many bacteria, WBC 11-25  CXR: Redemonstrated biapical scarring and left upper lobe bronchiectasis, unchanged. No focal consolidation.   CTH No acute intracranial hemorrhage. 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  Given 1.5L NS, ceftriaxone x1, haldol 2.5mg x1 in ED.   last admission: CTA with L MCA severe stenosis; distal L M2 occlusion .  TTE 1/8/23: EF 63% Mild mitral regurgitation ; A1c 9.4   MRI brain with evolving subacute infarcts as expected. question of SAH   CTH 1/27 evolviong subacute L hemisphere infarcts. no SAH found.   CTH 1/30 unchanged ; scaterred L MCA infarct no hemorrhage   s/p EGD/peg    Impression  1) AMS likely 2/2 infection/UTI toxic metabolic encephalopahthy   2) recent stroke - L MCA infarct 2/2 symptomatic L MCA ICAD ; worsening of R HP in setting of infection     -  f/u   - Nsx eval--> No intervention.  despite there note stating SAH, no comment to stop anti thrombotics.  nevertheless CTH read is neg for SAH.  no hemorrhage on CTH   - now on contact   - f/u endocrine   - treatment of infection per primary team -->  CTX, completed   - for secondary stroke prevention. DAPT x 3 months followeed by ASA 81mg dialy thereafter.   - hihg dose statin lipitor 40mg dialy   - avoid hypotension given L MCA ICAD   - telemetry  - PT/OT/SS/SLP, OOBC  - check FS, glucose control <180  - GI/DVT ppx  - Thank you for allowing me to participate in the care of this patient. Call with questions.    - dc plan ALBA Greco MD  Vascular Neurology  Office: [phone]  78F with dementia, T2DM, recently discharged about 1 week ago for CVA now presenting with poor PO intake, lethargy and hyperglycemia to 500s.   Of note, patient admitted 1/6 for R facial droop, word finding difficulties. Imaging concerning for acute L MCA infarct.  Per family, since stroke patient nonverbal/unable to participate in meaningful communications, intermittently follows commands. For the last 3-4 days, patient with poor po intake.    In ED, afebrile, , 135/84. WBC 15, Na 158, K 3.0, Cl 120, Glu 468, alk phos 136m BHB 0.7. pH 7.36, lactate 2.2.   UA: +leuk esterase, many bacteria, WBC 11-25  CXR: Redemonstrated biapical scarring and left upper lobe bronchiectasis, unchanged. No focal consolidation.   CTH No acute intracranial hemorrhage. 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  Given 1.5L NS, ceftriaxone x1, haldol 2.5mg x1 in ED.   last admission: CTA with L MCA severe stenosis; distal L M2 occlusion .  TTE 1/8/23: EF 63% Mild mitral regurgitation ; A1c 9.4   MRI brain with evolving subacute infarcts as expected. question of SAH   CTH 1/27 evolviong subacute L hemisphere infarcts. no SAH found.   CTH 1/30 unchanged ; scaterred L MCA infarct no hemorrhage   s/p EGD/peg    Impression  1) AMS likely 2/2 infection/UTI toxic metabolic encephalopahthy   2) recent stroke - L MCA infarct 2/2 symptomatic L MCA ICAD ; worsening of R HP in setting of infection     -  f/u   - Nsx eval--> No intervention.  despite there note stating SAH, no comment to stop anti thrombotics.  nevertheless CTH read is neg for SAH.  no hemorrhage on CTH   - now on contact   - f/u endocrine   - treatment of infection per primary team -->  CTX, completed   - for secondary stroke prevention. DAPT x 3 months followeed by ASA 81mg dialy thereafter.   - hihg dose statin lipitor 40mg dialy   - avoid hypotension given L MCA ICAD   - telemetry  - PT/OT/SS/SLP, OOBC  - check FS, glucose control <180  - GI/DVT ppx  - Thank you for allowing me to participate in the care of this patient. Call with questions.    - dc plan ALBA Greco MD  Vascular Neurology  Office: [phone]  78F with dementia, T2DM, recently discharged about 1 week ago for CVA now presenting with poor PO intake, lethargy and hyperglycemia to 500s.   Of note, patient admitted 1/6 for R facial droop, word finding difficulties. Imaging concerning for acute L MCA infarct.  Per family, since stroke patient nonverbal/unable to participate in meaningful communications, intermittently follows commands. For the last 3-4 days, patient with poor po intake.    In ED, afebrile, , 135/84. WBC 15, Na 158, K 3.0, Cl 120, Glu 468, alk phos 136m BHB 0.7. pH 7.36, lactate 2.2.   UA: +leuk esterase, many bacteria, WBC 11-25  CXR: Redemonstrated biapical scarring and left upper lobe bronchiectasis, unchanged. No focal consolidation.   CTH No acute intracranial hemorrhage. 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  Given 1.5L NS, ceftriaxone x1, haldol 2.5mg x1 in ED.   last admission: CTA with L MCA severe stenosis; distal L M2 occlusion .  TTE 1/8/23: EF 63% Mild mitral regurgitation ; A1c 9.4   MRI brain with evolving subacute infarcts as expected. question of SAH   CTH 1/27 evolviong subacute L hemisphere infarcts. no SAH found.   CTH 1/30 unchanged ; scaterred L MCA infarct no hemorrhage   s/p EGD/peg    Impression  1) AMS likely 2/2 infection/UTI toxic metabolic encephalopahthy   2) recent stroke - L MCA infarct 2/2 symptomatic L MCA ICAD ; worsening of R HP in setting of infection     -  f/u   - Nsx eval--> No intervention.  despite there note stating SAH, no comment to stop anti thrombotics.  nevertheless CTH read is neg for SAH.  no hemorrhage on CTH   - now on contact   - f/u endocrine   - treatment of infection per primary team -->  CTX, completed   - for secondary stroke prevention. DAPT x 3 months followeed by ASA 81mg dialy thereafter.   - hihg dose statin lipitor 40mg dialy   - avoid hypotension given L MCA ICAD   - telemetry  - PT/OT/SS/SLP, OOBC  - check FS, glucose control <180  - GI/DVT ppx  - Thank you for allowing me to participate in the care of this patient. Call with questions.    - dc plan ALBA Greco MD  Vascular Neurology  Office: [phone]

## 2023-02-04 NOTE — PROGRESS NOTE ADULT - PROBLEM SELECTOR PLAN 3
Continue with atorvastatin 80 mg daily   -   - Manage as outpatient       discussed w/pt/RN/medicine ACP  Can be reached via TEAMS/pager: [phone]   office:  [phone] (M-F 9a-5pm)               [phone] (nights/weekends)   Can access Amion coverage via sunrise/tools Continue with atorvastatin 80 mg daily   -   - Manage as outpatient       discussed w/pt/RN/medicine ACP  Can be reached via TEAMS/pager: [phone]   office:  [phone] (M-F 9a-5pm)               [phone] (nights/weekends)   Can access Amion coverage via sunrise/tools Continue with atorvastatin 80 mg daily   -   - Manage as outpatient       discussed w/pt/RN/medicine ACP  Can be reached via TEAMS/pager: [phone]   office:  [phone] (M-F 9a-5pm)               [phone] (nights/weekends)   Can access Amion coverage via sunrise/tools

## 2023-02-05 LAB
CULTURE RESULTS: NO GROWTH — SIGNIFICANT CHANGE UP
SPECIMEN SOURCE: SIGNIFICANT CHANGE UP

## 2023-02-05 RX ORDER — ACETAMINOPHEN 500 MG
1000 TABLET ORAL ONCE
Refills: 0 | Status: COMPLETED | OUTPATIENT
Start: 2023-02-05 | End: 2023-02-05

## 2023-02-05 RX ORDER — INSULIN GLARGINE 100 [IU]/ML
12 INJECTION, SOLUTION SUBCUTANEOUS EVERY MORNING
Refills: 0 | Status: DISCONTINUED | OUTPATIENT
Start: 2023-02-06 | End: 2023-02-06

## 2023-02-05 RX ORDER — ACETAMINOPHEN 500 MG
650 TABLET ORAL ONCE
Refills: 0 | Status: DISCONTINUED | OUTPATIENT
Start: 2023-02-05 | End: 2023-02-06

## 2023-02-05 RX ADMIN — ATORVASTATIN CALCIUM 80 MILLIGRAM(S): 80 TABLET, FILM COATED ORAL at 22:06

## 2023-02-05 RX ADMIN — Medication 1000 MILLIGRAM(S): at 17:07

## 2023-02-05 RX ADMIN — Medication 2: at 06:33

## 2023-02-05 RX ADMIN — Medication 4: at 13:04

## 2023-02-05 RX ADMIN — PANTOPRAZOLE SODIUM 40 MILLIGRAM(S): 20 TABLET, DELAYED RELEASE ORAL at 06:33

## 2023-02-05 RX ADMIN — Medication 650 MILLIGRAM(S): at 18:53

## 2023-02-05 RX ADMIN — Medication 650 MILLIGRAM(S): at 13:04

## 2023-02-05 RX ADMIN — INSULIN GLARGINE 10 UNIT(S): 100 INJECTION, SOLUTION SUBCUTANEOUS at 08:47

## 2023-02-05 RX ADMIN — TAMSULOSIN HYDROCHLORIDE 0.4 MILLIGRAM(S): 0.4 CAPSULE ORAL at 22:05

## 2023-02-05 RX ADMIN — Medication 0: at 18:25

## 2023-02-05 RX ADMIN — Medication 400 MILLIGRAM(S): at 16:37

## 2023-02-05 RX ADMIN — Medication 650 MILLIGRAM(S): at 19:53

## 2023-02-05 RX ADMIN — CLOPIDOGREL BISULFATE 75 MILLIGRAM(S): 75 TABLET, FILM COATED ORAL at 13:06

## 2023-02-05 RX ADMIN — Medication 2: at 00:55

## 2023-02-05 RX ADMIN — Medication 81 MILLIGRAM(S): at 13:05

## 2023-02-05 RX ADMIN — PANTOPRAZOLE SODIUM 40 MILLIGRAM(S): 20 TABLET, DELAYED RELEASE ORAL at 18:25

## 2023-02-05 RX ADMIN — Medication 650 MILLIGRAM(S): at 08:48

## 2023-02-05 NOTE — PROGRESS NOTE ADULT - SUBJECTIVE AND OBJECTIVE BOX
Neurology Progress Note    S: Patient seen and examined. MRI done, c/f infarcts. CTH neg for hemorrhage   on contact     Medication:    MEDICATIONS  (STANDING):  aspirin  chewable 81 milliGRAM(s) Oral daily  atorvastatin 80 milliGRAM(s) Oral at bedtime  clopidogrel Tablet 75 milliGRAM(s) Oral daily  dextrose 5%. 1000 milliLiter(s) (100 mL/Hr) IV Continuous <Continuous>  dextrose 50% Injectable 25 Gram(s) IV Push once  dextrose 50% Injectable 12.5 Gram(s) IV Push once  dextrose 50% Injectable 25 Gram(s) IV Push once  dextrose Oral Gel 15 Gram(s) Oral once  glucagon  Injectable 1 milliGRAM(s) IntraMuscular once  insulin glargine Injectable (LANTUS) 10 Unit(s) SubCutaneous every morning  insulin lispro (ADMELOG) corrective regimen sliding scale   SubCutaneous every 6 hours  pantoprazole   Suspension 40 milliGRAM(s) Oral two times a day  tamsulosin 0.4 milliGRAM(s) Oral at bedtime    MEDICATIONS  (PRN):  acetaminophen    Suspension .. 650 milliGRAM(s) Enteral Tube every 6 hours PRN Temp greater or equal to 38C (100.4F)    Vitals:        Vital Signs Last 24 Hrs  T(C): 37.3 (02-05-23 @ 06:44), Max: 37.3 (02-05-23 @ 06:44)  T(F): 99.2 (02-05-23 @ 06:44), Max: 99.2 (02-05-23 @ 06:44)  HR: 97 (02-05-23 @ 06:44) (86 - 97)  BP: 146/60 (02-05-23 @ 06:44) (118/62 - 146/60)  BP(mean): --  RR: 17 (02-05-23 @ 06:44) (17 - 18)  SpO2: 97% (02-05-23 @ 06:44) (97% - 100%)              General Exam:   General Appearance: Appropriately dressed and in no acute distress       Head: Normocephalic, atraumatic and no dysmorphic features  Ear, Nose, and Throat: Moist mucous membranes  CVS: S1S2+  Resp: No SOB, no wheeze or rhonchi  Abd: soft NTND  Extremities: No edema, no cyanosis  Skin: No bruises, no rashes        NEUROLOGICAL EXAM:    Mental status: Eyes open, awake, alert, attempts to produce speech, able to follow some simple commands, able to mimic at times , unable to ID objects  Cranial Nerves: Right facial droop, no nystagmus, blinks to threat B/L  Motor exam: Normal tone, no drift, Right hemiparesis RUE drift, 3-44/5, RLE drift, 3-4/5,, LUE 5/5, LLE 5/5  Sensation: Intact to light touch   Coordination/ Gait: Unable to participate with exam     I personally reviewed the below data/images/labs:  no new labs   CBC Full  -  ( 04 Feb 2023 07:50 )  WBC Count : 7.11 K/uL  RBC Count : 3.18 M/uL  Hemoglobin : 8.4 g/dL  Hematocrit : 27.8 %  Platelet Count - Automated : 405 K/uL  Mean Cell Volume : 87.4 fl  Mean Cell Hemoglobin : 26.4 pg  Mean Cell Hemoglobin Concentration : 30.2 gm/dL  Auto Neutrophil # : x  Auto Lymphocyte # : x  Auto Monocyte # : x  Auto Eosinophil # : x  Auto Basophil # : x  Auto Neutrophil % : x  Auto Lymphocyte % : x  Auto Monocyte % : x  Auto Eosinophil % : x  Auto Basophil % : x    02-04    135  |  102  |  9   ----------------------------<  219<H>  3.4<L>   |  24  |  0.50    Ca    7.7<L>      04 Feb 2023 07:50          `< from: CT Head No Cont (01.19.23 @ 13:45) >    ACC: [id number] EXAM:  CT BRAIN   ORDERED BY: CORINNE MADERA     PROCEDURE DATE:  01/19/2023           INTERPRETATION:  CLINICAL STATEMENT: Altered mental status    TECHNIQUE: CT of the head was performed without IV contrast.  RAPID   artificial intelligence was utilized for the preliminary evaluation of   intracranial hemorrhage.    COMPARISON: MRI brain 1/7/2023.    FINDINGS:  There is mild diffuse parenchymal volume loss. There are areas of low   attenuation in the periventricular white matter likely related to mild   chronic microvascular ischemic changes.    Evolving small infarcts noted in the left corona radiata.   Age-indeterminate infarct also noted in the left temporal and parietal   lobe    There is no acute intracranial hemorrhage, parenchymal mass, mass effect   or midline shift.. There is no hydrocephalus. Partial empty sella noted    The cranium is intact. Calcification noted adjacent to left frontal   artery table. The visualized paranasal sinuses are well-aerated.        IMPRESSION:  No acute intracranial hemorrhage.    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    --- End of Report ---        < from: MR Head No Cont (01.26.23 @ 22:08) >    ACC: [id number] EXAM:  MR BRAIN   ORDERED BY: POLLO LYON     PROCEDURE DATE:  01/26/2023          INTERPRETATION:  CLINICAL STATEMENT: Multiple left MCA territory   infarcts. Altered mental status.    TECHNIQUE: Multiplanar multisequence noncontrast MRI of the brain was   performed. Diffusion weighted imaging was performed. Significant image   degradation by motion artifact.  COMPARISON: MRI brain 1/7/2023.    FINDINGS:    Corpus callosum, pituitary and pineal regions appear unremarkable. No  tonsillar herniation or evidence of marrow replacement process.   Hyperostosis frontalis. Degenerative changes visualized cervical spine.    CP angle and IAC regions appear within normal limits, as do the globes,   intraconal regions and major intracranial flow-voids. No paranasal sinus   air-fluid levels or opacification is evident.    New curvilinear-serpiginous susceptibility associated with the high left   frontal lobe. No evidence of acute hemorrhage. Slightly less pronounced   confluent restricted diffusion left lateral temporal lobe. Multiple less   pronounced foci of restricted diffusion with T2-FLAIR prolongation   centered in the left corona radiata, with extension into the ipsilateral   centrum semiovale. Several additional smaller less pronounced foci of   restricted diffusion left parieto-occipital and left posterior temporal   juxtacortical white matter as well as cortically based serpiginous   restricted diffusion left posterior temporal lobe.    IMPRESSION:    Compared with MRI Brain 1/7/2023.    1. Multiple evolving LEFT-SIDED subacute infarcts, as above.  2. Findings suspicious for high LEFT frontal subarachnoid hemorrhage.   Recommend correlation with noncontrast CT of the brain.  3. Additional findings above.    Findings and recommendations discussed in detail with PADMINI Lyon at the   time of this interpretation.    --- En   < from: CT Head No Cont (01.27.23 @ 19:09) >    ACC: [id number] EXAM:  CT BRAIN   ORDERED BY: POLLO LYON     PROCEDURE DATE:  01/27/2023          INTERPRETATION:  CLINICAL INFORMATION: Subacute infarcts with possible   new subarachnoid hemorrhage on MRI.    TECHNIQUE:  Axial CT images were acquired through the head.  Intravenous contrast: None  Two-dimensional reformats were generated.    COMPARISON STUDY: Brain MRIs from 1/7/2023 and 1/26/2023.    FINDINGS:  There are evolving subacute infarcts in the left temporal lobe anteriorly   and within the left corona radiata.  There is no CT evidence of acute   intracranial hemorrhage, mass effect, midline shift or hydrocephalus.    There is mild generalized cerebral volume loss.    The paranasal sinuses are grossly clear.    The mastoids are underpneumatized bilaterally; no clinically significant   mastoid or middle ear effusion is visualized.    The patient is status post right-sided intraocular lens replacement.    The calvarium and skull base appear within normal limits..    IMPRESSION:  Evolving subacute infarcts in the left temporal lobe anteriorly and   within the left corona radiata.  No evidence of acute intracranial   hemorrhage.  No subarachnoid hemorrhage in the left frontal region as   questioned on prior MRI.    --- Endof Report ---    < from: CT Head No Cont (01.30.23 @ 23:02) >    ACC: [id number] EXAM:  CT BRAIN   ORDERED BY: ESAU OAKLEY     PROCEDURE DATE:  01/30/2023          INTERPRETATION:  Noncontrast CT of the brain.    CLINICAL INDICATION:  Altered mental status. Acute left MCA territory   infarcts.    TECHNIQUE : Axial CT scanning of the brain was obtained from the skull   base to the vertex without the administration of intravenous contrast.   Sagittal and coronal reformats were provided.    COMPARISON: MRI brain 1/26/2023. CT brain 1/27/2023.    FINDINGS:    Redemonstration of scattered acute left MCA territory infarcts. No CT   evidence for hemorrhagic transformation.    No hydrocephalus, midline shift, or effacement of basal cisterns.    No acute intracranial hemorrhage.    The visualized paranasal sinuses and mastoid air cells are clear.    IMPRESSION:    No significant interval change.    Redemonstration of scattered acute left MCA territory infarcts. No CT   evidence for hemorrhagic transformation.      --- End of Report ---            CHELSIE RYAN MD; Attending Radiologist  This document has been electronically signed. Jan 31 2023  9:12AM    < end of copied text >      Neurology Progress Note    S: Patient seen and examined. MRI done, c/f infarcts. CTH neg for hemorrhage   on contact     Medication:    MEDICATIONS  (STANDING):  aspirin  chewable 81 milliGRAM(s) Oral daily  atorvastatin 80 milliGRAM(s) Oral at bedtime  clopidogrel Tablet 75 milliGRAM(s) Oral daily  dextrose 5%. 1000 milliLiter(s) (100 mL/Hr) IV Continuous <Continuous>  dextrose 50% Injectable 25 Gram(s) IV Push once  dextrose 50% Injectable 12.5 Gram(s) IV Push once  dextrose 50% Injectable 25 Gram(s) IV Push once  dextrose Oral Gel 15 Gram(s) Oral once  glucagon  Injectable 1 milliGRAM(s) IntraMuscular once  insulin glargine Injectable (LANTUS) 10 Unit(s) SubCutaneous every morning  insulin lispro (ADMELOG) corrective regimen sliding scale   SubCutaneous every 6 hours  pantoprazole   Suspension 40 milliGRAM(s) Oral two times a day  tamsulosin 0.4 milliGRAM(s) Oral at bedtime    MEDICATIONS  (PRN):  acetaminophen    Suspension .. 650 milliGRAM(s) Enteral Tube every 6 hours PRN Temp greater or equal to 38C (100.4F)    Vitals:        Vital Signs Last 24 Hrs  T(C): 37.3 (02-05-23 @ 06:44), Max: 37.3 (02-05-23 @ 06:44)  T(F): 99.2 (02-05-23 @ 06:44), Max: 99.2 (02-05-23 @ 06:44)  HR: 97 (02-05-23 @ 06:44) (86 - 97)  BP: 146/60 (02-05-23 @ 06:44) (118/62 - 146/60)  BP(mean): --  RR: 17 (02-05-23 @ 06:44) (17 - 18)  SpO2: 97% (02-05-23 @ 06:44) (97% - 100%)              General Exam:   General Appearance: Appropriately dressed and in no acute distress       Head: Normocephalic, atraumatic and no dysmorphic features  Ear, Nose, and Throat: Moist mucous membranes  CVS: S1S2+  Resp: No SOB, no wheeze or rhonchi  Abd: soft NTND  Extremities: No edema, no cyanosis  Skin: No bruises, no rashes        NEUROLOGICAL EXAM:    Mental status: Eyes open, awake, alert, attempts to produce speech, able to follow some simple commands, able to mimic at times , unable to ID objects  Cranial Nerves: Right facial droop, no nystagmus, blinks to threat B/L  Motor exam: Normal tone, no drift, Right hemiparesis RUE drift, 3-44/5, RLE drift, 3-4/5,, LUE 5/5, LLE 5/5  Sensation: Intact to light touch   Coordination/ Gait: Unable to participate with exam     I personally reviewed the below data/images/labs:  no new labs   CBC Full  -  ( 04 Feb 2023 07:50 )  WBC Count : 7.11 K/uL  RBC Count : 3.18 M/uL  Hemoglobin : 8.4 g/dL  Hematocrit : 27.8 %  Platelet Count - Automated : 405 K/uL  Mean Cell Volume : 87.4 fl  Mean Cell Hemoglobin : 26.4 pg  Mean Cell Hemoglobin Concentration : 30.2 gm/dL  Auto Neutrophil # : x  Auto Lymphocyte # : x  Auto Monocyte # : x  Auto Eosinophil # : x  Auto Basophil # : x  Auto Neutrophil % : x  Auto Lymphocyte % : x  Auto Monocyte % : x  Auto Eosinophil % : x  Auto Basophil % : x    02-04    135  |  102  |  9   ----------------------------<  219<H>  3.4<L>   |  24  |  0.50    Ca    7.7<L>      04 Feb 2023 07:50          `< from: CT Head No Cont (01.19.23 @ 13:45) >    ACC: [id number] EXAM:  CT BRAIN   ORDERED BY: CORINNE MADERA     PROCEDURE DATE:  01/19/2023           INTERPRETATION:  CLINICAL STATEMENT: Altered mental status    TECHNIQUE: CT of the head was performed without IV contrast.  RAPID   artificial intelligence was utilized for the preliminary evaluation of   intracranial hemorrhage.    COMPARISON: MRI brain 1/7/2023.    FINDINGS:  There is mild diffuse parenchymal volume loss. There are areas of low   attenuation in the periventricular white matter likely related to mild   chronic microvascular ischemic changes.    Evolving small infarcts noted in the left corona radiata.   Age-indeterminate infarct also noted in the left temporal and parietal   lobe    There is no acute intracranial hemorrhage, parenchymal mass, mass effect   or midline shift.. There is no hydrocephalus. Partial empty sella noted    The cranium is intact. Calcification noted adjacent to left frontal   artery table. The visualized paranasal sinuses are well-aerated.        IMPRESSION:  No acute intracranial hemorrhage.    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    --- End of Report ---        < from: MR Head No Cont (01.26.23 @ 22:08) >    ACC: [id number] EXAM:  MR BRAIN   ORDERED BY: POLLO LYON     PROCEDURE DATE:  01/26/2023          INTERPRETATION:  CLINICAL STATEMENT: Multiple left MCA territory   infarcts. Altered mental status.    TECHNIQUE: Multiplanar multisequence noncontrast MRI of the brain was   performed. Diffusion weighted imaging was performed. Significant image   degradation by motion artifact.  COMPARISON: MRI brain 1/7/2023.    FINDINGS:    Corpus callosum, pituitary and pineal regions appear unremarkable. No  tonsillar herniation or evidence of marrow replacement process.   Hyperostosis frontalis. Degenerative changes visualized cervical spine.    CP angle and IAC regions appear within normal limits, as do the globes,   intraconal regions and major intracranial flow-voids. No paranasal sinus   air-fluid levels or opacification is evident.    New curvilinear-serpiginous susceptibility associated with the high left   frontal lobe. No evidence of acute hemorrhage. Slightly less pronounced   confluent restricted diffusion left lateral temporal lobe. Multiple less   pronounced foci of restricted diffusion with T2-FLAIR prolongation   centered in the left corona radiata, with extension into the ipsilateral   centrum semiovale. Several additional smaller less pronounced foci of   restricted diffusion left parieto-occipital and left posterior temporal   juxtacortical white matter as well as cortically based serpiginous   restricted diffusion left posterior temporal lobe.    IMPRESSION:    Compared with MRI Brain 1/7/2023.    1. Multiple evolving LEFT-SIDED subacute infarcts, as above.  2. Findings suspicious for high LEFT frontal subarachnoid hemorrhage.   Recommend correlation with noncontrast CT of the brain.  3. Additional findings above.    Findings and recommendations discussed in detail with PADMINI Lyon at the   time of this interpretation.    --- En   < from: CT Head No Cont (01.27.23 @ 19:09) >    ACC: [id number] EXAM:  CT BRAIN   ORDERED BY: POLLO LYON     PROCEDURE DATE:  01/27/2023          INTERPRETATION:  CLINICAL INFORMATION: Subacute infarcts with possible   new subarachnoid hemorrhage on MRI.    TECHNIQUE:  Axial CT images were acquired through the head.  Intravenous contrast: None  Two-dimensional reformats were generated.    COMPARISON STUDY: Brain MRIs from 1/7/2023 and 1/26/2023.    FINDINGS:  There are evolving subacute infarcts in the left temporal lobe anteriorly   and within the left corona radiata.  There is no CT evidence of acute   intracranial hemorrhage, mass effect, midline shift or hydrocephalus.    There is mild generalized cerebral volume loss.    The paranasal sinuses are grossly clear.    The mastoids are underpneumatized bilaterally; no clinically significant   mastoid or middle ear effusion is visualized.    The patient is status post right-sided intraocular lens replacement.    The calvarium and skull base appear within normal limits..    IMPRESSION:  Evolving subacute infarcts in the left temporal lobe anteriorly and   within the left corona radiata.  No evidence of acute intracranial   hemorrhage.  No subarachnoid hemorrhage in the left frontal region as   questioned on prior MRI.    --- Endof Report ---    < from: CT Head No Cont (01.30.23 @ 23:02) >    ACC: [id number] EXAM:  CT BRAIN   ORDERED BY: ESAU OAKLEY     PROCEDURE DATE:  01/30/2023          INTERPRETATION:  Noncontrast CT of the brain.    CLINICAL INDICATION:  Altered mental status. Acute left MCA territory   infarcts.    TECHNIQUE : Axial CT scanning of the brain was obtained from the skull   base to the vertex without the administration of intravenous contrast.   Sagittal and coronal reformats were provided.    COMPARISON: MRI brain 1/26/2023. CT brain 1/27/2023.    FINDINGS:    Redemonstration of scattered acute left MCA territory infarcts. No CT   evidence for hemorrhagic transformation.    No hydrocephalus, midline shift, or effacement of basal cisterns.    No acute intracranial hemorrhage.    The visualized paranasal sinuses and mastoid air cells are clear.    IMPRESSION:    No significant interval change.    Redemonstration of scattered acute left MCA territory infarcts. No CT   evidence for hemorrhagic transformation.      --- End of Report ---            CHELSIE RYAN MD; Attending Radiologist  This document has been electronically signed. Jan 31 2023  9:12AM    < end of copied text >      Neurology Progress Note    S: Patient seen and examined. MRI done, c/f infarcts. CTH neg for hemorrhage   on contact     Medication:    MEDICATIONS  (STANDING):  aspirin  chewable 81 milliGRAM(s) Oral daily  atorvastatin 80 milliGRAM(s) Oral at bedtime  clopidogrel Tablet 75 milliGRAM(s) Oral daily  dextrose 5%. 1000 milliLiter(s) (100 mL/Hr) IV Continuous <Continuous>  dextrose 50% Injectable 25 Gram(s) IV Push once  dextrose 50% Injectable 12.5 Gram(s) IV Push once  dextrose 50% Injectable 25 Gram(s) IV Push once  dextrose Oral Gel 15 Gram(s) Oral once  glucagon  Injectable 1 milliGRAM(s) IntraMuscular once  insulin glargine Injectable (LANTUS) 10 Unit(s) SubCutaneous every morning  insulin lispro (ADMELOG) corrective regimen sliding scale   SubCutaneous every 6 hours  pantoprazole   Suspension 40 milliGRAM(s) Oral two times a day  tamsulosin 0.4 milliGRAM(s) Oral at bedtime    MEDICATIONS  (PRN):  acetaminophen    Suspension .. 650 milliGRAM(s) Enteral Tube every 6 hours PRN Temp greater or equal to 38C (100.4F)    Vitals:        Vital Signs Last 24 Hrs  T(C): 37.3 (02-05-23 @ 06:44), Max: 37.3 (02-05-23 @ 06:44)  T(F): 99.2 (02-05-23 @ 06:44), Max: 99.2 (02-05-23 @ 06:44)  HR: 97 (02-05-23 @ 06:44) (86 - 97)  BP: 146/60 (02-05-23 @ 06:44) (118/62 - 146/60)  BP(mean): --  RR: 17 (02-05-23 @ 06:44) (17 - 18)  SpO2: 97% (02-05-23 @ 06:44) (97% - 100%)              General Exam:   General Appearance: Appropriately dressed and in no acute distress       Head: Normocephalic, atraumatic and no dysmorphic features  Ear, Nose, and Throat: Moist mucous membranes  CVS: S1S2+  Resp: No SOB, no wheeze or rhonchi  Abd: soft NTND  Extremities: No edema, no cyanosis  Skin: No bruises, no rashes        NEUROLOGICAL EXAM:    Mental status: Eyes open, awake, alert, attempts to produce speech, able to follow some simple commands, able to mimic at times , unable to ID objects  Cranial Nerves: Right facial droop, no nystagmus, blinks to threat B/L  Motor exam: Normal tone, no drift, Right hemiparesis RUE drift, 3-44/5, RLE drift, 3-4/5,, LUE 5/5, LLE 5/5  Sensation: Intact to light touch   Coordination/ Gait: Unable to participate with exam     I personally reviewed the below data/images/labs:  no new labs   CBC Full  -  ( 04 Feb 2023 07:50 )  WBC Count : 7.11 K/uL  RBC Count : 3.18 M/uL  Hemoglobin : 8.4 g/dL  Hematocrit : 27.8 %  Platelet Count - Automated : 405 K/uL  Mean Cell Volume : 87.4 fl  Mean Cell Hemoglobin : 26.4 pg  Mean Cell Hemoglobin Concentration : 30.2 gm/dL  Auto Neutrophil # : x  Auto Lymphocyte # : x  Auto Monocyte # : x  Auto Eosinophil # : x  Auto Basophil # : x  Auto Neutrophil % : x  Auto Lymphocyte % : x  Auto Monocyte % : x  Auto Eosinophil % : x  Auto Basophil % : x    02-04    135  |  102  |  9   ----------------------------<  219<H>  3.4<L>   |  24  |  0.50    Ca    7.7<L>      04 Feb 2023 07:50          `< from: CT Head No Cont (01.19.23 @ 13:45) >    ACC: [id number] EXAM:  CT BRAIN   ORDERED BY: CORINNE MADERA     PROCEDURE DATE:  01/19/2023           INTERPRETATION:  CLINICAL STATEMENT: Altered mental status    TECHNIQUE: CT of the head was performed without IV contrast.  RAPID   artificial intelligence was utilized for the preliminary evaluation of   intracranial hemorrhage.    COMPARISON: MRI brain 1/7/2023.    FINDINGS:  There is mild diffuse parenchymal volume loss. There are areas of low   attenuation in the periventricular white matter likely related to mild   chronic microvascular ischemic changes.    Evolving small infarcts noted in the left corona radiata.   Age-indeterminate infarct also noted in the left temporal and parietal   lobe    There is no acute intracranial hemorrhage, parenchymal mass, mass effect   or midline shift.. There is no hydrocephalus. Partial empty sella noted    The cranium is intact. Calcification noted adjacent to left frontal   artery table. The visualized paranasal sinuses are well-aerated.        IMPRESSION:  No acute intracranial hemorrhage.    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    --- End of Report ---        < from: MR Head No Cont (01.26.23 @ 22:08) >    ACC: [id number] EXAM:  MR BRAIN   ORDERED BY: POLLO LYON     PROCEDURE DATE:  01/26/2023          INTERPRETATION:  CLINICAL STATEMENT: Multiple left MCA territory   infarcts. Altered mental status.    TECHNIQUE: Multiplanar multisequence noncontrast MRI of the brain was   performed. Diffusion weighted imaging was performed. Significant image   degradation by motion artifact.  COMPARISON: MRI brain 1/7/2023.    FINDINGS:    Corpus callosum, pituitary and pineal regions appear unremarkable. No  tonsillar herniation or evidence of marrow replacement process.   Hyperostosis frontalis. Degenerative changes visualized cervical spine.    CP angle and IAC regions appear within normal limits, as do the globes,   intraconal regions and major intracranial flow-voids. No paranasal sinus   air-fluid levels or opacification is evident.    New curvilinear-serpiginous susceptibility associated with the high left   frontal lobe. No evidence of acute hemorrhage. Slightly less pronounced   confluent restricted diffusion left lateral temporal lobe. Multiple less   pronounced foci of restricted diffusion with T2-FLAIR prolongation   centered in the left corona radiata, with extension into the ipsilateral   centrum semiovale. Several additional smaller less pronounced foci of   restricted diffusion left parieto-occipital and left posterior temporal   juxtacortical white matter as well as cortically based serpiginous   restricted diffusion left posterior temporal lobe.    IMPRESSION:    Compared with MRI Brain 1/7/2023.    1. Multiple evolving LEFT-SIDED subacute infarcts, as above.  2. Findings suspicious for high LEFT frontal subarachnoid hemorrhage.   Recommend correlation with noncontrast CT of the brain.  3. Additional findings above.    Findings and recommendations discussed in detail with PADMINI Lyon at the   time of this interpretation.    --- En   < from: CT Head No Cont (01.27.23 @ 19:09) >    ACC: [id number] EXAM:  CT BRAIN   ORDERED BY: POLLO LYON     PROCEDURE DATE:  01/27/2023          INTERPRETATION:  CLINICAL INFORMATION: Subacute infarcts with possible   new subarachnoid hemorrhage on MRI.    TECHNIQUE:  Axial CT images were acquired through the head.  Intravenous contrast: None  Two-dimensional reformats were generated.    COMPARISON STUDY: Brain MRIs from 1/7/2023 and 1/26/2023.    FINDINGS:  There are evolving subacute infarcts in the left temporal lobe anteriorly   and within the left corona radiata.  There is no CT evidence of acute   intracranial hemorrhage, mass effect, midline shift or hydrocephalus.    There is mild generalized cerebral volume loss.    The paranasal sinuses are grossly clear.    The mastoids are underpneumatized bilaterally; no clinically significant   mastoid or middle ear effusion is visualized.    The patient is status post right-sided intraocular lens replacement.    The calvarium and skull base appear within normal limits..    IMPRESSION:  Evolving subacute infarcts in the left temporal lobe anteriorly and   within the left corona radiata.  No evidence of acute intracranial   hemorrhage.  No subarachnoid hemorrhage in the left frontal region as   questioned on prior MRI.    --- Endof Report ---    < from: CT Head No Cont (01.30.23 @ 23:02) >    ACC: [id number] EXAM:  CT BRAIN   ORDERED BY: ESAU OAKLEY     PROCEDURE DATE:  01/30/2023          INTERPRETATION:  Noncontrast CT of the brain.    CLINICAL INDICATION:  Altered mental status. Acute left MCA territory   infarcts.    TECHNIQUE : Axial CT scanning of the brain was obtained from the skull   base to the vertex without the administration of intravenous contrast.   Sagittal and coronal reformats were provided.    COMPARISON: MRI brain 1/26/2023. CT brain 1/27/2023.    FINDINGS:    Redemonstration of scattered acute left MCA territory infarcts. No CT   evidence for hemorrhagic transformation.    No hydrocephalus, midline shift, or effacement of basal cisterns.    No acute intracranial hemorrhage.    The visualized paranasal sinuses and mastoid air cells are clear.    IMPRESSION:    No significant interval change.    Redemonstration of scattered acute left MCA territory infarcts. No CT   evidence for hemorrhagic transformation.      --- End of Report ---            CHELSIE RYAN MD; Attending Radiologist  This document has been electronically signed. Jan 31 2023  9:12AM    < end of copied text >

## 2023-02-05 NOTE — PROGRESS NOTE ADULT - SUBJECTIVE AND OBJECTIVE BOX
[facility] [place] [facility]   MD PUSHPA WICK MD KRISTINE SOLTANPOUR, DO ANGELA WONG, PA    TEL:  OFFICE: [phone]  From 5pm-7am Answering Service [phone]    -- RENAL FOLLOW UP NOTE ---Date of Service 02-05-23 @ 17:42    Patient is a 78y old  Female who presents with a chief complaint of AMS, elevated finger sticks (05 Feb 2023 09:40)      Patient seen and examined at bedside. No chest pain/sob    VITALS:  T(F): 98.2 (02-05-23 @ 12:42), Max: 99.2 (02-05-23 @ 06:44)  HR: 89 (02-05-23 @ 12:42)  BP: 127/62 (02-05-23 @ 12:42)  RR: 17 (02-05-23 @ 12:42)  SpO2: 99% (02-05-23 @ 12:42)  Wt(kg): --    02-04 @ 07:01  -  02-05 @ 07:00  --------------------------------------------------------  IN: 1100 mL / OUT: 2050 mL / NET: -950 mL    02-05 @ 07:01  -  02-05 @ 17:42  --------------------------------------------------------  IN: 240 mL / OUT: 200 mL / NET: 40 mL          PHYSICAL EXAM:  General: NAD  Neck: No JVD  Respiratory: CTAB, no wheezes, rales or rhonchi  Cardiovascular: S1, S2, RRR  Gastrointestinal: BS+, soft, NT/ND  Extremities: No peripheral edema    Hospital Medications:   MEDICATIONS  (STANDING):  aspirin  chewable 81 milliGRAM(s) Oral daily  atorvastatin 80 milliGRAM(s) Oral at bedtime  clopidogrel Tablet 75 milliGRAM(s) Oral daily  dextrose 5%. 1000 milliLiter(s) (100 mL/Hr) IV Continuous <Continuous>  dextrose 50% Injectable 25 Gram(s) IV Push once  dextrose 50% Injectable 12.5 Gram(s) IV Push once  dextrose 50% Injectable 25 Gram(s) IV Push once  dextrose Oral Gel 15 Gram(s) Oral once  glucagon  Injectable 1 milliGRAM(s) IntraMuscular once  insulin lispro (ADMELOG) corrective regimen sliding scale   SubCutaneous every 6 hours  pantoprazole   Suspension 40 milliGRAM(s) Oral two times a day  tamsulosin 0.4 milliGRAM(s) Oral at bedtime      LABS:  02-04    135  |  102  |  9   ----------------------------<  219<H>  3.4<L>   |  24  |  0.50    Ca    7.7<L>      04 Feb 2023 07:50      Creatinine Trend: 0.50 <--, 0.54 <--, 0.56 <--, 0.67 <--                                8.4    7.11  )-----------( 405      ( 04 Feb 2023 07:50 )             27.8     Urine Studies:  Urinalysis - [02-04-23 @ 18:44]      Color DARK BROWN / Appearance Turbid / SG 1.009 / pH 7.0      Gluc Negative / Ketone Negative  / Bili Negative / Urobili Negative       Blood Large / Protein 100 mg/dL / Leuk Est Large / Nitrite Negative      RBC >50 / WBC >50 / Hyaline 2700 / Gran  / Sq Epi  / Non Sq Epi 6 / Bacteria Negative      Iron 17, TIBC 187, %sat 9      [01-25-23 @ 07:18]  Ferritin 172      [01-25-23 @ 07:18]  Lipid: chol 238, TG 80, HDL 46, LDL --      [01-08-23 @ 02:47]        RADIOLOGY & ADDITIONAL STUDIES:   [facility] [place] [facility]   MD PUSHPA WICK MD KRISTINE SOLTANPOUR, DO ANGELA WONG, PA    TEL:  OFFICE: [phone]  From 5pm-7am Answering Service [phone]    -- RENAL FOLLOW UP NOTE ---Date of Service 02-05-23 @ 17:42    Patient is a 78y old  Female who presents with a chief complaint of AMS, elevated finger sticks (05 Feb 2023 09:40)      Patient seen and examined at bedside. No chest pain/sob    VITALS:  T(F): 98.2 (02-05-23 @ 12:42), Max: 99.2 (02-05-23 @ 06:44)  HR: 89 (02-05-23 @ 12:42)  BP: 127/62 (02-05-23 @ 12:42)  RR: 17 (02-05-23 @ 12:42)  SpO2: 99% (02-05-23 @ 12:42)  Wt(kg): --    02-04 @ 07:01  -  02-05 @ 07:00  --------------------------------------------------------  IN: 1100 mL / OUT: 2050 mL / NET: -950 mL    02-05 @ 07:01  -  02-05 @ 17:42  --------------------------------------------------------  IN: 240 mL / OUT: 200 mL / NET: 40 mL          PHYSICAL EXAM:  General: NAD  Neck: No JVD  Respiratory: CTAB, no wheezes, rales or rhonchi  Cardiovascular: S1, S2, RRR  Gastrointestinal: BS+, soft, NT/ND  Extremities: No peripheral edema    Hospital Medications:   MEDICATIONS  (STANDING):  aspirin  chewable 81 milliGRAM(s) Oral daily  atorvastatin 80 milliGRAM(s) Oral at bedtime  clopidogrel Tablet 75 milliGRAM(s) Oral daily  dextrose 5%. 1000 milliLiter(s) (100 mL/Hr) IV Continuous <Continuous>  dextrose 50% Injectable 25 Gram(s) IV Push once  dextrose 50% Injectable 12.5 Gram(s) IV Push once  dextrose 50% Injectable 25 Gram(s) IV Push once  dextrose Oral Gel 15 Gram(s) Oral once  glucagon  Injectable 1 milliGRAM(s) IntraMuscular once  insulin lispro (ADMELOG) corrective regimen sliding scale   SubCutaneous every 6 hours  pantoprazole   Suspension 40 milliGRAM(s) Oral two times a day  tamsulosin 0.4 milliGRAM(s) Oral at bedtime      LABS:  02-04    135  |  102  |  9   ----------------------------<  219<H>  3.4<L>   |  24  |  0.50    Ca    7.7<L>      04 Feb 2023 07:50      Creatinine Trend: 0.50 <--, 0.54 <--, 0.56 <--, 0.67 <--                                8.4    7.11  )-----------( 405      ( 04 Feb 2023 07:50 )             27.8     Urine Studies:  Urinalysis - [02-04-23 @ 18:44]      Color DARK BROWN / Appearance Turbid / SG 1.009 / pH 7.0      Gluc Negative / Ketone Negative  / Bili Negative / Urobili Negative       Blood Large / Protein 100 mg/dL / Leuk Est Large / Nitrite Negative      RBC >50 / WBC >50 / Hyaline 2700 / Gran  / Sq Epi  / Non Sq Epi 6 / Bacteria Negative      Iron 17, TIBC 187, %sat 9      [01-25-23 @ 07:18]  Ferritin 172      [01-25-23 @ 07:18]  Lipid: chol 238, TG 80, HDL 46, LDL --      [01-08-23 @ 02:47]        RADIOLOGY & ADDITIONAL STUDIES:   [facility] [place] [facility]   MD PUSHPA WICK MD KRISTINE SOLTANPOUR, DO ANGELA WONG, PA    TEL:  OFFICE: [phone]  From 5pm-7am Answering Service [phone]    -- RENAL FOLLOW UP NOTE ---Date of Service 02-05-23 @ 17:42    Patient is a 78y old  Female who presents with a chief complaint of AMS, elevated finger sticks (05 Feb 2023 09:40)      Patient seen and examined at bedside. No chest pain/sob    VITALS:  T(F): 98.2 (02-05-23 @ 12:42), Max: 99.2 (02-05-23 @ 06:44)  HR: 89 (02-05-23 @ 12:42)  BP: 127/62 (02-05-23 @ 12:42)  RR: 17 (02-05-23 @ 12:42)  SpO2: 99% (02-05-23 @ 12:42)  Wt(kg): --    02-04 @ 07:01  -  02-05 @ 07:00  --------------------------------------------------------  IN: 1100 mL / OUT: 2050 mL / NET: -950 mL    02-05 @ 07:01  -  02-05 @ 17:42  --------------------------------------------------------  IN: 240 mL / OUT: 200 mL / NET: 40 mL          PHYSICAL EXAM:  General: NAD  Neck: No JVD  Respiratory: CTAB, no wheezes, rales or rhonchi  Cardiovascular: S1, S2, RRR  Gastrointestinal: BS+, soft, NT/ND  Extremities: No peripheral edema    Hospital Medications:   MEDICATIONS  (STANDING):  aspirin  chewable 81 milliGRAM(s) Oral daily  atorvastatin 80 milliGRAM(s) Oral at bedtime  clopidogrel Tablet 75 milliGRAM(s) Oral daily  dextrose 5%. 1000 milliLiter(s) (100 mL/Hr) IV Continuous <Continuous>  dextrose 50% Injectable 25 Gram(s) IV Push once  dextrose 50% Injectable 12.5 Gram(s) IV Push once  dextrose 50% Injectable 25 Gram(s) IV Push once  dextrose Oral Gel 15 Gram(s) Oral once  glucagon  Injectable 1 milliGRAM(s) IntraMuscular once  insulin lispro (ADMELOG) corrective regimen sliding scale   SubCutaneous every 6 hours  pantoprazole   Suspension 40 milliGRAM(s) Oral two times a day  tamsulosin 0.4 milliGRAM(s) Oral at bedtime      LABS:  02-04    135  |  102  |  9   ----------------------------<  219<H>  3.4<L>   |  24  |  0.50    Ca    7.7<L>      04 Feb 2023 07:50      Creatinine Trend: 0.50 <--, 0.54 <--, 0.56 <--, 0.67 <--                                8.4    7.11  )-----------( 405      ( 04 Feb 2023 07:50 )             27.8     Urine Studies:  Urinalysis - [02-04-23 @ 18:44]      Color DARK BROWN / Appearance Turbid / SG 1.009 / pH 7.0      Gluc Negative / Ketone Negative  / Bili Negative / Urobili Negative       Blood Large / Protein 100 mg/dL / Leuk Est Large / Nitrite Negative      RBC >50 / WBC >50 / Hyaline 2700 / Gran  / Sq Epi  / Non Sq Epi 6 / Bacteria Negative      Iron 17, TIBC 187, %sat 9      [01-25-23 @ 07:18]  Ferritin 172      [01-25-23 @ 07:18]  Lipid: chol 238, TG 80, HDL 46, LDL --      [01-08-23 @ 02:47]        RADIOLOGY & ADDITIONAL STUDIES:

## 2023-02-05 NOTE — CHART NOTE - NSCHARTNOTEFT_GEN_A_CORE
Diabetes Follow up note: non-billable visit    Chief complaint: T2DM    Interval Hx: BG values 150-low 200s over past 24 hours. DC planning for ALBA.       MEDS:  atorvastatin 80 milliGRAM(s) Oral at bedtime    insulin lispro (ADMELOG) corrective regimen sliding scale   SubCutaneous every 6 hours      Allergies    Benedryl Allergy Sinus (Hives)  penicillin (Rash)        Vital Signs Last 24 Hrs  T(C): 36.8 (05 Feb 2023 12:42), Max: 37.3 (05 Feb 2023 06:44)  T(F): 98.2 (05 Feb 2023 12:42), Max: 99.2 (05 Feb 2023 06:44)  HR: 89 (05 Feb 2023 12:42) (86 - 97)  BP: 127/62 (05 Feb 2023 12:42) (118/62 - 146/60)  BP(mean): --  RR: 17 (05 Feb 2023 12:42) (17 - 17)  SpO2: 99% (05 Feb 2023 12:42) (97% - 100%)    Parameters below as of 05 Feb 2023 12:42  Patient On (Oxygen Delivery Method): room air        LABS:  POCT Blood Glucose.: 206 mg/dL (02-05-23 @ 12:32)  POCT Blood Glucose.: 155 mg/dL (02-05-23 @ 08:10)  POCT Blood Glucose.: 177 mg/dL (02-05-23 @ 06:32)  POCT Blood Glucose.: 179 mg/dL (02-05-23 @ 00:33)  POCT Blood Glucose.: 159 mg/dL (02-04-23 @ 18:02)  POCT Blood Glucose.: 193 mg/dL (02-04-23 @ 12:26)  POCT Blood Glucose.: 192 mg/dL (02-04-23 @ 10:09)  POCT Blood Glucose.: 223 mg/dL (02-04-23 @ 06:00)  POCT Blood Glucose.: 196 mg/dL (02-04-23 @ 00:49)  POCT Blood Glucose.: 177 mg/dL (02-03-23 @ 17:43)  POCT Blood Glucose.: 203 mg/dL (02-03-23 @ 11:34)  POCT Blood Glucose.: 208 mg/dL (02-03-23 @ 05:59)  POCT Blood Glucose.: 212 mg/dL (02-03-23 @ 00:08)  POCT Blood Glucose.: 154 mg/dL (02-02-23 @ 17:38)                            8.4    7.11  )-----------( 405      ( 04 Feb 2023 07:50 )             27.8       02-04    135  |  102  |  9   ----------------------------<  219<H>  3.4<L>   |  24  |  0.50    eGFR: 95    Ca    7.7<L>      02-04      A1C with Estimated Average Glucose Result: 9.4 % (01-19-23 @ 13:11)  A1C with Estimated Average Glucose Result: 8.6 % (01-07-23 @ 05:40)        T2DM/plan:  -test BG Q6h  - Increase Lantus 12 units QAM.   - c/w Admelog moderate correction scale Q6h  - Please notify endocrine team of any change to TF regimen.   - auto-immune DM antibodies negative to r/o RAJ.   For d/c:   final recs TBD based on feeding modality/insulin requirements  pt remains on 24H TF via peg, at discharge likely will need lantus & scale if remains on 24H TF   -Patient can follow up at -[address] [place] [address], [address], [place] [address] [phone]  -Patient will need opthalmology and podiatry follow up as outpatient      Can be reached via TEAMS/pager: [phone]   office:  [phone] (M-F 9a-5pm)               [phone] (nights/weekends)   Can access [facility] coverage via sunrise/tools. Diabetes Follow up note: non-billable visit    Chief complaint: T2DM    Interval Hx: BG values 150-low 200s over past 24 hours. DC planning for ALBA.       MEDS:  atorvastatin 80 milliGRAM(s) Oral at bedtime    insulin lispro (ADMELOG) corrective regimen sliding scale   SubCutaneous every 6 hours      Allergies    Benedryl Allergy Sinus (Hives)  penicillin (Rash)        Vital Signs Last 24 Hrs  T(C): 36.8 (05 Feb 2023 12:42), Max: 37.3 (05 Feb 2023 06:44)  T(F): 98.2 (05 Feb 2023 12:42), Max: 99.2 (05 Feb 2023 06:44)  HR: 89 (05 Feb 2023 12:42) (86 - 97)  BP: 127/62 (05 Feb 2023 12:42) (118/62 - 146/60)  BP(mean): --  RR: 17 (05 Feb 2023 12:42) (17 - 17)  SpO2: 99% (05 Feb 2023 12:42) (97% - 100%)    Parameters below as of 05 Feb 2023 12:42  Patient On (Oxygen Delivery Method): room air        LABS:  POCT Blood Glucose.: 206 mg/dL (02-05-23 @ 12:32)  POCT Blood Glucose.: 155 mg/dL (02-05-23 @ 08:10)  POCT Blood Glucose.: 177 mg/dL (02-05-23 @ 06:32)  POCT Blood Glucose.: 179 mg/dL (02-05-23 @ 00:33)  POCT Blood Glucose.: 159 mg/dL (02-04-23 @ 18:02)  POCT Blood Glucose.: 193 mg/dL (02-04-23 @ 12:26)  POCT Blood Glucose.: 192 mg/dL (02-04-23 @ 10:09)  POCT Blood Glucose.: 223 mg/dL (02-04-23 @ 06:00)  POCT Blood Glucose.: 196 mg/dL (02-04-23 @ 00:49)  POCT Blood Glucose.: 177 mg/dL (02-03-23 @ 17:43)  POCT Blood Glucose.: 203 mg/dL (02-03-23 @ 11:34)  POCT Blood Glucose.: 208 mg/dL (02-03-23 @ 05:59)  POCT Blood Glucose.: 212 mg/dL (02-03-23 @ 00:08)  POCT Blood Glucose.: 154 mg/dL (02-02-23 @ 17:38)                            8.4    7.11  )-----------( 405      ( 04 Feb 2023 07:50 )             27.8       02-04    135  |  102  |  9   ----------------------------<  219<H>  3.4<L>   |  24  |  0.50    eGFR: 95    Ca    7.7<L>      02-04      A1C with Estimated Average Glucose Result: 9.4 % (01-19-23 @ 13:11)  A1C with Estimated Average Glucose Result: 8.6 % (01-07-23 @ 05:40)        T2DM/plan:  -test BG Q6h  - Increase Lantus 12 units QAM.   - c/w Admelog moderate correction scale Q6h  - Please notify endocrine team of any change to TF regimen.   - auto-immune DM antibodies negative to r/o RAJ.   For d/c:   final recs TBD based on feeding modality/insulin requirements  pt remains on 24H TF via peg, at discharge likely will need lantus & scale if remains on 24H TF   -Patient can follow up at -[address] [place] [address], [address], [place] [address] [phone]  -Patient will need opthalmology and podiatry follow up as outpatient      Can be reached via TEAMS/pager: [phone]   office:  [phone] (M-F 9a-5pm)               [phone] (nights/weekends)   Can access [facility] coverage via sunrise/tools. Diabetes Follow up note: non-billable visit    Chief complaint: T2DM    Interval Hx: BG values 150-low 200s over past 24 hours. DC planning for ALBA.       MEDS:  atorvastatin 80 milliGRAM(s) Oral at bedtime    insulin lispro (ADMELOG) corrective regimen sliding scale   SubCutaneous every 6 hours      Allergies    Benedryl Allergy Sinus (Hives)  penicillin (Rash)        Vital Signs Last 24 Hrs  T(C): 36.8 (05 Feb 2023 12:42), Max: 37.3 (05 Feb 2023 06:44)  T(F): 98.2 (05 Feb 2023 12:42), Max: 99.2 (05 Feb 2023 06:44)  HR: 89 (05 Feb 2023 12:42) (86 - 97)  BP: 127/62 (05 Feb 2023 12:42) (118/62 - 146/60)  BP(mean): --  RR: 17 (05 Feb 2023 12:42) (17 - 17)  SpO2: 99% (05 Feb 2023 12:42) (97% - 100%)    Parameters below as of 05 Feb 2023 12:42  Patient On (Oxygen Delivery Method): room air        LABS:  POCT Blood Glucose.: 206 mg/dL (02-05-23 @ 12:32)  POCT Blood Glucose.: 155 mg/dL (02-05-23 @ 08:10)  POCT Blood Glucose.: 177 mg/dL (02-05-23 @ 06:32)  POCT Blood Glucose.: 179 mg/dL (02-05-23 @ 00:33)  POCT Blood Glucose.: 159 mg/dL (02-04-23 @ 18:02)  POCT Blood Glucose.: 193 mg/dL (02-04-23 @ 12:26)  POCT Blood Glucose.: 192 mg/dL (02-04-23 @ 10:09)  POCT Blood Glucose.: 223 mg/dL (02-04-23 @ 06:00)  POCT Blood Glucose.: 196 mg/dL (02-04-23 @ 00:49)  POCT Blood Glucose.: 177 mg/dL (02-03-23 @ 17:43)  POCT Blood Glucose.: 203 mg/dL (02-03-23 @ 11:34)  POCT Blood Glucose.: 208 mg/dL (02-03-23 @ 05:59)  POCT Blood Glucose.: 212 mg/dL (02-03-23 @ 00:08)  POCT Blood Glucose.: 154 mg/dL (02-02-23 @ 17:38)                            8.4    7.11  )-----------( 405      ( 04 Feb 2023 07:50 )             27.8       02-04    135  |  102  |  9   ----------------------------<  219<H>  3.4<L>   |  24  |  0.50    eGFR: 95    Ca    7.7<L>      02-04      A1C with Estimated Average Glucose Result: 9.4 % (01-19-23 @ 13:11)  A1C with Estimated Average Glucose Result: 8.6 % (01-07-23 @ 05:40)        T2DM/plan:  -test BG Q6h  - Increase Lantus 12 units QAM.   - c/w Admelog moderate correction scale Q6h  - Please notify endocrine team of any change to TF regimen.   - auto-immune DM antibodies negative to r/o RAJ.   For d/c:   final recs TBD based on feeding modality/insulin requirements  pt remains on 24H TF via peg, at discharge likely will need lantus & scale if remains on 24H TF   -Patient can follow up at -[address] [place] [address], [address], [place] [address] [phone]  -Patient will need opthalmology and podiatry follow up as outpatient      Can be reached via TEAMS/pager: [phone]   office:  [phone] (M-F 9a-5pm)               [phone] (nights/weekends)   Can access [facility] coverage via sunrise/tools.

## 2023-02-05 NOTE — PROGRESS NOTE ADULT - ASSESSMENT
1. Dysphagia, post stroke  - s/p EGD with PEG placement   - EGD: Gastritis. Duodenal ulcers. PPI BID   - tolerating tube feeds via PEG    2. Hx of stroke     3. DM     4. anemia of chronic disease   - monitor H&H, transfuse PRN          Attending supervision statement: I have personally seen and examined the patient. I fully participated in the care of this patient. I have made amendments to the documentation where necessary, and agree with the history, physical exam, and plan as outlined by the ACP.    Formerly named [facility]   Gastroenterology and Hepatology  Office: [phone]   1. Dysphagia, post stroke  - s/p EGD with PEG placement   - EGD: Gastritis. Duodenal ulcers. PPI BID   - tolerating tube feeds via PEG    2. Hx of stroke     3. DM     4. anemia of chronic disease   - monitor H&H, transfuse PRN          Attending supervision statement: I have personally seen and examined the patient. I fully participated in the care of this patient. I have made amendments to the documentation where necessary, and agree with the history, physical exam, and plan as outlined by the ACP.    [facility]   Gastroenterology and Hepatology  Office: [phone]   1. Dysphagia, post stroke  - s/p EGD with PEG placement   - EGD: Gastritis. Duodenal ulcers. PPI BID   - tolerating tube feeds via PEG    2. Hx of stroke     3. DM     4. anemia of chronic disease   - monitor H&H, transfuse PRN          Attending supervision statement: I have personally seen and examined the patient. I fully participated in the care of this patient. I have made amendments to the documentation where necessary, and agree with the history, physical exam, and plan as outlined by the ACP.    [facility]   Gastroenterology and Hepatology  Office: [phone]

## 2023-02-05 NOTE — PROGRESS NOTE ADULT - SUBJECTIVE AND OBJECTIVE BOX
Chief Complaint:  Patient is a 78y old  Female who presents with a chief complaint of AMS, elevated finger sticks (03 Feb 2023 11:36)          Interval Events:   Patient seen and examined.   Tolerating tube feeds via PEG.     Hospital Medications:  acetaminophen    Suspension .. 650 milliGRAM(s) Enteral Tube every 6 hours PRN  aspirin  chewable 81 milliGRAM(s) Oral daily  atorvastatin 80 milliGRAM(s) Oral at bedtime  clopidogrel Tablet 75 milliGRAM(s) Oral daily  dextrose 5% + sodium chloride 0.9%. 1000 milliLiter(s) IV Continuous <Continuous>  dextrose 5%. 1000 milliLiter(s) IV Continuous <Continuous>  dextrose 50% Injectable 25 Gram(s) IV Push once  dextrose 50% Injectable 12.5 Gram(s) IV Push once  dextrose 50% Injectable 25 Gram(s) IV Push once  dextrose Oral Gel 15 Gram(s) Oral once  glucagon  Injectable 1 milliGRAM(s) IntraMuscular once  insulin lispro (ADMELOG) corrective regimen sliding scale   SubCutaneous every 6 hours  pantoprazole   Suspension 40 milliGRAM(s) Oral two times a day  tamsulosin 0.4 milliGRAM(s) Oral at bedtime        Review of Systems:  unable to obtain    PHYSICAL EXAM:   Vital Signs:  Vital Signs Last 24 Hrs  T(C): 36.9 (03 Feb 2023 04:35), Max: 36.9 (03 Feb 2023 04:35)  T(F): 98.5 (03 Feb 2023 04:35), Max: 98.5 (03 Feb 2023 04:35)  HR: 98 (03 Feb 2023 04:35) (91 - 98)  BP: 127/68 (03 Feb 2023 04:35) (115/65 - 127/68)  BP(mean): --  RR: 18 (03 Feb 2023 04:35) (18 - 18)  SpO2: 98% (03 Feb 2023 04:35) (97% - 98%)    Parameters below as of 03 Feb 2023 04:35  Patient On (Oxygen Delivery Method): room air      Daily     Daily       PHYSICAL EXAM:     GENERAL:  Appears stated age, well-groomed, well-nourished, no distress  HEENT:  NC/AT,  conjunctivae anicteric, clear and pink,   NECK: supple, trachea midline  CHEST:  Full & symmetric excursion, no increased effort, breath sounds clear  HEART:  Regular rhythm, no JVD  ABDOMEN:  Soft, non-tender, non-distended, normoactive bowel sounds,  no masses , no hepatosplenomegaly, +gastrostomy (c,d,i)  EXTREMITIES:  no cyanosis,clubbing or edema  SKIN:  No rash, erythema, or, ecchymoses, no jaundice  NEURO:   non-focal, no asterixis  RECTAL: Deferred      LABS Personally reviewed by me:                        8.7    5.92  )-----------( 384      ( 03 Feb 2023 06:43 )             28.0     Mean Cell Volume: 85.6 fl (02-03-23 @ 06:43)    02-02    136  |  104  |  6<L>  ----------------------------<  172<H>  3.6   |  25  |  0.54    Ca    7.9<L>      02 Feb 2023 06:51  Phos  2.5     02-02                                    8.7    5.92  )-----------( 384      ( 03 Feb 2023 06:43 )             28.0                         8.7    5.32  )-----------( 394      ( 02 Feb 2023 06:51 )             29.2                         8.8    5.86  )-----------( 414      ( 01 Feb 2023 22:10 )             27.8       Imaging personally reviewed by me:

## 2023-02-05 NOTE — PROGRESS NOTE ADULT - ASSESSMENT
78F with dementia, T2DM, recently discharged about 1 week ago for CVA now presenting with poor PO intake, lethargy and hyperglycemia to 500s.   Of note, patient admitted 1/6 for R facial droop, word finding difficulties. Imaging concerning for acute L MCA infarct. Discharged home on 1/11. Majority of history obtained through family given mental status. Per family, since stroke patient nonverbal/unable to participate in meaningful communications, intermittently follows commands. For the last 3-4 days, patient with poor po intake. Reportedly only eating breakfast. Day of presentation, more lethargic with elevated finger sticks up to the 500s. Patient was evaluated by endocrine team during prior admission with recs to increase metformin to 1000mg BID. However, given poor PO intake, family has been giving 500mg daily.     In ED, afebrile, , 135/84. WBC 15, Na 158, K 3.0, Cl 120, Glu 468, alk phos 136m BHB 0.7. pH 7.36, lactate 2.2. UA: +leuk esterase, many bacteria, WBC 11-25  CXR: Redemonstrated biapical scarring and left upper lobe bronchiectasis, unchanged. No focal consolidation.   CTH No acute intracranial hemorrhage. 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  Given 1.5L NS, ceftriaxone x1, haldol 2.5mg x1 in ED.     A/P    Hypernatremia/ Hyponatremia   free water flush on hold    improved   optimize glucose   Avoid overcorrection >6-8meq a day   monitor Na closely     Hypocalcemia   2/2 low albumin  corrected Ca WNL   optimize albumin   monitor     Hypokalemia  Replete as needed.   monitor K     Hypophosphatemia  Supplement as needed  Daily phosphorus     HTN  optimal   monitor BP closely     Proteinuria  Currently has UTI would repeat and then quantify

## 2023-02-05 NOTE — PROGRESS NOTE ADULT - SUBJECTIVE AND OBJECTIVE BOX
Name of Patient : SARAH DISLA  MRN: [id number]  Date of visit: 23     Subjective: Patient seen and examined. No new events except as noted.       REVIEW OF SYSTEMS:    limited     MEDICATIONS:  MEDICATIONS  (STANDING):  acetaminophen    Suspension .. 650 milliGRAM(s) Oral once  aspirin  chewable 81 milliGRAM(s) Oral daily  atorvastatin 80 milliGRAM(s) Oral at bedtime  clopidogrel Tablet 75 milliGRAM(s) Oral daily  dextrose 5%. 1000 milliLiter(s) (100 mL/Hr) IV Continuous <Continuous>  dextrose 50% Injectable 25 Gram(s) IV Push once  dextrose 50% Injectable 12.5 Gram(s) IV Push once  dextrose 50% Injectable 25 Gram(s) IV Push once  dextrose Oral Gel 15 Gram(s) Oral once  glucagon  Injectable 1 milliGRAM(s) IntraMuscular once  insulin lispro (ADMELOG) corrective regimen sliding scale   SubCutaneous every 6 hours  pantoprazole   Suspension 40 milliGRAM(s) Oral two times a day  tamsulosin 0.4 milliGRAM(s) Oral at bedtime      PHYSICAL EXAM:  T(C): 36.8 (23 @ 21:18), Max: 37.3 (23 @ 06:44)  HR: 86 (23 @ 21:18) (86 - 97)  BP: 120/70 (23 @ 21:18) (120/70 - 146/60)  RR: 17 (23 @ 21:18) (17 - 17)  SpO2: 100% (23 @ 21:18) (97% - 100%)  Wt(kg): --  I&O's Summary    2023 07:01  -  2023 07:00  --------------------------------------------------------  IN: 1100 mL / OUT: 2050 mL / NET: -950 mL    2023 07:01  -  2023 22:09  --------------------------------------------------------  IN: 240 mL / OUT: 200 mL / NET: 40 mL          Appearance: calm	  HEENT:  PERRLA   Lymphatic: No lymphadenopathy   Cardiovascular: Normal S1 S2, no JVD  Respiratory: normal effort , clear  Gastrointestinal:  Soft, Non-tender  Skin: No rashes,  warm to touch  Psychiatry:  Mood & affect appropriate  Musculuskeletal: No edema    recent labs, Imaging and EKGs personally reviewed     23 @ 07:01  -  23 @ 07:00  --------------------------------------------------------  IN: 1100 mL / OUT: 2050 mL / NET: -950 mL    23 @ 07:01  -  23 @ 22:10  --------------------------------------------------------  IN: 240 mL / OUT: 200 mL / NET: 40 mL                            8.4    7.11  )-----------( 405      ( 2023 07:50 )             27.8                   135  |  102  |  9   ----------------------------<  219<H>  3.4<L>   |  24  |  0.50    Ca    7.7<L>      2023 07:50                         Urinalysis Basic - ( 2023 18:44 )    Color: DARK BROWN / Appearance: Turbid / S.009 / pH: x  Gluc: x / Ketone: Negative  / Bili: Negative / Urobili: Negative   Blood: x / Protein: 100 mg/dL / Nitrite: Negative   Leuk Esterase: Large / RBC: >50 /hpf / WBC >50 /HPF   Sq Epi: x / Non Sq Epi: 6 /hpf / Bacteria: Negative             Name of Patient : SARAH DISLA  MRN: [id number]  Date of visit: 23     Subjective: Patient seen and examined. No new events except as noted.       REVIEW OF SYSTEMS:    limited     MEDICATIONS:  MEDICATIONS  (STANDING):  acetaminophen    Suspension .. 650 milliGRAM(s) Oral once  aspirin  chewable 81 milliGRAM(s) Oral daily  atorvastatin 80 milliGRAM(s) Oral at bedtime  clopidogrel Tablet 75 milliGRAM(s) Oral daily  dextrose 5%. 1000 milliLiter(s) (100 mL/Hr) IV Continuous <Continuous>  dextrose 50% Injectable 25 Gram(s) IV Push once  dextrose 50% Injectable 12.5 Gram(s) IV Push once  dextrose 50% Injectable 25 Gram(s) IV Push once  dextrose Oral Gel 15 Gram(s) Oral once  glucagon  Injectable 1 milliGRAM(s) IntraMuscular once  insulin lispro (ADMELOG) corrective regimen sliding scale   SubCutaneous every 6 hours  pantoprazole   Suspension 40 milliGRAM(s) Oral two times a day  tamsulosin 0.4 milliGRAM(s) Oral at bedtime      PHYSICAL EXAM:  T(C): 36.8 (23 @ 21:18), Max: 37.3 (23 @ 06:44)  HR: 86 (23 @ 21:18) (86 - 97)  BP: 120/70 (23 @ 21:18) (120/70 - 146/60)  RR: 17 (23 @ 21:18) (17 - 17)  SpO2: 100% (23 @ 21:18) (97% - 100%)  Wt(kg): --  I&O's Summary    2023 07:01  -  2023 07:00  --------------------------------------------------------  IN: 1100 mL / OUT: 2050 mL / NET: -950 mL    2023 07:01  -  2023 22:09  --------------------------------------------------------  IN: 240 mL / OUT: 200 mL / NET: 40 mL          Appearance: calm	  HEENT:  PERRLA   Lymphatic: No lymphadenopathy   Cardiovascular: Normal S1 S2, no JVD  Respiratory: normal effort , clear  Gastrointestinal:  Soft, Non-tender  Skin: No rashes,  warm to touch  Psychiatry:  Mood & affect appropriate  Musculuskeletal: No edema    recent labs, Imaging and EKGs personally reviewed     23 @ 07:01  -  23 @ 07:00  --------------------------------------------------------  IN: 1100 mL / OUT: 2050 mL / NET: -950 mL    23 @ 07:01  -  23 @ 22:10  --------------------------------------------------------  IN: 240 mL / OUT: 200 mL / NET: 40 mL                            8.4    7.11  )-----------( 405      ( 2023 07:50 )             27.8                   135  |  102  |  9   ----------------------------<  219<H>  3.4<L>   |  24  |  0.50    Ca    7.7<L>      2023 07:50                         Urinalysis Basic - ( 2023 18:44 )    Color: DARK BROWN / Appearance: Turbid / S.009 / pH: x  Gluc: x / Ketone: Negative  / Bili: Negative / Urobili: Negative   Blood: x / Protein: 100 mg/dL / Nitrite: Negative   Leuk Esterase: Large / RBC: >50 /hpf / WBC >50 /HPF   Sq Epi: x / Non Sq Epi: 6 /hpf / Bacteria: Negative             Name of Patient : SARAH DISLA  MRN: [id number]  Date of visit: 23     Subjective: Patient seen and examined. No new events except as noted.       REVIEW OF SYSTEMS:    limited     MEDICATIONS:  MEDICATIONS  (STANDING):  acetaminophen    Suspension .. 650 milliGRAM(s) Oral once  aspirin  chewable 81 milliGRAM(s) Oral daily  atorvastatin 80 milliGRAM(s) Oral at bedtime  clopidogrel Tablet 75 milliGRAM(s) Oral daily  dextrose 5%. 1000 milliLiter(s) (100 mL/Hr) IV Continuous <Continuous>  dextrose 50% Injectable 25 Gram(s) IV Push once  dextrose 50% Injectable 12.5 Gram(s) IV Push once  dextrose 50% Injectable 25 Gram(s) IV Push once  dextrose Oral Gel 15 Gram(s) Oral once  glucagon  Injectable 1 milliGRAM(s) IntraMuscular once  insulin lispro (ADMELOG) corrective regimen sliding scale   SubCutaneous every 6 hours  pantoprazole   Suspension 40 milliGRAM(s) Oral two times a day  tamsulosin 0.4 milliGRAM(s) Oral at bedtime      PHYSICAL EXAM:  T(C): 36.8 (23 @ 21:18), Max: 37.3 (23 @ 06:44)  HR: 86 (23 @ 21:18) (86 - 97)  BP: 120/70 (23 @ 21:18) (120/70 - 146/60)  RR: 17 (23 @ 21:18) (17 - 17)  SpO2: 100% (23 @ 21:18) (97% - 100%)  Wt(kg): --  I&O's Summary    2023 07:01  -  2023 07:00  --------------------------------------------------------  IN: 1100 mL / OUT: 2050 mL / NET: -950 mL    2023 07:01  -  2023 22:09  --------------------------------------------------------  IN: 240 mL / OUT: 200 mL / NET: 40 mL          Appearance: calm	  HEENT:  PERRLA   Lymphatic: No lymphadenopathy   Cardiovascular: Normal S1 S2, no JVD  Respiratory: normal effort , clear  Gastrointestinal:  Soft, Non-tender  Skin: No rashes,  warm to touch  Psychiatry:  Mood & affect appropriate  Musculuskeletal: No edema    recent labs, Imaging and EKGs personally reviewed     23 @ 07:01  -  23 @ 07:00  --------------------------------------------------------  IN: 1100 mL / OUT: 2050 mL / NET: -950 mL    23 @ 07:01  -  23 @ 22:10  --------------------------------------------------------  IN: 240 mL / OUT: 200 mL / NET: 40 mL                            8.4    7.11  )-----------( 405      ( 2023 07:50 )             27.8                   135  |  102  |  9   ----------------------------<  219<H>  3.4<L>   |  24  |  0.50    Ca    7.7<L>      2023 07:50                         Urinalysis Basic - ( 2023 18:44 )    Color: DARK BROWN / Appearance: Turbid / S.009 / pH: x  Gluc: x / Ketone: Negative  / Bili: Negative / Urobili: Negative   Blood: x / Protein: 100 mg/dL / Nitrite: Negative   Leuk Esterase: Large / RBC: >50 /hpf / WBC >50 /HPF   Sq Epi: x / Non Sq Epi: 6 /hpf / Bacteria: Negative

## 2023-02-05 NOTE — PROGRESS NOTE ADULT - ASSESSMENT
78F with dementia, T2DM, recently discharged about 1 week ago for CVA now presenting with poor PO intake, lethargy and hyperglycemia to 500s.   Of note, patient admitted 1/6 for R facial droop, word finding difficulties. Imaging concerning for acute L MCA infarct.  Per family, since stroke patient nonverbal/unable to participate in meaningful communications, intermittently follows commands. For the last 3-4 days, patient with poor po intake.    In ED, afebrile, , 135/84. WBC 15, Na 158, K 3.0, Cl 120, Glu 468, alk phos 136m BHB 0.7. pH 7.36, lactate 2.2.   UA: +leuk esterase, many bacteria, WBC 11-25  CXR: Redemonstrated biapical scarring and left upper lobe bronchiectasis, unchanged. No focal consolidation.   CTH No acute intracranial hemorrhage. 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  Given 1.5L NS, ceftriaxone x1, haldol 2.5mg x1 in ED.   last admission: CTA with L MCA severe stenosis; distal L M2 occlusion .  TTE 1/8/23: EF 63% Mild mitral regurgitation ; A1c 9.4   MRI brain with evolving subacute infarcts as expected. question of SAH   CTH 1/27 evolviong subacute L hemisphere infarcts. no SAH found.   CTH 1/30 unchanged ; scaterred L MCA infarct no hemorrhage   s/p EGD/peg    Impression  1) AMS likely 2/2 infection/UTI toxic metabolic encephalopahthy   2) recent stroke - L MCA infarct 2/2 symptomatic L MCA ICAD ; worsening of R HP in setting of infection     -  f/u   - Nsx eval--> No intervention.  despite there note stating SAH, no comment to stop anti thrombotics.  nevertheless CTH read is neg for SAH.  no hemorrhage on CTH   - now on contact   - f/u endocrine   - treatment of infection per primary team -->  CTX, completed   - for secondary stroke prevention. DAPT x 3 months followeed by ASA 81mg dialy thereafter.   - hihg dose statin lipitor 40mg dialy   - avoid hypotension given L MCA ICAD   - telemetry  - PT/OT/SS/SLP, OOBC  - check FS, glucose control <180  - GI/DVT ppx  - Thank you for allowing me to participate in the care of this patient. Call with questions.    - dc plan ALBA Greco MD  Vascular Neurology  Office: [phone]  78F with dementia, T2DM, recently discharged about 1 week ago for CVA now presenting with poor PO intake, lethargy and hyperglycemia to 500s.   Of note, patient admitted 1/6 for R facial droop, word finding difficulties. Imaging concerning for acute L MCA infarct.  Per family, since stroke patient nonverbal/unable to participate in meaningful communications, intermittently follows commands. For the last 3-4 days, patient with poor po intake.    In ED, afebrile, , 135/84. WBC 15, Na 158, K 3.0, Cl 120, Glu 468, alk phos 136m BHB 0.7. pH 7.36, lactate 2.2.   UA: +leuk esterase, many bacteria, WBC 11-25  CXR: Redemonstrated biapical scarring and left upper lobe bronchiectasis, unchanged. No focal consolidation.   CTH No acute intracranial hemorrhage. 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  Given 1.5L NS, ceftriaxone x1, haldol 2.5mg x1 in ED.   last admission: CTA with L MCA severe stenosis; distal L M2 occlusion .  TTE 1/8/23: EF 63% Mild mitral regurgitation ; A1c 9.4   MRI brain with evolving subacute infarcts as expected. question of SAH   CTH 1/27 evolviong subacute L hemisphere infarcts. no SAH found.   CTH 1/30 unchanged ; scaterred L MCA infarct no hemorrhage   s/p EGD/peg    Impression  1) AMS likely 2/2 infection/UTI toxic metabolic encephalopahthy   2) recent stroke - L MCA infarct 2/2 symptomatic L MCA ICAD ; worsening of R HP in setting of infection     -  f/u   - Nsx eval--> No intervention.  despite there note stating SAH, no comment to stop anti thrombotics.  nevertheless CTH read is neg for SAH.  no hemorrhage on CTH   - now on contact   - f/u endocrine   - treatment of infection per primary team -->  CTX, completed   - for secondary stroke prevention. DAPT x 3 months followeed by ASA 81mg dialy thereafter.   - hihg dose statin lipitor 40mg dialy   - avoid hypotension given L MCA ICAD   - telemetry  - PT/OT/SS/SLP, OOBC  - check FS, glucose control <180  - GI/DVT ppx  - Thank you for allowing me to participate in the care of this patient. Call with questions.    - dc plan ALBA Greco MD  Vascular Neurology  Office: [phone]  78F with dementia, T2DM, recently discharged about 1 week ago for CVA now presenting with poor PO intake, lethargy and hyperglycemia to 500s.   Of note, patient admitted 1/6 for R facial droop, word finding difficulties. Imaging concerning for acute L MCA infarct.  Per family, since stroke patient nonverbal/unable to participate in meaningful communications, intermittently follows commands. For the last 3-4 days, patient with poor po intake.    In ED, afebrile, , 135/84. WBC 15, Na 158, K 3.0, Cl 120, Glu 468, alk phos 136m BHB 0.7. pH 7.36, lactate 2.2.   UA: +leuk esterase, many bacteria, WBC 11-25  CXR: Redemonstrated biapical scarring and left upper lobe bronchiectasis, unchanged. No focal consolidation.   CTH No acute intracranial hemorrhage. 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  Given 1.5L NS, ceftriaxone x1, haldol 2.5mg x1 in ED.   last admission: CTA with L MCA severe stenosis; distal L M2 occlusion .  TTE 1/8/23: EF 63% Mild mitral regurgitation ; A1c 9.4   MRI brain with evolving subacute infarcts as expected. question of SAH   CTH 1/27 evolviong subacute L hemisphere infarcts. no SAH found.   CTH 1/30 unchanged ; scaterred L MCA infarct no hemorrhage   s/p EGD/peg    Impression  1) AMS likely 2/2 infection/UTI toxic metabolic encephalopahthy   2) recent stroke - L MCA infarct 2/2 symptomatic L MCA ICAD ; worsening of R HP in setting of infection     -  f/u   - Nsx eval--> No intervention.  despite there note stating SAH, no comment to stop anti thrombotics.  nevertheless CTH read is neg for SAH.  no hemorrhage on CTH   - now on contact   - f/u endocrine   - treatment of infection per primary team -->  CTX, completed   - for secondary stroke prevention. DAPT x 3 months followeed by ASA 81mg dialy thereafter.   - hihg dose statin lipitor 40mg dialy   - avoid hypotension given L MCA ICAD   - telemetry  - PT/OT/SS/SLP, OOBC  - check FS, glucose control <180  - GI/DVT ppx  - Thank you for allowing me to participate in the care of this patient. Call with questions.    - dc plan ALBA Greco MD  Vascular Neurology  Office: [phone]

## 2023-02-06 VITALS
SYSTOLIC BLOOD PRESSURE: 134 MMHG | DIASTOLIC BLOOD PRESSURE: 68 MMHG | RESPIRATION RATE: 18 BRPM | OXYGEN SATURATION: 99 % | TEMPERATURE: 98 F | HEART RATE: 80 BPM

## 2023-02-06 LAB
SARS-COV-2 RNA SPEC QL NAA+PROBE: SIGNIFICANT CHANGE UP

## 2023-02-06 PROCEDURE — 83605 ASSAY OF LACTIC ACID: CPT

## 2023-02-06 PROCEDURE — 87075 CULTR BACTERIA EXCEPT BLOOD: CPT

## 2023-02-06 PROCEDURE — 85014 HEMATOCRIT: CPT

## 2023-02-06 PROCEDURE — 87205 SMEAR GRAM STAIN: CPT

## 2023-02-06 PROCEDURE — 36415 COLL VENOUS BLD VENIPUNCTURE: CPT

## 2023-02-06 PROCEDURE — 97530 THERAPEUTIC ACTIVITIES: CPT

## 2023-02-06 PROCEDURE — 83550 IRON BINDING TEST: CPT

## 2023-02-06 PROCEDURE — 85025 COMPLETE CBC W/AUTO DIFF WBC: CPT

## 2023-02-06 PROCEDURE — 80053 COMPREHEN METABOLIC PANEL: CPT

## 2023-02-06 PROCEDURE — U0005: CPT

## 2023-02-06 PROCEDURE — 85027 COMPLETE CBC AUTOMATED: CPT

## 2023-02-06 PROCEDURE — 99232 SBSQ HOSP IP/OBS MODERATE 35: CPT

## 2023-02-06 PROCEDURE — 84295 ASSAY OF SERUM SODIUM: CPT

## 2023-02-06 PROCEDURE — 82010 KETONE BODYS QUAN: CPT

## 2023-02-06 PROCEDURE — 70450 CT HEAD/BRAIN W/O DYE: CPT | Mod: MA

## 2023-02-06 PROCEDURE — 97162 PT EVAL MOD COMPLEX 30 MIN: CPT

## 2023-02-06 PROCEDURE — 82947 ASSAY GLUCOSE BLOOD QUANT: CPT

## 2023-02-06 PROCEDURE — 84132 ASSAY OF SERUM POTASSIUM: CPT

## 2023-02-06 PROCEDURE — 92610 EVALUATE SWALLOWING FUNCTION: CPT

## 2023-02-06 PROCEDURE — 81001 URINALYSIS AUTO W/SCOPE: CPT

## 2023-02-06 PROCEDURE — 71045 X-RAY EXAM CHEST 1 VIEW: CPT

## 2023-02-06 PROCEDURE — 83735 ASSAY OF MAGNESIUM: CPT

## 2023-02-06 PROCEDURE — 74177 CT ABD & PELVIS W/CONTRAST: CPT

## 2023-02-06 PROCEDURE — 83540 ASSAY OF IRON: CPT

## 2023-02-06 PROCEDURE — 87070 CULTURE OTHR SPECIMN AEROBIC: CPT

## 2023-02-06 PROCEDURE — 96374 THER/PROPH/DIAG INJ IV PUSH: CPT

## 2023-02-06 PROCEDURE — 82803 BLOOD GASES ANY COMBINATION: CPT

## 2023-02-06 PROCEDURE — 83930 ASSAY OF BLOOD OSMOLALITY: CPT

## 2023-02-06 PROCEDURE — 80048 BASIC METABOLIC PNL TOTAL CA: CPT

## 2023-02-06 PROCEDURE — 97110 THERAPEUTIC EXERCISES: CPT

## 2023-02-06 PROCEDURE — 82550 ASSAY OF CK (CPK): CPT

## 2023-02-06 PROCEDURE — 99285 EMERGENCY DEPT VISIT HI MDM: CPT

## 2023-02-06 PROCEDURE — 96375 TX/PRO/DX INJ NEW DRUG ADDON: CPT

## 2023-02-06 PROCEDURE — 97129 THER IVNTJ 1ST 15 MIN: CPT

## 2023-02-06 PROCEDURE — 82746 ASSAY OF FOLIC ACID SERUM: CPT

## 2023-02-06 PROCEDURE — 82962 GLUCOSE BLOOD TEST: CPT

## 2023-02-06 PROCEDURE — 84100 ASSAY OF PHOSPHORUS: CPT

## 2023-02-06 PROCEDURE — 86341 ISLET CELL ANTIBODY: CPT

## 2023-02-06 PROCEDURE — 82435 ASSAY OF BLOOD CHLORIDE: CPT

## 2023-02-06 PROCEDURE — 87077 CULTURE AEROBIC IDENTIFY: CPT

## 2023-02-06 PROCEDURE — 97165 OT EVAL LOW COMPLEX 30 MIN: CPT

## 2023-02-06 PROCEDURE — 87637 SARSCOV2&INF A&B&RSV AMP PRB: CPT

## 2023-02-06 PROCEDURE — 87186 SC STD MICRODIL/AGAR DIL: CPT

## 2023-02-06 PROCEDURE — 82330 ASSAY OF CALCIUM: CPT

## 2023-02-06 PROCEDURE — 83036 HEMOGLOBIN GLYCOSYLATED A1C: CPT

## 2023-02-06 PROCEDURE — 70551 MRI BRAIN STEM W/O DYE: CPT

## 2023-02-06 PROCEDURE — 76857 US EXAM PELVIC LIMITED: CPT

## 2023-02-06 PROCEDURE — 87086 URINE CULTURE/COLONY COUNT: CPT

## 2023-02-06 PROCEDURE — 82607 VITAMIN B-12: CPT

## 2023-02-06 PROCEDURE — 82728 ASSAY OF FERRITIN: CPT

## 2023-02-06 PROCEDURE — 87040 BLOOD CULTURE FOR BACTERIA: CPT

## 2023-02-06 PROCEDURE — 85018 HEMOGLOBIN: CPT

## 2023-02-06 PROCEDURE — 72192 CT PELVIS W/O DYE: CPT

## 2023-02-06 PROCEDURE — 72192 CT PELVIS W/O DYE: CPT | Mod: 26

## 2023-02-06 PROCEDURE — U0003: CPT

## 2023-02-06 RX ORDER — TAMSULOSIN HYDROCHLORIDE 0.4 MG/1
1 CAPSULE ORAL
Qty: 30 | Refills: 0
Start: 2023-02-06 | End: 2023-03-07

## 2023-02-06 RX ORDER — POLYETHYLENE GLYCOL 3350 17 G/17G
17 POWDER, FOR SOLUTION ORAL
Qty: 0 | Refills: 0 | DISCHARGE
Start: 2023-02-06

## 2023-02-06 RX ORDER — ASPIRIN/CALCIUM CARB/MAGNESIUM 324 MG
1 TABLET ORAL
Qty: 30 | Refills: 0
Start: 2023-02-06 | End: 2023-03-07

## 2023-02-06 RX ORDER — INSULIN GLARGINE 100 [IU]/ML
12 INJECTION, SOLUTION SUBCUTANEOUS
Qty: 0 | Refills: 0 | DISCHARGE
Start: 2023-02-06

## 2023-02-06 RX ORDER — PERMETHRIN CREAM 5% W/W 50 MG/G
1 CREAM TOPICAL ONCE
Refills: 0 | Status: COMPLETED | OUTPATIENT
Start: 2023-02-06 | End: 2023-02-06

## 2023-02-06 RX ORDER — SENNA PLUS 8.6 MG/1
15 TABLET ORAL
Qty: 0 | Refills: 0 | DISCHARGE
Start: 2023-02-06

## 2023-02-06 RX ORDER — ATORVASTATIN CALCIUM 80 MG/1
1 TABLET, FILM COATED ORAL
Qty: 30 | Refills: 0
Start: 2023-02-06 | End: 2023-03-07

## 2023-02-06 RX ORDER — POLYETHYLENE GLYCOL 3350 17 G/17G
17 POWDER, FOR SOLUTION ORAL
Refills: 0 | Status: DISCONTINUED | OUTPATIENT
Start: 2023-02-06 | End: 2023-02-06

## 2023-02-06 RX ORDER — PANTOPRAZOLE SODIUM 20 MG/1
40 TABLET, DELAYED RELEASE ORAL
Qty: 0 | Refills: 0 | DISCHARGE
Start: 2023-02-06

## 2023-02-06 RX ORDER — CLOPIDOGREL BISULFATE 75 MG/1
1 TABLET, FILM COATED ORAL
Qty: 90 | Refills: 0
Start: 2023-02-06 | End: 2023-05-06

## 2023-02-06 RX ORDER — SENNA PLUS 8.6 MG/1
15 TABLET ORAL AT BEDTIME
Refills: 0 | Status: DISCONTINUED | OUTPATIENT
Start: 2023-02-06 | End: 2023-02-06

## 2023-02-06 RX ORDER — INSULIN LISPRO 100/ML
2 VIAL (ML) SUBCUTANEOUS
Qty: 0 | Refills: 0 | DISCHARGE
Start: 2023-02-06

## 2023-02-06 RX ADMIN — Medication 2: at 00:18

## 2023-02-06 RX ADMIN — CLOPIDOGREL BISULFATE 75 MILLIGRAM(S): 75 TABLET, FILM COATED ORAL at 12:26

## 2023-02-06 RX ADMIN — PERMETHRIN CREAM 5% W/W 1 APPLICATION(S): 50 CREAM TOPICAL at 15:12

## 2023-02-06 RX ADMIN — PANTOPRAZOLE SODIUM 40 MILLIGRAM(S): 20 TABLET, DELAYED RELEASE ORAL at 06:25

## 2023-02-06 RX ADMIN — Medication 81 MILLIGRAM(S): at 12:25

## 2023-02-06 RX ADMIN — INSULIN GLARGINE 12 UNIT(S): 100 INJECTION, SOLUTION SUBCUTANEOUS at 08:35

## 2023-02-06 RX ADMIN — Medication 2: at 06:25

## 2023-02-06 NOTE — PROGRESS NOTE ADULT - ASSESSMENT
78F with dementia, T2DM, recently discharged about 1 week ago for CVA now presenting with poor PO intake, lethargy and hyperglycemia to 500s.   Of note, patient admitted 1/6 for R facial droop, word finding difficulties. Imaging concerning for acute L MCA infarct.  Per family, since stroke patient nonverbal/unable to participate in meaningful communications, intermittently follows commands. For the last 3-4 days, patient with poor po intake.    In ED, afebrile, , 135/84. WBC 15, Na 158, K 3.0, Cl 120, Glu 468, alk phos 136m BHB 0.7. pH 7.36, lactate 2.2.   UA: +leuk esterase, many bacteria, WBC 11-25  CXR: Redemonstrated biapical scarring and left upper lobe bronchiectasis, unchanged. No focal consolidation.   CTH No acute intracranial hemorrhage. 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  Given 1.5L NS, ceftriaxone x1, haldol 2.5mg x1 in ED.   last admission: CTA with L MCA severe stenosis; distal L M2 occlusion .  TTE 1/8/23: EF 63% Mild mitral regurgitation ; A1c 9.4   MRI brain with evolving subacute infarcts as expected. question of SAH   CTH 1/27 evolviong subacute L hemisphere infarcts. no SAH found.   CTH 1/30 unchanged ; scaterred L MCA infarct no hemorrhage   s/p EGD/peg    Impression  1) AMS likely 2/2 infection/UTI toxic metabolic encephalopahthy   2) recent stroke - L MCA infarct 2/2 symptomatic L MCA ICAD ; worsening of R HP in setting of infection     -  f/u   - Nsx eval--> No intervention.  despite there note stating SAH, no comment to stop anti thrombotics.  nevertheless CTH read is neg for SAH.  no hemorrhage on CTH   - now on contact   - f/u endocrine   - treatment of infection per primary team -->  CTX, completed   - for secondary stroke prevention. DAPT x 3 months followeed by ASA 81mg dialy thereafter.   - hihg dose statin lipitor 40mg dialy   - avoid hypotension given L MCA ICAD   - telemetry  - PT/OT/SS/SLP, OOBC  - check FS, glucose control <180  - GI/DVT ppx  - Thank you for allowing me to participate in the care of this patient. Call with questions.    - dc plan ALBA Greoc MD  Vascular Neurology  Office: [phone]  78F with dementia, T2DM, recently discharged about 1 week ago for CVA now presenting with poor PO intake, lethargy and hyperglycemia to 500s.   Of note, patient admitted 1/6 for R facial droop, word finding difficulties. Imaging concerning for acute L MCA infarct.  Per family, since stroke patient nonverbal/unable to participate in meaningful communications, intermittently follows commands. For the last 3-4 days, patient with poor po intake.    In ED, afebrile, , 135/84. WBC 15, Na 158, K 3.0, Cl 120, Glu 468, alk phos 136m BHB 0.7. pH 7.36, lactate 2.2.   UA: +leuk esterase, many bacteria, WBC 11-25  CXR: Redemonstrated biapical scarring and left upper lobe bronchiectasis, unchanged. No focal consolidation.   CTH No acute intracranial hemorrhage. 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  Given 1.5L NS, ceftriaxone x1, haldol 2.5mg x1 in ED.   last admission: CTA with L MCA severe stenosis; distal L M2 occlusion .  TTE 1/8/23: EF 63% Mild mitral regurgitation ; A1c 9.4   MRI brain with evolving subacute infarcts as expected. question of SAH   CTH 1/27 evolviong subacute L hemisphere infarcts. no SAH found.   CTH 1/30 unchanged ; scaterred L MCA infarct no hemorrhage   s/p EGD/peg    Impression  1) AMS likely 2/2 infection/UTI toxic metabolic encephalopahthy   2) recent stroke - L MCA infarct 2/2 symptomatic L MCA ICAD ; worsening of R HP in setting of infection     -  f/u   - Nsx eval--> No intervention.  despite there note stating SAH, no comment to stop anti thrombotics.  nevertheless CTH read is neg for SAH.  no hemorrhage on CTH   - now on contact   - f/u endocrine   - treatment of infection per primary team -->  CTX, completed   - for secondary stroke prevention. DAPT x 3 months followeed by ASA 81mg dialy thereafter.   - hihg dose statin lipitor 40mg dialy   - avoid hypotension given L MCA ICAD   - telemetry  - PT/OT/SS/SLP, OOBC  - check FS, glucose control <180  - GI/DVT ppx  - Thank you for allowing me to participate in the care of this patient. Call with questions.    - dc plan ALBA Greco MD  Vascular Neurology  Office: [phone]  78F with dementia, T2DM, recently discharged about 1 week ago for CVA now presenting with poor PO intake, lethargy and hyperglycemia to 500s.   Of note, patient admitted 1/6 for R facial droop, word finding difficulties. Imaging concerning for acute L MCA infarct.  Per family, since stroke patient nonverbal/unable to participate in meaningful communications, intermittently follows commands. For the last 3-4 days, patient with poor po intake.    In ED, afebrile, , 135/84. WBC 15, Na 158, K 3.0, Cl 120, Glu 468, alk phos 136m BHB 0.7. pH 7.36, lactate 2.2.   UA: +leuk esterase, many bacteria, WBC 11-25  CXR: Redemonstrated biapical scarring and left upper lobe bronchiectasis, unchanged. No focal consolidation.   CTH No acute intracranial hemorrhage. 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  Given 1.5L NS, ceftriaxone x1, haldol 2.5mg x1 in ED.   last admission: CTA with L MCA severe stenosis; distal L M2 occlusion .  TTE 1/8/23: EF 63% Mild mitral regurgitation ; A1c 9.4   MRI brain with evolving subacute infarcts as expected. question of SAH   CTH 1/27 evolviong subacute L hemisphere infarcts. no SAH found.   CTH 1/30 unchanged ; scaterred L MCA infarct no hemorrhage   s/p EGD/peg    Impression  1) AMS likely 2/2 infection/UTI toxic metabolic encephalopahthy   2) recent stroke - L MCA infarct 2/2 symptomatic L MCA ICAD ; worsening of R HP in setting of infection     -  f/u   - Nsx eval--> No intervention.  despite there note stating SAH, no comment to stop anti thrombotics.  nevertheless CTH read is neg for SAH.  no hemorrhage on CTH   - now on contact   - f/u endocrine   - treatment of infection per primary team -->  CTX, completed   - for secondary stroke prevention. DAPT x 3 months followeed by ASA 81mg dialy thereafter.   - hihg dose statin lipitor 40mg dialy   - avoid hypotension given L MCA ICAD   - telemetry  - PT/OT/SS/SLP, OOBC  - check FS, glucose control <180  - GI/DVT ppx  - Thank you for allowing me to participate in the care of this patient. Call with questions.    - dc plan ALBA Greco MD  Vascular Neurology  Office: [phone]

## 2023-02-06 NOTE — CHART NOTE - NSCHARTNOTESELECT_GEN_ALL_CORE
Endocrinology/Event Note
Event Note
NGT insertion/Event Note
Nutrition Services
endocrine/Event Note
Urology/Event Note
endocrine/Event Note
Event Note
endocrine/Event Note
Event Note
Event Note
Urology/Event Note

## 2023-02-06 NOTE — DISCHARGE NOTE PROVIDER - NPI NUMBER (FOR [employer] USE ONLY) :
[[id number]],[[id number]],[UNKNOWN],[UNKNOWN] [[id number]],[[id number]],[UNKNOWN],[UNKNOWN] [[id number]],[[id number]],[UNKNOWN],[UNKNOWN]

## 2023-02-06 NOTE — PROGRESS NOTE ADULT - SUBJECTIVE AND OBJECTIVE BOX
Chief Complaint:  Patient is a 78y old  Female who presents with a chief complaint of AMS, elevated finger sticks (2023 13:09)      Date of service 23 @ 13:36      Interval Events:   Patient seen and examined.   No acute overnight events.     Hospital Medications:  acetaminophen    Suspension .. 650 milliGRAM(s) Enteral Tube every 6 hours PRN  acetaminophen    Suspension .. 650 milliGRAM(s) Oral once  aspirin  chewable 81 milliGRAM(s) Oral daily  atorvastatin 80 milliGRAM(s) Oral at bedtime  clopidogrel Tablet 75 milliGRAM(s) Oral daily  dextrose 5%. 1000 milliLiter(s) IV Continuous <Continuous>  dextrose 50% Injectable 25 Gram(s) IV Push once  dextrose 50% Injectable 12.5 Gram(s) IV Push once  dextrose 50% Injectable 25 Gram(s) IV Push once  dextrose Oral Gel 15 Gram(s) Oral once  glucagon  Injectable 1 milliGRAM(s) IntraMuscular once  insulin glargine Injectable (LANTUS) 12 Unit(s) SubCutaneous every morning  insulin lispro (ADMELOG) corrective regimen sliding scale   SubCutaneous every 6 hours  pantoprazole   Suspension 40 milliGRAM(s) Oral two times a day  permethrin 1% Rinse 1 Application(s) Topical once  tamsulosin 0.4 milliGRAM(s) Oral at bedtime        Review of Systems:  unable to obtain    PHYSICAL EXAM:   Vital Signs:  Vital Signs Last 24 Hrs  T(C): 36.7 (2023 12:31), Max: 36.8 (2023 21:18)  T(F): 98 (2023 12:31), Max: 98.3 (2023 21:18)  HR: 99 (2023 12:31) (73 - 99)  BP: 157/74 (2023 12:31) (120/70 - 157/85)  BP(mean): --  RR: 18 (2023 12:31) (17 - 18)  SpO2: 98% (2023 12:31) (95% - 100%)    Parameters below as of 2023 12:31  Patient On (Oxygen Delivery Method): room air      Daily     Daily       PHYSICAL EXAM:     GENERAL:  Appears stated age, well-groomed, well-nourished, no distress  HEENT:  NC/AT,  conjunctivae anicteric, clear and pink,   NECK: supple, trachea midline  CHEST:  Full & symmetric excursion, no increased effort, breath sounds clear  HEART:  Regular rhythm, no JVD  ABDOMEN:  Soft, non-tender, non-distended, normoactive bowel sounds,  no masses , no hepatosplenomegaly  EXTREMITIES:  no cyanosis,clubbing or edema  SKIN:  No rash, erythema, or, ecchymoses, no jaundice  NEURO:   non-focal, no asterixis  RECTAL: Deferred      LABS Personally reviewed by me:                Urinalysis Basic - ( 2023 18:44 )    Color: DARK BROWN / Appearance: Turbid / S.009 / pH: x  Gluc: x / Ketone: Negative  / Bili: Negative / Urobili: Negative   Blood: x / Protein: 100 mg/dL / Nitrite: Negative   Leuk Esterase: Large / RBC: >50 /hpf / WBC >50 /HPF   Sq Epi: x / Non Sq Epi: 6 /hpf / Bacteria: Negative                              8.4    7.11  )-----------( 405      ( 2023 07:50 )             27.8       Imaging personally reviewed by me:             Chief Complaint:  Patient is a 78y old  Female who presents with a chief complaint of AMS, elevated finger sticks (2023 13:09)      Date of service 23 @ 13:36      Interval Events:   Patient seen and examined.   No acute overnight events.   Having BMs.     Hospital Medications:  acetaminophen    Suspension .. 650 milliGRAM(s) Enteral Tube every 6 hours PRN  acetaminophen    Suspension .. 650 milliGRAM(s) Oral once  aspirin  chewable 81 milliGRAM(s) Oral daily  atorvastatin 80 milliGRAM(s) Oral at bedtime  clopidogrel Tablet 75 milliGRAM(s) Oral daily  dextrose 5%. 1000 milliLiter(s) IV Continuous <Continuous>  dextrose 50% Injectable 25 Gram(s) IV Push once  dextrose 50% Injectable 12.5 Gram(s) IV Push once  dextrose 50% Injectable 25 Gram(s) IV Push once  dextrose Oral Gel 15 Gram(s) Oral once  glucagon  Injectable 1 milliGRAM(s) IntraMuscular once  insulin glargine Injectable (LANTUS) 12 Unit(s) SubCutaneous every morning  insulin lispro (ADMELOG) corrective regimen sliding scale   SubCutaneous every 6 hours  pantoprazole   Suspension 40 milliGRAM(s) Oral two times a day  permethrin 1% Rinse 1 Application(s) Topical once  tamsulosin 0.4 milliGRAM(s) Oral at bedtime        Review of Systems:  unable to obtain    PHYSICAL EXAM:   Vital Signs:  Vital Signs Last 24 Hrs  T(C): 36.7 (2023 12:31), Max: 36.8 (2023 21:18)  T(F): 98 (2023 12:31), Max: 98.3 (2023 21:18)  HR: 99 (2023 12:31) (73 - 99)  BP: 157/74 (2023 12:31) (120/70 - 157/85)  BP(mean): --  RR: 18 (2023 12:31) (17 - 18)  SpO2: 98% (2023 12:31) (95% - 100%)    Parameters below as of 2023 12:31  Patient On (Oxygen Delivery Method): room air      Daily     Daily       PHYSICAL EXAM:     GENERAL:  Appears stated age, well-groomed, well-nourished, no distress  HEENT:  NC/AT,  conjunctivae anicteric, clear and pink,   NECK: supple, trachea midline  CHEST:  Full & symmetric excursion, no increased effort, breath sounds clear  HEART:  Regular rhythm, no JVD  ABDOMEN:  Soft, non-tender, non-distended, normoactive bowel sounds,  no masses , no hepatosplenomegaly  EXTREMITIES:  no cyanosis,clubbing or edema  SKIN:  No rash, erythema, or, ecchymoses, no jaundice  NEURO:   non-focal, no asterixis  RECTAL: Deferred      LABS Personally reviewed by me:                Urinalysis Basic - ( 2023 18:44 )    Color: DARK BROWN / Appearance: Turbid / S.009 / pH: x  Gluc: x / Ketone: Negative  / Bili: Negative / Urobili: Negative   Blood: x / Protein: 100 mg/dL / Nitrite: Negative   Leuk Esterase: Large / RBC: >50 /hpf / WBC >50 /HPF   Sq Epi: x / Non Sq Epi: 6 /hpf / Bacteria: Negative                              8.4    7.11  )-----------( 405      ( 2023 07:50 )             27.8       Imaging personally reviewed by me:

## 2023-02-06 NOTE — CHART NOTE - NSCHARTNOTEFT_GEN_A_CORE
78 year old female with recurrent urinary retention, pyuria and hematuria of unclear etiology  Blood cultures negative, urine cultures contaminated and negative.   Urine color improving     CT Cystogram reviewed, no concern for vesicoenteric fistula.     Continue catheter changes monthly  Flush catheter q shift  Antibiotics per primary team   Urology   Will need eventual outpatient cystoscopy to complete gross hematuria work up.    [facility]  [address] [place] [address], [address]  [place] [id number] [phone] 78 year old female with recurrent urinary retention, pyuria and hematuria of unclear etiology  Blood cultures negative, urine cultures contaminated and negative.   Urine color improving     CT Cystogram reviewed, no concern for vesicoenteric fistula.     Continue catheter changes monthly  Flush catheter q shift  Antibiotics per primary team   Urology   Will need eventual outpatient cystoscopy to complete gross hematuria work up.    [facility]  [address] [place] [address], [address]  [place] [id number] [phone] 78 year old female with recurrent urinary retention, pyuria and hematuria of unclear etiology  Blood cultures negative, urine cultures contaminated and negative.   Urine color improving     CT Cystogram reviewed, no concern for vesicoenteric fistula.     Continue catheter changes monthly  Flush catheter q shift  Antibiotics per primary team   Urology   Will need eventual outpatient cystoscopy to complete gross hematuria work up.    [facility]  [address] [place] [address], [address]  [place] [id number] [phone]

## 2023-02-06 NOTE — PROGRESS NOTE ADULT - NUTRITIONAL ASSESSMENT
Diet, NPO with Tube Feed:   Tube Feeding Modality: Nasogastric  Glucerna 1.2 Wallace (GLUCERNARTH)  Total Volume for 24 Hours (mL): 1200  Continuous  Starting Tube Feed Rate {mL per Hour}: 20  Increase Tube Feed Rate by (mL): 10     Every 12 hours  Until Goal Tube Feed Rate (mL per Hour): 50  Tube Feed Duration (in Hours): 24  Tube Feed Start Time: 00:00 (01-23-23 @ 11:07) [Active]
Diet, NPO with Tube Feed:   Tube Feeding Modality: Nasogastric  Glucerna 1.2 Wallace (GLUCERNARTH)  Total Volume for 24 Hours (mL): 1200  Continuous  Starting Tube Feed Rate {mL per Hour}: 20  Increase Tube Feed Rate by (mL): 10     Every 12 hours  Until Goal Tube Feed Rate (mL per Hour): 50  Tube Feed Duration (in Hours): 24  Tube Feed Start Time: 00:00 (02-02-23 @ 12:43) [Active]
Diet, NPO with Tube Feed:   Tube Feeding Modality: Nasogastric  Glucerna 1.2 Wallace (GLUCERNARTH)  Total Volume for 24 Hours (mL): 1200  Continuous  Starting Tube Feed Rate {mL per Hour}: 20  Increase Tube Feed Rate by (mL): 10     Every 12 hours  Until Goal Tube Feed Rate (mL per Hour): 50  Tube Feed Duration (in Hours): 24  Tube Feed Start Time: 00:00 (01-23-23 @ 11:07) [Active]
Diet, NPO after Midnight:      NPO Start Date: 31-Jan-2023,   NPO Start Time: 23:59 (01-31-23 @ 14:51) [Active]  Diet, NPO with Tube Feed:   Tube Feeding Modality: Nasogastric  Glucerna 1.2 Wallace (GLUCERNARTH)  Total Volume for 24 Hours (mL): 1200  Continuous  Starting Tube Feed Rate {mL per Hour}: 20  Increase Tube Feed Rate by (mL): 10     Every 12 hours  Until Goal Tube Feed Rate (mL per Hour): 50  Tube Feed Duration (in Hours): 24  Tube Feed Start Time: 00:00 (01-23-23 @ 11:07) [Active]
Diet, NPO with Tube Feed:   Tube Feeding Modality: Nasogastric  Glucerna 1.2 Wallace (GLUCERNARTH)  Total Volume for 24 Hours (mL): 1200  Continuous  Starting Tube Feed Rate {mL per Hour}: 20  Increase Tube Feed Rate by (mL): 10     Every 12 hours  Until Goal Tube Feed Rate (mL per Hour): 50  Tube Feed Duration (in Hours): 24  Tube Feed Start Time: 00:00 (01-23-23 @ 11:07) [Active]
Diet, NPO with Tube Feed:   Tube Feeding Modality: Nasogastric  Glucerna 1.2 Wallace (GLUCERNARTH)  Total Volume for 24 Hours (mL): 1200  Continuous  Starting Tube Feed Rate {mL per Hour}: 20  Increase Tube Feed Rate by (mL): 10     Every 12 hours  Until Goal Tube Feed Rate (mL per Hour): 50  Tube Feed Duration (in Hours): 24  Tube Feed Start Time: 00:00 (01-23-23 @ 11:07) [Active]
Diet, NPO with Tube Feed:   Tube Feeding Modality: Nasogastric  Glucerna 1.2 Wallace (GLUCERNARTH)  Total Volume for 24 Hours (mL): 1200  Continuous  Starting Tube Feed Rate {mL per Hour}: 20  Increase Tube Feed Rate by (mL): 10     Every 12 hours  Until Goal Tube Feed Rate (mL per Hour): 50  Tube Feed Duration (in Hours): 24  Tube Feed Start Time: 00:00 (02-02-23 @ 12:43) [Active]
Diet, NPO with Tube Feed:   Tube Feeding Modality: Nasogastric  Glucerna 1.2 Wallace (GLUCERNARTH)  Total Volume for 24 Hours (mL): 1200  Continuous  Starting Tube Feed Rate {mL per Hour}: 20  Increase Tube Feed Rate by (mL): 10     Every 12 hours  Until Goal Tube Feed Rate (mL per Hour): 50  Tube Feed Duration (in Hours): 24  Tube Feed Start Time: 00:00 (01-23-23 @ 11:07) [Active]
Diet, NPO with Tube Feed:   Tube Feeding Modality: Nasogastric  Glucerna 1.2 Wallace (GLUCERNARTH)  Total Volume for 24 Hours (mL): 1200  Continuous  Starting Tube Feed Rate {mL per Hour}: 20  Increase Tube Feed Rate by (mL): 10     Every 12 hours  Until Goal Tube Feed Rate (mL per Hour): 50  Tube Feed Duration (in Hours): 24  Tube Feed Start Time: 00:00 (02-02-23 @ 12:43) [Active]
Diet, NPO with Tube Feed:   Tube Feeding Modality: Nasogastric  Glucerna 1.2 Wallace (GLUCERNARTH)  Total Volume for 24 Hours (mL): 1200  Continuous  Starting Tube Feed Rate {mL per Hour}: 20  Increase Tube Feed Rate by (mL): 10     Every 12 hours  Until Goal Tube Feed Rate (mL per Hour): 50  Tube Feed Duration (in Hours): 24  Tube Feed Start Time: 00:00 (01-23-23 @ 11:07) [Active]

## 2023-02-06 NOTE — DISCHARGE NOTE PROVIDER - NSDCMRMEDTOKEN_GEN_ALL_CORE_FT
Basaglar KwikPen 100 units/mL subcutaneous solution: 12 unit(s) subcutaneous once a day  insulin lispro 100 units/mL injectable solution: )2 Unit(s) if Glucose 151 - 200  4 Unit(s) if Glucose 201 - 250  6 Unit(s) if Glucose 251 - 300  8 Unit(s) if Glucose 301 - 350  10 Unit(s) if Glucose 351 - 400  12 Unit(s) if Glucose Greater Than 400  pantoprazole 40 mg oral granule, delayed release: 40 milligram(s) by gastrostomy tube 2 times a day

## 2023-02-06 NOTE — PROGRESS NOTE ADULT - SUBJECTIVE AND OBJECTIVE BOX
[facility]   MD PUSHPA WICK MD, PA KRISTINE SOLTANPOUR, DO INJUNG KO, NP    TEL:  OFFICE: [phone]    From 5pm-7am Answering Service [phone]    -- RENAL FOLLOW UP NOTE ---Date of Service 02-06-23 @ 12:31    Patient is a 78y old  Female who presents with a chief complaint of AMS, elevated finger sticks (05 Feb 2023 14:09)      Patient seen and examined at bedside.     VITALS:  T(F): 98 (02-06-23 @ 06:33), Max: 98.3 (02-05-23 @ 21:18)  HR: 73 (02-06-23 @ 06:33)  BP: 157/85 (02-06-23 @ 06:33)  RR: 17 (02-06-23 @ 06:33)  SpO2: 95% (02-06-23 @ 06:33)  Wt(kg): --    02-05 @ 07:01  -  02-06 @ 07:00  --------------------------------------------------------  IN: 240 mL / OUT: 1550 mL / NET: -1310 mL          PHYSICAL EXAM:  Constitutional: NAD  Neck: No JVD  Respiratory: CTAB, no wheezes, rales or rhonchi  Cardiovascular: S1, S2, RRR  Gastrointestinal: BS+, soft, NT/ND  Extremities: No peripheral edema    Hospital Medications:   MEDICATIONS  (STANDING):  acetaminophen    Suspension .. 650 milliGRAM(s) Oral once  aspirin  chewable 81 milliGRAM(s) Oral daily  atorvastatin 80 milliGRAM(s) Oral at bedtime  clopidogrel Tablet 75 milliGRAM(s) Oral daily  dextrose 5%. 1000 milliLiter(s) (100 mL/Hr) IV Continuous <Continuous>  dextrose 50% Injectable 25 Gram(s) IV Push once  dextrose 50% Injectable 12.5 Gram(s) IV Push once  dextrose 50% Injectable 25 Gram(s) IV Push once  dextrose Oral Gel 15 Gram(s) Oral once  glucagon  Injectable 1 milliGRAM(s) IntraMuscular once  insulin glargine Injectable (LANTUS) 12 Unit(s) SubCutaneous every morning  insulin lispro (ADMELOG) corrective regimen sliding scale   SubCutaneous every 6 hours  pantoprazole   Suspension 40 milliGRAM(s) Oral two times a day  tamsulosin 0.4 milliGRAM(s) Oral at bedtime      LABS:        Creatinine Trend: 0.50 <--, 0.54 <--, 0.56 <--            Urine Studies:  Urinalysis - [02-04-23 @ 18:44]      Color DARK BROWN / Appearance Turbid / SG 1.009 / pH 7.0      Gluc Negative / Ketone Negative  / Bili Negative / Urobili Negative       Blood Large / Protein 100 mg/dL / Leuk Est Large / Nitrite Negative      RBC >50 / WBC >50 / Hyaline 2700 / Gran  / Sq Epi  / Non Sq Epi 6 / Bacteria Negative      Iron 17, TIBC 187, %sat 9      [01-25-23 @ 07:18]  Ferritin 172      [01-25-23 @ 07:18]  Lipid: chol 238, TG 80, HDL 46, LDL --      [01-08-23 @ 02:47]        RADIOLOGY & ADDITIONAL STUDIES:   [facility]   MD PUSHPA WICK MD, PA KRISTINE SOLTANPOUR, DO INJUNG KO, NP    TEL:  OFFICE: [phone]    From 5pm-7am Answering Service [phone]    -- RENAL FOLLOW UP NOTE ---Date of Service 02-06-23 @ 12:31    Patient is a 78y old  Female who presents with a chief complaint of AMS, elevated finger sticks (05 Feb 2023 14:09)      Patient seen and examined at bedside.     VITALS:  T(F): 98 (02-06-23 @ 06:33), Max: 98.3 (02-05-23 @ 21:18)  HR: 73 (02-06-23 @ 06:33)  BP: 157/85 (02-06-23 @ 06:33)  RR: 17 (02-06-23 @ 06:33)  SpO2: 95% (02-06-23 @ 06:33)  Wt(kg): --    02-05 @ 07:01  -  02-06 @ 07:00  --------------------------------------------------------  IN: 240 mL / OUT: 1550 mL / NET: -1310 mL          PHYSICAL EXAM:  Constitutional: NAD  Neck: No JVD  Respiratory: CTAB, no wheezes, rales or rhonchi  Cardiovascular: S1, S2, RRR  Gastrointestinal: BS+, soft, NT/ND  Extremities: No peripheral edema    Hospital Medications:   MEDICATIONS  (STANDING):  acetaminophen    Suspension .. 650 milliGRAM(s) Oral once  aspirin  chewable 81 milliGRAM(s) Oral daily  atorvastatin 80 milliGRAM(s) Oral at bedtime  clopidogrel Tablet 75 milliGRAM(s) Oral daily  dextrose 5%. 1000 milliLiter(s) (100 mL/Hr) IV Continuous <Continuous>  dextrose 50% Injectable 25 Gram(s) IV Push once  dextrose 50% Injectable 12.5 Gram(s) IV Push once  dextrose 50% Injectable 25 Gram(s) IV Push once  dextrose Oral Gel 15 Gram(s) Oral once  glucagon  Injectable 1 milliGRAM(s) IntraMuscular once  insulin glargine Injectable (LANTUS) 12 Unit(s) SubCutaneous every morning  insulin lispro (ADMELOG) corrective regimen sliding scale   SubCutaneous every 6 hours  pantoprazole   Suspension 40 milliGRAM(s) Oral two times a day  tamsulosin 0.4 milliGRAM(s) Oral at bedtime      LABS:        Creatinine Trend: 0.50 <--, 0.54 <--, 0.56 <--            Urine Studies:  Urinalysis - [02-04-23 @ 18:44]      Color DARK BROWN / Appearance Turbid / SG 1.009 / pH 7.0      Gluc Negative / Ketone Negative  / Bili Negative / Urobili Negative       Blood Large / Protein 100 mg/dL / Leuk Est Large / Nitrite Negative      RBC >50 / WBC >50 / Hyaline 2700 / Gran  / Sq Epi  / Non Sq Epi 6 / Bacteria Negative      Iron 17, TIBC 187, %sat 9      [01-25-23 @ 07:18]  Ferritin 172      [01-25-23 @ 07:18]  Lipid: chol 238, TG 80, HDL 46, LDL --      [01-08-23 @ 02:47]        RADIOLOGY & ADDITIONAL STUDIES:   [facility]   MD PUSHPA WICK MD, PA KRISTINE SOLTANPOUR, DO INJUNG KO, NP    TEL:  OFFICE: [phone]    From 5pm-7am Answering Service [phone]    -- RENAL FOLLOW UP NOTE ---Date of Service 02-06-23 @ 12:31    Patient is a 78y old  Female who presents with a chief complaint of AMS, elevated finger sticks (05 Feb 2023 14:09)      Patient seen and examined at bedside.     VITALS:  T(F): 98 (02-06-23 @ 06:33), Max: 98.3 (02-05-23 @ 21:18)  HR: 73 (02-06-23 @ 06:33)  BP: 157/85 (02-06-23 @ 06:33)  RR: 17 (02-06-23 @ 06:33)  SpO2: 95% (02-06-23 @ 06:33)  Wt(kg): --    02-05 @ 07:01  -  02-06 @ 07:00  --------------------------------------------------------  IN: 240 mL / OUT: 1550 mL / NET: -1310 mL          PHYSICAL EXAM:  Constitutional: NAD  Neck: No JVD  Respiratory: CTAB, no wheezes, rales or rhonchi  Cardiovascular: S1, S2, RRR  Gastrointestinal: BS+, soft, NT/ND  Extremities: No peripheral edema    Hospital Medications:   MEDICATIONS  (STANDING):  acetaminophen    Suspension .. 650 milliGRAM(s) Oral once  aspirin  chewable 81 milliGRAM(s) Oral daily  atorvastatin 80 milliGRAM(s) Oral at bedtime  clopidogrel Tablet 75 milliGRAM(s) Oral daily  dextrose 5%. 1000 milliLiter(s) (100 mL/Hr) IV Continuous <Continuous>  dextrose 50% Injectable 25 Gram(s) IV Push once  dextrose 50% Injectable 12.5 Gram(s) IV Push once  dextrose 50% Injectable 25 Gram(s) IV Push once  dextrose Oral Gel 15 Gram(s) Oral once  glucagon  Injectable 1 milliGRAM(s) IntraMuscular once  insulin glargine Injectable (LANTUS) 12 Unit(s) SubCutaneous every morning  insulin lispro (ADMELOG) corrective regimen sliding scale   SubCutaneous every 6 hours  pantoprazole   Suspension 40 milliGRAM(s) Oral two times a day  tamsulosin 0.4 milliGRAM(s) Oral at bedtime      LABS:        Creatinine Trend: 0.50 <--, 0.54 <--, 0.56 <--            Urine Studies:  Urinalysis - [02-04-23 @ 18:44]      Color DARK BROWN / Appearance Turbid / SG 1.009 / pH 7.0      Gluc Negative / Ketone Negative  / Bili Negative / Urobili Negative       Blood Large / Protein 100 mg/dL / Leuk Est Large / Nitrite Negative      RBC >50 / WBC >50 / Hyaline 2700 / Gran  / Sq Epi  / Non Sq Epi 6 / Bacteria Negative      Iron 17, TIBC 187, %sat 9      [01-25-23 @ 07:18]  Ferritin 172      [01-25-23 @ 07:18]  Lipid: chol 238, TG 80, HDL 46, LDL --      [01-08-23 @ 02:47]        RADIOLOGY & ADDITIONAL STUDIES:

## 2023-02-06 NOTE — DISCHARGE NOTE NURSING/CASE MANAGEMENT/SOCIAL WORK - PATIENT PORTAL LINK FT
You can access the FollowMyHealth Patient Portal offered by [facility] by registering at the following website: [email or web address]. By joining [facility]’s FollowMyHealth portal, you will also be able to view your health information using other applications (apps) compatible with our system. You can access the FollowMyHealth Patient Portal offered by [facility] by registering at the following website: [email or web address]. By joining [facility]’s FollowMyHealth portal, you will also be able to view your health information using other applications (apps) compatible with our system. You can access the FollowMyHealth Patient Portal offered by [facility] by registering at the following website: [email or web address]. By joining [facility]’s FollowMyHealth portal, you will also be able to view your health information using other applications (apps) compatible with our system.

## 2023-02-06 NOTE — DISCHARGE NOTE NURSING/CASE MANAGEMENT/SOCIAL WORK - NSDCPNINST_GEN_ALL_CORE
call md for any discomforts felt-- safety measures reemphasized-- gonzalez care --peg tube care-- bilateral heels (cavilon applied)-- incontinent associated dermatitis in perineal area-- bilateral buttock and sacrum ( barrier cream applied)--turn and change position while in bed

## 2023-02-06 NOTE — DISCHARGE NOTE PROVIDER - NSDCCPCAREPLAN_GEN_ALL_CORE_FT
PRINCIPAL DISCHARGE DIAGNOSIS  Diagnosis: Acute UTI  Assessment and Plan of Treatment: HOME CARE INSTRUCTIONS  f you were prescribed antibiotics, take them exactly as your caregiver instructs you. Finish the medication even if you feel better after you have only taken some of the medication.  Drink enough water and fluids to keep your urine clear or pale yellow.  Avoid caffeine, tea, and carbonated beverages. They tend to irritate your bladder.  Empty your bladder often. Avoid holding urine for long periods of time.  Empty your bladder before and after sexual intercourse.  After a bowel movement, women should cleanse from front to back. Use each tissue only once.  SEEK MEDICAL CARE IF:  You have back pain.  You develop a fever.  Your symptoms do not begin to resolve within 3 days.  SEEK IMMEDIATE MEDICAL CARE IF:  You have severe back pain or lower abdominal pain.  You develop chills.  You have nausea or vomiting.  You have continued burning or discomfort with urination.        SECONDARY DISCHARGE DIAGNOSES  Diagnosis: Diabetes mellitus with hyperglycemia  Assessment and Plan of Treatment: HgA1C this admission.  Make sure you get your HgA1c checked every three months.  If you take oral diabetes medications, check your blood glucose two times a day.  If you take insulin, check your blood glucose before meals and at bedtime.  It's important not to skip any meals.  Keep a log of your blood glucose results and always take it with you to your doctor appointments.  Keep a list of your current medications including injectables and over the counter medications and bring this medication list with you to all your doctor appointments.  If you have not seen your ophthalmologist this year call for appointment.  Check your feet daily for redness, sores, or openings. Do not self treat. If no improvement in two days call your primary care physician for an appointment.  Low blood sugar (hypoglycemia) is a blood sugar below 70mg/dl. Check your blood sugar if you feel signs/symptoms of hypoglycemia. If your blood sugar is below 70 take 15 grams of carbohydrates (ex 4 oz of apple juice, 3-4 glucose tablets, or 4-6 oz of regular soda) wait 15 minutes and repeat blood sugar to make sure it comes up above 70.  If your blood sugar is above 70 and you are due for a meal, have a meal.  If you are not due for a meal have a snack.  This snack helps keeps your blood sugar at a safe range.      Diagnosis: Cerebrovascular accident (CVA)  Assessment and Plan of Treatment: CT head remains stable    Diagnosis: Dysphagia  Assessment and Plan of Treatment: s/p peg tube. feeds are at goal

## 2023-02-06 NOTE — DISCHARGE NOTE PROVIDER - PROVIDER TOKENS
PROVIDER:[TOKEN:[[id number]]],PROVIDER:[TOKEN:[[id number]]],FREE:[LAST:[[facility] urology],PHONE:[phone]],FAX:[(   )    -],ADDRESS:[[address] [place] [address]]],FREE:[LAST:[[facility] Endocrine],PHONE:[phone]],FAX:[(   )    -],ADDRESS:[[address] [facility]] PROVIDER:[TOKEN:[[id number]]],PROVIDER:[TOKEN:[[id number]]],FREE:[LAST:[[facility] urology],PHONE:[phone]],FAX:[(   )    -],ADDRESS:[[address] [place] [address]]],FREE:[LAST:[[facility] Endocrine],PHONE:[phone]],FAX:[(   )    -],ADDRESS:[[address] [facility]] PROVIDER:[TOKEN:[[id number]]],PROVIDER:[TOKEN:[[id number]]],FREE:[LAST:[[facility] urology],PHONE:[phone]],FAX:[(   )    -],ADDRESS:[[address] [place] [address]]],FREE:[LAST:[[facility] Endocrine],PHONE:[phone]],FAX:[(   )    -],ADDRESS:[[address] [facility]]

## 2023-02-06 NOTE — DISCHARGE NOTE PROVIDER - HOSPITAL COURSE
78F with dementia, T2DM, recently discharged about 1 week ago for CVA now presenting with poor PO intake, lethargy and hyperglycemia to 500s.   Of note, patient admitted 1/6 for R facial droop, word finding difficulties. Imaging concerning for acute L MCA infarct. Discharged home on 1/11. Majority of history obtained through family given mental status. Per family, since stroke patient nonverbal/unable to participate in meaningful communications, intermittently follows commands. For the last 3-4 days, patient with poor po intake. Reportedly only eating breakfast. Day of presentation, more lethargic with elevated finger sticks up to the 500s. Patient was evaluated by endocrine team during prior admission with recs to increase metformin to 1000mg BID. However, given poor PO intake, family has been giving 500mg daily.     In ED, afebrile, , 135/84. WBC 15, Na 158, K 3.0, Cl 120, Glu 468, alk phos 136m BHB 0.7. pH 7.36, lactate 2.2. UA: +leuk esterase, many bacteria, WBC 11-25  CXR: Redemonstrated biapical scarring and left upper lobe bronchiectasis, unchanged. No focal consolidation.   CTH No acute intracranial hemorrhage. 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    Dysphagia  - SP PEG tube and EGD 02/01 with gastritis and duodenal ulcers. C/w PPI BID via peg  - Plan to begin feeds and up-titrate as per protocol   - Aspiration precautions   Cerebrovascular accident (CVA).  - Recent admission w/ Acute L MCA infarct, severe stenosis of the L M2 branch. MR with L MCA [place] infarcts   - CTH on admission showing No acute intracranial hemorrhage. 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 Per neurology, expected change seen on CT, not new lesions.   - C/w asa, plavix, statin.  - Neuro eval consulted; F/u recs   - Check MR brain -- C/F L sided Subacute infarcts, L frontal SAH; F/u neuro recs  - Checking CT head noted   - Hold lovenox DVT PPX  - NSGY eval appreciated - no int at this time  - Repeat CT H 01/30;  CT Cystogram reviewed, no concern for vesicoenteric fistula.     Continue catheter changes monthly  Flush catheter q shift  Antibiotics per primary team   Urology   Will need eventual outpatient cystoscopy to complete gross hematuria work up

## 2023-02-06 NOTE — PROGRESS NOTE ADULT - PROVIDER SPECIALTY LIST ADULT
Endocrinology
Endocrinology
Gastroenterology
Gastroenterology
Infectious Disease
Infectious Disease
Internal Medicine
Nephrology
Neurology
Endocrinology
Internal Medicine
Neurology
Endocrinology
Gastroenterology
Nephrology
Nephrology
Endocrinology
Endocrinology
Infectious Disease
Internal Medicine
Nephrology
Neurology
Urology
Internal Medicine
Internal Medicine
Nephrology
Neurology
Neurology
Endocrinology
Nephrology
Neurology
Gastroenterology
Infectious Disease
Nephrology
Neurology
Neurology
Urology
Gastroenterology
Nephrology
Internal Medicine
Neurology
Gastroenterology
Gastroenterology
Infectious Disease
Internal Medicine
Internal Medicine
Nephrology
Neurology
Endocrinology

## 2023-02-06 NOTE — PROGRESS NOTE ADULT - SUBJECTIVE AND OBJECTIVE BOX
Diabetes Follow up note:    Chief complaint: T2DM    Interval Hx: BG values at goal over past 24 hours. Pt tolerating TF @ goal rate. Pt seen at bedside w/daughter present. Asking to have mittens removed. DC planning for rehab today.     Review of Systems:  unable to provide AMS.     MEDS:  atorvastatin 80 milliGRAM(s) Oral at bedtime  insulin glargine Injectable (LANTUS) 12 Unit(s) SubCutaneous every morning  insulin lispro (ADMELOG) corrective regimen sliding scale   SubCutaneous every 6 hours      Allergies    Benedryl Allergy Sinus (Hives)  penicillin (Rash)          PE:  General: Female lying in bed. NAD.   Vital Signs Last 24 Hrs  T(C): 36.7 (06 Feb 2023 12:31), Max: 36.8 (05 Feb 2023 21:18)  T(F): 98 (06 Feb 2023 12:31), Max: 98.3 (05 Feb 2023 21:18)  HR: 99 (06 Feb 2023 12:31) (73 - 99)  BP: 157/74 (06 Feb 2023 12:31) (120/70 - 157/85)  BP(mean): --  RR: 18 (06 Feb 2023 12:31) (17 - 18)  SpO2: 98% (06 Feb 2023 12:31) (95% - 100%)    Parameters below as of 06 Feb 2023 12:31  Patient On (Oxygen Delivery Method): room air      Abd: Soft, NT,ND, PEG in place.   Extremities: Warm  Neuro: A&O X3    LABS:  POCT Blood Glucose.: 146 mg/dL (02-06-23 @ 12:23)  POCT Blood Glucose.: 175 mg/dL (02-06-23 @ 08:32)  POCT Blood Glucose.: 195 mg/dL (02-06-23 @ 05:54)  POCT Blood Glucose.: 171 mg/dL (02-06-23 @ 00:11)  POCT Blood Glucose.: 136 mg/dL (02-05-23 @ 17:44)  POCT Blood Glucose.: 206 mg/dL (02-05-23 @ 12:32)  POCT Blood Glucose.: 155 mg/dL (02-05-23 @ 08:10)  POCT Blood Glucose.: 177 mg/dL (02-05-23 @ 06:32)  POCT Blood Glucose.: 179 mg/dL (02-05-23 @ 00:33)  POCT Blood Glucose.: 159 mg/dL (02-04-23 @ 18:02)  POCT Blood Glucose.: 193 mg/dL (02-04-23 @ 12:26)  POCT Blood Glucose.: 192 mg/dL (02-04-23 @ 10:09)  POCT Blood Glucose.: 223 mg/dL (02-04-23 @ 06:00)  POCT Blood Glucose.: 196 mg/dL (02-04-23 @ 00:49)  POCT Blood Glucose.: 177 mg/dL (02-03-23 @ 17:43)          02-04    135  |  102  |  9   ----------------------------<  219<H>  3.4<L>   |  24  |  0.50    eGFR: 95    Ca    7.7<L>      02-04          A1C with Estimated Average Glucose Result: 9.4 % (01-19-23 @ 13:11)  A1C with Estimated Average Glucose Result: 8.6 % (01-07-23 @ 05:40)          Contact number: alycia [phone] or [phone]       Diabetes Follow up note:    Chief complaint: T2DM    Interval Hx: BG values at goal over past 24 hours. Pt tolerating TF @ goal rate. Pt seen at bedside w/daughter present. Asking to have mittens removed. DC planning for rehab today.     Review of Systems:  unable to provide AMS.     MEDS:  atorvastatin 80 milliGRAM(s) Oral at bedtime  insulin glargine Injectable (LANTUS) 12 Unit(s) SubCutaneous every morning  insulin lispro (ADMELOG) corrective regimen sliding scale   SubCutaneous every 6 hours      Allergies    Benedryl Allergy Sinus (Hives)  penicillin (Rash)          PE:  General: Female lying in bed. NAD.   Vital Signs Last 24 Hrs  T(C): 36.7 (06 Feb 2023 12:31), Max: 36.8 (05 Feb 2023 21:18)  T(F): 98 (06 Feb 2023 12:31), Max: 98.3 (05 Feb 2023 21:18)  HR: 99 (06 Feb 2023 12:31) (73 - 99)  BP: 157/74 (06 Feb 2023 12:31) (120/70 - 157/85)  BP(mean): --  RR: 18 (06 Feb 2023 12:31) (17 - 18)  SpO2: 98% (06 Feb 2023 12:31) (95% - 100%)    Parameters below as of 06 Feb 2023 12:31  Patient On (Oxygen Delivery Method): room air      Abd: Soft, NT,ND, PEG in place.   Extremities: Warm  Neuro: A&O X3    LABS:  POCT Blood Glucose.: 146 mg/dL (02-06-23 @ 12:23)  POCT Blood Glucose.: 175 mg/dL (02-06-23 @ 08:32)  POCT Blood Glucose.: 195 mg/dL (02-06-23 @ 05:54)  POCT Blood Glucose.: 171 mg/dL (02-06-23 @ 00:11)  POCT Blood Glucose.: 136 mg/dL (02-05-23 @ 17:44)  POCT Blood Glucose.: 206 mg/dL (02-05-23 @ 12:32)  POCT Blood Glucose.: 155 mg/dL (02-05-23 @ 08:10)  POCT Blood Glucose.: 177 mg/dL (02-05-23 @ 06:32)  POCT Blood Glucose.: 179 mg/dL (02-05-23 @ 00:33)  POCT Blood Glucose.: 159 mg/dL (02-04-23 @ 18:02)  POCT Blood Glucose.: 193 mg/dL (02-04-23 @ 12:26)  POCT Blood Glucose.: 192 mg/dL (02-04-23 @ 10:09)  POCT Blood Glucose.: 223 mg/dL (02-04-23 @ 06:00)  POCT Blood Glucose.: 196 mg/dL (02-04-23 @ 00:49)  POCT Blood Glucose.: 177 mg/dL (02-03-23 @ 17:43)          02-04    135  |  102  |  9   ----------------------------<  219<H>  3.4<L>   |  24  |  0.50    eGFR: 95    Ca    7.7<L>      02-04          A1C with Estimated Average Glucose Result: 9.4 % (01-19-23 @ 13:11)  A1C with Estimated Average Glucose Result: 8.6 % (01-07-23 @ 05:40)          Contact number: alycia [phone] or [phone]       Diabetes Follow up note:    Chief complaint: T2DM    Interval Hx: BG values at goal over past 24 hours. Pt tolerating TF @ goal rate. Pt seen at bedside w/daughter present. Asking to have mittens removed. DC planning for rehab today.     Review of Systems:  unable to provide AMS.     MEDS:  atorvastatin 80 milliGRAM(s) Oral at bedtime  insulin glargine Injectable (LANTUS) 12 Unit(s) SubCutaneous every morning  insulin lispro (ADMELOG) corrective regimen sliding scale   SubCutaneous every 6 hours      Allergies    Benedryl Allergy Sinus (Hives)  penicillin (Rash)          PE:  General: Female lying in bed. NAD.   Vital Signs Last 24 Hrs  T(C): 36.7 (06 Feb 2023 12:31), Max: 36.8 (05 Feb 2023 21:18)  T(F): 98 (06 Feb 2023 12:31), Max: 98.3 (05 Feb 2023 21:18)  HR: 99 (06 Feb 2023 12:31) (73 - 99)  BP: 157/74 (06 Feb 2023 12:31) (120/70 - 157/85)  BP(mean): --  RR: 18 (06 Feb 2023 12:31) (17 - 18)  SpO2: 98% (06 Feb 2023 12:31) (95% - 100%)    Parameters below as of 06 Feb 2023 12:31  Patient On (Oxygen Delivery Method): room air      Abd: Soft, NT,ND, PEG in place.   Extremities: Warm  Neuro: A&O X3    LABS:  POCT Blood Glucose.: 146 mg/dL (02-06-23 @ 12:23)  POCT Blood Glucose.: 175 mg/dL (02-06-23 @ 08:32)  POCT Blood Glucose.: 195 mg/dL (02-06-23 @ 05:54)  POCT Blood Glucose.: 171 mg/dL (02-06-23 @ 00:11)  POCT Blood Glucose.: 136 mg/dL (02-05-23 @ 17:44)  POCT Blood Glucose.: 206 mg/dL (02-05-23 @ 12:32)  POCT Blood Glucose.: 155 mg/dL (02-05-23 @ 08:10)  POCT Blood Glucose.: 177 mg/dL (02-05-23 @ 06:32)  POCT Blood Glucose.: 179 mg/dL (02-05-23 @ 00:33)  POCT Blood Glucose.: 159 mg/dL (02-04-23 @ 18:02)  POCT Blood Glucose.: 193 mg/dL (02-04-23 @ 12:26)  POCT Blood Glucose.: 192 mg/dL (02-04-23 @ 10:09)  POCT Blood Glucose.: 223 mg/dL (02-04-23 @ 06:00)  POCT Blood Glucose.: 196 mg/dL (02-04-23 @ 00:49)  POCT Blood Glucose.: 177 mg/dL (02-03-23 @ 17:43)          02-04    135  |  102  |  9   ----------------------------<  219<H>  3.4<L>   |  24  |  0.50    eGFR: 95    Ca    7.7<L>      02-04          A1C with Estimated Average Glucose Result: 9.4 % (01-19-23 @ 13:11)  A1C with Estimated Average Glucose Result: 8.6 % (01-07-23 @ 05:40)          Contact number: alycia [phone] or [phone]

## 2023-02-06 NOTE — PROGRESS NOTE ADULT - PROBLEM SELECTOR PLAN 1
test BG Q6h  - c/w Lantus 12 units QAM.   - c/w Admelog moderate correction scale Q6h  - auto-immune DM antibodies negative to r/o RAJ.   For d/c:   DC to discharge on current regimen  pt remains on 24H TF via peg, at discharge likely will need lantus & scale if remains on 24H TF   -Patient can follow up at [address] [place] [address], 3RD FLOOR, [place] [id number] [phone]  -Patient will need opthalmology and podiatry follow up as outpatient. test BG Q6h  - c/w Lantus 12 units QAM.   - c/w Admelog moderate correction scale Q6h  - auto-immune DM antibodies negative to r/o RAJ.   For d/c:   DC to discharge on current regimen  pt remains on 24H TF via peg, at discharge likely will need lantus & scale if remains on 24H TF   -Patient can follow up at [address] [place] [address], 3RD FLOOR, [place] [id number] [phone]  -Patient will need opthalmology and podiatry follow up as outpatient. test BG Q6h  - c/w Lantus 12 units QAM.   - c/w Admelog moderate correction scale Q6h  - auto-immune DM antibodies negative to r/o RAJ.   For d/c:   DC to discharge on current regimen  pt remains on 24H TF via peg, at discharge likely will need lantus & scale if remains on 24H TF   -Patient can follow up at [address] [place] [address], 3RD FLOOR, [place] [id number] [phone]  -Patient will need opthalmology and podiatry follow up as outpatient.

## 2023-02-06 NOTE — PROGRESS NOTE ADULT - ASSESSMENT
1. Dysphagia, post stroke  - s/p EGD with PEG placement   - EGD: Gastritis. Duodenal ulcers. PPI BID   - tolerating tube feeds via PEG    2. Hx of stroke     3. DM     4. anemia of chronic disease   - monitor H&H, transfuse PRN      5. Right colon inflammatory changes on CT  - ?suggestive of colitis  - no diarrhea, wbc wnl  - monitor          Attending supervision statement: I have personally seen and examined the patient. I fully participated in the care of this patient. I have made amendments to the documentation where necessary, and agree with the history, physical exam, and plan as outlined by the ACP.    [facility]   Gastroenterology and Hepatology  Office: [phone]   1. Dysphagia, post stroke  - s/p EGD with PEG placement   - EGD: Gastritis. Duodenal ulcers. PPI BID   - tolerating tube feeds via PEG    2. Hx of stroke     3. DM     4. anemia of chronic disease   - monitor H&H, transfuse PRN      5. Right colon inflammatory changes on CT  - ?suggestive of colitis  - no diarrhea, wbc wnl  - monitor          Attending supervision statement: I have personally seen and examined the patient. I fully participated in the care of this patient. I have made amendments to the documentation where necessary, and agree with the history, physical exam, and plan as outlined by the ACP.    [facility]   Gastroenterology and Hepatology  Office: [phone]   1. Dysphagia, post stroke  - s/p EGD with PEG placement   - EGD: Gastritis. Duodenal ulcers. PPI BID   - tolerating tube feeds via PEG    2. Hx of stroke     3. DM     4. anemia of chronic disease   - monitor H&H, transfuse PRN      5. Right colon inflammatory changes on CT  - ?suggestive of colitis  - no diarrhea, wbc wnl  - monitor          Attending supervision statement: I have personally seen and examined the patient. I fully participated in the care of this patient. I have made amendments to the documentation where necessary, and agree with the history, physical exam, and plan as outlined by the ACP.    [facility]   Gastroenterology and Hepatology  Office: [phone]   1. Dysphagia, post stroke  - s/p EGD with PEG placement   - EGD: Gastritis. Duodenal ulcers. PPI BID   - tolerating tube feeds via PEG    2. Hx of stroke     3. DM     4. anemia of chronic disease   - monitor H&H, transfuse PRN      5. Right colon inflammatory changes/ rectal impaction on CT  - having multiple BMs per RN  - consider bowel regimen of miralax 17g bid, senna 15ml at bedtime on dc          Attending supervision statement: I have personally seen and examined the patient. I fully participated in the care of this patient. I have made amendments to the documentation where necessary, and agree with the history, physical exam, and plan as outlined by the ACP.    [facility]   Gastroenterology and Hepatology  Office: [phone]   1. Dysphagia, post stroke  - s/p EGD with PEG placement   - EGD: Gastritis. Duodenal ulcers. PPI BID   - tolerating tube feeds via PEG    2. Hx of stroke     3. DM     4. anemia of chronic disease   - monitor H&H, transfuse PRN      5. Right colon inflammatory changes/ rectal impaction on CT  - having multiple BMs per RN  - consider bowel regimen of miralax 17g bid, senna 15ml at bedtime on dc          Attending supervision statement: I have personally seen and examined the patient. I fully participated in the care of this patient. I have made amendments to the documentation where necessary, and agree with the history, physical exam, and plan as outlined by the ACP.    [facility]   Gastroenterology and Hepatology  Office: [phone]   1. Dysphagia, post stroke  - s/p EGD with PEG placement   - EGD: Gastritis. Duodenal ulcers. PPI BID   - tolerating tube feeds via PEG    2. Hx of stroke     3. DM     4. anemia of chronic disease   - monitor H&H, transfuse PRN      5. Right colon inflammatory changes/ rectal impaction on CT  - having multiple BMs per RN  - consider bowel regimen of miralax 17g bid, senna 15ml at bedtime on dc          Attending supervision statement: I have personally seen and examined the patient. I fully participated in the care of this patient. I have made amendments to the documentation where necessary, and agree with the history, physical exam, and plan as outlined by the ACP.    [facility]   Gastroenterology and Hepatology  Office: [phone]

## 2023-02-06 NOTE — PROGRESS NOTE ADULT - REASON FOR ADMISSION
AMS, elevated finger sticks
DISPLAY PLAN FREE TEXT
AMS, elevated finger sticks

## 2023-02-06 NOTE — PROGRESS NOTE ADULT - PROBLEM SELECTOR PLAN 3
Continue with atorvastatin 80 mg daily   -   - Manage as outpatient     discussed w/daughter  Can be reached via TEAMS/pager: [phone]   office:  [phone] (M-F 9a-5pm)               [phone] (nights/weekends)   Can access Amion coverage via sunrise/tools. Continue with atorvastatin 80 mg daily   -   - Manage as outpatient     discussed w/daughter  Can be reached via TEAMS/pager: [phone]   office:  [phone] (M-F 9a-5pm)               [phone] (nights/weekends)   Can access Amion coverage via sunrise/tools. Continue with atorvastatin 80 mg daily   -   - Manage as outpatient     discussed w/daughter  Can be reached via TEAMS/pager: [phone]   office:  [phone] (M-F 9a-5pm)               [phone] (nights/weekends)   Can access Amion coverage via sunrise/tools.

## 2023-02-06 NOTE — DISCHARGE NOTE NURSING/CASE MANAGEMENT/SOCIAL WORK - NSDCPEPTCAREGIVEDUMATLIST _GEN_ALL_CORE
Wt Readings from Last 3 Encounters:   07/17/19 65 kg (143 lb 4 8 oz)   07/05/19 63 kg (139 lb)     Chronic systolic CHF last LVEF 78%  Holding diuretics given kidney injury    Consider restarting today or tomorrow Stroke/Diabetes

## 2023-02-06 NOTE — DISCHARGE NOTE NURSING/CASE MANAGEMENT/SOCIAL WORK - NSDCPEFALRISK_GEN_ALL_CORE
For information on Fall & Injury Prevention, visit: [email or web address]/[email or web address]-[email or web address]-[email or web address] OR  [email or web address]/[email or web address]-tips-[email or web address]-injury OR  https://www.cdc.gov/steadi/patient.html For information on Fall & Injury Prevention, visit: [email or web address]://www.[facility]/[email or web address]-[email or web address]-mobility OR  https://www.[facility]/news/fall-prevention-tips-to-avoid-injury OR  https://www.cdc.gov/steadi/patient.html For information on Fall & Injury Prevention, visit: [email or web address]://www.[facility]/news/fall-prevention-protects-[email or web address]-maintains-health-[email or web address]-mobility OR  https://www.[facility]/news/fall-prevention-tips-to-avoid-injury OR  https://www.cdc.gov/steadi/patient.html

## 2023-02-06 NOTE — PROGRESS NOTE ADULT - PROBLEM SELECTOR PROBLEM 1
Uncontrolled type 2 diabetes mellitus with hyperglycemia

## 2023-02-06 NOTE — PROGRESS NOTE ADULT - PROBLEM SELECTOR PLAN 2
BP goal 130/80  - Manage per primary team
BP goal 130/80  - Manage per primary team.
- BP goal 130/80  - Manage per primary team

## 2023-02-06 NOTE — PROGRESS NOTE ADULT - ASSESSMENT
78 F with recent T2DM diagnosis and CVA (d/c 1 week ago), presenting with glucoses 500s, poor po intake and lethargy. Endocrine consulted for assistance with management of uncontrolled, recently dx DM. Now s/p  PEG. Continuous feeds now at goal rate w/BG values at goal. DC planning to rehab. Discussed w/daughter-family to assist pt w/insulin injection at home. BG goal (100-200mg/dl).

## 2023-02-06 NOTE — PROGRESS NOTE ADULT - SUBJECTIVE AND OBJECTIVE BOX
Neurology Progress Note    S: Patient seen and examined. MRI done, c/f infarcts. CTH neg for hemorrhage   on contact     Medication:    MEDICATIONS  (STANDING):  aspirin  chewable 81 milliGRAM(s) Oral daily  atorvastatin 80 milliGRAM(s) Oral at bedtime  clopidogrel Tablet 75 milliGRAM(s) Oral daily  dextrose 5%. 1000 milliLiter(s) (100 mL/Hr) IV Continuous <Continuous>  dextrose 50% Injectable 25 Gram(s) IV Push once  dextrose 50% Injectable 12.5 Gram(s) IV Push once  dextrose 50% Injectable 25 Gram(s) IV Push once  dextrose Oral Gel 15 Gram(s) Oral once  glucagon  Injectable 1 milliGRAM(s) IntraMuscular once  insulin glargine Injectable (LANTUS) 10 Unit(s) SubCutaneous every morning  insulin lispro (ADMELOG) corrective regimen sliding scale   SubCutaneous every 6 hours  pantoprazole   Suspension 40 milliGRAM(s) Oral two times a day  tamsulosin 0.4 milliGRAM(s) Oral at bedtime    MEDICATIONS  (PRN):  acetaminophen    Suspension .. 650 milliGRAM(s) Enteral Tube every 6 hours PRN Temp greater or equal to 38C (100.4F)    Vitals:      Vital Signs Last 24 Hrs  T(C): 36.7 (02-06-23 @ 12:31), Max: 36.8 (02-05-23 @ 21:18)  T(F): 98 (02-06-23 @ 12:31), Max: 98.3 (02-05-23 @ 21:18)  HR: 99 (02-06-23 @ 12:31) (73 - 99)  BP: 157/74 (02-06-23 @ 12:31) (120/70 - 157/85)  BP(mean): --  RR: 18 (02-06-23 @ 12:31) (17 - 18)  SpO2: 98% (02-06-23 @ 12:31) (95% - 100%)            General Exam:   General Appearance: Appropriately dressed and in no acute distress       Head: Normocephalic, atraumatic and no dysmorphic features  Ear, Nose, and Throat: Moist mucous membranes  CVS: S1S2+  Resp: No SOB, no wheeze or rhonchi  Abd: soft NTND  Extremities: No edema, no cyanosis  Skin: No bruises, no rashes        NEUROLOGICAL EXAM:    Mental status: Eyes open, awake, alert, attempts to produce speech, able to follow some simple commands, able to mimic at times , unable to ID objects  Cranial Nerves: Right facial droop, no nystagmus, blinks to threat B/L  Motor exam: Normal tone, no drift, Right hemiparesis RUE drift, 3-44/5, RLE drift, 3-4/5,, LUE 5/5, LLE 5/5  Sensation: Intact to light touch   Coordination/ Gait: Unable to participate with exam     I personally reviewed the below data/images/labs:  no new labs   CBC Full  -  ( 04 Feb 2023 07:50 )  WBC Count : 7.11 K/uL  RBC Count : 3.18 M/uL  Hemoglobin : 8.4 g/dL  Hematocrit : 27.8 %  Platelet Count - Automated : 405 K/uL  Mean Cell Volume : 87.4 fl  Mean Cell Hemoglobin : 26.4 pg  Mean Cell Hemoglobin Concentration : 30.2 gm/dL  Auto Neutrophil # : x  Auto Lymphocyte # : x  Auto Monocyte # : x  Auto Eosinophil # : x  Auto Basophil # : x  Auto Neutrophil % : x  Auto Lymphocyte % : x  Auto Monocyte % : x  Auto Eosinophil % : x  Auto Basophil % : x    02-04    135  |  102  |  9   ----------------------------<  219<H>  3.4<L>   |  24  |  0.50    Ca    7.7<L>      04 Feb 2023 07:50          `< from: CT Head No Cont (01.19.23 @ 13:45) >    ACC: [id number] EXAM:  CT BRAIN   ORDERED BY: CORINNE MADERA     PROCEDURE DATE:  01/19/2023           INTERPRETATION:  CLINICAL STATEMENT: Altered mental status    TECHNIQUE: CT of the head was performed without IV contrast.  RAPID   artificial intelligence was utilized for the preliminary evaluation of   intracranial hemorrhage.    COMPARISON: MRI brain 1/7/2023.    FINDINGS:  There is mild diffuse parenchymal volume loss. There are areas of low   attenuation in the periventricular white matter likely related to mild   chronic microvascular ischemic changes.    Evolving small infarcts noted in the left corona radiata.   Age-indeterminate infarct also noted in the left temporal and parietal   lobe    There is no acute intracranial hemorrhage, parenchymal mass, mass effect   or midline shift.. There is no hydrocephalus. Partial empty sella noted    The cranium is intact. Calcification noted adjacent to left frontal   artery table. The visualized paranasal sinuses are well-aerated.        IMPRESSION:  No acute intracranial hemorrhage.    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    --- End of Report ---        < from: MR Head No Cont (01.26.23 @ 22:08) >    ACC: [id number] EXAM:  MR BRAIN   ORDERED BY: POLLO LYON     PROCEDURE DATE:  01/26/2023          INTERPRETATION:  CLINICAL STATEMENT: Multiple left MCA territory   infarcts. Altered mental status.    TECHNIQUE: Multiplanar multisequence noncontrast MRI of the brain was   performed. Diffusion weighted imaging was performed. Significant image   degradation by motion artifact.  COMPARISON: MRI brain 1/7/2023.    FINDINGS:    Corpus callosum, pituitary and pineal regions appear unremarkable. No  tonsillar herniation or evidence of marrow replacement process.   Hyperostosis frontalis. Degenerative changes visualized cervical spine.    CP angle and IAC regions appear within normal limits, as do the globes,   intraconal regions and major intracranial flow-voids. No paranasal sinus   air-fluid levels or opacification is evident.    New curvilinear-serpiginous susceptibility associated with the high left   frontal lobe. No evidence of acute hemorrhage. Slightly less pronounced   confluent restricted diffusion left lateral temporal lobe. Multiple less   pronounced foci of restricted diffusion with T2-FLAIR prolongation   centered in the left corona radiata, with extension into the ipsilateral   centrum semiovale. Several additional smaller less pronounced foci of   restricted diffusion left parieto-occipital and left posterior temporal   juxtacortical white matter as well as cortically based serpiginous   restricted diffusion left posterior temporal lobe.    IMPRESSION:    Compared with MRI Brain 1/7/2023.    1. Multiple evolving LEFT-SIDED subacute infarcts, as above.  2. Findings suspicious for high LEFT frontal subarachnoid hemorrhage.   Recommend correlation with noncontrast CT of the brain.  3. Additional findings above.    Findings and recommendations discussed in detail with PADMINI Lyon at the   time of this interpretation.    --- En   < from: CT Head No Cont (01.27.23 @ 19:09) >    ACC: [id number] EXAM:  CT BRAIN   ORDERED BY: POLLO LYON     PROCEDURE DATE:  01/27/2023          INTERPRETATION:  CLINICAL INFORMATION: Subacute infarcts with possible   new subarachnoid hemorrhage on MRI.    TECHNIQUE:  Axial CT images were acquired through the head.  Intravenous contrast: None  Two-dimensional reformats were generated.    COMPARISON STUDY: Brain MRIs from 1/7/2023 and 1/26/2023.    FINDINGS:  There are evolving subacute infarcts in the left temporal lobe anteriorly   and within the left corona radiata.  There is no CT evidence of acute   intracranial hemorrhage, mass effect, midline shift or hydrocephalus.    There is mild generalized cerebral volume loss.    The paranasal sinuses are grossly clear.    The mastoids are underpneumatized bilaterally; no clinically significant   mastoid or middle ear effusion is visualized.    The patient is status post right-sided intraocular lens replacement.    The calvarium and skull base appear within normal limits..    IMPRESSION:  Evolving subacute infarcts in the left temporal lobe anteriorly and   within the left corona radiata.  No evidence of acute intracranial   hemorrhage.  No subarachnoid hemorrhage in the left frontal region as   questioned on prior MRI.    --- Endof Report ---    < from: CT Head No Cont (01.30.23 @ 23:02) >    ACC: [id number] EXAM:  CT BRAIN   ORDERED BY: ESAU OAKLEY     PROCEDURE DATE:  01/30/2023          INTERPRETATION:  Noncontrast CT of the brain.    CLINICAL INDICATION:  Altered mental status. Acute left MCA territory   infarcts.    TECHNIQUE : Axial CT scanning of the brain was obtained from the skull   base to the vertex without the administration of intravenous contrast.   Sagittal and coronal reformats were provided.    COMPARISON: MRI brain 1/26/2023. CT brain 1/27/2023.    FINDINGS:    Redemonstration of scattered acute left MCA territory infarcts. No CT   evidence for hemorrhagic transformation.    No hydrocephalus, midline shift, or effacement of basal cisterns.    No acute intracranial hemorrhage.    The visualized paranasal sinuses and mastoid air cells are clear.    IMPRESSION:    No significant interval change.    Redemonstration of scattered acute left MCA territory infarcts. No CT   evidence for hemorrhagic transformation.      --- End of Report ---            CHELSIE RYAN MD; Attending Radiologist  This document has been electronically signed. Jan 31 2023  9:12AM    < end of copied text >      Neurology Progress Note    S: Patient seen and examined. MRI done, c/f infarcts. CTH neg for hemorrhage   on contact     Medication:    MEDICATIONS  (STANDING):  aspirin  chewable 81 milliGRAM(s) Oral daily  atorvastatin 80 milliGRAM(s) Oral at bedtime  clopidogrel Tablet 75 milliGRAM(s) Oral daily  dextrose 5%. 1000 milliLiter(s) (100 mL/Hr) IV Continuous <Continuous>  dextrose 50% Injectable 25 Gram(s) IV Push once  dextrose 50% Injectable 12.5 Gram(s) IV Push once  dextrose 50% Injectable 25 Gram(s) IV Push once  dextrose Oral Gel 15 Gram(s) Oral once  glucagon  Injectable 1 milliGRAM(s) IntraMuscular once  insulin glargine Injectable (LANTUS) 10 Unit(s) SubCutaneous every morning  insulin lispro (ADMELOG) corrective regimen sliding scale   SubCutaneous every 6 hours  pantoprazole   Suspension 40 milliGRAM(s) Oral two times a day  tamsulosin 0.4 milliGRAM(s) Oral at bedtime    MEDICATIONS  (PRN):  acetaminophen    Suspension .. 650 milliGRAM(s) Enteral Tube every 6 hours PRN Temp greater or equal to 38C (100.4F)    Vitals:      Vital Signs Last 24 Hrs  T(C): 36.7 (02-06-23 @ 12:31), Max: 36.8 (02-05-23 @ 21:18)  T(F): 98 (02-06-23 @ 12:31), Max: 98.3 (02-05-23 @ 21:18)  HR: 99 (02-06-23 @ 12:31) (73 - 99)  BP: 157/74 (02-06-23 @ 12:31) (120/70 - 157/85)  BP(mean): --  RR: 18 (02-06-23 @ 12:31) (17 - 18)  SpO2: 98% (02-06-23 @ 12:31) (95% - 100%)            General Exam:   General Appearance: Appropriately dressed and in no acute distress       Head: Normocephalic, atraumatic and no dysmorphic features  Ear, Nose, and Throat: Moist mucous membranes  CVS: S1S2+  Resp: No SOB, no wheeze or rhonchi  Abd: soft NTND  Extremities: No edema, no cyanosis  Skin: No bruises, no rashes        NEUROLOGICAL EXAM:    Mental status: Eyes open, awake, alert, attempts to produce speech, able to follow some simple commands, able to mimic at times , unable to ID objects  Cranial Nerves: Right facial droop, no nystagmus, blinks to threat B/L  Motor exam: Normal tone, no drift, Right hemiparesis RUE drift, 3-44/5, RLE drift, 3-4/5,, LUE 5/5, LLE 5/5  Sensation: Intact to light touch   Coordination/ Gait: Unable to participate with exam     I personally reviewed the below data/images/labs:  no new labs   CBC Full  -  ( 04 Feb 2023 07:50 )  WBC Count : 7.11 K/uL  RBC Count : 3.18 M/uL  Hemoglobin : 8.4 g/dL  Hematocrit : 27.8 %  Platelet Count - Automated : 405 K/uL  Mean Cell Volume : 87.4 fl  Mean Cell Hemoglobin : 26.4 pg  Mean Cell Hemoglobin Concentration : 30.2 gm/dL  Auto Neutrophil # : x  Auto Lymphocyte # : x  Auto Monocyte # : x  Auto Eosinophil # : x  Auto Basophil # : x  Auto Neutrophil % : x  Auto Lymphocyte % : x  Auto Monocyte % : x  Auto Eosinophil % : x  Auto Basophil % : x    02-04    135  |  102  |  9   ----------------------------<  219<H>  3.4<L>   |  24  |  0.50    Ca    7.7<L>      04 Feb 2023 07:50          `< from: CT Head No Cont (01.19.23 @ 13:45) >    ACC: [id number] EXAM:  CT BRAIN   ORDERED BY: CORINNE MADERA     PROCEDURE DATE:  01/19/2023           INTERPRETATION:  CLINICAL STATEMENT: Altered mental status    TECHNIQUE: CT of the head was performed without IV contrast.  RAPID   artificial intelligence was utilized for the preliminary evaluation of   intracranial hemorrhage.    COMPARISON: MRI brain 1/7/2023.    FINDINGS:  There is mild diffuse parenchymal volume loss. There are areas of low   attenuation in the periventricular white matter likely related to mild   chronic microvascular ischemic changes.    Evolving small infarcts noted in the left corona radiata.   Age-indeterminate infarct also noted in the left temporal and parietal   lobe    There is no acute intracranial hemorrhage, parenchymal mass, mass effect   or midline shift.. There is no hydrocephalus. Partial empty sella noted    The cranium is intact. Calcification noted adjacent to left frontal   artery table. The visualized paranasal sinuses are well-aerated.        IMPRESSION:  No acute intracranial hemorrhage.    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    --- End of Report ---        < from: MR Head No Cont (01.26.23 @ 22:08) >    ACC: [id number] EXAM:  MR BRAIN   ORDERED BY: POLLO LYON     PROCEDURE DATE:  01/26/2023          INTERPRETATION:  CLINICAL STATEMENT: Multiple left MCA territory   infarcts. Altered mental status.    TECHNIQUE: Multiplanar multisequence noncontrast MRI of the brain was   performed. Diffusion weighted imaging was performed. Significant image   degradation by motion artifact.  COMPARISON: MRI brain 1/7/2023.    FINDINGS:    Corpus callosum, pituitary and pineal regions appear unremarkable. No  tonsillar herniation or evidence of marrow replacement process.   Hyperostosis frontalis. Degenerative changes visualized cervical spine.    CP angle and IAC regions appear within normal limits, as do the globes,   intraconal regions and major intracranial flow-voids. No paranasal sinus   air-fluid levels or opacification is evident.    New curvilinear-serpiginous susceptibility associated with the high left   frontal lobe. No evidence of acute hemorrhage. Slightly less pronounced   confluent restricted diffusion left lateral temporal lobe. Multiple less   pronounced foci of restricted diffusion with T2-FLAIR prolongation   centered in the left corona radiata, with extension into the ipsilateral   centrum semiovale. Several additional smaller less pronounced foci of   restricted diffusion left parieto-occipital and left posterior temporal   juxtacortical white matter as well as cortically based serpiginous   restricted diffusion left posterior temporal lobe.    IMPRESSION:    Compared with MRI Brain 1/7/2023.    1. Multiple evolving LEFT-SIDED subacute infarcts, as above.  2. Findings suspicious for high LEFT frontal subarachnoid hemorrhage.   Recommend correlation with noncontrast CT of the brain.  3. Additional findings above.    Findings and recommendations discussed in detail with PADMINI Lyon at the   time of this interpretation.    --- En   < from: CT Head No Cont (01.27.23 @ 19:09) >    ACC: [id number] EXAM:  CT BRAIN   ORDERED BY: POLLO LYON     PROCEDURE DATE:  01/27/2023          INTERPRETATION:  CLINICAL INFORMATION: Subacute infarcts with possible   new subarachnoid hemorrhage on MRI.    TECHNIQUE:  Axial CT images were acquired through the head.  Intravenous contrast: None  Two-dimensional reformats were generated.    COMPARISON STUDY: Brain MRIs from 1/7/2023 and 1/26/2023.    FINDINGS:  There are evolving subacute infarcts in the left temporal lobe anteriorly   and within the left corona radiata.  There is no CT evidence of acute   intracranial hemorrhage, mass effect, midline shift or hydrocephalus.    There is mild generalized cerebral volume loss.    The paranasal sinuses are grossly clear.    The mastoids are underpneumatized bilaterally; no clinically significant   mastoid or middle ear effusion is visualized.    The patient is status post right-sided intraocular lens replacement.    The calvarium and skull base appear within normal limits..    IMPRESSION:  Evolving subacute infarcts in the left temporal lobe anteriorly and   within the left corona radiata.  No evidence of acute intracranial   hemorrhage.  No subarachnoid hemorrhage in the left frontal region as   questioned on prior MRI.    --- Endof Report ---    < from: CT Head No Cont (01.30.23 @ 23:02) >    ACC: [id number] EXAM:  CT BRAIN   ORDERED BY: ESAU OAKLEY     PROCEDURE DATE:  01/30/2023          INTERPRETATION:  Noncontrast CT of the brain.    CLINICAL INDICATION:  Altered mental status. Acute left MCA territory   infarcts.    TECHNIQUE : Axial CT scanning of the brain was obtained from the skull   base to the vertex without the administration of intravenous contrast.   Sagittal and coronal reformats were provided.    COMPARISON: MRI brain 1/26/2023. CT brain 1/27/2023.    FINDINGS:    Redemonstration of scattered acute left MCA territory infarcts. No CT   evidence for hemorrhagic transformation.    No hydrocephalus, midline shift, or effacement of basal cisterns.    No acute intracranial hemorrhage.    The visualized paranasal sinuses and mastoid air cells are clear.    IMPRESSION:    No significant interval change.    Redemonstration of scattered acute left MCA territory infarcts. No CT   evidence for hemorrhagic transformation.      --- End of Report ---            CHELSIE RYAN MD; Attending Radiologist  This document has been electronically signed. Jan 31 2023  9:12AM    < end of copied text >      Neurology Progress Note    S: Patient seen and examined. MRI done, c/f infarcts. CTH neg for hemorrhage   on contact     Medication:    MEDICATIONS  (STANDING):  aspirin  chewable 81 milliGRAM(s) Oral daily  atorvastatin 80 milliGRAM(s) Oral at bedtime  clopidogrel Tablet 75 milliGRAM(s) Oral daily  dextrose 5%. 1000 milliLiter(s) (100 mL/Hr) IV Continuous <Continuous>  dextrose 50% Injectable 25 Gram(s) IV Push once  dextrose 50% Injectable 12.5 Gram(s) IV Push once  dextrose 50% Injectable 25 Gram(s) IV Push once  dextrose Oral Gel 15 Gram(s) Oral once  glucagon  Injectable 1 milliGRAM(s) IntraMuscular once  insulin glargine Injectable (LANTUS) 10 Unit(s) SubCutaneous every morning  insulin lispro (ADMELOG) corrective regimen sliding scale   SubCutaneous every 6 hours  pantoprazole   Suspension 40 milliGRAM(s) Oral two times a day  tamsulosin 0.4 milliGRAM(s) Oral at bedtime    MEDICATIONS  (PRN):  acetaminophen    Suspension .. 650 milliGRAM(s) Enteral Tube every 6 hours PRN Temp greater or equal to 38C (100.4F)    Vitals:      Vital Signs Last 24 Hrs  T(C): 36.7 (02-06-23 @ 12:31), Max: 36.8 (02-05-23 @ 21:18)  T(F): 98 (02-06-23 @ 12:31), Max: 98.3 (02-05-23 @ 21:18)  HR: 99 (02-06-23 @ 12:31) (73 - 99)  BP: 157/74 (02-06-23 @ 12:31) (120/70 - 157/85)  BP(mean): --  RR: 18 (02-06-23 @ 12:31) (17 - 18)  SpO2: 98% (02-06-23 @ 12:31) (95% - 100%)            General Exam:   General Appearance: Appropriately dressed and in no acute distress       Head: Normocephalic, atraumatic and no dysmorphic features  Ear, Nose, and Throat: Moist mucous membranes  CVS: S1S2+  Resp: No SOB, no wheeze or rhonchi  Abd: soft NTND  Extremities: No edema, no cyanosis  Skin: No bruises, no rashes        NEUROLOGICAL EXAM:    Mental status: Eyes open, awake, alert, attempts to produce speech, able to follow some simple commands, able to mimic at times , unable to ID objects  Cranial Nerves: Right facial droop, no nystagmus, blinks to threat B/L  Motor exam: Normal tone, no drift, Right hemiparesis RUE drift, 3-44/5, RLE drift, 3-4/5,, LUE 5/5, LLE 5/5  Sensation: Intact to light touch   Coordination/ Gait: Unable to participate with exam     I personally reviewed the below data/images/labs:  no new labs   CBC Full  -  ( 04 Feb 2023 07:50 )  WBC Count : 7.11 K/uL  RBC Count : 3.18 M/uL  Hemoglobin : 8.4 g/dL  Hematocrit : 27.8 %  Platelet Count - Automated : 405 K/uL  Mean Cell Volume : 87.4 fl  Mean Cell Hemoglobin : 26.4 pg  Mean Cell Hemoglobin Concentration : 30.2 gm/dL  Auto Neutrophil # : x  Auto Lymphocyte # : x  Auto Monocyte # : x  Auto Eosinophil # : x  Auto Basophil # : x  Auto Neutrophil % : x  Auto Lymphocyte % : x  Auto Monocyte % : x  Auto Eosinophil % : x  Auto Basophil % : x    02-04    135  |  102  |  9   ----------------------------<  219<H>  3.4<L>   |  24  |  0.50    Ca    7.7<L>      04 Feb 2023 07:50          `< from: CT Head No Cont (01.19.23 @ 13:45) >    ACC: [id number] EXAM:  CT BRAIN   ORDERED BY: CORINNE MADERA     PROCEDURE DATE:  01/19/2023           INTERPRETATION:  CLINICAL STATEMENT: Altered mental status    TECHNIQUE: CT of the head was performed without IV contrast.  RAPID   artificial intelligence was utilized for the preliminary evaluation of   intracranial hemorrhage.    COMPARISON: MRI brain 1/7/2023.    FINDINGS:  There is mild diffuse parenchymal volume loss. There are areas of low   attenuation in the periventricular white matter likely related to mild   chronic microvascular ischemic changes.    Evolving small infarcts noted in the left corona radiata.   Age-indeterminate infarct also noted in the left temporal and parietal   lobe    There is no acute intracranial hemorrhage, parenchymal mass, mass effect   or midline shift.. There is no hydrocephalus. Partial empty sella noted    The cranium is intact. Calcification noted adjacent to left frontal   artery table. The visualized paranasal sinuses are well-aerated.        IMPRESSION:  No acute intracranial hemorrhage.    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    --- End of Report ---        < from: MR Head No Cont (01.26.23 @ 22:08) >    ACC: [id number] EXAM:  MR BRAIN   ORDERED BY: POLLO LYON     PROCEDURE DATE:  01/26/2023          INTERPRETATION:  CLINICAL STATEMENT: Multiple left MCA territory   infarcts. Altered mental status.    TECHNIQUE: Multiplanar multisequence noncontrast MRI of the brain was   performed. Diffusion weighted imaging was performed. Significant image   degradation by motion artifact.  COMPARISON: MRI brain 1/7/2023.    FINDINGS:    Corpus callosum, pituitary and pineal regions appear unremarkable. No  tonsillar herniation or evidence of marrow replacement process.   Hyperostosis frontalis. Degenerative changes visualized cervical spine.    CP angle and IAC regions appear within normal limits, as do the globes,   intraconal regions and major intracranial flow-voids. No paranasal sinus   air-fluid levels or opacification is evident.    New curvilinear-serpiginous susceptibility associated with the high left   frontal lobe. No evidence of acute hemorrhage. Slightly less pronounced   confluent restricted diffusion left lateral temporal lobe. Multiple less   pronounced foci of restricted diffusion with T2-FLAIR prolongation   centered in the left corona radiata, with extension into the ipsilateral   centrum semiovale. Several additional smaller less pronounced foci of   restricted diffusion left parieto-occipital and left posterior temporal   juxtacortical white matter as well as cortically based serpiginous   restricted diffusion left posterior temporal lobe.    IMPRESSION:    Compared with MRI Brain 1/7/2023.    1. Multiple evolving LEFT-SIDED subacute infarcts, as above.  2. Findings suspicious for high LEFT frontal subarachnoid hemorrhage.   Recommend correlation with noncontrast CT of the brain.  3. Additional findings above.    Findings and recommendations discussed in detail with PADMINI Lyon at the   time of this interpretation.    --- En   < from: CT Head No Cont (01.27.23 @ 19:09) >    ACC: [id number] EXAM:  CT BRAIN   ORDERED BY: POLLO LYON     PROCEDURE DATE:  01/27/2023          INTERPRETATION:  CLINICAL INFORMATION: Subacute infarcts with possible   new subarachnoid hemorrhage on MRI.    TECHNIQUE:  Axial CT images were acquired through the head.  Intravenous contrast: None  Two-dimensional reformats were generated.    COMPARISON STUDY: Brain MRIs from 1/7/2023 and 1/26/2023.    FINDINGS:  There are evolving subacute infarcts in the left temporal lobe anteriorly   and within the left corona radiata.  There is no CT evidence of acute   intracranial hemorrhage, mass effect, midline shift or hydrocephalus.    There is mild generalized cerebral volume loss.    The paranasal sinuses are grossly clear.    The mastoids are underpneumatized bilaterally; no clinically significant   mastoid or middle ear effusion is visualized.    The patient is status post right-sided intraocular lens replacement.    The calvarium and skull base appear within normal limits..    IMPRESSION:  Evolving subacute infarcts in the left temporal lobe anteriorly and   within the left corona radiata.  No evidence of acute intracranial   hemorrhage.  No subarachnoid hemorrhage in the left frontal region as   questioned on prior MRI.    --- Endof Report ---    < from: CT Head No Cont (01.30.23 @ 23:02) >    ACC: [id number] EXAM:  CT BRAIN   ORDERED BY: ESAU OAKLEY     PROCEDURE DATE:  01/30/2023          INTERPRETATION:  Noncontrast CT of the brain.    CLINICAL INDICATION:  Altered mental status. Acute left MCA territory   infarcts.    TECHNIQUE : Axial CT scanning of the brain was obtained from the skull   base to the vertex without the administration of intravenous contrast.   Sagittal and coronal reformats were provided.    COMPARISON: MRI brain 1/26/2023. CT brain 1/27/2023.    FINDINGS:    Redemonstration of scattered acute left MCA territory infarcts. No CT   evidence for hemorrhagic transformation.    No hydrocephalus, midline shift, or effacement of basal cisterns.    No acute intracranial hemorrhage.    The visualized paranasal sinuses and mastoid air cells are clear.    IMPRESSION:    No significant interval change.    Redemonstration of scattered acute left MCA territory infarcts. No CT   evidence for hemorrhagic transformation.      --- End of Report ---            CHELSIE RYAN MD; Attending Radiologist  This document has been electronically signed. Jan 31 2023  9:12AM    < end of copied text >

## 2023-02-06 NOTE — DISCHARGE NOTE PROVIDER - CARE PROVIDER_API CALL
Reji Greco)  Neurology; Vascular Neurology  [address] [facility], [address]  [place] [address]  Phone: [phone]  Fax: [phone]  Follow Up Time:     MINH JOSEPH  Specialist  N  JAKE WILKINSON, Rose,    Phone: ()-  Fax: ()-  Follow Up Time:     [facility] urology,   [address] [place] [address]  Phone: [phone]  Fax: (   )    -  Follow Up Time:     [facility] Endocrine,   [address] [place], [place]  Phone: [phone]  Fax: (   )    -  Follow Up Time:    Reji Greco)  Neurology; Vascular Neurology  [address] [facility], [address]  [place] [address]  Phone: [phone]  Fax: [phone]  Follow Up Time:     MINH OJSEPH  Specialist  N  JAKE WILKINSON, Rose,    Phone: ()-  Fax: ()-  Follow Up Time:     [facility] urology,   [address] [place] [address]  Phone: [phone]  Fax: (   )    -  Follow Up Time:     [facility] Endocrine,   [address] [place], [place]  Phone: [phone]  Fax: (   )    -  Follow Up Time:    Reji Greco)  Neurology; Vascular Neurology  [address] [facility], [address]  [place] [address]  Phone: [phone]  Fax: [phone]  Follow Up Time:     MINH JOSEPH  Specialist  N  JAKE WILKINSON, Rsoe,    Phone: ()-  Fax: ()-  Follow Up Time:     [facility] urology,   [address] [place] [address]  Phone: [phone]  Fax: (   )    -  Follow Up Time:     [facility] Endocrine,   [address] [place], [place]  Phone: [phone]  Fax: (   )    -  Follow Up Time:

## 2023-02-06 NOTE — PROGRESS NOTE ADULT - NSPROGADDITIONALINFOA_GEN_ALL_CORE
- Counseling on diet, exercise, and medication adherence was done  - Counseling on smoking cessation and alcohol consumption offered when appropriate.  - Pain assessed and judicious use of narcotics when appropriate was discussed.    - Stroke education given when appropriate.  - Importance of fall prevention discussed.   - Differential diagnosis and plan of care discussed with patient and/or family and primary team
26 minutes spent on the development of plan of care/coordination of care/glycemic control through review of labs, blood glucose values and vital signs.
26 minutes spent on plan of care/coordination of care/glycemic control through review of labs, blood glucose values and vital signs.
26 minutes spent on the development of plan of care/coordination of care/glycemic control through review of labs, blood glucose values and vital signs.

## 2023-02-06 NOTE — PROGRESS NOTE ADULT - ASSESSMENT
78F with dementia, T2DM, recently discharged about 1 week ago for CVA now presenting with poor PO intake, lethargy and hyperglycemia to 500s.   Of note, patient admitted 1/6 for R facial droop, word finding difficulties. Imaging concerning for acute L MCA infarct. Discharged home on 1/11. Majority of history obtained through family given mental status. Per family, since stroke patient nonverbal/unable to participate in meaningful communications, intermittently follows commands. For the last 3-4 days, patient with poor po intake. Reportedly only eating breakfast. Day of presentation, more lethargic with elevated finger sticks up to the 500s. Patient was evaluated by endocrine team during prior admission with recs to increase metformin to 1000mg BID. However, given poor PO intake, family has been giving 500mg daily.     In ED, afebrile, , 135/84. WBC 15, Na 158, K 3.0, Cl 120, Glu 468, alk phos 136m BHB 0.7. pH 7.36, lactate 2.2. UA: +leuk esterase, many bacteria, WBC 11-25  CXR: Redemonstrated biapical scarring and left upper lobe bronchiectasis, unchanged. No focal consolidation.   CTH No acute intracranial hemorrhage. 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  Given 1.5L NS, ceftriaxone x1, haldol 2.5mg x1 in ED.     A/P    Hypernatremia/ Hyponatremia   free water flush on hold    improved   no BMP today   optimize glucose   Avoid overcorrection >6-8meq a day   monitor Na closely     Hypocalcemia   2/2 low albumin  corrected Ca WNL   optimize albumin   monitor     Hypokalemia  Replete as needed.   monitor K     Hypophosphatemia  Supplement as needed  Daily phosphorus     HTN  fluctuating  not on BP med, if bp remains elevated, suggest to give amlodipine 5mg daily    monitor BP closely     Proteinuria  Currently has UTI would repeat and then quantify

## 2023-03-11 ENCOUNTER — EMERGENCY (EMERGENCY)
Facility: HOSPITAL | Age: 78
LOS: 1 days | Discharge: ANOTHER HOSPITAL | End: 2023-03-11
Attending: EMERGENCY MEDICINE | Admitting: EMERGENCY MEDICINE
Payer: MEDICARE

## 2023-03-11 VITALS
HEIGHT: 64 IN | SYSTOLIC BLOOD PRESSURE: 130 MMHG | HEART RATE: 99 BPM | TEMPERATURE: 97 F | WEIGHT: 119.93 LBS | DIASTOLIC BLOOD PRESSURE: 75 MMHG | RESPIRATION RATE: 16 BRPM | OXYGEN SATURATION: 95 %

## 2023-03-11 DIAGNOSIS — Z43.1 ENCOUNTER FOR ATTENTION TO GASTROSTOMY: ICD-10-CM

## 2023-03-11 PROCEDURE — 49465 FLUORO EXAM OF G/COLON TUBE: CPT

## 2023-03-11 PROCEDURE — 99283 EMERGENCY DEPT VISIT LOW MDM: CPT

## 2023-03-11 PROCEDURE — 99285 EMERGENCY DEPT VISIT HI MDM: CPT

## 2023-03-11 RX ORDER — DIATRIZOATE MEGLUMINE 180 MG/ML
60 INJECTION, SOLUTION INTRAVESICAL ONCE
Refills: 0 | Status: COMPLETED | OUTPATIENT
Start: 2023-03-11 | End: 2023-03-11

## 2023-03-11 RX ADMIN — DIATRIZOATE MEGLUMINE 60 MILLILITER(S): 180 INJECTION, SOLUTION INTRAVESICAL at 11:48

## 2023-03-11 NOTE — ED PROVIDER NOTE - PATIENT PORTAL LINK FT
You can access the FollowMyHealth Patient Portal offered by [facility] by registering at the following website: [email or web address]. By joining [employer]’s FollowMyHealth portal, you will also be able to view your health information using other applications (apps) compatible with our system. You can access the FollowMyHealth Patient Portal offered by [facility] by registering at the following website: [email or web address]. By joining [employer]’s FollowMyHealth portal, you will also be able to view your health information using other applications (apps) compatible with our system. You can access the FollowMyHealth Patient Portal offered by [facility] by registering at the following website: [email or web address]. By joining [employer]’s FollowMyHealth portal, you will also be able to view your health information using other applications (apps) compatible with our system.

## 2023-03-11 NOTE — ED PROVIDER NOTE - NSFOLLOWUPINSTRUCTIONS_ED_ALL_ED_FT
PEG Tube Home Guide      A percutaneous endoscopic gastrostomy (PEG) tube is used to deliver food, medicine, and fluids directly into the stomach. The tube has a clamp, a cap, and two anchors (bolsters). One bolster keeps the tube from coming out of the stomach. The other bolster holds the tube against the abdomen.    You will be taught how to use and adjust your PEG tube before you leave the hospital. You will also be taught how to care for the opening (stoma) in your abdomen. Make sure that you understand:  •How to care for your PEG tube.      •How to care for your stoma.      •How to give yourself feedings and medicines.      •When to call your health care provider for help.        Supplies needed:    •Soapy water.      •Clean, plain water.      •Clean washcloth.      •Bandage (dressing). This is optional.      •Syringe.        How to care for a PEG tube    Check your PEG tube every day. Make sure:  •It is not too tight. The bolster should rest gently over the stoma.      •It is in the correct position. There is a danielle on the tube that shows when it is in the correct position. Adjust the tube if you need to.        Cleaning your stoma  An abdomen with a PEG tube positioned in a stoma.  Clean your stoma every day. Follow these steps:  1.Wash your hands with soap and water for at least 20 seconds. If soap and water are not available, use hand .      2.Check the skin around the stoma for redness, rash, swelling, drainage, or extra tissue growth. If you notice any of these, call your health care provider.    3.Wash the stoma and the skin around it using a clean, soft washcloth. Clean using a circular motion, and wipe away from the stoma opening, not toward it.  •Use warm, soapy water, and only use cleansers recommended by your health care provider.      •Rinse the stoma area with plain water.      •Pat the stoma area dry.        4.Place a dressing over the stoma if your health care provider told you to do that.        Giving yourself a feeding  A person pushing water out of a syringe into a feeding tube to clean, or flush, the tube.  Your health care provider will give you instructions about:  •How much nutrition and fluid you will need for each feeding.      •How often to have a feeding.      •Whether to take medicine in the tube by itself or with a feeding.      To give yourself a feeding, follow these steps:  1.Lay out all of the equipment that you will need.      2.Make sure that the nutritional formula is at room temperature.      3.Wash your hands with soap and water for at least 20 seconds. If soap and water are not available, use hand .      4.Position yourself so that you are upright. You will need to stay upright throughout the feeding and for at least 30 minutes after the feeding.      5.Make sure the syringe plunger is pushed in. Place the tip of the syringe in clean water, and slowly pull the plunger to bring (draw up) the water into the syringe.      6.Remove the clamp and the cap from the PEG tube.      7.Push the water out of the syringe to clean (flush) the tube.      8.If the tube is clear, draw up the formula into the syringe. Make sure to use the right amount for each feeding and add water if necessary.      9.Slowly push the formula from the syringe through the tube.      10.After the feeding, flush the tube with water.      11.Put the clamp and the cap on the tube.      12.Stay sitting up or standing up straight for at least 30 minutes.      Use the feeding tube equipment, such as syringes and connectors, only as told by your health care provider.      Giving yourself medicine  Washing hands with soap and water.  To give yourself medicine, follow these steps:  1.Lay out all of the equipment that you will need.      2.If your medicine is in tablet form, crush the tablet and dissolve it in water.      3.Wash your hands with soap and water for at least 20 seconds. If soap and water are not available, use hand .      4.Position yourself so that you are upright. You will need to stay upright while you give yourself medicine and for at least 30 minutes afterward.      5.Make sure the syringe plunger is pushed in. Place the tip of the syringe in clean water, and slowly pull the plunger to bring (draw up) the water into the syringe.      6.Remove the clamp and the cap from the PEG tube.      7.Push the water out of the syringe to clean (flush) the tube.      8.If the tube is clear, draw up the medicine into the syringe.      9.Slowly push the medicine from the syringe through the tube.      10.Flush the tube with water.      11.Put the clamp and the cap on the tube.      12.Stay sitting up or standing up straight for at least 30 minutes.      Do not take sustained release (SR) medicines through your tube. If you are unsure if your medicine is an SR medicine, ask your health care provider or pharmacist.      Contact a health care provider if you have:    •Soreness, redness, or irritation around your stoma.      •Abdominal pain or bloating during or after your feedings.      •Nausea, constipation, or diarrhea that will not go away.      •A fever.      •Problems with your PEG tube.        Get help right away if:    •Your tube is blocked.      •Your tube falls out.      •You have pain around your stoma.      •You are bleeding from your stoma.      •Your tube is leaking.      •You choke or you have trouble breathing during or after a feeding.        Summary    •A percutaneous endoscopic gastrostomy (PEG) tube is used to deliver food and fluids directly into the stomach.      •You will be taught how to use and adjust your PEG tube. You will also be taught how to care for the stoma in your abdomen.      •Your health care provider will give you instructions on how to give yourself nutritional formula and medicines through your PEG tube.      •Contact your health care provider if you have a fever or soreness, redness, or irritation around your stoma.      •Get help right away if your tube leaks, is blocked, or falls out. Get help right away if you have pain or bleeding around your stoma.      This information is not intended to replace advice given to you by your health care provider. Make sure you discuss any questions you have with your health care provider.      Document Revised: 12/08/2021 Document Reviewed: 04/30/2021    Elsevier Patient Education © 2023 [employer]

## 2023-03-11 NOTE — ED ADULT NURSE NOTE - NS ED NRS NURSING HOMES
[facility]

## 2023-03-11 NOTE — ED PROVIDER NOTE - OBJECTIVE STATEMENT
As per Dr. Devlin, pt transferred to ED for G- tube replacement.  pt is aphasic.  no other complaints.

## 2023-03-11 NOTE — ED ADULT NURSE NOTE - NSIMPLEMENTINTERV_GEN_ALL_ED
Implemented All Fall with Harm Risk Interventions:  [place] to call system. Call bell, personal items and telephone within reach. Instruct patient to call for assistance. Room bathroom lighting operational. Non-slip footwear when patient is off stretcher. Physically safe environment: no spills, clutter or unnecessary equipment. Stretcher in lowest position, wheels locked, appropriate side rails in place. Provide visual cue, wrist band, yellow gown, etc. Monitor gait and stability. Monitor for mental status changes and reorient to person, place, and time. Review medications for side effects contributing to fall risk. Reinforce activity limits and safety measures with patient and family. Provide visual clues: red socks. Implemented All Fall with Harm Risk Interventions:  [place] to call system. Call bell, personal items and telephone within reach. Instruct patient to call for assistance. Room bathroom lighting operational. Non-slip footwear when patient is off stretcher. Physically safe environment: no spills, clutter or unnecessary equipment. Stretcher in lowest position, wheels locked, appropriate side rails in place. Provide visual cue, wrist band, yellow gown, etc. Monitor gait and stability. Monitor for mental status changes and reorient to person, place, and time. Review medications for side effects contributing to fall risk. Reinforce activity limits and safety measures with patient and family. Provide visual clues: red socks. Implemented All Fall with Harm Risk Interventions:  [place] to call system. Call bell, personal items and telephone within reach. Instruct patient to call for assistance. Room bathroom lighting operational. Non-slip footwear when patient is off stretcher. Physically safe environment: no spills, clutter or unnecessary equipment. Stretcher in lowest position, wheels locked, appropriate side rails in place. Provide visual cue, wrist band, yellow gown, etc. Monitor gait and stability. Monitor for mental status changes and reorient to person, place, and time. Review medications for side effects contributing to fall risk. Reinforce activity limits and safety measures with patient and family. Provide visual clues: red socks.

## 2023-06-26 NOTE — PROGRESS NOTE ADULT - TIME BILLING
Pt arrives at the ed c/o pain in left breast that started a week ago. Pt is A&Ox4 and is resting in bed. Pt states she feels a pressure on breast. Pt denies trauma. Pt denies taking anything for pain. Pt denies fevers. Airway is patent. VSS.
Agree with detailed plan as stated above
Agree with plan as detailed above
I spoke to the patient's grandson in law who is a medical fellow and reviewed the case in detail.
See above
see above

## 2023-07-08 NOTE — PROGRESS NOTE ADULT - ASSESSMENT
78F with dementia, T2DM, recently discharged about 1 week ago for CVA now presenting with poor PO intake, lethargy and hyperglycemia to 500s.   Of note, patient admitted 1/6 for R facial droop, word finding difficulties. Imaging concerning for acute L MCA infarct. Discharged home on 1/11. Majority of history obtained through family given mental status. Per family, since stroke patient nonverbal/unable to participate in meaningful communications, intermittently follows commands. For the last 3-4 days, patient with poor po intake. Reportedly only eating breakfast. Day of presentation, more lethargic with elevated finger sticks up to the 500s. Patient was evaluated by endocrine team during prior admission with recs to increase metformin to 1000mg BID. However, given poor PO intake, family has been giving 500mg daily.     In ED, afebrile, , 135/84. WBC 15, Na 158, K 3.0, Cl 120, Glu 468, alk phos 136m BHB 0.7. pH 7.36, lactate 2.2. UA: +leuk esterase, many bacteria, WBC 11-25  CXR: Redemonstrated biapical scarring and left upper lobe bronchiectasis, unchanged. No focal consolidation.   CTH No acute intracranial hemorrhage. 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  Given 1.5L NS, ceftriaxone x1, haldol 2.5mg x1 in ED.     A/P    Hypernatremia  2/2 poor oral intake   s/p  D5 50cc/hr x10hr 01/23/23  improved  Avoid overcorrection >6-8meq a day   monitor Na closely     Hypokalemia  stable   monitor K     Hypophosphatemia  Supplement as needed  Daily phosphorus     HTN  optimal   monitor BP closely     Proteinuria  Currently has UTI would repeat and then quantify   Admitted

## 2023-08-04 NOTE — H&P ADULT - NSVTERISKREFERASSESS_GEN_ALL_CORE
pacu discharge criteria met Pt stable for transfer to phase II     Refer to the Assessment tab to view/cancel completed assessment.

## 2023-08-05 ENCOUNTER — EMERGENCY (EMERGENCY)
Facility: HOSPITAL | Age: 78
LOS: 1 days | Discharge: HOME | End: 2023-08-05
Attending: EMERGENCY MEDICINE
Payer: MEDICARE

## 2023-08-05 VITALS
SYSTOLIC BLOOD PRESSURE: 135 MMHG | HEART RATE: 120 BPM | RESPIRATION RATE: 20 BRPM | DIASTOLIC BLOOD PRESSURE: 93 MMHG | OXYGEN SATURATION: 98 % | WEIGHT: 139.99 LBS | HEIGHT: 62 IN

## 2023-08-05 VITALS
DIASTOLIC BLOOD PRESSURE: 68 MMHG | OXYGEN SATURATION: 95 % | SYSTOLIC BLOOD PRESSURE: 134 MMHG | TEMPERATURE: 98 F | HEART RATE: 85 BPM | RESPIRATION RATE: 18 BRPM

## 2023-08-05 PROCEDURE — L8699: CPT

## 2023-08-05 PROCEDURE — 99283 EMERGENCY DEPT VISIT LOW MDM: CPT | Mod: 25

## 2023-08-05 PROCEDURE — 49465 FLUORO EXAM OF G/COLON TUBE: CPT

## 2023-08-05 PROCEDURE — 43762 RPLC GTUBE NO REVJ TRC: CPT

## 2023-08-05 PROCEDURE — 99284 EMERGENCY DEPT VISIT MOD MDM: CPT | Mod: 25

## 2023-08-05 NOTE — ED PROVIDER NOTE - PHYSICAL EXAMINATION
GENERAL: NAD  HEENT:  Atraumatic  CHEST/LUNG: Chest rise equal bilaterally  HEART: Regular rate and rhythm  ABDOMEN: Soft, Nontender, Nondistended. Broken PEG tube in place  EXTREMITIES:  Extremities warm  PSYCH: A&Ox3  SKIN: No obvious rashes or lesions  NEUROLOGY: strength and sensation at baseline.

## 2023-08-05 NOTE — ED PROVIDER NOTE - NSFOLLOWUPINSTRUCTIONS_ED_ALL_ED_FT
You were seen for a broken PEG tube today. The PEG tube was replaced. Please see your GI doctor this week for follow up. Return to the ED for further PEG tube complications.     SEEK IMMEDIATE MEDICAL CARE IF YOU HAVE ANY OF THE FOLLOWING SYMPTOMS: worsening abdominal pain, uncontrollable vomiting, profuse diarrhea, inability to have bowel movements or pass gas, black or bloody stools, fever accompanying chest pain or back pain, or fainting. These symptoms may represent a serious problem that is an emergency. Do not wait to see if the symptoms will go away. Get medical help right away. Call 911 and do not drive yourself to the hospital.

## 2023-08-05 NOTE — ED ADULT NURSE NOTE - NSFALLHARMRISKINTERV_ED_ALL_ED
Assistance OOB with selected safe patient handling equipment if applicable/Assistance with ambulation/Communicate risk of Fall with Harm to all staff, patient, and family/Monitor gait and stability/Provide patient with walking aids/Provide visual cue: red socks, yellow wristband, yellow gown, etc/Reinforce activity limits and safety measures with patient and family/Bed in lowest position, wheels locked, appropriate side rails in place/Call bell, personal items and telephone in reach/Instruct patient to call for assistance before getting out of bed/chair/stretcher/Non-slip footwear applied when patient is off stretcher/[place] to call system/Physically safe environment - no spills, clutter or unnecessary equipment/Purposeful Proactive Rounding/Room/bathroom lighting operational, light cord in reach Assistance OOB with selected safe patient handling equipment if applicable/Assistance with ambulation/Communicate risk of Fall with Harm to all staff, patient, and family/Monitor gait and stability/Provide patient with walking aids/Provide visual cue: red socks, yellow wristband, yellow gown, etc/Reinforce activity limits and safety measures with patient and family/Bed in lowest position, wheels locked, appropriate side rails in place/Call bell, personal items and telephone in reach/Instruct patient to call for assistance before getting out of bed/chair/stretcher/Non-slip footwear applied when patient is off stretcher/[place] to call system/Physically safe environment - no spills, clutter or unnecessary equipment/Purposeful Proactive Rounding/Room/bathroom lighting operational, light cord in reach Assistance OOB with selected safe patient handling equipment if applicable/Assistance with ambulation/Communicate risk of Fall with Harm to all staff, patient, and family/Monitor gait and stability/Provide patient with walking aids/Provide visual cue: red socks, yellow wristband, yellow gown, etc/Reinforce activity limits and safety measures with patient and family/Bed in lowest position, wheels locked, appropriate side rails in place/Call bell, personal items and telephone in reach/Instruct patient to call for assistance before getting out of bed/chair/stretcher/Non-slip footwear applied when patient is off stretcher/[place] to call system/Physically safe environment - no spills, clutter or unnecessary equipment/Purposeful Proactive Rounding/Room/bathroom lighting operational, light cord in reach

## 2023-08-05 NOTE — ED PROCEDURE NOTE - PROCEDURE ADDITIONAL DETAILS
The broken PEG tube was a 20 Guamanian and was removed fully. A 20 Guamanian PEG tube was re-inserted, abdominal XR obtained. The broken PEG tube was a 20 [place]ran and was removed fully. A 20 Andorran PEG tube was re-inserted, abdominal XR obtained. The broken PEG tube was a 20 [place]an and was removed fully. A 20 [place]an PEG tube was re-inserted, abdominal XR obtained.

## 2023-08-05 NOTE — ED PROVIDER NOTE - ATTENDING CONTRIBUTION TO CARE
Attending MD Barreto: I personally have seen and examined this patient.  Resident note reviewed and agree on plan of care and except where noted.  See below for details.     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  Reports PEG placed 2/2023 after CVA for poor po intake.  Reports today son was going to give patient her medicine's when he noted that the port portion of the PEG was on the bed next to the patient and the other portion was still in place.  Reports patient does have to be redirected to not touch her G tube.  Denies previous issue with PEG.  Reports otherwise patient at baseline.  Denies noting other new physical issues with patient.    Exam:   General: NAD  HENT: head NCAT, airway patent  Eyes: anicteric, no conjunctival injection   Lungs: lungs CTAB with good inspiratory effort, no increased work of breathing  Cardiac: +S1S2, no obvious m/r/g  GI: abdomen soft with +BS, NT, ND, portion of G tube still in tract, pulled with no resistance, balloon noted to be deflated at time of removal, new 20Fr G tube placed, easily into tract, surrounding area C/D/I, no erythema  MSK: ranging neck freely  Neuro: aphasic, moving bilateral UEs freely  Skin: no rash    A/P: 78F with PEG tube needing replacement, replaced at bedside, will obtain confirmatory XR, if well positioned, stable for discharge.  Follow up instructions given, importance of follow up emphasized, return to ED parameters reviewed and patient's son verbalized understanding.  All questions answered, all concerns addressed. Attending MD Barreto: I personally have seen and examined this patient.  Resident note reviewed and agree on plan of care and except where noted.  See below for details.     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  Reports PEG placed 2/2023 after CVA for poor po intake.  Reports today son was going to give patient her medicine's when he noted that the port portion of the PEG was on the bed next to the patient and the other portion was still in place.  Reports patient does have to be redirected to not touch her G tube.  Denies previous issue with PEG.  Reports otherwise patient at baseline.  Denies noting other new physical issues with patient.    Exam:   General: NAD  HENT: head NCAT, airway patent  Eyes: anicteric, no conjunctival injection   Lungs: lungs CTAB with good inspiratory effort, no increased work of breathing  Cardiac: +S1S2, no obvious m/r/g  GI: abdomen soft with +BS, NT, ND, portion of G tube still in tract, pulled with no resistance, balloon noted to be deflated at time of removal, new 20Fr G tube placed, easily into tract, surrounding area C/D/I, no erythema  MSK: ranging neck freely  Neuro: aphasic, moving UEs freely, some movement in bilateral LEs  Skin: no rash    A/P: 78F with PEG tube needing replacement, replaced at bedside, will obtain confirmatory XR, if well positioned, stable for discharge.  Follow up instructions given, importance of follow up emphasized, return to ED parameters reviewed and patient's son verbalized understanding.  All questions answered, all concerns addressed.

## 2023-08-05 NOTE — ED PROVIDER NOTE - CLINICAL SUMMARY MEDICAL DECISION MAKING FREE TEXT BOX
78F with dementia, T2DM, CVA in January w/ bilateral lower extremity deficits and non-verbal, G tube placed in February 2023 2/2 poor PO intake now presenting with broken PEG tube. Pt's PEG tube was broken approximately an hour ago, unsure of how the PEG tube was broken. Pt is otherwise asymptomatic. Denies fevers, vomiting. Limited ROS 2/2 pt being non-verbal. Physical examination concerning for broken PEG tube. Tube was placed 2/1/23. Otherwise unremarkable abd examination. Will replaced PEG tube, obtain XR to ensure proper placement, and likely d/c w/ close PCP f/u.

## 2023-08-05 NOTE — ED ADULT TRIAGE NOTE - HEIGHT IN FEET
Patient Education     4 Steps for Eating Healthier  Changing the way you eat can improve your health. It can lower your cholesterol and blood pressure, and help you stay at a healthy weight. Your diet doesn’t have to be bland and boring to be healthy. Just watch your calories and follow these steps:    Step 1. Eat fewer unhealthy fats  · Choose more fish and lean meats instead of fatty cuts of meat.  · Skip butter and lard, and use less margarine.  · Pass on foods that have palm, coconut, or hydrogenated oils.  · Eat fewer high-fat dairy foods like cheese, ice cream, and whole milk.  · Get a heart-healthy cookbook and try some low-fat recipes.  Step 2. Go light on salt  · Keep the saltshaker off the table.  · Limit high-salt ingredients, such as soy sauce, bouillon, and garlic salt.  · Instead of adding salt when cooking, season your food with herbs and flavorings. Try lemon, garlic, and onion, or salt-free herb seasonings.  · Limit convenience foods, such as boxed or canned foods and restaurant food.  · Read food labels and choose lower-sodium options.  Step 3. Limit sugar  · Pause before you add sugars to pancakes, cereal, coffee, or tea. This includes white and brown table sugar, syrup, honey, and molasses. Cut your usual amount by half.  · Use non-sugar sweeteners. Stevia, aspartame, and sucralose can satisfy a sweet tooth without adding calories.  · Swap out sugar-filled soda and other drinks. Buy sugar-free or low-calorie beverages. Remember water is always the best choice.  · Read labels and choose foods with less added sugar. Keep in mind that dairy foods and foods with fruit will have some natural sugar.  · Cut the sugar in recipes by 1/3 to 1/2. Boost the flavor with extracts like almond, vanilla, or orange. Or add spices such as cinnamon or nutmeg.  Step 4. Eat more fiber  · Eat fresh fruits and vegetables every day.  · Boost your diet with whole grains. Go for oats, whole-grain rice, and bran.  · Add  beans and lentils to your meals.  · Drink more water to match your fiber increase to help prevent constipation.  Date Last Reviewed: 6/1/2017  © 2964-6737 [employer]. [address] [place] [address], [place] [address]. All rights reserved. This information is not intended as a substitute for professional medical care. Always follow your healthcare professional's instructions.           [facility] Hours  Mon-Thurs 6:30am-6pm  Friday 6:30am-5pm  Saturday 6:30am-12pm     5

## 2023-08-05 NOTE — ED ADULT NURSE NOTE - OBJECTIVE STATEMENT
Pt is a 78y F BIBEMS from home to [facility] ED c/o of broken G-tube. Pt is non-verbal at baseline, family at bedside endorses about half an hour ago they went to give pt medications and the connector part of the G-tube was missing, unknown how G-tube broke, called doctor who referred them to the ED for eval. PMH of Dementia, CVA (January 2023), DM2. Pt is non-verbal at baseline, alert and awake in stretcher, breathing is spontaneous and unlabored with no evidence of acute distress. Pt on 81mg Aspirin. Pt is a 78y F BIBEMS from home to [facility] ED c/o of broken G-tube. Pt is non-verbal at baseline, family at bedside endorses about half an hour ago they went to give pt medications and the connector part of the G-tube was missing, unknown how G-tube broke, called doctor who referred them to the ED for eval. PMH of Dementia, CVA (January 2023), DM2. Pt is non-verbal at baseline, alert and awake in stretcher, breathing is spontaneous and unlabored with no evidence of acute distress. Pt on 81mg Aspirin. Pt is a 78y F BIBEMS from home to [facility] ED c/o of broken G-tube. Pt is non-verbal at baseline, family at bedside endorses about half an hour ago they went to give pt medications and the connector part of the G-tube was missing, unknown how G-tube broke, called doctor who referred them to the ED for eval. PMH of Dementia, CVA (January 2023), DM2. Pt is non-verbal at baseline, alert and awake in stretcher, breathing is spontaneous and unlabored with no evidence of acute distress. Pt on 81mg Aspirin. Pt is a 78y F BIBEMS from home to [facility] ED c/o of broken G-tube. Pt is non-verbal at baseline, family at bedside endorses about half an hour ago they went to give pt medications and the connector part of the G-tube was missing, unknown how G-tube broke, called doctor who referred them to the ED for eval. G-tube originally placed in February of this year. PMH of Dementia, CVA (January 2023), DM2. Pt is non-verbal at baseline, alert and awake in stretcher, breathing is spontaneous and unlabored with no evidence of acute distress. Pt on 81mg Aspirin. Pt is a 78y F BIBEMS from home to [facility] ED c/o of broken G-tube. Pt is non-verbal at baseline, family at bedside endorses about half an hour ago they went to give pt medications and the connector part of the G-tube was missing, unknown how G-tube broke, called doctor who referred them to the ED for eval. G-tube originally placed in February of this year. PMH of Dementia, CVA (January 2023), DM2. Pt is non-verbal at baseline, alert and awake in stretcher, breathing is spontaneous and unlabored with no evidence of acute distress. Pt on 81mg Aspirin. Pt is a 78y F BIBEMS from home to [facility] ED c/o of broken G-tube. Pt is non-verbal at baseline, family at bedside endorses about half an hour ago they went to give pt medications and the connector part of the G-tube was missing, unknown how G-tube broke, called doctor who referred them to the ED for eval. G-tube originally placed in February of this year. PMH of Dementia, CVA (January 2023), DM2. Pt is non-verbal at baseline, alert and awake in stretcher, breathing is spontaneous and unlabored with no evidence of acute distress. Pt on 81mg Aspirin.

## 2023-08-05 NOTE — ED PROCEDURE NOTE - CPROC ED TIME OUT STATEMENT1
“Patient's name, , procedure and correct site were confirmed during the [place] Timeout.” “Patient's name, , procedure and correct site were confirmed during the [place] Timeout.” “Patient's name, , procedure and correct site were confirmed during the [place] Timeout.”

## 2023-08-05 NOTE — ED PROVIDER NOTE - OBJECTIVE STATEMENT
78F with dementia, T2DM, CVA in January w/ bilateral lower extremity deficits and non-verbal, G tube placed in February 2023 2/2 poor PO intake now presenting with broken PEG tube. Pt's PEG tube was broken approximately an hour ago, unsure of how the PEG tube was broken. Pt is otherwise asymptomatic. Denies fevers, vomiting. Limited ROS 2/2 pt being non-verbal.

## 2023-08-05 NOTE — ED PROVIDER NOTE - PATIENT PORTAL LINK FT
You can access the FollowMyHealth Patient Portal offered by [facility] by registering at the following website: [email or web address]. By joining [employer]’s FollowMyHealth portal, you will also be able to view your health information using other applications (apps) compatible with our system. You can access the FollowMyHealth Patient Portal offered by [facility] by registering at the following website: [email or web address]. By joining [employer]’s FollowMyHealth portal, you will also be able to view your health information using other applications (apps) compatible with our system. You can access the FollowMyHealth Patient Portal offered by [facility] by registering at the following website: [email or web address]. By joining [employer]’s FollowMyHealth portal, you will also be able to view your health information using other applications (apps) compatible with our system.

## 2023-09-14 ENCOUNTER — OFFICE (OUTPATIENT)
Dept: URBAN - METROPOLITAN AREA CLINIC 63 | Facility: CLINIC | Age: 78
Setting detail: OPHTHALMOLOGY
End: 2023-09-14
Payer: MEDICAID

## 2023-09-14 DIAGNOSIS — H25.13: ICD-10-CM

## 2023-09-14 DIAGNOSIS — H35.033: ICD-10-CM

## 2023-09-14 DIAGNOSIS — E11.9: ICD-10-CM

## 2023-09-14 DIAGNOSIS — H02.834: ICD-10-CM

## 2023-09-14 DIAGNOSIS — H04.123: ICD-10-CM

## 2023-09-14 DIAGNOSIS — H43.813: ICD-10-CM

## 2023-09-14 DIAGNOSIS — H02.831: ICD-10-CM

## 2023-09-14 DIAGNOSIS — H40.013: ICD-10-CM

## 2023-09-14 PROCEDURE — 92014 COMPRE OPH EXAM EST PT 1/>: CPT | Performed by: STUDENT IN AN ORGANIZED HEALTH CARE EDUCATION/TRAINING PROGRAM

## 2023-09-14 PROCEDURE — 92250 FUNDUS PHOTOGRAPHY W/I&R: CPT | Performed by: STUDENT IN AN ORGANIZED HEALTH CARE EDUCATION/TRAINING PROGRAM

## 2023-09-14 ASSESSMENT — KERATOMETRY
OD_AXISANGLE_DEGREES: 167
OS_AXISANGLE_DEGREES: 167
OD_K2POWER_DIOPTERS: 44.00
OS_K2POWER_DIOPTERS: 44.00
METHOD_AUTO_MANUAL: AUTO
OS_K1POWER_DIOPTERS: 4.75
OD_K1POWER_DIOPTERS: 43.25

## 2023-09-14 ASSESSMENT — REFRACTION_AUTOREFRACTION
OS_CYLINDER: -1.25
OD_SPHERE: +2.00
OS_AXIS: 113
OS_SPHERE: +3.00
OD_AXIS: 075
OD_CYLINDER: -0.50

## 2023-09-14 ASSESSMENT — VISUAL ACUITY
OD_BCVA: 20/40-1
OS_BCVA: 20/40

## 2023-09-14 ASSESSMENT — REFRACTION_MANIFEST
OD_VA2: 20/25
OD_SPHERE: +2.50
OU_VA: 20/30
OD_AXIS: 175
OS_ADD: +2.75
OS_CYLINDER: -1.00
OS_AXIS: 040
OD_SPHERE: +1.00
OS_SPHERE: +3.00
OD_CYLINDER: -1.00
OS_VA1: 20/30
OS_VA2: 20/25
OS_VA1: 20/40
OD_AXIS: 055
OS_AXIS: 110
OD_VA1: 20/40
OD_VA1: 20/40
OS_SPHERE: +1.75
OD_ADD: +2.75
OD_CYLINDER: +2.25
OS_CYLINDER: +1.75

## 2023-09-14 ASSESSMENT — SPHEQUIV_DERIVED
OS_SPHEQUIV: 2.625
OD_SPHEQUIV: 2
OS_SPHEQUIV: 2.5
OS_SPHEQUIV: 2.375
OD_SPHEQUIV: 2.125
OD_SPHEQUIV: 1.75

## 2023-09-14 ASSESSMENT — REFRACTION_CURRENTRX
OS_SPHERE: +2.25
OS_ADD: +2.75
OD_CYLINDER: -0.75
OD_VPRISM_DIRECTION: PROGS
OD_AXIS: 020
OS_VPRISM_DIRECTION: PROGS
OD_OVR_VA: 20/
OD_ADD: +2.75
OS_CYLINDER: -0.75
OD_SPHERE: +2.25
OS_AXIS: 112
OS_OVR_VA: 20/

## 2023-09-14 ASSESSMENT — SUPERFICIAL PUNCTATE KERATITIS (SPK)
OD_SPK: 2+
OS_SPK: 2+

## 2023-09-14 ASSESSMENT — AXIALLENGTH_DERIVED
OD_AL: 22.7947
OD_AL: 22.7493
OS_AL: 31.73
OS_AL: 31.64
OD_AL: 22.886
OS_AL: 31.82

## 2023-09-14 ASSESSMENT — LID EXAM ASSESSMENTS
OD_MEIBOMITIS: RLL 1+
OS_MEIBOMITIS: LLL 1+

## 2023-09-14 ASSESSMENT — TONOMETRY
OD_IOP_MMHG: 15
OS_IOP_MMHG: 14

## 2023-09-14 ASSESSMENT — LID POSITION - DERMATOCHALASIS
OD_DERMATOCHALASIS: RUL 1+
OS_DERMATOCHALASIS: LUL 1+

## 2023-09-14 ASSESSMENT — CONFRONTATIONAL VISUAL FIELD TEST (CVF)
OD_FINDINGS: FULL
OS_FINDINGS: FULL

## 2024-09-30 ENCOUNTER — OFFICE (OUTPATIENT)
Dept: URBAN - METROPOLITAN AREA CLINIC 63 | Facility: CLINIC | Age: 79
Setting detail: OPHTHALMOLOGY
End: 2024-09-30
Payer: MEDICAID

## 2024-09-30 DIAGNOSIS — H02.831: ICD-10-CM

## 2024-09-30 DIAGNOSIS — H52.4: ICD-10-CM

## 2024-09-30 DIAGNOSIS — H43.813: ICD-10-CM

## 2024-09-30 DIAGNOSIS — H02.834: ICD-10-CM

## 2024-09-30 DIAGNOSIS — E11.9: ICD-10-CM

## 2024-09-30 DIAGNOSIS — H04.123: ICD-10-CM

## 2024-09-30 DIAGNOSIS — H40.013: ICD-10-CM

## 2024-09-30 DIAGNOSIS — H25.13: ICD-10-CM

## 2024-09-30 DIAGNOSIS — H35.033: ICD-10-CM

## 2024-09-30 PROCEDURE — 92015 DETERMINE REFRACTIVE STATE: CPT | Performed by: STUDENT IN AN ORGANIZED HEALTH CARE EDUCATION/TRAINING PROGRAM

## 2024-09-30 PROCEDURE — 92014 COMPRE OPH EXAM EST PT 1/>: CPT | Performed by: STUDENT IN AN ORGANIZED HEALTH CARE EDUCATION/TRAINING PROGRAM

## 2024-09-30 PROCEDURE — 92250 FUNDUS PHOTOGRAPHY W/I&R: CPT | Performed by: STUDENT IN AN ORGANIZED HEALTH CARE EDUCATION/TRAINING PROGRAM

## 2024-09-30 ASSESSMENT — CONFRONTATIONAL VISUAL FIELD TEST (CVF)
OD_FINDINGS: FULL
OS_FINDINGS: FULL

## 2024-10-01 ASSESSMENT — REFRACTION_MANIFEST
OD_AXIS: 085
OD_SPHERE: +1.00
OD_AXIS: 175
OS_VA1: 20/30
OD_VA1: 20/40
OD_CYLINDER: -0.50
OD_VA1: 20/40
OS_VA1: 20/40
OS_CYLINDER: +1.75
OS_AXIS: 040
OU_VA: 20/30
OD_CYLINDER: +2.25
OS_CYLINDER: -0.50
OS_AXIS: 105
OS_ADD: +2.75
OD_ADD: +2.75
OD_VA2: 20/25
OD_SPHERE: +2.50
OS_SPHERE: +3.00
OS_SPHERE: +1.75
OS_VA2: 20/25

## 2024-10-01 ASSESSMENT — KERATOMETRY
OS_AXISANGLE_DEGREES: 010
OD_AXISANGLE_DEGREES: 174
OD_K1POWER_DIOPTERS: 43.00
OD_K2POWER_DIOPTERS: 43.75
OS_K1POWER_DIOPTERS: 42.50
OS_K2POWER_DIOPTERS: 43.75
METHOD_AUTO_MANUAL: AUTO

## 2024-10-01 ASSESSMENT — REFRACTION_CURRENTRX
OD_ADD: +2.75
OS_SPHERE: +2.25
OD_OVR_VA: 20/
OS_CYLINDER: -0.75
OS_ADD: +2.75
OD_CYLINDER: -0.75
OD_AXIS: 020
OD_VPRISM_DIRECTION: PROGS
OS_AXIS: 112
OS_VPRISM_DIRECTION: PROGS
OS_OVR_VA: 20/
OD_SPHERE: +2.25

## 2024-10-01 ASSESSMENT — SUPERFICIAL PUNCTATE KERATITIS (SPK)
OS_SPK: 2+
OD_SPK: 2+

## 2024-10-01 ASSESSMENT — LID EXAM ASSESSMENTS
OS_MEIBOMITIS: LLL 1+
OD_MEIBOMITIS: RLL 1+

## 2024-10-01 ASSESSMENT — LID POSITION - DERMATOCHALASIS
OS_DERMATOCHALASIS: LUL 1+
OD_DERMATOCHALASIS: RUL 1+

## 2025-05-15 ENCOUNTER — EMERGENCY (EMERGENCY)
Facility: HOSPITAL | Age: 80
LOS: 1 days | End: 2025-05-15
Attending: EMERGENCY MEDICINE | Admitting: EMERGENCY MEDICINE
Payer: MEDICAID

## 2025-05-15 VITALS
WEIGHT: 179.9 LBS | HEIGHT: 62 IN | SYSTOLIC BLOOD PRESSURE: 119 MMHG | TEMPERATURE: 98 F | HEART RATE: 74 BPM | OXYGEN SATURATION: 98 % | RESPIRATION RATE: 16 BRPM | DIASTOLIC BLOOD PRESSURE: 73 MMHG

## 2025-05-15 PROCEDURE — 99285 EMERGENCY DEPT VISIT HI MDM: CPT

## 2025-05-15 RX ORDER — ONDANSETRON HCL/PF 4 MG/2 ML
4 VIAL (ML) INJECTION ONCE
Refills: 0 | Status: COMPLETED | OUTPATIENT
Start: 2025-05-15 | End: 2025-05-16

## 2025-05-15 NOTE — ED PROVIDER NOTE - PRINCIPAL DIAGNOSIS
Bed: ED11  Expected date: 12/6/22  Expected time: 7:39 AM  Means of arrival:   Comments:  [id number]   Acute diarrhea

## 2025-05-15 NOTE — ED PROVIDER NOTE - OBJECTIVE STATEMENT
80y F c/o abd pain and diarrhea today, pt came to [place] from [place] 2 wks ago, was well until today, all day abd pain, mostly left sided and diarrhea, has nausea, no vomiting, no known fever, feels cold and weak/tired since all of the vomiting

## 2025-05-15 NOTE — ED PROVIDER NOTE - NSFOLLOWUPINSTRUCTIONS_ED_ALL_ED_FT
Acute Diarrhea    AMBULATORY CARE:    Acute diarrhea starts quickly and lasts a short time, usually 1 to 3 days. It can last up to 2 weeks.    Signs and symptoms that may happen with diarrhea: You may have 3 or more episodes of diarrhea. It may be hard to control your diarrhea. You may also have any of the following:    Fever and chills    Headache or abdominal pain    Nausea and vomiting    Symptoms of dehydration such as thirst, decreased urination, dry skin, sunken eyes, or fast, pounding heartbeat  Seek care immediately if:    You feel confused.    Your heartbeat is faster than usual.    Your eyes look deeply sunken, or you have no tears when you cry.    You urinate less than usual, or your urine is dark yellow.    You have blood or mucus in your bowel movements.    You have severe abdominal pain.    You are unable to drink any liquids.  Contact your healthcare provider if:    Your symptoms do not get better with treatment.    You have a fever higher than 101.3°F (38.5°C).    You have trouble eating and drinking because you are vomiting.    Your diarrhea does not get better in 7 days.    You have questions or concerns about your condition or care.  Treatment for acute diarrhea may include medicines to slow or stop your diarrhea. You may also need medicine to treat an infection.    Self-care:    Drink liquids as directed. Liquids will help prevent dehydration caused by diarrhea. Ask your healthcare provider how much liquid to drink each day and which liquids are best for you. You may need to drink an oral rehydration solution (ORS). An ORS has the right amounts of water, salts, and sugar you need to replace body fluids. You can buy an ORS at most grocery stores and pharmacies.    Eat foods that are easy to digest. Examples include rice, lentils, cereal, bananas, potatoes, and bread. It also includes some fruits (bananas, melon), well-cooked vegetables, and lean meats. Do not eat foods high in fiber, fat, and sugar. Also, do not drink alcohol until your diarrhea is gone.  Prevent diarrhea:    Wash your hands often. Use soap and water. Wash your hands before you eat or prepare food. Also wash your hands after you use the bathroom. Use an alcohol-based hand gel when soap and water are not available.  Handwashing      Keep bathroom surfaces clean. This helps prevent the spread of germs that cause acute diarrhea.    Wash fruits and vegetables well before you eat them. This can help remove germs that cause diarrhea. If possible, remove the skin from fruits and vegetables, or cook them well before you eat them.    Cook meat and poultry as directed. Meat includes beef and pork. Poultry includes chicken, turkey, and duck.  Cook ground meat to 160°F.    Cook ground poultry, whole poultry, or cuts of poultry to at least 165°F. Remove the poultry from heat. Let it stand for 3 minutes before you eat it.    Cook whole cuts of meat other than poultry to at least 145°F. Remove the meat from heat. Let it stand for 3 minutes before you eat it.    Wash dishes that have touched raw meat or poultry with hot water and soap. This includes cutting boards, utensils, dishes, and serving containers.    Place raw or cooked meat or poultry in the refrigerator as soon as possible. Bacteria can grow in meat that is left at room temperature too long.    Do not eat raw or undercooked oysters, clams, or mussels. These foods may be contaminated and cause infection.    Drink only filtered or treated water when you travel. Do not put ice in your drinks. Drink bottled water whenever possible.  Follow up with your doctor as directed: Write down your questions so you remember to ask them during your visits.

## 2025-05-15 NOTE — ED PROVIDER NOTE - WET READ LAUNCH FT
----- Message from Cindy Bella sent at 1/27/2023  8:23 AM EST -----  Regarding: Blood work  Contact: [phone]  Do I have blood work?   There are no Wet Read(s) to document.

## 2025-05-15 NOTE — ED PROVIDER NOTE - NSICDXPASTMEDICALHX_GEN_ALL_CORE_FT
PAST MEDICAL HISTORY:  Diabetes     GERD (gastroesophageal reflux disease)     H/O neuropathy     High cholesterol     Hypertension

## 2025-05-16 VITALS
TEMPERATURE: 98 F | OXYGEN SATURATION: 97 % | DIASTOLIC BLOOD PRESSURE: 66 MMHG | HEART RATE: 70 BPM | RESPIRATION RATE: 16 BRPM | SYSTOLIC BLOOD PRESSURE: 117 MMHG

## 2025-05-16 LAB
ALBUMIN SERPL ELPH-MCNC: 4 G/DL — SIGNIFICANT CHANGE UP (ref 3.3–5)
ALP SERPL-CCNC: 83 U/L — SIGNIFICANT CHANGE UP (ref 30–120)
ALT FLD-CCNC: 30 U/L — SIGNIFICANT CHANGE UP (ref 10–60)
ANION GAP SERPL CALC-SCNC: 9 MMOL/L — SIGNIFICANT CHANGE UP (ref 5–17)
APPEARANCE UR: CLEAR — SIGNIFICANT CHANGE UP
AST SERPL-CCNC: 26 U/L — SIGNIFICANT CHANGE UP (ref 10–40)
BASOPHILS # BLD AUTO: 0.02 K/UL — SIGNIFICANT CHANGE UP (ref 0–0.2)
BASOPHILS NFR BLD AUTO: 0.2 % — SIGNIFICANT CHANGE UP (ref 0–2)
BILIRUB SERPL-MCNC: 0.5 MG/DL — SIGNIFICANT CHANGE UP (ref 0.2–1.2)
BILIRUB UR-MCNC: NEGATIVE — SIGNIFICANT CHANGE UP
BUN SERPL-MCNC: 27 MG/DL — HIGH (ref 7–23)
CALCIUM SERPL-MCNC: 10 MG/DL — SIGNIFICANT CHANGE UP (ref 8.4–10.5)
CHLORIDE SERPL-SCNC: 102 MMOL/L — SIGNIFICANT CHANGE UP (ref 96–108)
CO2 SERPL-SCNC: 25 MMOL/L — SIGNIFICANT CHANGE UP (ref 22–31)
COLOR SPEC: YELLOW — SIGNIFICANT CHANGE UP
CREAT SERPL-MCNC: 1.02 MG/DL — SIGNIFICANT CHANGE UP (ref 0.5–1.3)
DIFF PNL FLD: NEGATIVE — SIGNIFICANT CHANGE UP
EGFR: 55 ML/MIN/1.73M2 — LOW
EGFR: 55 ML/MIN/1.73M2 — LOW
EOSINOPHIL # BLD AUTO: 0.05 K/UL — SIGNIFICANT CHANGE UP (ref 0–0.5)
EOSINOPHIL NFR BLD AUTO: 0.6 % — SIGNIFICANT CHANGE UP (ref 0–6)
GLUCOSE SERPL-MCNC: 124 MG/DL — HIGH (ref 70–99)
GLUCOSE UR QL: NEGATIVE MG/DL — SIGNIFICANT CHANGE UP
HCT VFR BLD CALC: 33.4 % — LOW (ref 34.5–45)
HGB BLD-MCNC: 10.6 G/DL — LOW (ref 11.5–15.5)
IMM GRANULOCYTES NFR BLD AUTO: 0.2 % — SIGNIFICANT CHANGE UP (ref 0–0.9)
KETONES UR QL: NEGATIVE MG/DL — SIGNIFICANT CHANGE UP
LEUKOCYTE ESTERASE UR-ACNC: NEGATIVE — SIGNIFICANT CHANGE UP
LIDOCAIN IGE QN: 82 U/L — HIGH (ref 16–77)
LYMPHOCYTES # BLD AUTO: 2.23 K/UL — SIGNIFICANT CHANGE UP (ref 1–3.3)
LYMPHOCYTES # BLD AUTO: 27.5 % — SIGNIFICANT CHANGE UP (ref 13–44)
MCHC RBC-ENTMCNC: 22.9 PG — LOW (ref 27–34)
MCHC RBC-ENTMCNC: 31.7 G/DL — LOW (ref 32–36)
MCV RBC AUTO: 72.3 FL — LOW (ref 80–100)
MONOCYTES # BLD AUTO: 0.42 K/UL — SIGNIFICANT CHANGE UP (ref 0–0.9)
MONOCYTES NFR BLD AUTO: 5.2 % — SIGNIFICANT CHANGE UP (ref 2–14)
NEUTROPHILS # BLD AUTO: 5.38 K/UL — SIGNIFICANT CHANGE UP (ref 1.8–7.4)
NEUTROPHILS NFR BLD AUTO: 66.3 % — SIGNIFICANT CHANGE UP (ref 43–77)
NITRITE UR-MCNC: NEGATIVE — SIGNIFICANT CHANGE UP
NRBC BLD AUTO-RTO: 0 /100 WBCS — SIGNIFICANT CHANGE UP (ref 0–0)
PH UR: 5.5 — SIGNIFICANT CHANGE UP (ref 5–8)
PLATELET # BLD AUTO: 252 K/UL — SIGNIFICANT CHANGE UP (ref 150–400)
POTASSIUM SERPL-MCNC: 4.8 MMOL/L — SIGNIFICANT CHANGE UP (ref 3.5–5.3)
POTASSIUM SERPL-SCNC: 4.8 MMOL/L — SIGNIFICANT CHANGE UP (ref 3.5–5.3)
PROT SERPL-MCNC: 8 G/DL — SIGNIFICANT CHANGE UP (ref 6–8.3)
PROT UR-MCNC: NEGATIVE MG/DL — SIGNIFICANT CHANGE UP
RBC # BLD: 4.62 M/UL — SIGNIFICANT CHANGE UP (ref 3.8–5.2)
RBC # FLD: 17.2 % — HIGH (ref 10.3–14.5)
SODIUM SERPL-SCNC: 136 MMOL/L — SIGNIFICANT CHANGE UP (ref 135–145)
SP GR SPEC: 1.03 — HIGH (ref 1–1.03)
UROBILINOGEN FLD QL: 0.2 MG/DL — SIGNIFICANT CHANGE UP (ref 0.2–1)
WBC # BLD: 8.12 K/UL — SIGNIFICANT CHANGE UP (ref 3.8–10.5)
WBC # FLD AUTO: 8.12 K/UL — SIGNIFICANT CHANGE UP (ref 3.8–10.5)

## 2025-05-16 PROCEDURE — 81003 URINALYSIS AUTO W/O SCOPE: CPT

## 2025-05-16 PROCEDURE — 80053 COMPREHEN METABOLIC PANEL: CPT

## 2025-05-16 PROCEDURE — 83690 ASSAY OF LIPASE: CPT

## 2025-05-16 PROCEDURE — 74177 CT ABD & PELVIS W/CONTRAST: CPT | Mod: 26

## 2025-05-16 PROCEDURE — 74177 CT ABD & PELVIS W/CONTRAST: CPT

## 2025-05-16 PROCEDURE — 96361 HYDRATE IV INFUSION ADD-ON: CPT

## 2025-05-16 PROCEDURE — 36415 COLL VENOUS BLD VENIPUNCTURE: CPT

## 2025-05-16 PROCEDURE — 99284 EMERGENCY DEPT VISIT MOD MDM: CPT | Mod: 25

## 2025-05-16 PROCEDURE — 85025 COMPLETE CBC W/AUTO DIFF WBC: CPT

## 2025-05-16 PROCEDURE — 96374 THER/PROPH/DIAG INJ IV PUSH: CPT | Mod: XU

## 2025-05-16 RX ORDER — GABAPENTIN 400 MG/1
1 CAPSULE ORAL
Refills: 0 | DISCHARGE

## 2025-05-16 RX ORDER — FERRIC MALTOL 30 MG/1
30 CAPSULE ORAL
Refills: 0 | DISCHARGE

## 2025-05-16 RX ORDER — METOPROLOL SUCCINATE 50 MG/1
1 TABLET, EXTENDED RELEASE ORAL
Refills: 0 | DISCHARGE

## 2025-05-16 RX ORDER — ATORVASTATIN CALCIUM 80 MG/1
1 TABLET, FILM COATED ORAL
Refills: 0 | DISCHARGE

## 2025-05-16 RX ORDER — SERTRALINE 100 MG/1
1 TABLET, FILM COATED ORAL
Refills: 0 | DISCHARGE

## 2025-05-16 RX ORDER — METFORMIN HYDROCHLORIDE 850 MG/1
1 TABLET ORAL
Refills: 0 | DISCHARGE

## 2025-05-16 RX ORDER — AMLODIPINE BESYLATE 10 MG/1
1 TABLET ORAL
Refills: 0 | DISCHARGE

## 2025-05-16 RX ADMIN — Medication 4 MILLIGRAM(S): at 00:14

## 2025-05-16 RX ADMIN — Medication 1000 MILLILITER(S): at 00:14

## 2025-05-16 RX ADMIN — Medication 1000 MILLILITER(S): at 04:27

## 2025-05-16 NOTE — ED ADULT NURSE NOTE - NSFALLHARMRISKINTERV_ED_ALL_ED

## 2025-05-16 NOTE — ED ADULT NURSE REASSESSMENT NOTE - NS ED NURSE REASSESS COMMENT FT1
MD aware of results. pt and her daughter informed of same. urine specimen obtained and sent to lab. awaiting results and disposition at this time
MD aware of all results. pt and her daughter informed of same. encouraged to follow up with PCP and to return for worsening symptoms. left unit in NAD via w/c

## 2025-05-16 NOTE — ED ADULT NURSE NOTE - OBJECTIVE STATEMENT
pt to ED brought in by her daughter who reports pt with many episodes of diarrhea and abdominal pain that started today. no sick contacts. daughter reports pt recently traveled from [place] about 2 weeks ago. +nausea, no emesis noted

## 2025-07-01 NOTE — DIETITIAN INITIAL EVALUATION ADULT - WEIGHT FOR BMI (LBS)
Body Location Override (Optional - Billing Will Still Be Based On Selected Body Map Location If Applicable): feet Detail Level: Detailed Cpt Desired:  Showing: negative findings within normal realm Koh Intro Text (From The.....): A KOH prep was ordered and evaluated from the left posterior thigh. Koh Procedure Text (Tissue Harvesting Technique): The skin scrapings were placed on a glass slide, covered with a coverslip and a KOH solution was applied. 128.1

## 2025-07-16 ENCOUNTER — OFFICE (OUTPATIENT)
Facility: LOCATION | Age: 80
Setting detail: OPHTHALMOLOGY
End: 2025-07-16
Payer: MEDICAID

## 2025-07-16 DIAGNOSIS — E11.9: ICD-10-CM

## 2025-07-16 DIAGNOSIS — H43.813: ICD-10-CM

## 2025-07-16 DIAGNOSIS — H40.013: ICD-10-CM

## 2025-07-16 DIAGNOSIS — H25.13: ICD-10-CM

## 2025-07-16 DIAGNOSIS — H04.123: ICD-10-CM

## 2025-07-16 DIAGNOSIS — H02.834: ICD-10-CM

## 2025-07-16 DIAGNOSIS — H35.033: ICD-10-CM

## 2025-07-16 DIAGNOSIS — H16.223: ICD-10-CM

## 2025-07-16 DIAGNOSIS — H02.831: ICD-10-CM

## 2025-07-16 PROCEDURE — 92014 COMPRE OPH EXAM EST PT 1/>: CPT | Performed by: STUDENT IN AN ORGANIZED HEALTH CARE EDUCATION/TRAINING PROGRAM

## 2025-07-16 PROCEDURE — 92133 CPTRZD OPH DX IMG PST SGM ON: CPT | Performed by: STUDENT IN AN ORGANIZED HEALTH CARE EDUCATION/TRAINING PROGRAM

## 2025-07-16 ASSESSMENT — KERATOMETRY
OD_K2POWER_DIOPTERS: 43.75
OS_AXISANGLE_DEGREES: 008
OD_K1POWER_DIOPTERS: 42.75
METHOD_AUTO_MANUAL: AUTO
OS_K1POWER_DIOPTERS: 42.00
OS_K2POWER_DIOPTERS: 43.75
OD_AXISANGLE_DEGREES: 173

## 2025-07-16 ASSESSMENT — REFRACTION_CURRENTRX
OD_AXIS: 016
OS_AXIS: 117
OS_VPRISM_DIRECTION: BF
OD_OVR_VA: 20/
OD_CYLINDER: -0.75
OD_ADD: +3.25
OD_AXIS: 020
OS_SPHERE: +2.00
OS_CYLINDER: -0.50
OS_OVR_VA: 20/
OS_VPRISM_DIRECTION: PROGS
OS_ADD: +2.75
OS_ADD: +3.25
OD_VPRISM_DIRECTION: BF
OD_VPRISM_DIRECTION: PROGS
OS_AXIS: 112
OD_CYLINDER: -0.50
OS_CYLINDER: -0.75
OS_SPHERE: +2.25
OD_SPHERE: +2.00
OD_ADD: +2.75
OD_OVR_VA: 20/
OD_SPHERE: +2.25
OS_OVR_VA: 20/

## 2025-07-16 ASSESSMENT — REFRACTION_MANIFEST
OS_ADD: +2.75
OD_CYLINDER: +2.25
OD_SPHERE: +2.50
OS_CYLINDER: -0.50
OS_AXIS: 105
OD_AXIS: 085
OS_VA2: 20/25
OD_VA1: 20/40
OS_CYLINDER: +1.75
OS_AXIS: 040
OU_VA: 20/30
OD_VA1: 20/40
OD_AXIS: 175
OS_SPHERE: +1.75
OD_VA2: 20/25
OD_ADD: +2.75
OD_SPHERE: +1.00
OS_SPHERE: +3.00
OS_VA1: 20/40
OD_CYLINDER: -0.50
OS_VA1: 20/30

## 2025-07-16 ASSESSMENT — REFRACTION_AUTOREFRACTION
OS_CYLINDER: -2.00
OS_AXIS: 108
OD_AXIS: 084
OD_SPHERE: +2.00
OD_CYLINDER: -3.25
OS_SPHERE: +2.25

## 2025-07-16 ASSESSMENT — CONFRONTATIONAL VISUAL FIELD TEST (CVF)
OD_FINDINGS: FULL
OS_FINDINGS: FULL

## 2025-07-16 ASSESSMENT — VISUAL ACUITY
OS_BCVA: 20/70
OD_BCVA: 20/60

## 2025-07-16 ASSESSMENT — SUPERFICIAL PUNCTATE KERATITIS (SPK)
OS_SPK: 2+
OD_SPK: 2+

## 2025-07-16 ASSESSMENT — TONOMETRY
OD_IOP_MMHG: 13
OS_IOP_MMHG: 10

## 2025-07-16 ASSESSMENT — LID EXAM ASSESSMENTS
OS_MEIBOMITIS: LLL 1+
OD_MEIBOMITIS: RLL 1+

## 2025-07-16 ASSESSMENT — LID POSITION - DERMATOCHALASIS
OD_DERMATOCHALASIS: RUL 1+
OS_DERMATOCHALASIS: LUL 1+

## (undated) DEVICE — TUBING SUCTION CONN 6FT STERILE

## (undated) DEVICE — SOL INJ NS 0.9% 500ML 2 PORT

## (undated) DEVICE — BITE BLOCK ADULT 20 X 27MM (GREEN)

## (undated) DEVICE — TUBING SUCTION 20FT

## (undated) DEVICE — CATH IV SAFE BC 20G X 1.16" (PINK)

## (undated) DEVICE — SENSOR O2 FINGER ADULT

## (undated) DEVICE — BALLOON US ENDO

## (undated) DEVICE — CATH IV SAFE BC 22G X 1" (BLUE)

## (undated) DEVICE — SYR ALLIANCE II INFLATION 60ML

## (undated) DEVICE — TUBING IV SET GRAVITY 3Y 100" MACRO

## (undated) DEVICE — FOLEY HOLDER STATLOCK 2 WAY ADULT

## (undated) DEVICE — SUCTION YANKAUER NO CONTROL VENT

## (undated) DEVICE — PACK IV START WITH CHG